# Patient Record
Sex: MALE | Race: WHITE | Employment: OTHER | URBAN - NONMETROPOLITAN AREA
[De-identification: names, ages, dates, MRNs, and addresses within clinical notes are randomized per-mention and may not be internally consistent; named-entity substitution may affect disease eponyms.]

---

## 2020-05-28 ENCOUNTER — APPOINTMENT (OUTPATIENT)
Dept: CT IMAGING | Age: 65
End: 2020-05-28
Payer: MEDICARE

## 2020-05-28 ENCOUNTER — APPOINTMENT (OUTPATIENT)
Dept: GENERAL RADIOLOGY | Age: 65
End: 2020-05-28
Payer: MEDICARE

## 2020-05-28 ENCOUNTER — HOSPITAL ENCOUNTER (EMERGENCY)
Age: 65
Discharge: HOME HEALTH | End: 2020-05-28
Attending: EMERGENCY MEDICINE
Payer: MEDICARE

## 2020-05-28 VITALS
WEIGHT: 190 LBS | SYSTOLIC BLOOD PRESSURE: 124 MMHG | DIASTOLIC BLOOD PRESSURE: 89 MMHG | RESPIRATION RATE: 19 BRPM | BODY MASS INDEX: 27.2 KG/M2 | TEMPERATURE: 97.6 F | HEIGHT: 70 IN | OXYGEN SATURATION: 93 % | HEART RATE: 78 BPM

## 2020-05-28 LAB
ALBUMIN SERPL-MCNC: 3.9 GM/DL (ref 3.4–5)
ALP BLD-CCNC: 74 IU/L (ref 40–129)
ALT SERPL-CCNC: 34 U/L (ref 10–40)
ANION GAP SERPL CALCULATED.3IONS-SCNC: 10 MMOL/L (ref 4–16)
AST SERPL-CCNC: 20 IU/L (ref 15–37)
BASOPHILS ABSOLUTE: 0.1 K/CU MM
BASOPHILS RELATIVE PERCENT: 0.7 % (ref 0–1)
BILIRUB SERPL-MCNC: 0.3 MG/DL (ref 0–1)
BUN BLDV-MCNC: 24 MG/DL (ref 6–23)
CALCIUM SERPL-MCNC: 9.1 MG/DL (ref 8.3–10.6)
CHLORIDE BLD-SCNC: 102 MMOL/L (ref 99–110)
CO2: 28 MMOL/L (ref 21–32)
CREAT SERPL-MCNC: 1 MG/DL (ref 0.9–1.3)
D DIMER: 333 NG/ML(DDU)
DIFFERENTIAL TYPE: ABNORMAL
EOSINOPHILS ABSOLUTE: 0.1 K/CU MM
EOSINOPHILS RELATIVE PERCENT: 1.3 % (ref 0–3)
GFR AFRICAN AMERICAN: >60 ML/MIN/1.73M2
GFR NON-AFRICAN AMERICAN: >60 ML/MIN/1.73M2
GLUCOSE BLD-MCNC: 175 MG/DL (ref 70–99)
HCT VFR BLD CALC: 51.1 % (ref 42–52)
HEMOGLOBIN: 16.2 GM/DL (ref 13.5–18)
IMMATURE NEUTROPHIL %: 0.2 % (ref 0–0.43)
LYMPHOCYTES ABSOLUTE: 2.2 K/CU MM
LYMPHOCYTES RELATIVE PERCENT: 24.5 % (ref 24–44)
MCH RBC QN AUTO: 33 PG (ref 27–31)
MCHC RBC AUTO-ENTMCNC: 31.7 % (ref 32–36)
MCV RBC AUTO: 104.1 FL (ref 78–100)
MONOCYTES ABSOLUTE: 0.6 K/CU MM
MONOCYTES RELATIVE PERCENT: 6.9 % (ref 0–4)
PDW BLD-RTO: 13.2 % (ref 11.7–14.9)
PLATELET # BLD: 173 K/CU MM (ref 140–440)
PMV BLD AUTO: 9.3 FL (ref 7.5–11.1)
POTASSIUM SERPL-SCNC: 4 MMOL/L (ref 3.5–5.1)
PRO-BNP: 2651 PG/ML
RBC # BLD: 4.91 M/CU MM (ref 4.6–6.2)
SEGMENTED NEUTROPHILS ABSOLUTE COUNT: 6 K/CU MM
SEGMENTED NEUTROPHILS RELATIVE PERCENT: 66.4 % (ref 36–66)
SODIUM BLD-SCNC: 140 MMOL/L (ref 135–145)
TOTAL IMMATURE NEUTOROPHIL: 0.02 K/CU MM
TOTAL PROTEIN: 7.2 GM/DL (ref 6.4–8.2)
TROPONIN T: <0.01 NG/ML
WBC # BLD: 9 K/CU MM (ref 4–10.5)

## 2020-05-28 PROCEDURE — 6360000002 HC RX W HCPCS: Performed by: EMERGENCY MEDICINE

## 2020-05-28 PROCEDURE — U0002 COVID-19 LAB TEST NON-CDC: HCPCS

## 2020-05-28 PROCEDURE — 80053 COMPREHEN METABOLIC PANEL: CPT

## 2020-05-28 PROCEDURE — 93005 ELECTROCARDIOGRAM TRACING: CPT | Performed by: EMERGENCY MEDICINE

## 2020-05-28 PROCEDURE — 84484 ASSAY OF TROPONIN QUANT: CPT

## 2020-05-28 PROCEDURE — 93010 ELECTROCARDIOGRAM REPORT: CPT | Performed by: INTERNAL MEDICINE

## 2020-05-28 PROCEDURE — 96374 THER/PROPH/DIAG INJ IV PUSH: CPT

## 2020-05-28 PROCEDURE — 85379 FIBRIN DEGRADATION QUANT: CPT

## 2020-05-28 PROCEDURE — 83880 ASSAY OF NATRIURETIC PEPTIDE: CPT

## 2020-05-28 PROCEDURE — 71045 X-RAY EXAM CHEST 1 VIEW: CPT

## 2020-05-28 PROCEDURE — 85025 COMPLETE CBC W/AUTO DIFF WBC: CPT

## 2020-05-28 PROCEDURE — 6360000004 HC RX CONTRAST MEDICATION: Performed by: EMERGENCY MEDICINE

## 2020-05-28 PROCEDURE — 99285 EMERGENCY DEPT VISIT HI MDM: CPT

## 2020-05-28 PROCEDURE — 71275 CT ANGIOGRAPHY CHEST: CPT

## 2020-05-28 RX ORDER — FUROSEMIDE 10 MG/ML
40 INJECTION INTRAMUSCULAR; INTRAVENOUS ONCE
Status: COMPLETED | OUTPATIENT
Start: 2020-05-28 | End: 2020-05-28

## 2020-05-28 RX ADMIN — IOPAMIDOL 75 ML: 755 INJECTION, SOLUTION INTRAVENOUS at 16:35

## 2020-05-28 RX ADMIN — FUROSEMIDE 40 MG: 10 INJECTION, SOLUTION INTRAVENOUS at 19:11

## 2020-05-28 ASSESSMENT — ENCOUNTER SYMPTOMS
RHINORRHEA: 0
SORE THROAT: 0
SHORTNESS OF BREATH: 1
VOMITING: 0
BACK PAIN: 0
NAUSEA: 0
ABDOMINAL PAIN: 0
EYE REDNESS: 0
WHEEZING: 0
COUGH: 1

## 2020-05-28 NOTE — ED NOTES
[facility] called for consult to [facility] hospitalist      Adele Pace, [address] [facility]  05/28/20 8664

## 2020-05-28 NOTE — ED PROVIDER NOTES
ADDENDUM:    Care of the patient was assumed  from Dr. Ulises Hawkins. I have reviewed the notes, assessments, and/or procedures performed, I concur with her/his documentation on Gagan Sena. I reviewed the medical record and evaluated the patient. ED COURSE/MDM:  Laboratory and imaging data were reviewed and care plan was arranged with the patient(see separate lab/imaging reports). RADIOLOGY:  Already resulted studies have been reviewed. CTA PULMONARY W CONTRAST   Final Result   No evidence of pulmonary embolism. Findings compatible with pulmonary edema with small pleural effusions. XR CHEST PORTABLE   Final Result   Findings are suggestive of congestive heart failure.              Labs Reviewed   CBC WITH AUTO DIFFERENTIAL - Abnormal; Notable for the following components:       Result Value    .1 (*)     MCH 33.0 (*)     MCHC 31.7 (*)     Segs Relative 66.4 (*)     Monocytes % 6.9 (*)     All other components within normal limits   COMPREHENSIVE METABOLIC PANEL W/ REFLEX TO MG FOR LOW K - Abnormal; Notable for the following components:    BUN 24 (*)     Glucose 175 (*)     All other components within normal limits   BRAIN NATRIURETIC PEPTIDE - Abnormal; Notable for the following components:    Pro-BNP 2,651 (*)     All other components within normal limits   D-DIMER, QUANTITATIVE - Abnormal; Notable for the following components:    D-Dimer, Quant 333 (*)     All other components within normal limits   TROPONIN   COVID-19       Medications   iopamidol (ISOVUE-370) 76 % injection 75 mL (75 mLs Intravenous Given 5/28/20 1635)   furosemide (LASIX) injection 40 mg (40 mg Intravenous Given 5/28/20 1911)       Vitals:    05/28/20 1318 05/28/20 1319 05/28/20 1416   BP: (!) 143/92     Pulse: 72  73   Resp: 18  15   Temp: 97.6 °F (36.4 °C)     TempSrc: Oral     SpO2: 98%  93%   Weight:  190 lb (86.2 kg)    Height:  5' 10\" (1.778 m)        Patient refuses to be admitted he changed his mind  I ([facility]    2. Hypoxia    3.  Shortness of breath        New Prescriptions    No medications on file                 Leisa Shi DO  05/28/20 6750

## 2020-05-28 NOTE — ED PROVIDER NOTES
suicidal ideas. Past Medical History:   Diagnosis Date    COPD (chronic obstructive pulmonary disease) ([facility]     Hypertension     Thyroid disease      History reviewed. No pertinent surgical history. History reviewed. No pertinent family history.   Social History     Socioeconomic History    Marital status:      Spouse name: Not on file    Number of children: Not on file    Years of education: Not on file    Highest education level: Not on file   Occupational History    Not on file   Social Needs    Financial resource strain: Not on file    Food insecurity     Worry: Not on file     Inability: Not on file    Transportation needs     Medical: Not on file     Non-medical: Not on file   Tobacco Use    Smoking status: Current Every Day Smoker     Packs/day: 1.00     Types: Cigarettes    Smokeless tobacco: Never Used   Substance and Sexual Activity    Alcohol use: Not on file     Comment: social    Drug use: Never    Sexual activity: Not on file   Lifestyle    Physical activity     Days per week: Not on file     Minutes per session: Not on file    Stress: Not on file   Relationships    Social connections     Talks on phone: Not on file     Gets together: Not on file     Attends Church service: Not on file     Active member of club or organization: Not on file     Attends meetings of clubs or organizations: Not on file     Relationship status: Not on file    Intimate partner violence     Fear of current or ex partner: Not on file     Emotionally abused: Not on file     Physically abused: Not on file     Forced sexual activity: Not on file   Other Topics Concern    Not on file   Social History Narrative    Not on file     Current Facility-Administered Medications   Medication Dose Route Frequency Provider Last Rate Last Dose    furosemide (LASIX) injection 40 mg  40 mg Intravenous Once Elvin Barron MD         Current Outpatient Medications   Medication Sig Dispense Refill   

## 2020-05-28 NOTE — ED NOTES
Spoke with Daria Snow at the access center to inform her that pt is signing out AMA.  She states she will pass it on     Justin Mckeon  05/28/20 1918

## 2020-05-29 ENCOUNTER — CARE COORDINATION (OUTPATIENT)
Dept: CARE COORDINATION | Age: 65
End: 2020-05-29

## 2020-05-29 LAB
EKG ATRIAL RATE: 68 BPM
EKG DIAGNOSIS: NORMAL
EKG P AXIS: -6 DEGREES
EKG P-R INTERVAL: 174 MS
EKG Q-T INTERVAL: 472 MS
EKG QRS DURATION: 110 MS
EKG QTC CALCULATION (BAZETT): 501 MS
EKG R AXIS: 64 DEGREES
EKG T AXIS: 31 DEGREES
EKG VENTRICULAR RATE: 68 BPM

## 2020-05-30 LAB
SARS-COV-2: NOT DETECTED
SOURCE: NORMAL

## 2020-06-01 ENCOUNTER — OFFICE VISIT (OUTPATIENT)
Dept: INTERNAL MEDICINE CLINIC | Age: 65
End: 2020-06-01
Payer: MEDICARE

## 2020-06-01 PROBLEM — M54.50 CHRONIC BILATERAL LOW BACK PAIN WITHOUT SCIATICA: Status: ACTIVE | Noted: 2020-06-01

## 2020-06-01 PROBLEM — G89.29 CHRONIC BILATERAL LOW BACK PAIN WITHOUT SCIATICA: Status: ACTIVE | Noted: 2020-06-01

## 2020-06-01 PROBLEM — J43.1 PANLOBULAR EMPHYSEMA (HCC): Status: ACTIVE | Noted: 2020-06-01

## 2020-06-01 PROBLEM — J81.0 ACUTE PULMONARY EDEMA (HCC): Status: ACTIVE | Noted: 2020-06-01

## 2020-06-01 PROBLEM — K21.9 GASTROESOPHAGEAL REFLUX DISEASE: Status: ACTIVE | Noted: 2020-06-01

## 2020-06-01 PROBLEM — M08.00 JUVENILE RHEUMATOID ARTHRITIS (HCC): Status: ACTIVE | Noted: 2020-06-01

## 2020-06-01 PROBLEM — Z72.0 TOBACCO USE: Status: ACTIVE | Noted: 2020-06-01

## 2020-06-01 PROBLEM — I10 ESSENTIAL HYPERTENSION: Status: ACTIVE | Noted: 2020-06-01

## 2020-06-01 PROBLEM — E03.9 ACQUIRED HYPOTHYROIDISM: Status: ACTIVE | Noted: 2020-06-01

## 2020-06-01 PROBLEM — I73.9 PERIPHERAL ARTERIAL DISEASE (HCC): Status: ACTIVE | Noted: 2020-06-01

## 2020-06-01 PROBLEM — N40.0 BENIGN PROSTATIC HYPERPLASIA: Status: ACTIVE | Noted: 2020-06-01

## 2020-06-01 PROBLEM — J30.89 NON-SEASONAL ALLERGIC RHINITIS: Status: ACTIVE | Noted: 2020-06-01

## 2020-06-01 PROCEDURE — 99204 OFFICE O/P NEW MOD 45 MIN: CPT | Performed by: INTERNAL MEDICINE

## 2020-06-01 RX ORDER — BUDESONIDE AND FORMOTEROL FUMARATE DIHYDRATE 160; 4.5 UG/1; UG/1
2 AEROSOL RESPIRATORY (INHALATION) 2 TIMES DAILY
Qty: 1 INHALER | Refills: 11
Start: 2020-06-01 | End: 2020-07-15 | Stop reason: SDUPTHER

## 2020-06-01 RX ORDER — CETIRIZINE HYDROCHLORIDE 10 MG/1
10 TABLET ORAL DAILY
Qty: 90 TABLET | Refills: 1
Start: 2020-06-01 | End: 2020-07-01

## 2020-06-01 RX ORDER — LEVOTHYROXINE SODIUM 0.03 MG/1
25 TABLET ORAL DAILY
Qty: 90 TABLET | Refills: 1
Start: 2020-06-01 | End: 2020-06-15

## 2020-06-01 RX ORDER — DULOXETIN HYDROCHLORIDE 30 MG/1
30 CAPSULE, DELAYED RELEASE ORAL DAILY
Qty: 90 CAPSULE | Refills: 1
Start: 2020-06-01 | End: 2020-06-15

## 2020-06-01 RX ORDER — FUROSEMIDE 20 MG/1
20 TABLET ORAL DAILY
Qty: 15 TABLET | Refills: 0 | Status: SHIPPED | OUTPATIENT
Start: 2020-06-01 | End: 2020-06-15

## 2020-06-01 RX ORDER — FINASTERIDE 5 MG/1
5 TABLET, FILM COATED ORAL DAILY
Qty: 90 TABLET | Refills: 1
Start: 2020-06-01 | End: 2020-07-15 | Stop reason: SDUPTHER

## 2020-06-01 RX ORDER — TAMSULOSIN HYDROCHLORIDE 0.4 MG/1
0.4 CAPSULE ORAL DAILY
Qty: 90 CAPSULE | Refills: 1
Start: 2020-06-01 | End: 2020-07-15 | Stop reason: SDUPTHER

## 2020-06-01 RX ORDER — METOPROLOL SUCCINATE 25 MG/1
25 TABLET, EXTENDED RELEASE ORAL DAILY
Qty: 90 TABLET | Refills: 1
Start: 2020-06-01 | End: 2020-06-15

## 2020-06-01 RX ORDER — ALBUTEROL SULFATE 90 UG/1
2 AEROSOL, METERED RESPIRATORY (INHALATION) 4 TIMES DAILY PRN
Qty: 1 INHALER | Refills: 11
Start: 2020-06-01 | End: 2020-07-15 | Stop reason: SDUPTHER

## 2020-06-01 RX ORDER — FAMOTIDINE 20 MG/1
20 TABLET, FILM COATED ORAL 2 TIMES DAILY
Qty: 60 TABLET | Refills: 3
Start: 2020-06-01 | End: 2020-07-15 | Stop reason: SDUPTHER

## 2020-06-01 RX ORDER — AMLODIPINE BESYLATE 10 MG/1
10 TABLET ORAL DAILY
Qty: 90 TABLET | Refills: 1
Start: 2020-06-01 | End: 2020-06-15

## 2020-06-01 RX ORDER — CYCLOBENZAPRINE HCL 5 MG
5 TABLET ORAL NIGHTLY PRN
Qty: 90 TABLET | Refills: 1
Start: 2020-06-01 | End: 2020-06-11

## 2020-06-01 ASSESSMENT — PATIENT HEALTH QUESTIONNAIRE - PHQ9
SUM OF ALL RESPONSES TO PHQ QUESTIONS 1-9: 0
2. FEELING DOWN, DEPRESSED OR HOPELESS: 0
SUM OF ALL RESPONSES TO PHQ QUESTIONS 1-9: 0
1. LITTLE INTEREST OR PLEASURE IN DOING THINGS: 0
SUM OF ALL RESPONSES TO PHQ9 QUESTIONS 1 & 2: 0

## 2020-06-01 NOTE — PROGRESS NOTES
Metoprolol and Amlodipine. # Takes Synthroid. Unclear dosing ? 25mcg. # Takes Zyrtec for allergic rhinitis. # Takes Finasteride and Flomax for BPH. # Takes Takes Cymbalta and Flexeril. Unsure of dosing of Cymbalta. # Smokes cigarettes. He is trying to quit smoking. He bought OTC patches. # Uses Albuterol and Symbicort for COPD. # Patient has PAD symptoms. # Reports he has h/o juvenile rheumatoid arthritis. # High MCV on CBC on 5/28/2020. # As per patient he should have had his gall bladder however due to pandemic restrictions he had to hold off. Asking for 1 week off. Drives a semi-truck. Having difficulty breathing. Health maintenance:   Health Maintenance Due   Topic Date Due    AAA screen  1955    Hepatitis C screen  1955    HIV screen  10/26/1969    Lipid screen  10/26/1994    Diabetes screen  10/26/1994    Colon cancer screen colonoscopy  10/26/2004    Shingles Vaccine (2 of 3) 04/02/2018    Pneumococcal 65+ years Vaccine (1 of 1 - PPSV23) 10/26/2019       Past Medical History:  Past Medical History:   Diagnosis Date    [facility]chronic obstructive pulmonary disease) ([facility]     Hypertension     Thyroid disease        Past Surgical History:  No past surgical history on file.     Allergies:  No Known Allergies    Medications:  Current outpatient prescriptions:  Outpatient Medications Marked as Taking for the 6/1/20 encounter (Office Visit) with Kaden Amos MD   Medication Sig Dispense Refill    finasteride (PROSCAR) 5 MG tablet Take 1 tablet by mouth daily 90 tablet 1    tamsulosin (FLOMAX) 0.4 MG capsule Take 1 capsule by mouth daily 90 capsule 1    cetirizine (ZYRTEC) 10 MG tablet Take 1 tablet by mouth daily 90 tablet 1    cyclobenzaprine (FLEXERIL) 5 MG tablet Take 1 tablet by mouth nightly as needed for Muscle spasms 90 tablet 1    levothyroxine (SYNTHROID) 25 MCG tablet Take 1 tablet by mouth daily 90 tablet 1    amLODIPine (NORVASC) 10 MG tablet Take 1 tablet by mouth daily 90 tablet 1    metoprolol succinate (TOPROL XL) 25 MG extended release tablet Take 1 tablet by mouth daily 90 tablet 1    DULoxetine (CYMBALTA) 30 MG extended release capsule Take 1 capsule by mouth daily 90 capsule 1    famotidine (PEPCID) 20 MG tablet Take 1 tablet by mouth 2 times daily 60 tablet 3    budesonide-formoterol (SYMBICORT) 160-4.5 MCG/ACT AERO Inhale 2 puffs into the lungs 2 times daily 1 Inhaler 11    albuterol sulfate HFA (VENTOLIN HFA) 108 (90 Base) MCG/ACT inhaler Inhale 2 puffs into the lungs 4 times daily as needed for Wheezing 1 Inhaler 11    furosemide (LASIX) 20 MG tablet Take 1 tablet by mouth daily 15 tablet 0       Social History:  Social History     Socioeconomic History    Marital status:      Spouse name: Not on file    Number of children: Not on file    Years of education: Not on file    Highest education level: Not on file   Occupational History    Not on file   Social Needs    Financial resource strain: Not on file    Food insecurity     Worry: Not on file     Inability: Not on file    Transportation needs     Medical: Not on file     Non-medical: Not on file   Tobacco Use    Smoking status: Current Every Day Smoker     Packs/day: 1.00     Types: Cigarettes    Smokeless tobacco: Never Used   Substance and Sexual Activity    Alcohol use: Not on file     Comment: social    Drug use: Never    Sexual activity: Not on file   Lifestyle    Physical activity     Days per week: Not on file     Minutes per session: Not on file    Stress: Not on file   Relationships    Social connections     Talks on phone: Not on file     Gets together: Not on file     Attends Anabaptist service: Not on file     Active member of club or organization: Not on file     Attends meetings of clubs or organizations: Not on file     Relationship status: Not on file    Intimate partner violence     Fear of current or ex partner: Not on file     Emotionally social, and family history during this visit. Pursuant to the emergency declaration under the [facility], [facility] waiver authority and the [employer] Act, this Virtual Visit was conducted, with patient's consent, to reduce the patient's risk of exposure to COVID-19 and provide continuity of care for an established patient. Services were provided through a video synchronous discussion virtually to substitute for in-person clinic visit.       Lise Jones MD  Internal Medicine  6/1/2020   2:33 PM

## 2020-06-01 NOTE — LETTER
[id number] [facility] Internal Med  [address] [place] [id number]  Phone: [phone]  Fax: [phone]    Danielle Garcia MD        June 1, 2020     Patient: Kamila Fonseca   YOB: 1955   Date of Visit: 6/1/2020       To Whom It May Concern: It is my medical opinion that Kamila Fonseca should have time off for 1 week starting 6/1/2020 due to acute medical illness. He may return to work with no restrictions on 6/8/2020. If you have any questions or concerns, please don't hesitate to call.     Sincerely,           Danielle Garcia MD

## 2020-06-05 ENCOUNTER — CARE COORDINATION (OUTPATIENT)
Dept: CARE COORDINATION | Age: 65
End: 2020-06-05

## 2020-06-15 ENCOUNTER — OFFICE VISIT (OUTPATIENT)
Dept: INTERNAL MEDICINE CLINIC | Age: 65
End: 2020-06-15
Payer: MEDICARE

## 2020-06-15 VITALS
HEART RATE: 95 BPM | TEMPERATURE: 97.5 F | DIASTOLIC BLOOD PRESSURE: 75 MMHG | BODY MASS INDEX: 29.49 KG/M2 | OXYGEN SATURATION: 98 % | SYSTOLIC BLOOD PRESSURE: 110 MMHG | HEIGHT: 70 IN | WEIGHT: 206 LBS

## 2020-06-15 PROBLEM — K58.2 MIXED IRRITABLE BOWEL SYNDROME: Status: ACTIVE | Noted: 2020-06-15

## 2020-06-15 PROBLEM — J30.1 SEASONAL ALLERGIC RHINITIS DUE TO POLLEN: Status: ACTIVE | Noted: 2020-06-15

## 2020-06-15 PROCEDURE — 99214 OFFICE O/P EST MOD 30 MIN: CPT | Performed by: INTERNAL MEDICINE

## 2020-06-15 RX ORDER — DICYCLOMINE HYDROCHLORIDE 10 MG/1
10 CAPSULE ORAL 4 TIMES DAILY
Qty: 360 CAPSULE | Refills: 1
Start: 2020-06-15 | End: 2020-07-15 | Stop reason: SDUPTHER

## 2020-06-15 RX ORDER — LEVOTHYROXINE SODIUM 0.1 MG/1
100 TABLET ORAL DAILY
Qty: 90 TABLET | Refills: 1
Start: 2020-06-15 | End: 2020-07-15 | Stop reason: SDUPTHER

## 2020-06-15 RX ORDER — AMLODIPINE BESYLATE 5 MG/1
5 TABLET ORAL DAILY
Qty: 90 TABLET | Refills: 1
Start: 2020-06-15 | End: 2020-07-15

## 2020-06-15 RX ORDER — TIOTROPIUM BROMIDE 18 UG/1
18 CAPSULE ORAL; RESPIRATORY (INHALATION) DAILY
Qty: 30 CAPSULE | Refills: 1 | Status: SHIPPED | OUTPATIENT
Start: 2020-06-15 | End: 2020-07-08

## 2020-06-15 RX ORDER — FUROSEMIDE 20 MG/1
20 TABLET ORAL DAILY
Qty: 30 TABLET | Refills: 0 | Status: SHIPPED | OUTPATIENT
Start: 2020-06-15 | End: 2020-07-15 | Stop reason: SDUPTHER

## 2020-06-15 RX ORDER — FLUTICASONE PROPIONATE 50 MCG
1 SPRAY, SUSPENSION (ML) NASAL DAILY
Qty: 1 BOTTLE | Refills: 0
Start: 2020-06-15 | End: 2020-07-15 | Stop reason: SDUPTHER

## 2020-06-15 RX ORDER — DULOXETIN HYDROCHLORIDE 60 MG/1
60 CAPSULE, DELAYED RELEASE ORAL DAILY
Qty: 90 CAPSULE | Refills: 1
Start: 2020-06-15 | End: 2020-07-15 | Stop reason: SDUPTHER

## 2020-06-15 RX ORDER — CYCLOBENZAPRINE HCL 10 MG
10 TABLET ORAL 2 TIMES DAILY PRN
Qty: 180 TABLET | Refills: 1
Start: 2020-06-15 | End: 2020-06-25

## 2020-06-15 SDOH — ECONOMIC STABILITY: FOOD INSECURITY: WITHIN THE PAST 12 MONTHS, THE FOOD YOU BOUGHT JUST DIDN'T LAST AND YOU DIDN'T HAVE MONEY TO GET MORE.: NEVER TRUE

## 2020-06-15 SDOH — ECONOMIC STABILITY: FOOD INSECURITY: WITHIN THE PAST 12 MONTHS, YOU WORRIED THAT YOUR FOOD WOULD RUN OUT BEFORE YOU GOT MONEY TO BUY MORE.: NEVER TRUE

## 2020-06-15 SDOH — ECONOMIC STABILITY: INCOME INSECURITY: HOW HARD IS IT FOR YOU TO PAY FOR THE VERY BASICS LIKE FOOD, HOUSING, MEDICAL CARE, AND HEATING?: NOT HARD AT ALL

## 2020-06-15 SDOH — ECONOMIC STABILITY: TRANSPORTATION INSECURITY
IN THE PAST 12 MONTHS, HAS LACK OF TRANSPORTATION KEPT YOU FROM MEETINGS, WORK, OR FROM GETTING THINGS NEEDED FOR DAILY LIVING?: NO

## 2020-06-15 SDOH — ECONOMIC STABILITY: TRANSPORTATION INSECURITY
IN THE PAST 12 MONTHS, HAS THE LACK OF TRANSPORTATION KEPT YOU FROM MEDICAL APPOINTMENTS OR FROM GETTING MEDICATIONS?: NO

## 2020-06-15 NOTE — PROGRESS NOTES
6/15/20    Leonor Oreilly  1955    Chief Complaint   Patient presents with    Other     2 wk chf       History of Present Illness:  Leonor Oreilly is a 71 y.o. pleasant gentleman presenting today with a chief complaint of SOB. He has a past medical history significant for:  HTN, on Amlodipine 5mg QD, Metoprolol tartrate 25mg BID  Hypothyroidism, on Synthroid 100mcg QD    PAD, on ASA   GERD, on Pepcid 20mg BID   Allergic rhinitis, on Zyrtec, Flonase   BPH, on Finasteride, Flomax  COPD, on Albuterol PRN, Symbicort BID  Chronic back pain, on Cymbalta 60mg QHS, Flexeril 10mg BID PRN   IBS-mixed, on Bentyl QID   Juvenile RA   Rheumatic fever   Current smoker     # Patient was in the ED on 5/28 for acute onset SOB. New diagnosis of CHF. EKG wo acute ischemia. Neg Mainor. BNP elevated. CT showed no PE but did show pulmonary edema (high d-dimer). COVID test negative. He was hypoxic, requiring 4L O2 however patient declined admission. He is not having worsening SOB, but at his baseline. He was given in the ED Lasix IV 40mg once. First time I met patient 2 weeks ago I gave him Lasix 20mg QD. He reports his SOB improved over 50% with the Lasix. TTE not done. # Takes Pepcid for GERD. Helping. He was on Zantac in the past but I discontinued it due to recall. # Takes Amlodipine and Metoprolol for HTN. Unclear of dosing of Metoprolol and Amlodipine. # Takes Synthroid. Unclear dosing ? 25mcg. # Takes Zyrtec for allergic rhinitis. This is helping. Tolerating well. He is also using Flonase PRN. # Takes Finasteride and Flomax for BPH. Symptoms stable on these meds. Tolerating meds. # Takes Takes Cymbalta and Flexeril for chronic back pain. Both helping. No side effects such as drowsiness. # Smokes cigarettes. He is trying to quit smoking. He bought OTC patches. # Uses Albuterol and Symbicort for COPD. # Reports he has h/o juvenile rheumatoid arthritis. # High MCV on CBC on 5/28/2020.    # As per (NORVASC) 5 MG tablet Take 1 tablet by mouth daily 90 tablet 1    tiotropium (SPIRIVA HANDIHALER) 18 MCG inhalation capsule Inhale 1 capsule into the lungs daily 30 capsule 1    finasteride (PROSCAR) 5 MG tablet Take 1 tablet by mouth daily 90 tablet 1    tamsulosin (FLOMAX) 0.4 MG capsule Take 1 capsule by mouth daily 90 capsule 1    cetirizine (ZYRTEC) 10 MG tablet Take 1 tablet by mouth daily 90 tablet 1    famotidine (PEPCID) 20 MG tablet Take 1 tablet by mouth 2 times daily 60 tablet 3    budesonide-formoterol (SYMBICORT) 160-4.5 MCG/ACT AERO Inhale 2 puffs into the lungs 2 times daily 1 Inhaler 11    albuterol sulfate HFA (VENTOLIN HFA) 108 (90 Base) MCG/ACT inhaler Inhale 2 puffs into the lungs 4 times daily as needed for Wheezing 1 Inhaler 11       Social History:  Social History     Socioeconomic History    Marital status:      Spouse name: Not on file    Number of children: Not on file    Years of education: Not on file    Highest education level: Not on file   Occupational History    Not on file   Social Needs    Financial resource strain: Not hard at all   [employer] insecurity     Worry: Never true     Inability: Never true   [employer] needs     Medical: No     Non-medical: No   Tobacco Use    Smoking status: Current Every Day Smoker     Packs/day: 1.00     Types: Cigarettes    Smokeless tobacco: Never Used   Substance and Sexual Activity    Alcohol use: Not on file     Comment: social    Drug use: Never    Sexual activity: Not on file   Lifestyle    Physical activity     Days per week: Not on file     Minutes per session: Not on file    Stress: Not on file   Relationships    Social connections     Talks on phone: Not on file     Gets together: Not on file     Attends Taoist service: Not on file     Active member of club or organization: Not on file     Attends meetings of clubs or organizations: Not on file     Relationship status: Not on file    Intimate partner Ears: external ears normal  Neck: supple, no cervical lymphadenopathy, thyroid symmetric and not enlarged, no bruits   Heart: regular rate and rhythm, regular S1/S2, no S3/S4, no audible murmurs, no audible friction rub  Lungs: clear to auscultation bilaterally, no audible crackles, no audible wheezes, no audible rhonchi    Abdomen: normal bowel sounds, soft abdomen, non-tender, no palpable masses  Extremities: no edema, warm, no cyanosis, no clubbing. Good capillary refill   MSK: no tenderness across spinous processes, full ROM in all 4 extremities. No joint swelling or tenderness   Peripheral vascular: 2+ pulses symmetric (radial)  Neuro: gait normal, CN II-XII intact, motor power 5/5 in all 4 extremities, sensation intact and symmetric    Labs   Reviewed with patient     Imaging   Reviewed with patient      Assessment/Plan:     1. Acute pulmonary edema (HCC)  Improving  However he needs TTE for evaluation as it has never been done and CHF diagnosis is unclear (systolic vs diastolic?)   He has PAD  He has JRA so is at risk for CAD  He is already on ASA  Will continue Lasix 20mg QD   Will check labs to ensure renal and hepatic labs stable   - Echocardiogram complete; Future  - CBC WITH AUTO DIFFERENTIAL; Future  - COMPREHENSIVE METABOLIC PANEL; Future    2. Essential hypertension  Stable  Continue Amlodipine 5mg QD  Continue Metoprolol tartrate 25mg BID     3. Acquired hypothyroidism  Needs updated TFTs  Stable  Continue synthroid 100mcg QD   - TSH without Reflex; Future  - FREE T4; Future    4. Dyslipidemia  Not on statin therapy  Needs ASCVD calculated and will get updated lipid panel  - LIPID PANEL; Future    5. Hyperglycemia  - HEMOGLOBIN A1C; Future    6. Peripheral arterial disease (HCC)  Stable  Continue ASA    7. Gastroesophageal reflux disease, esophagitis presence not specified  Stable  Continue Pepcid 20mg BID    8. Seasonal allergic rhinitis due to pollen  Stable  Continue Zyrtec and Flonase     9.

## 2020-06-22 ENCOUNTER — CARE COORDINATION (OUTPATIENT)
Dept: CARE COORDINATION | Age: 65
End: 2020-06-22

## 2020-06-22 NOTE — CARE COORDINATION
You Patient resolved from the Care Transitions episode on 5/29/20  Discussed COVID-19 related testing which was not done at this time. Test results were not done. Patient informed of results, if available? No    Patient/family has been provided the following resources and education related to COVID-19:                         Signs, symptoms and red flags related to COVID-19            CDC exposure and quarantine guidelines            Conduit exposure contact - [phone]            Contact for their local Department of Health                 Patient currently reports that the following symptoms have improved:  shortness of breath and no new/worsening symptoms     No further outreach scheduled with this CTN/ACM. Episode of Care resolved. Patient has this CTN/ACM contact information if future needs arise. Still having some sob, coughing but not as bad. Has echo and labs tmw. Is using spiriva and symbicort. Reports heat bothered him over the wknd, made breathing worse, reviewed limiting environmental triggers, voices understanding. Advised to call if any needs arise and to call pcp with any worsening symptoms.

## 2020-06-23 ENCOUNTER — HOSPITAL ENCOUNTER (OUTPATIENT)
Dept: CARDIAC REHAB | Age: 65
Discharge: HOME | End: 2020-06-23
Payer: MEDICARE

## 2020-06-23 ENCOUNTER — HOSPITAL ENCOUNTER (OUTPATIENT)
Age: 65
Discharge: HOME | End: 2020-06-23
Payer: MEDICARE

## 2020-06-23 LAB
ALBUMIN SERPL-MCNC: 4.3 GM/DL (ref 3.4–5)
ALP BLD-CCNC: 79 IU/L (ref 40–129)
ALT SERPL-CCNC: 16 U/L (ref 10–40)
ANION GAP SERPL CALCULATED.3IONS-SCNC: 22 MMOL/L (ref 4–16)
AST SERPL-CCNC: 16 IU/L (ref 15–37)
BASOPHILS ABSOLUTE: 0 K/CU MM
BASOPHILS RELATIVE PERCENT: 0.5 % (ref 0–1)
BILIRUB SERPL-MCNC: 0.5 MG/DL (ref 0–1)
BUN BLDV-MCNC: 25 MG/DL (ref 6–23)
CALCIUM SERPL-MCNC: 10 MG/DL (ref 8.3–10.6)
CHLORIDE BLD-SCNC: 97 MMOL/L (ref 99–110)
CHOLESTEROL: 155 MG/DL
CO2: 18 MMOL/L (ref 21–32)
CREAT SERPL-MCNC: 1.4 MG/DL (ref 0.9–1.3)
DIFFERENTIAL TYPE: ABNORMAL
EOSINOPHILS ABSOLUTE: 0.2 K/CU MM
EOSINOPHILS RELATIVE PERCENT: 2.8 % (ref 0–3)
ESTIMATED AVERAGE GLUCOSE: 137 MG/DL
GFR AFRICAN AMERICAN: >60 ML/MIN/1.73M2
GFR NON-AFRICAN AMERICAN: 51 ML/MIN/1.73M2
GLUCOSE BLD-MCNC: 98 MG/DL (ref 70–99)
HBA1C MFR BLD: 6.4 % (ref 4.2–6.3)
HCT VFR BLD CALC: 52.7 % (ref 42–52)
HDLC SERPL-MCNC: 30 MG/DL
HEMOGLOBIN: 17.6 GM/DL (ref 13.5–18)
IMMATURE NEUTROPHIL %: 0.5 % (ref 0–0.43)
LDL CHOLESTEROL DIRECT: 106 MG/DL
LV EF: 43 %
LVEF MODALITY: NORMAL
LYMPHOCYTES ABSOLUTE: 2 K/CU MM
LYMPHOCYTES RELATIVE PERCENT: 24.5 % (ref 24–44)
MCH RBC QN AUTO: 33.3 PG (ref 27–31)
MCHC RBC AUTO-ENTMCNC: 33.4 % (ref 32–36)
MCV RBC AUTO: 99.6 FL (ref 78–100)
MONOCYTES ABSOLUTE: 0.9 K/CU MM
MONOCYTES RELATIVE PERCENT: 10.5 % (ref 0–4)
PDW BLD-RTO: 12.9 % (ref 11.7–14.9)
PLATELET # BLD: 157 K/CU MM (ref 140–440)
PMV BLD AUTO: 9.2 FL (ref 7.5–11.1)
POTASSIUM SERPL-SCNC: 4.1 MMOL/L (ref 3.5–5.1)
RBC # BLD: 5.29 M/CU MM (ref 4.6–6.2)
SEGMENTED NEUTROPHILS ABSOLUTE COUNT: 5.1 K/CU MM
SEGMENTED NEUTROPHILS RELATIVE PERCENT: 61.2 % (ref 36–66)
SODIUM BLD-SCNC: 137 MMOL/L (ref 135–145)
TOTAL IMMATURE NEUTOROPHIL: 0.04 K/CU MM
TOTAL PROTEIN: 7.4 GM/DL (ref 6.4–8.2)
TRIGL SERPL-MCNC: 173 MG/DL
TSH HIGH SENSITIVITY: 1.34 UIU/ML (ref 0.27–4.2)
WBC # BLD: 8.3 K/CU MM (ref 4–10.5)

## 2020-06-23 PROCEDURE — 80053 COMPREHEN METABOLIC PANEL: CPT

## 2020-06-23 PROCEDURE — 93306 TTE W/DOPPLER COMPLETE: CPT

## 2020-06-23 PROCEDURE — 85025 COMPLETE CBC W/AUTO DIFF WBC: CPT

## 2020-06-23 PROCEDURE — 80061 LIPID PANEL: CPT

## 2020-06-23 PROCEDURE — 36415 COLL VENOUS BLD VENIPUNCTURE: CPT

## 2020-06-23 PROCEDURE — 83721 ASSAY OF BLOOD LIPOPROTEIN: CPT

## 2020-06-23 PROCEDURE — 84439 ASSAY OF FREE THYROXINE: CPT

## 2020-06-23 PROCEDURE — 83036 HEMOGLOBIN GLYCOSYLATED A1C: CPT

## 2020-06-23 PROCEDURE — 84443 ASSAY THYROID STIM HORMONE: CPT

## 2020-06-24 ENCOUNTER — TELEPHONE (OUTPATIENT)
Dept: INTERNAL MEDICINE CLINIC | Age: 65
End: 2020-06-24

## 2020-06-24 LAB — T4 FREE: 1.28 NG/DL (ref 0.9–1.8)

## 2020-07-08 RX ORDER — TIOTROPIUM BROMIDE 18 UG/1
18 CAPSULE ORAL; RESPIRATORY (INHALATION) DAILY
Qty: 30 CAPSULE | Refills: 3 | Status: SHIPPED | OUTPATIENT
Start: 2020-07-08 | End: 2020-07-15 | Stop reason: SDUPTHER

## 2020-07-15 ENCOUNTER — OFFICE VISIT (OUTPATIENT)
Dept: INTERNAL MEDICINE CLINIC | Age: 65
End: 2020-07-15
Payer: MEDICARE

## 2020-07-15 VITALS
DIASTOLIC BLOOD PRESSURE: 60 MMHG | BODY MASS INDEX: 30.36 KG/M2 | OXYGEN SATURATION: 92 % | HEART RATE: 80 BPM | HEIGHT: 69 IN | SYSTOLIC BLOOD PRESSURE: 110 MMHG | WEIGHT: 205 LBS

## 2020-07-15 PROBLEM — E78.2 MIXED HYPERLIPIDEMIA: Status: ACTIVE | Noted: 2020-07-15

## 2020-07-15 PROBLEM — I50.42 CHRONIC COMBINED SYSTOLIC AND DIASTOLIC CONGESTIVE HEART FAILURE (HCC): Status: ACTIVE | Noted: 2020-07-15

## 2020-07-15 PROBLEM — N17.9 AKI (ACUTE KIDNEY INJURY) (HCC): Status: ACTIVE | Noted: 2020-07-15

## 2020-07-15 PROBLEM — R73.03 PREDIABETES: Status: ACTIVE | Noted: 2020-07-15

## 2020-07-15 PROBLEM — N40.0 BENIGN PROSTATIC HYPERPLASIA: Status: ACTIVE | Noted: 2020-07-15

## 2020-07-15 PROCEDURE — 99214 OFFICE O/P EST MOD 30 MIN: CPT | Performed by: INTERNAL MEDICINE

## 2020-07-15 PROCEDURE — 36415 COLL VENOUS BLD VENIPUNCTURE: CPT | Performed by: INTERNAL MEDICINE

## 2020-07-15 RX ORDER — FAMOTIDINE 20 MG/1
20 TABLET, FILM COATED ORAL 2 TIMES DAILY
Qty: 180 TABLET | Refills: 1 | Status: ON HOLD | OUTPATIENT
Start: 2020-07-15 | End: 2020-11-09 | Stop reason: HOSPADM

## 2020-07-15 RX ORDER — TAMSULOSIN HYDROCHLORIDE 0.4 MG/1
0.4 CAPSULE ORAL DAILY
Qty: 90 CAPSULE | Refills: 1 | Status: ON HOLD | OUTPATIENT
Start: 2020-07-15 | End: 2020-11-09 | Stop reason: HOSPADM

## 2020-07-15 RX ORDER — LISINOPRIL 5 MG/1
5 TABLET ORAL DAILY
Qty: 30 TABLET | Refills: 0 | Status: SHIPPED | OUTPATIENT
Start: 2020-07-15 | End: 2020-08-10

## 2020-07-15 RX ORDER — DICYCLOMINE HYDROCHLORIDE 10 MG/1
10 CAPSULE ORAL 4 TIMES DAILY
Qty: 360 CAPSULE | Refills: 1 | Status: ON HOLD | OUTPATIENT
Start: 2020-07-15 | End: 2020-11-09 | Stop reason: HOSPADM

## 2020-07-15 RX ORDER — ALBUTEROL SULFATE 90 UG/1
2 AEROSOL, METERED RESPIRATORY (INHALATION) 4 TIMES DAILY PRN
Qty: 1 INHALER | Refills: 11 | Status: ON HOLD | OUTPATIENT
Start: 2020-07-15 | End: 2020-11-09 | Stop reason: HOSPADM

## 2020-07-15 RX ORDER — FLUTICASONE PROPIONATE 50 MCG
1 SPRAY, SUSPENSION (ML) NASAL DAILY
Qty: 1 BOTTLE | Refills: 0 | Status: SHIPPED | OUTPATIENT
Start: 2020-07-15 | End: 2020-08-18

## 2020-07-15 RX ORDER — FUROSEMIDE 20 MG/1
20 TABLET ORAL DAILY
Qty: 30 TABLET | Refills: 0 | Status: SHIPPED | OUTPATIENT
Start: 2020-07-15 | End: 2020-08-10

## 2020-07-15 RX ORDER — DULOXETIN HYDROCHLORIDE 60 MG/1
60 CAPSULE, DELAYED RELEASE ORAL DAILY
Qty: 90 CAPSULE | Refills: 1 | Status: SHIPPED | OUTPATIENT
Start: 2020-07-15 | End: 2020-12-22 | Stop reason: SDUPTHER

## 2020-07-15 RX ORDER — AMLODIPINE BESYLATE 5 MG/1
5 TABLET ORAL DAILY
Qty: 90 TABLET | Refills: 1 | Status: CANCELLED | OUTPATIENT
Start: 2020-07-15

## 2020-07-15 RX ORDER — FINASTERIDE 5 MG/1
5 TABLET, FILM COATED ORAL DAILY
Qty: 90 TABLET | Refills: 1 | Status: ON HOLD | OUTPATIENT
Start: 2020-07-15 | End: 2020-11-09 | Stop reason: HOSPADM

## 2020-07-15 RX ORDER — LEVOTHYROXINE SODIUM 0.1 MG/1
100 TABLET ORAL DAILY
Qty: 90 TABLET | Refills: 1 | Status: SHIPPED | OUTPATIENT
Start: 2020-07-15 | End: 2020-11-30

## 2020-07-15 RX ORDER — BUDESONIDE AND FORMOTEROL FUMARATE DIHYDRATE 160; 4.5 UG/1; UG/1
2 AEROSOL RESPIRATORY (INHALATION) 2 TIMES DAILY
Qty: 1 INHALER | Refills: 11 | Status: SHIPPED | OUTPATIENT
Start: 2020-07-15 | End: 2021-02-24 | Stop reason: SDUPTHER

## 2020-07-15 RX ORDER — TIOTROPIUM BROMIDE 18 UG/1
18 CAPSULE ORAL; RESPIRATORY (INHALATION) DAILY
Qty: 30 CAPSULE | Refills: 3 | Status: SHIPPED | OUTPATIENT
Start: 2020-07-15 | End: 2020-12-16

## 2020-07-15 ASSESSMENT — PATIENT HEALTH QUESTIONNAIRE - PHQ9
2. FEELING DOWN, DEPRESSED OR HOPELESS: 0
SUM OF ALL RESPONSES TO PHQ QUESTIONS 1-9: 0
SUM OF ALL RESPONSES TO PHQ QUESTIONS 1-9: 0
1. LITTLE INTEREST OR PLEASURE IN DOING THINGS: 0
SUM OF ALL RESPONSES TO PHQ9 QUESTIONS 1 & 2: 0

## 2020-07-15 NOTE — PATIENT INSTRUCTIONS
Patient Education        Limiting Sodium With Heart Failure: Care Instructions  Your Care Instructions     Sodium causes your body to hold on to extra water. This may cause your heart failure symptoms to get worse. Limiting sodium may help you feel better. People get most of their sodium from processed foods. Fast food and restaurant meals also tend to be very high in sodium. Your doctor may suggest that you limit sodium. Your doctor can tell you how much sodium is right for you. An example is less than 3,000 mg a day. This includes all the salt you eat in cooked or packaged foods. Follow-up care is a key part of your treatment and safety. Be sure to make and go to all appointments, and call your doctor if you are having problems. It's also a good idea to know your test results and keep a list of the medicines you take. How can you care for yourself at home? Read food labels  · Read food labels on cans and food packages. The labels tell you how much sodium is in each serving. Make sure that you look at the serving size. If you eat more than the serving size, you have eaten more sodium than is listed for one serving. · Food labels also tell you the Percent Daily Value for sodium. Choose products with low Percent Daily Values for sodium. · Be aware that sodium can come in forms other than salt, including monosodium glutamate (MSG), sodium citrate, and sodium bicarbonate (baking soda). MSG is often added to Asian food. You can sometimes ask for food without MSG or salt. Buy low-sodium foods  · Buy foods that are labeled \"unsalted\" (no salt added), \"sodium-free\" (less than 5 mg of sodium per serving), or \"low-sodium\" (less than 140 mg of sodium per serving). A food labeled \"light sodium\" has less than half of the full-sodium version of that food. Foods labeled \"reduced-sodium\" may still have too much sodium. · Buy fresh vegetables or plain, frozen vegetables.  Buy low-sodium versions of canned vegetables, soups, and other canned goods. Prepare low-sodium meals  · Use less salt each day when cooking. Reducing salt in this way will help you adjust to the taste. Do not add salt after cooking. Take the salt shaker off the table. · Flavor your food with garlic, lemon juice, onion, vinegar, herbs, and spices instead of salt. Do not use soy sauce, steak sauce, onion salt, garlic salt, mustard, or ketchup on your food. · Make your own salad dressings, sauces, and ketchup without adding salt. · Use less salt (or none) when recipes call for it. You can often use half the salt a recipe calls for without losing flavor. Other dishes like rice, pasta, and grains do not need added salt. · Rinse canned vegetables. This removes some--but not all--of the salt. · Avoid water that has a naturally high sodium content or that has been treated with water softeners, which add sodium. Call your local water company to find out the sodium content of your water supply. If you buy bottled water, read the label and choose a sodium-free brand. Avoid high-sodium foods, such as:  · Smoked, cured, salted, and canned meat, fish, and poultry. · Ham, roche, hot dogs, and luncheon meats. · Regular, hard, and processed cheese and regular peanut butter. · Crackers with salted tops. · Frozen prepared meals. · Canned and dried soups, broths, and bouillon, unless labeled sodium-free or low-sodium. · Canned vegetables, unless labeled sodium-free or low-sodium. · Salted snack foods such as chips and pretzels. · Western No fries, pizza, tacos, and other fast foods. · Pickles, olives, ketchup, and other condiments, especially soy sauce, unless labeled sodium-free or low-sodium. If you cannot cook for yourself  · Have family members or friends help you, or have someone cook low-sodium meals. · Check with your local senior nutrition program to find out where meals are served and whether they offer a low-sodium option.  You can often find these programs through your local health department or hospital.  · Have meals delivered to your home. Most [facility] have a Meals on [employer]. These programs provide one hot meal a day for older adults, delivered to their homes. Ask whether these meals are low-sodium. Let them know that you are on a low-sodium diet. Where can you learn more? Go to https://BlastRootspepiceweb.[employer]bay. org and sign in to your [employer] account. Enter A166 in the [facility] box to learn more about \"Limiting Sodium With Heart Failure: Care Instructions. \"     If you do not have an account, please click on the \"Sign Up Now\" link. Current as of: December 16, 2019               Content Version: 12.5  © 6231-8513 Healthwise, Incorporated. Care instructions adapted under license by [facility] ([facility]). If you have questions about a medical condition or this instruction, always ask your healthcare professional. [address] any warranty or liability for your use of this information.

## 2020-07-15 NOTE — PROGRESS NOTES
7/15/20    Jillian Rojas  1955    Chief Complaint   Patient presents with    Shortness of Breath     copd       History of Present Illness:  Jillian Rojas is a 71 y.o. pleasant gentleman presenting today with a chief complaint of CHF, HTN. He has a past medical history significant for:  HTN, on Amlodipine 5mg QD, Metoprolol tartrate 25mg BID  HL (,  on 6/23/2020), not on statin   Prediabetes (HbA1C 6.4% on 6/23/2020), not on meds  Hypothyroidism (TSH 1.34 normal on 6/23/2020), on Synthroid 100mcg QD    PAD, on ASA   GERD, on Pepcid 20mg BID   Allergic rhinitis, on Zyrtec, Flonase   BPH, on Finasteride, Flomax  COPD, on Albuterol PRN, Symbicort BID, Spiriva QD   Chronic back pain, on Cymbalta 60mg QHS, Flexeril 10mg BID PRN   IBS-mixed, on Bentyl QID   Juvenile RA   Rheumatic fever   Current smoker     # Patient was in the ED on 5/28 for acute onset SOB. New diagnosis of CHF. EKG wo acute ischemia. Neg Mainor. BNP elevated. CT showed no PE but did show pulmonary edema (high d-dimer). COVID test negative. He was hypoxic, requiring 4L O2 however patient declined admission.   He is not having worsening SOB, but at his baseline. He was given in the ED Lasix IV 40mg once. First time I met patient in June I prescribed Lasix 20mg QD. He reports his SOB improved over 70% with the Lasix. TTE done 6/23/2020 shows e/o mixed systolic and diastolic heart failure. He has never had LHC done. Patient denies alcohol abuse. He developed JOSE so I held Lasix (Cr up to 1.4 in June). He has held Lasix for 1 week. # Takes Pepcid for GERD. Helping. He was on Zantac in the past but I discontinued it due to recall. No heartburn or reflux or nausea. # Takes Amlodipine and Metoprolol for HTN. Unclear of dosing of Metoprolol and Amlodipine. No CP or palpitations. # Takes Synthroid. No symptoms of hyper or hypothyroidism. # Takes Zyrtec for allergic rhinitis. This is helping. Tolerating well.  He is also using Flonase PRN. No coughing or sneezing. # Takes Finasteride and Flomax for BPH. Symptoms stable on these meds. Tolerating meds. No LUTS. # Takes Cymbalta and Flexeril for chronic back pain. Both helping. No side effects such as drowsiness. # Smokes cigarettes. He is trying to quit smoking. He bought OTC patches. # Uses Albuterol and Symbicort for COPD, as well as recently added Spiriva. No wheezing, coughing, hemoptysis  # Reports he has h/o juvenile rheumatoid arthritis? # High MCV on CBC on 5/28/2020. # As per patient he should have had his gall bladder however due to pandemic restrictions he had to hold off. # He takes ASA for PAD. No bleeding or bruising on ASA. No h/o CAD or ischemic stroke. # ? H/o dyslipidemia. Not on statin therapy. He does have RA however. # Patient's mother was diabetic. His BG was high in ER in May. Prediabetic based on A1C drawn in June. # Patient is having ED over the past 2 months. # Has lesion on L side of face. Sun-exposed area. Growing over the past 2 months.      Drives a semi-truck       Health maintenance:   Health Maintenance Due   Topic Date Due    AAA screen  1955    Hepatitis C screen  1955    HIV screen  10/26/1969    Colon cancer screen colonoscopy  10/26/2004    Shingles Vaccine (2 of 3) 04/02/2018    Pneumococcal 65+ years Vaccine (1 of 1 - PPSV23) 10/26/2019    Annual Wellness Visit (AWV)  06/01/2020       Past Medical History:  Past Medical History:   Diagnosis Date    COPD (chronic obstructive pulmonary disease) ([facility] [address]     Hypertension     Thyroid disease        Past Surgical History:  History reviewed. No pertinent surgical history.     Allergies:  No Known Allergies    Medications:  Current outpatient prescriptions:  Outpatient Medications Marked as Taking for the 7/15/20 encounter (Office Visit) with Kaylie Ariza MD   Medication Sig Dispense Refill    furosemide (LASIX) 20 MG tablet Take 1 tablet by mouth daily 30 tablet 0    albuterol sulfate HFA (VENTOLIN HFA) 108 (90 Base) MCG/ACT inhaler Inhale 2 puffs into the lungs 4 times daily as needed for Wheezing 1 Inhaler 11    budesonide-formoterol (SYMBICORT) 160-4.5 MCG/ACT AERO Inhale 2 puffs into the lungs 2 times daily 1 Inhaler 11    dicyclomine (BENTYL) 10 MG capsule Take 1 capsule by mouth 4 times daily 360 capsule 1    famotidine (PEPCID) 20 MG tablet Take 1 tablet by mouth 2 times daily 180 tablet 1    DULoxetine (CYMBALTA) 60 MG extended release capsule Take 1 capsule by mouth daily 90 capsule 1    finasteride (PROSCAR) 5 MG tablet Take 1 tablet by mouth daily 90 tablet 1    fluticasone (FLONASE) 50 MCG/ACT nasal spray 1 spray by Each Nostril route daily 1 Bottle 0    levothyroxine (SYNTHROID) 100 MCG tablet Take 1 tablet by mouth daily 90 tablet 1    metoprolol tartrate (LOPRESSOR) 25 MG tablet Take 1 tablet by mouth 2 times daily 180 tablet 1    tiotropium (SPIRIVA HANDIHALER) 18 MCG inhalation capsule Inhale 1 capsule into the lungs daily 30 capsule 3    tamsulosin (FLOMAX) 0.4 MG capsule Take 1 capsule by mouth daily 90 capsule 1    lisinopril (PRINIVIL;ZESTRIL) 5 MG tablet Take 1 tablet by mouth daily 30 tablet 0       Social History:  Social History     Socioeconomic History    Marital status:      Spouse name: Not on file    Number of children: Not on file    Years of education: Not on file    Highest education level: Not on file   Occupational History    Not on file   Social Needs    Financial resource strain: Not hard at all   [employer] insecurity     Worry: Never true     Inability: Never true   [employer] needs     Medical: No     Non-medical: No   Tobacco Use    Smoking status: Current Every Day Smoker     Packs/day: 1.00     Types: Cigarettes    Smokeless tobacco: Never Used   Substance and Sexual Activity    Alcohol use: Not on file     Comment: social    Drug use: Never    Sexual activity: Not on file   Lifestyle    Physical activity     Days per week: Not on file     Minutes per session: Not on file    Stress: Not on file   Relationships    Social connections     Talks on phone: Not on file     Gets together: Not on file     Attends Confucianism service: Not on file     Active member of club or organization: Not on file     Attends meetings of clubs or organizations: Not on file     Relationship status: Not on file    Intimate partner violence     Fear of current or ex partner: Not on file     Emotionally abused: Not on file     Physically abused: Not on file     Forced sexual activity: Not on file   Other Topics Concern    Not on file   Social History Narrative    Not on file       Family History:  History reviewed. No pertinent family history. Review of Systems:  Constitutional: no fevers, no chills, no night sweats, no weight loss, no weight gain, no fatigue   Pain assessment: no pain  Head: no headaches  Ears: no hearing loss, no tinnitus, no vertigo  Eyes: no blurry vision, no diplopia, no dryness, no itchiness  Mouth: no oral ulcers, no dry mouth, no sore throat  Nose: no nasal congestion, no epistaxis  Cardiac: no chest pain, no palpitations, leg swelling, no orthopnea, no PND, no syncope  Pulmonary: dyspnea, no cough, no wheezing, no hemoptysis  GI: no nausea, no vomiting, no diarrhea, no constipation, no abdominal pain, no hematochezia  : no dysuria, no frequency, no urgency, no hematuria, no frothy urine  MSK: no arthralgias, no myalgias, no early morning stiffness, no Raynaud's   Neuro: no focal neurological deficits, no seizures  Sleep: no snoring, no daytime somnolence   Psych: no depression, no anxiety, no suicidal ideation      Physical Exam:  VITALS:   /60   Pulse 80   Ht 5' 9\" (1.753 m)   Wt 205 lb (93 kg)   SpO2 92%   BMI 30.27 kg/m²     PHYSICAL EXAMINATION:  General: alert, awake, and oriented to time, place, person, and situation.  Not in acute distress   Skin: scaly raised lesion papule on L sideburn  Head: normocephalic/atraumatic  Eyes: anicteric sclera, well-injected conjunctiva. Pupils are equally round and reactive to light. Extraocular movements are intact   Nose/Sinuses: no sinus tenderness, septum midline, mucosa appears normal   Oropharynx: lips, teeth, and tongue normal. Non-erythematous pharynx, no oral ulcers, moist mucous membranes, no tonsillar exudates   Ears: external ears normal  Neck: supple, no cervical lymphadenopathy, thyroid symmetric and not enlarged, no bruits, No JVD   Heart: regular rate and rhythm, regular S1/S2, no S3/S4, no audible murmurs, no audible friction rub  Lungs: clear to auscultation bilaterally, no audible crackles, no audible wheezes, no audible rhonchi    Abdomen: normal bowel sounds, soft abdomen, non-tender, no palpable masses  Extremities: no edema, warm, no cyanosis, no clubbing. Good capillary refill   MSK: no tenderness across spinous processes, full ROM in all 4 extremities. No joint swelling or tenderness   Peripheral vascular: 2+ pulses symmetric (radial)  Neuro: gait normal, CN II-XII intact, motor power 5/5 in all 4 extremities, sensation intact and symmetric    Labs   Reviewed with patient     Imaging   Reviewed with patient      Assessment/Plan:     1. Chronic combined systolic and diastolic congestive heart failure ([address]  Initially was in the ER in May 2020 for acute pulmonary edema. Since then TTE has shown recently combined HFrEF/HFpEF   Ideally needs to be on ACEi/BB/diuretic  He is already on Lasix and Metoprolol. Will continue (but keep Lasix held for a short while until JOSE has resolved)   Will DC Amlodipine and switch to Lisinopril (as BP is well controlled and want to avoid hypotension)   Also, will refer to Cardiology to rule out ischemic cardiomyopathy. May need stress test/LHC   No active ADHF based on my exam  Compensated.  Maintain euvolemia   FU in 1 month or sooner if needed  - Charmayne Hamper, MD, Cardiology, [place]    2. JOSE (acute kidney injury) ([facility] [address]  Check RFP today  Keep Lasix held. Volume status is stable based on my exam today   - RENAL FUNCTION PANEL; Future    3. Essential hypertension  Stable  Continue Metoprolol tartrate 25mg BID  Switch Amlodipine to Lisinopril for cardioprotection (not adding it due to risk for hypotension)    4. Mixed hyperlipidemia  ,  in June 2020  Will no start statin therapy as of yet     5. Prediabetes  HbA1C 6.4% in June 2020  New diagnosis  Not on meds  Monitor     6. Acquired hypothyroidism  Stable  TSH normal in June 2020  Continue Synthroid 100mcg QD     7. Panlobular emphysema (HCC)  Stable  Continue Albuterol PRN  Continue Symbicort BID   Continue Spiriva QD     8. Mixed irritable bowel syndrome  Stable  Continue Bentyl PRN     9. Gastroesophageal reflux disease, esophagitis presence not specified  Stable  Continue Pepcid 20mg BID     10. Chronic bilateral low back pain without sciatica  Stable  Continue Cymbalta 60mg QD     11. Benign prostatic hyperplasia, unspecified whether lower urinary tract symptoms present  Stable  Continue Flomax & Proscar     12. Seasonal allergic rhinitis due to pollen  Stable  Continue Flonase     13. Tobacco use  Smoking cessation strongly encouraged     14. Skin lesion  On face. Sun-exposed area. Rule out [facility]   - External Referral To Dermatology       Care discussed with patient and questions answered. Patient verbalizes understanding and agrees with plan. Discussed with patient the importance of continuity of care. I encouraged patient to schedule next appointment within 4 weeks with me. Patient prefers to be reached by Phone call at [phone] for future medical correspondence. Encouraged to activate [employer]. I reviewed and reconciled the medications this visit. I reviewed and updated the past medical, surgical, social, and family history during this visit. After visit summary provided.        Shahram Morelos MD  Internal Medicine  7/15/2020   2:26 PM

## 2020-07-16 LAB
ALBUMIN SERPL-MCNC: 4.2 G/DL (ref 3.4–5)
ANION GAP SERPL CALCULATED.3IONS-SCNC: 13 MMOL/L (ref 3–16)
BUN BLDV-MCNC: 23 MG/DL (ref 7–20)
CALCIUM SERPL-MCNC: 9.3 MG/DL (ref 8.3–10.6)
CHLORIDE BLD-SCNC: 96 MMOL/L (ref 99–110)
CO2: 22 MMOL/L (ref 21–32)
CREAT SERPL-MCNC: 1 MG/DL (ref 0.8–1.3)
GFR AFRICAN AMERICAN: >60
GFR NON-AFRICAN AMERICAN: >60
GLUCOSE BLD-MCNC: 107 MG/DL (ref 70–99)
PHOSPHORUS: 4.2 MG/DL (ref 2.5–4.9)
POTASSIUM SERPL-SCNC: 4.7 MMOL/L (ref 3.5–5.1)
SODIUM BLD-SCNC: 131 MMOL/L (ref 136–145)

## 2020-07-17 ENCOUNTER — TELEPHONE (OUTPATIENT)
Dept: INTERNAL MEDICINE CLINIC | Age: 65
End: 2020-07-17

## 2020-07-17 NOTE — TELEPHONE ENCOUNTER
Please inform patient that his kidney function has normalized. He is to go back on Lasix 20mg once daily.        Satya Jewell MD  Internal Medicine  7/17/2020  8:49 AM

## 2020-07-17 NOTE — TELEPHONE ENCOUNTER
Pt called back for results, pt informed of results and recommendations,  voiced understanding, pt voiced no questions or concerns.

## 2020-07-21 ENCOUNTER — CARE COORDINATION (OUTPATIENT)
Dept: CARE COORDINATION | Age: 65
End: 2020-07-21

## 2020-07-21 NOTE — CARE COORDINATION
Pt advised of info from pcp and voices understanding. Reports he also plans to only use spiriva every day d/t dry mouth and change in taste of food since he started using. Educated pt on med usage and that it is long acting inhaler and encouraged pt to take as directed. Pt plans to take lasix and spiriva qod and see if dry mouth/taste change improve. Plans to f/u with pcp after cardiology ov. Reviewed with pt to reports any worsening swelling/sob, new cough or other symptoms to pcp immediately. Voices understanding and denies further needs at this time.

## 2020-07-21 NOTE — CARE COORDINATION
Ambulatory Care Coordination Note  7/21/2020  CM Risk Score: 2  Charlson 10 Year Mortality Risk Score: 79%     ACC: Bella Ortega, RN    Summary Note: Rcvd call from pt with concner over timing of cardiology follow up apptmt. Reports PCP wants him to return in a month to see her. Reports pcp Wants him to see cardiology and he would not be able to get in until September . He does not want to go to [place] even if they could get him in sooner. He would like to see pcp after cardiology ov. Reports his \"mouth is so dry, Feels like sandpaper. \" since starting waterpill and new inhaler,  Spiriva. Reports he is Back on lasix now. Report difficulty with heat. Reviewed limiting environmental triggers iin r/t copd. Still with pain that he has had for months in chest and that this was discussed at last pcp ov and she advsied cardiology f/u. Reports has pain in there all the time per pt. Has coughing up green phlegm for a long time and pt reports pcp aware. NO new/worsening symptoms at this time. Not weighing, but denies swelling. Confirmed he Has inhalers and reviewed usage. Advised to go to ER if would have chest pain or sob and advseid to reports any new/worsening symptoms. Reviewed importance of handwashing and red flags r/t covid and to call pcp office if any arise or seek medical attention. Voices understanding. Ambulatory Care Coordination Assessment    Care Coordination Protocol  Program Enrollment:  Rising Risk  Referral from Primary Care Provider:  No  Week 1 - Initial Assessment     Do you have all of your prescriptions and are they filled?:  Yes  Barriers to medication adherence:  None  Are you able to afford your medications?:  Yes  How often do you have trouble taking your medications the way you have been told to take them?:  I always take them as prescribed.      Do you have Home O2 Therapy?:  No      Ability to seek help/take action for Emergent Urgent situations i.e. fire, crime, inclement weather or health crisis. :  Independent  Ability to ambulate to restroom:  Independent  Ability handle personal hygeine needs (bathing/dressing/grooming): Independent  Ability to manage Medications: Independent  Ability to prepare Food Preparation:  Independent  Ability to maintain home (clean home, laundry): Independent  Ability to drive and/or has transportation:  Independent  Ability to do shopping:  Independent  Ability to manage finances: Independent  Is patient able to live independently?:  Yes                    Suggested Interventions and Community Resources   Zone Management Tools: In Process                  Prior to Admission medications    Medication Sig Start Date End Date Taking?  Authorizing Provider   furosemide (LASIX) 20 MG tablet Take 1 tablet by mouth daily 7/15/20   Santana Harada, MD   albuterol sulfate HFA (VENTOLIN HFA) 108 (90 Base) MCG/ACT inhaler Inhale 2 puffs into the lungs 4 times daily as needed for Wheezing 7/15/20   Santana Harada, MD   budesonide-formoterol [facility]) 160-4.5 MCG/ACT AERO Inhale 2 puffs into the lungs 2 times daily 7/15/20   Santana Harada, MD   dicyclomine (BENTYL) 10 MG capsule Take 1 capsule by mouth 4 times daily 7/15/20   Santana Harada, MD   famotidine (PEPCID) 20 MG tablet Take 1 tablet by mouth 2 times daily 7/15/20   Santana Harada, MD   DULoxetine (CYMBALTA) 60 MG extended release capsule Take 1 capsule by mouth daily 7/15/20   Santana Harada, MD   finasteride (PROSCAR) 5 MG tablet Take 1 tablet by mouth daily 7/15/20   Santana Harada, MD   fluticasone [facility]) 50 MCG/ACT nasal spray 1 spray by Each Nostril route daily 7/15/20   Santana Harada, MD   levothyroxine (SYNTHROID) 100 MCG tablet Take 1 tablet by mouth daily 7/15/20   Santana Harada, MD   metoprolol tartrate (LOPRESSOR) 25 MG tablet Take 1 tablet by mouth 2 times daily 7/15/20   Santana Harada, MD   tiotropium (SPIRIVA HANDIHALER) 18 MCG inhalation capsule Inhale 1

## 2020-07-24 ENCOUNTER — CARE COORDINATION (OUTPATIENT)
Dept: CARE COORDINATION | Age: 65
End: 2020-07-24

## 2020-07-24 NOTE — TELEPHONE ENCOUNTER
He should be OK driving as he is no longer having any symptoms. 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

## 2020-07-24 NOTE — LETTER
Alexandria Ndiaye M.D. 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 If you have any questions or concerns, please don't hesitate to call.     Sincerely,           Alexandria Ndiaye M.D.

## 2020-07-24 NOTE — CARE COORDINATION
Received call from pt. Pt works as a . reports with \"my condition\" and being unable to get into the cardiologist soon his work is asking if he is ok to drive. Reports he feels good. 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 Reports he has no trouble driving and not getting tired. Will forward to pcp office and advised pt to follow up with them in regard to questions.

## 2020-07-30 NOTE — CARE COORDINATION
Call to pt to advise of info from pcp and voices understanding. Reports he spoke with office and they had discussed emailing info/letter to him but he has not gotten anything yet. ACM will f/u.

## 2020-08-03 ENCOUNTER — TELEPHONE (OUTPATIENT)
Dept: INTERNAL MEDICINE CLINIC | Age: 65
End: 2020-08-03

## 2020-08-03 NOTE — TELEPHONE ENCOUNTER
Contacted patient to get email to send his letter or phone number to contact someone at office to get email    Contacted [employer] informed to email letter to [email or web address].   Letter emailed

## 2020-08-17 ENCOUNTER — VIRTUAL VISIT (OUTPATIENT)
Dept: INTERNAL MEDICINE CLINIC | Age: 65
End: 2020-08-17
Payer: MEDICARE

## 2020-08-17 PROCEDURE — 99442 PR PHYS/QHP TELEPHONE EVALUATION 11-20 MIN: CPT | Performed by: INTERNAL MEDICINE

## 2020-08-17 RX ORDER — FUROSEMIDE 20 MG/1
TABLET ORAL
Qty: 90 TABLET | Refills: 1 | Status: ON HOLD | OUTPATIENT
Start: 2020-08-17 | End: 2020-11-09 | Stop reason: HOSPADM

## 2020-08-17 RX ORDER — LISINOPRIL 5 MG/1
TABLET ORAL
Qty: 90 TABLET | Refills: 1 | Status: ON HOLD | OUTPATIENT
Start: 2020-08-17 | End: 2020-09-24 | Stop reason: HOSPADM

## 2020-08-17 ASSESSMENT — PATIENT HEALTH QUESTIONNAIRE - PHQ9
1. LITTLE INTEREST OR PLEASURE IN DOING THINGS: 0
2. FEELING DOWN, DEPRESSED OR HOPELESS: 0
SUM OF ALL RESPONSES TO PHQ9 QUESTIONS 1 & 2: 0
SUM OF ALL RESPONSES TO PHQ QUESTIONS 1-9: 0
SUM OF ALL RESPONSES TO PHQ QUESTIONS 1-9: 0

## 2020-08-17 NOTE — PROGRESS NOTES
resolved after Lasix was held for 1 week (Cr up to 1.4 in June then down to 1.0 with GFR > 60 in July). He has not had ischemic work up done in the past.   He is also on BB and ACEi due to systolic dysfunction. # Takes Pepcid for GERD. Helping. He was on Zantac in the past but I discontinued it due to recall. No heartburn or reflux or nausea. # HTN: stable and controlled. No CP, palpitations, dizziness, or light-headedness. # Takes Synthroid. No symptoms of hyper or hypothyroidism. # Takes Zyrtec for allergic rhinitis. This is helping. Tolerating well. He is also using Flonase PRN. No coughing or sneezing. # Takes Finasteride and Flomax for BPH. Symptoms stable on these meds. Tolerating meds. No LUTS. # Takes Cymbalta and Flexeril for chronic back pain. Both helping. No side effects such as drowsiness.   # Smokes cigarettes. He is trying to quit smoking. He bought OTC patches which have not been helping. Nicorette gum has also not been helping. # Uses Albuterol and Symbicort for COPD, as well as recently added Spiriva. 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 # High MCV on CBC on 5/28/2020. # As per patient he should have had his gall bladder however due to pandemic restrictions he had to hold off.   # He takes ASA for PAD. No bleeding or bruising on ASA. No h/o CAD or ischemic stroke. # ? H/o dyslipidemia. Not on statin therapy. He does have RA however. # Patient's mother was diabetic. His BG was high in ER in May. Prediabetic based on A1C drawn in June. # Patient is having ED over the past few months. # Has lesion on L side of face. Sun-exposed area. Growing over the past 2 months.  Sent him to see Derm.      Drives a semi-truck       Health maintenance:   Health Maintenance Due   Topic Date Due    AAA screen  1955    Hepatitis C screen  1955    HIV screen  10/26/1969    Colon cancer screen colonoscopy  10/26/2004    Shingles Vaccine (2 of 3) 04/02/2018    Pneumococcal 65+ years Vaccine (1 of 1 - PPSV23) 10/26/2019    Annual Wellness Visit (AWV)  06/01/2020       Past Medical History:  Past Medical History:   Diagnosis Date    COPD (chronic obstructive pulmonary disease) ([facility] [address]     Hypertension     Thyroid disease        Past Surgical History:  History reviewed. No pertinent surgical history.     Allergies:  No Known Allergies    Medications:  Current outpatient prescriptions:  Outpatient Medications Marked as Taking for the 8/17/20 encounter (Virtual Visit) with Lise Rodríguez MD   Medication Sig Dispense Refill    lisinopril (PRINIVIL;ZESTRIL) 5 MG tablet TAKE 1 TABLET BY MOUTH EVERY DAY 90 tablet 1    furosemide (LASIX) 20 MG tablet TAKE 1 TABLET BY MOUTH EVERY DAY 90 tablet 1    albuterol sulfate HFA (VENTOLIN HFA) 108 (90 Base) MCG/ACT inhaler Inhale 2 puffs into the lungs 4 times daily as needed for Wheezing 1 Inhaler 11    budesonide-formoterol (SYMBICORT) 160-4.5 MCG/ACT AERO Inhale 2 puffs into the lungs 2 times daily 1 Inhaler 11    dicyclomine (BENTYL) 10 MG capsule Take 1 capsule by mouth 4 times daily 360 capsule 1    famotidine (PEPCID) 20 MG tablet Take 1 tablet by mouth 2 times daily 180 tablet 1    DULoxetine (CYMBALTA) 60 MG extended release capsule Take 1 capsule by mouth daily 90 capsule 1    finasteride (PROSCAR) 5 MG tablet Take 1 tablet by mouth daily 90 tablet 1    fluticasone (FLONASE) 50 MCG/ACT nasal spray 1 spray by Each Nostril route daily 1 Bottle 0    levothyroxine (SYNTHROID) 100 MCG tablet Take 1 tablet by mouth daily 90 tablet 1    metoprolol tartrate (LOPRESSOR) 25 MG tablet Take 1 tablet by mouth 2 times daily 180 tablet 1    tiotropium (SPIRIVA HANDIHALER) 18 MCG inhalation capsule Inhale 1 capsule into the lungs daily 30 capsule 3    tamsulosin (FLOMAX) 0.4 MG capsule Take 1 capsule by mouth daily 90 capsule 1       Social History:  Social History     Socioeconomic History    Marital status:      Spouse name: Not on file    Number of children: Not on file    Years of education: Not on file    Highest education level: Not on file   Occupational History    Not on file   Social Needs    Financial resource strain: Not hard at all    Food insecurity     Worry: Never true     Inability: Never true   [employer] needs     Medical: No     Non-medical: No   Tobacco Use    Smoking status: Current Every Day Smoker     Packs/day: 1.00     Types: Cigarettes    Smokeless tobacco: Never Used   Substance and Sexual Activity    Alcohol use: Not on file     Comment: social    Drug use: Never    Sexual activity: Not on file   Lifestyle    Physical activity     Days per week: Not on file     Minutes per session: Not on file    Stress: Not on file   Relationships    Social connections     Talks on phone: Not on file     Gets together: Not on file     Attends Sikh service: Not on file     Active member of club or organization: Not on file     Attends meetings of clubs or organizations: Not on file     Relationship status: Not on file    Intimate partner violence     Fear of current or ex partner: Not on file     Emotionally abused: Not on file     Physically abused: Not on file     Forced sexual activity: Not on file   Other Topics Concern    Not on file   Social History Narrative    Not on file       Family History:  History reviewed. No pertinent family history.       Review of Systems:  Constitutional: no fevers, no chills, no night sweats, no weight loss, no weight gain, no fatigue   Pain assessment: no pain  Head: no headaches  Ears: no hearing loss, no tinnitus, no vertigo  Eyes: no blurry vision, no diplopia, no dryness, no itchiness  Mouth: no oral ulcers, no dry mouth, no sore throat  Nose: no nasal congestion, no epistaxis  Cardiac: no chest pain, no palpitations, leg swelling, no orthopnea, no PND, no syncope  Pulmonary: dyspnea on exertion, no cough, no wheezing, no hemoptysis  GI: no nausea, no vomiting, no diarrhea, no constipation, no abdominal pain, no hematochezia  : no dysuria, no frequency, no urgency, no hematuria, no frothy urine  MSK: no arthralgias, no myalgias, no early morning stiffness, no Raynaud's   Neuro: no focal neurological deficits, no seizures  Sleep: no snoring, no daytime somnolence   Psych: no depression, no anxiety, no suicidal ideation      Physical Exam:  Due to this being a TeleHealth encounter, evaluation of the following organ systems is limited: Vitals/Constitutional/EENT/Resp/CV/GI//MSK/Neuro/Skin/Heme-Lymph-Imm. Labs   Reviewed with patient     Imaging   Reviewed with patient      Assessment/Plan:     1. Chronic combined systolic and diastolic congestive heart failure ([address]  HFrEF/HFpEF on recent TTE  Needs ischemic work up as it has not been done in the past  He will establish care with Cardiology in 3 weeks  Continue ACEi Lisinopril 5mg QD  Continue BB Metoprolol tartrate 25mg BID  Continue diuretic Lasix 20mg QD   Maintain euvolemia  Currently compensated wo ADHF     2. Essential hypertension  Stable  Continue Lisinopril 5mg QD  Continue Metoprolol tartrate 25mg BID     3. Tobacco use  Smoking cessation strongly encouraged  Will await work up from Cardiology prior to initiating Chantix due to risk for CV event (although benefit of smoking outweighs the small risk of CV event from Chantix however he has not had ischemic [place] done for CHF)       Care discussed with patient and questions answered. Patient verbalizes understanding and agrees with plan. Discussed with patient the importance of continuity of care. I encouraged patient to schedule next appointment within 4 weeks with me. I reviewed and reconciled the medications this visit. I reviewed and updated the past medical, surgical, social, and family history during this visit.        I affirm this is a Patient Initiated Episode with an Established Patient who has not had a related appointment within my department in the past 7 days or scheduled within the next 24 hours. Total Time: minutes: 11-20 minutes    Note: not billable if this call serves to triage the patient into an appointment for the relevant concern      Pursuant to the emergency declaration under the [address] [place] [address], [facility] waiver authority and the [employer] Act, this Virtual Visit was conducted, with patient's consent, to reduce the patient's risk of exposure to COVID-19 and provide continuity of care for an established patient. Services were provided through a telephone synchronous discussion virtually to substitute for in-person clinic visit.       Veena Ornelas MD  Internal Medicine  8/17/2020   1:17 PM

## 2020-08-18 RX ORDER — FLUTICASONE PROPIONATE 50 MCG
SPRAY, SUSPENSION (ML) NASAL
Qty: 1 BOTTLE | Refills: 0 | Status: ON HOLD | OUTPATIENT
Start: 2020-08-18 | End: 2020-10-21

## 2020-09-09 ENCOUNTER — INITIAL CONSULT (OUTPATIENT)
Dept: CARDIOLOGY CLINIC | Age: 65
End: 2020-09-09
Payer: MEDICARE

## 2020-09-09 ENCOUNTER — TELEPHONE (OUTPATIENT)
Dept: CARDIOLOGY CLINIC | Age: 65
End: 2020-09-09

## 2020-09-09 VITALS
RESPIRATION RATE: 16 BRPM | DIASTOLIC BLOOD PRESSURE: 70 MMHG | TEMPERATURE: 97.2 F | BODY MASS INDEX: 29.77 KG/M2 | HEART RATE: 88 BPM | WEIGHT: 201 LBS | SYSTOLIC BLOOD PRESSURE: 122 MMHG | HEIGHT: 69 IN

## 2020-09-09 PROCEDURE — 99204 OFFICE O/P NEW MOD 45 MIN: CPT | Performed by: INTERNAL MEDICINE

## 2020-09-09 RX ORDER — ASPIRIN 81 MG/1
81 TABLET ORAL DAILY
Qty: 90 TABLET | Refills: 1 | Status: SHIPPED | OUTPATIENT
Start: 2020-09-09 | End: 2020-12-10 | Stop reason: SDUPTHER

## 2020-09-09 NOTE — PROGRESS NOTES
CARDIOLOGY CONSULT NOTE   Reason for consultation:  SHORTNESS OF BREATH, LV DYSFUNCTION    Referring physician:  Dr. Ryan Hilliard    Primary care physician: Guanako Manning MD      Dear Dr. Ryan Hilliard  Thanks for the consult. History of present illness:Keyur is a 71 y. o.year old who  presents with chest pressure and shortness of breath started in June, he went to hospital and was diagnosed with CHF, his LVEF was 40-45%  Upon extreme heat temperature, he feels shortness of breath which is mild to moderate, for last few months, intermittent, self limiting, not associated with cough or fever, gets worse with activity and better with rest,    Blood pressure, cholesterol, blood glucose and weight are well controlled. Past medical history:    has a past medical history of COPD (chronic obstructive pulmonary disease) ([address] H/O echocardiogram, Hypertension, and Thyroid disease. Past surgical history:  Social History:   reports that he has been smoking cigarettes. He has been smoking about 0.50 packs per day. He has never used smokeless tobacco. He reports that he does not use drugs. Family history:   no family history of CAD, STROKE of DM    No Known Allergies    No current facility-administered medications for this visit.      Current Outpatient Medications   Medication Sig Dispense Refill    fluticasone (FLONASE) 50 MCG/ACT nasal spray SPRAY 1 SPRAY INTO EACH NOSTRIL EVERY DAY 1 Bottle 0    lisinopril (PRINIVIL;ZESTRIL) 5 MG tablet TAKE 1 TABLET BY MOUTH EVERY DAY 90 tablet 1    furosemide (LASIX) 20 MG tablet TAKE 1 TABLET BY MOUTH EVERY DAY 90 tablet 1    albuterol sulfate HFA (VENTOLIN HFA) 108 (90 Base) MCG/ACT inhaler Inhale 2 puffs into the lungs 4 times daily as needed for Wheezing 1 Inhaler 11    budesonide-formoterol (SYMBICORT) 160-4.5 MCG/ACT AERO Inhale 2 puffs into the lungs 2 times daily 1 Inhaler 11    dicyclomine (BENTYL) 10 MG capsule Take 1 capsule by mouth 4 times daily 360 capsule 1    famotidine (PEPCID) 20 MG tablet Take 1 tablet by mouth 2 times daily 180 tablet 1    DULoxetine (CYMBALTA) 60 MG extended release capsule Take 1 capsule by mouth daily 90 capsule 1    finasteride (PROSCAR) 5 MG tablet Take 1 tablet by mouth daily 90 tablet 1    levothyroxine (SYNTHROID) 100 MCG tablet Take 1 tablet by mouth daily 90 tablet 1    metoprolol tartrate (LOPRESSOR) 25 MG tablet Take 1 tablet by mouth 2 times daily 180 tablet 1    tiotropium (SPIRIVA HANDIHALER) 18 MCG inhalation capsule Inhale 1 capsule into the lungs daily 30 capsule 3    tamsulosin (FLOMAX) 0.4 MG capsule Take 1 capsule by mouth daily 90 capsule 1     No current facility-administered medications for this visit. Review of Systems:   · Constitutional: No Fever or Weight Loss   · Eyes: No Decreased Vision  · ENT: No Headaches, Hearing Loss or Vertigo  · Cardiovascular: + chest pain, dyspnea on exertion, palpitations or loss of consciousness  · Respiratory: No cough or wheezing    · Gastrointestinal: No abdominal pain, appetite loss, blood in stools, constipation, diarrhea or heartburn  · Genitourinary: No dysuria, trouble voiding, or hematuria  · Musculoskeletal:  No gait disturbance, weakness or joint complaints  · Integumentary: No rash or pruritis  · Neurological: No TIA or stroke symptoms  · Psychiatric: No anxiety or depression  · Endocrine: No malaise, fatigue or temperature intolerance  · Hematologic/Lymphatic: No bleeding problems, blood clots or swollen lymph nodes  · Allergic/Immunologic: No nasal congestion or hives  All systems negative except as marked. ·   ·      Physical Examination:    Vitals:    09/09/20 1450   BP: 122/70   Pulse: 88   Resp: 16   Temp: 97.2 °F (36.2 °C)      Wt Readings from Last 3 Encounters:   09/09/20 201 lb (91.2 kg)   07/15/20 205 lb (93 kg)   06/15/20 206 lb (93.4 kg)     Body mass index is 29.68 kg/m².     General Appearance:  No distress, conversant    Constitutional:  Well developed, Well nourished, No acute distress, Non-toxic appearance. HENT:  Normocephalic, Atraumatic, Bilateral external ears normal, Oropharynx moist, No oral exudates, Nose normal. Neck- Normal range of motion, No tenderness, Supple, No stridor,no apical-carotid delay, no carotid bruit  Eyes:  PERRL, EOMI, Conjunctiva normal, No discharge. Respiratory:  Normal breath sounds, No respiratory distress, No wheezing, No chest tenderness. ,no use of accessory muscles, diaphragm movement is normal  Cardiovascular: (PMI) apex non displaced,no lifts no thrills, no s3,no s4, Normal heart rate, Normal rhythm, No murmurs, No rubs, No gallops. Carotid arteries pulse and amplitude are normal no bruit, no abdominal bruit noted ( normal abdominal aorta ausculation), femoral arteries pulse and amplitude are normal no bruit, pedal pulses are normal  GI:  Bowel sounds normal, Soft, No tenderness, No masses, No pulsatile masses, no hepatosplenomegally, no bruits  : External genitalia appear normal, No masses or lesions. No discharge. No CVA tenderness. Musculoskeletal:  Intact distal pulses, No edema, No tenderness, No cyanosis, No clubbing. Good range of motion in all major joints. No tenderness to palpation or major deformities noted. Back- No tenderness. Integument:  Warm, Dry, No erythema, No rash. Skin: no rash, no ulcers  Lymphatic:  No lymphadenopathy noted. Neurologic:  Alert & oriented x 3, Normal motor function, Normal sensory function, No focal deficits noted. Psychiatric:  Affect normal, Judgment normal, Mood normal.   Lab Review   No results for input(s): WBC, HGB, HCT, PLT in the last 72 hours. No results for input(s): NA, K, CL, CO2, PHOS, BUN, CREATININE in the last 72 hours. Invalid input(s): CA  No results for input(s): AST, ALT, ALB, BILIDIR, BILITOT, ALKPHOS in the last 72 hours. No results for input(s): TROPONINI in the last 72 hours.   No results found for: BNP  No results found for: INR,

## 2020-09-09 NOTE — PATIENT INSTRUCTIONS
Pt here in office & educated on [facility] for Dx: SOB, scheduled for 9/23/2020 @ 0830, w/arrival @ 0630, @ [facility]; risks explained; & consents signed. Pre-admission orders given to pt for labs & CXR, which are due 9/18/2020 @ [facility]. Instructions given to pt to:  NPO after midnight night before procedure; Call hospital @ [phone] to pre-register; May take rest of morning meds. am of procedure; & pt voiced understanding. Copies of consent, pre-testing orders, & info. sheet given to [place].

## 2020-09-09 NOTE — TELEPHONE ENCOUNTER
Sent information for [facility] with Dr. Hallie Mays on 9/23 @ 830 AM to [facility]. [facility] via email. DX SOB R06.02and Decreased cardiac ejection fraction R93.1  Added to COVID tracker, procedure tracker and OneNote.   Gopal Hall patient

## 2020-09-09 NOTE — LETTER
[address] behind [facility] at [address] to have this lab work done. Phone: [phone] Hours: 7:00 am to 5:00 pm       PHYSICIAN SIGNATURE:      DATE:                                                 Cristela Sharon  Pre Cath Lab Orders   Patient Name: Dick Landry   : 1955   MRN# [id number]  Pre Cath Lab orders   1. IV of 0.9 NS@ 75ml/hr started 2 hours prior to procedure. (#20 Gauge Insyte or larger)  2. Diazepam (Valium) 5 mg po ONCE in Cath Lab. 3. Diphenhydramine (Benadryl) 25 mg ONCE. PHYSICIAN SIGNATURE:      DATE:                                                                                              [facility] ([facility]) Informed Consent for Anesthesia/Sedation, Surgery, Invasive Procedures, and other High-risk Interventions and Medication use      *This consent is applicable for 30 days following patient signature*    Procedure(s)   I Dick Landry authorize, Dr. Matias Gonsalez    and the associate(s) or assistant(s) of his/her choice, to perform the following procedure(s): [id number] [facility] [address]       I know that unexpected conditions may require additional or different procedures than those above. I authorize the above named practitioner(s) perform these as necessary and desirable. This is based on the practitioners professional judgment. The above named practitioner has discussed the above procedure(s) with me, including:  ? Potential benefits, including likelihood of success of the procedure(s) goals  ? Risks  ? Side effects, risk of death, and risk of infection  ? Any potential problems that might occur during recuperation or healing post-procedure  ? Reasonable alternatives  ? Risks of NOT performing the procedure(s)    I acknowledge that no warranty or guarantee has been made to the results the procedure(s). I consent to the above named practitioner(s) providing additional services to me as deemed reasonable and necessary, including but not limited to:    ? Use of medications for anesthesia or sedation. ? All anesthesia and sedation carry risks. My practitioner has discussed my anticipated anesthesia and/or sedation and the risks of using, risk of not using, benefits, side effects, and alternatives. ? Use of pathology  ? I authorize [facility] ([facility]) to dispose of tissues, specimens or organs when pathology is complete. ? Use of radiology  ? A contrast agent may be required for radiology procedures. My practitioner has advised me of the risks of using, risks of not using, benefits, side effects, and alternatives. ? Observers or use of photography, video/audio recording, or televising of the procedure(s). This is for medical, scientific, or educational purposes. This includes appropriate portions of my body. My identity will not be revealed. ? I consent to release of my social security number and other identifying information to [employer] (FDA), and the supplier/, if I receive tissue, a device, or implant. This is to track the tissue, device, or implant for defect, recall, infection, etc.     ? Use of blood and/or blood products, if needed, through my hospital stay. My practitioner has advised me of the risks of using, risks of not using, benefits, side effects, and alternatives. ___ I do NOT want Blood or Blood products given. (Complete separate  refusal form)    Code Status (mitzy one):  ___ I do NOT HAVE a DNR order. I am a Full-code.   I will receive CPR, intubation,  chest compressions, medications, and/or other life saving measures if I have a  cardiac or respiratory arrest.    ___ I have a Do Not Resuscitate (DNR)order.   (mitzy one below)  ___  I rescind my DNR for surgery and immediate post-operative period through Phase 2 recovery. This means, for that time period, I will be a Full-code and receive CPR, intubation, chest compressions, medications, and/or other life saving measures, if I have a cardiac or respiratory arrest.    ___ I WANT to keep my DNR in effect during my procedure(s) and immediate post-operative recovery period through Phase 2 recovery. (Complete separate refusal form)     This form has been fully explained to me. I understand its contents. Patients Signature: ___________________________Date: ________  Time: ________    If patient unable to sign, has engaged the [address] [place] [address], is a minor, or has a court-appointed Guardian:  [address] [facility] Representative Name (Print):  ____________________________________      Relationship ([place] one):    Guardian   Parent    Spouse    HCPOA   Child   Sibling  Next-of-Kin Friend    Patients Representative Signature: _______________________________________              Date: ______________  Time: __________    An  was used.    name/ID: _________________________________      [facility]) Witness________________________  Date: ________   Time: _________    Physician/Practitioner _______________________  Date: ________   Time: _________           Revision 2017                                                                                                        Natalia Clemente    PROCEDURE TO SCHEDULE:    LEFT HEART CATHETERIZATION WITH POSSIBLE PERCUTANEOUS CORONARY INTERVENTION     Patient Name: Corry Thorpe   : 1955   MRN# [id number]    Home Phone Number: [phone]   Weight:    Wt Readings from Last 3 Encounters:   20 201 lb (91.2 kg)   07/15/20 205 lb (93 kg)   06/15/20 206 lb (93.4 kg)        Insurance: Payor: Gabriel Delgado / Plan: Yumiko Rebolledo ESSENTIAL/PLUS / Product Type: *No Product type* /     Date of Ighxnxyhd98/23/2020 Time: 08 Arrival Time:  Diagnosis:  SOB  Allergies: No Known Allergies     1) Call [facility] scheduling ([phone]) or Instant Message  CONFIRMED WITH     PHONE OR   INSTANT MESSAGE  2) PREAUTHORIZATION NUMBER:    Spoke to:      From date:     expiration date:         Joshua Valenzuela

## 2020-09-14 ENCOUNTER — VIRTUAL VISIT (OUTPATIENT)
Dept: INTERNAL MEDICINE CLINIC | Age: 65
End: 2020-09-14
Payer: MEDICARE

## 2020-09-14 PROCEDURE — 99442 PR PHYS/QHP TELEPHONE EVALUATION 11-20 MIN: CPT | Performed by: INTERNAL MEDICINE

## 2020-09-14 ASSESSMENT — PATIENT HEALTH QUESTIONNAIRE - PHQ9
2. FEELING DOWN, DEPRESSED OR HOPELESS: 0
1. LITTLE INTEREST OR PLEASURE IN DOING THINGS: 0
SUM OF ALL RESPONSES TO PHQ QUESTIONS 1-9: 0
SUM OF ALL RESPONSES TO PHQ9 QUESTIONS 1 & 2: 0
SUM OF ALL RESPONSES TO PHQ QUESTIONS 1-9: 0

## 2020-09-14 NOTE — PROGRESS NOTES
TELEHEALTH EVALUATION -- Audio/Visual (During FRUEV-10 public health emergency)      Pawan Long  1955    Patient location: Patient Home     Pawan Long is a 71 y.o. pleasant gentleman evaluated via telephone on 9/14/20       Consent:  He and/or health care decision maker is aware that that he may receive a bill for this telephone service, depending on his insurance coverage, and has provided verbal consent to proceed: Yes      History of Present Illness:  Pawan Long is a 71 y.o. pleasant gentleman who has requested an audio/video evaluation for the following concern(s): HFrEF. He has a past medical history significant for:  HTN, on Lisinopril 5mg QD, Metoprolol tartrate 25mg BID  HL (,  on 6/23/2020), not on statin   Prediabetes (HbA1C 6.4% on 6/23/2020), not on meds  HFrEF/HFpEF (EF 98-68%, diastolic dysfunction, mild MR on 6/23/2020), on Lisinopril, Metoprolol, and Lasix 20mg QD   Hypothyroidism (TSH 1.34 normal on 6/23/2020), on Synthroid 100mcg QD  PAD, on ASA   GERD, on Pepcid 20mg BID   Allergic rhinitis, on Zyrtec, Flonase   BPH, on Flomax, Finasteride   COPD, on Albuterol PRN, Symbicort BID, Spiriva QD   Chronic back pain, on Cymbalta 60mg QHS, Flexeril 10mg BID PRN   IBS-mixed, on Bentyl QID   Juvenile RA   Rheumatic fever   Current smoker     # Patient was in the ED on 5/28 for acute onset SOB. New diagnosis of CHF. EKG wo acute ischemia. Neg Mainor. BNP elevated. CT showed no PE but did show pulmonary edema (high d-dimer). COVID test negative. He was hypoxic, requiring 4L O2 however patient declined admission.   He is not having worsening SOB, but at his baseline. He was given in the ED Lasix IV 40mg once.   First time I met patient in June I prescribed Lasix 20mg QD. He reports his SOB improved over 70% with the Lasix. TTE done 6/23/2020 shows e/o mixed systolic and diastolic heart failure. He has never had LHC done.  Patient denies alcohol abuse. He developed pre-renal JOSE which resolved after Lasix was held for 1 week (Cr up to 1.4 in June then down to 1.0 with GFR > 60 in July). He has not had ischemic work up done in the past.   He is also on BB and ACEi due to systolic dysfunction. He has established with Dr. Alberto Trinidad and will be getting L/RHC on 9/23. He will also be getting COVID-19 testing done PAT. # Takes Pepcid for GERD. Helping. He was on Zantac in the past but I discontinued it due to recall. No heartburn or reflux or nausea.   # HTN: stable and controlled. No CP, palpitations, dizziness, or light-headedness. # Takes Synthroid. No symptoms of hyper or hypothyroidism.   # Takes Zyrtec for allergic rhinitis. This is helping. Tolerating well. He is also using Flonase PRN. No coughing or sneezing.   # Takes Finasteride and Flomax for BPH. Symptoms stable on these meds. Tolerating meds. No LUTS.   # Takes Cymbalta and Flexeril for chronic back pain. Both helping. No side effects such as drowsiness.   # Smokes cigarettes. He is trying to quit smoking. He bought OTC patches which have not been helping. Nicorette gum has also not been helping. # Uses Albuterol and Symbicort for COPD, as well as recently added Spiriva. 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 # High MCV on CBC on 5/28/2020. # As per patient he should have had his gall bladder however due to pandemic restrictions he had to hold off.   # He takes ASA for PAD. No bleeding or bruising on ASA. No h/o CAD or ischemic stroke. # ? H/o dyslipidemia. Not on statin therapy. He does have RA however. # Patient's mother was diabetic. His BG was high in ER in May. Prediabetic based on A1C drawn in June.   # Patient is having ED over the past few months.   # Has lesion on L side of face. Sun-exposed area.  Growing over the past 2 months. Sent him to see Derm.      Drives a semi-truck       Health maintenance:   Health Maintenance Due   Topic Date Due    AAA screen tartrate (LOPRESSOR) 25 MG tablet Take 1 tablet by mouth 2 times daily 180 tablet 1    tiotropium (SPIRIVA HANDIHALER) 18 MCG inhalation capsule Inhale 1 capsule into the lungs daily 30 capsule 3    tamsulosin (FLOMAX) 0.4 MG capsule Take 1 capsule by mouth daily 90 capsule 1       Social History:  Social History     Socioeconomic History    Marital status:      Spouse name: Not on file    Number of children: Not on file    Years of education: Not on file    Highest education level: Not on file   Occupational History    Not on file   Social Needs    Financial resource strain: Not hard at all   [email or web address] insecurity     Worry: Never true     Inability: Never true   [email or web address] needs     Medical: No     Non-medical: No   Tobacco Use    Smoking status: Current Every Day Smoker     Packs/day: 0.50     Types: Cigarettes    Smokeless tobacco: Never Used   Substance and Sexual Activity    Alcohol use: Not on file     Comment: social    Drug use: Never    Sexual activity: Not on file   Lifestyle    Physical activity     Days per week: Not on file     Minutes per session: Not on file    Stress: Not on file   Relationships    Social connections     Talks on phone: Not on file     Gets together: Not on file     Attends Rastafari service: Not on file     Active member of club or organization: Not on file     Attends meetings of clubs or organizations: Not on file     Relationship status: Not on file    Intimate partner violence     Fear of current or ex partner: Not on file     Emotionally abused: Not on file     Physically abused: Not on file     Forced sexual activity: Not on file   Other Topics Concern    Not on file   Social History Narrative    Not on file       Family History:  History reviewed. No pertinent family history.       Review of Systems:  Constitutional: no fevers, no chills, no night sweats, no weight loss, no weight gain, no fatigue   Pain assessment: no pain  Head: no headaches  Ears: no hearing loss, no tinnitus, no vertigo  Eyes: no blurry vision, no diplopia, no dryness, no itchiness  Mouth: no oral ulcers, no dry mouth, no sore throat  Nose: no nasal congestion, no epistaxis  Cardiac: no chest pain, no palpitations, leg swelling, no orthopnea, no PND, no syncope  Pulmonary: dyspnea on exertion, no cough, no wheezing, no hemoptysis  GI: no nausea, no vomiting, no diarrhea, no constipation, no abdominal pain, no hematochezia  : no dysuria, no frequency, no urgency, no hematuria, no frothy urine  MSK: no arthralgias, no myalgias, no early morning stiffness, no Raynaud's   Neuro: no focal neurological deficits, no seizures  Sleep: no snoring, no daytime somnolence   Psych: no depression, no anxiety, no suicidal ideation      Physical Exam:  Due to this being a TeleHealth encounter, evaluation of the following organ systems is limited: Vitals/Constitutional/EENT/Resp/CV/GI//MSK/Neuro/Skin/Heme-Lymph-Imm. Labs   Reviewed with patient     Imaging   Reviewed with patient      Assessment/Plan:     1. Chronic combined systolic and diastolic congestive heart failure ([address]  HFrEF/HFpEF on recent TTE  Needs ischemic work up as it has not been done in the past  He has established with Cardiology Dr. Sami Tabares and will be getting catheterization done next week   Continue ACEi Lisinopril 5mg QD  Continue BB Metoprolol tartrate 25mg BID  Continue diuretic Lasix 20mg QD   Maintain euvolemia  Currently compensated wo ADHF     2. Essential hypertension  Stable  Continue Lisinopril 5mg QD  Continue Metoprolol tartrate 25mg BID      Care discussed with patient and questions answered. Patient verbalizes understanding and agrees with plan. Discussed with patient the importance of continuity of care. I encouraged patient to schedule next appointment within 2 weeks post-procedure with me. I reviewed and reconciled the medications this visit.    I reviewed and updated the past medical, surgical, social, and family history during this visit. I affirm this is a Patient Initiated Episode with an Established Patient who has not had a related appointment within my department in the past 7 days or scheduled within the next 24 hours. Total Time: minutes: 11-20 minutes    Note: not billable if this call serves to triage the patient into an appointment for the relevant concern      Pursuant to the emergency declaration under the [address] [place] [address] waiver authority and the [employer] Act, this Virtual Visit was conducted, with patient's consent, to reduce the patient's risk of exposure to COVID-19 and provide continuity of care for an established patient. Services were provided through a telephone synchronous discussion virtually to substitute for in-person clinic visit.       Santana Harada, MD  Internal Medicine  9/14/2020   2:55 PM

## 2020-09-18 ENCOUNTER — HOSPITAL ENCOUNTER (OUTPATIENT)
Age: 65
Setting detail: SPECIMEN
Discharge: HOME | End: 2020-09-18
Payer: MEDICARE

## 2020-09-18 ENCOUNTER — HOSPITAL ENCOUNTER (OUTPATIENT)
Dept: GENERAL RADIOLOGY | Age: 65
Discharge: HOME | End: 2020-09-18
Payer: MEDICARE

## 2020-09-18 ENCOUNTER — HOSPITAL ENCOUNTER (OUTPATIENT)
Age: 65
Discharge: HOME | End: 2020-09-18
Payer: MEDICARE

## 2020-09-18 LAB
ABO/RH: NORMAL
ANION GAP SERPL CALCULATED.3IONS-SCNC: 4 MMOL/L (ref 4–16)
ANTIBODY SCREEN: NEGATIVE
APTT: 31.8 SECONDS (ref 25.1–37.1)
BASOPHILS ABSOLUTE: 0 K/CU MM
BASOPHILS RELATIVE PERCENT: 0.4 % (ref 0–1)
BUN BLDV-MCNC: 17 MG/DL (ref 6–23)
CALCIUM SERPL-MCNC: 9.4 MG/DL (ref 8.3–10.6)
CHLORIDE BLD-SCNC: 97 MMOL/L (ref 99–110)
CO2: 35 MMOL/L (ref 21–32)
COMMENT: NORMAL
CREAT SERPL-MCNC: 1.3 MG/DL (ref 0.9–1.3)
DIFFERENTIAL TYPE: ABNORMAL
EOSINOPHILS ABSOLUTE: 0.4 K/CU MM
EOSINOPHILS RELATIVE PERCENT: 4.8 % (ref 0–3)
GFR AFRICAN AMERICAN: >60 ML/MIN/1.73M2
GFR NON-AFRICAN AMERICAN: 55 ML/MIN/1.73M2
GLUCOSE FASTING: 113 MG/DL (ref 70–99)
HCT VFR BLD CALC: 52.9 % (ref 42–52)
HEMOGLOBIN: 17.9 GM/DL (ref 13.5–18)
IMMATURE NEUTROPHIL %: 0.2 % (ref 0–0.43)
INR BLD: 1.07 INDEX
LYMPHOCYTES ABSOLUTE: 2.2 K/CU MM
LYMPHOCYTES RELATIVE PERCENT: 27.1 % (ref 24–44)
MCH RBC QN AUTO: 33.4 PG (ref 27–31)
MCHC RBC AUTO-ENTMCNC: 33.8 % (ref 32–36)
MCV RBC AUTO: 98.7 FL (ref 78–100)
MONOCYTES ABSOLUTE: 0.4 K/CU MM
MONOCYTES RELATIVE PERCENT: 5.4 % (ref 0–4)
PDW BLD-RTO: 13.6 % (ref 11.7–14.9)
PLATELET # BLD: 201 K/CU MM (ref 140–440)
PMV BLD AUTO: 9 FL (ref 7.5–11.1)
POTASSIUM SERPL-SCNC: 4.3 MMOL/L (ref 3.5–5.1)
PROTHROMBIN TIME: 12.2 SECONDS (ref 11.7–14.5)
RBC # BLD: 5.36 M/CU MM (ref 4.6–6.2)
SEGMENTED NEUTROPHILS ABSOLUTE COUNT: 5 K/CU MM
SEGMENTED NEUTROPHILS RELATIVE PERCENT: 62.1 % (ref 36–66)
SODIUM BLD-SCNC: 136 MMOL/L (ref 135–145)
TOTAL IMMATURE NEUTOROPHIL: 0.02 K/CU MM
WBC # BLD: 8.1 K/CU MM (ref 4–10.5)

## 2020-09-18 PROCEDURE — 71046 X-RAY EXAM CHEST 2 VIEWS: CPT

## 2020-09-18 PROCEDURE — 86901 BLOOD TYPING SEROLOGIC RH(D): CPT

## 2020-09-18 PROCEDURE — 36415 COLL VENOUS BLD VENIPUNCTURE: CPT

## 2020-09-18 PROCEDURE — 85610 PROTHROMBIN TIME: CPT

## 2020-09-18 PROCEDURE — U0002 COVID-19 LAB TEST NON-CDC: HCPCS

## 2020-09-18 PROCEDURE — 80048 BASIC METABOLIC PNL TOTAL CA: CPT

## 2020-09-18 PROCEDURE — 86850 RBC ANTIBODY SCREEN: CPT

## 2020-09-18 PROCEDURE — C9803 HOPD COVID-19 SPEC COLLECT: HCPCS

## 2020-09-18 PROCEDURE — 85730 THROMBOPLASTIN TIME PARTIAL: CPT

## 2020-09-18 PROCEDURE — 85025 COMPLETE CBC W/AUTO DIFF WBC: CPT

## 2020-09-18 PROCEDURE — 86900 BLOOD TYPING SEROLOGIC ABO: CPT

## 2020-09-21 LAB
SARS-COV-2: NOT DETECTED
SOURCE: NORMAL

## 2020-09-22 ENCOUNTER — TELEPHONE (OUTPATIENT)
Dept: CARDIOLOGY CLINIC | Age: 65
End: 2020-09-22

## 2020-09-23 ENCOUNTER — APPOINTMENT (OUTPATIENT)
Dept: ULTRASOUND IMAGING | Age: 65
DRG: 287 | End: 2020-09-23
Attending: INTERNAL MEDICINE
Payer: MEDICARE

## 2020-09-23 ENCOUNTER — HOSPITAL ENCOUNTER (INPATIENT)
Dept: CARDIAC CATH/INVASIVE PROCEDURES | Age: 65
LOS: 1 days | Discharge: HOME | DRG: 287 | End: 2020-09-24
Attending: INTERNAL MEDICINE | Admitting: INTERNAL MEDICINE
Payer: MEDICARE

## 2020-09-23 LAB
ACTIVATED CLOTTING TIME, LOW RANGE: 174 SEC
ANION GAP SERPL CALCULATED.3IONS-SCNC: 12 MMOL/L (ref 4–16)
BASE EXCESS: 1 (ref 0–3.3)
BASOPHILS ABSOLUTE: 0.1 K/CU MM
BASOPHILS RELATIVE PERCENT: 0.7 % (ref 0–1)
BUN BLDV-MCNC: 27 MG/DL (ref 6–23)
CALCIUM SERPL-MCNC: 9.2 MG/DL (ref 8.3–10.6)
CARBON MONOXIDE, BLOOD: 5.3 % (ref 0–5)
CHLORIDE BLD-SCNC: 103 MMOL/L (ref 99–110)
CO2 CONTENT: 24.9 MMOL/L (ref 19–24)
CO2: 24 MMOL/L (ref 21–32)
COMMENT: ABNORMAL
CREAT SERPL-MCNC: 1.2 MG/DL (ref 0.9–1.3)
DIFFERENTIAL TYPE: ABNORMAL
EOSINOPHILS ABSOLUTE: 0.6 K/CU MM
EOSINOPHILS RELATIVE PERCENT: 5.2 % (ref 0–3)
ESTIMATED AVERAGE GLUCOSE: 134 MG/DL
GFR AFRICAN AMERICAN: >60 ML/MIN/1.73M2
GFR NON-AFRICAN AMERICAN: >60 ML/MIN/1.73M2
GLUCOSE BLD-MCNC: 179 MG/DL (ref 70–99)
HBA1C MFR BLD: 6.3 % (ref 4.2–6.3)
HCO3 ARTERIAL: 23.7 MMOL/L (ref 18–23)
HCT VFR BLD CALC: 52.8 % (ref 42–52)
HEMOGLOBIN: 17.7 GM/DL (ref 13.5–18)
IMMATURE NEUTROPHIL %: 0.2 % (ref 0–0.43)
LYMPHOCYTES ABSOLUTE: 2.7 K/CU MM
LYMPHOCYTES RELATIVE PERCENT: 22.6 % (ref 24–44)
MAGNESIUM: 2.2 MG/DL (ref 1.8–2.4)
MCH RBC QN AUTO: 33.7 PG (ref 27–31)
MCHC RBC AUTO-ENTMCNC: 33.5 % (ref 32–36)
MCV RBC AUTO: 100.4 FL (ref 78–100)
METHEMOGLOBIN ARTERIAL: 1.4 %
MONOCYTES ABSOLUTE: 0.9 K/CU MM
MONOCYTES RELATIVE PERCENT: 7.7 % (ref 0–4)
NUCLEATED RBC %: 0 %
O2 SATURATION: 87 % (ref 96–97)
PCO2 ARTERIAL: 40 MMHG (ref 32–45)
PDW BLD-RTO: 14.1 % (ref 11.7–14.9)
PH BLOOD: 7.38 (ref 7.34–7.45)
PLATELET # BLD: 239 K/CU MM (ref 140–440)
PMV BLD AUTO: 9.1 FL (ref 7.5–11.1)
PO2 ARTERIAL: 62 MMHG (ref 75–100)
POTASSIUM SERPL-SCNC: 4.8 MMOL/L (ref 3.5–5.1)
RBC # BLD: 5.26 M/CU MM (ref 4.6–6.2)
SEGMENTED NEUTROPHILS ABSOLUTE COUNT: 7.6 K/CU MM
SEGMENTED NEUTROPHILS RELATIVE PERCENT: 63.6 % (ref 36–66)
SODIUM BLD-SCNC: 139 MMOL/L (ref 135–145)
TOTAL IMMATURE NEUTOROPHIL: 0.03 K/CU MM
TOTAL NUCLEATED RBC: 0 K/CU MM
WBC # BLD: 12 K/CU MM (ref 4–10.5)

## 2020-09-23 PROCEDURE — 6360000004 HC RX CONTRAST MEDICATION

## 2020-09-23 PROCEDURE — 4A023N8 MEASUREMENT OF CARDIAC SAMPLING AND PRESSURE, BILATERAL, PERCUTANEOUS APPROACH: ICD-10-PCS | Performed by: INTERNAL MEDICINE

## 2020-09-23 PROCEDURE — B2151ZZ FLUOROSCOPY OF LEFT HEART USING LOW OSMOLAR CONTRAST: ICD-10-PCS | Performed by: INTERNAL MEDICINE

## 2020-09-23 PROCEDURE — 93971 EXTREMITY STUDY: CPT

## 2020-09-23 PROCEDURE — 6370000000 HC RX 637 (ALT 250 FOR IP): Performed by: PHYSICIAN ASSISTANT

## 2020-09-23 PROCEDURE — 2580000003 HC RX 258: Performed by: INTERNAL MEDICINE

## 2020-09-23 PROCEDURE — 94761 N-INVAS EAR/PLS OXIMETRY MLT: CPT

## 2020-09-23 PROCEDURE — 6360000002 HC RX W HCPCS: Performed by: SURGERY

## 2020-09-23 PROCEDURE — 6370000000 HC RX 637 (ALT 250 FOR IP): Performed by: INTERNAL MEDICINE

## 2020-09-23 PROCEDURE — 82803 BLOOD GASES ANY COMBINATION: CPT

## 2020-09-23 PROCEDURE — 85347 COAGULATION TIME ACTIVATED: CPT

## 2020-09-23 PROCEDURE — 2500000003 HC RX 250 WO HCPCS

## 2020-09-23 PROCEDURE — 83735 ASSAY OF MAGNESIUM: CPT

## 2020-09-23 PROCEDURE — 6360000002 HC RX W HCPCS

## 2020-09-23 PROCEDURE — 93460 R&L HRT ART/VENTRICLE ANGIO: CPT | Performed by: INTERNAL MEDICINE

## 2020-09-23 PROCEDURE — 93005 ELECTROCARDIOGRAM TRACING: CPT | Performed by: INTERNAL MEDICINE

## 2020-09-23 PROCEDURE — 2000000000 HC ICU R&B

## 2020-09-23 PROCEDURE — 2580000003 HC RX 258: Performed by: SURGERY

## 2020-09-23 PROCEDURE — 83036 HEMOGLOBIN GLYCOSYLATED A1C: CPT

## 2020-09-23 PROCEDURE — 93460 R&L HRT ART/VENTRICLE ANGIO: CPT

## 2020-09-23 PROCEDURE — P9045 ALBUMIN (HUMAN), 5%, 250 ML: HCPCS | Performed by: SURGERY

## 2020-09-23 PROCEDURE — B2111ZZ FLUOROSCOPY OF MULTIPLE CORONARY ARTERIES USING LOW OSMOLAR CONTRAST: ICD-10-PCS | Performed by: INTERNAL MEDICINE

## 2020-09-23 PROCEDURE — 93880 EXTRACRANIAL BILAT STUDY: CPT

## 2020-09-23 PROCEDURE — 80048 BASIC METABOLIC PNL TOTAL CA: CPT

## 2020-09-23 PROCEDURE — 85025 COMPLETE CBC W/AUTO DIFF WBC: CPT

## 2020-09-23 PROCEDURE — B2131ZZ FLUOROSCOPY OF MULTIPLE CORONARY ARTERY BYPASS GRAFTS USING LOW OSMOLAR CONTRAST: ICD-10-PCS | Performed by: INTERNAL MEDICINE

## 2020-09-23 PROCEDURE — 93308 TTE F-UP OR LMTD: CPT

## 2020-09-23 RX ORDER — SODIUM CHLORIDE 0.9 % (FLUSH) 0.9 %
10 SYRINGE (ML) INJECTION PRN
Status: DISCONTINUED | OUTPATIENT
Start: 2020-09-23 | End: 2020-09-24 | Stop reason: HOSPADM

## 2020-09-23 RX ORDER — DOPAMINE HYDROCHLORIDE 160 MG/100ML
INJECTION, SOLUTION INTRAVENOUS
Status: COMPLETED
Start: 2020-09-23 | End: 2020-09-23

## 2020-09-23 RX ORDER — LIDOCAINE HYDROCHLORIDE 10 MG/ML
5 INJECTION, SOLUTION EPIDURAL; INFILTRATION; INTRACAUDAL; PERINEURAL ONCE
Status: DISCONTINUED | OUTPATIENT
Start: 2020-09-23 | End: 2020-09-24 | Stop reason: HOSPADM

## 2020-09-23 RX ORDER — PROCHLORPERAZINE EDISYLATE 5 MG/ML
10 INJECTION INTRAMUSCULAR; INTRAVENOUS ONCE
Status: COMPLETED | OUTPATIENT
Start: 2020-09-23 | End: 2020-09-23

## 2020-09-23 RX ORDER — BUDESONIDE AND FORMOTEROL FUMARATE DIHYDRATE 160; 4.5 UG/1; UG/1
2 AEROSOL RESPIRATORY (INHALATION) 2 TIMES DAILY
Status: DISCONTINUED | OUTPATIENT
Start: 2020-09-23 | End: 2020-09-24 | Stop reason: HOSPADM

## 2020-09-23 RX ORDER — 0.9 % SODIUM CHLORIDE 0.9 %
250 INTRAVENOUS SOLUTION INTRAVENOUS ONCE
Status: COMPLETED | OUTPATIENT
Start: 2020-09-23 | End: 2020-09-23

## 2020-09-23 RX ORDER — DIPHENHYDRAMINE HCL 25 MG
25 TABLET ORAL
Status: COMPLETED | OUTPATIENT
Start: 2020-09-23 | End: 2020-09-23

## 2020-09-23 RX ORDER — ALBUTEROL SULFATE 2.5 MG/3ML
2.5 SOLUTION RESPIRATORY (INHALATION) EVERY 4 HOURS PRN
Status: DISCONTINUED | OUTPATIENT
Start: 2020-09-23 | End: 2020-09-24 | Stop reason: HOSPADM

## 2020-09-23 RX ORDER — DOPAMINE HYDROCHLORIDE 160 MG/100ML
2.5 INJECTION, SOLUTION INTRAVENOUS CONTINUOUS
Status: DISCONTINUED | OUTPATIENT
Start: 2020-09-23 | End: 2020-09-24 | Stop reason: HOSPADM

## 2020-09-23 RX ORDER — DIAZEPAM 5 MG/1
5 TABLET ORAL
Status: COMPLETED | OUTPATIENT
Start: 2020-09-23 | End: 2020-09-23

## 2020-09-23 RX ORDER — FINASTERIDE 5 MG/1
5 TABLET, FILM COATED ORAL DAILY
Status: DISCONTINUED | OUTPATIENT
Start: 2020-09-24 | End: 2020-09-24 | Stop reason: HOSPADM

## 2020-09-23 RX ORDER — SODIUM CHLORIDE 0.9 % (FLUSH) 0.9 %
10 SYRINGE (ML) INJECTION EVERY 12 HOURS SCHEDULED
Status: DISCONTINUED | OUTPATIENT
Start: 2020-09-23 | End: 2020-09-24 | Stop reason: HOSPADM

## 2020-09-23 RX ORDER — ALBUMIN, HUMAN INJ 5% 5 %
25 SOLUTION INTRAVENOUS ONCE
Status: COMPLETED | OUTPATIENT
Start: 2020-09-23 | End: 2020-09-23

## 2020-09-23 RX ORDER — LEVOTHYROXINE SODIUM 0.1 MG/1
100 TABLET ORAL DAILY
Status: DISCONTINUED | OUTPATIENT
Start: 2020-09-24 | End: 2020-09-24 | Stop reason: HOSPADM

## 2020-09-23 RX ORDER — SODIUM CHLORIDE 9 MG/ML
INJECTION, SOLUTION INTRAVENOUS CONTINUOUS
Status: DISCONTINUED | OUTPATIENT
Start: 2020-09-23 | End: 2020-09-23

## 2020-09-23 RX ORDER — ONDANSETRON 2 MG/ML
4 INJECTION INTRAMUSCULAR; INTRAVENOUS EVERY 6 HOURS PRN
Status: DISCONTINUED | OUTPATIENT
Start: 2020-09-23 | End: 2020-09-24 | Stop reason: HOSPADM

## 2020-09-23 RX ORDER — DULOXETIN HYDROCHLORIDE 30 MG/1
60 CAPSULE, DELAYED RELEASE ORAL DAILY
Status: DISCONTINUED | OUTPATIENT
Start: 2020-09-24 | End: 2020-09-24 | Stop reason: HOSPADM

## 2020-09-23 RX ORDER — TAMSULOSIN HYDROCHLORIDE 0.4 MG/1
0.4 CAPSULE ORAL DAILY
Status: DISCONTINUED | OUTPATIENT
Start: 2020-09-24 | End: 2020-09-24 | Stop reason: HOSPADM

## 2020-09-23 RX ORDER — ATROPINE SULFATE 0.1 MG/ML
INJECTION INTRAVENOUS
Status: COMPLETED
Start: 2020-09-23 | End: 2020-09-23

## 2020-09-23 RX ORDER — ASPIRIN 81 MG/1
81 TABLET ORAL DAILY
Status: DISCONTINUED | OUTPATIENT
Start: 2020-09-23 | End: 2020-09-24 | Stop reason: HOSPADM

## 2020-09-23 RX ORDER — MAGNESIUM SULFATE 1 G/100ML
1 INJECTION INTRAVENOUS ONCE
Status: COMPLETED | OUTPATIENT
Start: 2020-09-23 | End: 2020-09-23

## 2020-09-23 RX ADMIN — ALBUMIN (HUMAN) 25 G: 12.5 INJECTION, SOLUTION INTRAVENOUS at 15:14

## 2020-09-23 RX ADMIN — ASPIRIN 81 MG: 81 TABLET, COATED ORAL at 18:19

## 2020-09-23 RX ADMIN — DOPAMINE HYDROCHLORIDE IN DEXTROSE 400000 MCG: 1.6 INJECTION, SOLUTION INTRAVENOUS at 11:52

## 2020-09-23 RX ADMIN — MAGNESIUM SULFATE HEPTAHYDRATE 1 G: 1 INJECTION, SOLUTION INTRAVENOUS at 19:24

## 2020-09-23 RX ADMIN — SODIUM CHLORIDE 250 ML: 9 INJECTION, SOLUTION INTRAVENOUS at 15:15

## 2020-09-23 RX ADMIN — SODIUM CHLORIDE, PRESERVATIVE FREE 10 ML: 5 INJECTION INTRAVENOUS at 20:44

## 2020-09-23 RX ADMIN — ATROPINE SULFATE 1 MG: 0.1 INJECTION PARENTERAL at 11:52

## 2020-09-23 RX ADMIN — DIAZEPAM 5 MG: 5 TABLET ORAL at 08:04

## 2020-09-23 RX ADMIN — PROCHLORPERAZINE EDISYLATE 10 MG: 5 INJECTION INTRAMUSCULAR; INTRAVENOUS at 12:35

## 2020-09-23 RX ADMIN — DIPHENHYDRAMINE HCL 25 MG: 25 TABLET ORAL at 08:04

## 2020-09-23 RX ADMIN — SODIUM CHLORIDE: 9 INJECTION, SOLUTION INTRAVENOUS at 08:04

## 2020-09-23 ASSESSMENT — PAIN DESCRIPTION - LOCATION: LOCATION: ARM

## 2020-09-23 ASSESSMENT — PAIN DESCRIPTION - PAIN TYPE: TYPE: ACUTE PAIN

## 2020-09-23 ASSESSMENT — PAIN DESCRIPTION - DESCRIPTORS: DESCRIPTORS: ACHING;CONSTANT

## 2020-09-23 ASSESSMENT — PAIN SCALES - GENERAL
PAINLEVEL_OUTOF10: 0
PAINLEVEL_OUTOF10: 0
PAINLEVEL_OUTOF10: 8

## 2020-09-23 ASSESSMENT — PAIN DESCRIPTION - ORIENTATION: ORIENTATION: RIGHT

## 2020-09-23 NOTE — PROGRESS NOTES
Dr Ashvin Snider at bedside to assess patient, aware of patients symptoms and VS's, orders received.

## 2020-09-23 NOTE — PROGRESS NOTES
Report called to [facility] in ICU. Patient transported to ICU room 2104 with RN on cardiac monitor.

## 2020-09-23 NOTE — PROGRESS NOTES
Received from cath lab. Monitor and alarms on. Call Light in reach. Procedure site assessment completed per   Allen Maria RN and J Peabody RN      No hematoma or bleeding noted.

## 2020-09-23 NOTE — CONSULTS
PICC line setup started, Dr. Gissell Reyes in room to see patient, states no PICC line at this time, wants to wean off dopamine. PICC line cancelled at this time.

## 2020-09-23 NOTE — CONSULTS
Department of Cardiovascular & Thoracic Surgery   Consult Note        Reason for Consult:  CAD  Requesting Physician:  Dr. Dorene Maldonado    Date of Consult: 09/23/20      History Obtained From:  patient, electronic medical record, MD, friend    HISTORY OF PRESENT ILLNESS:    The patient is a  71 y.o. male who presented with history of shortness of breath. In May, he had an episode where he suddenly woke up with discomfort and difficulty breathing. This episode may have been associated with some chest pain. He has not had any significant CP since then. No palpitations. He was hospitalized in June and found to have CHF. He admits that his dyspnea is worse when it is hotter and more humid. No CP/SOB/edema/F/C/N/Diarrhea, but he had some emesis yesterday but he thinks he may have eaten too much junk yesterday and admits he was a little nervous as well. Workup prompted LHC which was signif for RCA, Cx, and LAD stenoses. RCA occluded but fills from collaterals from L.  Cardiologist requesting consult for evaluation of CAD. Past Medical History:        Diagnosis Date    COPD (chronic obstructive pulmonary disease) [facility]     H/O echocardiogram 06/23/2020     Mild concentric LVH with moderate systolic impairment. EF is 40-45 % .  Hypertension     Thyroid disease        Past Surgical History:    No past surgical history on file.     Current Medications:   Current Facility-Administered Medications: 0.9 % sodium chloride infusion, , Intravenous, Continuous    Allergies:  No Known Allergies    Social History:   Social History     Socioeconomic History    Marital status:      Spouse name: Not on file    Number of children: Not on file    Years of education: Not on file    Highest education level: Not on file   Occupational History    Not on file   Social Needs    Financial resource strain: Not hard at all   [employer] insecurity     Worry: Never true     Inability: Never true   [employer] needs Medical: No     Non-medical: No   Tobacco Use    Smoking status: Current Every Day Smoker     Packs/day: 0.50     Types: Cigarettes    Smokeless tobacco: Never Used   Substance and Sexual Activity    Alcohol use: Not on file     Comment: social    Drug use: Never    Sexual activity: Not on file   Lifestyle    Physical activity     Days per week: Not on file     Minutes per session: Not on file    Stress: Not on file   Relationships    Social connections     Talks on phone: Not on file     Gets together: Not on file     Attends Yazidism service: Not on file     Active member of club or organization: Not on file     Attends meetings of clubs or organizations: Not on file     Relationship status: Not on file    Intimate partner violence     Fear of current or ex partner: Not on file     Emotionally abused: Not on file     Physically abused: Not on file     Forced sexual activity: Not on file   Other Topics Concern    Not on file   Social History Narrative    Not on file       Family History:    No family history on file. REVIEW OF SYSTEMS:   Complete ROS performed and negative except as noted per HPI. PHYSICAL EXAM:  Physical Exam  VITALS:  /68   Pulse 62   Temp 96.2 °F (35.7 °C) (Temporal)   Resp 18   Ht 5' 10\" (1.778 m)   Wt 195 lb (88.5 kg)   SpO2 97%   BMI 27.98 kg/m²   24HR INTAKE/OUTPUT:  No intake or output data in the 24 hours ending 09/23/20 1045    General appearance: No apparent distress, appears stated age and cooperative. Skin: unremarkable, warm, dry  HEENT Normocephalic, atraumatic without obvious deformity, Conjunctiva normal, EOMI, hearing intact  Neck: Supple, Trachea midline   Lungs: Good respiratory effort.  Clear to auscultation, bilaterally  Heart: Regular rate/ rhythm   Abdomen: Soft, non-tender or non-distended   Extremities: min edema warm well perfused  Neurologic: Alert, grossly intact, sensation intact  Mental status: normal affect        DATA:  Images reviewed  Echo--45% EF  Cath--TVD as per HPI    IMPRESSION  Patient Active Problem List   Diagnosis    Juvenile rheumatoid arthritis ([facility]    Chronic bilateral low back pain without sciatica    Peripheral arterial disease (HCC)    Panlobular emphysema ([facility]    Essential hypertension    Acquired hypothyroidism    Gastroesophageal reflux disease    Non-seasonal allergic rhinitis    Benign prostatic hyperplasia without lower urinary tract symptoms    Tobacco use    Acute pulmonary edema (HCC)    Seasonal allergic rhinitis due to pollen    Mixed irritable bowel syndrome    Mixed hyperlipidemia    Prediabetes    Benign prostatic hyperplasia    Chronic combined systolic and diastolic congestive heart failure (HCC)    JOSE (acute kidney injury) ([facility]    LV dysfunction         RECOMMENDATIONS:    Reiterated the importance of lifestyle changes included low sodium diet and tobacco cessation. The risks, benefits and alternatives were discussed in detail with the patient and family. The risks include bleeding, infection, graft failure, cardiac arrhythmias, thromboembolism, stroke, need for reoperation and death. The patient expressed understanding of these issues, confirmed that all questions were answered, and desires to proceed. Reiterated importance of tobacco cessation. > 10 min spent in tobacco cessation counseling. Thank you for the opportunity to assist in the care of this patient.

## 2020-09-23 NOTE — H&P
History of present illness:Keyur is a 71 y. o.year old who  presents with chest pressure and shortness of breath started in June, he went to hospital and was diagnosed with CHF, his LVEF was 40-45%  Upon extreme heat temperature, he feels shortness of breath which is mild to moderate, for last few months, intermittent, self limiting, not associated with cough or fever, gets worse with activity and better with rest,     Blood pressure, cholesterol, blood glucose and weight are well controlled.     Past medical history:    has a past medical history of COPD (chronic obstructive pulmonary disease) ([address] H/O echocardiogram, Hypertension, and Thyroid disease. Past surgical history:  Social History:   reports that he has been smoking cigarettes. He has been smoking about 0.50 packs per day. He has never used smokeless tobacco. He reports that he does not use drugs. Family history:   no family history of CAD, STROKE of DM     No Known Allergies       Current Meds Link used for Sign Out Report   No current facility-administered medications for this visit.       Current Facility-Administered Medications          Current Outpatient Medications   Medication Sig Dispense Refill    fluticasone (FLONASE) 50 MCG/ACT nasal spray SPRAY 1 SPRAY INTO EACH NOSTRIL EVERY DAY 1 Bottle 0    lisinopril (PRINIVIL;ZESTRIL) 5 MG tablet TAKE 1 TABLET BY MOUTH EVERY DAY 90 tablet 1    furosemide (LASIX) 20 MG tablet TAKE 1 TABLET BY MOUTH EVERY DAY 90 tablet 1    albuterol sulfate HFA (VENTOLIN HFA) 108 (90 Base) MCG/ACT inhaler Inhale 2 puffs into the lungs 4 times daily as needed for Wheezing 1 Inhaler 11    budesonide-formoterol (SYMBICORT) 160-4.5 MCG/ACT AERO Inhale 2 puffs into the lungs 2 times daily 1 Inhaler 11    dicyclomine (BENTYL) 10 MG capsule Take 1 capsule by mouth 4 times daily 360 capsule 1    famotidine (PEPCID) 20 MG tablet Take 1 tablet by mouth 2 times daily 180 tablet 1    DULoxetine (CYMBALTA) 60 MG extended release capsule Take 1 capsule by mouth daily 90 capsule 1    finasteride (PROSCAR) 5 MG tablet Take 1 tablet by mouth daily 90 tablet 1    levothyroxine (SYNTHROID) 100 MCG tablet Take 1 tablet by mouth daily 90 tablet 1    metoprolol tartrate (LOPRESSOR) 25 MG tablet Take 1 tablet by mouth 2 times daily 180 tablet 1    tiotropium (SPIRIVA HANDIHALER) 18 MCG inhalation capsule Inhale 1 capsule into the lungs daily 30 capsule 3    tamsulosin (FLOMAX) 0.4 MG capsule Take 1 capsule by mouth daily 90 capsule 1      No current facility-administered medications for this visit. Review of Systems:   · Constitutional: No Fever or Weight Loss   · Eyes: No Decreased Vision  · ENT: No Headaches, Hearing Loss or Vertigo  · Cardiovascular: + chest pain, dyspnea on exertion, palpitations or loss of consciousness  · Respiratory: No cough or wheezing    · Gastrointestinal: No abdominal pain, appetite loss, blood in stools, constipation, diarrhea or heartburn  · Genitourinary: No dysuria, trouble voiding, or hematuria  · Musculoskeletal:  No gait disturbance, weakness or joint complaints  · Integumentary: No rash or pruritis  · Neurological: No TIA or stroke symptoms  · Psychiatric: No anxiety or depression  · Endocrine: No malaise, fatigue or temperature intolerance  · Hematologic/Lymphatic: No bleeding problems, blood clots or swollen lymph nodes  · Allergic/Immunologic: No nasal congestion or hives  All systems negative except as marked.      ·    ·       Physical Examination:        Vitals:     09/09/20 1450   BP: 122/70   Pulse: 88   Resp: 16   Temp: 97.2 °F (36.2 °C)          Wt Readings from Last 3 Encounters:   09/09/20 201 lb (91.2 kg)   07/15/20 205 lb (93 kg)   06/15/20 206 lb (93.4 kg)     Body mass index is 29.68 kg/m².     General Appearance:  No distress, conversant     Constitutional:  Well developed, Well nourished, No acute distress, Non-toxic appearance.    HENT:  Normocephalic, Atraumatic, Bilateral external ears normal, Oropharynx moist, No oral exudates, Nose normal. Neck- Normal range of motion, No tenderness, Supple, No stridor,no apical-carotid delay, no carotid bruit  Eyes:  PERRL, EOMI, Conjunctiva normal, No discharge. Respiratory:  Normal breath sounds, No respiratory distress, No wheezing, No chest tenderness. ,no use of accessory muscles, diaphragm movement is normal  Cardiovascular: (PMI) apex non displaced,no lifts no thrills, no s3,no s4, Normal heart rate, Normal rhythm, No murmurs, No rubs, No gallops. Carotid arteries pulse and amplitude are normal no bruit, no abdominal bruit noted ( normal abdominal aorta ausculation), femoral arteries pulse and amplitude are normal no bruit, pedal pulses are normal  GI:  Bowel sounds normal, Soft, No tenderness, No masses, No pulsatile masses, no hepatosplenomegally, no bruits  : External genitalia appear normal, No masses or lesions. No discharge. No CVA tenderness. Musculoskeletal:  Intact distal pulses, No edema, No tenderness, No cyanosis, No clubbing. Good range of motion in all major joints. No tenderness to palpation or major deformities noted. Back- No tenderness. Integument:  Warm, Dry, No erythema, No rash. Skin: no rash, no ulcers  Lymphatic:  No lymphadenopathy noted. Neurologic:  Alert & oriented x 3, Normal motor function, Normal sensory function, No focal deficits noted. Psychiatric:  Affect normal, Judgment normal, Mood normal.   Lab Review   No results for input(s): WBC, HGB, HCT, PLT in the last 72 hours. No results for input(s): NA, K, CL, CO2, PHOS, BUN, CREATININE in the last 72 hours.     Invalid input(s): CA  No results for input(s): AST, ALT, ALB, BILIDIR, BILITOT, ALKPHOS in the last 72 hours. No results for input(s): TROPONINI in the last 72 hours. No results found for: BNP  No results found for: INR, PROTIME        EKG:nsr     Chest Xray: CHF     ECHO: lvef 40-45%  Labs, echo, meds reviewed  Assessment: 71 y. o.year old with PMH of  has a past medical history of COPD (chronic obstructive pulmonary disease) ([address] H/O echocardiogram, Hypertension, and Thyroid disease.        Recommendations:     1. NEW ONSET CHF LVEF dysfunction:  [facility] scheduled,risk and benefit explained and consent obtained, CONTINUE ASPIRIN AND STATINS, BB  2. COPD\" stable, recommend to weigth: recommend to stop smoking  3. Border line DM: watch for now. Diet control  4. HTN: controlled on lopressor and lisinopril  5. CHF\" stable, continue lasix and lisiniopril and BB  6. Health maintenance: exerise and diet  All labs, medications and tests reviewed, continue all other medications of all above medical condition listed as is.

## 2020-09-24 VITALS
HEIGHT: 70 IN | OXYGEN SATURATION: 96 % | BODY MASS INDEX: 27.92 KG/M2 | TEMPERATURE: 98 F | HEART RATE: 108 BPM | WEIGHT: 195 LBS | DIASTOLIC BLOOD PRESSURE: 98 MMHG | SYSTOLIC BLOOD PRESSURE: 170 MMHG | RESPIRATION RATE: 20 BRPM

## 2020-09-24 PROBLEM — I25.10 CORONARY ARTERY DISEASE INVOLVING NATIVE CORONARY ARTERY: Status: ACTIVE | Noted: 2020-09-24

## 2020-09-24 PROBLEM — R11.10 VOMITING: Status: ACTIVE | Noted: 2020-09-24

## 2020-09-24 PROBLEM — E86.0 DEHYDRATION: Status: ACTIVE | Noted: 2020-09-24

## 2020-09-24 LAB
ANION GAP SERPL CALCULATED.3IONS-SCNC: 11 MMOL/L (ref 4–16)
BUN BLDV-MCNC: 23 MG/DL (ref 6–23)
CALCIUM SERPL-MCNC: 8.4 MG/DL (ref 8.3–10.6)
CHLORIDE BLD-SCNC: 104 MMOL/L (ref 99–110)
CO2: 23 MMOL/L (ref 21–32)
CREAT SERPL-MCNC: 0.9 MG/DL (ref 0.9–1.3)
GFR AFRICAN AMERICAN: >60 ML/MIN/1.73M2
GFR NON-AFRICAN AMERICAN: >60 ML/MIN/1.73M2
GLUCOSE BLD-MCNC: 98 MG/DL (ref 70–99)
HCT VFR BLD CALC: 47.7 % (ref 42–52)
HEMOGLOBIN: 15.5 GM/DL (ref 13.5–18)
MAGNESIUM: 2.2 MG/DL (ref 1.8–2.4)
MCH RBC QN AUTO: 32.9 PG (ref 27–31)
MCHC RBC AUTO-ENTMCNC: 32.5 % (ref 32–36)
MCV RBC AUTO: 101.3 FL (ref 78–100)
PDW BLD-RTO: 13.7 % (ref 11.7–14.9)
PLATELET # BLD: 178 K/CU MM (ref 140–440)
PMV BLD AUTO: 9.1 FL (ref 7.5–11.1)
POTASSIUM SERPL-SCNC: 4.1 MMOL/L (ref 3.5–5.1)
RBC # BLD: 4.71 M/CU MM (ref 4.6–6.2)
SODIUM BLD-SCNC: 138 MMOL/L (ref 135–145)
WBC # BLD: 9.4 K/CU MM (ref 4–10.5)

## 2020-09-24 PROCEDURE — 94640 AIRWAY INHALATION TREATMENT: CPT

## 2020-09-24 PROCEDURE — 83735 ASSAY OF MAGNESIUM: CPT

## 2020-09-24 PROCEDURE — 85027 COMPLETE CBC AUTOMATED: CPT

## 2020-09-24 PROCEDURE — 94761 N-INVAS EAR/PLS OXIMETRY MLT: CPT

## 2020-09-24 PROCEDURE — 99232 SBSQ HOSP IP/OBS MODERATE 35: CPT | Performed by: INTERNAL MEDICINE

## 2020-09-24 PROCEDURE — 6370000000 HC RX 637 (ALT 250 FOR IP): Performed by: PHYSICIAN ASSISTANT

## 2020-09-24 PROCEDURE — 2580000003 HC RX 258: Performed by: INTERNAL MEDICINE

## 2020-09-24 PROCEDURE — 80048 BASIC METABOLIC PNL TOTAL CA: CPT

## 2020-09-24 PROCEDURE — 94664 DEMO&/EVAL PT USE INHALER: CPT

## 2020-09-24 RX ORDER — CHLORHEXIDINE GLUCONATE 0.12 MG/ML
30 RINSE ORAL ONCE
Status: CANCELLED | OUTPATIENT
Start: 2020-10-01

## 2020-09-24 RX ORDER — SIMVASTATIN 40 MG
40 TABLET ORAL ONCE
Status: CANCELLED | OUTPATIENT
Start: 2020-10-01

## 2020-09-24 RX ORDER — CARVEDILOL 3.12 MG/1
3.12 TABLET ORAL ONCE
Status: CANCELLED | OUTPATIENT
Start: 2020-10-01

## 2020-09-24 RX ADMIN — DULOXETINE HYDROCHLORIDE 60 MG: 30 CAPSULE, DELAYED RELEASE ORAL at 08:20

## 2020-09-24 RX ADMIN — BUDESONIDE AND FORMOTEROL FUMARATE DIHYDRATE 2 PUFF: 160; 4.5 AEROSOL RESPIRATORY (INHALATION) at 07:38

## 2020-09-24 RX ADMIN — ASPIRIN 81 MG: 81 TABLET, COATED ORAL at 08:20

## 2020-09-24 RX ADMIN — TIOTROPIUM BROMIDE INHALATION SPRAY 2 PUFF: 3.12 SPRAY, METERED RESPIRATORY (INHALATION) at 07:37

## 2020-09-24 RX ADMIN — TAMSULOSIN HYDROCHLORIDE 0.4 MG: 0.4 CAPSULE ORAL at 08:20

## 2020-09-24 RX ADMIN — LEVOTHYROXINE SODIUM 100 MCG: 100 TABLET ORAL at 08:20

## 2020-09-24 RX ADMIN — FINASTERIDE 5 MG: 5 TABLET, FILM COATED ORAL at 08:20

## 2020-09-24 RX ADMIN — SODIUM CHLORIDE, PRESERVATIVE FREE 10 ML: 5 INJECTION INTRAVENOUS at 08:21

## 2020-09-24 ASSESSMENT — PAIN SCALES - GENERAL
PAINLEVEL_OUTOF10: 0

## 2020-09-24 NOTE — CARE COORDINATION
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 Cm met with pt. 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 Pt had been living in [place] until approx 1 year ago when he  from his wife of 11 years. Pt states that he has no children and he himself is an only child. Pt states that he has cousins but does not associate with them. Pt states that he is living check to check and has no money put back. CM asked how this could be the case given that pt is living in his truck. Pt states that he does have a car and making payment on that but also has been giving his soon to be ex wife money every month toward her rent and utilties. Cm asked if he planned to do that after the divorce and he advised no. Cm explained that even if he did not have open heart he could have any number of things happen that could prevent him from being able to stay in the cab of his truck and he doesn't have the money for a deposit on an ap. Cm encouraged pt to consider whether he should continue sending money to his soon to be ex wife or make sure that he is putting money back to take care of himself. Cm encouraged pt to call his wife and request that since he has been paying part of her bills that she allow him to stay with her for a couple of weeks post op or quit sending her money so he can possibly use it for a motel. Cm also encouraged him to consider asking someone he works with if he could stay with them and/or if he gave them some money weekly could he stay with them. Pt is aware that if pt does poorly after surgery that SNF could be an option but there is no guarantee insurance would approve SNF and it is based on how he is progressing/doing post op. Cm checked the price of 4 local motels and placed the rates/ph#s and locations on pt's discharge instructions.

## 2020-09-24 NOTE — PLAN OF CARE
Problem:  Activity:  Goal: Risk for activity intolerance will decrease  Description: Risk for activity intolerance will decrease  Outcome: Ongoing  Goal: Will verbalize the importance of balancing activity with adequate rest periods  Description: Will verbalize the importance of balancing activity with adequate rest periods  Outcome: Ongoing     Problem: Cardiac:  Goal: Ability to maintain an adequate cardiac output will improve  Description: Ability to maintain an adequate cardiac output will improve  Outcome: Ongoing  Goal: Hemodynamic stability will improve  Description: Hemodynamic stability will improve  Outcome: Ongoing  Goal: Diagnostic test results will improve  Description: Diagnostic test results will improve  Outcome: Ongoing     Problem: Coping:  Goal: Ability to verbalize feelings about condition will improve  Description: Ability to verbalize feelings about condition will improve  Outcome: Ongoing  Goal: Ability to identify strategies to decrease anxiety will improve  Description: Ability to identify strategies to decrease anxiety will improve  Outcome: Ongoing  Goal: Ability to identify and alter barriers to satisfying sexual function will improve  Description: Ability to identify and alter barriers to satisfying sexual function will improve  Outcome: Ongoing     Problem: Health Behavior:  Goal: Ability to identify changes in lifestyle to reduce recurrence of condition will improve  Description: Ability to identify changes in lifestyle to reduce recurrence of condition will improve  Outcome: Ongoing  Goal: Ability to manage health-related needs will improve  Description: Ability to manage health-related needs will improve  Outcome: Ongoing     Problem: Nutritional:  Goal: Ability to identify appropriate dietary choices will improve  Description: Ability to identify appropriate dietary choices will improve  Outcome: Ongoing     Problem: Respiratory:  Goal: Ability to maintain adequate ventilation will improve  Description: Ability to maintain adequate ventilation will improve  Outcome: Ongoing  Goal: Levels of oxygenation will improve  Description: Levels of oxygenation will improve  Outcome: Ongoing     Problem: Sensory:  Goal: Pain level will decrease  Description: Pain level will decrease  Outcome: Ongoing

## 2020-09-24 NOTE — PROGRESS NOTES
Today's plan: bp is better, ok for discharge for out patient CABG      Admit Date:  9/23/2020    Subjective:ok      Chief complaints on admission: abnormal ECHO and shortness of breath        History of present illness:Keyur is a 71 y. o.year old who  presents with shortness of breath and abnomal ECHO    Past medical history:    has a past medical history of COPD (chronic obstructive pulmonary disease) ([address] H/O cardiac catheterization, H/O echocardiogram, Hypertension, and Thyroid disease. Past surgical history:   has a past surgical history that includes Cardiac catheterization. Social History:   reports that he has been smoking cigarettes. He has been smoking about 0.50 packs per day. He has never used smokeless tobacco. He reports that he does not use drugs. Family history:  family history is not on file. No Known Allergies      Objective:   BP (!) 156/98   Pulse 89   Temp 98 °F (36.7 °C) (Oral)   Resp 12   Ht 5' 10\" (1.778 m)   Wt 195 lb (88.5 kg)   SpO2 96%   BMI 27.98 kg/m²       Intake/Output Summary (Last 24 hours) at 9/24/2020 1713  Last data filed at 9/24/2020 1301  Gross per 24 hour   Intake 1904.95 ml   Output 1000 ml   Net 904.95 ml       TELEMETRY: Sinus     Physical Exam:  Constitutional:  Well developed, Well nourished, No acute distress, Non-toxic appearance. HENT:  Normocephalic, Atraumatic, Bilateral external ears normal, Oropharynx moist, No oral exudates, Nose normal. Neck- Normal range of motion, No tenderness, Supple, No stridor. Eyes:  PERRL, EOMI, Conjunctiva normal, No discharge. Respiratory:  Normal breath sounds, No respiratory distress, No wheezing, No chest tenderness. ,no use of accessory muscles, diaphragm movement is normal  Cardiovascular: (PMI) apex non displaced,no lifts no thrills, no s3,no s4, Normal heart rate, Normal rhythm, No murmurs, No rubs, No gallops.  Carotid arteries pulse and amplitude are normal no bruit, no abdominal bruit noted ( normal abdominal aorta ausculation), femoral arteries pulse and amplitude are normal no bruit, pedal pulses are normal  GI:  Bowel sounds normal, Soft, No tenderness, No masses, No pulsatile masses. : External genitalia appear normal, No masses or lesions. No discharge. No CVA tenderness. Musculoskeletal:  Intact distal pulses, No edema, No tenderness, No cyanosis, No clubbing. Good range of motion in all major joints. No tenderness to palpation or major deformities noted. Back- No tenderness. Integument:  Warm, Dry, No erythema, No rash. Lymphatic:  No lymphadenopathy noted. Neurologic:  Alert & oriented x 3, Normal motor function, Normal sensory function, No focal deficits noted. Psychiatric:  Affect normal, Judgment normal, Mood normal.     Medications:    lidocaine PF  5 mL Intradermal Once    sodium chloride flush  10 mL Intravenous 2 times per day    aspirin EC  81 mg Oral Daily    budesonide-formoterol  2 puff Inhalation BID    DULoxetine  60 mg Oral Daily    finasteride  5 mg Oral Daily    levothyroxine  100 mcg Oral Daily    tamsulosin  0.4 mg Oral Daily    tiotropium  2 puff Inhalation Daily      DOPamine Stopped (09/23/20 1509)     ondansetron, sodium chloride flush, albuterol    Lab Data:  CBC:   Recent Labs     09/23/20  1235 09/24/20  0337   WBC 12.0* 9.4   HGB 17.7 15.5   HCT 52.8* 47.7   .4* 101.3*    178     BMP:   Recent Labs     09/23/20  1235 09/24/20  0337    138   K 4.8 4.1    104   CO2 24 23   BUN 27* 23   CREATININE 1.2 0.9     LIVER PROFILE: No results for input(s): AST, ALT, LIPASE, BILIDIR, BILITOT, ALKPHOS in the last 72 hours. Invalid input(s): AMYLASE,  ALB  PT/INR: No results for input(s): PROTIME, INR in the last 72 hours. APTT: No results for input(s): APTT in the last 72 hours. BNP:  No results for input(s): BNP in the last 72 hours. TROPONIN: @TROPONINI:3@      Assessment:  71 y. o.year old who is admitted for          Plan:  CAD: MULTIVESSEL DISEASE, WILL NEED CABG Continue aspirincontinue statins,OFF  ACEinhibitors, OFF betablockers DUE TO SHOCK YESTERDAY  Dyslipidemia: continue statins  HYPOTHYROIDISM: STABLE  Health maintenance: exerise and diet  All labs, medications and tests reviewed, continue all other medications of all above medical condition listed as is.       Chelly Mendosa MD 9/24/2020 5:13 PM

## 2020-09-24 NOTE — PROGRESS NOTES
Discussed with pt and Gil Fuller. Pt is currently living in his semi truck and has no family or friends he can stay with post op. He would like to work for a while to save up money for a motel stay post op. Will hold off on surgery for now. Plan for outpt follow up next week to discuss futher. Advised pt to try to think of anyone who might be able to help him in the meantime. He agrees to the plan.

## 2020-09-24 NOTE — PROGRESS NOTES
Pt discharged via wheelchair. Discharge paperwork reviewed w/ patient. Dr Anibal Mcclain in to see patient before discharge, plan of care discussed.

## 2020-09-24 NOTE — DISCHARGE SUMMARY
Discharge Summary     Patient ID  Dilip Brooks   1955  [id number]      Primary Care Doctor:  Garcia Regan MD    Admit date: 9/23/2020   Discharge date: 9/24/2020      Admitting Physician: Hillary Goetz MD   Discharge Physician: Geovanni Pacheco PA-C    Discharge Diagnoses: Active Hospital Problems    Diagnosis Date Noted    Dehydration [E86.0] 09/24/2020    Vomiting [R11.10] 09/24/2020    Coronary artery disease involving native coronary artery [I25.10] 09/24/2020    LV dysfunction [I51.9]      Discharged Condition: stable. Hospital Course:  Patient was admitted for hypotension and bradycardia after LHC. He had nausea and vomiting and was hypovolemic. Received dopamine gtt and fluid resuscitation. monitored rhythm, O2 sat, I/O, Labs, CXRs serially  Neuro status , BP and vital signs monitored  Started on diet and activity. At discharge, pt ambulating, on RA.      Special concerns: none  Further plans after discharge: will return for CABG next week    VS at discharge   Vitals:    09/24/20 0800   BP: (!) 140/89   Pulse: 81   Resp: 15   Temp: 98 °F (36.7 °C)   SpO2: 92%       Consults: none    Disposition: home    Patient Instructions:      Medication List      CHANGE how you take these medications    metoprolol tartrate 25 MG tablet  Commonly known as:  LOPRESSOR  Take 0.5 tablets by mouth 2 times daily  What changed:  how much to take        CONTINUE taking these medications    albuterol sulfate  (90 Base) MCG/ACT inhaler  Commonly known as:  Ventolin HFA  Inhale 2 puffs into the lungs 4 times daily as needed for Wheezing     aspirin EC 81 MG EC tablet  Take 1 tablet by mouth daily     budesonide-formoterol 160-4.5 MCG/ACT Aero  Commonly known as:  Symbicort  Inhale 2 puffs into the lungs 2 times daily     dicyclomine 10 MG capsule  Commonly known as:  BENTYL  Take 1 capsule by mouth 4 times daily     DULoxetine 60 MG extended release capsule  Commonly known as:  CYMBALTA  Take 1

## 2020-09-24 NOTE — PROGRESS NOTES
Pt is currently living out of his semi-truck. , has no family or friends to stay with at this time. Pt has no place to quarantine, or recover post procedure scheduled for October 1st. Had CM speak w/ patient. Patient would like to delay surgery until he can save money for a hotel. Álvaro MARCIAL notified and up to bedside to see patient. Per her request, wait until Dr Amanda Goode sees patient to discharge or proceed further.

## 2020-09-25 ENCOUNTER — TELEPHONE (OUTPATIENT)
Dept: INTERNAL MEDICINE CLINIC | Age: 65
End: 2020-09-25

## 2020-09-25 ENCOUNTER — HOSPITAL ENCOUNTER (OUTPATIENT)
Dept: PREADMISSION TESTING | Age: 65
Discharge: HOME | End: 2020-09-25

## 2020-09-25 ENCOUNTER — CARE COORDINATION (OUTPATIENT)
Dept: CARE COORDINATION | Age: 65
End: 2020-09-25

## 2020-09-25 RX ORDER — VARENICLINE TARTRATE 0.5 MG/1
.5-1 TABLET, FILM COATED ORAL SEE ADMIN INSTRUCTIONS
Qty: 57 TABLET | Refills: 0 | Status: SHIPPED | OUTPATIENT
Start: 2020-09-25 | End: 2020-12-10 | Stop reason: ALTCHOICE

## 2020-09-25 NOTE — TELEPHONE ENCOUNTER
Patient would like to know if we can send in Chantix for him? He is trying to do this prior to his upcoming surgery.

## 2020-09-25 NOTE — CARE COORDINATION
Call to pt for acm/hosp follow-up. Reviewed avs and med changes with pt. Denies any new/worsening symptoms at this time. Advised to contact Dr. Colton Brown and Dr. Wilman Pineda office for f/u as directed and reports understanding and that he has their phone number. Pt reports Going thru bad divorce, has to have place to stay post cabg surgery for 2 weeks. Reports he is working on securing a place to stay at this time. Has numbers to [employer] local, and reports that maggie alfaro work as does not want to stay alone. Offered referral to social work for possible assistance and reports he was provied with local hotel info from  at [address] and he is working on it on his own. Reports he is talking to ex wife about staying with her and supposed to find out in a few days and wants to wait on referral until he hears back from her.  Will continue to follow ot educate and support, denies needs at thsi time

## 2020-09-26 LAB
EKG ATRIAL RATE: 49 BPM
EKG DIAGNOSIS: NORMAL
EKG P AXIS: 30 DEGREES
EKG P-R INTERVAL: 164 MS
EKG Q-T INTERVAL: 472 MS
EKG QRS DURATION: 104 MS
EKG QTC CALCULATION (BAZETT): 426 MS
EKG R AXIS: 60 DEGREES
EKG T AXIS: 116 DEGREES
EKG VENTRICULAR RATE: 49 BPM

## 2020-09-28 NOTE — TELEPHONE ENCOUNTER
Please inform patient with his active coronary issues I would recommend nicotine patches over Chantix.  If he is agreeable, I can send prescription for patches

## 2020-10-01 ENCOUNTER — CARE COORDINATION (OUTPATIENT)
Dept: CARE COORDINATION | Age: 65
End: 2020-10-01

## 2020-10-01 ENCOUNTER — TELEPHONE (OUTPATIENT)
Dept: CARDIOLOGY CLINIC | Age: 65
End: 2020-10-01

## 2020-10-01 NOTE — CARE COORDINATION
Ambulatory Care Coordination Note  CM Risk Score: 2  Charlson 10 Year Mortality Risk Score: 79%     ACC: Charity Elaine RN    Summary Note: Call to pt for acm f/u. Reports he has talked with 3 places yesterday to stay post surgery, they are all with waiting list, reports he informed them of situation and they moved him up on list. Saw cv surgeon yesterday, and aware of situation, ex wife has to get back with pt about situation As soon as pt hears something he will inform surgeon To get scheduled for surgery. Has ov with pcp next week. Will f/u with pt in 2 weeks. Declines  referral at this time. Advised to call if any needs arise. Ambulatory Care Coordination Assessment    Care Coordination Protocol  Program Enrollment:  Rising Risk  Referral from Primary Care Provider:  No  Week 1 - Initial Assessment     Do you have all of your prescriptions and are they filled?:  Yes  Barriers to medication adherence:  None  Are you able to afford your medications?:  Yes  How often do you have trouble taking your medications the way you have been told to take them?:  I always take them as prescribed. Do you have Home O2 Therapy?:  No      Ability to seek help/take action for Emergent Urgent situations i.e. fire, crime, inclement weather or health crisis. :  Independent  Ability to ambulate to restroom:  Independent  Ability handle personal hygeine needs (bathing/dressing/grooming): Independent  Ability to manage Medications: Independent  Ability to prepare Food Preparation:  Independent  Ability to maintain home (clean home, laundry): Independent  Ability to drive and/or has transportation:  Independent  Ability to do shopping:  Independent  Ability to manage finances:   Independent  Is patient able to live independently?:  Yes     Current Housing:  Transition, Homeless        Per the Fall Risk Screening, did the patient have 2 or more falls or 1 fall with injury in the past year?:  No     Frequent urination at night?:  No  Do you use rails/bars?:  No  Do you have a non-slip tub mat?:  No        Thinking about your patient's physical health needs, are there any symptoms or problems (risk indicators) you are unsure about that require further investigation?:  No identified areas of uncertainly or problems already being investigated   Are the patients physical health problems impacting on their mental well-being?:  Moderate to severe impact upon mental well-being and preventing enjoyment of usual activities   Are there any problems with your patients lifestyle behaviors (alcohol, drugs, diet, exercise) that are impacting on physical or mental well-being?:  No identified areas of concern   Do you have any other concerns about your patients mental well-being? How would you rate their severity and impact on the patient?:  Mild problems - don't interfere with function   How would you rate their home environment in terms of safety and stability (including domestic violence, insecure housing, neighbor harassment)?:  Safety/stability questionable   How do daily activities impact on the patient's well-being? (include current or anticipated unemployment, work, caregiving, access to transportation or other): Contributes to low mood or stress at times   How would you rate their social network (family, work, friends)?:  Adequate participation with social networks   How would you rate their financial resources (including ability to afford all required medical care)?:  Financially insecure, some resource challenges   How wells does the patient now understand their health and well-being (symptoms, signs or risk factors) and what they need to do to manage their health?:  Reasonable to good understanding and already engages in managing health or is willing to undertake better management   How well do you think your patient can engage in healthcare discussions?  (Barriers include language, deafness, aphasia, alcohol or drug problems, learning difficulties, concentration):  Clear and open communication, no identified barriers   Do other services need to be involved to help this patient?:  Other care/services in place and adequate   Are current services involved with this patient well-coordinated? (Include coordination with other services you are now recommendation): All required care/services in place and well-coordinated   Suggested Interventions and Community Resources   Physical Therapy:  Completed   Social Work:  Declined   Other Therapy Services:  Completed   Zone Management Tools:  Completed                  Prior to Admission medications    Medication Sig Start Date End Date Taking? Authorizing Provider   traMADol (ULTRAM) 50 MG tablet Take 1 tablet by mouth every 8 hours as needed for Pain for up to 4 days. Intended supply: 3 days. Take lowest dose possible to manage pain 12/2/20 12/6/20  Álvaro Fleming PA-C   levothyroxine (SYNTHROID) 100 MCG tablet TAKE 1 TABLET BY MOUTH EVERY DAY 11/30/20   Melanie White MD   amiodarone (CORDARONE) 200 MG tablet Take 1 tablet by mouth daily 11/10/20   CHERRY Mayorga MD   albuterol sulfate  (90 Base) MCG/ACT inhaler Inhale 2 puffs into the lungs every 4 hours (while awake) 11/9/20   C Johnathon Mayorga MD   atorvastatin (LIPITOR) 20 MG tablet Take 1 tablet by mouth nightly 11/9/20   C Johnathon Mayorga MD   carvedilol (COREG) 3.125 MG tablet Take 1 tablet by mouth 2 times daily 11/9/20   C Johnathon Mayorga MD   predniSONE (DELTASONE) 10 MG tablet 1 tab daily for 5 days, then 1/2 tab daily for 6 days, then stop.  11/9/20   Miles Billings MD   guaiFENesin [facility]) 600 MG extended release tablet Take 1 tablet by mouth 2 times daily 11/9/20   C Johnathon Mayorga MD   furosemide (LASIX) 40 MG tablet Take 1 tablet by mouth 2 times daily 11/9/20   C Johnathon Mayorga MD   finasteride (PROSCAR) 5 MG tablet Take 1 tablet by mouth daily 11/10/20   C Johnathon Mayorga MD   tamsulosin [facility]) 0.4

## 2020-10-01 NOTE — TELEPHONE ENCOUNTER
Patient picked up completed [facility] physician statement. He will take care of sending it to [facility]. See copy of form attached.

## 2020-10-05 ENCOUNTER — OFFICE VISIT (OUTPATIENT)
Dept: INTERNAL MEDICINE CLINIC | Age: 65
End: 2020-10-05
Payer: MEDICARE

## 2020-10-05 VITALS
SYSTOLIC BLOOD PRESSURE: 120 MMHG | HEART RATE: 64 BPM | HEIGHT: 69 IN | WEIGHT: 207 LBS | TEMPERATURE: 98.7 F | DIASTOLIC BLOOD PRESSURE: 60 MMHG | BODY MASS INDEX: 30.66 KG/M2 | OXYGEN SATURATION: 96 %

## 2020-10-05 PROCEDURE — 99495 TRANSJ CARE MGMT MOD F2F 14D: CPT | Performed by: INTERNAL MEDICINE

## 2020-10-05 PROCEDURE — 1111F DSCHRG MED/CURRENT MED MERGE: CPT | Performed by: INTERNAL MEDICINE

## 2020-10-05 RX ORDER — CETIRIZINE HYDROCHLORIDE 10 MG/1
TABLET ORAL
COMMUNITY
Start: 2020-09-06 | End: 2020-12-22 | Stop reason: SDUPTHER

## 2020-10-05 RX ORDER — ATORVASTATIN CALCIUM 40 MG/1
40 TABLET, FILM COATED ORAL EVERY EVENING
Qty: 30 TABLET | Refills: 3 | Status: ON HOLD | OUTPATIENT
Start: 2020-10-05 | End: 2020-11-09 | Stop reason: HOSPADM

## 2020-10-05 RX ORDER — VARENICLINE TARTRATE
KIT
Status: ON HOLD | COMMUNITY
Start: 2020-09-29 | End: 2020-11-09 | Stop reason: HOSPADM

## 2020-10-05 RX ORDER — AMLODIPINE BESYLATE 5 MG/1
TABLET ORAL
Status: ON HOLD | COMMUNITY
Start: 2020-09-06 | End: 2020-11-09 | Stop reason: HOSPADM

## 2020-10-05 ASSESSMENT — PATIENT HEALTH QUESTIONNAIRE - PHQ9
SUM OF ALL RESPONSES TO PHQ QUESTIONS 1-9: 0
SUM OF ALL RESPONSES TO PHQ9 QUESTIONS 1 & 2: 0
1. LITTLE INTEREST OR PLEASURE IN DOING THINGS: 0
2. FEELING DOWN, DEPRESSED OR HOPELESS: 0
SUM OF ALL RESPONSES TO PHQ QUESTIONS 1-9: 0

## 2020-10-05 NOTE — PROGRESS NOTES
Post-Discharge Transitional Care Management Services or Hospital Follow Up      Wilfredo Hillman   YOB: 1955    Date of Office Visit:  10/5/2020  Date of Hospital Admission: 9/23/20  Date of Hospital Discharge: 9/24/20  Readmission Risk Score(high >=14%.  Medium >=10%):Readmission Risk Score: 10      Care management risk score Rising risk (score 2-5) and Complex Care (Scores >=6): 2     Non face to face  following discharge, date last encounter closed (first attempt may have been earlier): 9/25/2020  4:22 PM 9/25/2020  4:22 PM    Call initiated 2 business days of discharge: Yes     Patient Active Problem List   Diagnosis    Juvenile rheumatoid arthritis ([address]    Chronic bilateral low back pain without sciatica    Peripheral arterial disease ([address]    Panlobular emphysema ([address]    Essential hypertension    Acquired hypothyroidism    Gastroesophageal reflux disease    Non-seasonal allergic rhinitis    Benign prostatic hyperplasia without lower urinary tract symptoms    Tobacco use    Acute pulmonary edema (HCC)    Seasonal allergic rhinitis due to pollen    Mixed irritable bowel syndrome    Mixed hyperlipidemia    Prediabetes    Benign prostatic hyperplasia    Chronic combined systolic and diastolic congestive heart failure (HCC)    JOSE (acute kidney injury) ([address]    LV dysfunction    Dehydration    Vomiting    Coronary artery disease involving native coronary artery       No Known Allergies    Medications listed as ordered at the time of discharge from hospital   [place], [address] [place] [address] Medication Instructions AYAH:    Printed on:10/05/20 2638   Medication Information                      albuterol sulfate HFA (VENTOLIN HFA) 108 (90 Base) MCG/ACT inhaler  Inhale 2 puffs into the lungs 4 times daily as needed for Wheezing             amLODIPine (NORVASC) 5 MG tablet  TAKE 1 TABLET BY MOUTH EVERY DAY             aspirin EC 81 MG EC tablet  Take 1 tablet by mouth daily atorvastatin (LIPITOR) 40 MG tablet  Take 1 tablet by mouth every evening             budesonide-formoterol (SYMBICORT) 160-4.5 MCG/ACT AERO  Inhale 2 puffs into the lungs 2 times daily             cetirizine (ZYRTEC) 10 MG tablet  TAKE 1 TABLET BY MOUTH EVERY DAY             CHANTIX STARTING MONTH REJI 0.5 MG X 11 & 1 MG X 42 tablet  TAKE 1 TAB BY MOUTH X3DAYS, THEN TAKE 1 TAB TWICE A DAY X4DAYS, TAKE 2TABS TWICE A DAY             dicyclomine (BENTYL) 10 MG capsule  Take 1 capsule by mouth 4 times daily             DULoxetine (CYMBALTA) 60 MG extended release capsule  Take 1 capsule by mouth daily             famotidine (PEPCID) 20 MG tablet  Take 1 tablet by mouth 2 times daily             finasteride (PROSCAR) 5 MG tablet  Take 1 tablet by mouth daily             fluticasone (FLONASE) 50 MCG/ACT nasal spray  SPRAY 1 SPRAY INTO EACH NOSTRIL EVERY DAY             furosemide (LASIX) 20 MG tablet  TAKE 1 TABLET BY MOUTH EVERY DAY             levothyroxine (SYNTHROID) 100 MCG tablet  Take 1 tablet by mouth daily             metoprolol tartrate (LOPRESSOR) 25 MG tablet  Take 0.5 tablets by mouth 2 times daily             tamsulosin (FLOMAX) 0.4 MG capsule  Take 1 capsule by mouth daily             tiotropium (SPIRIVA HANDIHALER) 18 MCG inhalation capsule  Inhale 1 capsule into the lungs daily             varenicline (CHANTIX) 0.5 MG tablet  Take 1-2 tablets by mouth See Admin Instructions 0.5mg DAILY for 3 days followed by 0.5mg TWICE DAILY for 4 days followed by 1mg TWICE DAILY                   Medications marked \"taking\" at this time  Outpatient Medications Marked as Taking for the 10/5/20 encounter (Office Visit) with Elvin Riley MD   Medication Sig Dispense Refill    amLODIPine (NORVASC) 5 MG tablet TAKE 1 TABLET BY MOUTH EVERY DAY      CHANTIX STARTING MONTH REJI 0.5 MG X 11 & 1 MG X 42 tablet TAKE 1 TAB BY MOUTH X3DAYS, THEN TAKE 1 TAB TWICE A DAY X4DAYS, TAKE 2TABS TWICE A DAY      atorvastatin (LIPITOR) 40 MG tablet Take 1 tablet by mouth every evening 30 tablet 3    varenicline (CHANTIX) 0.5 MG tablet Take 1-2 tablets by mouth See Admin Instructions 0.5mg DAILY for 3 days followed by 0.5mg TWICE DAILY for 4 days followed by 1mg TWICE DAILY 57 tablet 0    metoprolol tartrate (LOPRESSOR) 25 MG tablet Take 0.5 tablets by mouth 2 times daily 180 tablet 1    aspirin EC 81 MG EC tablet Take 1 tablet by mouth daily 90 tablet 1    furosemide (LASIX) 20 MG tablet TAKE 1 TABLET BY MOUTH EVERY DAY 90 tablet 1    albuterol sulfate HFA (VENTOLIN HFA) 108 (90 Base) MCG/ACT inhaler Inhale 2 puffs into the lungs 4 times daily as needed for Wheezing 1 Inhaler 11    budesonide-formoterol (SYMBICORT) 160-4.5 MCG/ACT AERO Inhale 2 puffs into the lungs 2 times daily 1 Inhaler 11    famotidine (PEPCID) 20 MG tablet Take 1 tablet by mouth 2 times daily 180 tablet 1    DULoxetine (CYMBALTA) 60 MG extended release capsule Take 1 capsule by mouth daily 90 capsule 1    finasteride (PROSCAR) 5 MG tablet Take 1 tablet by mouth daily 90 tablet 1    levothyroxine (SYNTHROID) 100 MCG tablet Take 1 tablet by mouth daily 90 tablet 1    tamsulosin (FLOMAX) 0.4 MG capsule Take 1 capsule by mouth daily 90 capsule 1        Medications patient taking as of now reconciled against medications ordered at time of hospital discharge: Yes    Chief Complaint   Patient presents with    Follow-Up from Hospital       HPI    Inpatient course: Discharge summary reviewed- see chart.     Interval history/Current status:   Daisy Olivares is a 71 y.o. pleasant gentleman who has requested an audio/video evaluation for the following concern(s): HFrEF, multivessel CAD  He has a past medical history significant for:  HTN, on Amlodipine 5mg QD, Metoprolol tartrate 25mg BID  HL (,  on 6/23/2020), not on statin   Prediabetes (HbA1C 6.3% on 9/23/2020), not on meds  HFrEF/HFpEF (EF 60-28%, diastolic dysfunction, mild MR on 6/23/2020), off Lisinopril, on Metoprolol tartrate 25mg BID and Lasix 20mg QD   Hypothyroidism (TSH 1.34 normal on 6/23/2020), on Synthroid 100mcg QD  PAD, on ASA   GERD, on Pepcid 20mg BID   Allergic rhinitis, on Zyrtec, Flonase   BPH, on Flomax, Finasteride   COPD, on Albuterol PRN, Symbicort BID, Spiriva QD   Chronic back pain, on Cymbalta 60mg QHS, Flexeril 10mg BID PRN   IBS-mixed, on Bentyl QID   Juvenile RA   Rheumatic fever   Current smoker     # Patient was admitted 9/23-9/24/2020 for hypotension and bradycardia following LHC. He had N/V and was hypovolemic. He was thought to be in cardiogenic shock s/p dopamine gtt. He also received IVF resuscitation. He was discharged home ambulating, in a stable condition. LHC showed multi-vessel CAD and he is planned for CABG. Following with Dr. Kaylan Flores outpatient. He was having issues with staying in a home post-op. Last night his ex-wife allowed him to stay at home for 2 weeks post-op. His Lisinopril was discontinued in the hospital. He is on Amlodipine. # Patient started Chantix a few days ago. Has started to cut back. # Patient was in the ED on 5/28 for acute onset SOB. New diagnosis of CHF. EKG wo acute ischemia. Neg Mainor. BNP elevated. CT showed no PE but did show pulmonary edema (high d-dimer). COVID test negative. He was hypoxic, requiring 4L O2 however patient declined admission.   He is not having worsening SOB, but at his baseline. He was given in the ED Lasix IV 40mg once.   First time I met patient in June I prescribed Lasix 20mg QD. He reports his SOB improved over 70% with the Lasix. TTE done 6/23/2020 shows e/o mixed systolic and diastolic heart failure. He has never had LHC done. Patient denies alcohol abuse. He developed pre-renal JOSE which resolved after Lasix was held for 1 week (Cr up to 1.4 in June then down to 1.0 with GFR > 60 in July). He has not had ischemic work up done in the past.   # Takes Pepcid for GERD. Helping.  He was on Zantac in the past but I discontinued it due to recall. No heartburn or reflux or nausea.   # HTN: stable and controlled. No CP, palpitations, dizziness, or light-headedness.   # Takes Synthroid. No symptoms of hyper or hypothyroidism.   # Takes Zyrtec for allergic rhinitis. This is helping. Tolerating well. He is also using Flonase PRN. No coughing or sneezing.   # Takes Finasteride and Flomax for BPH. Symptoms stable on these meds. Tolerating meds. No LUTS.   # Takes Cymbalta and Flexeril for chronic back pain. Both helping. No side effects such as drowsiness.   # Smokes cigarettes. He is trying to quit smoking. He bought OTC patches which have not been helping. Nicorette gum has also not been helping.   # Uses Albuterol and Symbicort for COPD, as well as recently added Spiriva. 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 # High MCV on CBC on 5/28/2020. # As per patient he should have had his gall bladder however due to pandemic restrictions he had to hold off.   # He takes ASA for PAD. No bleeding or bruising on ASA. No h/o CAD or ischemic stroke. # ? H/o dyslipidemia. Not on statin therapy. He does have RA however. # Patient's mother was diabetic. His BG was high in ER in May. Prediabetic based on A1C drawn in June.   # Patient is having ED over the past few months.   # Has lesion on L side of face. Sun-exposed area.  Growing over the past 2 months. Sent him to see Derm.      Drives a semi-truck     Review of Systems:  Constitutional: no fevers, no chills, no night sweats, no weight loss, no weight gain, no fatigue   Pain assessment: no pain  Head: no headaches  Ears: no hearing loss, no tinnitus, no vertigo  Eyes: no blurry vision, no diplopia, no dryness, no itchiness  Mouth: no oral ulcers, no dry mouth, no sore throat  Nose: no nasal congestion, no epistaxis  Cardiac: no chest pain, no palpitations, leg swelling, no orthopnea, no PND, no syncope  Pulmonary: exertional °C)   SpO2: 96%   Weight: 207 lb (93.9 kg)   Height: 5' 9\" (1.753 m)     Body mass index is 30.57 kg/m². Wt Readings from Last 3 Encounters:   10/05/20 207 lb (93.9 kg)   09/30/20 202 lb (91.6 kg)   09/23/20 195 lb (88.5 kg)     BP Readings from Last 3 Encounters:   10/05/20 120/60   09/30/20 132/84   09/24/20 (!) 170/98         Assessment/Plan:    1. Coronary artery disease involving native coronary artery of native heart with other form of angina pectoris ([facility]  Multivessel CAD  Planned for CABG upcoming with Dr. Keren Connell  Start Atorvastatin 40mg QD   Continue ASA  - WI DISCHARGE MEDS RECONCILED W/ CURRENT OUTPATIENT MED LIST    2. Mixed hyperlipidemia  ,  in June 2020  Start Atorvastatin 40mg QD today  Check CMP in 3 months ~ Jan 2020    3. Essential hypertension  Stable  Continue Amlodipine 5mg QD  Continue Metoprolol 25mg BID  Patient recently in hospital switched from Lisinopril to Amlodipine due to ?cardiogenic shock. However likely hypovolemic from n/v due to anesthetic. ACEi is protective but per patient he was told to DC     4. Prediabetes  Stable  A1C 6.3% in Sept 2020  Not on meds  Diet controlled    5. Acquired hypothyroidism  TSH normal in June 2020  Continue Synthroid 100mcg QD    6. Tobacco use  Smoking cessation strongly encouraged  Ideally should not start Chantix due to active CAD, however he recently started it. Discussed at length with patient risk for CAD event, and benefit of quitting smoking outweighs risk of Chantix at the moment. He wants to continue Chantix.  He has seen Dr. Kodi Michael who has been OK with him being on Chantix          Medical Decision Making: high complexity      Nate Tay MD  Internal Medicine  10/5/2020  2:18 PM

## 2020-10-06 NOTE — PROGRESS NOTES
Physician Progress Note      Ilda Moy  GEOVANNA #:                  [id number]  :                       1955  ADMIT DATE:       2020 6:46 AM  DISCH DATE:        2020 5:28 PM  RESPONDING  PROVIDER #:        Annalisa Forrest PA-C          QUERY TEXT:    [facility],    Pt admitted with CAD and has CHF documented. If possible, please document in   progress notes and discharge summary further specificity regarding the type   and acuity of CHF:    The medical record reflects the following:  Risk Factors: CAD  Clinical Indicators: documentation of new onset stable CHF, BNP-2651,   persistent SOB  Treatment: Lasix, lisinopril, BB  Options provided:  -- Acute on Chronic Systolic CHF/HFrEF  -- Acute on Chronic Diastolic CHF/HFpEF  -- Acute on Chronic Systolic and Diastolic CHF  -- Acute Systolic CHF/HFrEF  -- Acute Diastolic CHF/HFpEF  -- Acute Systolic and Diastolic CHF  -- Chronic Systolic CHF/HFrEF  -- Chronic Diastolic CHF/HFpEF  -- Chronic Systolic and Diastolic CHF  -- Other - I will add my own diagnosis  -- Disagree - Not applicable / Not valid  -- Disagree - Clinically unable to determine / Unknown  -- Refer to Clinical Documentation Reviewer    PROVIDER RESPONSE TEXT:    This patient has chronic systolic and diastolic CHF.     Query created by: Audi Yao on 2020 3:05 PM      Electronically signed by:  Annalisa Forrest PA-C 10/6/2020 11:57 AM

## 2020-10-08 ENCOUNTER — ANESTHESIA EVENT (OUTPATIENT)
Dept: OPERATING ROOM | Age: 65
DRG: 235 | End: 2020-10-08
Payer: MEDICARE

## 2020-10-08 NOTE — ANESTHESIA PRE PROCEDURE
7/15/20   Kingston Valle MD   famotidine (PEPCID) 20 MG tablet Take 1 tablet by mouth 2 times daily 7/15/20   Kingston Valle MD   DULoxetine (CYMBALTA) 60 MG extended release capsule Take 1 capsule by mouth daily 7/15/20   Kingston Valle MD   finasteride (PROSCAR) 5 MG tablet Take 1 tablet by mouth daily 7/15/20   Kingston Valle MD   levothyroxine (SYNTHROID) 100 MCG tablet Take 1 tablet by mouth daily 7/15/20   Kingston Valle MD   tiotropium (Kaylah Katos) 18 MCG inhalation capsule Inhale 1 capsule into the lungs daily 7/15/20   Kingston Valle MD   tamsulosin [facility]) 0.4 MG capsule Take 1 capsule by mouth daily 7/15/20   Kingston Valle MD       Current medications:    Current Outpatient Medications   Medication Sig Dispense Refill    amLODIPine (NORVASC) 5 MG tablet TAKE 1 TABLET BY MOUTH EVERY DAY      CHANTIX STARTING MONTH REJI 0.5 MG X 11 & 1 MG X 42 tablet TAKE 1 TAB BY MOUTH X3DAYS, THEN TAKE 1 TAB TWICE A DAY X4DAYS, TAKE 2TABS TWICE A DAY      cetirizine (ZYRTEC) 10 MG tablet TAKE 1 TABLET BY MOUTH EVERY DAY      atorvastatin (LIPITOR) 40 MG tablet Take 1 tablet by mouth every evening 30 tablet 3    varenicline (CHANTIX) 0.5 MG tablet Take 1-2 tablets by mouth See Admin Instructions 0.5mg DAILY for 3 days followed by 0.5mg TWICE DAILY for 4 days followed by 1mg TWICE DAILY 57 tablet 0    metoprolol tartrate (LOPRESSOR) 25 MG tablet Take 0.5 tablets by mouth 2 times daily 180 tablet 1    aspirin EC 81 MG EC tablet Take 1 tablet by mouth daily 90 tablet 1    fluticasone (FLONASE) 50 MCG/ACT nasal spray SPRAY 1 SPRAY INTO EACH NOSTRIL EVERY DAY 1 Bottle 0    furosemide (LASIX) 20 MG tablet TAKE 1 TABLET BY MOUTH EVERY DAY 90 tablet 1    albuterol sulfate HFA (VENTOLIN HFA) 108 (90 Base) MCG/ACT inhaler Inhale 2 puffs into the lungs 4 times daily as needed for Wheezing 1 Inhaler 11    budesonide-formoterol (SYMBICORT) 160-4.5 MCG/ACT AERO Inhale 2 puffs into the lungs 2 times daily 1 Inhaler 11    dicyclomine (BENTYL) 10 MG capsule Take 1 capsule by mouth 4 times daily 360 capsule 1    famotidine (PEPCID) 20 MG tablet Take 1 tablet by mouth 2 times daily 180 tablet 1    DULoxetine (CYMBALTA) 60 MG extended release capsule Take 1 capsule by mouth daily 90 capsule 1    finasteride (PROSCAR) 5 MG tablet Take 1 tablet by mouth daily 90 tablet 1    levothyroxine (SYNTHROID) 100 MCG tablet Take 1 tablet by mouth daily 90 tablet 1    tiotropium (SPIRIVA HANDIHALER) 18 MCG inhalation capsule Inhale 1 capsule into the lungs daily 30 capsule 3    tamsulosin (FLOMAX) 0.4 MG capsule Take 1 capsule by mouth daily 90 capsule 1     No current facility-administered medications for this encounter.         Allergies:  No Known Allergies    Problem List:    Patient Active Problem List   Diagnosis Code    Juvenile rheumatoid arthritis ([facility] M08.00    Chronic bilateral low back pain without sciatica M54.5, G89.29    Peripheral arterial disease (HCC) I73.9    Panlobular emphysema (HCC) J43.1    Essential hypertension I10    Acquired hypothyroidism E03.9    Gastroesophageal reflux disease K21.9    Non-seasonal allergic rhinitis J30.89    Benign prostatic hyperplasia without lower urinary tract symptoms N40.0    Tobacco use Z72.0    Acute pulmonary edema (HCC) J81.0    Seasonal allergic rhinitis due to pollen J30.1    Mixed irritable bowel syndrome K58.2    Mixed hyperlipidemia E78.2    Prediabetes R73.03    Benign prostatic hyperplasia N40.0    Chronic combined systolic and diastolic congestive heart failure (HCC) I50.42    JOSE (acute kidney injury) (HCC) N17.9    LV dysfunction I51.9    Dehydration E86.0    Vomiting R11.10    Coronary artery disease involving native coronary artery I25.10       Past Medical History:        Diagnosis Date    COPD (chronic obstructive pulmonary disease) ([facility]     H/O cardiac catheterization 09/24/2020     Severe calcified multivessel CAD with LV dysfuntion, PCWP is 12, CABG consult.  H/O echocardiogram 06/23/2020     Mild concentric LVH with moderate systolic impairment. EF is 40-45 % .  Hypertension     Thyroid disease        Past Surgical History:        Procedure Laterality Date    CARDIAC CATHETERIZATION      per old chart pt had cath done 9/24/2020       Social History:    Social History     Tobacco Use    Smoking status: Current Every Day Smoker     Packs/day: 0.50     Types: Cigarettes    Smokeless tobacco: Never Used   Substance Use Topics    Alcohol use: Not on file     Comment: social                                Ready to quit: Not Answered  Counseling given: Not Answered      Vital Signs (Current): There were no vitals filed for this visit. BP Readings from Last 3 Encounters:   10/05/20 120/60   09/30/20 132/84   09/24/20 (!) 170/98       NPO Status:                                                                                 BMI:   Wt Readings from Last 3 Encounters:   10/05/20 207 lb (93.9 kg)   09/30/20 202 lb (91.6 kg)   09/23/20 195 lb (88.5 kg)     There is no height or weight on file to calculate BMI.       CBC  Lab Results   Component Value Date/Time    WBC 7.3 10/12/2020 08:23 AM    HGB 15.1 10/12/2020 08:23 AM    HCT 46.6 10/12/2020 08:23 AM     10/12/2020 08:23 AM     RENAL  Lab Results   Component Value Date/Time     10/12/2020 08:23 AM    K 4.2 10/12/2020 08:23 AM     10/12/2020 08:23 AM    CO2 23 10/12/2020 08:23 AM    BUN 23 10/12/2020 08:23 AM    CREATININE 0.9 10/12/2020 08:23 AM    GLUCOSE 130 (H) 10/12/2020 08:23 AM     COAGS  Lab Results   Component Value Date/Time    PROTIME 12.1 10/12/2020 08:23 AM    INR 1.00 10/12/2020 08:23 AM    APTT 27.4 10/12/2020 08:23 AM       ABGs:   Lab Results   Component Value Date    PO2ART 85 10/12/2020    NSL9ATJ 33.0 10/12/2020    BTQ1OUM 20.4 10/12/2020          COVID-19 Screening (If Applicable):   Lab Results Component Value Date    COVID19 NOT DETECTED 09/18/2020         Anesthesia Evaluation  Patient summary reviewed  Airway: Mallampati: III  TM distance: >3 FB   Neck ROM: full  Mouth opening: > = 3 FB Dental:    (+) edentulous      Pulmonary: breath sounds clear to auscultation  (+) COPD (Panlobular emphysema , inhaler x 3. last exacerbation, 3/2020):  current smoker (on chantix 4-5 cigs per day)                          ROS comment: Hx seasonal allergies with sinuitis       Smoker  2ppd x   Counseled on smoking cessation. PAT Airway. Limited exam. COVID 19 precautions. Patient wearing mask  Head/Neck movement: full   History of difficult intubation:  None  Teeth: U/L dentures   Able to lie flat         COVID 19 screening  Denies recent fever or known COVID 19 exposure. Instructed to quarantine until surgery and report any new respiratory or fever symptoms to surgeon. Aware COVID testing must be completed before DOS. COVID swab:  10/12/2020    CXR: 9/23/2020  1. Central pulmonary vascular congestion without overt pulmonary edema. 2. Stable enlargement of the cardiac silhouette. Cardiovascular:    (+) hypertension (on beta blocker ):, valvular problems/murmurs (mild MR): MR, CAD:, CHF: systolic and diastolic, hyperlipidemia      ECG reviewed  Rhythm: regular    Echocardiogram reviewed  Stress test reviewed             ROS comment: Cardiac cath 9/23/2020  LMCA: Normal.  LAD: Abnormal.calcified 80% ostial, proximal stenosis  LCx: Abnormal.abnormal, 0m1 90% stenosis, om2 occluded  RCA: Abnormal.heavily calcified RCA, 100% occluded,gets collaterals from left circulation      Limited echo 9/2020   This is a limited echocardiogram.   Left ventricular systolic function is abnormal.   Ejection fraction is visually estimated at 40%. No evidence of any pericardial effusion. Echo 6/2020  EF 40-45%   Mild concentric LVH with moderate systolic impairment.   Impaired relaxation compatible with diastolic surgery.   10/8/2020

## 2020-10-12 ENCOUNTER — HOSPITAL ENCOUNTER (OUTPATIENT)
Dept: PREADMISSION TESTING | Age: 65
Discharge: HOME | End: 2020-10-16
Payer: MEDICARE

## 2020-10-12 ENCOUNTER — CARE COORDINATION (OUTPATIENT)
Dept: CARE COORDINATION | Age: 65
End: 2020-10-12

## 2020-10-12 ENCOUNTER — HOSPITAL ENCOUNTER (OUTPATIENT)
Dept: PULMONOLOGY | Age: 65
Discharge: HOME | End: 2020-10-12
Payer: MEDICARE

## 2020-10-12 ENCOUNTER — HOSPITAL ENCOUNTER (OUTPATIENT)
Dept: ULTRASOUND IMAGING | Age: 65
Discharge: HOME | End: 2020-10-12
Payer: MEDICARE

## 2020-10-12 VITALS
OXYGEN SATURATION: 97 % | BODY MASS INDEX: 29.2 KG/M2 | DIASTOLIC BLOOD PRESSURE: 66 MMHG | HEIGHT: 70 IN | TEMPERATURE: 97 F | SYSTOLIC BLOOD PRESSURE: 118 MMHG | WEIGHT: 204 LBS | RESPIRATION RATE: 18 BRPM | HEART RATE: 105 BPM

## 2020-10-12 LAB
ANION GAP SERPL CALCULATED.3IONS-SCNC: 12 MMOL/L (ref 4–16)
APTT: 27.4 SECONDS (ref 25.1–37.1)
BASE EXCESS: 4 (ref 0–3.3)
BASOPHILS ABSOLUTE: 0 K/CU MM
BASOPHILS RELATIVE PERCENT: 0.5 % (ref 0–1)
BUN BLDV-MCNC: 23 MG/DL (ref 6–23)
CALCIUM SERPL-MCNC: 9.1 MG/DL (ref 8.3–10.6)
CARBON MONOXIDE, BLOOD: 4.5 % (ref 0–5)
CHLORIDE BLD-SCNC: 105 MMOL/L (ref 99–110)
CO2 CONTENT: 21.4 MMOL/L (ref 19–24)
CO2: 23 MMOL/L (ref 21–32)
COMMENT: ABNORMAL
CREAT SERPL-MCNC: 0.9 MG/DL (ref 0.9–1.3)
DIFFERENTIAL TYPE: ABNORMAL
EKG ATRIAL RATE: 57 BPM
EKG DIAGNOSIS: NORMAL
EKG P AXIS: 34 DEGREES
EKG P-R INTERVAL: 140 MS
EKG Q-T INTERVAL: 464 MS
EKG QRS DURATION: 104 MS
EKG QTC CALCULATION (BAZETT): 451 MS
EKG R AXIS: 34 DEGREES
EKG T AXIS: 70 DEGREES
EKG VENTRICULAR RATE: 57 BPM
EOSINOPHILS ABSOLUTE: 0.5 K/CU MM
EOSINOPHILS RELATIVE PERCENT: 6.4 % (ref 0–3)
ESTIMATED AVERAGE GLUCOSE: 134 MG/DL
GFR AFRICAN AMERICAN: >60 ML/MIN/1.73M2
GFR NON-AFRICAN AMERICAN: >60 ML/MIN/1.73M2
GLUCOSE BLD-MCNC: 130 MG/DL (ref 70–99)
HBA1C MFR BLD: 6.3 % (ref 4.2–6.3)
HCO3 ARTERIAL: 20.4 MMOL/L (ref 18–23)
HCT VFR BLD CALC: 46.6 % (ref 42–52)
HEMOGLOBIN: 15.1 GM/DL (ref 13.5–18)
IMMATURE NEUTROPHIL %: 0.5 % (ref 0–0.43)
INR BLD: 1 INDEX
LYMPHOCYTES ABSOLUTE: 1.8 K/CU MM
LYMPHOCYTES RELATIVE PERCENT: 24.7 % (ref 24–44)
MAGNESIUM: 2.1 MG/DL (ref 1.8–2.4)
MCH RBC QN AUTO: 32.5 PG (ref 27–31)
MCHC RBC AUTO-ENTMCNC: 32.4 % (ref 32–36)
MCV RBC AUTO: 100.4 FL (ref 78–100)
METHEMOGLOBIN ARTERIAL: 1.2 %
MONOCYTES ABSOLUTE: 0.7 K/CU MM
MONOCYTES RELATIVE PERCENT: 9.3 % (ref 0–4)
NUCLEATED RBC %: 0 %
O2 SATURATION: 92.4 % (ref 96–97)
PCO2 ARTERIAL: 33 MMHG (ref 32–45)
PDW BLD-RTO: 13.4 % (ref 11.7–14.9)
PH BLOOD: 7.4 (ref 7.34–7.45)
PLATELET # BLD: 219 K/CU MM (ref 140–440)
PMV BLD AUTO: 9.2 FL (ref 7.5–11.1)
PO2 ARTERIAL: 85 MMHG (ref 75–100)
POTASSIUM SERPL-SCNC: 4.2 MMOL/L (ref 3.5–5.1)
PROTHROMBIN TIME: 12.1 SECONDS (ref 11.7–14.5)
RBC # BLD: 4.64 M/CU MM (ref 4.6–6.2)
SEGMENTED NEUTROPHILS ABSOLUTE COUNT: 4.3 K/CU MM
SEGMENTED NEUTROPHILS RELATIVE PERCENT: 58.6 % (ref 36–66)
SODIUM BLD-SCNC: 140 MMOL/L (ref 135–145)
TOTAL IMMATURE NEUTOROPHIL: 0.04 K/CU MM
TOTAL NUCLEATED RBC: 0 K/CU MM
WBC # BLD: 7.3 K/CU MM (ref 4–10.5)

## 2020-10-12 PROCEDURE — 80048 BASIC METABOLIC PNL TOTAL CA: CPT

## 2020-10-12 PROCEDURE — 83036 HEMOGLOBIN GLYCOSYLATED A1C: CPT

## 2020-10-12 PROCEDURE — 85730 THROMBOPLASTIN TIME PARTIAL: CPT

## 2020-10-12 PROCEDURE — 86900 BLOOD TYPING SEROLOGIC ABO: CPT

## 2020-10-12 PROCEDURE — 86901 BLOOD TYPING SEROLOGIC RH(D): CPT

## 2020-10-12 PROCEDURE — P9016 RBC LEUKOCYTES REDUCED: HCPCS

## 2020-10-12 PROCEDURE — 86850 RBC ANTIBODY SCREEN: CPT

## 2020-10-12 PROCEDURE — 36415 COLL VENOUS BLD VENIPUNCTURE: CPT

## 2020-10-12 PROCEDURE — 85025 COMPLETE CBC W/AUTO DIFF WBC: CPT

## 2020-10-12 PROCEDURE — 36600 WITHDRAWAL OF ARTERIAL BLOOD: CPT

## 2020-10-12 PROCEDURE — 93971 EXTREMITY STUDY: CPT

## 2020-10-12 PROCEDURE — 93005 ELECTROCARDIOGRAM TRACING: CPT | Performed by: SURGERY

## 2020-10-12 PROCEDURE — 86922 COMPATIBILITY TEST ANTIGLOB: CPT

## 2020-10-12 PROCEDURE — 83735 ASSAY OF MAGNESIUM: CPT

## 2020-10-12 PROCEDURE — 82803 BLOOD GASES ANY COMBINATION: CPT

## 2020-10-12 PROCEDURE — 85610 PROTHROMBIN TIME: CPT

## 2020-10-12 PROCEDURE — 93010 ELECTROCARDIOGRAM REPORT: CPT | Performed by: INTERNAL MEDICINE

## 2020-10-12 PROCEDURE — 81001 URINALYSIS AUTO W/SCOPE: CPT

## 2020-10-12 PROCEDURE — U0002 COVID-19 LAB TEST NON-CDC: HCPCS

## 2020-10-12 PROCEDURE — 87081 CULTURE SCREEN ONLY: CPT

## 2020-10-12 PROCEDURE — 94010 BREATHING CAPACITY TEST: CPT

## 2020-10-12 RX ORDER — CHLORHEXIDINE GLUCONATE 0.12 MG/ML
30 RINSE ORAL ONCE
Status: CANCELLED | OUTPATIENT
Start: 2020-10-19

## 2020-10-12 RX ORDER — CYCLOBENZAPRINE HCL 10 MG
10 TABLET ORAL 2 TIMES DAILY PRN
Status: ON HOLD | COMMUNITY
End: 2020-11-09 | Stop reason: HOSPADM

## 2020-10-12 RX ORDER — CARVEDILOL 3.12 MG/1
3.12 TABLET ORAL ONCE
Status: CANCELLED | OUTPATIENT
Start: 2020-10-19

## 2020-10-12 RX ORDER — SIMVASTATIN 40 MG
40 TABLET ORAL ONCE
Status: CANCELLED | OUTPATIENT
Start: 2020-10-19

## 2020-10-12 ASSESSMENT — PAIN SCALES - GENERAL
PAINLEVEL_OUTOF10: 0
PAINLEVEL_OUTOF10: 0

## 2020-10-12 ASSESSMENT — LIFESTYLE VARIABLES: SMOKING_STATUS: 1

## 2020-10-12 NOTE — PROGRESS NOTES
Cardiac Surgery Risk Factor Worksheet      STS Criteria  Possible Score Yes/No Score ICD 10 Notes   Emergency Case 6 No      Serum Creatinine         >1.2 0 No 0 CR 0.9    >1.6 to <1.8mg/dl 1 No 0     >.1.9 4 No 0     Acute/Chronic Renal Failure/Renal Insufficiency 0   No 0     Left Ventricular Dysfunction(EF<40%) 3   No 0     Operative Mitral Valve Insufficiency 3   No 0     Reoperation 3 No 0     Age >71 and <74 years 1  Yes   1 Age 71    Age >75 years 2 No 0     Prior Vascular Surgery 2   No 0     COPD +History of Patient Use of Bronchodilators 2   No 0     COPD 0 Yes 0     Current Smoker of History of Smoking  0   Yes 0 Current 2pk/day smoker    Anemia (Hemotocrit<0.34) 2   No 0 Hct 46.6    ASA / Anticoagulants/ Bleeding Tendiencies / Antiplatelets 0   Yes 0 ASA daily    Operative Aortic Valve Stenosis 1 No 0       Weight<143 lbs (  BMI<18.5) 1   No 0     Weight >200 lbs (BMI >30    0   Yes 0 Wt: 204lbs  BMI 29.69    Diabetes Oral or Insulin Therapy 1   No 0 HbgA1C 6.3    Cerebrovascular Disease 1   No 0     Impaired Mobility 0 No 0     Walker/Cane/Wheelchair 0 No 0     Recent MI 0 No 0     Hypertension 0 Yes 0     Peripheral Vascular Disease 0 No 0     Lives Alone 0 Yes 0 Has plan for friend to stay with him for the first two weeks after discharged home    Other (GI Auto Immune Hepatic etc) 0 No 0       TOTAL   1     Note The surgeon must be notified for all risk scores >7    Notified by CHITO Hadley, RN   10/12/2020

## 2020-10-12 NOTE — PROGRESS NOTES
Educated patient on CABG, post operative phase and care after being discharged home. Questions answered and patient verbalized understanding. Patient states he has decent oral health with no broken or loose teeth.

## 2020-10-12 NOTE — PROGRESS NOTES
PFT: Bedside spirometry complete. Pt cooperative, good effort. A copy was left with patients PAT packet as well as a copy left in the PFT lab. Abg was drawn on room air as well with no complications. Patient was also instructed and educated on the importance of the IS.   Patient achieved

## 2020-10-13 LAB
SARS-COV-2: NOT DETECTED
SOURCE: NORMAL

## 2020-10-14 LAB
CULTURE: NORMAL
Lab: NORMAL
SPECIMEN: NORMAL

## 2020-10-17 ENCOUNTER — CARE COORDINATION (OUTPATIENT)
Dept: CARE COORDINATION | Age: 65
End: 2020-10-17

## 2020-10-19 ENCOUNTER — APPOINTMENT (OUTPATIENT)
Dept: GENERAL RADIOLOGY | Age: 65
DRG: 235 | End: 2020-10-19
Attending: SURGERY
Payer: MEDICARE

## 2020-10-19 ENCOUNTER — ANESTHESIA (OUTPATIENT)
Dept: OPERATING ROOM | Age: 65
DRG: 235 | End: 2020-10-19
Payer: MEDICARE

## 2020-10-19 ENCOUNTER — HOSPITAL ENCOUNTER (INPATIENT)
Age: 65
LOS: 8 days | Discharge: NURSING, REHAB OR LONG-TERM CARE | DRG: 235 | End: 2020-10-27
Attending: SURGERY | Admitting: SURGERY
Payer: MEDICARE

## 2020-10-19 VITALS
RESPIRATION RATE: 1 BRPM | OXYGEN SATURATION: 100 % | SYSTOLIC BLOOD PRESSURE: 104 MMHG | DIASTOLIC BLOOD PRESSURE: 57 MMHG | TEMPERATURE: 95.4 F

## 2020-10-19 PROBLEM — I25.10 CAD IN NATIVE ARTERY: Status: ACTIVE | Noted: 2020-10-19

## 2020-10-19 LAB
ANION GAP SERPL CALCULATED.3IONS-SCNC: 14 MMOL/L (ref 4–16)
APTT: 32.5 SECONDS (ref 25.1–37.1)
BASE EXCESS MIXED: 0.1 (ref 0–1.2)
BASE EXCESS MIXED: 0.8 (ref 0–1.2)
BASE EXCESS MIXED: 1.2 (ref 0–1.2)
BASE EXCESS MIXED: 1.9 (ref 0–1.2)
BASE EXCESS MIXED: 1.9 (ref 0–1.2)
BASE EXCESS MIXED: 2 (ref 0–1.2)
BASE EXCESS MIXED: 2.3 (ref 0–1.2)
BASE EXCESS MIXED: 2.3 (ref 0–1.2)
BASE EXCESS MIXED: 2.6 (ref 0–1.2)
BASE EXCESS MIXED: 3 (ref 0–1.2)
BASE EXCESS MIXED: 3.7 (ref 0–1.2)
BASE EXCESS MIXED: 4.8 (ref 0–1.2)
BASE EXCESS: ABNORMAL (ref 0–3.3)
BUN BLDV-MCNC: 25 MG/DL (ref 6–23)
CALCIUM SERPL-MCNC: 9.8 MG/DL (ref 8.3–10.6)
CHLORIDE BLD-SCNC: 105 MMOL/L (ref 99–110)
CO2: 22 MMOL/L (ref 21–32)
CO2: 24 MMOL/L (ref 21–32)
CO2: 24 MMOL/L (ref 21–32)
CO2: 25 MMOL/L (ref 21–32)
CO2: 26 MMOL/L (ref 21–32)
CO2: 27 MMOL/L (ref 21–32)
CREAT SERPL-MCNC: 1.1 MG/DL (ref 0.9–1.3)
GFR AFRICAN AMERICAN: >60 ML/MIN/1.73M2
GFR NON-AFRICAN AMERICAN: >60 ML/MIN/1.73M2
GLUCOSE BLD-MCNC: 114 MG/DL (ref 70–99)
GLUCOSE BLD-MCNC: 119 MG/DL (ref 70–99)
GLUCOSE BLD-MCNC: 120 MG/DL (ref 70–99)
GLUCOSE BLD-MCNC: 126 MG/DL (ref 70–99)
GLUCOSE BLD-MCNC: 134 MG/DL (ref 70–99)
GLUCOSE BLD-MCNC: 137 MG/DL (ref 70–99)
GLUCOSE BLD-MCNC: 145 MG/DL (ref 70–99)
GLUCOSE BLD-MCNC: 146 MG/DL (ref 70–99)
GLUCOSE BLD-MCNC: 148 MG/DL (ref 70–99)
GLUCOSE BLD-MCNC: 149 MG/DL (ref 70–99)
GLUCOSE BLD-MCNC: 150 MG/DL (ref 70–99)
GLUCOSE BLD-MCNC: 155 MG/DL (ref 70–99)
GLUCOSE BLD-MCNC: 162 MG/DL (ref 70–99)
GLUCOSE BLD-MCNC: 162 MG/DL (ref 70–99)
GLUCOSE BLD-MCNC: 182 MG/DL (ref 70–99)
GLUCOSE BLD-MCNC: 62 MG/DL (ref 70–99)
GLUCOSE BLD-MCNC: 63 MG/DL (ref 70–99)
GLUCOSE BLD-MCNC: 95 MG/DL (ref 70–99)
HCO3 ARTERIAL: 22.5 MMOL/L (ref 18–23)
HCO3 ARTERIAL: 22.5 MMOL/L (ref 18–23)
HCO3 ARTERIAL: 23.3 MMOL/L (ref 18–23)
HCO3 ARTERIAL: 23.6 MMOL/L (ref 18–23)
HCO3 ARTERIAL: 23.7 MMOL/L (ref 18–23)
HCO3 ARTERIAL: 23.9 MMOL/L (ref 18–23)
HCO3 ARTERIAL: 24 MMOL/L (ref 18–23)
HCO3 ARTERIAL: 24.3 MMOL/L (ref 18–23)
HCO3 ARTERIAL: 24.3 MMOL/L (ref 18–23)
HCO3 ARTERIAL: 24.4 MMOL/L (ref 18–23)
HCO3 ARTERIAL: 25.1 MMOL/L (ref 18–23)
HCO3 ARTERIAL: 25.6 MMOL/L (ref 18–23)
HCT VFR BLD CALC: 34 % (ref 42–52)
HCT VFR BLD CALC: 35 % (ref 42–52)
HCT VFR BLD CALC: 35 % (ref 42–52)
HCT VFR BLD CALC: 36 % (ref 42–52)
HCT VFR BLD CALC: 38 % (ref 42–52)
HCT VFR BLD CALC: 39.4 % (ref 42–52)
HCT VFR BLD CALC: 44 % (ref 42–52)
HEMOGLOBIN: 11.5 GM/DL (ref 13.5–18)
HEMOGLOBIN: 11.6 GM/DL (ref 13.5–18)
HEMOGLOBIN: 11.6 GM/DL (ref 13.5–18)
HEMOGLOBIN: 11.7 GM/DL (ref 13.5–18)
HEMOGLOBIN: 11.7 GM/DL (ref 13.5–18)
HEMOGLOBIN: 11.9 GM/DL (ref 13.5–18)
HEMOGLOBIN: 11.9 GM/DL (ref 13.5–18)
HEMOGLOBIN: 12.2 GM/DL (ref 13.5–18)
HEMOGLOBIN: 12.3 GM/DL (ref 13.5–18)
HEMOGLOBIN: 12.4 GM/DL (ref 13.5–18)
HEMOGLOBIN: 12.7 GM/DL (ref 13.5–18)
HEMOGLOBIN: 12.9 GM/DL (ref 13.5–18)
HEMOGLOBIN: 14.9 GM/DL (ref 13.5–18)
INR BLD: 1.27 INDEX
MAGNESIUM: 3.3 MG/DL (ref 1.8–2.4)
MCH RBC QN AUTO: 33.1 PG (ref 27–31)
MCHC RBC AUTO-ENTMCNC: 32.2 % (ref 32–36)
MCV RBC AUTO: 102.6 FL (ref 78–100)
O2 SATURATION: 100 % (ref 96–97)
O2 SATURATION: 88.3 % (ref 96–97)
O2 SATURATION: 88.4 % (ref 96–97)
O2 SATURATION: 89.4 % (ref 96–97)
O2 SATURATION: 90.8 % (ref 96–97)
O2 SATURATION: 93.1 % (ref 96–97)
O2 SATURATION: 95.3 % (ref 96–97)
O2 SATURATION: 97.3 % (ref 96–97)
O2 SATURATION: 99.5 % (ref 96–97)
O2 SATURATION: 99.6 % (ref 96–97)
PCO2 ARTERIAL: 33.7 MMHG (ref 32–45)
PCO2 ARTERIAL: 40.9 MMHG (ref 32–45)
PCO2 ARTERIAL: 41.8 MMHG (ref 32–45)
PCO2 ARTERIAL: 41.9 MMHG (ref 32–45)
PCO2 ARTERIAL: 42.3 MMHG (ref 32–45)
PCO2 ARTERIAL: 46.1 MMHG (ref 32–45)
PCO2 ARTERIAL: 46.1 MMHG (ref 32–45)
PCO2 ARTERIAL: 46.7 MMHG (ref 32–45)
PCO2 ARTERIAL: 47.9 MMHG (ref 32–45)
PCO2 ARTERIAL: 48.4 MMHG (ref 32–45)
PCO2 ARTERIAL: 50.4 MMHG (ref 32–45)
PCO2 ARTERIAL: 56.5 MMHG (ref 32–45)
PDW BLD-RTO: 13.2 % (ref 11.7–14.9)
PH BLOOD: 7.24 (ref 7.34–7.45)
PH BLOOD: 7.26 (ref 7.34–7.45)
PH BLOOD: 7.3 (ref 7.34–7.45)
PH BLOOD: 7.31 (ref 7.34–7.45)
PH BLOOD: 7.32 (ref 7.34–7.45)
PH BLOOD: 7.32 (ref 7.34–7.45)
PH BLOOD: 7.33 (ref 7.34–7.45)
PH BLOOD: 7.35 (ref 7.34–7.45)
PH BLOOD: 7.36 (ref 7.34–7.45)
PH BLOOD: 7.39 (ref 7.34–7.45)
PH BLOOD: 7.41 (ref 7.34–7.45)
PH BLOOD: 7.43 (ref 7.34–7.45)
PLATELET # BLD: 184 K/CU MM (ref 140–440)
PMV BLD AUTO: 8.8 FL (ref 7.5–11.1)
PO2 ARTERIAL: 111.5 MMHG (ref 75–100)
PO2 ARTERIAL: 181.1 MMHG (ref 75–100)
PO2 ARTERIAL: 186.8 MMHG (ref 75–100)
PO2 ARTERIAL: 58.3 MMHG (ref 75–100)
PO2 ARTERIAL: 59.9 MMHG (ref 75–100)
PO2 ARTERIAL: 595.7 MMHG (ref 75–100)
PO2 ARTERIAL: 61 MMHG (ref 75–100)
PO2 ARTERIAL: 630 MMHG (ref 75–100)
PO2 ARTERIAL: 65.2 MMHG (ref 75–100)
PO2 ARTERIAL: 664.6 MMHG (ref 75–100)
PO2 ARTERIAL: 73 MMHG (ref 75–100)
PO2 ARTERIAL: 89.5 MMHG (ref 75–100)
POC ACT PLUS: 112 SEC
POC ACT PLUS: 126 SEC
POC ACT PLUS: 456 SEC
POC ACT PLUS: 462 SEC
POC ACT PLUS: 495 SEC
POC ACT PLUS: 642 SEC
POC CALCIUM: 1.09 MMOL/L (ref 1.12–1.32)
POC CALCIUM: 1.1 MMOL/L (ref 1.12–1.32)
POC CALCIUM: 1.14 MMOL/L (ref 1.12–1.32)
POC CALCIUM: 1.25 MMOL/L (ref 1.12–1.32)
POC CALCIUM: 1.28 MMOL/L (ref 1.12–1.32)
POC CALCIUM: 1.29 MMOL/L (ref 1.12–1.32)
POC CALCIUM: 1.3 MMOL/L (ref 1.12–1.32)
POC CALCIUM: 1.35 MMOL/L (ref 1.12–1.32)
POC CALCIUM: 1.49 MMOL/L (ref 1.12–1.32)
POC CALCIUM: 1.52 MMOL/L (ref 1.12–1.32)
POC CHLORIDE: 103 MMOL/L (ref 98–109)
POC CHLORIDE: 105 MMOL/L (ref 98–109)
POC CHLORIDE: 105 MMOL/L (ref 98–109)
POC CHLORIDE: 106 MMOL/L (ref 98–109)
POC CHLORIDE: 106 MMOL/L (ref 98–109)
POC CHLORIDE: 107 MMOL/L (ref 98–109)
POC CHLORIDE: 108 MMOL/L (ref 98–109)
POC CHLORIDE: 108 MMOL/L (ref 98–109)
POC CREATININE: 1 MG/DL (ref 0.9–1.3)
POC CREATININE: 1.1 MG/DL (ref 0.9–1.3)
POC CREATININE: 1.2 MG/DL (ref 0.9–1.3)
POC CREATININE: 1.3 MG/DL (ref 0.9–1.3)
POTASSIUM SERPL-SCNC: 3.3 MMOL/L (ref 3.5–4.5)
POTASSIUM SERPL-SCNC: 3.5 MMOL/L (ref 3.5–4.5)
POTASSIUM SERPL-SCNC: 3.5 MMOL/L (ref 3.5–4.5)
POTASSIUM SERPL-SCNC: 3.5 MMOL/L (ref 3.5–5.1)
POTASSIUM SERPL-SCNC: 3.8 MMOL/L (ref 3.5–4.5)
POTASSIUM SERPL-SCNC: 4.2 MMOL/L (ref 3.5–4.5)
POTASSIUM SERPL-SCNC: 4.3 MMOL/L (ref 3.5–4.5)
POTASSIUM SERPL-SCNC: 4.5 MMOL/L (ref 3.5–4.5)
POTASSIUM SERPL-SCNC: 4.6 MMOL/L (ref 3.5–4.5)
POTASSIUM SERPL-SCNC: 5 MMOL/L (ref 3.5–4.5)
POTASSIUM SERPL-SCNC: 5 MMOL/L (ref 3.5–5.1)
POTASSIUM SERPL-SCNC: 5.1 MMOL/L (ref 3.5–5.1)
POTASSIUM SERPL-SCNC: 5.2 MMOL/L (ref 3.5–4.5)
PROTHROMBIN TIME: 15.4 SECONDS (ref 11.7–14.5)
RBC # BLD: 3.84 M/CU MM (ref 4.6–6.2)
SODIUM BLD-SCNC: 138 MMOL/L (ref 138–146)
SODIUM BLD-SCNC: 139 MMOL/L (ref 138–146)
SODIUM BLD-SCNC: 140 MMOL/L (ref 138–146)
SODIUM BLD-SCNC: 141 MMOL/L (ref 135–145)
SODIUM BLD-SCNC: 141 MMOL/L (ref 138–146)
SODIUM BLD-SCNC: 142 MMOL/L (ref 138–146)
SODIUM BLD-SCNC: 143 MMOL/L (ref 138–146)
SODIUM BLD-SCNC: 143 MMOL/L (ref 138–146)
SOURCE, BLOOD GAS: ABNORMAL
WBC # BLD: 24.2 K/CU MM (ref 4–10.5)

## 2020-10-19 PROCEDURE — 85730 THROMBOPLASTIN TIME PARTIAL: CPT

## 2020-10-19 PROCEDURE — 80048 BASIC METABOLIC PNL TOTAL CA: CPT

## 2020-10-19 PROCEDURE — 6360000002 HC RX W HCPCS: Performed by: SURGERY

## 2020-10-19 PROCEDURE — 2500000003 HC RX 250 WO HCPCS

## 2020-10-19 PROCEDURE — 2580000003 HC RX 258: Performed by: ANESTHESIOLOGY

## 2020-10-19 PROCEDURE — 021109W BYPASS CORONARY ARTERY, TWO ARTERIES FROM AORTA WITH AUTOLOGOUS VENOUS TISSUE, OPEN APPROACH: ICD-10-PCS | Performed by: SURGERY

## 2020-10-19 PROCEDURE — 2580000003 HC RX 258: Performed by: SURGERY

## 2020-10-19 PROCEDURE — 2500000003 HC RX 250 WO HCPCS: Performed by: NURSE ANESTHETIST, CERTIFIED REGISTERED

## 2020-10-19 PROCEDURE — 2709999900 HC NON-CHARGEABLE SUPPLY: Performed by: SURGERY

## 2020-10-19 PROCEDURE — 5A1221Z PERFORMANCE OF CARDIAC OUTPUT, CONTINUOUS: ICD-10-PCS | Performed by: SURGERY

## 2020-10-19 PROCEDURE — 83735 ASSAY OF MAGNESIUM: CPT

## 2020-10-19 PROCEDURE — 6370000000 HC RX 637 (ALT 250 FOR IP): Performed by: SURGERY

## 2020-10-19 PROCEDURE — 85027 COMPLETE CBC AUTOMATED: CPT

## 2020-10-19 PROCEDURE — 3700000001 HC ADD 15 MINUTES (ANESTHESIA): Performed by: SURGERY

## 2020-10-19 PROCEDURE — 6370000000 HC RX 637 (ALT 250 FOR IP)

## 2020-10-19 PROCEDURE — 2580000003 HC RX 258: Performed by: PHYSICIAN ASSISTANT

## 2020-10-19 PROCEDURE — P9045 ALBUMIN (HUMAN), 5%, 250 ML: HCPCS | Performed by: PHYSICIAN ASSISTANT

## 2020-10-19 PROCEDURE — 2000000000 HC ICU R&B

## 2020-10-19 PROCEDURE — 71045 X-RAY EXAM CHEST 1 VIEW: CPT

## 2020-10-19 PROCEDURE — 06BQ4ZZ EXCISION OF LEFT SAPHENOUS VEIN, PERCUTANEOUS ENDOSCOPIC APPROACH: ICD-10-PCS | Performed by: SURGERY

## 2020-10-19 PROCEDURE — 37799 UNLISTED PX VASCULAR SURGERY: CPT

## 2020-10-19 PROCEDURE — 3600000008 HC SURGERY OHS BASE: Performed by: SURGERY

## 2020-10-19 PROCEDURE — 3700000000 HC ANESTHESIA ATTENDED CARE: Performed by: SURGERY

## 2020-10-19 PROCEDURE — C1751 CATH, INF, PER/CENT/MIDLINE: HCPCS | Performed by: SURGERY

## 2020-10-19 PROCEDURE — 2720000010 HC SURG SUPPLY STERILE: Performed by: SURGERY

## 2020-10-19 PROCEDURE — 85610 PROTHROMBIN TIME: CPT

## 2020-10-19 PROCEDURE — 93503 INSERT/PLACE HEART CATHETER: CPT

## 2020-10-19 PROCEDURE — 3600000018 HC SURGERY OHS ADDTL 15MIN: Performed by: SURGERY

## 2020-10-19 PROCEDURE — P9045 ALBUMIN (HUMAN), 5%, 250 ML: HCPCS | Performed by: NURSE ANESTHETIST, CERTIFIED REGISTERED

## 2020-10-19 PROCEDURE — 6370000000 HC RX 637 (ALT 250 FOR IP): Performed by: PHYSICIAN ASSISTANT

## 2020-10-19 PROCEDURE — 94640 AIRWAY INHALATION TREATMENT: CPT

## 2020-10-19 PROCEDURE — 82962 GLUCOSE BLOOD TEST: CPT

## 2020-10-19 PROCEDURE — 2500000003 HC RX 250 WO HCPCS: Performed by: SURGERY

## 2020-10-19 PROCEDURE — C1729 CATH, DRAINAGE: HCPCS | Performed by: SURGERY

## 2020-10-19 PROCEDURE — 6360000002 HC RX W HCPCS: Performed by: PHYSICIAN ASSISTANT

## 2020-10-19 PROCEDURE — 6370000000 HC RX 637 (ALT 250 FOR IP): Performed by: NURSE ANESTHETIST, CERTIFIED REGISTERED

## 2020-10-19 PROCEDURE — 94002 VENT MGMT INPAT INIT DAY: CPT

## 2020-10-19 PROCEDURE — 2580000003 HC RX 258

## 2020-10-19 PROCEDURE — B24BZZ4 ULTRASONOGRAPHY OF HEART WITH AORTA, TRANSESOPHAGEAL: ICD-10-PCS | Performed by: SURGERY

## 2020-10-19 PROCEDURE — 7100000000 HC PACU RECOVERY - FIRST 15 MIN

## 2020-10-19 PROCEDURE — C1894 INTRO/SHEATH, NON-LASER: HCPCS | Performed by: SURGERY

## 2020-10-19 PROCEDURE — 85347 COAGULATION TIME ACTIVATED: CPT

## 2020-10-19 PROCEDURE — 6360000002 HC RX W HCPCS: Performed by: NURSE ANESTHETIST, CERTIFIED REGISTERED

## 2020-10-19 PROCEDURE — 2580000003 HC RX 258: Performed by: NURSE ANESTHETIST, CERTIFIED REGISTERED

## 2020-10-19 PROCEDURE — 02100Z9 BYPASS CORONARY ARTERY, ONE ARTERY FROM LEFT INTERNAL MAMMARY, OPEN APPROACH: ICD-10-PCS | Performed by: SURGERY

## 2020-10-19 PROCEDURE — P9047 ALBUMIN (HUMAN), 25%, 50ML: HCPCS

## 2020-10-19 PROCEDURE — 84132 ASSAY OF SERUM POTASSIUM: CPT

## 2020-10-19 PROCEDURE — 6360000002 HC RX W HCPCS

## 2020-10-19 RX ORDER — MAGNESIUM SULFATE IN WATER 40 MG/ML
2 INJECTION, SOLUTION INTRAVENOUS PRN
Status: DISCONTINUED | OUTPATIENT
Start: 2020-10-19 | End: 2020-10-27 | Stop reason: HOSPADM

## 2020-10-19 RX ORDER — DULOXETIN HYDROCHLORIDE 30 MG/1
60 CAPSULE, DELAYED RELEASE ORAL DAILY
Status: DISCONTINUED | OUTPATIENT
Start: 2020-10-19 | End: 2020-10-27 | Stop reason: HOSPADM

## 2020-10-19 RX ORDER — NICOTINE POLACRILEX 4 MG
15 LOZENGE BUCCAL PRN
Status: DISCONTINUED | OUTPATIENT
Start: 2020-10-19 | End: 2020-10-27 | Stop reason: HOSPADM

## 2020-10-19 RX ORDER — PROTAMINE SULFATE 10 MG/ML
INJECTION, SOLUTION INTRAVENOUS PRN
Status: DISCONTINUED | OUTPATIENT
Start: 2020-10-19 | End: 2020-10-19 | Stop reason: SDUPTHER

## 2020-10-19 RX ORDER — KETOROLAC TROMETHAMINE 30 MG/ML
15 INJECTION, SOLUTION INTRAMUSCULAR; INTRAVENOUS EVERY 6 HOURS PRN
Status: DISPENSED | OUTPATIENT
Start: 2020-10-19 | End: 2020-10-24

## 2020-10-19 RX ORDER — SODIUM PHOSPHATE, DIBASIC AND SODIUM PHOSPHATE, MONOBASIC 7; 19 G/133ML; G/133ML
1 ENEMA RECTAL DAILY PRN
Status: DISCONTINUED | OUTPATIENT
Start: 2020-10-19 | End: 2020-10-27 | Stop reason: HOSPADM

## 2020-10-19 RX ORDER — ASPIRIN 81 MG/1
81 TABLET ORAL DAILY
Status: DISCONTINUED | OUTPATIENT
Start: 2020-10-19 | End: 2020-10-27 | Stop reason: HOSPADM

## 2020-10-19 RX ORDER — SODIUM CHLORIDE, SODIUM LACTATE, POTASSIUM CHLORIDE, CALCIUM CHLORIDE 600; 310; 30; 20 MG/100ML; MG/100ML; MG/100ML; MG/100ML
INJECTION, SOLUTION INTRAVENOUS CONTINUOUS
Status: DISCONTINUED | OUTPATIENT
Start: 2020-10-19 | End: 2020-10-19

## 2020-10-19 RX ORDER — CHLORHEXIDINE GLUCONATE 0.12 MG/ML
30 RINSE ORAL ONCE
Status: COMPLETED | OUTPATIENT
Start: 2020-10-19 | End: 2020-10-19

## 2020-10-19 RX ORDER — HYDRALAZINE HYDROCHLORIDE 20 MG/ML
5 INJECTION INTRAMUSCULAR; INTRAVENOUS EVERY 5 MIN PRN
Status: DISCONTINUED | OUTPATIENT
Start: 2020-10-19 | End: 2020-10-27 | Stop reason: HOSPADM

## 2020-10-19 RX ORDER — AMIODARONE HYDROCHLORIDE 200 MG/1
200 TABLET ORAL 3 TIMES DAILY
Status: DISPENSED | OUTPATIENT
Start: 2020-10-19 | End: 2020-10-26

## 2020-10-19 RX ORDER — SIMVASTATIN 40 MG
40 TABLET ORAL ONCE
Status: COMPLETED | OUTPATIENT
Start: 2020-10-19 | End: 2020-10-19

## 2020-10-19 RX ORDER — ALBUMIN (HUMAN) 12.5 G/50ML
25 SOLUTION INTRAVENOUS
Status: ACTIVE | OUTPATIENT
Start: 2020-10-19 | End: 2020-10-19

## 2020-10-19 RX ORDER — ONDANSETRON 2 MG/ML
4 INJECTION INTRAMUSCULAR; INTRAVENOUS EVERY 8 HOURS PRN
Status: DISCONTINUED | OUTPATIENT
Start: 2020-10-19 | End: 2020-10-27 | Stop reason: HOSPADM

## 2020-10-19 RX ORDER — PANTOPRAZOLE SODIUM 40 MG/1
40 TABLET, DELAYED RELEASE ORAL DAILY
Status: DISCONTINUED | OUTPATIENT
Start: 2020-10-19 | End: 2020-10-27 | Stop reason: HOSPADM

## 2020-10-19 RX ORDER — FENTANYL CITRATE 50 UG/ML
25 INJECTION, SOLUTION INTRAMUSCULAR; INTRAVENOUS
Status: DISPENSED | OUTPATIENT
Start: 2020-10-19 | End: 2020-10-20

## 2020-10-19 RX ORDER — ALBUMIN, HUMAN INJ 5% 5 %
25 SOLUTION INTRAVENOUS PRN
Status: DISCONTINUED | OUTPATIENT
Start: 2020-10-19 | End: 2020-10-27 | Stop reason: HOSPADM

## 2020-10-19 RX ORDER — LIDOCAINE HYDROCHLORIDE 20 MG/ML
INJECTION, SOLUTION INTRAVENOUS PRN
Status: DISCONTINUED | OUTPATIENT
Start: 2020-10-19 | End: 2020-10-19 | Stop reason: SDUPTHER

## 2020-10-19 RX ORDER — CALCIUM CHLORIDE 100 MG/ML
INJECTION INTRAVENOUS; INTRAVENTRICULAR PRN
Status: DISCONTINUED | OUTPATIENT
Start: 2020-10-19 | End: 2020-10-19 | Stop reason: SDUPTHER

## 2020-10-19 RX ORDER — DEXTROSE MONOHYDRATE 25 G/50ML
12.5 INJECTION, SOLUTION INTRAVENOUS PRN
Status: DISCONTINUED | OUTPATIENT
Start: 2020-10-19 | End: 2020-10-27 | Stop reason: HOSPADM

## 2020-10-19 RX ORDER — BISACODYL 10 MG
10 SUPPOSITORY, RECTAL RECTAL DAILY PRN
Status: DISCONTINUED | OUTPATIENT
Start: 2020-10-19 | End: 2020-10-27 | Stop reason: HOSPADM

## 2020-10-19 RX ORDER — PHENYLEPHRINE HYDROCHLORIDE 10 MG/ML
INJECTION INTRAVENOUS PRN
Status: DISCONTINUED | OUTPATIENT
Start: 2020-10-19 | End: 2020-10-19 | Stop reason: SDUPTHER

## 2020-10-19 RX ORDER — IPRATROPIUM BROMIDE AND ALBUTEROL SULFATE 2.5; .5 MG/3ML; MG/3ML
1 SOLUTION RESPIRATORY (INHALATION)
Status: DISCONTINUED | OUTPATIENT
Start: 2020-10-19 | End: 2020-10-23

## 2020-10-19 RX ORDER — METOPROLOL TARTRATE 5 MG/5ML
2.5 INJECTION INTRAVENOUS EVERY 10 MIN PRN
Status: DISCONTINUED | OUTPATIENT
Start: 2020-10-19 | End: 2020-10-27 | Stop reason: HOSPADM

## 2020-10-19 RX ORDER — ROCURONIUM BROMIDE 10 MG/ML
INJECTION, SOLUTION INTRAVENOUS PRN
Status: DISCONTINUED | OUTPATIENT
Start: 2020-10-19 | End: 2020-10-19 | Stop reason: SDUPTHER

## 2020-10-19 RX ORDER — SODIUM CHLORIDE 9 MG/ML
INJECTION, SOLUTION INTRAVENOUS CONTINUOUS PRN
Status: DISCONTINUED | OUTPATIENT
Start: 2020-10-19 | End: 2020-10-19 | Stop reason: SDUPTHER

## 2020-10-19 RX ORDER — SODIUM CHLORIDE 0.9 % (FLUSH) 0.9 %
10 SYRINGE (ML) INJECTION EVERY 12 HOURS SCHEDULED
Status: DISCONTINUED | OUTPATIENT
Start: 2020-10-19 | End: 2020-10-27 | Stop reason: HOSPADM

## 2020-10-19 RX ORDER — HEPARIN SODIUM 1000 [USP'U]/ML
INJECTION, SOLUTION INTRAVENOUS; SUBCUTANEOUS PRN
Status: DISCONTINUED | OUTPATIENT
Start: 2020-10-19 | End: 2020-10-19 | Stop reason: SDUPTHER

## 2020-10-19 RX ORDER — DEXAMETHASONE SODIUM PHOSPHATE 4 MG/ML
INJECTION, SOLUTION INTRA-ARTICULAR; INTRALESIONAL; INTRAMUSCULAR; INTRAVENOUS; SOFT TISSUE PRN
Status: DISCONTINUED | OUTPATIENT
Start: 2020-10-19 | End: 2020-10-19 | Stop reason: SDUPTHER

## 2020-10-19 RX ORDER — SODIUM CHLORIDE 450 MG/100ML
INJECTION, SOLUTION INTRAVENOUS CONTINUOUS
Status: DISCONTINUED | OUTPATIENT
Start: 2020-10-19 | End: 2020-10-27 | Stop reason: HOSPADM

## 2020-10-19 RX ORDER — EPINEPHRINE 1 MG/ML
INJECTION, SOLUTION, CONCENTRATE INTRAVENOUS PRN
Status: DISCONTINUED | OUTPATIENT
Start: 2020-10-19 | End: 2020-10-19 | Stop reason: SDUPTHER

## 2020-10-19 RX ORDER — CARVEDILOL 3.12 MG/1
3.12 TABLET ORAL ONCE
Status: DISCONTINUED | OUTPATIENT
Start: 2020-10-19 | End: 2020-10-19 | Stop reason: HOSPADM

## 2020-10-19 RX ORDER — MIDAZOLAM HYDROCHLORIDE 1 MG/ML
INJECTION INTRAMUSCULAR; INTRAVENOUS PRN
Status: DISCONTINUED | OUTPATIENT
Start: 2020-10-19 | End: 2020-10-19 | Stop reason: SDUPTHER

## 2020-10-19 RX ORDER — ALBUMIN, HUMAN INJ 5% 5 %
SOLUTION INTRAVENOUS PRN
Status: DISCONTINUED | OUTPATIENT
Start: 2020-10-19 | End: 2020-10-19 | Stop reason: SDUPTHER

## 2020-10-19 RX ORDER — NITROGLYCERIN 20 MG/100ML
10 INJECTION INTRAVENOUS CONTINUOUS PRN
Status: DISCONTINUED | OUTPATIENT
Start: 2020-10-19 | End: 2020-10-27 | Stop reason: HOSPADM

## 2020-10-19 RX ORDER — POLYETHYLENE GLYCOL 3350 17 G/17G
17 POWDER, FOR SOLUTION ORAL DAILY
Status: DISCONTINUED | OUTPATIENT
Start: 2020-10-19 | End: 2020-10-27 | Stop reason: HOSPADM

## 2020-10-19 RX ORDER — M-VIT,TX,IRON,MINS/CALC/FOLIC 27MG-0.4MG
1 TABLET ORAL
Status: DISCONTINUED | OUTPATIENT
Start: 2020-10-20 | End: 2020-10-27 | Stop reason: HOSPADM

## 2020-10-19 RX ORDER — FINASTERIDE 5 MG/1
5 TABLET, FILM COATED ORAL DAILY
Status: DISCONTINUED | OUTPATIENT
Start: 2020-10-19 | End: 2020-10-27 | Stop reason: HOSPADM

## 2020-10-19 RX ORDER — DEXTROSE MONOHYDRATE 50 MG/ML
100 INJECTION, SOLUTION INTRAVENOUS PRN
Status: DISCONTINUED | OUTPATIENT
Start: 2020-10-19 | End: 2020-10-27 | Stop reason: HOSPADM

## 2020-10-19 RX ORDER — PROPOFOL 10 MG/ML
INJECTION, EMULSION INTRAVENOUS PRN
Status: DISCONTINUED | OUTPATIENT
Start: 2020-10-19 | End: 2020-10-19 | Stop reason: SDUPTHER

## 2020-10-19 RX ORDER — ACETAMINOPHEN 325 MG/1
650 TABLET ORAL EVERY 4 HOURS PRN
Status: DISCONTINUED | OUTPATIENT
Start: 2020-10-19 | End: 2020-10-27 | Stop reason: HOSPADM

## 2020-10-19 RX ORDER — HYDROCODONE BITARTRATE AND ACETAMINOPHEN 5; 325 MG/1; MG/1
2 TABLET ORAL EVERY 4 HOURS PRN
Status: DISCONTINUED | OUTPATIENT
Start: 2020-10-19 | End: 2020-10-27 | Stop reason: HOSPADM

## 2020-10-19 RX ORDER — TAMSULOSIN HYDROCHLORIDE 0.4 MG/1
0.4 CAPSULE ORAL DAILY
Status: DISCONTINUED | OUTPATIENT
Start: 2020-10-19 | End: 2020-10-27 | Stop reason: HOSPADM

## 2020-10-19 RX ORDER — FUROSEMIDE 10 MG/ML
20 INJECTION INTRAMUSCULAR; INTRAVENOUS
Status: ACTIVE | OUTPATIENT
Start: 2020-10-19 | End: 2020-10-19

## 2020-10-19 RX ORDER — LEVOTHYROXINE SODIUM 0.1 MG/1
100 TABLET ORAL DAILY
Status: DISCONTINUED | OUTPATIENT
Start: 2020-10-19 | End: 2020-10-27 | Stop reason: HOSPADM

## 2020-10-19 RX ORDER — CARVEDILOL 3.12 MG/1
3.12 TABLET ORAL 2 TIMES DAILY
Status: DISCONTINUED | OUTPATIENT
Start: 2020-10-19 | End: 2020-10-20

## 2020-10-19 RX ORDER — HYDROCODONE BITARTRATE AND ACETAMINOPHEN 5; 325 MG/1; MG/1
1 TABLET ORAL EVERY 4 HOURS PRN
Status: DISCONTINUED | OUTPATIENT
Start: 2020-10-19 | End: 2020-10-27 | Stop reason: HOSPADM

## 2020-10-19 RX ORDER — SODIUM CHLORIDE 0.9 % (FLUSH) 0.9 %
10 SYRINGE (ML) INJECTION PRN
Status: DISCONTINUED | OUTPATIENT
Start: 2020-10-19 | End: 2020-10-27 | Stop reason: HOSPADM

## 2020-10-19 RX ORDER — AMIODARONE HYDROCHLORIDE 200 MG/1
200 TABLET ORAL DAILY
Status: DISCONTINUED | OUTPATIENT
Start: 2020-10-27 | End: 2020-10-27 | Stop reason: HOSPADM

## 2020-10-19 RX ORDER — ATORVASTATIN CALCIUM 20 MG/1
20 TABLET, FILM COATED ORAL NIGHTLY
Status: DISCONTINUED | OUTPATIENT
Start: 2020-10-20 | End: 2020-10-27 | Stop reason: HOSPADM

## 2020-10-19 RX ORDER — FENTANYL CITRATE 0.05 MG/ML
INJECTION, SOLUTION INTRAMUSCULAR; INTRAVENOUS PRN
Status: DISCONTINUED | OUTPATIENT
Start: 2020-10-19 | End: 2020-10-19 | Stop reason: SDUPTHER

## 2020-10-19 RX ORDER — BUDESONIDE AND FORMOTEROL FUMARATE DIHYDRATE 160; 4.5 UG/1; UG/1
2 AEROSOL RESPIRATORY (INHALATION) 2 TIMES DAILY
Status: DISCONTINUED | OUTPATIENT
Start: 2020-10-19 | End: 2020-10-27 | Stop reason: HOSPADM

## 2020-10-19 RX ORDER — CHLORHEXIDINE GLUCONATE 0.12 MG/ML
15 RINSE ORAL ONCE
Status: DISCONTINUED | OUTPATIENT
Start: 2020-10-19 | End: 2020-10-19 | Stop reason: HOSPADM

## 2020-10-19 RX ORDER — POTASSIUM CHLORIDE 29.8 MG/ML
20 INJECTION INTRAVENOUS PRN
Status: DISCONTINUED | OUTPATIENT
Start: 2020-10-19 | End: 2020-10-27 | Stop reason: HOSPADM

## 2020-10-19 RX ADMIN — SODIUM CHLORIDE: 4.5 INJECTION, SOLUTION INTRAVENOUS at 23:58

## 2020-10-19 RX ADMIN — Medication: at 20:05

## 2020-10-19 RX ADMIN — FENTANYL CITRATE 25 MCG: 50 INJECTION INTRAMUSCULAR; INTRAVENOUS at 20:07

## 2020-10-19 RX ADMIN — CEFAZOLIN SODIUM 2 G: 10 INJECTION, POWDER, FOR SOLUTION INTRAVENOUS at 07:15

## 2020-10-19 RX ADMIN — SODIUM CHLORIDE 2 UNITS/HR: 9 INJECTION, SOLUTION INTRAVENOUS at 13:11

## 2020-10-19 RX ADMIN — MIDAZOLAM 2 MG: 1 INJECTION INTRAMUSCULAR; INTRAVENOUS at 06:57

## 2020-10-19 RX ADMIN — MIDAZOLAM 2 MG: 1 INJECTION INTRAMUSCULAR; INTRAVENOUS at 11:08

## 2020-10-19 RX ADMIN — CALCIUM CHLORIDE 1 G: 100 INJECTION, SOLUTION INTRAVENOUS; INTRAVENTRICULAR at 10:48

## 2020-10-19 RX ADMIN — ROCURONIUM BROMIDE 20 MG: 10 INJECTION INTRAVENOUS at 08:47

## 2020-10-19 RX ADMIN — ROCURONIUM BROMIDE 100 MG: 10 INJECTION INTRAVENOUS at 07:15

## 2020-10-19 RX ADMIN — ALBUMIN (HUMAN) 250 ML: 12.5 INJECTION, SOLUTION INTRAVENOUS at 10:47

## 2020-10-19 RX ADMIN — EPINEPHRINE 2 MCG/MIN: 1 INJECTION, SOLUTION, CONCENTRATE INTRAVENOUS at 08:16

## 2020-10-19 RX ADMIN — INSULIN HUMAN 2 UNITS: 100 INJECTION, SOLUTION PARENTERAL at 11:40

## 2020-10-19 RX ADMIN — PROPOFOL 20 MG: 10 INJECTION, EMULSION INTRAVENOUS at 07:14

## 2020-10-19 RX ADMIN — ALBUMIN (HUMAN) 25 G: 12.5 INJECTION, SOLUTION INTRAVENOUS at 13:08

## 2020-10-19 RX ADMIN — PROTAMINE SULFATE 350 MG: 10 INJECTION, SOLUTION INTRAVENOUS at 10:55

## 2020-10-19 RX ADMIN — SODIUM CHLORIDE, PRESERVATIVE FREE 10 ML: 5 INJECTION INTRAVENOUS at 20:06

## 2020-10-19 RX ADMIN — HEPARIN SODIUM 30000 UNITS: 1000 INJECTION INTRAVENOUS; SUBCUTANEOUS at 08:18

## 2020-10-19 RX ADMIN — BUDESONIDE AND FORMOTEROL FUMARATE DIHYDRATE 2 PUFF: 160; 4.5 AEROSOL RESPIRATORY (INHALATION) at 21:32

## 2020-10-19 RX ADMIN — LIDOCAINE HYDROCHLORIDE 100 MG: 20 INJECTION, SOLUTION INTRAVENOUS at 07:14

## 2020-10-19 RX ADMIN — CHLORHEXIDINE GLUCONATE 0.12% ORAL RINSE 30 ML: 1.2 LIQUID ORAL at 06:27

## 2020-10-19 RX ADMIN — HYDROCODONE BITARTRATE AND ACETAMINOPHEN 2 TABLET: 5; 325 TABLET ORAL at 17:00

## 2020-10-19 RX ADMIN — SODIUM CHLORIDE: 4.5 INJECTION, SOLUTION INTRAVENOUS at 12:20

## 2020-10-19 RX ADMIN — EPINEPHRINE 5 MCG: 1 INJECTION, SOLUTION, CONCENTRATE INTRAVENOUS at 07:21

## 2020-10-19 RX ADMIN — ALBUMIN (HUMAN) 25 G: 12.5 INJECTION, SOLUTION INTRAVENOUS at 12:40

## 2020-10-19 RX ADMIN — Medication: at 12:55

## 2020-10-19 RX ADMIN — NOREPINEPHRINE BITARTRATE 2 MCG/MIN: 1 INJECTION INTRAVENOUS at 10:57

## 2020-10-19 RX ADMIN — Medication: at 06:27

## 2020-10-19 RX ADMIN — CEFAZOLIN SODIUM 2 G: 10 INJECTION, POWDER, FOR SOLUTION INTRAVENOUS at 18:49

## 2020-10-19 RX ADMIN — FENTANYL CITRATE 25 MCG: 50 INJECTION INTRAMUSCULAR; INTRAVENOUS at 12:54

## 2020-10-19 RX ADMIN — DEXAMETHASONE SODIUM PHOSPHATE 8 MG: 4 INJECTION, SOLUTION INTRAMUSCULAR; INTRAVENOUS at 07:52

## 2020-10-19 RX ADMIN — AMINOCAPROIC ACID 5 G/HR: 250 INJECTION, SOLUTION INTRAVENOUS at 07:43

## 2020-10-19 RX ADMIN — ONDANSETRON 4 MG: 2 INJECTION INTRAMUSCULAR; INTRAVENOUS at 13:32

## 2020-10-19 RX ADMIN — FENTANYL CITRATE 250 MCG: 50 INJECTION, SOLUTION INTRAMUSCULAR; INTRAVENOUS at 11:45

## 2020-10-19 RX ADMIN — EPINEPHRINE 10 MCG: 1 INJECTION, SOLUTION, CONCENTRATE INTRAVENOUS at 08:14

## 2020-10-19 RX ADMIN — CHLORHEXIDINE GLUCONATE: 4 LIQUID TOPICAL at 06:30

## 2020-10-19 RX ADMIN — AMIODARONE HYDROCHLORIDE 200 MG: 200 TABLET ORAL at 20:05

## 2020-10-19 RX ADMIN — FENTANYL CITRATE 300 MCG: 50 INJECTION, SOLUTION INTRAMUSCULAR; INTRAVENOUS at 07:14

## 2020-10-19 RX ADMIN — IPRATROPIUM BROMIDE AND ALBUTEROL SULFATE 1 AMPULE: .5; 3 SOLUTION RESPIRATORY (INHALATION) at 21:28

## 2020-10-19 RX ADMIN — FENTANYL CITRATE 200 MCG: 50 INJECTION, SOLUTION INTRAMUSCULAR; INTRAVENOUS at 07:51

## 2020-10-19 RX ADMIN — CARVEDILOL 3.12 MG: 3.12 TABLET, FILM COATED ORAL at 20:05

## 2020-10-19 RX ADMIN — SODIUM CHLORIDE, POTASSIUM CHLORIDE, SODIUM LACTATE AND CALCIUM CHLORIDE: 600; 310; 30; 20 INJECTION, SOLUTION INTRAVENOUS at 06:52

## 2020-10-19 RX ADMIN — CEFAZOLIN SODIUM 2 G: 10 INJECTION, POWDER, FOR SOLUTION INTRAVENOUS at 11:48

## 2020-10-19 RX ADMIN — PHENYLEPHRINE HYDROCHLORIDE 50 MCG: 10 INJECTION INTRAVENOUS at 07:21

## 2020-10-19 RX ADMIN — HYDROCODONE BITARTRATE AND ACETAMINOPHEN 2 TABLET: 5; 325 TABLET ORAL at 21:33

## 2020-10-19 RX ADMIN — SIMVASTATIN 40 MG: 40 TABLET, FILM COATED ORAL at 06:27

## 2020-10-19 RX ADMIN — SODIUM CHLORIDE: 900 INJECTION INTRAVENOUS at 07:00

## 2020-10-19 ASSESSMENT — PAIN DESCRIPTION - ORIENTATION
ORIENTATION: MID
ORIENTATION: UPPER;MID
ORIENTATION: MID;UPPER
ORIENTATION: MID
ORIENTATION: UPPER;MID

## 2020-10-19 ASSESSMENT — PULMONARY FUNCTION TESTS
PIF_VALUE: 17
PIF_VALUE: 4
PIF_VALUE: 18
PIF_VALUE: 14
PIF_VALUE: 0
PIF_VALUE: 0
PIF_VALUE: 17
PIF_VALUE: 19
PIF_VALUE: 0
PIF_VALUE: 20
PIF_VALUE: 0
PIF_VALUE: 16
PIF_VALUE: 14
PIF_VALUE: 17
PIF_VALUE: 16
PIF_VALUE: 0
PIF_VALUE: 21
PIF_VALUE: 16
PIF_VALUE: 16
PIF_VALUE: 0
PIF_VALUE: 14
PIF_VALUE: 19
PIF_VALUE: 0
PIF_VALUE: 15
PIF_VALUE: 18
PIF_VALUE: 5
PIF_VALUE: 17
PIF_VALUE: 18
PIF_VALUE: 15
PIF_VALUE: 17
PIF_VALUE: 0
PIF_VALUE: 21
PIF_VALUE: 0
PIF_VALUE: 24
PIF_VALUE: 0
PIF_VALUE: 0
PIF_VALUE: 16
PIF_VALUE: 16
PIF_VALUE: 0
PIF_VALUE: 17
PIF_VALUE: 20
PIF_VALUE: 0
PIF_VALUE: 16
PIF_VALUE: 0
PIF_VALUE: 1
PIF_VALUE: 0
PIF_VALUE: 1
PIF_VALUE: 17
PIF_VALUE: 16
PIF_VALUE: 15
PIF_VALUE: 16
PIF_VALUE: 0
PIF_VALUE: 17
PIF_VALUE: 0
PIF_VALUE: 13
PIF_VALUE: 0
PIF_VALUE: 1
PIF_VALUE: 0
PIF_VALUE: 19
PIF_VALUE: 18
PIF_VALUE: 0
PIF_VALUE: 0
PIF_VALUE: 16
PIF_VALUE: 0
PIF_VALUE: 16
PIF_VALUE: 0
PIF_VALUE: 0
PIF_VALUE: 15
PIF_VALUE: 17
PIF_VALUE: 21
PIF_VALUE: 1
PIF_VALUE: 17
PIF_VALUE: 18
PIF_VALUE: 13
PIF_VALUE: 22
PIF_VALUE: 0
PIF_VALUE: 16
PIF_VALUE: 0
PIF_VALUE: 0
PIF_VALUE: 18
PIF_VALUE: 0
PIF_VALUE: 15
PIF_VALUE: 14
PIF_VALUE: 18
PIF_VALUE: 0
PIF_VALUE: 19
PIF_VALUE: 1
PIF_VALUE: 0
PIF_VALUE: 0
PIF_VALUE: 16
PIF_VALUE: 16
PIF_VALUE: 1
PIF_VALUE: 17
PIF_VALUE: 16
PIF_VALUE: 0
PIF_VALUE: 0
PIF_VALUE: 17
PIF_VALUE: 18
PIF_VALUE: 16
PIF_VALUE: 15
PIF_VALUE: 0
PIF_VALUE: 16
PIF_VALUE: 18
PIF_VALUE: 0
PIF_VALUE: 2
PIF_VALUE: 0
PIF_VALUE: 0
PIF_VALUE: 5
PIF_VALUE: 0
PIF_VALUE: 16
PIF_VALUE: 1
PIF_VALUE: 16
PIF_VALUE: 0
PIF_VALUE: 0
PIF_VALUE: 17
PIF_VALUE: 0
PIF_VALUE: 16
PIF_VALUE: 0
PIF_VALUE: 17
PIF_VALUE: 13
PIF_VALUE: 0
PIF_VALUE: 8
PIF_VALUE: 15
PIF_VALUE: 17
PIF_VALUE: 17
PIF_VALUE: 16
PIF_VALUE: 1
PIF_VALUE: 14
PIF_VALUE: 17
PIF_VALUE: 17
PIF_VALUE: 1
PIF_VALUE: 17
PIF_VALUE: 14
PIF_VALUE: 0
PIF_VALUE: 18
PIF_VALUE: 0
PIF_VALUE: 16
PIF_VALUE: 20
PIF_VALUE: 14
PIF_VALUE: 0
PIF_VALUE: 16
PIF_VALUE: 6
PIF_VALUE: 16
PIF_VALUE: 20
PIF_VALUE: 18
PIF_VALUE: 0
PIF_VALUE: 18
PIF_VALUE: 17
PIF_VALUE: 0
PIF_VALUE: 19
PIF_VALUE: 16
PIF_VALUE: 17
PIF_VALUE: 16
PIF_VALUE: 17
PIF_VALUE: 16
PIF_VALUE: 14
PIF_VALUE: 0
PIF_VALUE: 17
PIF_VALUE: 0
PIF_VALUE: 0
PIF_VALUE: 17
PIF_VALUE: 0
PIF_VALUE: 17
PIF_VALUE: 17
PIF_VALUE: 19
PIF_VALUE: 16
PIF_VALUE: 0
PIF_VALUE: 17
PIF_VALUE: 16
PIF_VALUE: 17
PIF_VALUE: 0
PIF_VALUE: 16
PIF_VALUE: 20
PIF_VALUE: 0
PIF_VALUE: 15
PIF_VALUE: 0
PIF_VALUE: 21
PIF_VALUE: 13
PIF_VALUE: 17
PIF_VALUE: 0
PIF_VALUE: 16
PIF_VALUE: 0
PIF_VALUE: 0
PIF_VALUE: 1
PIF_VALUE: 0
PIF_VALUE: 17
PIF_VALUE: 20
PIF_VALUE: 1
PIF_VALUE: 0
PIF_VALUE: 21
PIF_VALUE: 0
PIF_VALUE: 16
PIF_VALUE: 16
PIF_VALUE: 1
PIF_VALUE: 0
PIF_VALUE: 22
PIF_VALUE: 0
PIF_VALUE: 16
PIF_VALUE: 0
PIF_VALUE: 0
PIF_VALUE: 16
PIF_VALUE: 15
PIF_VALUE: 0
PIF_VALUE: 15
PIF_VALUE: 0
PIF_VALUE: 11
PIF_VALUE: 16
PIF_VALUE: 0
PIF_VALUE: 0
PIF_VALUE: 17
PIF_VALUE: 1
PIF_VALUE: 0
PIF_VALUE: 17
PIF_VALUE: 17
PIF_VALUE: 0
PIF_VALUE: 18
PIF_VALUE: 16
PIF_VALUE: 0
PIF_VALUE: 1
PIF_VALUE: 16
PIF_VALUE: 0
PIF_VALUE: 16
PIF_VALUE: 0
PIF_VALUE: 0
PIF_VALUE: 17
PIF_VALUE: 16
PIF_VALUE: 17
PIF_VALUE: 0
PIF_VALUE: 1
PIF_VALUE: 20
PIF_VALUE: 17
PIF_VALUE: 17
PIF_VALUE: 0
PIF_VALUE: 16
PIF_VALUE: 0
PIF_VALUE: 0
PIF_VALUE: 20
PIF_VALUE: 0
PIF_VALUE: 16
PIF_VALUE: 17
PIF_VALUE: 17
PIF_VALUE: 0
PIF_VALUE: 6
PIF_VALUE: 16
PIF_VALUE: 16
PIF_VALUE: 0
PIF_VALUE: 16
PIF_VALUE: 15
PIF_VALUE: 0
PIF_VALUE: 0
PIF_VALUE: 16
PIF_VALUE: 16
PIF_VALUE: 17
PIF_VALUE: 0
PIF_VALUE: 17
PIF_VALUE: 5
PIF_VALUE: 17
PIF_VALUE: 17
PIF_VALUE: 16
PIF_VALUE: 0
PIF_VALUE: 17
PIF_VALUE: 0
PIF_VALUE: 20
PIF_VALUE: 0
PIF_VALUE: 16
PIF_VALUE: 16
PIF_VALUE: 19
PIF_VALUE: 16
PIF_VALUE: 16
PIF_VALUE: 18
PIF_VALUE: 7
PIF_VALUE: 17
PIF_VALUE: 1
PIF_VALUE: 0
PIF_VALUE: 17
PIF_VALUE: 1
PIF_VALUE: 17

## 2020-10-19 ASSESSMENT — PAIN - FUNCTIONAL ASSESSMENT
PAIN_FUNCTIONAL_ASSESSMENT: PREVENTS OR INTERFERES SOME ACTIVE ACTIVITIES AND ADLS
PAIN_FUNCTIONAL_ASSESSMENT: ACTIVITIES ARE NOT PREVENTED

## 2020-10-19 ASSESSMENT — PAIN DESCRIPTION - DESCRIPTORS
DESCRIPTORS: ACHING
DESCRIPTORS: CONSTANT
DESCRIPTORS: CONSTANT
DESCRIPTORS: ACHING
DESCRIPTORS: ACHING;DISCOMFORT

## 2020-10-19 ASSESSMENT — PAIN SCALES - GENERAL
PAINLEVEL_OUTOF10: 7
PAINLEVEL_OUTOF10: 10
PAINLEVEL_OUTOF10: 5
PAINLEVEL_OUTOF10: 5
PAINLEVEL_OUTOF10: 9
PAINLEVEL_OUTOF10: 0
PAINLEVEL_OUTOF10: 8
PAINLEVEL_OUTOF10: 10
PAINLEVEL_OUTOF10: 8
PAINLEVEL_OUTOF10: 10

## 2020-10-19 ASSESSMENT — PAIN DESCRIPTION - FREQUENCY
FREQUENCY: CONTINUOUS

## 2020-10-19 ASSESSMENT — PAIN DESCRIPTION - PAIN TYPE
TYPE: SURGICAL PAIN
TYPE: ACUTE PAIN;SURGICAL PAIN

## 2020-10-19 ASSESSMENT — PAIN DESCRIPTION - LOCATION
LOCATION: CHEST
LOCATION: CHEST
LOCATION: BACK;CHEST;SHOULDER
LOCATION: CHEST
LOCATION: CHEST

## 2020-10-19 ASSESSMENT — PAIN DESCRIPTION - PROGRESSION
CLINICAL_PROGRESSION: NOT CHANGED

## 2020-10-19 ASSESSMENT — PAIN DESCRIPTION - ONSET
ONSET: ON-GOING

## 2020-10-19 NOTE — ANESTHESIA PROCEDURE NOTES
Arterial Line:    An arterial line was placed using surface landmarks, in the OR for the following indication(s): continuous blood pressure monitoring and blood sampling needed. A 22 gauge (size), 1 and 3/4 inch (length), Arrow (type) catheter was placed, Seldinger technique used, into the left radial artery, secured by Tegaderm.   Anesthesia type: Local  Local infiltration: Injection  10/19/2020 7:09 AM10/19/2020 7:11 AM  Anesthesiologist: Óscar Valdovinos MD  Resident/CRNA: GETACHEW Baxter CRNA  Performed: Resident/CRNA and Anesthesiologist   Preanesthetic Checklist  Completed: patient identified, site marked, surgical consent, pre-op evaluation, timeout performed, IV checked, risks and benefits discussed, monitors and equipment checked, anesthesia consent given, oxygen available and patient being monitored

## 2020-10-19 NOTE — PROGRESS NOTES
Rodrigue King, at bedside. Money counted and wallet secured. Security receipt placed in pt's personal belongings. Bailee Robles, took picture of receipt.

## 2020-10-19 NOTE — FLOWSHEET NOTE
RT at bedside. Vent settings changed to spontaneous and pt doing well. Will continue to monitor closely.

## 2020-10-19 NOTE — PROGRESS NOTES
Pt belongings received:  Shoes, undergarments, shirt, jeans, belt, keys, cell phone, cell phone , watch, dentures, glasses, wallet with $ 1839.00    Security called to lock money in safe.

## 2020-10-19 NOTE — PROGRESS NOTES
Laury Bills, emergency contact, at bedside. Updated on Hospital visiting policy change tomorrow- No visitors. Security code given: [id number]. Questions answered. Encourage to call with questions or concerns at any time.

## 2020-10-19 NOTE — PROGRESS NOTES
Patient meets parameters for extubation. Patient able to follow commands, lifts head off of pillow, and Nif -22 and RSBI 22 at this time. Patient tolerating CPAP. Patient educated on extubation process. Sahil RT at bedside. Patient extubated, 4 liters of oxygen applied per NC. Breathing treatment administered per MAR. Vital signs per protocol. Will continue to monitor patient.      Electronically signed by Roman Urrutia RN on 10/19/2020 at 3:49 PM

## 2020-10-19 NOTE — ANESTHESIA POSTPROCEDURE EVALUATION
Department of Anesthesiology  Postprocedure Note    Patient: Kendall Rodriges  MRN: [id number]  YOB: 1955  Date of evaluation: 10/19/2020  Time:  12:16 PM     Procedure Summary     Date:  10/19/20 Room / Location:  [address] [place] [address]    Anesthesia Start:  0700 Anesthesia Stop:  5995    Procedure:  CABG CORONARY ARTERY BYPASS x3 WITH LIMA, INTRAOP BETH, ENDOHARVEST OF THE LEFT SAPHENOUS VEIN (N/A Chest) Diagnosis:  (CAD)    Surgeon:  Christian Raphael MD Responsible Provider:  GETACHEW Nava CRNA    Anesthesia Type:  general ASA Status:  4          Anesthesia Type: general    Archana Phase I:      Archana Phase II:      Last vitals: Reviewed and per EMR flowsheets.        Anesthesia Post Evaluation    Patient location during evaluation: ICU  Patient participation: complete - patient cannot participate (sedated)  Level of consciousness: sedated and ventilated  Pain score: 0  Airway patency: patent  Nausea & Vomiting: no nausea and no vomiting  Complications: no  Cardiovascular status: blood pressure returned to baseline  Respiratory status: acceptable and ventilator  Hydration status: stable

## 2020-10-19 NOTE — OP NOTE
Operative Note      Patient: Laith Talamantes  YOB: 1955  MRN: [id number]    Date of Procedure: 10/19/2020    Pre-Op Diagnosis: CAD, CHF    Post-Op Diagnosis: Same       Procedure(s):  CABG CORONARY ARTERY BYPASS x3 WITH LIMA, INTRAOP BETH, ENDOHARVEST OF THE LEFT SAPHENOUS VEIN    Surgeon(s):  MD Jan Akbar MD Misty Dolphin, MD    Assistant:   Physician Assistant: Delano Mazariegos PA-C    Anesthesia: General    Estimated Blood Loss (mL): 585     Complications: None    Specimens:   * No specimens in log *    Implants:  * No implants in log *      Drains:   Urethral Catheter Non-latex 16 fr (Active)   Blakes x 2      Pre-op Diagnosis:    Active Hospital Problems    Diagnosis Date Noted    Coronary artery disease involving native coronary artery [I25.10] 09/24/2020    Chronic combined systolic and diastolic congestive heart failure ([facility] [address] [I50.42] 07/15/2020       Past Medical History:   Diagnosis Date    CAD (coronary artery disease)     Dr. Efren Dai COPD (chronic obstructive pulmonary disease) ([facility] [address]     No pulmonologist - follows with PCP    H/O cardiac catheterization 09/24/2020     Severe calcified multivessel CAD with LV dysfuntion, PCWP is 12, CABG consult.  H/O echocardiogram 06/23/2020     Mild concentric LVH with moderate systolic impairment. EF is 40-45 % .     Hyperlipidemia     Hypertension     Follows with PCP    IBS (irritable bowel syndrome)     Pancreatitis 2013    Rheumatoid arthritis ([facility] [address]     Thyroid disease             Post-op Diagnosis and Findings: As above     Active Hospital Problems    Diagnosis Date Noted    Coronary artery disease involving native coronary artery [I25.10] 09/24/2020    Chronic combined systolic and diastolic congestive heart failure ([facility] [address] [I50.42] 07/15/2020     Severe LVH, Pericarditis and old infarcts noted  preop EF ~30%, postop function improved, dior lateral wall  Quality of the coronaries extensive diffuse disease, Cx occluded  Quality of BROWN excellent  Quality of saphanous vein good  PLV not targetable      Procedure:  CABG X 3  LIMA to LAD  SVG to PDA, Ramus    Additional procedure:  Triple lumen CVP  Akira Nailer catheter  Vein Mapping    Surgeon: Pancho Manning MD/Jan Mast MD    Assistant(s):  Marko Olsen PA-C    Anesthesia:  General      Drains:  Two chest tubes and Pacer wires      Complications:  None    Details of the Procedure:     Patient brought to the operating room after discussion with patient, family and consultation with cardiology. Patient was prepared for surgery and risk stratification was performed. Ultrasound was used to identify and map the course of GSV. Time out per check list confirmed    Placement of CVP and Solomon Olga:  Patient was prepared and draped exposing the left subclavian area with complete barrier precautions. A needle was placed in the subclavian vein and a guidewire was placed through the needle. The needle was placed a second time in the subclavian vein and a subsequent guidewire was placed. The first guidewire was dilated and a triple lumen catheter was placed over the guidewire. The catheter was flushed and secured with suture. An introductory sheath was placed over the second guidewire and then a Akira Nailer catheter was floated with pressure monitoring guidance until wedged. Hemodynamic obtained and catheter was anchored to the skin with suture. CABG:  Patient was prepared exposing chest and both lower extremities. Midline chest incision was made and sternum was opened at midline. Pericardium was opened and coronary arteries were evaluated. The left mammary artery was harvested. Simultaneously the left great saphenous vein was harvested by endoscopic technique. Patient was placed on cardio pulmonary bypass with the arterial cannula in the ascending aorta and venous cannula in the right atrium. Cardioplegic arrest was obtained.      The Ramus target was

## 2020-10-19 NOTE — PROGRESS NOTES
Dr. Delores Levy at bedside to eval patient. Hemodynamics,  I/O and chest tube output reviewed. Reviewed.  Order received:     mls  Albumin 500 ml prn x1

## 2020-10-19 NOTE — PROGRESS NOTES
Patient arrived from OR to room 2128. Pt traveled on bed with cardiac monitor. Escorted per Herminio Perales CRNA and Jose M Radha. Pt being bagged per Jose M Radha. Pt connected to vent per   [facility]. Bedside report and complete assessment done. EPOC drawn. Post-op labs drawn and sent to lab.      EPOC results:  Temp 34.8  Potassium 3.3  Creatinine 1.3     Infusions Handoff:  Epi 7  Norepip 2  Insulin N/A   OR Intake:  Crystalloids 700  Cell saver 700  Albumin 250   OR Output:     Fowler 150

## 2020-10-20 ENCOUNTER — APPOINTMENT (OUTPATIENT)
Dept: GENERAL RADIOLOGY | Age: 65
DRG: 235 | End: 2020-10-20
Attending: SURGERY
Payer: MEDICARE

## 2020-10-20 LAB
ANION GAP SERPL CALCULATED.3IONS-SCNC: 13 MMOL/L (ref 4–16)
BASE EXCESS MIXED: 0.8 (ref 0–1.2)
BASE EXCESS MIXED: 1 (ref 0–1.2)
BASE EXCESS MIXED: 1.7 (ref 0–1.2)
BASE EXCESS MIXED: 1.8 (ref 0–1.2)
BASE EXCESS: ABNORMAL (ref 0–3.3)
BUN BLDV-MCNC: 29 MG/DL (ref 6–23)
CALCIUM IONIZED: 4.8 MG/DL (ref 4.48–5.28)
CALCIUM SERPL-MCNC: 9.2 MG/DL (ref 8.3–10.6)
CARBON MONOXIDE, BLOOD: 2.1 % (ref 0–5)
CHLORIDE BLD-SCNC: 104 MMOL/L (ref 99–110)
CO2 CONTENT: 24.6 MMOL/L (ref 19–24)
CO2 CONTENT: 24.6 MMOL/L (ref 19–24)
CO2 CONTENT: 26.4 MMOL/L (ref 19–24)
CO2: 21 MMOL/L (ref 21–32)
CO2: 26 MMOL/L (ref 21–32)
COMMENT: ABNORMAL
CREAT SERPL-MCNC: 1.1 MG/DL (ref 0.9–1.3)
GFR AFRICAN AMERICAN: >60 ML/MIN/1.73M2
GFR NON-AFRICAN AMERICAN: >60 ML/MIN/1.73M2
GLUCOSE BLD-MCNC: 105 MG/DL (ref 70–99)
GLUCOSE BLD-MCNC: 115 MG/DL (ref 70–99)
GLUCOSE BLD-MCNC: 120 MG/DL (ref 70–99)
GLUCOSE BLD-MCNC: 121 MG/DL (ref 70–99)
GLUCOSE BLD-MCNC: 124 MG/DL (ref 70–99)
GLUCOSE BLD-MCNC: 125 MG/DL (ref 70–99)
GLUCOSE BLD-MCNC: 126 MG/DL (ref 70–99)
GLUCOSE BLD-MCNC: 129 MG/DL (ref 70–99)
GLUCOSE BLD-MCNC: 130 MG/DL (ref 70–99)
GLUCOSE BLD-MCNC: 136 MG/DL (ref 70–99)
GLUCOSE BLD-MCNC: 137 MG/DL (ref 70–99)
GLUCOSE BLD-MCNC: 148 MG/DL (ref 70–99)
GLUCOSE BLD-MCNC: 148 MG/DL (ref 70–99)
GLUCOSE BLD-MCNC: 154 MG/DL (ref 70–99)
GLUCOSE BLD-MCNC: 158 MG/DL (ref 70–99)
GLUCOSE BLD-MCNC: 167 MG/DL (ref 70–99)
GLUCOSE BLD-MCNC: 178 MG/DL (ref 70–99)
GLUCOSE BLD-MCNC: 182 MG/DL (ref 70–99)
GLUCOSE BLD-MCNC: 97 MG/DL (ref 70–99)
HCO3 ARTERIAL: 23.3 MMOL/L (ref 18–23)
HCO3 ARTERIAL: 23.4 MMOL/L (ref 18–23)
HCO3 ARTERIAL: 24.8 MMOL/L (ref 18–23)
HCO3 ARTERIAL: 25.2 MMOL/L (ref 18–23)
HCT VFR BLD CALC: 35 % (ref 42–52)
HCT VFR BLD CALC: 36 % (ref 42–52)
HCT VFR BLD CALC: 36.4 % (ref 42–52)
HCT VFR BLD CALC: 37 % (ref 42–52)
HEMOGLOBIN: 11.7 GM/DL (ref 13.5–18)
HEMOGLOBIN: 11.8 GM/DL (ref 13.5–18)
HEMOGLOBIN: 12.1 GM/DL (ref 13.5–18)
HEMOGLOBIN: 12.5 GM/DL (ref 13.5–18)
IONIZED CA: 1.2 MMOL/L (ref 1.12–1.32)
MAGNESIUM: 2.9 MG/DL (ref 1.8–2.4)
MCH RBC QN AUTO: 32.9 PG (ref 27–31)
MCHC RBC AUTO-ENTMCNC: 32.1 % (ref 32–36)
MCV RBC AUTO: 102.2 FL (ref 78–100)
METHEMOGLOBIN ARTERIAL: 1.2 %
O2 SATURATION: 83.3 % (ref 96–97)
O2 SATURATION: 88.9 % (ref 96–97)
O2 SATURATION: 90.8 % (ref 96–97)
O2 SATURATION: 93.7 % (ref 96–97)
PCO2 ARTERIAL: 38 MMHG (ref 32–45)
PCO2 ARTERIAL: 39.9 MMHG (ref 32–45)
PCO2 ARTERIAL: 40.3 MMHG (ref 32–45)
PCO2 ARTERIAL: 43.6 MMHG (ref 32–45)
PDW BLD-RTO: 13.6 % (ref 11.7–14.9)
PH BLOOD: 7.36 (ref 7.34–7.45)
PH BLOOD: 7.37 (ref 7.34–7.45)
PH BLOOD: 7.38 (ref 7.34–7.45)
PH BLOOD: 7.43 (ref 7.34–7.45)
PLATELET # BLD: 146 K/CU MM (ref 140–440)
PMV BLD AUTO: 9.4 FL (ref 7.5–11.1)
PO2 ARTERIAL: 48.9 MMHG (ref 75–100)
PO2 ARTERIAL: 58.6 MMHG (ref 75–100)
PO2 ARTERIAL: 62.3 MMHG (ref 75–100)
PO2 ARTERIAL: 69 MMHG (ref 75–100)
POC CALCIUM: 1.16 MMOL/L (ref 1.12–1.32)
POC CALCIUM: 1.23 MMOL/L (ref 1.12–1.32)
POC CALCIUM: 1.26 MMOL/L (ref 1.12–1.32)
POC CHLORIDE: 106 MMOL/L (ref 98–109)
POC CREATININE: 1.2 MG/DL (ref 0.9–1.3)
POTASSIUM SERPL-SCNC: 4.7 MMOL/L (ref 3.5–4.5)
POTASSIUM SERPL-SCNC: 4.7 MMOL/L (ref 3.5–4.5)
POTASSIUM SERPL-SCNC: 4.7 MMOL/L (ref 3.5–5.1)
POTASSIUM SERPL-SCNC: 4.9 MMOL/L (ref 3.5–4.5)
RBC # BLD: 3.56 M/CU MM (ref 4.6–6.2)
SODIUM BLD-SCNC: 136 MMOL/L (ref 138–146)
SODIUM BLD-SCNC: 137 MMOL/L (ref 138–146)
SODIUM BLD-SCNC: 138 MMOL/L (ref 135–145)
SODIUM BLD-SCNC: 138 MMOL/L (ref 138–146)
SOURCE, BLOOD GAS: ABNORMAL
WBC # BLD: 19.2 K/CU MM (ref 4–10.5)

## 2020-10-20 PROCEDURE — 2000000000 HC ICU R&B

## 2020-10-20 PROCEDURE — 97162 PT EVAL MOD COMPLEX 30 MIN: CPT

## 2020-10-20 PROCEDURE — 82962 GLUCOSE BLOOD TEST: CPT

## 2020-10-20 PROCEDURE — 97166 OT EVAL MOD COMPLEX 45 MIN: CPT

## 2020-10-20 PROCEDURE — 85014 HEMATOCRIT: CPT

## 2020-10-20 PROCEDURE — 94761 N-INVAS EAR/PLS OXIMETRY MLT: CPT

## 2020-10-20 PROCEDURE — 83735 ASSAY OF MAGNESIUM: CPT

## 2020-10-20 PROCEDURE — 97530 THERAPEUTIC ACTIVITIES: CPT

## 2020-10-20 PROCEDURE — 6370000000 HC RX 637 (ALT 250 FOR IP): Performed by: PHYSICIAN ASSISTANT

## 2020-10-20 PROCEDURE — 94667 MNPJ CHEST WALL 1ST: CPT

## 2020-10-20 PROCEDURE — 82330 ASSAY OF CALCIUM: CPT

## 2020-10-20 PROCEDURE — 2580000003 HC RX 258: Performed by: PHYSICIAN ASSISTANT

## 2020-10-20 PROCEDURE — 71045 X-RAY EXAM CHEST 1 VIEW: CPT

## 2020-10-20 PROCEDURE — 2500000003 HC RX 250 WO HCPCS: Performed by: SURGERY

## 2020-10-20 PROCEDURE — 94640 AIRWAY INHALATION TREATMENT: CPT

## 2020-10-20 PROCEDURE — 6360000002 HC RX W HCPCS: Performed by: SURGERY

## 2020-10-20 PROCEDURE — 94660 CPAP INITIATION&MGMT: CPT

## 2020-10-20 PROCEDURE — 80048 BASIC METABOLIC PNL TOTAL CA: CPT

## 2020-10-20 PROCEDURE — 6360000002 HC RX W HCPCS: Performed by: PHYSICIAN ASSISTANT

## 2020-10-20 PROCEDURE — 84295 ASSAY OF SERUM SODIUM: CPT

## 2020-10-20 PROCEDURE — 6360000002 HC RX W HCPCS: Performed by: INTERNAL MEDICINE

## 2020-10-20 PROCEDURE — 82803 BLOOD GASES ANY COMBINATION: CPT

## 2020-10-20 PROCEDURE — 93005 ELECTROCARDIOGRAM TRACING: CPT | Performed by: SURGERY

## 2020-10-20 PROCEDURE — 85027 COMPLETE CBC AUTOMATED: CPT

## 2020-10-20 PROCEDURE — 84132 ASSAY OF SERUM POTASSIUM: CPT

## 2020-10-20 PROCEDURE — 6360000002 HC RX W HCPCS

## 2020-10-20 PROCEDURE — 85018 HEMOGLOBIN: CPT

## 2020-10-20 PROCEDURE — 2500000003 HC RX 250 WO HCPCS

## 2020-10-20 PROCEDURE — 6370000000 HC RX 637 (ALT 250 FOR IP): Performed by: SURGERY

## 2020-10-20 RX ORDER — FUROSEMIDE 10 MG/ML
20 INJECTION INTRAMUSCULAR; INTRAVENOUS ONCE
Status: COMPLETED | OUTPATIENT
Start: 2020-10-20 | End: 2020-10-20

## 2020-10-20 RX ORDER — MORPHINE SULFATE 2 MG/ML
2 INJECTION, SOLUTION INTRAMUSCULAR; INTRAVENOUS ONCE
Status: COMPLETED | OUTPATIENT
Start: 2020-10-20 | End: 2020-10-20

## 2020-10-20 RX ORDER — CARVEDILOL 3.12 MG/1
3.12 TABLET ORAL ONCE
Status: COMPLETED | OUTPATIENT
Start: 2020-10-20 | End: 2020-10-20

## 2020-10-20 RX ORDER — CARVEDILOL 6.25 MG/1
6.25 TABLET ORAL 2 TIMES DAILY
Status: DISCONTINUED | OUTPATIENT
Start: 2020-10-20 | End: 2020-10-21

## 2020-10-20 RX ORDER — MORPHINE SULFATE 2 MG/ML
INJECTION, SOLUTION INTRAMUSCULAR; INTRAVENOUS
Status: COMPLETED
Start: 2020-10-20 | End: 2020-10-20

## 2020-10-20 RX ORDER — FUROSEMIDE 10 MG/ML
20 INJECTION INTRAMUSCULAR; INTRAVENOUS 2 TIMES DAILY
Status: DISCONTINUED | OUTPATIENT
Start: 2020-10-20 | End: 2020-10-20

## 2020-10-20 RX ORDER — MAGNESIUM SULFATE IN WATER 40 MG/ML
2 INJECTION, SOLUTION INTRAVENOUS ONCE
Status: COMPLETED | OUTPATIENT
Start: 2020-10-20 | End: 2020-10-20

## 2020-10-20 RX ORDER — METHYLPREDNISOLONE SODIUM SUCCINATE 125 MG/2ML
125 INJECTION, POWDER, LYOPHILIZED, FOR SOLUTION INTRAMUSCULAR; INTRAVENOUS ONCE
Status: COMPLETED | OUTPATIENT
Start: 2020-10-20 | End: 2020-10-20

## 2020-10-20 RX ORDER — LABETALOL HYDROCHLORIDE 5 MG/ML
10 INJECTION, SOLUTION INTRAVENOUS ONCE
Status: COMPLETED | OUTPATIENT
Start: 2020-10-20 | End: 2020-10-20

## 2020-10-20 RX ORDER — FUROSEMIDE 10 MG/ML
40 INJECTION INTRAMUSCULAR; INTRAVENOUS 2 TIMES DAILY
Status: DISCONTINUED | OUTPATIENT
Start: 2020-10-20 | End: 2020-10-27 | Stop reason: HOSPADM

## 2020-10-20 RX ADMIN — IPRATROPIUM BROMIDE AND ALBUTEROL SULFATE 1 AMPULE: .5; 3 SOLUTION RESPIRATORY (INHALATION) at 08:11

## 2020-10-20 RX ADMIN — CARVEDILOL 3.12 MG: 3.12 TABLET, FILM COATED ORAL at 12:00

## 2020-10-20 RX ADMIN — CALCIUM GLUCONATE 2 G: 98 INJECTION, SOLUTION INTRAVENOUS at 16:24

## 2020-10-20 RX ADMIN — HYDROCODONE BITARTRATE AND ACETAMINOPHEN 2 TABLET: 5; 325 TABLET ORAL at 23:48

## 2020-10-20 RX ADMIN — MAGNESIUM SULFATE 2 G: 2 INJECTION INTRAVENOUS at 01:35

## 2020-10-20 RX ADMIN — AMIODARONE HYDROCHLORIDE 200 MG: 200 TABLET ORAL at 13:51

## 2020-10-20 RX ADMIN — BUDESONIDE AND FORMOTEROL FUMARATE DIHYDRATE 2 PUFF: 160; 4.5 AEROSOL RESPIRATORY (INHALATION) at 21:19

## 2020-10-20 RX ADMIN — LEVOTHYROXINE SODIUM 100 MCG: 100 TABLET ORAL at 08:42

## 2020-10-20 RX ADMIN — MORPHINE SULFATE 2 MG: 2 INJECTION, SOLUTION INTRAMUSCULAR; INTRAVENOUS at 01:50

## 2020-10-20 RX ADMIN — SODIUM CHLORIDE, PRESERVATIVE FREE 10 ML: 5 INJECTION INTRAVENOUS at 20:56

## 2020-10-20 RX ADMIN — FUROSEMIDE 40 MG: 10 INJECTION, SOLUTION INTRAMUSCULAR; INTRAVENOUS at 18:47

## 2020-10-20 RX ADMIN — SODIUM CHLORIDE, PRESERVATIVE FREE 10 ML: 5 INJECTION INTRAVENOUS at 08:41

## 2020-10-20 RX ADMIN — FUROSEMIDE 20 MG: 10 INJECTION, SOLUTION INTRAVENOUS at 08:40

## 2020-10-20 RX ADMIN — CARVEDILOL 3.12 MG: 3.12 TABLET, FILM COATED ORAL at 22:51

## 2020-10-20 RX ADMIN — POLYETHYLENE GLYCOL (3350) 17 G: 17 POWDER, FOR SOLUTION ORAL at 08:47

## 2020-10-20 RX ADMIN — TAMSULOSIN HYDROCHLORIDE 0.4 MG: 0.4 CAPSULE ORAL at 08:43

## 2020-10-20 RX ADMIN — ASPIRIN 81 MG: 81 TABLET, COATED ORAL at 08:43

## 2020-10-20 RX ADMIN — FUROSEMIDE 20 MG: 10 INJECTION, SOLUTION INTRAVENOUS at 12:00

## 2020-10-20 RX ADMIN — BUDESONIDE AND FORMOTEROL FUMARATE DIHYDRATE 2 PUFF: 160; 4.5 AEROSOL RESPIRATORY (INHALATION) at 08:10

## 2020-10-20 RX ADMIN — AMIODARONE HYDROCHLORIDE 200 MG: 200 TABLET ORAL at 08:43

## 2020-10-20 RX ADMIN — CARVEDILOL 6.25 MG: 6.25 TABLET, FILM COATED ORAL at 20:54

## 2020-10-20 RX ADMIN — CEFAZOLIN SODIUM 2 G: 10 INJECTION, POWDER, FOR SOLUTION INTRAVENOUS at 03:43

## 2020-10-20 RX ADMIN — AMIODARONE HYDROCHLORIDE 200 MG: 200 TABLET ORAL at 20:55

## 2020-10-20 RX ADMIN — IPRATROPIUM BROMIDE AND ALBUTEROL SULFATE 1 AMPULE: .5; 3 SOLUTION RESPIRATORY (INHALATION) at 15:35

## 2020-10-20 RX ADMIN — METHYLPREDNISOLONE SODIUM SUCCINATE 125 MG: 125 INJECTION, POWDER, LYOPHILIZED, FOR SOLUTION INTRAMUSCULAR; INTRAVENOUS at 16:32

## 2020-10-20 RX ADMIN — DULOXETINE HYDROCHLORIDE 60 MG: 30 CAPSULE, DELAYED RELEASE ORAL at 08:43

## 2020-10-20 RX ADMIN — PANTOPRAZOLE SODIUM 40 MG: 40 TABLET, DELAYED RELEASE ORAL at 08:42

## 2020-10-20 RX ADMIN — CARVEDILOL 3.12 MG: 3.12 TABLET, FILM COATED ORAL at 08:42

## 2020-10-20 RX ADMIN — LABETALOL HYDROCHLORIDE 10 MG: 5 INJECTION, SOLUTION INTRAVENOUS at 01:53

## 2020-10-20 RX ADMIN — FINASTERIDE 5 MG: 5 TABLET, FILM COATED ORAL at 08:42

## 2020-10-20 RX ADMIN — HYDROCODONE BITARTRATE AND ACETAMINOPHEN 2 TABLET: 5; 325 TABLET ORAL at 13:50

## 2020-10-20 RX ADMIN — IPRATROPIUM BROMIDE AND ALBUTEROL SULFATE 1 AMPULE: .5; 3 SOLUTION RESPIRATORY (INHALATION) at 11:41

## 2020-10-20 RX ADMIN — HYDROCODONE BITARTRATE AND ACETAMINOPHEN 2 TABLET: 5; 325 TABLET ORAL at 04:35

## 2020-10-20 RX ADMIN — Medication: at 08:42

## 2020-10-20 RX ADMIN — MORPHINE SULFATE 2 MG: 2 INJECTION, SOLUTION INTRAMUSCULAR; INTRAVENOUS at 02:15

## 2020-10-20 RX ADMIN — ATORVASTATIN CALCIUM 20 MG: 20 TABLET, FILM COATED ORAL at 20:54

## 2020-10-20 RX ADMIN — IPRATROPIUM BROMIDE AND ALBUTEROL SULFATE 1 AMPULE: .5; 3 SOLUTION RESPIRATORY (INHALATION) at 21:18

## 2020-10-20 RX ADMIN — HYDROCODONE BITARTRATE AND ACETAMINOPHEN 2 TABLET: 5; 325 TABLET ORAL at 18:47

## 2020-10-20 RX ADMIN — FUROSEMIDE 20 MG: 10 INJECTION, SOLUTION INTRAVENOUS at 01:43

## 2020-10-20 RX ADMIN — CALCIUM GLUCONATE 2 G: 98 INJECTION, SOLUTION INTRAVENOUS at 01:53

## 2020-10-20 RX ADMIN — Medication: at 20:54

## 2020-10-20 RX ADMIN — HYDROCODONE BITARTRATE AND ACETAMINOPHEN 2 TABLET: 5; 325 TABLET ORAL at 08:43

## 2020-10-20 RX ADMIN — MULTIPLE VITAMINS W/ MINERALS TAB 1 TABLET: TAB at 08:42

## 2020-10-20 RX ADMIN — FENTANYL CITRATE 25 MCG: 50 INJECTION INTRAMUSCULAR; INTRAVENOUS at 00:28

## 2020-10-20 ASSESSMENT — PAIN - FUNCTIONAL ASSESSMENT
PAIN_FUNCTIONAL_ASSESSMENT: PREVENTS OR INTERFERES SOME ACTIVE ACTIVITIES AND ADLS
PAIN_FUNCTIONAL_ASSESSMENT: ACTIVITIES ARE NOT PREVENTED
PAIN_FUNCTIONAL_ASSESSMENT: PREVENTS OR INTERFERES SOME ACTIVE ACTIVITIES AND ADLS

## 2020-10-20 ASSESSMENT — PAIN DESCRIPTION - ONSET
ONSET: ON-GOING
ONSET: GRADUAL
ONSET: ON-GOING

## 2020-10-20 ASSESSMENT — PAIN SCALES - GENERAL
PAINLEVEL_OUTOF10: 9
PAINLEVEL_OUTOF10: 9
PAINLEVEL_OUTOF10: 5
PAINLEVEL_OUTOF10: 0
PAINLEVEL_OUTOF10: 9
PAINLEVEL_OUTOF10: 10
PAINLEVEL_OUTOF10: 8
PAINLEVEL_OUTOF10: 0
PAINLEVEL_OUTOF10: 8
PAINLEVEL_OUTOF10: 10
PAINLEVEL_OUTOF10: 9
PAINLEVEL_OUTOF10: 8
PAINLEVEL_OUTOF10: 9
PAINLEVEL_OUTOF10: 6
PAINLEVEL_OUTOF10: 7
PAINLEVEL_OUTOF10: 6
PAINLEVEL_OUTOF10: 8
PAINLEVEL_OUTOF10: 7
PAINLEVEL_OUTOF10: 9

## 2020-10-20 ASSESSMENT — PAIN DESCRIPTION - LOCATION
LOCATION: CHEST
LOCATION: CHEST;STERNUM
LOCATION: STERNUM;BACK;SHOULDER
LOCATION: CHEST
LOCATION: CHEST;STERNUM

## 2020-10-20 ASSESSMENT — PAIN DESCRIPTION - PROGRESSION
CLINICAL_PROGRESSION: GRADUALLY WORSENING
CLINICAL_PROGRESSION: NOT CHANGED
CLINICAL_PROGRESSION: NOT CHANGED
CLINICAL_PROGRESSION: GRADUALLY WORSENING

## 2020-10-20 ASSESSMENT — PAIN DESCRIPTION - ORIENTATION
ORIENTATION: MID

## 2020-10-20 ASSESSMENT — PAIN DESCRIPTION - DESCRIPTORS
DESCRIPTORS: DISCOMFORT
DESCRIPTORS: ACHING
DESCRIPTORS: ACHING;SORE
DESCRIPTORS: ACHING
DESCRIPTORS: ACHING
DESCRIPTORS: CONSTANT
DESCRIPTORS: ACHING
DESCRIPTORS: CONSTANT

## 2020-10-20 ASSESSMENT — PAIN DESCRIPTION - PAIN TYPE
TYPE: SURGICAL PAIN
TYPE: SURGICAL PAIN
TYPE: SURGICAL PAIN;CHRONIC PAIN
TYPE: SURGICAL PAIN
TYPE: ACUTE PAIN;SURGICAL PAIN
TYPE: SURGICAL PAIN
TYPE: ACUTE PAIN;SURGICAL PAIN

## 2020-10-20 ASSESSMENT — PAIN DESCRIPTION - FREQUENCY
FREQUENCY: CONTINUOUS
FREQUENCY: CONTINUOUS
FREQUENCY: INTERMITTENT
FREQUENCY: CONTINUOUS

## 2020-10-20 NOTE — PROGRESS NOTES
Dr. Teresa Barajas ok to wait to pull Art line and [place] cath until he rounds on patient this morning.

## 2020-10-20 NOTE — PROGRESS NOTES
Physical Therapy  [facility] ACUTE CARE PHYSICAL THERAPY EVALUATION  Melvin Slade, 1955, 2128/2128-A, 10/20/2020    History  [place]:  There were no encounter diagnoses. Patient  has a past medical history of CAD (coronary artery disease), COPD (chronic obstructive pulmonary disease) ([facility] [address] H/O cardiac catheterization, H/O echocardiogram, Hyperlipidemia, Hypertension, IBS (irritable bowel syndrome), Pancreatitis, Rheumatoid arthritis ([facility] [address] and Thyroid disease. Patient  has a past surgical history that includes Endoscopy, colon, diagnostic (2017); Colonoscopy (2017); and Cardiac catheterization (09/24/2020). Subjective:  Patient states:  \"I just feel weak\"   Pain:  Sternum 8/10 (reports also having chronic back pain)   Communication with other providers:  Handoff to RN, co-eval with Ry BENITEZ   Restrictions: sternal precautions, falls, portable tele, pulse ox, BP cuff, dsouza, chest tube, A-line, central line     Home Setup/Prior level of function  Social/Functional History  Lives With: Significant other(Plans on staying with ex-wife at discharge)  Type of Home: House  Home Layout: One level  Home Access: Stairs to enter with rails  Entrance Stairs - Number of Steps: 1  Entrance Stairs - Rails: Right  Bathroom Shower/Tub: Tub/Shower unit  Bathroom Toilet: Standard  Bathroom Accessibility: Accessible  ADL Assistance: Independent  Homemaking Assistance: Independent  Homemaking Responsibilities: Yes  Ambulation Assistance: Independent(uses no AD)  Transfer Assistance: Independent  Active : Yes  Occupation: Full time employment  Type of occupation:   Additional Comments: Per RN, 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 Pt reports he will be staying with ex-wife at discharge and will have initial 24/7 supervision. Above information is for ex-wife's home setup.     Examination of body systems (includes body structures/functions, activity/participation limitations):  · Observation: Supine in bed upon arrival. Agreeable to work with therapy   · Vision:  [facility]   · Hearing:  [facility]  · Cardiopulmonary:  Vitals stable throughout session on 11L at rest, 15L with activity   · Orientation: [facility]     Musculoskeletal  · ROM R/L:  WFL BLEs  · Strength R/L:  BLEs grossly 4+/5. Minimal strength deficits observed in function and endurance. Mobility/treatment:   · Rolling L/R:  Partial roll modA  · Supine to sit:  maxA for hip guidance and uprighting trunk. Slow to advance BLEs to EOB needing small assist at end of distance to get his heels fully off   · Transfers:   · Sit to stand: CGA from raised bed surface with cues for fwd momentum and UE placement   · Stand to sit: Shanel for eccentric control to recliner. Cues for UE placement and sequencing trunk/hips   · Sitting balance:  SBA at EOB static without UE support   · Standing balance:  CGA at CW   · Gait: ~25ft with CW CGA for safety. Slow pace with very short bilat step length and foot clearance. Limited distance with report of feeling weak and SOB. · Educated pt on POC, role of PT, DME use, sternal precautions. Cues for sequencing,  weight shift, UE placement to inc safety and indep with mobility     [facility] 6 Clicks Inpatient Mobility:  AM-PAC Inpatient Mobility Raw Score : 16    Safety: patient left in chair, call light within reach, RN notified, gait belt used. Assessment:  Pt is a 71year old male admitted with CAD and is s/p CABG x 3 as of 10/19. Recommend ARU once medically stable. At baseline he is indep with gross mobility, ADLs, and working full time. He is currently functioning below his typical baseline needing up to maxA and with limited standing/ambulation tolerance. He would benefit from continued therapy to address his current deficits, dec potential fall risk, and restore PLOF. Complexity: Moderate  Prognosis: Good, no significant barriers to participation at this time.    Plan Times per week: 6+/week, 1 week  Discharge Recommendations: IP Rehab  Equipment: continue to assess     Goals:  Short term goals  Time Frame for Short term goals: 1 week  Short term goal 1: Pt will perform sit><supine Shanel  Short term goal 2: Pt will transfer SBA  Short term goal 3: Pt will ambulate 200ft with LRAD SBA  Short term goal 4: Pt will ascend/descend 1 step, single rail SBA  Short term goal 5: Pt will complete cardiac HEP x 10 reps indep       Treatment plan:  Bed mobility, transfers, balance, gait, TA, TX, stairs     Recommendations for NURSING mobility: ambulate with CW     Time:   Time in: 1248  Time out: 1316  Timed treatment minutes: 13  Total time: 28    Electronically signed by:    Agatha Rascon [id number]  10/20/2020, 2:01 PM

## 2020-10-20 NOTE — FLOWSHEET NOTE
Pete MARCIAL aware of improving ABGs. Verbal orders received patient to wear Bipap during naps and while sleeping. RN may remove for short breaks and meals.      Jerod Rutherford RN 11:21 AM

## 2020-10-20 NOTE — FLOWSHEET NOTE
A wire removed from V port at this time due to ABP increasing to 880V systolic after returning from walk. Dr. Elizabeth Menezes notified. Patient HR now 80 with some multifocal PVCs noted.  45 (67). Pacer remains in reach if needed.      Valencai Zheng RN 4:36 PM

## 2020-10-20 NOTE — PLAN OF CARE
Problem: Falls - Risk of:  Goal: Will remain free from falls  Description: Will remain free from falls  10/20/2020 0228 by Aron Curry  Outcome: Ongoing  10/19/2020 1817 by Coty Esparza RN  Outcome: Ongoing  10/19/2020 1628 by Angelica Mclaughlin RN  Outcome: Ongoing  Goal: Absence of physical injury  Description: Absence of physical injury  10/20/2020 0228 by Aron Curry  Outcome: Ongoing  10/19/2020 1817 by Coty Esparza RN  Outcome: Ongoing  10/19/2020 1628 by Angelica Mclaughlin RN  Outcome: Ongoing     Problem: Skin Integrity:  Goal: Will show no infection signs and symptoms  Description: Will show no infection signs and symptoms  10/20/2020 0228 by Aron Curry  Outcome: Ongoing  10/19/2020 1817 by Coty Esparza RN  Outcome: Ongoing  10/19/2020 1628 by Angelica Mclaughlin RN  Outcome: Ongoing  Goal: Absence of new skin breakdown  Description: Absence of new skin breakdown  10/20/2020 0228 by Aron Curry  Outcome: Ongoing  10/19/2020 1817 by Coty Esparza RN  Outcome: Ongoing  10/19/2020 1628 by Angelica Mclaughlin RN  Outcome: Ongoing     Problem: Discharge Planning:  Goal: Discharged to appropriate level of care  Description: Discharged to appropriate level of care  10/20/2020 0228 by Aron Curry  Outcome: Ongoing  10/19/2020 1817 by Coty Esparza RN  Outcome: Ongoing  10/19/2020 1628 by Angelica Mclaughlin RN  Outcome: Ongoing     Problem:  Activity Intolerance:  Goal: Able to perform prescribed physical activity  Description: Able to perform prescribed physical activity  10/20/2020 0228 by Aron Curry  Outcome: Ongoing  10/19/2020 1817 by Coty Esparza RN  Outcome: Ongoing  10/19/2020 1628 by Angelica Mclaughlin RN  Outcome: Ongoing  Goal: Ability to tolerate increased activity will improve  Description: Ability to tolerate increased activity will improve  10/20/2020 0228 by Aron Curry  Outcome: Ongoing  10/19/2020 1817 by Kamila Gonzalez adequate ventilation will improve  Description: Ability to maintain adequate ventilation will improve  10/20/2020 0228 by Heidy Richboroter  Outcome: Ongoing  10/19/2020 1817 by Ainsley Cross RN  Outcome: Ongoing  10/19/2020 1628 by Gia Killian RN  Outcome: Ongoing     Problem: Pain:  Goal: Pain level will decrease  Description: Pain level will decrease  10/20/2020 0228 by [facility]  Outcome: Ongoing  10/19/2020 1817 by Ainsley Cross RN  Outcome: Ongoing  10/19/2020 1628 by Gia Killian RN  Outcome: Ongoing  Goal: Control of acute pain  Description: Control of acute pain  10/20/2020 0228 by Heidy Richbororegla  Outcome: Ongoing  10/19/2020 1817 by Ainsley Cross RN  Outcome: Ongoing  10/19/2020 1628 by Gia Killian RN  Outcome: Ongoing  Goal: Control of chronic pain  Description: Control of chronic pain  10/20/2020 0228 by [facility]  Outcome: Ongoing  10/19/2020 1817 by Ainsley Cross RN  Outcome: Ongoing  10/19/2020 1628 by Gia Killian RN  Outcome: Ongoing     Problem: Tissue Perfusion - Cardiopulmonary, Altered:  Goal: Absence of angina  Description: Absence of angina  10/20/2020 0228 by Heidy [facility]  Outcome: Ongoing  10/19/2020 1817 by Ainsley Cross RN  Outcome: Ongoing  10/19/2020 1628 by Gia Killian RN  Outcome: Ongoing  Goal: Hemodynamic stability will improve  Description: Hemodynamic stability will improve  10/20/2020 0228 by [facility]  Outcome: Ongoing  10/19/2020 1817 by Ainsley Cross RN  Outcome: Ongoing  10/19/2020 1628 by Gia Killian RN  Outcome: Ongoing  Goal: Will show no evidence of cardiac arrhythmias  Description: Will show no evidence of cardiac arrhythmias  10/20/2020 0228 by Heidy [facility]  Outcome: Ongoing  10/19/2020 1817 by Ainsley Cross RN  Outcome: Ongoing  10/19/2020 1628 by Gia Killian RN  Outcome: Ongoing     Problem: Tobacco Use:  Goal: Will participate in inpatient tobacco-use cessation counseling  Description: Will participate in inpatient tobacco-use cessation counseling  10/20/2020 0228 by Betzy Nguyen  Outcome: Ongoing  10/19/2020 1817 by Dalila Montana RN  Outcome: Ongoing  10/19/2020 1628 by Juanita Mobley RN  Outcome: Ongoing     Problem: Pain:  Goal: Pain level will decrease  Description: Pain level will decrease  10/20/2020 0228 by Betzy Nguyen  Outcome: Ongoing  10/19/2020 1817 by Dalila Montana RN  Outcome: Ongoing  10/19/2020 1628 by Juanita Mobley RN  Outcome: Ongoing  Goal: Control of acute pain  Description: Control of acute pain  10/20/2020 0228 by Betzy Nguyen  Outcome: Ongoing  10/19/2020 1817 by Dalila Montana RN  Outcome: Ongoing  Goal: Control of chronic pain  Description: Control of chronic pain  10/20/2020 0228 by Betzy Nguyen  Outcome: Ongoing  10/19/2020 1817 by Dalila Montana RN  Outcome: Ongoing

## 2020-10-20 NOTE — PROGRESS NOTES
Pts. Rhythm irregular, 02 saturation dropping, increased pain. Labs checked, EKG completed at 0130. Notified Dr. Vinh Dawson. 2 grams calcium, 2 mg Morphine, 10mg labetalol, and 2 grams Magnesium given. Patient placed on atrial pacing per Dr. Vinh Dawson. Will continue to monitor.

## 2020-10-20 NOTE — PLAN OF CARE
Problem: Falls - Risk of:  Goal: Will remain free from falls  Description: Will remain free from falls  10/20/2020 0909 by Yelena Echevarria RN  Outcome: Ongoing  10/20/2020 0228 by Della Rivera  Outcome: Ongoing  Goal: Absence of physical injury  Description: Absence of physical injury  10/20/2020 0909 by Yelena Echevarria RN  Outcome: Ongoing  10/20/2020 0228 by Della Rivera  Outcome: Ongoing     Problem: Skin Integrity:  Goal: Will show no infection signs and symptoms  Description: Will show no infection signs and symptoms  10/20/2020 0909 by Yelena Echevarria RN  Outcome: Ongoing  10/20/2020 0228 by Della Rivera  Outcome: Ongoing  Goal: Absence of new skin breakdown  Description: Absence of new skin breakdown  10/20/2020 0909 by Yelena Echevarria RN  Outcome: Ongoing  10/20/2020 0228 by Della Rivera  Outcome: Ongoing     Problem: Discharge Planning:  Goal: Discharged to appropriate level of care  Description: Discharged to appropriate level of care  10/20/2020 0909 by Yelena Echevarria RN  Outcome: Ongoing  10/20/2020 0228 by Della Rivera  Outcome: Ongoing     Problem:  Activity Intolerance:  Goal: Able to perform prescribed physical activity  Description: Able to perform prescribed physical activity  10/20/2020 0909 by Yelena Echevarria RN  Outcome: Ongoing  10/20/2020 0228 by Della Rivera  Outcome: Ongoing  Goal: Ability to tolerate increased activity will improve  Description: Ability to tolerate increased activity will improve  10/20/2020 0909 by Yelena Echevarria RN  Outcome: Ongoing  10/20/2020 0228 by Della Rivera  Outcome: Ongoing     Problem: Anxiety:  Goal: Level of anxiety will decrease  Description: Level of anxiety will decrease  10/20/2020 0909 by Yelena Echevarria RN  Outcome: Ongoing  10/20/2020 0228 by Della Rivera  Outcome: Ongoing     Problem: Cardiac Output - Decreased:  Goal: Cardiac output within specified parameters  Description: Cardiac output within specified parameters  10/20/2020 0909 by Yelena Echevarria Cardiopulmonary, Altered:  Goal: Absence of angina  Description: Absence of angina  10/20/2020 0909 by Barbara Rdz RN  Outcome: Ongoing  10/20/2020 0228 by Yaw Osorio  Outcome: Ongoing  Goal: Hemodynamic stability will improve  Description: Hemodynamic stability will improve  10/20/2020 0909 by Barbara Rdz RN  Outcome: Ongoing  10/20/2020 0228 by Yaw Osorio  Outcome: Ongoing  Goal: Will show no evidence of cardiac arrhythmias  Description: Will show no evidence of cardiac arrhythmias  10/20/2020 0909 by Barbara Rdz RN  Outcome: Ongoing  10/20/2020 0228 by Yaw Osorio  Outcome: Ongoing     Problem: Tobacco Use:  Goal: Will participate in inpatient tobacco-use cessation counseling  Description: Will participate in inpatient tobacco-use cessation counseling  10/20/2020 0909 by Barbara Rdz RN  Outcome: Ongoing  10/20/2020 0228 by Yaw Osorio  Outcome: Ongoing     Problem: Pain:  Description: Pain management should include both nonpharmacologic and pharmacologic interventions.   Goal: Pain level will decrease  Description: Pain level will decrease  10/20/2020 0909 by Barbara Rdz RN  Outcome: Ongoing  10/20/2020 0228 by Yaw Osorio  Outcome: Ongoing  Goal: Control of acute pain  Description: Control of acute pain  10/20/2020 0909 by Barbara Rdz RN  Outcome: Ongoing  10/20/2020 0228 by Yaw Osorio  Outcome: Ongoing  Goal: Control of chronic pain  Description: Control of chronic pain  10/20/2020 0909 by Barbara Rdz RN  Outcome: Ongoing  10/20/2020 0228 by Yaw Osorio  Outcome: Ongoing

## 2020-10-20 NOTE — CARE COORDINATION
Attempted to meet with patient. He is unable to participate; on BiPap. Awaiting PT/OT recommendations. Dusty Wong RN    1200 Contacted emergency contact Constantine Hayden at [phone]. She is his employer. Patient was attempting to secure permanent housing PTA but did not want to stay alone w/[facility] following so he let apartment go and will remain on list for another apartment. Patient has made arrangements for his soon-to-be ex wife Abilio Nation and step daughter Waverly Canavan ([phone]) for 2 weeks stay in [facility]. They will provide transportation. Per Lavinia Cates- patient's car is in the lot (he drove himself against recommendations) His keys are with the patient; he has miranda in the safe. Lavinia Cates also let CM know that patient continued to smoke up until Robe 10/18 night. CM reinforced that patient may be discharged as soon as day 4; awaiting PT/OT recommendations. Lavinia Cates will reach out to Step daughter Waverly Canavan with update.  Dusty Wong RN

## 2020-10-20 NOTE — CONSULTS
Subjective:   CHIEF COMPLAINT / HPI:  71year old male who has smoked for over 50 years and has underlying copd underwent cabg yesterday. Postop he was able to be extubated but today requiring more and more o2 and is currently on 11 litres o2      Past Medical History:  Past Medical History:   Diagnosis Date    CAD (coronary artery disease)     Dr. Caro Yang COPD (chronic obstructive pulmonary disease) ([facility] [address]     No pulmonologist - follows with PCP    H/O cardiac catheterization 09/24/2020     Severe calcified multivessel CAD with LV dysfuntion, PCWP is 12, CABG consult.  H/O echocardiogram 06/23/2020     Mild concentric LVH with moderate systolic impairment. EF is 40-45 % .  Hyperlipidemia     Hypertension     Follows with PCP    IBS (irritable bowel syndrome)     Pancreatitis 2013    Rheumatoid arthritis ([facility] [address]     Thyroid disease        Past Surgical History:        Procedure Laterality Date    CARDIAC CATHETERIZATION  09/24/2020     Severe calcified multivessel CAD with LV dysfuntion, PCWP is 12, CABG consult.     COLONOSCOPY  2017    Polyps - [place], [place])    ENDOSCOPY, COLON, DIAGNOSTIC  2017    Normal exam per pt (done in [place], [place])       Current Medications:    Current Facility-Administered Medications: carvedilol (COREG) tablet 6.25 mg, 6.25 mg, Oral, BID  furosemide (LASIX) injection 40 mg, 40 mg, Intravenous, BID  methylPREDNISolone sodium (SOLU-MEDROL) injection 125 mg, 125 mg, Intravenous, Once  budesonide-formoterol (SYMBICORT) 160-4.5 MCG/ACT inhaler 2 puff, 2 puff, Inhalation, BID  DULoxetine (CYMBALTA) extended release capsule 60 mg, 60 mg, Oral, Daily  finasteride (PROSCAR) tablet 5 mg, 5 mg, Oral, Daily  levothyroxine (SYNTHROID) tablet 100 mcg, 100 mcg, Oral, Daily  tiotropium (SPIRIVA RESPIMAT) 2.5 MCG/ACT inhaler 2 puff, 2 puff, Inhalation, Daily  tamsulosin (FLOMAX) capsule 0.4 mg, 0.4 mg, Oral, Daily  0.45 % sodium chloride infusion, , Intravenous, Continuous  sodium chloride flush 0.9 % injection 10 mL, 10 mL, Intravenous, 2 times per day  sodium chloride flush 0.9 % injection 10 mL, 10 mL, Intravenous, PRN  potassium chloride 20 mEq/50 mL IVPB (Central Line), 20 mEq, Intravenous, PRN  magnesium sulfate 2 g in 50 mL IVPB premix, 2 g, Intravenous, PRN  calcium gluconate 2 g in dextrose 5 % 100 mL IVPB, 2 g, Intravenous, PRN  acetaminophen (TYLENOL) tablet 650 mg, 650 mg, Oral, Q4H PRN  HYDROcodone-acetaminophen (NORCO) 5-325 MG per tablet 1 tablet, 1 tablet, Oral, Q4H PRN **OR** HYDROcodone-acetaminophen (NORCO) 5-325 MG per tablet 2 tablet, 2 tablet, Oral, Q4H PRN  ketorolac (TORADOL) injection 15 mg, 15 mg, Intravenous, Q6H PRN  polyethylene glycol (GLYCOLAX) packet 17 g, 17 g, Oral, Daily  bisacodyl (DULCOLAX) suppository 10 mg, 10 mg, Rectal, Daily PRN  fleet rectal enema 1 enema, 1 enema, Rectal, Daily PRN  ondansetron (ZOFRAN) injection 4 mg, 4 mg, Intravenous, Q8H PRN  pantoprazole (PROTONIX) tablet 40 mg, 40 mg, Oral, Daily  amiodarone (CORDARONE) tablet 200 mg, 200 mg, Oral, TID  hydrALAZINE (APRESOLINE) injection 5 mg, 5 mg, Intravenous, Q5 Min PRN  metoprolol (LOPRESSOR) injection 2.5 mg, 2.5 mg, Intravenous, Q10 Min PRN  mupirocin (BACTROBAN) 2 % ointment, , Nasal, BID  therapeutic multivitamin-minerals 1 tablet, 1 tablet, Oral, Daily with breakfast  atorvastatin (LIPITOR) tablet 20 mg, 20 mg, Oral, Nightly  aspirin EC tablet 81 mg, 81 mg, Oral, Daily  ipratropium-albuterol (DUONEB) nebulizer solution 1 ampule, 1 ampule, Inhalation, Q4H WA  albumin human 5 % IV solution 25 g, 25 g, Intravenous, PRN  nitroGLYCERIN 50 mg in dextrose 5% 250 mL infusion, 10 mcg/min, Intravenous, Continuous PRN  insulin regular (HUMULIN R;NOVOLIN R) 100 Units in sodium chloride 0.9 % 100 mL infusion, 1 Units/hr, Intravenous, Continuous  glucose (GLUTOSE) 40 % oral gel 15 g, 15 g, Oral, PRN  dextrose 50 % IV solution, 12.5 g, Intravenous, PRN  glucagon (rDNA) injection 1 mg, 1 mg, Intramuscular, PRN  dextrose 5 % solution, 100 mL/hr, Intravenous, PRN  [START ON 10/27/2020] amiodarone (CORDARONE) tablet 200 mg, 200 mg, Oral, Daily  EPINEPHrine (EPINEPHrine HCL) 5 mg in dextrose 5 % 250 mL infusion, 1 mcg/min, Intravenous, Continuous PRN  norepinephrine (LEVOPHED) 16 mg in dextrose 5% 250 mL infusion, 2 mcg/min, Intravenous, Continuous PRN    No Known Allergies    Social History:    Social History     Socioeconomic History    Marital status:      Spouse name: None    Number of children: None    Years of education: None    Highest education level: None   Occupational History    None   Social Needs    Financial resource strain: Not hard at all   [employer] insecurity     Worry: Never true     Inability: Never true    Transportation needs     Medical: No     Non-medical: No   Tobacco Use    Smoking status: Current Every Day Smoker     Packs/day: 2.00     Years: 52.00     Pack years: 104.00     Types: Cigarettes     Start date: 1968    Smokeless tobacco: Never Used    Tobacco comment: Currently smokes 4-5 cigs a day (10/12/20)   Substance and Sexual Activity    Alcohol use: Yes     Comment: social    Drug use: Never    Sexual activity: None   Lifestyle    Physical activity     Days per week: None     Minutes per session: None    Stress: None   Relationships    Social connections     Talks on phone: None     Gets together: None     Attends L[employer] day service: None     Active member of club or organization: None     Attends meetings of clubs or organizations: None     Relationship status: None    Intimate partner violence     Fear of current or ex partner: None     Emotionally abused: None     Physically abused: None     Forced sexual activity: None   Other Topics Concern    None   Social History Narrative    None       Family History:   Family History   Problem Relation Age of Onset    Alzheimer's Disease Mother     Heart Disease Father     Heart Attack Father     High Blood Pressure Father     High Cholesterol Father        Immunization:    There is no immunization history on file for this patient. REVIEW OF SYSTEMS:    CONSTITUTIONAL:  negative for fevers, chills, diaphoresis, activity change, appetite change, fatigue, night sweats and unexpected weight change. EYES:  negative for blurred vision, eye discharge, visual disturbance and icterus  HEENT:  negative for hearing loss, tinnitus, ear drainage, sinus pressure, nasal congestion, epistaxis and snoring  RESPIRATORY:  See HPI  CARDIOVASCULAR:  negative for chest pain, palpitations, exertional chest pressure/discomfort, edema, syncope  GASTROINTESTINAL:  negative for nausea, vomiting, diarrhea, constipation, blood in stool and abdominal pain  GENITOURINARY:  negative for frequency, dysuria and hematuria  HEMATOLOGIC/LYMPHATIC:  negative for easy bruising, bleeding and lymphadenopathy  ALLERGIC/IMMUNOLOGIC:  negative for recurrent infections, angioedema, anaphylaxis and drug reactions  ENDOCRINE:  negative for weight changes and diabetic symptoms including polyuria, polydipsia and polyphagia    MUSCULOSKELETAL:  negative for  pain, joint swelling, decreased range of motion and muscle weakness  NEUROLOGICAL:  negative for headaches, slurred speech, unilateral weakness  PSYCHIATRIC/BEHAVIORAL: negative for hallucinations, behavioral problems, confusion and agitation.      Objective:   PHYSICAL EXAM:      VITALS:    Vitals:    10/20/20 1200 10/20/20 1203 10/20/20 1303 10/20/20 1403   BP: (!) 118/50 (!) 124/58 129/77 (!) 88/53   Pulse: 91 91 93 88   Resp:  (!) 33 24 24   Temp:  98.1 °F (36.7 °C)     TempSrc:  Core     SpO2:  93% 95% 92%   Weight:       Height:             CONSTITUTIONAL:  awake, alert, cooperative, no apparent distress, and appears stated age  NECK:  Supple, symmetrical, trachea midline, no adenopathy, thyroid symmetric, not enlarged and no tenderness  CHEST: Chest expansion equal and symmetrical, no intercostal

## 2020-10-20 NOTE — FLOWSHEET NOTE
Reyna Curling emergency contact updated via phone, security code verified     Valencia Zheng RN 8:52 AM

## 2020-10-20 NOTE — PROGRESS NOTES
PATIENT NAME: Daisy Olivares    TODAY'S DATE: 10/20/20    SUBJECTIVE:    Pt is POD # 1 s/p CABG x 3. Pt c/o incisional pain. Has not been out of bed yet. Mild SOB, on BiPAP. OBJECTIVE:   VITALS:    Vitals:    10/20/20 0903   BP: (!) 151/77   Pulse: 91   Resp: 26   Temp:    SpO2: (!) 84%     INTAKE/OUTPUT:    Date 10/20/20 0000 - 10/20/20 2359   Shift 6067-1080 0529-5540 9400-9211 24 Hour Total   INTAKE   I.V.(mL/kg/hr) 1121(1.5) 10  1131   IV Piggyback 249   249   Shift Total(mL/kg) 1370(14.9) 10(0.1)  1380(15)   OUTPUT   Urine(mL/kg/hr) 4070(5.5) 335  4405   Chest Tube 90 30  120   Shift Total(mL/kg) 4160(45.2) 365(4)  4525(49.2)   Weight (kg) 92 92 92 92      Patient Vitals for the past 96 hrs (Last 3 readings):   Weight   10/20/20 0503 202 lb 13.2 oz (92 kg)   10/19/20 0624 204 lb (92.5 kg)       EXAM:  Blood pressure (!) 151/77, pulse 91, temperature 98.2 °F (36.8 °C), temperature source Core, resp. rate 26, height 5' 9.5\" (1.765 m), weight 202 lb 13.2 oz (92 kg), SpO2 (!) 84 %. General appearance: No apparent distress, appears stated age and cooperative. Skin: unremarkable  HEENT Normocephalic, atraumatic without obvious deformity. Neck: Supple, Trachea midline   Lungs: Good respiratory effort. Diminished, bilaterally  Heart: Regular rate/ rhythm inc c/d/i  Abdomen: Soft, non-tender distended   Extremities: 1+ edema warm well perfused  Neurologic: Alert, grossly intact  Mental status: normal affect      Data:  CBC:   Recent Labs     10/19/20  1215  10/19/20  1657 10/19/20  1752 10/20/20  0428   WBC 24.2*  --   --   --  19.2*   HGB 12.7*   < > 11.6* 12.3* 11.7*   HCT 39.4*   < > 34.0* 36.0* 36.4*     --   --   --  146    < > = values in this interval not displayed.      BMP:    Recent Labs     10/19/20  1215  10/19/20  1657  10/19/20  1752 10/19/20  2307 10/20/20  0428      < > 140  --  140  --  138   K 3.5   < > 4.6*   < > 5.0* 5.0 4.7     --   --   --   --   --  104   CO2 22   < > 25 --  25  --  21   BUN 25*  --   --   --   --   --  29*   CREATININE 1.1   < > 1.1  --  1.1  --  1.1   GLUCOSE 150*  --   --   --   --   --  115*    < > = values in this interval not displayed. Hepatic: No results for input(s): AST, ALT, ALB, BILITOT, ALKPHOS in the last 72 hours. Mag:      Recent Labs     10/19/20  1215 10/20/20  0428   MG 3.3* 2.9*      Phos:   No results for input(s): PHOS in the last 72 hours. INR:   Recent Labs     10/19/20  1215   INR 1.27       Radiology Review:  CXR  Impression:      Bilateral pleural effusions with bibasilar opacities without significant   change. ASSESSMENT AND PLAN:    Patient Active Problem List   Diagnosis    Juvenile rheumatoid arthritis ([facility] [address]    Chronic bilateral low back pain without sciatica    Peripheral arterial disease (HCC)    Panlobular emphysema (HCC)    Essential hypertension    Acquired hypothyroidism    Gastroesophageal reflux disease    Non-seasonal allergic rhinitis    Benign prostatic hyperplasia without lower urinary tract symptoms    Tobacco use    Acute pulmonary edema (HCC)    Seasonal allergic rhinitis due to pollen    Mixed irritable bowel syndrome    Mixed hyperlipidemia    Prediabetes    Benign prostatic hyperplasia    Chronic combined systolic and diastolic congestive heart failure (HCC)    JOSE (acute kidney injury) ([address]    LV dysfunction    Dehydration    Vomiting    Coronary artery disease involving native coronary artery    CAD in native artery       S/P CABG x 3    Cardio: atrial paced 90, increase coreg, on PO amio. Pulm: on BiPAP FiO2 80%, wean O2 as tolerated. Check ABG later this am. Needs diuresed. Moderate bilateral pleural effusions. GI: abd distended, likely from BiPAP, will monitor closely. May need NGT if it worsens. Renal: creatinine stable 1.1, excellent UOP. Start lasix 40 BID. Tubes/Lines: DC swan. Keep art line until more stable from respiratory standpoint.  Pt to walk with CT, will hopefully have a dump.    Wound: stable    Violeta Mayte CEHEK

## 2020-10-20 NOTE — FLOWSHEET NOTE
Dr. Anaya Monteiro contacted personally for consult per Dr. Romelia Mccall, also notified via perfect serve per unit clerk Sue Schroeder RN 12:31 PM

## 2020-10-20 NOTE — PROGRESS NOTES
Patient up in chair eating lunch. POD 1 states he is having a moderate amount of pain and his nurse was bringing him a pain pill. I spoke to patient regarding his housing situation and he stated that he would be staying with his spouse that he is  from at this time for the first two weeks after discharge in [facility]. He is currently looking for housing in the [facility] but is not sure what he will find at this time. I explained to patient that he will not be able to drive for six weeks or until he has been released by his surgeon. I spoke with patient regarding Cardiac Rehab Phase 2. Patient reluctant at this time because he does not know where he will be residing. I explained to him the benefits of rehab and what it involves. Patient states he will keep this in mind. I informed him that he will receive a phone call from rehab in a few weeks. Patient verbalized understanding and has no further questions at this time. I will follow patient until discharge.

## 2020-10-21 ENCOUNTER — APPOINTMENT (OUTPATIENT)
Dept: GENERAL RADIOLOGY | Age: 65
DRG: 235 | End: 2020-10-21
Attending: SURGERY
Payer: MEDICARE

## 2020-10-21 LAB
ANION GAP SERPL CALCULATED.3IONS-SCNC: 10 MMOL/L (ref 4–16)
BASE EXCESS MIXED: 2.4 (ref 0–1.2)
BUN BLDV-MCNC: 32 MG/DL (ref 6–23)
CALCIUM IONIZED: 4.64 MG/DL (ref 4.48–5.28)
CALCIUM SERPL-MCNC: 9.2 MG/DL (ref 8.3–10.6)
CARBON MONOXIDE, BLOOD: 2.1 % (ref 0–5)
CHLORIDE BLD-SCNC: 100 MMOL/L (ref 99–110)
CO2 CONTENT: 27.6 MMOL/L (ref 19–24)
CO2: 27 MMOL/L (ref 21–32)
COMMENT: ABNORMAL
CREAT SERPL-MCNC: 0.9 MG/DL (ref 0.9–1.3)
GFR AFRICAN AMERICAN: >60 ML/MIN/1.73M2
GFR NON-AFRICAN AMERICAN: >60 ML/MIN/1.73M2
GLUCOSE BLD-MCNC: 100 MG/DL (ref 70–99)
GLUCOSE BLD-MCNC: 103 MG/DL (ref 70–99)
GLUCOSE BLD-MCNC: 105 MG/DL (ref 70–99)
GLUCOSE BLD-MCNC: 106 MG/DL (ref 70–99)
GLUCOSE BLD-MCNC: 109 MG/DL (ref 70–99)
GLUCOSE BLD-MCNC: 112 MG/DL (ref 70–99)
GLUCOSE BLD-MCNC: 124 MG/DL (ref 70–99)
GLUCOSE BLD-MCNC: 125 MG/DL (ref 70–99)
GLUCOSE BLD-MCNC: 126 MG/DL (ref 70–99)
GLUCOSE BLD-MCNC: 129 MG/DL (ref 70–99)
GLUCOSE BLD-MCNC: 129 MG/DL (ref 70–99)
GLUCOSE BLD-MCNC: 130 MG/DL (ref 70–99)
GLUCOSE BLD-MCNC: 147 MG/DL (ref 70–99)
GLUCOSE BLD-MCNC: 150 MG/DL (ref 70–99)
GLUCOSE BLD-MCNC: 159 MG/DL (ref 70–99)
GLUCOSE BLD-MCNC: 82 MG/DL (ref 70–99)
GLUCOSE BLD-MCNC: 90 MG/DL (ref 70–99)
GLUCOSE BLD-MCNC: 93 MG/DL (ref 70–99)
GLUCOSE BLD-MCNC: 93 MG/DL (ref 70–99)
GLUCOSE BLD-MCNC: 94 MG/DL (ref 70–99)
GLUCOSE BLD-MCNC: 99 MG/DL (ref 70–99)
HCO3 ARTERIAL: 26.4 MMOL/L (ref 18–23)
HCT VFR BLD CALC: 34.7 % (ref 42–52)
HEMOGLOBIN: 11.6 GM/DL (ref 13.5–18)
IONIZED CA: 1.16 MMOL/L (ref 1.12–1.32)
MAGNESIUM: 2.3 MG/DL (ref 1.8–2.4)
MCH RBC QN AUTO: 33.7 PG (ref 27–31)
MCHC RBC AUTO-ENTMCNC: 33.4 % (ref 32–36)
MCV RBC AUTO: 100.9 FL (ref 78–100)
METHEMOGLOBIN ARTERIAL: 1.2 %
O2 SATURATION: 90.3 % (ref 96–97)
PCO2 ARTERIAL: 38 MMHG (ref 32–45)
PDW BLD-RTO: 13.5 % (ref 11.7–14.9)
PH BLOOD: 7.45 (ref 7.34–7.45)
PLATELET # BLD: 138 K/CU MM (ref 140–440)
PMV BLD AUTO: 9.4 FL (ref 7.5–11.1)
PO2 ARTERIAL: 56 MMHG (ref 75–100)
POTASSIUM SERPL-SCNC: 4.3 MMOL/L (ref 3.5–5.1)
RBC # BLD: 3.44 M/CU MM (ref 4.6–6.2)
SODIUM BLD-SCNC: 137 MMOL/L (ref 135–145)
WBC # BLD: 22 K/CU MM (ref 4–10.5)

## 2020-10-21 PROCEDURE — 82803 BLOOD GASES ANY COMBINATION: CPT

## 2020-10-21 PROCEDURE — 2700000000 HC OXYGEN THERAPY PER DAY

## 2020-10-21 PROCEDURE — 94640 AIRWAY INHALATION TREATMENT: CPT

## 2020-10-21 PROCEDURE — 97110 THERAPEUTIC EXERCISES: CPT

## 2020-10-21 PROCEDURE — 97116 GAIT TRAINING THERAPY: CPT

## 2020-10-21 PROCEDURE — 94664 DEMO&/EVAL PT USE INHALER: CPT

## 2020-10-21 PROCEDURE — 2580000003 HC RX 258: Performed by: PHYSICIAN ASSISTANT

## 2020-10-21 PROCEDURE — 2000000000 HC ICU R&B

## 2020-10-21 PROCEDURE — 94150 VITAL CAPACITY TEST: CPT

## 2020-10-21 PROCEDURE — 82330 ASSAY OF CALCIUM: CPT

## 2020-10-21 PROCEDURE — 94668 MNPJ CHEST WALL SBSQ: CPT

## 2020-10-21 PROCEDURE — 94660 CPAP INITIATION&MGMT: CPT

## 2020-10-21 PROCEDURE — 6370000000 HC RX 637 (ALT 250 FOR IP): Performed by: PHYSICIAN ASSISTANT

## 2020-10-21 PROCEDURE — 93010 ELECTROCARDIOGRAM REPORT: CPT | Performed by: INTERNAL MEDICINE

## 2020-10-21 PROCEDURE — 82962 GLUCOSE BLOOD TEST: CPT

## 2020-10-21 PROCEDURE — 2580000003 HC RX 258: Performed by: SURGERY

## 2020-10-21 PROCEDURE — 80048 BASIC METABOLIC PNL TOTAL CA: CPT

## 2020-10-21 PROCEDURE — 97530 THERAPEUTIC ACTIVITIES: CPT

## 2020-10-21 PROCEDURE — 6370000000 HC RX 637 (ALT 250 FOR IP): Performed by: SURGERY

## 2020-10-21 PROCEDURE — 94761 N-INVAS EAR/PLS OXIMETRY MLT: CPT

## 2020-10-21 PROCEDURE — 85027 COMPLETE CBC AUTOMATED: CPT

## 2020-10-21 PROCEDURE — 71045 X-RAY EXAM CHEST 1 VIEW: CPT

## 2020-10-21 PROCEDURE — 6360000002 HC RX W HCPCS: Performed by: PHYSICIAN ASSISTANT

## 2020-10-21 PROCEDURE — 83735 ASSAY OF MAGNESIUM: CPT

## 2020-10-21 RX ORDER — GUAIFENESIN 600 MG/1
600 TABLET, EXTENDED RELEASE ORAL 2 TIMES DAILY
Status: DISCONTINUED | OUTPATIENT
Start: 2020-10-21 | End: 2020-10-27 | Stop reason: HOSPADM

## 2020-10-21 RX ORDER — CARVEDILOL 12.5 MG/1
12.5 TABLET ORAL 2 TIMES DAILY
Status: DISCONTINUED | OUTPATIENT
Start: 2020-10-21 | End: 2020-10-21

## 2020-10-21 RX ORDER — FUROSEMIDE 10 MG/ML
40 INJECTION INTRAMUSCULAR; INTRAVENOUS ONCE
Status: COMPLETED | OUTPATIENT
Start: 2020-10-21 | End: 2020-10-21

## 2020-10-21 RX ORDER — LISINOPRIL 5 MG/1
5 TABLET ORAL DAILY
Status: DISCONTINUED | OUTPATIENT
Start: 2020-10-21 | End: 2020-10-22

## 2020-10-21 RX ORDER — CARVEDILOL 6.25 MG/1
6.25 TABLET ORAL 2 TIMES DAILY
Status: DISCONTINUED | OUTPATIENT
Start: 2020-10-21 | End: 2020-10-22

## 2020-10-21 RX ORDER — FLUTICASONE PROPIONATE 50 MCG
SPRAY, SUSPENSION (ML) NASAL
Qty: 1 BOTTLE | Refills: 0 | Status: SHIPPED | OUTPATIENT
Start: 2020-10-21 | End: 2020-12-22

## 2020-10-21 RX ORDER — CARVEDILOL 3.12 MG/1
3.12 TABLET ORAL 2 TIMES DAILY
Status: DISCONTINUED | OUTPATIENT
Start: 2020-10-21 | End: 2020-10-27 | Stop reason: HOSPADM

## 2020-10-21 RX ADMIN — GUAIFENESIN 600 MG: 600 TABLET, EXTENDED RELEASE ORAL at 21:17

## 2020-10-21 RX ADMIN — Medication: at 21:17

## 2020-10-21 RX ADMIN — ASPIRIN 81 MG: 81 TABLET, COATED ORAL at 08:23

## 2020-10-21 RX ADMIN — CALCIUM GLUCONATE 2 G: 98 INJECTION, SOLUTION INTRAVENOUS at 08:31

## 2020-10-21 RX ADMIN — HYDROCODONE BITARTRATE AND ACETAMINOPHEN 2 TABLET: 5; 325 TABLET ORAL at 21:16

## 2020-10-21 RX ADMIN — PANTOPRAZOLE SODIUM 40 MG: 40 TABLET, DELAYED RELEASE ORAL at 08:23

## 2020-10-21 RX ADMIN — TAMSULOSIN HYDROCHLORIDE 0.4 MG: 0.4 CAPSULE ORAL at 09:52

## 2020-10-21 RX ADMIN — SODIUM CHLORIDE, PRESERVATIVE FREE 10 ML: 5 INJECTION INTRAVENOUS at 21:18

## 2020-10-21 RX ADMIN — HYDROCODONE BITARTRATE AND ACETAMINOPHEN 2 TABLET: 5; 325 TABLET ORAL at 04:13

## 2020-10-21 RX ADMIN — FUROSEMIDE 40 MG: 10 INJECTION, SOLUTION INTRAMUSCULAR; INTRAVENOUS at 17:43

## 2020-10-21 RX ADMIN — AMIODARONE HYDROCHLORIDE 200 MG: 200 TABLET ORAL at 21:17

## 2020-10-21 RX ADMIN — SODIUM CHLORIDE: 4.5 INJECTION, SOLUTION INTRAVENOUS at 14:01

## 2020-10-21 RX ADMIN — AMIODARONE HYDROCHLORIDE 200 MG: 200 TABLET ORAL at 13:56

## 2020-10-21 RX ADMIN — AMIODARONE HYDROCHLORIDE 200 MG: 200 TABLET ORAL at 08:23

## 2020-10-21 RX ADMIN — POLYETHYLENE GLYCOL (3350) 17 G: 17 POWDER, FOR SOLUTION ORAL at 09:53

## 2020-10-21 RX ADMIN — SODIUM CHLORIDE 2 UNITS/HR: 9 INJECTION, SOLUTION INTRAVENOUS at 14:01

## 2020-10-21 RX ADMIN — SODIUM CHLORIDE, PRESERVATIVE FREE 10 ML: 5 INJECTION INTRAVENOUS at 08:32

## 2020-10-21 RX ADMIN — FUROSEMIDE 40 MG: 10 INJECTION, SOLUTION INTRAMUSCULAR; INTRAVENOUS at 08:31

## 2020-10-21 RX ADMIN — BUDESONIDE AND FORMOTEROL FUMARATE DIHYDRATE 2 PUFF: 160; 4.5 AEROSOL RESPIRATORY (INHALATION) at 08:23

## 2020-10-21 RX ADMIN — LISINOPRIL 5 MG: 5 TABLET ORAL at 09:52

## 2020-10-21 RX ADMIN — FINASTERIDE 5 MG: 5 TABLET, FILM COATED ORAL at 08:23

## 2020-10-21 RX ADMIN — HYDROCODONE BITARTRATE AND ACETAMINOPHEN 2 TABLET: 5; 325 TABLET ORAL at 13:56

## 2020-10-21 RX ADMIN — GUAIFENESIN 600 MG: 600 TABLET, EXTENDED RELEASE ORAL at 09:52

## 2020-10-21 RX ADMIN — TIOTROPIUM BROMIDE INHALATION SPRAY 2 PUFF: 3.12 SPRAY, METERED RESPIRATORY (INHALATION) at 08:22

## 2020-10-21 RX ADMIN — IPRATROPIUM BROMIDE AND ALBUTEROL SULFATE 1 AMPULE: .5; 3 SOLUTION RESPIRATORY (INHALATION) at 16:15

## 2020-10-21 RX ADMIN — CARVEDILOL 9.38 MG: 6.25 TABLET, FILM COATED ORAL at 07:13

## 2020-10-21 RX ADMIN — DULOXETINE HYDROCHLORIDE 60 MG: 30 CAPSULE, DELAYED RELEASE ORAL at 08:23

## 2020-10-21 RX ADMIN — HYDROCODONE BITARTRATE AND ACETAMINOPHEN 2 TABLET: 5; 325 TABLET ORAL at 08:23

## 2020-10-21 RX ADMIN — FUROSEMIDE 40 MG: 10 INJECTION, SOLUTION INTRAMUSCULAR; INTRAVENOUS at 13:56

## 2020-10-21 RX ADMIN — BISACODYL 10 MG: 10 SUPPOSITORY RECTAL at 09:32

## 2020-10-21 RX ADMIN — LEVOTHYROXINE SODIUM 100 MCG: 100 TABLET ORAL at 08:23

## 2020-10-21 RX ADMIN — ATORVASTATIN CALCIUM 20 MG: 20 TABLET, FILM COATED ORAL at 21:29

## 2020-10-21 RX ADMIN — Medication: at 08:23

## 2020-10-21 RX ADMIN — MULTIPLE VITAMINS W/ MINERALS TAB 1 TABLET: TAB at 09:52

## 2020-10-21 ASSESSMENT — PAIN SCALES - GENERAL
PAINLEVEL_OUTOF10: 9
PAINLEVEL_OUTOF10: 9
PAINLEVEL_OUTOF10: 7
PAINLEVEL_OUTOF10: 9
PAINLEVEL_OUTOF10: 0
PAINLEVEL_OUTOF10: 7
PAINLEVEL_OUTOF10: 0
PAINLEVEL_OUTOF10: 7
PAINLEVEL_OUTOF10: 0
PAINLEVEL_OUTOF10: 0
PAINLEVEL_OUTOF10: 9
PAINLEVEL_OUTOF10: 9

## 2020-10-21 ASSESSMENT — PAIN DESCRIPTION - LOCATION
LOCATION: BACK;CHEST;STERNUM
LOCATION: CHEST;STERNUM
LOCATION: STERNUM
LOCATION: CHEST
LOCATION: CHEST

## 2020-10-21 ASSESSMENT — PAIN DESCRIPTION - PAIN TYPE
TYPE: ACUTE PAIN;SURGICAL PAIN
TYPE: ACUTE PAIN;SURGICAL PAIN
TYPE: SURGICAL PAIN
TYPE: SURGICAL PAIN
TYPE: SURGICAL PAIN;CHRONIC PAIN

## 2020-10-21 ASSESSMENT — PAIN DESCRIPTION - ONSET
ONSET: GRADUAL

## 2020-10-21 ASSESSMENT — PAIN DESCRIPTION - ORIENTATION
ORIENTATION: MID
ORIENTATION: MID
ORIENTATION: UPPER;MID;LEFT;RIGHT
ORIENTATION: MID
ORIENTATION: MID

## 2020-10-21 ASSESSMENT — PAIN DESCRIPTION - PROGRESSION
CLINICAL_PROGRESSION: GRADUALLY WORSENING
CLINICAL_PROGRESSION: GRADUALLY IMPROVING
CLINICAL_PROGRESSION: GRADUALLY WORSENING
CLINICAL_PROGRESSION: GRADUALLY WORSENING

## 2020-10-21 ASSESSMENT — PAIN DESCRIPTION - FREQUENCY
FREQUENCY: INTERMITTENT
FREQUENCY: CONTINUOUS
FREQUENCY: INTERMITTENT
FREQUENCY: CONTINUOUS
FREQUENCY: CONTINUOUS

## 2020-10-21 ASSESSMENT — PAIN - FUNCTIONAL ASSESSMENT
PAIN_FUNCTIONAL_ASSESSMENT: ACTIVITIES ARE NOT PREVENTED
PAIN_FUNCTIONAL_ASSESSMENT: PREVENTS OR INTERFERES SOME ACTIVE ACTIVITIES AND ADLS
PAIN_FUNCTIONAL_ASSESSMENT: ACTIVITIES ARE NOT PREVENTED
PAIN_FUNCTIONAL_ASSESSMENT: ACTIVITIES ARE NOT PREVENTED

## 2020-10-21 ASSESSMENT — PAIN DESCRIPTION - DESCRIPTORS
DESCRIPTORS: ACHING;CONSTANT;SORE
DESCRIPTORS: ACHING;SORE
DESCRIPTORS: CONSTANT
DESCRIPTORS: ACHING;SORE
DESCRIPTORS: ACHING;CONSTANT

## 2020-10-21 NOTE — PROGRESS NOTES
pulmonary      SUBJECTIVE: better today but still on high fio2     OBJECTIVE    VITALS:  /72   Pulse 74   Temp 98.3 °F (36.8 °C) (Oral)   Resp 21   Ht 5' 9.5\" (1.765 m)   Wt 200 lb 13.4 oz (91.1 kg)   SpO2 99%   BMI 29.23 kg/m²   HEAD AND FACE EXAM:  No throat injection, no active exudate,no thrush  NECK EXAM;No JVD, no masses, symmetrical  CHEST EXAM; Expansion equal and symmetrical, no masses  LUNG EXAM; Good breath sounds bilaterally. There are expiratory wheezes both lungs, there are crackles at both lung bases  CARDIOVASCULAR EXAM: Positive S1 and S2, no S3 or S4, no clicks ,no murmurs  RIGHT AND LEFT LOWER EXTRIMITY EXAM: No edema, no swelling, no inflamation  CNS EXAM: Alert and oriented X3          LABS   Lab Results   Component Value Date    WBC 22.0 (H) 10/21/2020    HGB 11.6 (L) 10/21/2020    HCT 34.7 (L) 10/21/2020    .9 (H) 10/21/2020     (L) 10/21/2020     Lab Results   Component Value Date    CREATININE 1.1 10/20/2020    BUN 29 (H) 10/20/2020     (L) 10/20/2020    K 4.9 (H) 10/20/2020     10/20/2020    CO2 21 10/20/2020     Lab Results   Component Value Date    INR 1.27 10/19/2020    PROTIME 15.4 (H) 10/19/2020          Lab Results   Component Value Date    PHOS 4.2 07/15/2020        Recent Labs     10/19/20  1752 10/20/20  0512 10/20/20  1000   PH 7.36 7.38 7.43   PO2ART 59.9* 48.9* 69*   KEQ4VET 41.9 39.9 38.0   O2SAT 89.4* 83.3* 93.7*         Wt Readings from Last 3 Encounters:   10/21/20 200 lb 13.4 oz (91.1 kg)   10/12/20 204 lb (92.5 kg)   10/05/20 207 lb (93.9 kg)               ASSESMENT  Ac resp failure  Ac copd  Bib atx  vonda pl effusion        PLAN  1. Cont bipap. vapotherm if available  2. Cont o2  3. metaneb rx  4.  Inc yossi    10/21/2020  Katherine Norman M.D.

## 2020-10-21 NOTE — PROGRESS NOTES
Physician Progress Note      Yimi Gould  [facility] #:                  [id number]  :                       1955  ADMIT DATE:       10/19/2020 5:45 AM  DISCH DATE:  RESPONDING  PROVIDER #:        Tracy Carson PA-C          QUERY TEXT:    Dear Attending:    Patient admitted after CABGx3, noted to have chronic combined heart failure   and noted on 10/21 to have O2 9L HF NC with worsening pulmonary edema on CXR. If possible, please document in progress notes and discharge summary if you   are evaluating and/or treating any of the following: The medical record reflects the following:  Risk Factors: CABGx3, chronic combined HF, COPD  Clinical Indicators: ABG: pCO2 38, pO2 56, HCO3 26.4, TCO2 27.6, BE 2.4, sat   90.3; SpO2 91-94% on 9L HF O2 put on Cpap 60% with SpO2 ; CXR: Mildly   increased interstitial changes which could be related to increased  pulmonary edema or pneumonitis. ?Cardiomegaly with central vascular   congestion; 10/21 Cardiothoracic PA PN: \"Pulm: on 9L HF NC, worsening pulm   edema on CXR. Needs aggressive diuresis. \"  Treatment: Lasix 40mg three times daily, CXR, ABG, I&O, daily weights, labs   and monitoring  If you have any questions, please call [phone]. Thank you. Nathan Nesbitt,   RN, BSN, CDS  Options provided:  -- Acute on Chronic Systolic and Diastolic CHF  -- Chronic Systolic and Diastolic CHF  -- Other - I will add my own diagnosis  -- Disagree - Not applicable / Not valid  -- Disagree - Clinically unable to determine / Unknown  -- Refer to Clinical Documentation Reviewer    PROVIDER RESPONSE TEXT:    This patient has chronic systolic and diastolic CHF.     Query created by: Tanner Ruelas on 10/21/2020 1:59 PM      Electronically signed by:  Tracy Carson PA-C 10/21/2020 2:24 PM

## 2020-10-21 NOTE — PROGRESS NOTES
Spoke to Gus from PFT. If patient d/c to ARU or nursing home, O2 eval will be done at the facility. If pt discharges from hospital, order can be placed 48 hours prior to discharge.      Electronically signed by Alison Stanton RN on 10/21/2020 at 2:10 PM

## 2020-10-21 NOTE — PROGRESS NOTES
Kenisha Nguyen called (emergency contact) and gave the security code. Updated on patients condition and answered questions.      Electronically signed by Juan Campbell RN on 10/21/2020 at 8:54 AM

## 2020-10-21 NOTE — PROGRESS NOTES
Jared MARCIAL at bedside assessing patient. Verbal orders received to draw ABGs, will assess chest tube output after patient ambulates and to remove dsouza.     Electronically signed by Juarez Martinez RN on 10/21/2020 at 8:56 AM

## 2020-10-21 NOTE — FLOWSHEET NOTE
Message sent to Dr. Eugenia Calderon. Regarding pt. nbp 140/80, abp 176/76, hr 96. Coreg 6.25 mg po given at 2054. Possible need for antihypertensive. Orders given for add'l 3.125 mg coreg po to be given.

## 2020-10-21 NOTE — PROGRESS NOTES
talked to on the phone. Notified of patients hypotension and HR in the 60s since about 1500. Dr. Salas Francis gave order parameters to give 3.125mg of Coreg if the SBP is under 100 and to give 6.25mg if SBP over 100. Discontinue Coreg 12.5mg.      Electronically signed by Mila Velasco RN on 10/21/2020 at 6:33 PM

## 2020-10-21 NOTE — PROGRESS NOTES
ART line removed. Patient tolerated. Pressure held for 5 minutes. Transparent dressing applied.      Electronically signed by Brigida Langford RN on 10/21/2020 at 10:30 AM

## 2020-10-21 NOTE — PROGRESS NOTES
Chest tubes removed per Dr Yesy Whiteside order. Vital signs monitored per CVICU policy. Dry sterile dressing applied with petroleum gauze to incision site. Patient tolerated well. CXR ordered. Patient repositioned for comfort and call light in reach.     Electronically signed by Elmer Vasquez RN on 10/21/2020 at 1:04 PM

## 2020-10-21 NOTE — PROGRESS NOTES
Dr. Waqar Carmichael rounding on patient. Updated that we will remove dsouza and chest tube once the patient ambulates and returns to bed. Also discussed patient's current oxygen demands. No new orders at this time.     Electronically signed by Ximena Moody RN on 10/21/2020 at 11:28 AM

## 2020-10-21 NOTE — PROGRESS NOTES
Sixtove sent to Dr. Daxa Alexander for consult.     Electronically signed by Karlee Caballero RN on 10/21/2020 at 1:07 PM

## 2020-10-21 NOTE — PROGRESS NOTES
PATIENT NAME: Milton Raymond    TODAY'S DATE: 10/21/20    SUBJECTIVE:    Pt is POD # 2 s/p CABG x 3. Pt feeling better today. SOB slowly improving but still high O2 req. He is working with therapy. He states his abdomen feels bloated but he has gallbladder issues. OBJECTIVE:   VITALS:    Vitals:    10/21/20 1002   BP: 103/62   Pulse: 75   Resp: 18   Temp:    SpO2: 91%     INTAKE/OUTPUT:    Date 10/21/20 0000 - 10/21/20 2359   Shift 3815-2174 3659-7452 6369-8307 24 Hour Total   INTAKE   P.O. 320 300  620   I. V.(mL/kg/hr) 123(0.2)   123   IV Piggyback  100  100   Shift Total(mL/kg) 443(4.9) 400(4.4)  843(9.3)   OUTPUT   Urine(mL/kg/hr) 860(1.2) 365  1225   Chest Tube 60 20  80   Shift Total(mL/kg) 920(10.1) 385(4.2)  1305(14.3)   Weight (kg) 91.1 91.1 91.1 91.1      Patient Vitals for the past 96 hrs (Last 3 readings):   Weight   10/21/20 0500 200 lb 13.4 oz (91.1 kg)   10/20/20 0503 202 lb 13.2 oz (92 kg)   10/19/20 0624 204 lb (92.5 kg)       EXAM:  Blood pressure 103/62, pulse 75, temperature 97.9 °F (36.6 °C), temperature source Oral, resp. rate 18, height 5' 9.5\" (1.765 m), weight 200 lb 13.4 oz (91.1 kg), SpO2 91 %. General appearance: No apparent distress, appears stated age and cooperative. Skin: unremarkable  HEENT Normocephalic, atraumatic without obvious deformity. Neck: Supple, Trachea midline   Lungs: Good respiratory effort. Diminished, bilaterally  Heart: Regular rate/ rhythm inc c/d/i  Abdomen: Soft, non-tender distended   Extremities: 1+ edema warm well perfused  Neurologic: Alert, grossly intact  Mental status: normal affect      Data:  CBC:   Recent Labs     10/19/20  1215  10/20/20  0428 10/20/20  0512 10/21/20  0510   WBC 24.2*  --  19.2*  --  22.0*   HGB 12.7*   < > 11.7* 12.5* 11.6*   HCT 39.4*   < > 36.4* 37.0* 34.7*     --  146  --  138*    < > = values in this interval not displayed.      BMP:    Recent Labs     10/19/20  1215  10/19/20  1752  10/20/20  0428 10/20/20  4249 10/21/20  0510      < > 140  --  138 136* 137   K 3.5   < > 5.0*   < > 4.7 4.9* 4.3     --   --   --  104  --  100   CO2 22   < > 25  --  21  --  27   BUN 25*  --   --   --  29*  --  32*   CREATININE 1.1   < > 1.1  --  1.1  --  0.9   GLUCOSE 150*  --   --   --  115*  --  94    < > = values in this interval not displayed. Hepatic: No results for input(s): AST, ALT, ALB, BILITOT, ALKPHOS in the last 72 hours. Mag:      Recent Labs     10/19/20  1215 10/20/20  0428 10/21/20  0510   MG 3.3* 2.9* 2.3      Phos:   No results for input(s): PHOS in the last 72 hours. INR:   Recent Labs     10/19/20  1215   INR 1.27       Radiology Review:  CXR  Impression:      Mildly increased interstitial changes which could be related to increased   pulmonary edema or pneumonitis.  Cardiomegaly with central vascular   congestion.  Otherwise similar appearing chest.          ASSESSMENT AND PLAN:    Patient Active Problem List   Diagnosis    Juvenile rheumatoid arthritis ([address]    Chronic bilateral low back pain without sciatica    Peripheral arterial disease ([address]    Panlobular emphysema ([address]    Essential hypertension    Acquired hypothyroidism    Gastroesophageal reflux disease    Non-seasonal allergic rhinitis    Benign prostatic hyperplasia without lower urinary tract symptoms    Tobacco use    Acute pulmonary edema (HCC)    Seasonal allergic rhinitis due to pollen    Mixed irritable bowel syndrome    Mixed hyperlipidemia    Prediabetes    Benign prostatic hyperplasia    Chronic combined systolic and diastolic congestive heart failure (HCC)    JOSE (acute kidney injury) ([address]    LV dysfunction    Dehydration    Vomiting    Coronary artery disease involving native coronary artery    CAD in native artery       S/P CABG x 3    Cardio: stable, HTN, increase coreg and add low dose lisinopril. Continue PO amio. Pulm: on 9L HF NC, worsening pulm edema on CXR. Needs aggressive diuresis.  Encourage IS, wean O2 as tolerated. ABG with hypoxemia, no hypercapnia. GI: abd distended, suppository today. Renal: creatinine stable 0.9, good UOP. Lasix 40 TID today. Tubes/Lines: DC art line, CT, dsouza.    Wound: stable    ARU precert pending    Lydia Guardado PA-C

## 2020-10-21 NOTE — CARE COORDINATION
Chart reviewed. PT/OT recommend ARUU. Called referral to [id number] RN. Ravi Santos RN    1400 Met with patient; he is awake and eating lunch. Discussed ARU referral and need for insurance approval. Discussed back up plan- possibly SNF in [facility]. He would like [facility] or [facility] if ARU precert fails. Encouraged patient to have car removed from the lot to avoid possible tow or vandalism.  Ravi Santos RN

## 2020-10-22 ENCOUNTER — APPOINTMENT (OUTPATIENT)
Dept: GENERAL RADIOLOGY | Age: 65
DRG: 235 | End: 2020-10-22
Attending: SURGERY
Payer: MEDICARE

## 2020-10-22 LAB
ANION GAP SERPL CALCULATED.3IONS-SCNC: 13 MMOL/L (ref 4–16)
BUN BLDV-MCNC: 45 MG/DL (ref 6–23)
CALCIUM IONIZED: 4.76 MG/DL (ref 4.48–5.28)
CALCIUM SERPL-MCNC: 8.8 MG/DL (ref 8.3–10.6)
CHLORIDE BLD-SCNC: 96 MMOL/L (ref 99–110)
CO2: 24 MMOL/L (ref 21–32)
CREAT SERPL-MCNC: 1.2 MG/DL (ref 0.9–1.3)
GFR AFRICAN AMERICAN: >60 ML/MIN/1.73M2
GFR NON-AFRICAN AMERICAN: >60 ML/MIN/1.73M2
GLUCOSE BLD-MCNC: 100 MG/DL (ref 70–99)
GLUCOSE BLD-MCNC: 108 MG/DL (ref 70–99)
GLUCOSE BLD-MCNC: 110 MG/DL (ref 70–99)
GLUCOSE BLD-MCNC: 143 MG/DL (ref 70–99)
GLUCOSE BLD-MCNC: 149 MG/DL (ref 70–99)
GLUCOSE BLD-MCNC: 193 MG/DL (ref 70–99)
GLUCOSE BLD-MCNC: 80 MG/DL (ref 70–99)
GLUCOSE BLD-MCNC: 93 MG/DL (ref 70–99)
HCT VFR BLD CALC: 32.5 % (ref 42–52)
HEMOGLOBIN: 10.4 GM/DL (ref 13.5–18)
IONIZED CA: 1.19 MMOL/L (ref 1.12–1.32)
MAGNESIUM: 2.2 MG/DL (ref 1.8–2.4)
MCH RBC QN AUTO: 33 PG (ref 27–31)
MCHC RBC AUTO-ENTMCNC: 32 % (ref 32–36)
MCV RBC AUTO: 103.2 FL (ref 78–100)
PDW BLD-RTO: 13.3 % (ref 11.7–14.9)
PLATELET # BLD: 120 K/CU MM (ref 140–440)
PMV BLD AUTO: 10.4 FL (ref 7.5–11.1)
POTASSIUM SERPL-SCNC: 4.1 MMOL/L (ref 3.5–5.1)
RBC # BLD: 3.15 M/CU MM (ref 4.6–6.2)
SODIUM BLD-SCNC: 133 MMOL/L (ref 135–145)
WBC # BLD: 15 K/CU MM (ref 4–10.5)

## 2020-10-22 PROCEDURE — 6370000000 HC RX 637 (ALT 250 FOR IP): Performed by: PHYSICIAN ASSISTANT

## 2020-10-22 PROCEDURE — 6360000002 HC RX W HCPCS: Performed by: SURGERY

## 2020-10-22 PROCEDURE — 71045 X-RAY EXAM CHEST 1 VIEW: CPT

## 2020-10-22 PROCEDURE — 51798 US URINE CAPACITY MEASURE: CPT

## 2020-10-22 PROCEDURE — 85027 COMPLETE CBC AUTOMATED: CPT

## 2020-10-22 PROCEDURE — 80048 BASIC METABOLIC PNL TOTAL CA: CPT

## 2020-10-22 PROCEDURE — 6360000002 HC RX W HCPCS: Performed by: PHYSICIAN ASSISTANT

## 2020-10-22 PROCEDURE — 6360000002 HC RX W HCPCS: Performed by: INTERNAL MEDICINE

## 2020-10-22 PROCEDURE — 6370000000 HC RX 637 (ALT 250 FOR IP): Performed by: INTERNAL MEDICINE

## 2020-10-22 PROCEDURE — 82330 ASSAY OF CALCIUM: CPT

## 2020-10-22 PROCEDURE — 82962 GLUCOSE BLOOD TEST: CPT

## 2020-10-22 PROCEDURE — 94640 AIRWAY INHALATION TREATMENT: CPT

## 2020-10-22 PROCEDURE — 94761 N-INVAS EAR/PLS OXIMETRY MLT: CPT

## 2020-10-22 PROCEDURE — 97110 THERAPEUTIC EXERCISES: CPT

## 2020-10-22 PROCEDURE — 83735 ASSAY OF MAGNESIUM: CPT

## 2020-10-22 PROCEDURE — 94660 CPAP INITIATION&MGMT: CPT

## 2020-10-22 PROCEDURE — 2580000003 HC RX 258: Performed by: SURGERY

## 2020-10-22 PROCEDURE — 6370000000 HC RX 637 (ALT 250 FOR IP): Performed by: SURGERY

## 2020-10-22 PROCEDURE — 97530 THERAPEUTIC ACTIVITIES: CPT

## 2020-10-22 PROCEDURE — 2000000000 HC ICU R&B

## 2020-10-22 PROCEDURE — 2580000003 HC RX 258: Performed by: PHYSICIAN ASSISTANT

## 2020-10-22 PROCEDURE — 2700000000 HC OXYGEN THERAPY PER DAY

## 2020-10-22 PROCEDURE — 51701 INSERT BLADDER CATHETER: CPT

## 2020-10-22 RX ORDER — MAGNESIUM SULFATE IN WATER 40 MG/ML
2 INJECTION, SOLUTION INTRAVENOUS ONCE
Status: COMPLETED | OUTPATIENT
Start: 2020-10-22 | End: 2020-10-22

## 2020-10-22 RX ORDER — ACETYLCYSTEINE 200 MG/ML
600 SOLUTION ORAL; RESPIRATORY (INHALATION) 2 TIMES DAILY
Status: COMPLETED | OUTPATIENT
Start: 2020-10-22 | End: 2020-10-23

## 2020-10-22 RX ORDER — ACETYLCYSTEINE 200 MG/ML
600 SOLUTION ORAL; RESPIRATORY (INHALATION) 2 TIMES DAILY
Status: DISCONTINUED | OUTPATIENT
Start: 2020-10-22 | End: 2020-10-22

## 2020-10-22 RX ORDER — BISACODYL 10 MG
10 SUPPOSITORY, RECTAL RECTAL ONCE
Status: COMPLETED | OUTPATIENT
Start: 2020-10-22 | End: 2020-10-22

## 2020-10-22 RX ORDER — METHYLPREDNISOLONE SODIUM SUCCINATE 40 MG/ML
40 INJECTION, POWDER, LYOPHILIZED, FOR SOLUTION INTRAMUSCULAR; INTRAVENOUS EVERY 12 HOURS
Status: DISCONTINUED | OUTPATIENT
Start: 2020-10-22 | End: 2020-10-25

## 2020-10-22 RX ADMIN — CALCIUM GLUCONATE 2 G: 98 INJECTION, SOLUTION INTRAVENOUS at 12:05

## 2020-10-22 RX ADMIN — Medication: at 20:52

## 2020-10-22 RX ADMIN — AMIODARONE HYDROCHLORIDE 200 MG: 200 TABLET ORAL at 20:52

## 2020-10-22 RX ADMIN — IPRATROPIUM BROMIDE AND ALBUTEROL SULFATE 1 AMPULE: .5; 3 SOLUTION RESPIRATORY (INHALATION) at 19:36

## 2020-10-22 RX ADMIN — FUROSEMIDE 40 MG: 10 INJECTION, SOLUTION INTRAMUSCULAR; INTRAVENOUS at 13:28

## 2020-10-22 RX ADMIN — FUROSEMIDE 40 MG: 10 INJECTION, SOLUTION INTRAMUSCULAR; INTRAVENOUS at 20:51

## 2020-10-22 RX ADMIN — AMIODARONE HYDROCHLORIDE 200 MG: 200 TABLET ORAL at 13:22

## 2020-10-22 RX ADMIN — IPRATROPIUM BROMIDE AND ALBUTEROL SULFATE 1 AMPULE: .5; 3 SOLUTION RESPIRATORY (INHALATION) at 15:51

## 2020-10-22 RX ADMIN — AMIODARONE HYDROCHLORIDE 200 MG: 200 TABLET ORAL at 09:55

## 2020-10-22 RX ADMIN — AMIODARONE HYDROCHLORIDE 150 MG: 50 INJECTION, SOLUTION INTRAVENOUS at 14:13

## 2020-10-22 RX ADMIN — FINASTERIDE 5 MG: 5 TABLET, FILM COATED ORAL at 09:54

## 2020-10-22 RX ADMIN — LEVOTHYROXINE SODIUM 100 MCG: 100 TABLET ORAL at 09:57

## 2020-10-22 RX ADMIN — TAMSULOSIN HYDROCHLORIDE 0.4 MG: 0.4 CAPSULE ORAL at 09:53

## 2020-10-22 RX ADMIN — GUAIFENESIN 600 MG: 600 TABLET, EXTENDED RELEASE ORAL at 20:53

## 2020-10-22 RX ADMIN — HYDROCODONE BITARTRATE AND ACETAMINOPHEN 2 TABLET: 5; 325 TABLET ORAL at 22:53

## 2020-10-22 RX ADMIN — ACETAMINOPHEN 650 MG: 325 TABLET ORAL at 20:51

## 2020-10-22 RX ADMIN — MAGNESIUM SULFATE IN WATER 2 G: 40 INJECTION, SOLUTION INTRAVENOUS at 10:53

## 2020-10-22 RX ADMIN — DULOXETINE HYDROCHLORIDE 60 MG: 30 CAPSULE, DELAYED RELEASE ORAL at 09:54

## 2020-10-22 RX ADMIN — METHYLPREDNISOLONE SODIUM SUCCINATE 40 MG: 40 INJECTION, POWDER, LYOPHILIZED, FOR SOLUTION INTRAMUSCULAR; INTRAVENOUS at 13:22

## 2020-10-22 RX ADMIN — HYDROCODONE BITARTRATE AND ACETAMINOPHEN 2 TABLET: 5; 325 TABLET ORAL at 10:01

## 2020-10-22 RX ADMIN — SODIUM CHLORIDE, PRESERVATIVE FREE 10 ML: 5 INJECTION INTRAVENOUS at 10:17

## 2020-10-22 RX ADMIN — CARVEDILOL 3.12 MG: 3.12 TABLET, FILM COATED ORAL at 13:20

## 2020-10-22 RX ADMIN — Medication: at 09:53

## 2020-10-22 RX ADMIN — GUAIFENESIN 600 MG: 600 TABLET, EXTENDED RELEASE ORAL at 09:53

## 2020-10-22 RX ADMIN — ACETYLCYSTEINE 600 MG: 200 SOLUTION ORAL; RESPIRATORY (INHALATION) at 16:00

## 2020-10-22 RX ADMIN — CARVEDILOL 3.12 MG: 3.12 TABLET, FILM COATED ORAL at 20:52

## 2020-10-22 RX ADMIN — ASPIRIN 81 MG: 81 TABLET, COATED ORAL at 09:55

## 2020-10-22 RX ADMIN — KETOROLAC TROMETHAMINE 15 MG: 30 INJECTION, SOLUTION INTRAMUSCULAR; INTRAVENOUS at 20:51

## 2020-10-22 RX ADMIN — SODIUM CHLORIDE, PRESERVATIVE FREE 10 ML: 5 INJECTION INTRAVENOUS at 20:53

## 2020-10-22 RX ADMIN — POLYETHYLENE GLYCOL (3350) 17 G: 17 POWDER, FOR SOLUTION ORAL at 10:02

## 2020-10-22 RX ADMIN — ATORVASTATIN CALCIUM 20 MG: 20 TABLET, FILM COATED ORAL at 20:52

## 2020-10-22 RX ADMIN — IPRATROPIUM BROMIDE AND ALBUTEROL SULFATE 1 AMPULE: .5; 3 SOLUTION RESPIRATORY (INHALATION) at 11:38

## 2020-10-22 RX ADMIN — METHYLPREDNISOLONE SODIUM SUCCINATE 40 MG: 40 INJECTION, POWDER, LYOPHILIZED, FOR SOLUTION INTRAMUSCULAR; INTRAVENOUS at 23:50

## 2020-10-22 RX ADMIN — PANTOPRAZOLE SODIUM 40 MG: 40 TABLET, DELAYED RELEASE ORAL at 09:55

## 2020-10-22 RX ADMIN — BISACODYL 10 MG: 10 SUPPOSITORY RECTAL at 18:15

## 2020-10-22 RX ADMIN — INSULIN LISPRO 1 UNITS: 100 INJECTION, SOLUTION INTRAVENOUS; SUBCUTANEOUS at 19:31

## 2020-10-22 RX ADMIN — HYDROCODONE BITARTRATE AND ACETAMINOPHEN 2 TABLET: 5; 325 TABLET ORAL at 18:15

## 2020-10-22 RX ADMIN — IPRATROPIUM BROMIDE AND ALBUTEROL SULFATE 1 AMPULE: .5; 3 SOLUTION RESPIRATORY (INHALATION) at 07:46

## 2020-10-22 RX ADMIN — AMIODARONE HYDROCHLORIDE 0.5 MG/MIN: 50 INJECTION, SOLUTION INTRAVENOUS at 22:31

## 2020-10-22 RX ADMIN — MULTIPLE VITAMINS W/ MINERALS TAB 1 TABLET: TAB at 09:55

## 2020-10-22 RX ADMIN — AMIODARONE HYDROCHLORIDE 1 MG/MIN: 50 INJECTION, SOLUTION INTRAVENOUS at 15:17

## 2020-10-22 RX ADMIN — BUDESONIDE AND FORMOTEROL FUMARATE DIHYDRATE 2 PUFF: 160; 4.5 AEROSOL RESPIRATORY (INHALATION) at 07:46

## 2020-10-22 RX ADMIN — HYDROCODONE BITARTRATE AND ACETAMINOPHEN 1 TABLET: 5; 325 TABLET ORAL at 03:37

## 2020-10-22 ASSESSMENT — PAIN DESCRIPTION - LOCATION
LOCATION: CHEST;STERNUM
LOCATION: CHEST;STERNUM
LOCATION: CHEST
LOCATION: CHEST;STERNUM
LOCATION: CHEST;STERNUM

## 2020-10-22 ASSESSMENT — PAIN - FUNCTIONAL ASSESSMENT
PAIN_FUNCTIONAL_ASSESSMENT: ACTIVITIES ARE NOT PREVENTED

## 2020-10-22 ASSESSMENT — PAIN DESCRIPTION - ONSET
ONSET: ON-GOING
ONSET: GRADUAL

## 2020-10-22 ASSESSMENT — PAIN DESCRIPTION - PROGRESSION
CLINICAL_PROGRESSION: GRADUALLY WORSENING

## 2020-10-22 ASSESSMENT — PAIN DESCRIPTION - ORIENTATION
ORIENTATION: MID

## 2020-10-22 ASSESSMENT — PAIN DESCRIPTION - PAIN TYPE
TYPE: SURGICAL PAIN
TYPE: ACUTE PAIN;SURGICAL PAIN
TYPE: SURGICAL PAIN
TYPE: ACUTE PAIN;SURGICAL PAIN
TYPE: SURGICAL PAIN

## 2020-10-22 ASSESSMENT — PAIN SCALES - GENERAL
PAINLEVEL_OUTOF10: 9
PAINLEVEL_OUTOF10: 7
PAINLEVEL_OUTOF10: 9
PAINLEVEL_OUTOF10: 6
PAINLEVEL_OUTOF10: 0
PAINLEVEL_OUTOF10: 5
PAINLEVEL_OUTOF10: 5

## 2020-10-22 ASSESSMENT — PAIN DESCRIPTION - FREQUENCY
FREQUENCY: CONTINUOUS
FREQUENCY: CONTINUOUS
FREQUENCY: INTERMITTENT
FREQUENCY: CONTINUOUS

## 2020-10-22 ASSESSMENT — PAIN DESCRIPTION - DESCRIPTORS
DESCRIPTORS: ACHING
DESCRIPTORS: ACHING;SORE
DESCRIPTORS: ACHING

## 2020-10-22 NOTE — FLOWSHEET NOTE
Dr. Louann osei. Reviewed BMP results for today, Verbal orders received to discontinue Lisinopril at this time. Coreg dose modified to 3.125 mg PO BID. Patient's underlying rhythm reviewed without pacer, verbal orders received to maintain A pacing at rate of 80. Aware of continued high O2 requirements, on 9L high flow cannula at this time, with loose congestion noted with persistent cough. RN to leave in pacing wires today.      Valencia Zheng RN 9:34 AM

## 2020-10-22 NOTE — PLAN OF CARE
Problem: Falls - Risk of:  Goal: Will remain free from falls  Description: Will remain free from falls  Outcome: Ongoing  Goal: Absence of physical injury  Description: Absence of physical injury  Outcome: Ongoing     Problem: Skin Integrity:  Goal: Will show no infection signs and symptoms  Description: Will show no infection signs and symptoms  Outcome: Ongoing  Goal: Absence of new skin breakdown  Description: Absence of new skin breakdown  Outcome: Ongoing     Problem: Discharge Planning:  Goal: Discharged to appropriate level of care  Description: Discharged to appropriate level of care  Outcome: Ongoing     Problem:  Activity Intolerance:  Goal: Able to perform prescribed physical activity  Description: Able to perform prescribed physical activity  Outcome: Ongoing  Goal: Ability to tolerate increased activity will improve  Description: Ability to tolerate increased activity will improve  Outcome: Ongoing     Problem: Anxiety:  Goal: Level of anxiety will decrease  Description: Level of anxiety will decrease  Outcome: Ongoing     Problem: Cardiac Output - Decreased:  Goal: Cardiac output within specified parameters  Description: Cardiac output within specified parameters  Outcome: Ongoing  Goal: Hemodynamic stability will improve  Description: Hemodynamic stability will improve  Outcome: Ongoing     Problem: Fluid Volume - Imbalance:  Goal: Ability to achieve a balanced intake and output will improve  Description: Ability to achieve a balanced intake and output will improve  Outcome: Ongoing  Goal: Chest tube drainage is within specified parameters  Description: Chest tube drainage is within specified parameters  Outcome: Ongoing     Problem: Gas Exchange - Impaired:  Goal: Levels of oxygenation will improve  Description: Levels of oxygenation will improve  Outcome: Ongoing  Goal: Ability to maintain adequate ventilation will improve  Description: Ability to maintain adequate ventilation will improve  Outcome: Ongoing     Problem: Pain:  Description: Pain management should include both nonpharmacologic and pharmacologic interventions. Goal: Pain level will decrease  Description: Pain level will decrease  Outcome: Ongoing  Goal: Control of acute pain  Description: Control of acute pain  Outcome: Ongoing  Goal: Control of chronic pain  Description: Control of chronic pain  Outcome: Ongoing     Problem: Tissue Perfusion - Cardiopulmonary, Altered:  Goal: Absence of angina  Description: Absence of angina  Outcome: Ongoing  Goal: Hemodynamic stability will improve  Description: Hemodynamic stability will improve  Outcome: Ongoing  Goal: Will show no evidence of cardiac arrhythmias  Description: Will show no evidence of cardiac arrhythmias  Outcome: Ongoing     Problem: Tobacco Use:  Goal: Will participate in inpatient tobacco-use cessation counseling  Description: Will participate in inpatient tobacco-use cessation counseling  Outcome: Ongoing     Problem: Pain:  Description: Pain management should include both nonpharmacologic and pharmacologic interventions.   Goal: Pain level will decrease  Description: Pain level will decrease  Outcome: Ongoing  Goal: Control of acute pain  Description: Control of acute pain  Outcome: Ongoing  Goal: Control of chronic pain  Description: Control of chronic pain  Outcome: Ongoing  Goal: Patient's pain/discomfort is manageable  Description: Patient's pain/discomfort is manageable  Outcome: Ongoing     Problem: Infection:  Goal: Will remain free from infection  Description: Will remain free from infection  Outcome: Ongoing     Problem: Safety:  Goal: Free from accidental physical injury  Description: Free from accidental physical injury  Outcome: Ongoing  Goal: Free from intentional harm  Description: Free from intentional harm  Outcome: Ongoing     Problem: Daily Care:  Goal: Daily care needs are met  Description: Daily care needs are met  Outcome: Ongoing     Problem: Skin Integrity:  Goal: Skin integrity will stabilize  Description: Skin integrity will stabilize  Outcome: Ongoing     Problem: Discharge Planning:  Goal: Patients continuum of care needs are met  Description: Patients continuum of care needs are met  Outcome: Ongoing

## 2020-10-22 NOTE — FLOWSHEET NOTE
Dr. Char Yates called, patient has converted into A fib rate 110's to 120's, BP borderline low, SBP running 03-76 systolic with A fib. Telephone orders received to hold Coreg at this time. Telephone orders received to give 2 GM Mag Sulfate IV x1 now. RN will also replace IV Calcium gluconate per protocol.      Sebastien Cisneros RN 10:05 AM

## 2020-10-22 NOTE — FLOWSHEET NOTE
Pt. placed on bipap. HOB lowered. Procedure explained. [place] area cleaned. Pt. Straight cathed. Sterile technique used. 300 ml in rolanda colored urine returned. Cath d/c'd. [place] area cleaned. Pt. Tolerated well.

## 2020-10-22 NOTE — PROGRESS NOTES
Physical Therapy    Physical Therapy Treatment Note  Name: Kristy Camargo MRN: [id number] :   1955   Date:  10/22/2020   Admission Date: 10/19/2020 Room:  [address] [place], [place] [address]   Restrictions/Precautions:        fall risk, sternal precautions  Communication with other providers:  Patricia CHIRINOS states pt is ok to see for therapy, pt has been on Bi-pap last night and on external pacer. Still 9L of O2  Subjective:  Patient states:  He  Feels awful  Pain:   Location, Type, Intensity (0/10 to 10/10):  8/10 chest with coughs  Objective:    Observation:  Pt was semi fowlers in bed, having coughs every 3-4 min,HR 89 at rest  Treatment, including education/measures:  Vital Signs  HR: 103, B/P 90/60, O2 91% on 9L of O2  Education:  Pt was instructed in Sternal Precautions, Purpose of Exercise Program, Joselin Scale and Signs and Symptoms of Exercise Intolerance per Cardiac Protocol  Pt was given Initial Cardiac Exercises in Supine: pt had continual coughs and rattles with movement  Ankle Pumps x 10  Hip Abduction x 10  Heel Slides x 10  Shoulder Rolls x 10  Head Turns x 10  Front Arm Raises x 10   Side Arm Raises x 10  Arm Crosses x 10  Pt's HR increased to 140 BPM and pt given rest breaks  Transfers  Supine to sit :min A of 1 with HOB elevated to 65 degrees  Scooting :mod a of 1  Sit to stand :min A of 1 with line management, pt's HR elevated to 140's  Stand to sit :min A and VC's for precations  Did not amb pt at this time, pt was set up for breakfast and discussed with nursing  Safety  Patient left safely in the chair, with call light/phone in reach.   Assessment / Impression:     Patient's tolerance of treatment:  Fair, continually non productive coughing, increased HR with activity   Adverse Reaction: none  Significant change in status and impact:  none  Barriers to improvement:  Pain, coughing,9L of O2, increased HR with activity  Plan for Next Session:    Will cont to progress ex's and gt per cardiac protocol and educate pt on sternal precautions, S & S of ex intolerance, purpose of ex's, progression of ex's and gt at home. Time in:  0845  Time out:  0938  Timed treatment minutes:  53  Total treatment time:  48  Previously filed items:  Social/Functional History  Lives With: Significant other(Plans on staying with ex-wife at discharge)  Type of Home: House  Home Layout: One level  Home Access: Stairs to enter with rails  Entrance Stairs - Number of Steps: 1  Entrance Stairs - Rails: Right  Bathroom Shower/Tub: Tub/Shower unit  Bathroom Toilet: Standard  Bathroom Accessibility: Accessible  ADL Assistance: Independent  Homemaking Assistance: Independent  Homemaking Responsibilities: Yes  Ambulation Assistance: Independent(uses no AD)  Transfer Assistance: Independent  Active : Yes  Occupation: Full time employment  Type of occupation:   Additional Comments: Per RN, 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 Pt reports he will be staying with ex-wife at discharge and will have initial 24/7 supervision. Above information is for ex-wife's home setup. Short term goals  Time Frame for Short term goals: 1 week  Short term goal 1: Pt will perform sit><supine Shanel  Short term goal 2: Pt will transfer SBA  Short term goal 3: Pt will ambulate 200ft with LRAD SBA  Short term goal 4: Pt will ascend/descend 1 step, single rail SBA  Short term goal 5: Pt will complete cardiac HEP x 10 reps indep   Electronically signed by:     Dannielle Rowe PTA  10/22/2020, 9:25 AM

## 2020-10-22 NOTE — PROGRESS NOTES
PATIENT NAME: Layla Contreras    TODAY'S DATE: 10/22/20    SUBJECTIVE:    Pt is POD # 3 s/p CABG x 3. Pt c/o SOB and \"not feeling good\". He has had increasing O2 req and has been in and out of atrial fibrillation all morning. He had to be straight cathed this am and has not voided since. C/o abdominal bloating. Had a very small BM yesterday with suppository. OBJECTIVE:   VITALS:    Vitals:    10/22/20 1320   BP: 114/63   Pulse: 76   Resp:    Temp:    SpO2:      INTAKE/OUTPUT:    Date 10/22/20 0000 - 10/22/20 2359   Shift 6890-3473 3730-2168 6631-4264 24 Hour Total   INTAKE   I.V.(mL/kg/hr) 123.2(0.2) 10  133.2   Shift Total(mL/kg) 123.2(1.3) 10(0.1)  133.2(1.4)   OUTPUT   Urine(mL/kg/hr) 300(0.4)   300   Shift Total(mL/kg) 300(3.2)   300(3.2)   Weight (kg) 93.1 93.1 93.1 93.1      Patient Vitals for the past 96 hrs (Last 3 readings):   Weight   10/22/20 0525 205 lb 4 oz (93.1 kg)   10/21/20 0500 200 lb 13.4 oz (91.1 kg)   10/20/20 0503 202 lb 13.2 oz (92 kg)       EXAM:  Blood pressure 114/63, pulse 76, temperature 98 °F (36.7 °C), temperature source Oral, resp. rate 11, height 5' 9.5\" (1.765 m), weight 205 lb 4 oz (93.1 kg), SpO2 (!) 89 %. General appearance: No apparent distress, appears stated age and cooperative. Skin: unremarkable  HEENT Normocephalic, atraumatic without obvious deformity. Neck: Supple, Trachea midline   Lungs: poor respiratory effort.  Diminished, bilaterally  Heart: Regular rate/ rhythm inc c/d/i  Abdomen: Soft, non-tender distended   Extremities: 1+ edema warm well perfused  Neurologic: Alert, grossly intact  Mental status: normal affect      Data:  CBC:   Recent Labs     10/20/20  0428 10/20/20  0512 10/21/20  0510 10/22/20  0530   WBC 19.2*  --  22.0* 15.0*   HGB 11.7* 12.5* 11.6* 10.4*   HCT 36.4* 37.0* 34.7* 32.5*     --  138* 120*     BMP:    Recent Labs     10/20/20  0428 10/20/20  0512 10/21/20  0510 10/22/20  0530    136* 137 133*   K 4.7 4.9* 4.3 4.1     -- 100 96*   CO2 21  --  27 24   BUN 29*  --  32* 45*   CREATININE 1.1  --  0.9 1.2   GLUCOSE 115*  --  94 93     Hepatic: No results for input(s): AST, ALT, ALB, BILITOT, ALKPHOS in the last 72 hours. Mag:      Recent Labs     10/20/20  0428 10/21/20  0510 10/22/20  0530   MG 2.9* 2.3 2.2      Phos:   No results for input(s): PHOS in the last 72 hours. INR:   No results for input(s): INR in the last 72 hours. Radiology Review:  CXR  Impression:      1. Unchanged mild pulmonary edema and small bilateral pleural effusions. 2. Stable right lower lobe atelectasis. ASSESSMENT AND PLAN:    Patient Active Problem List   Diagnosis    [facility] rheumatoid arthritis [facility] [address]    Chronic bilateral low back pain without sciatica    Peripheral arterial disease (HCC)    Panlobular emphysema (HCC)    Essential hypertension    Acquired hypothyroidism    Gastroesophageal reflux disease    Non-seasonal allergic rhinitis    Benign prostatic hyperplasia without lower urinary tract symptoms    Tobacco use    Acute pulmonary edema (HCC)    Seasonal allergic rhinitis due to pollen    Mixed irritable bowel syndrome    Mixed hyperlipidemia    Prediabetes    Benign prostatic hyperplasia    Chronic combined systolic and diastolic congestive heart failure (HCC)    JOSE (acute kidney injury) ([facility] [address]    LV dysfunction    Dehydration    Vomiting    Coronary artery disease involving native coronary artery    CAD in native artery       S/P CABG x 3    Cardio: atrial fibrillation, give 2g mag, bolus amio. DC lisinopril due to HOTN. Decrease BB. Pulm: on 10L HF NC, appreciate pulm input. Starting on BID solumedrol and mucomyst. Pulmonary toilet. GI: repeat suppository today  Renal: creatinine up to 1.2. suspect urinary retention. Check bladder scan. Reinsert dsouza. Continue proscar and flomax. Continue BID lasix.    Wound: stable    ARU precert pending    Sin Bocanegra PA-C

## 2020-10-22 NOTE — FLOWSHEET NOTE
Telephone update given to emergency contact Anisa Wyatt, security code verified. Notified patient is in pre-cert process for ARU and this may take a day or more. She is concerned patient's car is in parking lot and she is concerned it may be towed. RN will call security to inquire. Aware pt still requiring increase O2 and conversion into A fib this morning.      Denisse Albrecht RN 10:34 AM

## 2020-10-22 NOTE — CONSULTS
Endocrinology   Consult Note      Dear Doctor Ara/Elliott    Thank You for the Consult     Pt. Was Admitted for : Chest pain CAD underwent CABG 2 days ago    Reason for Consult: Control of blood glucose/postop day 2    History Obtained From:  Patient/ EMR       HISTORY OF PRESENT ILLNESS:                The patient is a 71 y.o. male with significant past medical history of CAD, lipidemia, hypertension, otitis, hypothyroidism was admitted to hospital with chest discomfort and found to be having CAD and underwent cardiac bypass surgery 2 days ago. Patient was mildly diabetic but not taking any medicines for control blood glucose. I was  consulted for better control of blood glucose. ROS:   Pt's ROS done in detail. Abnormal ROS are noted in Medical and Surgical History Section below: Other Medical History:        Diagnosis Date    CAD (coronary artery disease)     Dr. Yuridia Archibald COPD (chronic obstructive pulmonary disease) ([facility] [address]     No pulmonologist - follows with PCP    H/O cardiac catheterization 09/24/2020     Severe calcified multivessel CAD with LV dysfuntion, PCWP is 12, CABG consult.  H/O echocardiogram 06/23/2020     Mild concentric LVH with moderate systolic impairment. EF is 40-45 % .  Hyperlipidemia     Hypertension     Follows with PCP    IBS (irritable bowel syndrome)     Pancreatitis 2013    Rheumatoid arthritis ([facility] [address]     Thyroid disease      Surgical History:        Procedure Laterality Date    CARDIAC CATHETERIZATION  09/24/2020     Severe calcified multivessel CAD with LV dysfuntion, PCWP is 12, CABG consult.  COLONOSCOPY  2017    Polyps - [facility], [place])    ENDOSCOPY, COLON, DIAGNOSTIC  2017    Normal exam per pt (done in [place], [place])       Allergies:  Patient has no known allergies.     Family History:       Problem Relation Age of Onset    Alzheimer's Disease Mother     Heart Disease Father     Heart Attack Father     High Blood Pressure Father     High Cholesterol Father      REVIEW OF SYSTEMS:  Review of System Done as noted above     PHYSICAL EXAM:      Vitals:    BP 97/62   Pulse 78   Temp 97.8 °F (36.6 °C) (Oral)   Resp 12   Ht 5' 9.5\" (1.765 m)   Wt 200 lb 13.4 oz (91.1 kg)   SpO2 91%   BMI 29.23 kg/m²     CONSTITUTIONAL:  awake, alert, cooperative, appears stated age   EYES:  vision intact Fundoscopic Exam not performed   ENT:Normal  NECK:  Supple, No JVD. Thyroid Exam:Normal   LUNGS:  Has Vesicular Breath Sounds,   CARDIOVASCULAR:  Normal apical impulse, regular rate and rhythm, normal S1 and S2, no S3 or S4, and has no  murmur   ABDOMEN:  No scars, normal bowel sounds, soft, non-distended, non-tender, no masses palpated, no hepatolienomegaly  Musculoskeletal: Normal  Extremities: Normal, peripheral pulses normal, , has no edema   NEUROLOGIC:  Awake, alert, oriented to name, place and time. Cranial nerves II-XII are grossly intact. Motor is  intact. Sensory is intact. ,  and gait is normal.    DATA:    CBC:   Recent Labs     10/19/20  1215  10/20/20  0428 10/20/20  0512 10/21/20  0510   WBC 24.2*  --  19.2*  --  22.0*   HGB 12.7*   < > 11.7* 12.5* 11.6*     --  146  --  138*    < > = values in this interval not displayed. CMP:  Recent Labs     10/19/20  1215  10/19/20  1752  10/20/20  0428 10/20/20  0512 10/21/20  0510      < > 140  --  138 136* 137   K 3.5   < > 5.0*   < > 4.7 4.9* 4.3     --   --   --  104  --  100   CO2 22   < > 25  --  21  --  27   BUN 25*  --   --   --  29*  --  32*   CREATININE 1.1   < > 1.1  --  1.1  --  0.9   CALCIUM 9.8  --   --   --  9.2  --  9.2    < > = values in this interval not displayed.      Lipids:   Lab Results   Component Value Date    CHOL 155 06/23/2020    HDL 30 06/23/2020    TRIG 173 06/23/2020     Glucose:   Recent Labs     10/21/20  1851 10/21/20  2037 10/21/20  2123   POCGLU 129* 126* 109*     Hemoglobin A1C:   Lab Results   Component Value Date    LABA1C 6.3 10/12/2020     Free T4:   Lab Results   Component Value Date    T4FREE 1.28 06/23/2020     Free T3: No results found for: FT3  TSH High Sensitivity:   Lab Results   Component Value Date    TSHHS 1.340 06/23/2020       Xr Chest Portable    Result Date: 10/21/2020  EXAMINATION: ONE XRAY VIEW OF THE CHEST 10/21/2020 1:16 pm COMPARISON: 10/21/2020 HISTORY: ORDERING SYSTEM PROVIDED HISTORY: chest tube removal TECHNOLOGIST PROVIDED HISTORY: Reason for exam:->chest tube removal Reason for   . Interval removal of thoracostomy tubes. No discrete pneumothorax.             Scheduled Medicines   Medications:    guaiFENesin  600 mg Oral BID    lisinopril  5 mg Oral Daily    carvedilol  3.125 mg Oral BID    Or    carvedilol  6.25 mg Oral BID    furosemide  40 mg Intravenous BID    budesonide-formoterol  2 puff Inhalation BID    DULoxetine  60 mg Oral Daily    finasteride  5 mg Oral Daily    levothyroxine  100 mcg Oral Daily    tiotropium  2 puff Inhalation Daily    tamsulosin  0.4 mg Oral Daily    sodium chloride flush  10 mL Intravenous 2 times per day    polyethylene glycol  17 g Oral Daily    pantoprazole  40 mg Oral Daily    amiodarone  200 mg Oral TID    mupirocin   Nasal BID    therapeutic multivitamin-minerals  1 tablet Oral Daily with breakfast    atorvastatin  20 mg Oral Nightly    aspirin  81 mg Oral Daily    ipratropium-albuterol  1 ampule Inhalation Q4H WA    [START ON 10/27/2020] amiodarone  200 mg Oral Daily      Infusions:    sodium chloride 10 mL/hr at 10/21/20 1401    nitroGLYCERIN      insulin 1 Units/hr (10/21/20 2124)    dextrose      EPINEPHrine infusion      norepinephrine           IMPRESSION    Patient Active Problem List   Diagnosis    Juvenile rheumatoid arthritis (HCC)    Chronic bilateral low back pain without sciatica    Peripheral arterial disease (HCC)    Panlobular emphysema (HCC)    Essential hypertension    Acquired hypothyroidism    Gastroesophageal reflux disease    Non-seasonal allergic rhinitis    Benign prostatic hyperplasia without lower urinary tract symptoms    Tobacco use    Acute pulmonary edema (HCC)    Seasonal allergic rhinitis due to pollen    Mixed irritable bowel syndrome    Mixed hyperlipidemia    Prediabetes    Benign prostatic hyperplasia    Chronic combined systolic and diastolic congestive heart failure (HCC)    JOSE (acute kidney injury) (HCC)    LV dysfunction    Dehydration    Vomiting    Coronary artery disease involving native coronary artery    CAD in native artery         RECOMMENDATIONS:      1. Reviewed POC blood glucose . Labs and X ray results   2. Reviewed Home and Current Medicines   3. Started on post CABG surgery protocol including IV insulin infusion drip  4. Monitor Blood glucose frequently   5. Modify  the dose of Insulin  as needed        Will follow with you  Again thank you for sharing pt's care with me.      Truly yours,       Jorje Teague MD

## 2020-10-22 NOTE — FLOWSHEET NOTE
called and notified of make and color, description of patient's car and where it is located. They state his car will not be towed.      Brda Pittman RN 10:42 AM

## 2020-10-22 NOTE — FLOWSHEET NOTE
Called and spoke to Dr. Viviana Reynolds. Updated on issues with high bp last night, early coreg dose of 9.375 mg po this am followed by 120 mg lasix total for the day d/t evidence of pulmonary edema on pcxr this am. Sbp at 1915 running 60's - 70's. Hr 63. Pt. C/o some dizziness. Assisted x 2 back to bed and temporary pacer connected and pacing at 80 with complete capture. Bp retake 94/59(79). Hr 80 Orders given to obtain cvp reading. cvp 11. Orders given to hold beta blockers. Continue to keep a paced and continue to monitor patient.

## 2020-10-22 NOTE — FLOWSHEET NOTE
Hamzah MARCIAL notified of bladder scan volume 495 cc, verbal orders received to insert indwelling dsouza catheter now. Aware HR still converting in and out of Afib, rate 130-140. BP 96 82. Verbal orders received to start Amiodarone infusion now over 24 hours and do not discontinue oral Amiodarone. Aware Amiodarone bolus given without improvement. Nightshift RN to give oral Coreg 3.125 mg tonight as ordered.      Rickey Hall RN 2:54 PM

## 2020-10-23 ENCOUNTER — APPOINTMENT (OUTPATIENT)
Dept: ULTRASOUND IMAGING | Age: 65
DRG: 235 | End: 2020-10-23
Attending: SURGERY
Payer: MEDICARE

## 2020-10-23 ENCOUNTER — APPOINTMENT (OUTPATIENT)
Dept: GENERAL RADIOLOGY | Age: 65
DRG: 235 | End: 2020-10-23
Attending: SURGERY
Payer: MEDICARE

## 2020-10-23 LAB
ANION GAP SERPL CALCULATED.3IONS-SCNC: 11 MMOL/L (ref 4–16)
BUN BLDV-MCNC: 50 MG/DL (ref 6–23)
CALCIUM IONIZED: 4.36 MG/DL (ref 4.48–5.28)
CALCIUM SERPL-MCNC: 9 MG/DL (ref 8.3–10.6)
CHLORIDE BLD-SCNC: 95 MMOL/L (ref 99–110)
CO2: 25 MMOL/L (ref 21–32)
CREAT SERPL-MCNC: 1.1 MG/DL (ref 0.9–1.3)
GFR AFRICAN AMERICAN: >60 ML/MIN/1.73M2
GFR NON-AFRICAN AMERICAN: >60 ML/MIN/1.73M2
GLUCOSE BLD-MCNC: 143 MG/DL (ref 70–99)
GLUCOSE BLD-MCNC: 152 MG/DL (ref 70–99)
GLUCOSE BLD-MCNC: 170 MG/DL (ref 70–99)
GLUCOSE BLD-MCNC: 181 MG/DL (ref 70–99)
GLUCOSE BLD-MCNC: 184 MG/DL (ref 70–99)
IONIZED CA: 1.09 MMOL/L (ref 1.12–1.32)
MAGNESIUM: 2.4 MG/DL (ref 1.8–2.4)
POTASSIUM SERPL-SCNC: 4.7 MMOL/L (ref 3.5–5.1)
PROSTATE SPECIFIC ANTIGEN: 1.1 NG/ML (ref 0–4)
SODIUM BLD-SCNC: 131 MMOL/L (ref 135–145)

## 2020-10-23 PROCEDURE — 6370000000 HC RX 637 (ALT 250 FOR IP): Performed by: PHYSICIAN ASSISTANT

## 2020-10-23 PROCEDURE — 2580000003 HC RX 258: Performed by: PHYSICIAN ASSISTANT

## 2020-10-23 PROCEDURE — 71045 X-RAY EXAM CHEST 1 VIEW: CPT

## 2020-10-23 PROCEDURE — 94761 N-INVAS EAR/PLS OXIMETRY MLT: CPT

## 2020-10-23 PROCEDURE — 82962 GLUCOSE BLOOD TEST: CPT

## 2020-10-23 PROCEDURE — 6360000002 HC RX W HCPCS: Performed by: INTERNAL MEDICINE

## 2020-10-23 PROCEDURE — 97116 GAIT TRAINING THERAPY: CPT

## 2020-10-23 PROCEDURE — 94640 AIRWAY INHALATION TREATMENT: CPT

## 2020-10-23 PROCEDURE — 76604 US EXAM CHEST: CPT

## 2020-10-23 PROCEDURE — 6370000000 HC RX 637 (ALT 250 FOR IP): Performed by: SURGERY

## 2020-10-23 PROCEDURE — 97110 THERAPEUTIC EXERCISES: CPT

## 2020-10-23 PROCEDURE — 97530 THERAPEUTIC ACTIVITIES: CPT

## 2020-10-23 PROCEDURE — 6370000000 HC RX 637 (ALT 250 FOR IP): Performed by: INTERNAL MEDICINE

## 2020-10-23 PROCEDURE — 82330 ASSAY OF CALCIUM: CPT

## 2020-10-23 PROCEDURE — G0103 PSA SCREENING: HCPCS

## 2020-10-23 PROCEDURE — 83735 ASSAY OF MAGNESIUM: CPT

## 2020-10-23 PROCEDURE — 2700000000 HC OXYGEN THERAPY PER DAY

## 2020-10-23 PROCEDURE — 2000000000 HC ICU R&B

## 2020-10-23 PROCEDURE — 6360000002 HC RX W HCPCS: Performed by: PHYSICIAN ASSISTANT

## 2020-10-23 PROCEDURE — 80048 BASIC METABOLIC PNL TOTAL CA: CPT

## 2020-10-23 PROCEDURE — 94150 VITAL CAPACITY TEST: CPT

## 2020-10-23 RX ORDER — ALBUTEROL SULFATE 90 UG/1
2 AEROSOL, METERED RESPIRATORY (INHALATION)
Status: DISCONTINUED | OUTPATIENT
Start: 2020-10-23 | End: 2020-10-27 | Stop reason: HOSPADM

## 2020-10-23 RX ADMIN — HYDROCODONE BITARTRATE AND ACETAMINOPHEN 2 TABLET: 5; 325 TABLET ORAL at 03:03

## 2020-10-23 RX ADMIN — ACETYLCYSTEINE 600 MG: 200 SOLUTION ORAL; RESPIRATORY (INHALATION) at 07:30

## 2020-10-23 RX ADMIN — HYDROCODONE BITARTRATE AND ACETAMINOPHEN 2 TABLET: 5; 325 TABLET ORAL at 06:25

## 2020-10-23 RX ADMIN — IPRATROPIUM BROMIDE AND ALBUTEROL SULFATE 1 AMPULE: .5; 3 SOLUTION RESPIRATORY (INHALATION) at 11:18

## 2020-10-23 RX ADMIN — CARVEDILOL 3.12 MG: 3.12 TABLET, FILM COATED ORAL at 20:25

## 2020-10-23 RX ADMIN — Medication: at 08:42

## 2020-10-23 RX ADMIN — FUROSEMIDE 40 MG: 10 INJECTION, SOLUTION INTRAMUSCULAR; INTRAVENOUS at 08:41

## 2020-10-23 RX ADMIN — TAMSULOSIN HYDROCHLORIDE 0.4 MG: 0.4 CAPSULE ORAL at 08:42

## 2020-10-23 RX ADMIN — INSULIN LISPRO 1 UNITS: 100 INJECTION, SOLUTION INTRAVENOUS; SUBCUTANEOUS at 17:18

## 2020-10-23 RX ADMIN — AMIODARONE HYDROCHLORIDE 200 MG: 200 TABLET ORAL at 20:25

## 2020-10-23 RX ADMIN — DULOXETINE HYDROCHLORIDE 60 MG: 30 CAPSULE, DELAYED RELEASE ORAL at 08:42

## 2020-10-23 RX ADMIN — CARVEDILOL 3.12 MG: 3.12 TABLET, FILM COATED ORAL at 08:44

## 2020-10-23 RX ADMIN — MULTIPLE VITAMINS W/ MINERALS TAB 1 TABLET: TAB at 08:42

## 2020-10-23 RX ADMIN — INSULIN LISPRO 1 UNITS: 100 INJECTION, SOLUTION INTRAVENOUS; SUBCUTANEOUS at 12:05

## 2020-10-23 RX ADMIN — BUDESONIDE AND FORMOTEROL FUMARATE DIHYDRATE 2 PUFF: 160; 4.5 AEROSOL RESPIRATORY (INHALATION) at 07:34

## 2020-10-23 RX ADMIN — INSULIN LISPRO 1 UNITS: 100 INJECTION, SOLUTION INTRAVENOUS; SUBCUTANEOUS at 08:43

## 2020-10-23 RX ADMIN — TIOTROPIUM BROMIDE INHALATION SPRAY 2 PUFF: 3.12 SPRAY, METERED RESPIRATORY (INHALATION) at 07:34

## 2020-10-23 RX ADMIN — HYDROCODONE BITARTRATE AND ACETAMINOPHEN 2 TABLET: 5; 325 TABLET ORAL at 20:24

## 2020-10-23 RX ADMIN — CALCIUM GLUCONATE 2 G: 98 INJECTION, SOLUTION INTRAVENOUS at 08:41

## 2020-10-23 RX ADMIN — AMIODARONE HYDROCHLORIDE 200 MG: 200 TABLET ORAL at 15:10

## 2020-10-23 RX ADMIN — AMIODARONE HYDROCHLORIDE 200 MG: 200 TABLET ORAL at 08:42

## 2020-10-23 RX ADMIN — ASPIRIN 81 MG: 81 TABLET, COATED ORAL at 08:41

## 2020-10-23 RX ADMIN — BUDESONIDE AND FORMOTEROL FUMARATE DIHYDRATE 2 PUFF: 160; 4.5 AEROSOL RESPIRATORY (INHALATION) at 21:39

## 2020-10-23 RX ADMIN — ATORVASTATIN CALCIUM 20 MG: 20 TABLET, FILM COATED ORAL at 20:25

## 2020-10-23 RX ADMIN — HYDROCODONE BITARTRATE AND ACETAMINOPHEN 2 TABLET: 5; 325 TABLET ORAL at 10:33

## 2020-10-23 RX ADMIN — METHYLPREDNISOLONE SODIUM SUCCINATE 40 MG: 40 INJECTION, POWDER, LYOPHILIZED, FOR SOLUTION INTRAMUSCULAR; INTRAVENOUS at 12:05

## 2020-10-23 RX ADMIN — ALBUTEROL SULFATE 2 PUFF: 90 AEROSOL, METERED RESPIRATORY (INHALATION) at 21:38

## 2020-10-23 RX ADMIN — GUAIFENESIN 600 MG: 600 TABLET, EXTENDED RELEASE ORAL at 08:42

## 2020-10-23 RX ADMIN — LEVOTHYROXINE SODIUM 100 MCG: 100 TABLET ORAL at 08:42

## 2020-10-23 RX ADMIN — SODIUM CHLORIDE, PRESERVATIVE FREE 10 ML: 5 INJECTION INTRAVENOUS at 20:26

## 2020-10-23 RX ADMIN — Medication: at 20:25

## 2020-10-23 RX ADMIN — PANTOPRAZOLE SODIUM 40 MG: 40 TABLET, DELAYED RELEASE ORAL at 08:41

## 2020-10-23 RX ADMIN — GUAIFENESIN 600 MG: 600 TABLET, EXTENDED RELEASE ORAL at 20:25

## 2020-10-23 RX ADMIN — FUROSEMIDE 40 MG: 10 INJECTION, SOLUTION INTRAMUSCULAR; INTRAVENOUS at 17:18

## 2020-10-23 RX ADMIN — FINASTERIDE 5 MG: 5 TABLET, FILM COATED ORAL at 08:42

## 2020-10-23 RX ADMIN — POLYETHYLENE GLYCOL (3350) 17 G: 17 POWDER, FOR SOLUTION ORAL at 08:41

## 2020-10-23 RX ADMIN — IPRATROPIUM BROMIDE AND ALBUTEROL SULFATE 1 AMPULE: .5; 3 SOLUTION RESPIRATORY (INHALATION) at 07:29

## 2020-10-23 ASSESSMENT — PAIN SCALES - GENERAL
PAINLEVEL_OUTOF10: 5
PAINLEVEL_OUTOF10: 7
PAINLEVEL_OUTOF10: 8
PAINLEVEL_OUTOF10: 7
PAINLEVEL_OUTOF10: 4
PAINLEVEL_OUTOF10: 9
PAINLEVEL_OUTOF10: 8
PAINLEVEL_OUTOF10: 9

## 2020-10-23 ASSESSMENT — PAIN DESCRIPTION - PAIN TYPE
TYPE: SURGICAL PAIN
TYPE: ACUTE PAIN;SURGICAL PAIN
TYPE: CHRONIC PAIN;SURGICAL PAIN
TYPE: ACUTE PAIN;SURGICAL PAIN

## 2020-10-23 ASSESSMENT — PAIN DESCRIPTION - FREQUENCY
FREQUENCY: INTERMITTENT
FREQUENCY: CONTINUOUS

## 2020-10-23 ASSESSMENT — PAIN DESCRIPTION - DESCRIPTORS
DESCRIPTORS: ACHING;CONSTANT
DESCRIPTORS: ACHING;CONSTANT
DESCRIPTORS: ACHING;DISCOMFORT
DESCRIPTORS: ACHING

## 2020-10-23 ASSESSMENT — PAIN DESCRIPTION - LOCATION
LOCATION: BACK;CHEST
LOCATION: CHEST;BACK
LOCATION: INCISION;STERNUM
LOCATION: CHEST

## 2020-10-23 ASSESSMENT — PAIN - FUNCTIONAL ASSESSMENT
PAIN_FUNCTIONAL_ASSESSMENT: PREVENTS OR INTERFERES WITH MANY ACTIVE NOT PASSIVE ACTIVITIES
PAIN_FUNCTIONAL_ASSESSMENT: ACTIVITIES ARE NOT PREVENTED

## 2020-10-23 ASSESSMENT — PAIN DESCRIPTION - ORIENTATION
ORIENTATION: MID
ORIENTATION: MID
ORIENTATION: MID;UPPER;LOWER

## 2020-10-23 ASSESSMENT — PAIN DESCRIPTION - ONSET
ONSET: ON-GOING

## 2020-10-23 ASSESSMENT — PAIN DESCRIPTION - PROGRESSION
CLINICAL_PROGRESSION: NOT CHANGED
CLINICAL_PROGRESSION: NOT CHANGED
CLINICAL_PROGRESSION: GRADUALLY WORSENING
CLINICAL_PROGRESSION: GRADUALLY WORSENING
CLINICAL_PROGRESSION: NOT CHANGED

## 2020-10-23 NOTE — PROGRESS NOTES
Dr. Renny Dumont at bedside rounding on patient. Continue current treatment plan. No new orders at this time.      Electronically signed by Russ Traore RN on 10/23/2020 at 7:37 AM

## 2020-10-23 NOTE — PROGRESS NOTES
Verbal orders received from Sophia MARCIAL to consult urology. Asked patient if he currently sees anyone for his BPH and he stated his primary care provider takes care of the condition. Pt also talked to this nurse about how he was taking Chantix for smoking cessation. Voiced this concern to Sophia and she stated this should be restarted at discharge.      Electronically signed by Katih Fredercik RN on 10/23/2020 at 10:13 AM

## 2020-10-23 NOTE — Clinical Note
Order received to DC pacer wire. Pt informed and removal process explained. Atrial and ventricular wires removed without difficulty. Pt tolerated well. Dry ABD dressing applied. Tele strip recorded and placed in chart. Will continue to monitor.

## 2020-10-23 NOTE — PROGRESS NOTES
Physical Therapy    Physical Therapy Treatment Note  Name: Dilip Brooks MRN: [id number] :   1955   Date:  10/23/2020   Admission Date: 10/19/2020 Room:  [address] [place], [place] [address]   Restrictions/Precautions:        fall risk, sternal precautions  Communication with other providers:  Per nurse ok to tx  Subjective:  Patient states:  Pt motivated and agreeable to tx  Pain:   Location, Type, Intensity (0/10 to 10/10):  Rates pain 9 but not due for pain meds at this time. Pt did not appear to be in distress at end of tx and was able to start eating breakfast.  Objective:    Observation:  Alert and oriented on 10 liters of wall O2 but nurse present and assisted with gt and placed pt on 8 liters for gt. Treatment, including education/measures:  Sit<=>stand from chair cga of 2  amb with cardiac walker 120' cga   Education:  Pt was instructed in Sternal Precautions, Purpose of Exercise Program, Joselin Scale and Signs and Symptoms of Exercise Intolerance per Cardiac Protocol  Pt was instructed in Intermediate Cardiac ex program:sitting pt needs rest breaks between ex and cues for purse lip breathing. Cervical side bending and rotation 10 reps and pt reports this felt good. Overhead Side Stretch x 10  Ankle Pumps/ Heel Raises x 10  Marching Seated x 10  Forward Arm Raises x 10  Side Arm Raises x 10  Arm Crosses x 10  Arm Circles Forwards and Backwards x 10  SittingTrunkTwist x 10  Safety  Patient left safely in the chair, with call light/phone in reach with alarm applied. Gait belt was used for transfers and gait. Assessment / Impression:       Patient's tolerance of treatment:  good   Adverse Reaction: na  Significant change in status and impact:  na  Barriers to improvement:  Strength and safety   Plan for Next Session:    Will cont to progress ex's and gt per cardiac protocol and educate pt on sternal precautions, S & S of ex intolerance, purpose of ex's, progression of ex's and gt at home.    Time in:  0840  Time out: 0935  Timed treatment minutes:  55  Total treatment time:  54    Previously filed items:  Social/Functional History  Lives With: Significant other(Plans on staying with ex-wife at discharge)  Type of Home: House  Home Layout: One level  Home Access: Stairs to enter with rails  Entrance Stairs - Number of Steps: 1  Entrance Stairs - Rails: Right  Bathroom Shower/Tub: Tub/Shower unit  Bathroom Toilet: Standard  Bathroom Accessibility: Accessible  ADL Assistance: Independent  Homemaking Assistance: Independent  Homemaking Responsibilities: Yes  Ambulation Assistance: Independent(uses no AD)  Transfer Assistance: Independent  Active : Yes  Occupation: Full time employment  Type of occupation:   Additional Comments: Per RN, 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 Pt reports he will be staying with ex-wife at discharge and will have initial 24/7 supervision. Above information is for ex-wife's home setup.   Short term goals  Time Frame for Short term goals: 1 week  Short term goal 1: Pt will perform sit><supine Shanel  Short term goal 2: Pt will transfer SBA  Short term goal 3: Pt will ambulate 200ft with LRAD SBA  Short term goal 4: Pt will ascend/descend 1 step, single rail SBA  Short term goal 5: Pt will complete cardiac HEP x 10 reps indep       Electronically signed by:    Hansa Pradhan PTA  10/23/2020, 8:35 AM

## 2020-10-23 NOTE — PROGRESS NOTES
Progress Note( Dr. Pramod Cesar)  10/22/2020  Subjective:   Admit Date: 10/19/2020  PCP: Giacomo Clay MD    Admitted For : Chest pain CAD underwent CABG    Consulted For: Better control of blood glucose filed diabetes mellitus    Interval History:Postop day 3  Better  Denies any chest pains,   Denies SOB . Denies nausea or vomiting. Doing well //more  ambulatory  No new bowel or bladder symptoms. Intake/Output Summary (Last 24 hours) at 10/22/2020 2345  Last data filed at 10/22/2020 2016  Gross per 24 hour   Intake 1184.18 ml   Output 1275 ml   Net -90.82 ml       DATA    CBC:   Recent Labs     10/20/20  0428 10/20/20  0512 10/21/20  0510 10/22/20  0530   WBC 19.2*  --  22.0* 15.0*   HGB 11.7* 12.5* 11.6* 10.4*     --  138* 120*    CMP:  Recent Labs     10/20/20  0428 10/20/20  0512 10/21/20  0510 10/22/20  0530    136* 137 133*   K 4.7 4.9* 4.3 4.1     --  100 96*   CO2 21  --  27 24   BUN 29*  --  32* 45*   CREATININE 1.1  --  0.9 1.2   CALCIUM 9.2  --  9.2 8.8     Lipids:   Lab Results   Component Value Date    CHOL 155 06/23/2020    HDL 30 06/23/2020    TRIG 173 06/23/2020     Glucose:  Recent Labs     10/22/20  1312 10/22/20  1738 10/22/20  2043   POCGLU 149* 143* 193*     [id number]:  Lab Results   Component Value Date    LABA1C 6.3 10/12/2020     High Sensitivity TSH:   Lab Results   Component Value Date    TSHHS 1.340 06/23/2020     Free T3: No results found for: FT3  Free T4:  Lab Results   Component Value Date    T4FREE 1.28 06/23/2020       Xr Chest Portable    Result Date: 10/22/2020  EXAMINATION: ONE XRAY VIEW OF THE CHEST 10/22/2020 5:10 am COMPARISON: 10/21/2020 HISTORY: ORDERING SYSTEM PROVIDED HISTORY: Post op open heart surgery TECHNOLOGIST PROVIDED HISTORY: Reason for Exam:->Post op open heart surgery Reason for Exam: post op open heart surgery Acuity: Unknown Type of Exam: Ongoing FINDINGS: Central venous catheter tip overlies the SVC. Sternotomy wires.   Small bilateral pleural effusion. Right lower lobe opacity. Stable cardiomegaly. Mild interstitial edema. No pneumothorax. 1. Unchanged mild pulmonary edema and small bilateral pleural effusions. 2. Stable right lower lobe atelectasis. Xr Chest Portable    Result Date: 10/21/2020  EXAMINATION: ONE XRAY VIEW OF THE CHEST 10/21/2020 1:16 pm COMPARISON: 10/21/2020 HISTORY: ORDERING SYSTEM PROVIDED HISTORY: chest tube removal   ersistent small right pleural effusion with bibasilar atelectasis. No discrete pneumothorax. The osseous structures otherwise stable. Interval removal of thoracostomy tubes. No discrete pneumothorax. Xr Chest Portable    Result Date: 10/21/2020  EXAMINATION: ONE XRAY VIEW OF THE CHEST 10/21/2020 6:06 am COMPARISON: 10/20/2020 HISTORY: ORDERING SYSTEM PROVIDED HISTORY: Post op open heart surgery  . Mildly increased interstitial changes which could be related to increased pulmonary edema or pneumonitis. Cardiomegaly with central vascular congestion. Otherwise similar appearing chest.     Xr Chest Portable    Result Date: 10/21/2020  EXAMINATION: ONE XRAY VIEW OF THE CHEST 10/20/2020 5:18 am COMPARISON: Chest radiograph dated October 19, 2020. HISTORY: ORDERING SYSTEM PROVIDED HISTORY: Post op open heart surgery TECHNOLOGIST PROVIDED HISTORY: Reason for Exam:->Post op open heart surgery Reason for Exam: Post op open heart surgery Acuity: Unknown Type of Exam: Unknown FINDINGS: Median sternotomy wires are noted. A left-sided central venous catheter is in place. A Adah-Olga catheter is in place. Bilateral chest tubes are present. Low lung volumes with bilateral pleural effusions and bibasilar opacities are seen. Bilateral pleural effusions with bibasilar opacities without significant change.            Scheduled Medicines   Medications:    insulin lispro  0-6 Units Subcutaneous TID WC    insulin lispro  0-3 Units Subcutaneous 2 times per day    methylPREDNISolone  40 mg Intravenous Q12H    acetylcysteine  600 mg Inhalation BID    guaiFENesin  600 mg Oral BID    carvedilol  3.125 mg Oral BID    furosemide  40 mg Intravenous BID    budesonide-formoterol  2 puff Inhalation BID    DULoxetine  60 mg Oral Daily    finasteride  5 mg Oral Daily    levothyroxine  100 mcg Oral Daily    tiotropium  2 puff Inhalation Daily    tamsulosin  0.4 mg Oral Daily    sodium chloride flush  10 mL Intravenous 2 times per day    polyethylene glycol  17 g Oral Daily    pantoprazole  40 mg Oral Daily    amiodarone  200 mg Oral TID    mupirocin   Nasal BID    therapeutic multivitamin-minerals  1 tablet Oral Daily with breakfast    atorvastatin  20 mg Oral Nightly    aspirin  81 mg Oral Daily    ipratropium-albuterol  1 ampule Inhalation Q4H WA    [START ON 10/27/2020] amiodarone  200 mg Oral Daily      Infusions:    amiodarone 0.5 mg/min (10/22/20 9641)    sodium chloride 10 mL/hr at 10/21/20 1401    nitroGLYCERIN      insulin 1 Units/hr (10/22/20 5934)    dextrose      EPINEPHrine infusion      norepinephrine           Objective:   Vitals: BP (!) 108/58   Pulse 66   Temp 98.4 °F (36.9 °C) (Oral)   Resp 20   Ht 5' 9.5\" (1.765 m)   Wt 205 lb 4 oz (93.1 kg)   SpO2 97%   BMI 29.88 kg/m²   General appearance: alert and cooperative with exam  Neck: no JVD or bruit  Thyroid : Normal lobes   Lungs: Has Vesicular Breath sounds remove the chest tubes  Heart:  regular rate and rhythm  Abdomen: soft, non-tender; bowel sounds normal; no masses,  no organomegaly  Musculoskeletal: Normal  Extremities: extremities normal, , no edema  Neurologic:  Awake, alert, oriented to name, place and time. Cranial nerves II-XII are grossly intact. Motor is  intact. Sensory is intact. ,  and gait is normal.    Assessment:     Patient Active Problem List:     Juvenile rheumatoid arthritis ([address]     Chronic bilateral low back pain without sciatica     Peripheral arterial disease ([address]     Panlobular emphysema ([facility] [address]     Essential hypertension     Acquired hypothyroidism     Gastroesophageal reflux disease     Non-seasonal allergic rhinitis     Benign prostatic hyperplasia without lower urinary tract symptoms     Tobacco use     Acute pulmonary edema (HCC)     Seasonal allergic rhinitis due to pollen     Mixed irritable bowel syndrome     Mixed hyperlipidemia     Prediabetes     Benign prostatic hyperplasia     Chronic combined systolic and diastolic congestive heart failure (HCC)     JOSE (acute kidney injury) (HCC)     LV dysfunction     Dehydration     Vomiting     Coronary artery disease involving native coronary artery     CAD in native artery. //CABG      Plan:     1. Reviewed POC blood glucose . Labs and X ray results   2. Reviewed Current Medicines   3. Will d/c  IV insulin infusion  4. Will start  on  Correction bolus Humalog Insulin regime  5. Monitor Blood glucose frequently   6. Modified  the dose of Insulin/ other medicines as needed   7. Will follow     .      Cesar Rubio MD

## 2020-10-23 NOTE — PROGRESS NOTES
Sara MARCIAL agreed that it is OK to dc pacing wires.     Electronically signed by Kathi Frederick RN on 10/23/2020 at 3:56 PM

## 2020-10-23 NOTE — PROGRESS NOTES
Lexy from Ultrasound called and discussed when they would be to room. She stated she was on her way.     Electronically signed by Elmer Vasquez RN on 10/23/2020 at 10:38 AM

## 2020-10-23 NOTE — PLAN OF CARE
Problem: Falls - Risk of:  Goal: Will remain free from falls  Description: Will remain free from falls  Outcome: Ongoing  Goal: Absence of physical injury  Description: Absence of physical injury  Outcome: Ongoing     Problem: Skin Integrity:  Goal: Will show no infection signs and symptoms  Description: Will show no infection signs and symptoms  Outcome: Ongoing  Goal: Absence of new skin breakdown  Description: Absence of new skin breakdown  Outcome: Ongoing     Problem: Discharge Planning:  Goal: Discharged to appropriate level of care  Description: Discharged to appropriate level of care  Outcome: Ongoing     Problem:  Activity Intolerance:  Goal: Able to perform prescribed physical activity  Description: Able to perform prescribed physical activity  Outcome: Ongoing  Goal: Ability to tolerate increased activity will improve  Description: Ability to tolerate increased activity will improve  Outcome: Ongoing     Problem: Anxiety:  Goal: Level of anxiety will decrease  Description: Level of anxiety will decrease  Outcome: Ongoing     Problem: Cardiac Output - Decreased:  Goal: Cardiac output within specified parameters  Description: Cardiac output within specified parameters  Outcome: Ongoing  Goal: Hemodynamic stability will improve  Description: Hemodynamic stability will improve  Outcome: Ongoing     Problem: Fluid Volume - Imbalance:  Goal: Ability to achieve a balanced intake and output will improve  Description: Ability to achieve a balanced intake and output will improve  Outcome: Ongoing  Goal: Chest tube drainage is within specified parameters  Description: Chest tube drainage is within specified parameters  Outcome: Ongoing     Problem: Gas Exchange - Impaired:  Goal: Levels of oxygenation will improve  Description: Levels of oxygenation will improve  Outcome: Ongoing  Goal: Ability to maintain adequate ventilation will improve  Description: Ability to maintain adequate ventilation will improve  Outcome: Ongoing     Problem: Pain:  Goal: Pain level will decrease  Description: Pain level will decrease  Outcome: Ongoing  Goal: Control of acute pain  Description: Control of acute pain  Outcome: Ongoing  Goal: Control of chronic pain  Description: Control of chronic pain  Outcome: Ongoing     Problem: Tissue Perfusion - Cardiopulmonary, Altered:  Goal: Absence of angina  Description: Absence of angina  Outcome: Ongoing  Goal: Hemodynamic stability will improve  Description: Hemodynamic stability will improve  Outcome: Ongoing  Goal: Will show no evidence of cardiac arrhythmias  Description: Will show no evidence of cardiac arrhythmias  Outcome: Ongoing     Problem: Tobacco Use:  Goal: Will participate in inpatient tobacco-use cessation counseling  Description: Will participate in inpatient tobacco-use cessation counseling  Outcome: Ongoing     Problem: Pain:  Goal: Pain level will decrease  Description: Pain level will decrease  Outcome: Ongoing  Goal: Control of acute pain  Description: Control of acute pain  Outcome: Ongoing  Goal: Control of chronic pain  Description: Control of chronic pain  Outcome: Ongoing  Goal: Patient's pain/discomfort is manageable  Description: Patient's pain/discomfort is manageable  Outcome: Ongoing     Problem: Infection:  Goal: Will remain free from infection  Description: Will remain free from infection  Outcome: Ongoing     Problem: Safety:  Goal: Free from accidental physical injury  Description: Free from accidental physical injury  Outcome: Ongoing  Goal: Free from intentional harm  Description: Free from intentional harm  Outcome: Ongoing     Problem: Daily Care:  Goal: Daily care needs are met  Description: Daily care needs are met  Outcome: Ongoing     Problem: Skin Integrity:  Goal: Skin integrity will stabilize  Description: Skin integrity will stabilize  Outcome: Ongoing     Problem: Discharge Planning:  Goal: Patients continuum of care needs are met  Description: Patients continuum of care needs are met  Outcome: Ongoing

## 2020-10-23 NOTE — CONSULTS
[facility], [address]   Consult Note  [facility] 1 2 3 4 5    Date: 10/23/2020   Patient: Ute Fuentes   : 1955   DOA: 10/19/2020   MRN: [id number]   ROOM#: [id number]     Reason for Consult:  Urinary Retention   Requesting Physician:  Jessica Huggins PA-C  Collaborating Urologist on Call at time of admission: Dr. Rc English:  Chest pain, SOB     History Obtained From:  patient, electronic medical record    HISTORY OF PRESENT ILLNESS:                The patient is a 71 y.o. male with significant past medical history of CAD, CHF, COPD, HTN and HLD who was admitted on 10/19/20 for CABG x 3. Patient developed a-fib and started to have difficulty voiding after the procedure. Dsouza catheter was removed 10/21/20 and was unable to void. He was reportedly straight cathted the morning of 10/22/20 with 300cc urine output and was unable to void all day, so an indwelling dsouza catheter was placed that evening after bedside bladder scan showed 495cc He is reportedly going for a thoracentesis today. He notes a h/o BPH, managed by his PCP with Flomax 0.4mg and Proscar 5mg. He notes a baseline strong urinary stream with urinary urgency and  nocturia x1-3. Past Medical History:        Diagnosis Date    CAD (coronary artery disease)     Dr. Ravinder Forte COPD (chronic obstructive pulmonary disease) ([facility] [address]     No pulmonologist - follows with PCP    H/O cardiac catheterization 2020     Severe calcified multivessel CAD with LV dysfuntion, PCWP is 12, CABG consult.  H/O echocardiogram 2020     Mild concentric LVH with moderate systolic impairment. EF is 40-45 % .     Hyperlipidemia     Hypertension     Follows with PCP    IBS (irritable bowel syndrome)     Pancreatitis     Rheumatoid arthritis ([facility] [address]     Thyroid disease      Past Surgical History:        Procedure Laterality Date    CARDIAC CATHETERIZATION  2020     Severe calcified multivessel CAD with LV 4.2 07/15/2020    CREATININE 1.1 10/23/2020    CALCIUM 9.0 10/23/2020    GFRAA >60 10/23/2020    LABGLOM >60 10/23/2020     Imaging:  Xr Chest Portable    Result Date: 10/23/2020  EXAMINATION: ONE XRAY VIEW OF THE CHEST 10/23/2020 8:42 am COMPARISON: 10/22/2020 HISTORY: ORDERING SYSTEM PROVIDED HISTORY: cabg TECHNOLOGIST PROVIDED HISTORY: Reason for exam:->cabg Reason for Exam: cabg Acuity: Acute Type of Exam: Initial FINDINGS: Left subclavian catheter with tip at the inferior SVC. Status post median sternotomy. Stable mild cardiomegaly. No pneumothorax. Moderate right pleural effusion and small left pleural effusion are stable. Moderate bibasilar atelectasis is stable. 1.  No significant change from prior exam. Moderate right pleural effusion and small left pleural effusion with bibasilar atelectasis. Xr Chest Portable    Result Date: 10/22/2020  EXAMINATION: ONE XRAY VIEW OF THE CHEST 10/22/2020 5:10 am COMPARISON: 10/21/2020 HISTORY: ORDERING SYSTEM PROVIDED HISTORY: Post op open heart surgery TECHNOLOGIST PROVIDED HISTORY: Reason for Exam:->Post op open heart surgery Reason for Exam: post op open heart surgery Acuity: Unknown Type of Exam: Ongoing FINDINGS: Central venous catheter tip overlies the SVC. Sternotomy wires. Small bilateral pleural effusion. Right lower lobe opacity. Stable cardiomegaly. Mild interstitial edema. No pneumothorax. 1. Unchanged mild pulmonary edema and small bilateral pleural effusions. 2. Stable right lower lobe atelectasis.      Xr Chest Portable    Result Date: 10/21/2020  EXAMINATION: ONE XRAY VIEW OF THE CHEST 10/21/2020 1:16 pm COMPARISON: 10/21/2020 HISTORY: ORDERING SYSTEM PROVIDED HISTORY: chest tube removal TECHNOLOGIST PROVIDED HISTORY: Reason for exam:->chest tube removal Reason for Exam: chest tube removal Acuity: Acute Type of Exam: Initial Additional signs and symptoms: none Relevant Medical/Surgical History: post cabg 2 days ago FINDINGS: Interval removal of thoracostomy tubes. Left subclavian central venous catheter stable in positioning. Status post median sternotomy. Stable cardiomegaly with pulmonary vascular congestion. Persistent small right pleural effusion with bibasilar atelectasis. No discrete pneumothorax. The osseous structures otherwise stable. Interval removal of thoracostomy tubes. No discrete pneumothorax. Xr Chest Portable    Result Date: 10/21/2020  EXAMINATION: ONE XRAY VIEW OF THE CHEST 10/21/2020 6:06 am COMPARISON: 10/20/2020 HISTORY: ORDERING SYSTEM PROVIDED HISTORY: Post op open heart surgery TECHNOLOGIST PROVIDED HISTORY: Reason for Exam:->Post op open heart surgery Reason for Exam: Post op open heart surgery Acuity: Unknown Type of Exam: Ongoing FINDINGS: Median sternotomy wires are identified. Left-sided subclavian central venous catheter with the tip overlying the distal SVC. Cardiomegaly. Vascular congestion. Low lung volumes. Prominent interstitial changes are seen bilaterally, mildly increased. Osseous structures appear stable. No free air beneath the diaphragms. Large bore tube is noted overlying the left chest and mediastinum versus medial aspect of the right hemithorax inferiorly. Gas-filled loops of bowel seen in the upper abdomen. Mildly increased interstitial changes which could be related to increased pulmonary edema or pneumonitis. Cardiomegaly with central vascular congestion. Otherwise similar appearing chest.     Xr Chest Portable    Result Date: 10/21/2020  EXAMINATION: ONE XRAY VIEW OF THE CHEST 10/20/2020 5:18 am COMPARISON: Chest radiograph dated October 19, 2020. HISTORY: ORDERING SYSTEM PROVIDED HISTORY: Post op open heart surgery TECHNOLOGIST PROVIDED HISTORY: Reason for Exam:->Post op open heart surgery Reason for Exam: Post op open heart surgery Acuity: Unknown Type of Exam: Unknown FINDINGS: Median sternotomy wires are noted. A left-sided central venous catheter is in place.   A Dundee-Olga catheter is in place. Bilateral chest tubes are present. Low lung volumes with bilateral pleural effusions and bibasilar opacities are seen. Bilateral pleural effusions with bibasilar opacities without significant change. Xr Chest Portable    Result Date: 10/20/2020  EXAMINATION: ONE XRAY VIEW OF THE CHEST 10/19/2020 12:42 pm COMPARISON: 09/18/2020 HISTORY: ORDERING SYSTEM PROVIDED HISTORY: Post op open heart surgery TECHNOLOGIST PROVIDED HISTORY: Reason for exam:->Post op open heart surgery Reason for Exam: Post op open heart surgery FINDINGS: Status post cardiac surgery. Endotracheal tube terminates 5.5 cm above the royal. Dundee-Olga catheter terminates in the region of the right main pulmonary artery. Left subclavian catheter terminates at the inferior SVC. Chest tubes are present. Moderate left basilar atelectasis. Mild right basilar atelectasis. Pulmonary vascular redistribution. No significant pneumothorax. Stable mild cardiomegaly. 1. Interval cardiac surgery. Satisfactory position of support devices. 2. Moderate left basilar atelectasis. Mild right basilar atelectasis. 3. Pulmonary vascular redistribution. Vl Dup Carotid Bilateral    Result Date: 9/23/2020  EXAMINATION: ULTRASOUND EVALUATION OF THE CAROTID ARTERIES 9/23/2020 COMPARISON: None. HISTORY: ORDERING SYSTEM PROVIDED HISTORY: pre op CABG TECHNOLOGIST PROVIDED HISTORY: Reason for exam:->pre op CABG Reason for Exam: pre op cabg FINDINGS: RIGHT: The right common carotid artery demonstrates peak systolic velocities of 33 cm/sec in the proximal and distal segments respectively. The right internal carotid artery demonstrates the systolic velocities of 53, 60, 77 cm/sec in the proximal, mid and distal segments respectively. The external carotid artery is patent. The vertebral artery demonstrates normal antegrade flow. No evidence of focal atherosclerotic plaque. ICA/CCA ratio of 0.3.  LEFT: The left common carotid artery demonstrates peak systolic velocities of 48 cm/sec in the proximal and distal segments respectively. The left internal carotid artery demonstrates the systolic velocities of 45, 50, 57 cm/sec in the proximal, mid and distal segments respectively. The external carotid artery is patent. The vertebral artery demonstrates normal antegrade flow. No evidence of focal atherosclerotic plaque. ICA/CCA ratio of 0.9. The right internal carotid artery demonstrates 0-50% stenosis . The left internal carotid artery demonstrates 0-50% stenosis . Bilateral vertebral arteries are patent with flow in the normal direction. Us Lower Extremity Unilateral Vein Mapping    Result Date: 10/12/2020  EXAMINATION: VENOUS ULTRASOUND OF THE LEFT LOWER EXTREMITY FOR VEIN PSJYLND97/12/2020 10:39 am TECHNIQUE: Duplex ultrasound and Doppler images were obtained of the left lower extremity. COMPARISON: 09/23/2020 HISTORY: ORDERING SYSTEM PROVIDED HISTORY: Pre-op exam TECHNOLOGIST PROVIDED HISTORY: Reason for Exam: pre op Acuity: Acute Type of Exam: Initial FINDINGS: Greater saphenous vein measurements below: Saphenofemoral junction: 4 mm Proximal thigh: 4 mm Mid thigh: 4 mm Distal thigh: 3.5 mm Knee: 3.4 mm Proximal calf: 2.4 mm Mid calf: 3 mm Distal calf: 2.7 mm     Left greater saphenous vein measurements above. Us Lower Extremity Unilateral Vein Mapping    Result Date: 9/23/2020  EXAMINATION: ULTRASOUND OF THE left LOWER EXTREMITY FOR VEIN MAPPING 9/23/2020 4:54 pm TECHNIQUE: Sonographic evaluation of the greater saphenous vein was performed. Color flow Doppler imaging was also acquired. COMPARISON: None. HISTORY: ORDERING SYSTEM PROVIDED HISTORY: left leg please. Pre-op CABG. Alert nursing staff if left leg cannot be mapped. TECHNOLOGIST PROVIDED HISTORY: Reason for exam:->left leg please. Pre-op CABG. Alert nursing staff if left leg cannot be mapped. Reason for Exam: pre op cabg FINDINGS: Sapheno-femoral junction: 5.1 mm.  Proximal thigh:  4.1 mm. Mid thigh:  4.0mm. Distal thigh:  4.0mm. Knee:  3.5mm. Proximal calf: 2.6mm. Mid calf: 1.8mm. Ankle:  2.3mm. Greater saphenous vein is patent with measurements ranging from 1.8 mm to 5.1mm as discussed above. Assessment & Plan:      Brandon Robertson is a 71y.o. year old male admitted 10/19/2020 for CAD, s/p CABG x 3 on 10/19/20 with Dr. Joette Blizzard. 1) Urinary Retention: Fowler removed post-op 10/21/20   PVR 317cc on 10/22/20 AM, he was straight cathted with ~300cc urine output    Indwelling Fowler catheter placed evening of 10/22/20 with 475cc urine output    Serum creatinine stable at 1.1 (baseline 0.9-1.1)   Acute urinary retention likely related to anesthesia and immobility    Recommend voiding trial in 48 hours and close Urology follow-up in 1-2 weeks   2) BPH: Chronically on Flomax 0.4mg and Proscar 5mg managed by his PCP, does not follow with Urology   Due to chronic LUTS, patient may benefit from UroLift vs TURP   Recommend outpatient cystoscopy/uroflow/PVR/TRUS in the near future for further evaluation of bladder emptying    Check PSA     Will continue to follow         Patient seen and examined, chart reviewed.      Electronically signed by Johnathan Gonzalez PA-C on 10/23/2020 at 11:43 AM

## 2020-10-23 NOTE — PROGRESS NOTES
pulmonary      SUBJECTIVE: rmains on 10 litres o2     OBJECTIVE    VITALS:  BP (!) 149/74   Pulse 65   Temp 98 °F (36.7 °C) (Oral)   Resp 14   Ht 5' 9.5\" (1.765 m)   Wt 203 lb 4.2 oz (92.2 kg)   SpO2 93%   BMI 29.59 kg/m²   HEAD AND FACE EXAM:  No throat injection, no active exudate,no thrush  NECK EXAM;No JVD, no masses, symmetrical  CHEST EXAM; Expansion equal and symmetrical, no masses  LUNG EXAM; Good breath sounds bilaterally. There are expiratory wheezes both lungs, there are crackles at both lung bases  CARDIOVASCULAR EXAM: Positive S1 and S2, no S3 or S4, no clicks ,no murmurs  RIGHT AND LEFT LOWER EXTRIMITY EXAM: No edema, no swelling, no inflamation  CNS EXAM: Alert and oriented X3          LABS   Lab Results   Component Value Date    WBC 15.0 (H) 10/22/2020    HGB 10.4 (L) 10/22/2020    HCT 32.5 (L) 10/22/2020    .2 (H) 10/22/2020     (L) 10/22/2020     Lab Results   Component Value Date    CREATININE 1.1 10/23/2020    BUN 50 (H) 10/23/2020     (L) 10/23/2020    K 4.7 10/23/2020    CL 95 (L) 10/23/2020    CO2 25 10/23/2020     Lab Results   Component Value Date    INR 1.27 10/19/2020    PROTIME 15.4 (H) 10/19/2020          Lab Results   Component Value Date    PHOS 4.2 07/15/2020        Recent Labs     10/20/20  1000 10/21/20  0900   PH 7.43 7.45   PO2ART 69* 56*   PBG3IIK 38.0 38.0   O2SAT 93.7* 90.3*         Wt Readings from Last 3 Encounters:   10/23/20 203 lb 4.2 oz (92.2 kg)   10/12/20 204 lb (92.5 kg)   10/05/20 207 lb (93.9 kg)               ASSESMENT  Ac hypoxic rewsp failure  Ac copd  vonda pl effusion  Bib atx        PLAN  1. Bd rx  2. Steroids added to pt rx  3. Check cxr and act accordingly  4.  Cont o2    10/23/2020  Nicole Segundo M.D.

## 2020-10-23 NOTE — CARE COORDINATION
Received  for [address] [place] [address] liaison. Precert granted for ARU stay. Spoke to [employer]- patient may have thoracentesis today. Contacted ARU liaison Jos Islas with update. They can take the patient late today or tomorrow. Will update ARU when able.  Jania Berry RN

## 2020-10-23 NOTE — PROGRESS NOTES
PATIENT NAME: Ute Fuentes    TODAY'S DATE: 10/23/20    SUBJECTIVE:    Pt is POD # 4 s/p CABG x 3. Pt feels slightly better today. C/o orthopnea. OBJECTIVE:   VITALS:    Vitals:    10/23/20 1003   BP: 138/70   Pulse: 64   Resp: 19   Temp:    SpO2: 93%     INTAKE/OUTPUT:    Date 10/23/20 0000 - 10/23/20 2359   Shift 3487-5290 6209-5388 2979-7720 24 Hour Total   INTAKE   P.O. 150   150   I. V.(mL/kg/hr) 257(0.3)   257   Shift Total(mL/kg) 407(4.4)   407(4.4)   OUTPUT   Urine(mL/kg/hr) 750(1) 380  1130   Shift Total(mL/kg) 750(8.1) 380(4.1)  1130(12.3)   Weight (kg) 92.2 92.2 92.2 92.2      Patient Vitals for the past 96 hrs (Last 3 readings):   Weight   10/23/20 0500 203 lb 4.2 oz (92.2 kg)   10/22/20 0525 205 lb 4 oz (93.1 kg)   10/21/20 0500 200 lb 13.4 oz (91.1 kg)       EXAM:  Blood pressure 138/70, pulse 64, temperature 97.7 °F (36.5 °C), temperature source Oral, resp. rate 19, height 5' 9.5\" (1.765 m), weight 203 lb 4.2 oz (92.2 kg), SpO2 93 %. General appearance: No apparent distress, appears stated age and cooperative. Skin: unremarkable  HEENT Normocephalic, atraumatic without obvious deformity. Neck: Supple, Trachea midline   Lungs: poor respiratory effort. Diminished, bilaterally  Heart: Regular rate/ rhythm inc c/d/i  Abdomen: Soft, non-tender distended   Extremities: min  edema warm well perfused  Neurologic: Alert, grossly intact  Mental status: normal affect      Data:  CBC:   Recent Labs     10/21/20  0510 10/22/20  0530   WBC 22.0* 15.0*   HGB 11.6* 10.4*   HCT 34.7* 32.5*   * 120*     BMP:    Recent Labs     10/21/20  0510 10/22/20  0530 10/23/20  0600    133* 131*   K 4.3 4.1 4.7    96* 95*   CO2 27 24 25   BUN 32* 45* 50*   CREATININE 0.9 1.2 1.1   GLUCOSE 94 93 143*     Hepatic: No results for input(s): AST, ALT, ALB, BILITOT, ALKPHOS in the last 72 hours.   Mag:      Recent Labs     10/21/20  0510 10/22/20  0530 10/23/20  0600   MG 2.3 2.2 2.4      Phos:   No results for

## 2020-10-23 NOTE — PROGRESS NOTES
Simon MARCIAL at bedside assessing patient. Updated patient on plan for ultrasound and thoracentesis if needed. Will round on patient later. No new orders at this time.      Electronically signed by Jennie Sheets RN on 10/23/2020 at 10:40 AM

## 2020-10-23 NOTE — PROGRESS NOTES
Dr. Darcy Huff sent update regarding Amiodarone gtt. Amio gtt infused at 1 mg/min x6 hrs and 0.5 mg/min x 18hrs.  Order received:    HENRRY amiodarone gtt

## 2020-10-23 NOTE — PROGRESS NOTES
Mychal Mera with urology at bedside assessing patient. Wants to leave dsouza in for another 48 hours to give bladder time to rest then will reassess with voiding trial. Recapped timeline with patient on dsouza after surgery and it being removed on Wednesday. Also confirmed that patient currently takes Proscar and Flomax. Patient confirmed that he does not currently see a urologist. Patient states that at home he has urgency and frequency. No new orders at this time. Will continue to monitor.      Electronically signed by Karlee Caballero RN on 10/23/2020 at 12:05 PM

## 2020-10-24 ENCOUNTER — APPOINTMENT (OUTPATIENT)
Dept: GENERAL RADIOLOGY | Age: 65
DRG: 235 | End: 2020-10-24
Attending: SURGERY
Payer: MEDICARE

## 2020-10-24 PROBLEM — E86.0 DEHYDRATION: Status: RESOLVED | Noted: 2020-09-24 | Resolved: 2020-10-24

## 2020-10-24 LAB
ANION GAP SERPL CALCULATED.3IONS-SCNC: 9 MMOL/L (ref 4–16)
BUN BLDV-MCNC: 47 MG/DL (ref 6–23)
CALCIUM IONIZED: 4.8 MG/DL (ref 4.48–5.28)
CALCIUM SERPL-MCNC: 9 MG/DL (ref 8.3–10.6)
CHLORIDE BLD-SCNC: 95 MMOL/L (ref 99–110)
CO2: 28 MMOL/L (ref 21–32)
CREAT SERPL-MCNC: 1.1 MG/DL (ref 0.9–1.3)
GFR AFRICAN AMERICAN: >60 ML/MIN/1.73M2
GFR NON-AFRICAN AMERICAN: >60 ML/MIN/1.73M2
GLUCOSE BLD-MCNC: 126 MG/DL (ref 70–99)
GLUCOSE BLD-MCNC: 128 MG/DL (ref 70–99)
GLUCOSE BLD-MCNC: 143 MG/DL (ref 70–99)
GLUCOSE BLD-MCNC: 150 MG/DL (ref 70–99)
GLUCOSE BLD-MCNC: 163 MG/DL (ref 70–99)
GLUCOSE BLD-MCNC: 194 MG/DL (ref 70–99)
IONIZED CA: 1.2 MMOL/L (ref 1.12–1.32)
POTASSIUM SERPL-SCNC: 4.7 MMOL/L (ref 3.5–5.1)
SODIUM BLD-SCNC: 132 MMOL/L (ref 135–145)

## 2020-10-24 PROCEDURE — 97116 GAIT TRAINING THERAPY: CPT

## 2020-10-24 PROCEDURE — 6370000000 HC RX 637 (ALT 250 FOR IP): Performed by: PHYSICIAN ASSISTANT

## 2020-10-24 PROCEDURE — 94150 VITAL CAPACITY TEST: CPT

## 2020-10-24 PROCEDURE — 82962 GLUCOSE BLOOD TEST: CPT

## 2020-10-24 PROCEDURE — 2000000000 HC ICU R&B

## 2020-10-24 PROCEDURE — 94640 AIRWAY INHALATION TREATMENT: CPT

## 2020-10-24 PROCEDURE — 94761 N-INVAS EAR/PLS OXIMETRY MLT: CPT

## 2020-10-24 PROCEDURE — 6360000002 HC RX W HCPCS: Performed by: SURGERY

## 2020-10-24 PROCEDURE — 36415 COLL VENOUS BLD VENIPUNCTURE: CPT

## 2020-10-24 PROCEDURE — 82330 ASSAY OF CALCIUM: CPT

## 2020-10-24 PROCEDURE — 71046 X-RAY EXAM CHEST 2 VIEWS: CPT

## 2020-10-24 PROCEDURE — 6370000000 HC RX 637 (ALT 250 FOR IP): Performed by: SURGERY

## 2020-10-24 PROCEDURE — 80048 BASIC METABOLIC PNL TOTAL CA: CPT

## 2020-10-24 PROCEDURE — 6360000002 HC RX W HCPCS: Performed by: INTERNAL MEDICINE

## 2020-10-24 PROCEDURE — 2700000000 HC OXYGEN THERAPY PER DAY

## 2020-10-24 PROCEDURE — 6360000002 HC RX W HCPCS: Performed by: PHYSICIAN ASSISTANT

## 2020-10-24 PROCEDURE — 2580000003 HC RX 258: Performed by: PHYSICIAN ASSISTANT

## 2020-10-24 PROCEDURE — 97110 THERAPEUTIC EXERCISES: CPT

## 2020-10-24 PROCEDURE — 94660 CPAP INITIATION&MGMT: CPT

## 2020-10-24 RX ORDER — MORPHINE SULFATE 2 MG/ML
1 INJECTION, SOLUTION INTRAMUSCULAR; INTRAVENOUS EVERY 4 HOURS PRN
Status: DISCONTINUED | OUTPATIENT
Start: 2020-10-24 | End: 2020-10-27 | Stop reason: HOSPADM

## 2020-10-24 RX ADMIN — HYDROCODONE BITARTRATE AND ACETAMINOPHEN 2 TABLET: 5; 325 TABLET ORAL at 23:12

## 2020-10-24 RX ADMIN — CARVEDILOL 3.12 MG: 3.12 TABLET, FILM COATED ORAL at 21:08

## 2020-10-24 RX ADMIN — DULOXETINE HYDROCHLORIDE 60 MG: 30 CAPSULE, DELAYED RELEASE ORAL at 09:39

## 2020-10-24 RX ADMIN — SODIUM CHLORIDE, PRESERVATIVE FREE 10 ML: 5 INJECTION INTRAVENOUS at 21:09

## 2020-10-24 RX ADMIN — ALBUTEROL SULFATE 2 PUFF: 90 AEROSOL, METERED RESPIRATORY (INHALATION) at 15:14

## 2020-10-24 RX ADMIN — ALBUTEROL SULFATE 2 PUFF: 90 AEROSOL, METERED RESPIRATORY (INHALATION) at 21:24

## 2020-10-24 RX ADMIN — ATORVASTATIN CALCIUM 20 MG: 20 TABLET, FILM COATED ORAL at 21:08

## 2020-10-24 RX ADMIN — SODIUM CHLORIDE, PRESERVATIVE FREE 10 ML: 5 INJECTION INTRAVENOUS at 09:37

## 2020-10-24 RX ADMIN — HYDROCODONE BITARTRATE AND ACETAMINOPHEN 2 TABLET: 5; 325 TABLET ORAL at 09:55

## 2020-10-24 RX ADMIN — ALBUTEROL SULFATE 2 PUFF: 90 AEROSOL, METERED RESPIRATORY (INHALATION) at 07:43

## 2020-10-24 RX ADMIN — ASPIRIN 81 MG: 81 TABLET, COATED ORAL at 09:37

## 2020-10-24 RX ADMIN — AMIODARONE HYDROCHLORIDE 200 MG: 200 TABLET ORAL at 21:08

## 2020-10-24 RX ADMIN — INSULIN LISPRO 1 UNITS: 100 INJECTION, SOLUTION INTRAVENOUS; SUBCUTANEOUS at 18:35

## 2020-10-24 RX ADMIN — SODIUM CHLORIDE, PRESERVATIVE FREE 10 ML: 5 INJECTION INTRAVENOUS at 00:09

## 2020-10-24 RX ADMIN — TAMSULOSIN HYDROCHLORIDE 0.4 MG: 0.4 CAPSULE ORAL at 09:39

## 2020-10-24 RX ADMIN — FUROSEMIDE 40 MG: 10 INJECTION, SOLUTION INTRAMUSCULAR; INTRAVENOUS at 09:38

## 2020-10-24 RX ADMIN — AMIODARONE HYDROCHLORIDE 200 MG: 200 TABLET ORAL at 14:11

## 2020-10-24 RX ADMIN — FINASTERIDE 5 MG: 5 TABLET, FILM COATED ORAL at 09:39

## 2020-10-24 RX ADMIN — GUAIFENESIN 600 MG: 600 TABLET, EXTENDED RELEASE ORAL at 21:08

## 2020-10-24 RX ADMIN — METHYLPREDNISOLONE SODIUM SUCCINATE 40 MG: 40 INJECTION, POWDER, LYOPHILIZED, FOR SOLUTION INTRAMUSCULAR; INTRAVENOUS at 00:09

## 2020-10-24 RX ADMIN — LEVOTHYROXINE SODIUM 100 MCG: 100 TABLET ORAL at 09:39

## 2020-10-24 RX ADMIN — PANTOPRAZOLE SODIUM 40 MG: 40 TABLET, DELAYED RELEASE ORAL at 09:38

## 2020-10-24 RX ADMIN — FUROSEMIDE 40 MG: 10 INJECTION, SOLUTION INTRAMUSCULAR; INTRAVENOUS at 18:29

## 2020-10-24 RX ADMIN — GUAIFENESIN 600 MG: 600 TABLET, EXTENDED RELEASE ORAL at 09:38

## 2020-10-24 RX ADMIN — AMIODARONE HYDROCHLORIDE 200 MG: 200 TABLET ORAL at 09:39

## 2020-10-24 RX ADMIN — BUDESONIDE AND FORMOTEROL FUMARATE DIHYDRATE 2 PUFF: 160; 4.5 AEROSOL RESPIRATORY (INHALATION) at 07:43

## 2020-10-24 RX ADMIN — HYDROCODONE BITARTRATE AND ACETAMINOPHEN 2 TABLET: 5; 325 TABLET ORAL at 18:29

## 2020-10-24 RX ADMIN — BUDESONIDE AND FORMOTEROL FUMARATE DIHYDRATE 2 PUFF: 160; 4.5 AEROSOL RESPIRATORY (INHALATION) at 21:24

## 2020-10-24 RX ADMIN — INSULIN LISPRO 1 UNITS: 100 INJECTION, SOLUTION INTRAVENOUS; SUBCUTANEOUS at 12:52

## 2020-10-24 RX ADMIN — TIOTROPIUM BROMIDE INHALATION SPRAY 2 PUFF: 3.12 SPRAY, METERED RESPIRATORY (INHALATION) at 07:43

## 2020-10-24 RX ADMIN — ALBUTEROL SULFATE 2 PUFF: 90 AEROSOL, METERED RESPIRATORY (INHALATION) at 11:14

## 2020-10-24 RX ADMIN — Medication 1 MG: at 12:48

## 2020-10-24 RX ADMIN — METHYLPREDNISOLONE SODIUM SUCCINATE 40 MG: 40 INJECTION, POWDER, LYOPHILIZED, FOR SOLUTION INTRAMUSCULAR; INTRAVENOUS at 12:30

## 2020-10-24 RX ADMIN — CARVEDILOL 3.12 MG: 3.12 TABLET, FILM COATED ORAL at 09:39

## 2020-10-24 RX ADMIN — MULTIPLE VITAMINS W/ MINERALS TAB 1 TABLET: TAB at 09:42

## 2020-10-24 RX ADMIN — POLYETHYLENE GLYCOL (3350) 17 G: 17 POWDER, FOR SOLUTION ORAL at 09:37

## 2020-10-24 ASSESSMENT — PAIN DESCRIPTION - PAIN TYPE
TYPE: SURGICAL PAIN
TYPE: ACUTE PAIN;SURGICAL PAIN
TYPE: SURGICAL PAIN
TYPE: CHRONIC PAIN;SURGICAL PAIN
TYPE: SURGICAL PAIN
TYPE: SURGICAL PAIN

## 2020-10-24 ASSESSMENT — PAIN DESCRIPTION - PROGRESSION
CLINICAL_PROGRESSION: NOT CHANGED
CLINICAL_PROGRESSION: NOT CHANGED
CLINICAL_PROGRESSION: GRADUALLY WORSENING
CLINICAL_PROGRESSION: NOT CHANGED
CLINICAL_PROGRESSION: GRADUALLY IMPROVING
CLINICAL_PROGRESSION: NOT CHANGED
CLINICAL_PROGRESSION: GRADUALLY WORSENING
CLINICAL_PROGRESSION: NOT CHANGED
CLINICAL_PROGRESSION: GRADUALLY WORSENING

## 2020-10-24 ASSESSMENT — PAIN DESCRIPTION - ONSET
ONSET: ON-GOING
ONSET: GRADUAL

## 2020-10-24 ASSESSMENT — PAIN DESCRIPTION - DESCRIPTORS
DESCRIPTORS: DISCOMFORT
DESCRIPTORS: ACHING
DESCRIPTORS: ACHING;DISCOMFORT
DESCRIPTORS: ACHING
DESCRIPTORS: ACHING

## 2020-10-24 ASSESSMENT — PAIN DESCRIPTION - FREQUENCY
FREQUENCY: CONTINUOUS
FREQUENCY: INTERMITTENT
FREQUENCY: CONTINUOUS

## 2020-10-24 ASSESSMENT — PAIN SCALES - GENERAL
PAINLEVEL_OUTOF10: 9
PAINLEVEL_OUTOF10: 0
PAINLEVEL_OUTOF10: 9
PAINLEVEL_OUTOF10: 3
PAINLEVEL_OUTOF10: 9
PAINLEVEL_OUTOF10: 0
PAINLEVEL_OUTOF10: 7
PAINLEVEL_OUTOF10: 9
PAINLEVEL_OUTOF10: 0
PAINLEVEL_OUTOF10: 0
PAINLEVEL_OUTOF10: 9

## 2020-10-24 ASSESSMENT — PAIN DESCRIPTION - LOCATION
LOCATION: CHEST;SHOULDER
LOCATION: CHEST
LOCATION: BACK;CHEST;SHOULDER
LOCATION: CHEST
LOCATION: STERNUM;INCISION
LOCATION: CHEST;SHOULDER
LOCATION: CHEST

## 2020-10-24 ASSESSMENT — PAIN - FUNCTIONAL ASSESSMENT
PAIN_FUNCTIONAL_ASSESSMENT: PREVENTS OR INTERFERES SOME ACTIVE ACTIVITIES AND ADLS
PAIN_FUNCTIONAL_ASSESSMENT: ACTIVITIES ARE NOT PREVENTED
PAIN_FUNCTIONAL_ASSESSMENT: PREVENTS OR INTERFERES SOME ACTIVE ACTIVITIES AND ADLS
PAIN_FUNCTIONAL_ASSESSMENT: PREVENTS OR INTERFERES SOME ACTIVE ACTIVITIES AND ADLS
PAIN_FUNCTIONAL_ASSESSMENT: ACTIVITIES ARE NOT PREVENTED
PAIN_FUNCTIONAL_ASSESSMENT: PREVENTS OR INTERFERES SOME ACTIVE ACTIVITIES AND ADLS

## 2020-10-24 ASSESSMENT — PAIN DESCRIPTION - ORIENTATION
ORIENTATION: MID;RIGHT
ORIENTATION: MID
ORIENTATION: MID;RIGHT
ORIENTATION: RIGHT;MID
ORIENTATION: MID
ORIENTATION: MID;RIGHT
ORIENTATION: MID
ORIENTATION: RIGHT;MID
ORIENTATION: MID

## 2020-10-24 NOTE — PLAN OF CARE
Problem: Falls - Risk of:  Goal: Will remain free from falls  Description: Will remain free from falls  10/24/2020 0021 by Damien Mendoza RN  Outcome: Ongoing  10/23/2020 1731 by Katerine Flores RN  Outcome: Ongoing  Goal: Absence of physical injury  Description: Absence of physical injury  10/24/2020 0021 by Damien Mendoza RN  Outcome: Ongoing  10/23/2020 1731 by Katerine Flores RN  Outcome: Ongoing     Problem: Skin Integrity:  Goal: Will show no infection signs and symptoms  Description: Will show no infection signs and symptoms  10/24/2020 0021 by Damien Mendoza RN  Outcome: Ongoing  10/23/2020 1731 by Katerine Flores RN  Outcome: Ongoing  Goal: Absence of new skin breakdown  Description: Absence of new skin breakdown  10/24/2020 0021 by Damien Mendoza RN  Outcome: Ongoing  10/23/2020 1731 by Katerine Flores RN  Outcome: Ongoing     Problem: Discharge Planning:  Goal: Discharged to appropriate level of care  Description: Discharged to appropriate level of care  10/24/2020 0021 by Damien Mendoza RN  Outcome: Ongoing  10/23/2020 1731 by Katerine Flroes RN  Outcome: Ongoing     Problem:  Activity Intolerance:  Goal: Able to perform prescribed physical activity  Description: Able to perform prescribed physical activity  10/24/2020 0021 by Damien Mendoza RN  Outcome: Ongoing  10/23/2020 1731 by Katerine Flores RN  Outcome: Ongoing  Goal: Ability to tolerate increased activity will improve  Description: Ability to tolerate increased activity will improve  10/24/2020 0021 by Damien Mendoza RN  Outcome: Ongoing  10/23/2020 1731 by Katerine Flores RN  Outcome: Ongoing     Problem: Anxiety:  Goal: Level of anxiety will decrease  Description: Level of anxiety will decrease  10/24/2020 0021 by Damien Mendoza RN  Outcome: Ongoing  10/23/2020 1731 by Katerine Flores RN  Outcome: Ongoing     Problem: Cardiac Output - Decreased:  Goal: Cardiac output within specified parameters  Description: Cardiac output within specified parameters  10/24/2020 0021 by Manny Beasley RN  Outcome: Ongoing  10/23/2020 1731 by Reji Clayton RN  Outcome: Ongoing  Goal: Hemodynamic stability will improve  Description: Hemodynamic stability will improve  10/24/2020 0021 by Manny Beasley RN  Outcome: Ongoing  10/23/2020 1731 by Reji Clayton RN  Outcome: Ongoing     Problem: Fluid Volume - Imbalance:  Goal: Ability to achieve a balanced intake and output will improve  Description: Ability to achieve a balanced intake and output will improve  10/24/2020 0021 by Manny Beasley RN  Outcome: Ongoing  10/23/2020 1731 by Reji Clayton RN  Outcome: Ongoing  Goal: Chest tube drainage is within specified parameters  Description: Chest tube drainage is within specified parameters  10/24/2020 0021 by Manny Beasley RN  Outcome: Ongoing  10/23/2020 1731 by Reji Clayton RN  Outcome: Ongoing     Problem: Gas Exchange - Impaired:  Goal: Levels of oxygenation will improve  Description: Levels of oxygenation will improve  10/24/2020 0021 by Manny Beasley RN  Outcome: Ongoing  10/23/2020 1731 by Reji Clayton RN  Outcome: Ongoing  Goal: Ability to maintain adequate ventilation will improve  Description: Ability to maintain adequate ventilation will improve  10/24/2020 0021 by Manny Beasley RN  Outcome: Ongoing  10/23/2020 1731 by Reji Clayton RN  Outcome: Ongoing     Problem: Pain:  Description: Pain management should include both nonpharmacologic and pharmacologic interventions.   Goal: Pain level will decrease  Description: Pain level will decrease  10/24/2020 0021 by Manny Beasley RN  Outcome: Ongoing  10/23/2020 1731 by Reji Clayton RN  Outcome: Ongoing  Goal: Control of acute pain  Description: Control of acute pain  10/24/2020 0021 Ongoing  10/23/2020 1731 by Arnie Clay RN  Outcome: Ongoing  Goal: Patient's pain/discomfort is manageable  Description: Patient's pain/discomfort is manageable  10/24/2020 0021 by Geovanni Felipe RN  Outcome: Ongoing  10/23/2020 1731 by Arnie Clay RN  Outcome: Ongoing     Problem: Infection:  Goal: Will remain free from infection  Description: Will remain free from infection  10/24/2020 0021 by Geovanni Felipe RN  Outcome: Ongoing  10/23/2020 1731 by Arnie Clay RN  Outcome: Ongoing     Problem: Safety:  Goal: Free from accidental physical injury  Description: Free from accidental physical injury  10/24/2020 0021 by Geovanni Felipe RN  Outcome: Ongoing  10/23/2020 1731 by Arnie Clay RN  Outcome: Ongoing  Goal: Free from intentional harm  Description: Free from intentional harm  10/24/2020 0021 by Geovanni Felipe RN  Outcome: Ongoing  10/23/2020 1731 by Arnie Clay RN  Outcome: Ongoing     Problem: Daily Care:  Goal: Daily care needs are met  Description: Daily care needs are met  10/24/2020 0021 by Geovanni Felipe RN  Outcome: Ongoing  10/23/2020 1731 by Arnie Clay RN  Outcome: Ongoing     Problem: Skin Integrity:  Goal: Skin integrity will stabilize  Description: Skin integrity will stabilize  10/24/2020 0021 by Geovanni Felipe RN  Outcome: Ongoing  10/23/2020 1731 by Arnie Clay RN  Outcome: Ongoing     Problem: Discharge Planning:  Goal: Patients continuum of care needs are met  Description: Patients continuum of care needs are met  10/24/2020 0021 by Geovanni Felipe RN  Outcome: Ongoing  10/23/2020 1731 by Arnie Clay RN  Outcome: Ongoing

## 2020-10-24 NOTE — PROGRESS NOTES
pulmonary      SUBJECTIVE: still on 9 litres o2     OBJECTIVE    VITALS:  /60   Pulse 64   Temp 98.4 °F (36.9 °C) (Oral)   Resp 9   Ht 5' 9.5\" (1.765 m)   Wt 202 lb 2.6 oz (91.7 kg)   SpO2 97%   BMI 29.43 kg/m²   HEAD AND FACE EXAM:  No throat injection, no active exudate,no thrush  NECK EXAM;No JVD, no masses, symmetrical  CHEST EXAM; Expansion equal and symmetrical, no masses  LUNG EXAM; Good breath sounds bilaterally. There are expiratory wheezes both lungs, there are crackles at both lung bases  CARDIOVASCULAR EXAM: Positive S1 and S2, no S3 or S4, no clicks ,no murmurs  RIGHT AND LEFT LOWER EXTRIMITY EXAM: No edema, no swelling, no inflamation  CNS EXAM: Alert and oriented X3          LABS   Lab Results   Component Value Date    WBC 15.0 (H) 10/22/2020    HGB 10.4 (L) 10/22/2020    HCT 32.5 (L) 10/22/2020    .2 (H) 10/22/2020     (L) 10/22/2020     Lab Results   Component Value Date    CREATININE 1.1 10/24/2020    BUN 47 (H) 10/24/2020     (L) 10/24/2020    K 4.7 10/24/2020    CL 95 (L) 10/24/2020    CO2 28 10/24/2020     Lab Results   Component Value Date    INR 1.27 10/19/2020    PROTIME 15.4 (H) 10/19/2020          Lab Results   Component Value Date    PHOS 4.2 07/15/2020      No results for input(s): PH, PO2ART, YNX1FHL, HCO3, BEART, O2SAT in the last 72 hours. Wt Readings from Last 3 Encounters:   10/24/20 202 lb 2.6 oz (91.7 kg)   10/12/20 204 lb (92.5 kg)   10/05/20 207 lb (93.9 kg)               ASSESMENT  Ac resp failure  Ac copd  vonda pl effusion  Bib atx        PLAN  1. Bd rx  2. Taper steroids  3.  Inc yossi    10/24/2020  Elicia Beavers M.D.

## 2020-10-24 NOTE — PROGRESS NOTES
Physical Therapy    Physical Therapy Treatment Note  Name: Domi Power MRN: [id number] :   1955   Date:  10/24/2020   Admission Date: 10/19/2020 Room:  [address] [place], [place] [address]A   Restrictions/Precautions:        fall risk, sternal precautions  Communication with other providers:  Per nurse ok to tx  Subjective:  Patient states:  Agreeable to tx  Pain:   Location, Type, Intensity (0/10 to 10/10):  No c/o during tx  Objective:    Observation:  Alert and oriented on 7 liters O2  Treatment, including education/measures:  Sup to sit with min assist and cues  Sit<=>stand from bed to chair cga of 1  amb with cardiac walker 240' cga     Education:  Pt was instructed in Sternal Precautions, Purpose of Exercise Program, Joselin Scale and Signs and Symptoms of Exercise Intolerance per Cardiac Protocol  Pt was instructed in Intermediate Cardiac ex program:sitting pt needs rest breaks between ex and cues for purse lip breathing. Cervical side bending and rotation 10 reps. Overhead Side Stretch x 10  Ankle Pumps/ Heel Raises x 10  Marching Seated x 10  Forward Arm Raises x 10  Side Arm Raises x 10  Arm Crosses x 10  Arm Circles Forwards and Backwards x 10  SittingTrunkTwist x 10  Safety  Patient left safely in the chair, with call light/phone in reach with alarm applied. Gait belt was used for transfers and gait. Assessment / Impression:       Patient's tolerance of treatment:  good  Adverse Reaction: na  Significant change in status and impact:  na  Barriers to improvement:  Strength and safety  Plan for Next Session:    Will cont to progress ex's and gt per cardiac protocol and educate pt on sternal precautions, S & S of ex intolerance, purpose of ex's, progression of ex's and gt at home.    Time in: 830  Time out:  915  Timed treatment minutes:  45  Total treatment time:  39    Previously filed items:  Social/Functional History  Lives With: Significant other(Plans on staying with ex-wife at discharge)  Type of Home: House  Home Layout: One level  Home Access: Stairs to enter with rails  Entrance Stairs - Number of Steps: 1  Entrance Stairs - Rails: Right  Bathroom Shower/Tub: Tub/Shower unit  Bathroom Toilet: Standard  Bathroom Accessibility: Accessible  ADL Assistance: Independent  Homemaking Assistance: Independent  Homemaking Responsibilities: Yes  Ambulation Assistance: Independent(uses no AD)  Transfer Assistance: Independent  Active : Yes  Occupation: Full time employment  Type of occupation:   Additional Comments: Per RN, 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 Pt reports he will be staying with ex-wife at discharge and will have initial 24/7 supervision. Above information is for ex-wife's home setup.   Short term goals  Time Frame for Short term goals: 1 week  Short term goal 1: Pt will perform sit><supine Shanel  Short term goal 2: Pt will transfer SBA  Short term goal 3: Pt will ambulate 200ft with LRAD SBA  Short term goal 4: Pt will ascend/descend 1 step, single rail SBA  Short term goal 5: Pt will complete cardiac HEP x 10 reps indep       Electronically signed by:    Guanako Guerra PTA  10/24/2020, 8:17 AM

## 2020-10-24 NOTE — CARE COORDINATION
Received a call from [address] [facility] admissions inquiring if patient will admit today. Spoke with Tonie Torrez RN who states patient is not medically stable today though likely will be tomorrow. Updated [facility] admissions who states patient authorization is valid through tomorrow hence he is able to admit tomorrow if ready. If not, new pre-cert will likely be needed. CM remains available if needs arise.

## 2020-10-24 NOTE — PROGRESS NOTES
PATIENT NAME: Arlet Mccall    TODAY'S DATE: 10/24/20    SUBJECTIVE:    Pt postop day #5 status post CABG  Is making slow but steady progress still on oxygen however pulse oximetry reveals increasing oxygen levels  He is more comfortable and tolerating more activity. OBJECTIVE:   VITALS:    Vitals:    10/24/20 1000   BP: (!) 140/70   Pulse: 65   Resp:    Temp:    SpO2: 98%     INTAKE/OUTPUT:    Date 10/24/20 0000 - 10/24/20 2359   Shift 6927-2803 7338-8052 2819-5047 24 Hour Total   INTAKE   P.O.  200  200   Shift Total(mL/kg)  704(6.7)  200(2.2)   OUTPUT   Urine(mL/kg/hr) 450(0.6)   450   Shift Total(mL/kg) 450(4.9)   450(4.9)   Weight (kg) 91.7 91.7 91.7 91.7      Patient Vitals for the past 96 hrs (Last 3 readings):   Weight   10/24/20 0534 202 lb 2.6 oz (91.7 kg)   10/23/20 0500 203 lb 4.2 oz (92.2 kg)   10/22/20 0525 205 lb 4 oz (93.1 kg)       EXAM:  Blood pressure (!) 140/70, pulse 65, temperature 98.2 °F (36.8 °C), temperature source Oral, resp. rate 21, height 5' 9.5\" (1.765 m), weight 202 lb 2.6 oz (91.7 kg), SpO2 98 %. General appearance: No apparent distress, appears stated age and cooperative. Skin: unremarkable  HEENT Normocephalic, atraumatic without obvious deformity. Neck: Supple, Trachea midline   Lungs: Good respiratory effort. Clear to auscultation, bilaterally  Heart: Regular rate/ rhythm inc c/d/i  Abdomen: Soft, non-tender or non-distended   Extremities: edema warm well perfused  Neurologic: Alert, grossly intact  Mental status: normal affect      Data:  CBC:   Recent Labs     10/22/20  0530   WBC 15.0*   HGB 10.4*   HCT 32.5*   *     BMP:    Recent Labs     10/22/20  0530 10/23/20  0600 10/24/20  0500   * 131* 132*   K 4.1 4.7 4.7   CL 96* 95* 95*   CO2 24 25 28   BUN 45* 50* 47*   CREATININE 1.2 1.1 1.1   GLUCOSE 93 143* 128*     Hepatic: No results for input(s): AST, ALT, ALB, BILITOT, ALKPHOS in the last 72 hours.   Mag:      Recent Labs     10/22/20  0501 10/23/20  0600   MG 2.2 2.4      Phos:   No results for input(s): PHOS in the last 72 hours. INR: No results for input(s): INR in the last 72 hours. Radiology Review:  CXR      ASSESSMENT AND PLAN:  S/P   Patient Active Problem List   Diagnosis    Juvenile rheumatoid arthritis ([facility] [address]    Chronic bilateral low back pain without sciatica    Peripheral arterial disease (HCC)    Panlobular emphysema (HCC)    Essential hypertension    Acquired hypothyroidism    Gastroesophageal reflux disease    Non-seasonal allergic rhinitis    Benign prostatic hyperplasia without lower urinary tract symptoms    Tobacco use    Acute pulmonary edema (HCC)    Seasonal allergic rhinitis due to pollen    Mixed irritable bowel syndrome    Mixed hyperlipidemia    Prediabetes    Benign prostatic hyperplasia    Chronic combined systolic and diastolic congestive heart failure (HCC)    JOSE (acute kidney injury) (HCC)    LV dysfunction    Dehydration    Vomiting    Coronary artery disease involving native coronary artery    CAD in native artery       Mobilize. Pulmonary toilet.    Diuresis  Continue postoperative monitoring and care  Continue weaning the oxygen  Patient may be ready to go to ARU next 24 to 48 hours

## 2020-10-24 NOTE — PROGRESS NOTES
Progress Note( Dr. David Goodwin)  10/24/2020  Subjective:   Admit Date: 10/19/2020  PCP: Melanie White MD    Admitted For : Chest pain CAD underwent CABG    Consulted For: Better control of blood glucose filed diabetes mellitus    Interval History:Postop day 5  Better  C/O  chest pains, also with deep breathing  Has some  SOB . Denies nausea or vomiting. Doing well //more  ambulatory  No new bowel or bladder symptoms. Intake/Output Summary (Last 24 hours) at 10/24/2020 1638  Last data filed at 10/24/2020 1330  Gross per 24 hour   Intake 450 ml   Output 1400 ml   Net -950 ml       DATA    CBC:   Recent Labs     10/22/20  0530   WBC 15.0*   HGB 10.4*   *    CMP:  Recent Labs     10/22/20  0530 10/23/20  0600 10/24/20  0500   * 131* 132*   K 4.1 4.7 4.7   CL 96* 95* 95*   CO2 24 25 28   BUN 45* 50* 47*   CREATININE 1.2 1.1 1.1   CALCIUM 8.8 9.0 9.0     Lipids:   Lab Results   Component Value Date    CHOL 155 06/23/2020    HDL 30 06/23/2020    TRIG 173 06/23/2020     Glucose:  Recent Labs     10/24/20  0201 10/24/20  0944 10/24/20  1241   POCGLU 163* 126* 143*     [id number]:  Lab Results   Component Value Date    LABA1C 6.3 10/12/2020     High Sensitivity TSH:   Lab Results   Component Value Date    TSHHS 1.340 06/23/2020     Free T3: No results found for: FT3  Free T4:  Lab Results   Component Value Date    T4FREE 1.28 06/23/2020       Xr Chest Portable    Result Date: 10/22/2020  EXAMINATION: ONE XRAY VIEW OF THE CHEST 10/22/2020 5:10 am COMPARISON: 10/21/2020 HISTORY: ORDERING SYSTEM PROVIDED HISTORY: Post op open heart surgery TECHNOLOGIST PROVIDED HISTORY: Reason for Exam:->Post op open heart surgery Reason for Exam: post op open heart surgery Acuity: Unknown Type of Exam: Ongoing FINDINGS: Central venous catheter tip overlies the SVC. Sternotomy wires. Small bilateral pleural effusion. Right lower lobe opacity. Stable cardiomegaly. Mild interstitial edema. No pneumothorax.      1. Unchanged mild pulmonary edema and small bilateral pleural effusions. 2. Stable right lower lobe atelectasis. Xr Chest Portable    Result Date: 10/21/2020  EXAMINATION: ONE XRAY VIEW OF THE CHEST 10/21/2020 1:16 pm COMPARISON: 10/21/2020 HISTORY: ORDERING SYSTEM PROVIDED HISTORY: chest tube removal   ersistent small right pleural effusion with bibasilar atelectasis. No discrete pneumothorax. The osseous structures otherwise stable. Interval removal of thoracostomy tubes. No discrete pneumothorax. Xr Chest Portable    Result Date: 10/21/2020  EXAMINATION: ONE XRAY VIEW OF THE CHEST 10/21/2020 6:06 am COMPARISON: 10/20/2020 HISTORY: ORDERING SYSTEM PROVIDED HISTORY: Post op open heart surgery  . Mildly increased interstitial changes which could be related to increased pulmonary edema or pneumonitis. Cardiomegaly with central vascular congestion. Otherwise similar appearing chest.     Xr Chest Portable    Result Date: 10/21/2020  EXAMINATION: ONE XRAY VIEW OF THE CHEST 10/20/2020 5:18 am COMPARISON: Chest radiograph dated October 19, 2020. HISTORY: ORDERING SYSTEM PROVIDED HISTORY: Post op open heart surgery TECHNOLOGIST PROVIDED HISTORY: Reason for Exam:->Post op open heart surgery Reason for Exam: Post op open heart surgery Acuity: Unknown Type of Exam: Unknown FINDINGS: Median sternotomy wires are noted. A left-sided central venous catheter is in place. A [place]-Olga catheter is in place. Bilateral chest tubes are present. Low lung volumes with bilateral pleural effusions and bibasilar opacities are seen. Bilateral pleural effusions with bibasilar opacities without significant change.            Scheduled Medicines   Medications:    albuterol sulfate HFA  2 puff Inhalation Q4H WA    insulin lispro  0-6 Units Subcutaneous TID WC    insulin lispro  0-3 Units Subcutaneous 2 times per day    methylPREDNISolone  40 mg Intravenous Q12H    guaiFENesin  600 mg Oral BID    carvedilol  3.125 mg Oral BID    furosemide  40 mg Intravenous BID    budesonide-formoterol  2 puff Inhalation BID    DULoxetine  60 mg Oral Daily    finasteride  5 mg Oral Daily    levothyroxine  100 mcg Oral Daily    tiotropium  2 puff Inhalation Daily    tamsulosin  0.4 mg Oral Daily    sodium chloride flush  10 mL Intravenous 2 times per day    polyethylene glycol  17 g Oral Daily    pantoprazole  40 mg Oral Daily    amiodarone  200 mg Oral TID    therapeutic multivitamin-minerals  1 tablet Oral Daily with breakfast    atorvastatin  20 mg Oral Nightly    aspirin  81 mg Oral Daily    [START ON 10/27/2020] amiodarone  200 mg Oral Daily      Infusions:    sodium chloride 10 mL/hr at 10/21/20 1401    nitroGLYCERIN      insulin 1 Units/hr (10/22/20 0334)    dextrose      EPINEPHrine infusion      norepinephrine           Objective:   Vitals: /60   Pulse 64   Temp 98.4 °F (36.9 °C) (Oral)   Resp 12   Ht 5' 9.5\" (1.765 m)   Wt 202 lb 2.6 oz (91.7 kg)   SpO2 95%   BMI 29.43 kg/m²   General appearance: alert and cooperative with exam  Neck: no JVD or bruit  Thyroid : Normal lobes   Lungs: Has Vesicular Breath sounds somewhat diminished breath sounds right side   heart:  regular rate and rhythm  Abdomen: soft, non-tender; bowel sounds normal; no masses,  no organomegaly  Musculoskeletal: Normal  Extremities: extremities normal, , no edema  Neurologic:  Awake, alert, oriented to name, place and time. Cranial nerves II-XII are grossly intact. Motor is  intact. Sensory is intact. ,  and gait is normal.    Assessment:     Patient Active Problem List:     Juvenile rheumatoid arthritis (HCC)     Chronic bilateral low back pain without sciatica     Peripheral arterial disease (HCC)     Panlobular emphysema (HCC)     Essential hypertension     Acquired hypothyroidism     Gastroesophageal reflux disease     Non-seasonal allergic rhinitis     Benign prostatic hyperplasia without lower urinary tract symptoms

## 2020-10-24 NOTE — PROGRESS NOTES
Progress Note( Dr. Concepcion Garcia)  10/23/2020  Subjective:   Admit Date: 10/19/2020  PCP: Elvin Riley MD    Admitted For : Chest pain CAD underwent CABG    Consulted For: Better control of blood glucose filed diabetes mellitus    Interval History:Postop day 4  Better  Denies any chest pains,   Denies SOB . Denies nausea or vomiting. Doing well //more  ambulatory  No new bowel or bladder symptoms. Intake/Output Summary (Last 24 hours) at 10/23/2020 2224  Last data filed at 10/23/2020 2200  Gross per 24 hour   Intake 880.52 ml   Output 2505 ml   Net -1624.48 ml       DATA    CBC:   Recent Labs     10/21/20  0510 10/22/20  0530   WBC 22.0* 15.0*   HGB 11.6* 10.4*   * 120*    CMP:  Recent Labs     10/21/20  0510 10/22/20  0530 10/23/20  0600    133* 131*   K 4.3 4.1 4.7    96* 95*   CO2 27 24 25   BUN 32* 45* 50*   CREATININE 0.9 1.2 1.1   CALCIUM 9.2 8.8 9.0     Lipids:   Lab Results   Component Value Date    CHOL 155 06/23/2020    HDL 30 06/23/2020    TRIG 173 06/23/2020     Glucose:  Recent Labs     10/23/20  1201 10/23/20  1709 10/23/20  2021   POCGLU 181* 170* 184*     [id number]:  Lab Results   Component Value Date    LABA1C 6.3 10/12/2020     High Sensitivity TSH:   Lab Results   Component Value Date    TSHHS 1.340 06/23/2020     Free T3: No results found for: FT3  Free T4:  Lab Results   Component Value Date    T4FREE 1.28 06/23/2020       Xr Chest Portable    Result Date: 10/22/2020  EXAMINATION: ONE XRAY VIEW OF THE CHEST 10/22/2020 5:10 am COMPARISON: 10/21/2020 HISTORY: ORDERING SYSTEM PROVIDED HISTORY: Post op open heart surgery TECHNOLOGIST PROVIDED HISTORY: Reason for Exam:->Post op open heart surgery Reason for Exam: post op open heart surgery Acuity: Unknown Type of Exam: Ongoing FINDINGS: Central venous catheter tip overlies the SVC. Sternotomy wires. Small bilateral pleural effusion. Right lower lobe opacity. Stable cardiomegaly. Mild interstitial edema.   No pneumothorax. 1. Unchanged mild pulmonary edema and small bilateral pleural effusions. 2. Stable right lower lobe atelectasis. Xr Chest Portable    Result Date: 10/21/2020  EXAMINATION: ONE XRAY VIEW OF THE CHEST 10/21/2020 1:16 pm COMPARISON: 10/21/2020 HISTORY: ORDERING SYSTEM PROVIDED HISTORY: chest tube removal   ersistent small right pleural effusion with bibasilar atelectasis. No discrete pneumothorax. The osseous structures otherwise stable. Interval removal of thoracostomy tubes. No discrete pneumothorax. Xr Chest Portable    Result Date: 10/21/2020  EXAMINATION: ONE XRAY VIEW OF THE CHEST 10/21/2020 6:06 am COMPARISON: 10/20/2020 HISTORY: ORDERING SYSTEM PROVIDED HISTORY: Post op open heart surgery  . Mildly increased interstitial changes which could be related to increased pulmonary edema or pneumonitis. Cardiomegaly with central vascular congestion. Otherwise similar appearing chest.     Xr Chest Portable    Result Date: 10/21/2020  EXAMINATION: ONE XRAY VIEW OF THE CHEST 10/20/2020 5:18 am COMPARISON: Chest radiograph dated October 19, 2020. HISTORY: ORDERING SYSTEM PROVIDED HISTORY: Post op open heart surgery TECHNOLOGIST PROVIDED HISTORY: Reason for Exam:->Post op open heart surgery Reason for Exam: Post op open heart surgery Acuity: Unknown Type of Exam: Unknown FINDINGS: Median sternotomy wires are noted. A left-sided central venous catheter is in place. A [place]-Olga catheter is in place. Bilateral chest tubes are present. Low lung volumes with bilateral pleural effusions and bibasilar opacities are seen. Bilateral pleural effusions with bibasilar opacities without significant change.            Scheduled Medicines   Medications:    albuterol sulfate HFA  2 puff Inhalation Q4H WA    insulin lispro  0-6 Units Subcutaneous TID WC    insulin lispro  0-3 Units Subcutaneous 2 times per day    methylPREDNISolone  40 mg Intravenous Q12H    guaiFENesin  600 mg Oral BID    carvedilol  3.125 mg Oral BID    furosemide  40 mg Intravenous BID    budesonide-formoterol  2 puff Inhalation BID    DULoxetine  60 mg Oral Daily    finasteride  5 mg Oral Daily    levothyroxine  100 mcg Oral Daily    tiotropium  2 puff Inhalation Daily    tamsulosin  0.4 mg Oral Daily    sodium chloride flush  10 mL Intravenous 2 times per day    polyethylene glycol  17 g Oral Daily    pantoprazole  40 mg Oral Daily    amiodarone  200 mg Oral TID    therapeutic multivitamin-minerals  1 tablet Oral Daily with breakfast    atorvastatin  20 mg Oral Nightly    aspirin  81 mg Oral Daily    [START ON 10/27/2020] amiodarone  200 mg Oral Daily      Infusions:    sodium chloride 10 mL/hr at 10/21/20 1401    nitroGLYCERIN      insulin 1 Units/hr (10/22/20 0334)    dextrose      EPINEPHrine infusion      norepinephrine           Objective:   Vitals: BP (!) 116/52   Pulse 69   Temp 98 °F (36.7 °C) (Oral)   Resp 17   Ht 5' 9.5\" (1.765 m)   Wt 203 lb 4.2 oz (92.2 kg)   SpO2 (!) 87%   BMI 29.59 kg/m²   General appearance: alert and cooperative with exam  Neck: no JVD or bruit  Thyroid : Normal lobes   Lungs: Has Vesicular Breath sounds remove the chest tubes  Heart:  regular rate and rhythm  Abdomen: soft, non-tender; bowel sounds normal; no masses,  no organomegaly  Musculoskeletal: Normal  Extremities: extremities normal, , no edema  Neurologic:  Awake, alert, oriented to name, place and time. Cranial nerves II-XII are grossly intact. Motor is  intact. Sensory is intact. ,  and gait is normal.    Assessment:     Patient Active Problem List:     Juvenile rheumatoid arthritis (HCC)     Chronic bilateral low back pain without sciatica     Peripheral arterial disease (HCC)     Panlobular emphysema (HCC)     Essential hypertension     Acquired hypothyroidism     Gastroesophageal reflux disease     Non-seasonal allergic rhinitis     Benign prostatic hyperplasia without lower urinary tract symptoms Tobacco use     Acute pulmonary edema (HCC)     Seasonal allergic rhinitis due to pollen     Mixed irritable bowel syndrome     Mixed hyperlipidemia     Prediabetes     Benign prostatic hyperplasia     Chronic combined systolic and diastolic congestive heart failure (HCC)     JOSE (acute kidney injury) (HCC)     LV dysfunction     Dehydration     Vomiting     Coronary artery disease involving native coronary artery     CAD in native artery. //CABG      Plan:     1. Reviewed POC blood glucose . Labs and X ray results   2. Reviewed Current Medicines   3. on  Correction bolus Humalog Insulin regime  4. Monitor Blood glucose frequently   5. Modified  the dose of Insulin/ other medicines as needed   6. Will follow     .      Margaret Sanchez MD

## 2020-10-25 LAB
ANION GAP SERPL CALCULATED.3IONS-SCNC: 6 MMOL/L (ref 4–16)
BUN BLDV-MCNC: 47 MG/DL (ref 6–23)
CALCIUM SERPL-MCNC: 9 MG/DL (ref 8.3–10.6)
CHLORIDE BLD-SCNC: 98 MMOL/L (ref 99–110)
CO2: 32 MMOL/L (ref 21–32)
CREAT SERPL-MCNC: 1 MG/DL (ref 0.9–1.3)
GFR AFRICAN AMERICAN: >60 ML/MIN/1.73M2
GFR NON-AFRICAN AMERICAN: >60 ML/MIN/1.73M2
GLUCOSE BLD-MCNC: 128 MG/DL (ref 70–99)
GLUCOSE BLD-MCNC: 141 MG/DL (ref 70–99)
GLUCOSE BLD-MCNC: 152 MG/DL (ref 70–99)
GLUCOSE BLD-MCNC: 161 MG/DL (ref 70–99)
GLUCOSE BLD-MCNC: 167 MG/DL (ref 70–99)
GLUCOSE BLD-MCNC: 167 MG/DL (ref 70–99)
POTASSIUM SERPL-SCNC: 4.8 MMOL/L (ref 3.5–5.1)
SODIUM BLD-SCNC: 136 MMOL/L (ref 135–145)

## 2020-10-25 PROCEDURE — 80048 BASIC METABOLIC PNL TOTAL CA: CPT

## 2020-10-25 PROCEDURE — 6370000000 HC RX 637 (ALT 250 FOR IP): Performed by: SURGERY

## 2020-10-25 PROCEDURE — 6360000002 HC RX W HCPCS: Performed by: INTERNAL MEDICINE

## 2020-10-25 PROCEDURE — 82962 GLUCOSE BLOOD TEST: CPT

## 2020-10-25 PROCEDURE — 6370000000 HC RX 637 (ALT 250 FOR IP): Performed by: PHYSICIAN ASSISTANT

## 2020-10-25 PROCEDURE — 94640 AIRWAY INHALATION TREATMENT: CPT

## 2020-10-25 PROCEDURE — 6360000002 HC RX W HCPCS: Performed by: PHYSICIAN ASSISTANT

## 2020-10-25 PROCEDURE — 6370000000 HC RX 637 (ALT 250 FOR IP): Performed by: INTERNAL MEDICINE

## 2020-10-25 PROCEDURE — 94150 VITAL CAPACITY TEST: CPT

## 2020-10-25 PROCEDURE — 2000000000 HC ICU R&B

## 2020-10-25 PROCEDURE — 2580000003 HC RX 258: Performed by: PHYSICIAN ASSISTANT

## 2020-10-25 PROCEDURE — 94761 N-INVAS EAR/PLS OXIMETRY MLT: CPT

## 2020-10-25 PROCEDURE — 2700000000 HC OXYGEN THERAPY PER DAY

## 2020-10-25 RX ORDER — PREDNISONE 20 MG/1
20 TABLET ORAL DAILY
Status: DISCONTINUED | OUTPATIENT
Start: 2020-10-25 | End: 2020-10-27 | Stop reason: HOSPADM

## 2020-10-25 RX ADMIN — ALBUTEROL SULFATE 2 PUFF: 90 AEROSOL, METERED RESPIRATORY (INHALATION) at 11:17

## 2020-10-25 RX ADMIN — ALBUTEROL SULFATE 2 PUFF: 90 AEROSOL, METERED RESPIRATORY (INHALATION) at 15:51

## 2020-10-25 RX ADMIN — SODIUM CHLORIDE, PRESERVATIVE FREE 10 ML: 5 INJECTION INTRAVENOUS at 09:24

## 2020-10-25 RX ADMIN — METHYLPREDNISOLONE SODIUM SUCCINATE 40 MG: 40 INJECTION, POWDER, LYOPHILIZED, FOR SOLUTION INTRAMUSCULAR; INTRAVENOUS at 12:23

## 2020-10-25 RX ADMIN — SODIUM CHLORIDE, PRESERVATIVE FREE 10 ML: 5 INJECTION INTRAVENOUS at 21:09

## 2020-10-25 RX ADMIN — FINASTERIDE 5 MG: 5 TABLET, FILM COATED ORAL at 09:26

## 2020-10-25 RX ADMIN — BUDESONIDE AND FORMOTEROL FUMARATE DIHYDRATE 2 PUFF: 160; 4.5 AEROSOL RESPIRATORY (INHALATION) at 08:01

## 2020-10-25 RX ADMIN — FUROSEMIDE 40 MG: 10 INJECTION, SOLUTION INTRAMUSCULAR; INTRAVENOUS at 18:13

## 2020-10-25 RX ADMIN — HYDROCODONE BITARTRATE AND ACETAMINOPHEN 2 TABLET: 5; 325 TABLET ORAL at 19:49

## 2020-10-25 RX ADMIN — GUAIFENESIN 600 MG: 600 TABLET, EXTENDED RELEASE ORAL at 09:26

## 2020-10-25 RX ADMIN — HYDROCODONE BITARTRATE AND ACETAMINOPHEN 2 TABLET: 5; 325 TABLET ORAL at 08:44

## 2020-10-25 RX ADMIN — MULTIPLE VITAMINS W/ MINERALS TAB 1 TABLET: TAB at 09:28

## 2020-10-25 RX ADMIN — TIOTROPIUM BROMIDE INHALATION SPRAY 2 PUFF: 3.12 SPRAY, METERED RESPIRATORY (INHALATION) at 08:02

## 2020-10-25 RX ADMIN — POLYETHYLENE GLYCOL (3350) 17 G: 17 POWDER, FOR SOLUTION ORAL at 09:23

## 2020-10-25 RX ADMIN — PREDNISONE 20 MG: 20 TABLET ORAL at 15:40

## 2020-10-25 RX ADMIN — INSULIN LISPRO 1 UNITS: 100 INJECTION, SOLUTION INTRAVENOUS; SUBCUTANEOUS at 18:15

## 2020-10-25 RX ADMIN — FUROSEMIDE 40 MG: 10 INJECTION, SOLUTION INTRAMUSCULAR; INTRAVENOUS at 09:26

## 2020-10-25 RX ADMIN — HYDROCODONE BITARTRATE AND ACETAMINOPHEN 2 TABLET: 5; 325 TABLET ORAL at 15:41

## 2020-10-25 RX ADMIN — METHYLPREDNISOLONE SODIUM SUCCINATE 40 MG: 40 INJECTION, POWDER, LYOPHILIZED, FOR SOLUTION INTRAMUSCULAR; INTRAVENOUS at 01:00

## 2020-10-25 RX ADMIN — BUDESONIDE AND FORMOTEROL FUMARATE DIHYDRATE 2 PUFF: 160; 4.5 AEROSOL RESPIRATORY (INHALATION) at 21:54

## 2020-10-25 RX ADMIN — INSULIN LISPRO 1 UNITS: 100 INJECTION, SOLUTION INTRAVENOUS; SUBCUTANEOUS at 08:38

## 2020-10-25 RX ADMIN — TAMSULOSIN HYDROCHLORIDE 0.4 MG: 0.4 CAPSULE ORAL at 09:25

## 2020-10-25 RX ADMIN — PANTOPRAZOLE SODIUM 40 MG: 40 TABLET, DELAYED RELEASE ORAL at 09:25

## 2020-10-25 RX ADMIN — ALBUTEROL SULFATE 2 PUFF: 90 AEROSOL, METERED RESPIRATORY (INHALATION) at 21:54

## 2020-10-25 RX ADMIN — AMIODARONE HYDROCHLORIDE 200 MG: 200 TABLET ORAL at 15:41

## 2020-10-25 RX ADMIN — DULOXETINE HYDROCHLORIDE 60 MG: 30 CAPSULE, DELAYED RELEASE ORAL at 09:24

## 2020-10-25 RX ADMIN — CARVEDILOL 3.12 MG: 3.12 TABLET, FILM COATED ORAL at 09:26

## 2020-10-25 RX ADMIN — SODIUM CHLORIDE, PRESERVATIVE FREE 10 ML: 5 INJECTION INTRAVENOUS at 01:00

## 2020-10-25 RX ADMIN — INSULIN LISPRO 1 UNITS: 100 INJECTION, SOLUTION INTRAVENOUS; SUBCUTANEOUS at 12:21

## 2020-10-25 RX ADMIN — AMIODARONE HYDROCHLORIDE 200 MG: 200 TABLET ORAL at 21:09

## 2020-10-25 RX ADMIN — AMIODARONE HYDROCHLORIDE 200 MG: 200 TABLET ORAL at 09:25

## 2020-10-25 RX ADMIN — ALBUTEROL SULFATE 2 PUFF: 90 AEROSOL, METERED RESPIRATORY (INHALATION) at 08:00

## 2020-10-25 RX ADMIN — CARVEDILOL 3.12 MG: 3.12 TABLET, FILM COATED ORAL at 21:09

## 2020-10-25 RX ADMIN — ATORVASTATIN CALCIUM 20 MG: 20 TABLET, FILM COATED ORAL at 21:09

## 2020-10-25 RX ADMIN — GUAIFENESIN 600 MG: 600 TABLET, EXTENDED RELEASE ORAL at 21:09

## 2020-10-25 RX ADMIN — ASPIRIN 81 MG: 81 TABLET, COATED ORAL at 09:25

## 2020-10-25 RX ADMIN — LEVOTHYROXINE SODIUM 100 MCG: 100 TABLET ORAL at 09:25

## 2020-10-25 ASSESSMENT — PAIN DESCRIPTION - DESCRIPTORS
DESCRIPTORS: ACHING
DESCRIPTORS: ACHING;DISCOMFORT
DESCRIPTORS: ACHING

## 2020-10-25 ASSESSMENT — PAIN SCALES - GENERAL
PAINLEVEL_OUTOF10: 9
PAINLEVEL_OUTOF10: 7
PAINLEVEL_OUTOF10: 7
PAINLEVEL_OUTOF10: 0
PAINLEVEL_OUTOF10: 9
PAINLEVEL_OUTOF10: 9

## 2020-10-25 ASSESSMENT — PAIN DESCRIPTION - ORIENTATION
ORIENTATION: MID;RIGHT
ORIENTATION: MID;RIGHT
ORIENTATION: MID

## 2020-10-25 ASSESSMENT — PAIN DESCRIPTION - PROGRESSION
CLINICAL_PROGRESSION: GRADUALLY IMPROVING
CLINICAL_PROGRESSION: GRADUALLY WORSENING
CLINICAL_PROGRESSION: GRADUALLY WORSENING
CLINICAL_PROGRESSION: GRADUALLY IMPROVING
CLINICAL_PROGRESSION: GRADUALLY WORSENING

## 2020-10-25 ASSESSMENT — PAIN - FUNCTIONAL ASSESSMENT
PAIN_FUNCTIONAL_ASSESSMENT: PREVENTS OR INTERFERES SOME ACTIVE ACTIVITIES AND ADLS
PAIN_FUNCTIONAL_ASSESSMENT: PREVENTS OR INTERFERES SOME ACTIVE ACTIVITIES AND ADLS
PAIN_FUNCTIONAL_ASSESSMENT: ACTIVITIES ARE NOT PREVENTED
PAIN_FUNCTIONAL_ASSESSMENT: PREVENTS OR INTERFERES SOME ACTIVE ACTIVITIES AND ADLS
PAIN_FUNCTIONAL_ASSESSMENT: PREVENTS OR INTERFERES SOME ACTIVE ACTIVITIES AND ADLS

## 2020-10-25 ASSESSMENT — PAIN DESCRIPTION - PAIN TYPE
TYPE: SURGICAL PAIN;CHRONIC PAIN
TYPE: SURGICAL PAIN

## 2020-10-25 ASSESSMENT — PAIN SCALES - WONG BAKER: WONGBAKER_NUMERICALRESPONSE: 0

## 2020-10-25 ASSESSMENT — PAIN DESCRIPTION - LOCATION
LOCATION: CHEST
LOCATION: CHEST;SHOULDER
LOCATION: CHEST
LOCATION: CHEST;SHOULDER
LOCATION: INCISION;STERNUM

## 2020-10-25 ASSESSMENT — PAIN DESCRIPTION - FREQUENCY
FREQUENCY: INTERMITTENT
FREQUENCY: CONTINUOUS
FREQUENCY: CONTINUOUS
FREQUENCY: INTERMITTENT
FREQUENCY: CONTINUOUS

## 2020-10-25 ASSESSMENT — PAIN DESCRIPTION - ONSET
ONSET: GRADUAL
ONSET: GRADUAL
ONSET: ON-GOING

## 2020-10-25 NOTE — PROGRESS NOTES
Progress Note( Dr. Concepcion Garcia)  10/25/2020  Subjective:   Admit Date: 10/19/2020  PCP: Elvin Riley MD    Admitted For : Chest pain CAD underwent CABG    Consulted For: Better control of blood glucose filed diabetes mellitus    Interval History:Postop day 5  Better  C/O  chest pains, also with deep breathing mostly from chest wall  Has some  SOB . Denies nausea or vomiting. Doing well //more  ambulatory  No new bowel or bladder symptoms. Intake/Output Summary (Last 24 hours) at 10/25/2020 1750  Last data filed at 10/25/2020 0602  Gross per 24 hour   Intake 200 ml   Output 1640 ml   Net -1440 ml       DATA    CBC:   No results for input(s): WBC, HGB, PLT in the last 72 hours. CMP:  Recent Labs     10/23/20  0600 10/24/20  0500 10/25/20  0603   * 132* 136   K 4.7 4.7 4.8   CL 95* 95* 98*   CO2 25 28 32   BUN 50* 47* 47*   CREATININE 1.1 1.1 1.0   CALCIUM 9.0 9.0 9.0     Lipids:   Lab Results   Component Value Date    CHOL 155 06/23/2020    HDL 30 06/23/2020    TRIG 173 06/23/2020     Glucose:  Recent Labs     10/25/20  0214 10/25/20  0835 10/25/20  1219   POCGLU 128* 152* 167*     WwsepzzuyhO1G:  Lab Results   Component Value Date    LABA1C 6.3 10/12/2020     High Sensitivity TSH:   Lab Results   Component Value Date    TSHHS 1.340 06/23/2020     Free T3: No results found for: FT3  Free T4:  Lab Results   Component Value Date    T4FREE 1.28 06/23/2020       Xr Chest Portable    Result Date: 10/22/2020  EXAMINATION: ONE XRAY VIEW OF THE CHEST 10/22/2020 5:10 am COMPARISON: 10/21/2020 HISTORY: ORDERING SYSTEM PROVIDED HISTORY: Post op open heart surgery TECHNOLOGIST PROVIDED HISTORY: Reason for Exam:->Post op open heart surgery Reason for Exam: post op open heart surgery Acuity: Unknown Type of Exam: Ongoing FINDINGS: Central venous catheter tip overlies the SVC. Sternotomy wires. Small bilateral pleural effusion. Right lower lobe opacity. Stable cardiomegaly. Mild interstitial edema.   No pneumothorax. 1. Unchanged mild pulmonary edema and small bilateral pleural effusions. 2. Stable right lower lobe atelectasis. Xr Chest Portable    Result Date: 10/21/2020  EXAMINATION: ONE XRAY VIEW OF THE CHEST 10/21/2020 1:16 pm COMPARISON: 10/21/2020 HISTORY: ORDERING SYSTEM PROVIDED HISTORY: chest tube removal   ersistent small right pleural effusion with bibasilar atelectasis. No discrete pneumothorax. The osseous structures otherwise stable. Interval removal of thoracostomy tubes. No discrete pneumothorax. Xr Chest Portable    Result Date: 10/21/2020  EXAMINATION: ONE XRAY VIEW OF THE CHEST 10/21/2020 6:06 am COMPARISON: 10/20/2020 HISTORY: ORDERING SYSTEM PROVIDED HISTORY: Post op open heart surgery  . Mildly increased interstitial changes which could be related to increased pulmonary edema or pneumonitis. Cardiomegaly with central vascular congestion. Otherwise similar appearing chest.     Xr Chest Portable    Result Date: 10/21/2020  EXAMINATION: ONE XRAY VIEW OF THE CHEST 10/20/2020 5:18 am COMPARISON: Chest radiograph dated October 19, 2020. HISTORY: ORDERING SYSTEM PROVIDED HISTORY: Post op open heart surgery TECHNOLOGIST PROVIDED HISTORY: Reason for Exam:->Post op open heart surgery Reason for Exam: Post op open heart surgery Acuity: Unknown Type of Exam: Unknown FINDINGS: Median sternotomy wires are noted. A left-sided central venous catheter is in place. A [place]and[place]-Olga catheter is in place. Bilateral chest tubes are present. Low lung volumes with bilateral pleural effusions and bibasilar opacities are seen. Bilateral pleural effusions with bibasilar opacities without significant change.            Scheduled Medicines   Medications:    predniSONE  20 mg Oral Daily    albuterol sulfate HFA  2 puff Inhalation Q4H WA    insulin lispro  0-6 Units Subcutaneous TID WC    insulin lispro  0-3 Units Subcutaneous 2 times per day    guaiFENesin  600 mg Oral BID    carvedilol 3.125 mg Oral BID    furosemide  40 mg Intravenous BID    budesonide-formoterol  2 puff Inhalation BID    DULoxetine  60 mg Oral Daily    finasteride  5 mg Oral Daily    levothyroxine  100 mcg Oral Daily    tiotropium  2 puff Inhalation Daily    tamsulosin  0.4 mg Oral Daily    sodium chloride flush  10 mL Intravenous 2 times per day    polyethylene glycol  17 g Oral Daily    pantoprazole  40 mg Oral Daily    amiodarone  200 mg Oral TID    therapeutic multivitamin-minerals  1 tablet Oral Daily with breakfast    atorvastatin  20 mg Oral Nightly    aspirin  81 mg Oral Daily    [START ON 10/27/2020] amiodarone  200 mg Oral Daily      Infusions:    sodium chloride 10 mL/hr at 10/21/20 1401    nitroGLYCERIN      insulin 1 Units/hr (10/22/20 0334)    dextrose      EPINEPHrine infusion      norepinephrine           Objective:   Vitals: /67   Pulse 64   Temp 97.9 °F (36.6 °C) (Oral)   Resp 12   Ht 5' 9.5\" (1.765 m)   Wt 200 lb 9.9 oz (91 kg)   SpO2 100%   BMI 29.20 kg/m²   General appearance: alert and cooperative with exam  Neck: no JVD or bruit  Thyroid : Normal lobes   Lungs: Has Vesicular Breath sounds somewhat diminished breath sounds right side   heart:  regular rate and rhythm  Abdomen: soft, non-tender; bowel sounds normal; no masses,  no organomegaly  Musculoskeletal: Normal  Extremities: extremities normal, , no edema  Neurologic:  Awake, alert, oriented to name, place and time. Cranial nerves II-XII are grossly intact. Motor is  intact. Sensory is intact. ,  and gait is normal.    Assessment:     Patient Active Problem List:     Juvenile rheumatoid arthritis (HCC)     Chronic bilateral low back pain without sciatica     Peripheral arterial disease (HCC)     Panlobular emphysema (HCC)     Essential hypertension     Acquired hypothyroidism     Gastroesophageal reflux disease     Non-seasonal allergic rhinitis     Benign prostatic hyperplasia without lower urinary tract symptoms Tobacco use     Acute pulmonary edema (HCC)     Seasonal allergic rhinitis due to pollen     Mixed irritable bowel syndrome     Mixed hyperlipidemia     Prediabetes     Benign prostatic hyperplasia     Chronic combined systolic and diastolic congestive heart failure (HCC)     JOSE (acute kidney injury) (HCC)     LV dysfunction     Dehydration     Vomiting     Coronary artery disease involving native coronary artery     CAD in native artery. //CABG      Plan:     1. Reviewed POC blood glucose . Labs and X ray results   2. Reviewed Current Medicines   3. on  Correction bolus Humalog Insulin regime  4. Monitor Blood glucose frequently   5. Modified  the dose of Insulin/ other medicines as needed   6. Looking into possibility of being discharged to acute rehab unit sometime soon on insurance approval  7. Will follow     .      Rinku Santos MD

## 2020-10-25 NOTE — PROGRESS NOTES
[facility] [address]   Progress Note  [address] 0 1 2      Date: 10/25/2020   Patient: Nano Carcamo   : 1955   DOA: 10/19/2020   MRN: [id number]   ROOM#: [id number]/2128-A     Admit Date: 10/19/2020     Collaborating Urologist on Call at time of admission: Dr. Stefanie Le     CC: Chest pain, SOB   Reason for Consult:  Urinary Retention   s/p CABG x 3 on 10/19/20 with Dr. Colletta Landsberg    Subjective:     Pain: mild, no nausea and no vomiting,   Bowel Movement/Flatus:   Yes  Voiding: indwelling dsouza catheter, urine clear    \"I don't feel too good\"   Patient weaning off of oxygen. Plans to be discharged to ARU, possible tomorrow. He has been ambulating well on his own. Patient does not feel comfortable proceeding with voiding trial today, he is in agreement to try tomorrow AM which is reasonable.      Objective:    Vitals:    BP (!) 146/72   Pulse 59   Temp 97.7 °F (36.5 °C) (Oral)   Resp 12   Ht 5' 9.5\" (1.765 m)   Wt 200 lb 9.9 oz (91 kg)   SpO2 99%   BMI 29.20 kg/m²    Temp  Av °F (36.7 °C)  Min: 97.7 °F (36.5 °C)  Max: 98.4 °F (36.9 °C)       Intake/Output Summary (Last 24 hours) at 10/25/2020 0810  Last data filed at 10/25/2020 0602  Gross per 24 hour   Intake 900 ml   Output 2690 ml   Net -1790 ml       Physical Exam:   General appearance: alert, appears stated age, cooperative and no distress  Head: Normocephalic, without obvious abnormality, atraumatic  Male genitalia: dsouza catheter in place, urine clear     Labs:   WBC:    Lab Results   Component Value Date    WBC 15.0 10/22/2020      Hemoglobin/Hematocrit:    Lab Results   Component Value Date    HGB 10.4 10/22/2020    HCT 32.5 10/22/2020      BMP:   Lab Results   Component Value Date     10/25/2020    K 4.8 10/25/2020    CL 98 10/25/2020    CO2 32 10/25/2020    BUN 47 10/25/2020    LABALBU 4.2 07/15/2020    CREATININE 1.0 10/25/2020    CALCIUM 9.0 10/25/2020    GFRAA >60 10/25/2020    LABGLOM >60 10/25/2020 Imaging:  Xr Chest (2 Vw)    Result Date: 10/24/2020  EXAMINATION: TWO XRAY VIEWS OF THE CHEST 10/24/2020 11:28 am COMPARISON: 10/23/2020 HISTORY: ORDERING SYSTEM PROVIDED HISTORY: pacer wire removal TECHNOLOGIST PROVIDED HISTORY: Reason for exam:->pacer wire removal Follow-up exam FINDINGS: Left subclavian central venous catheter stable in positioning. Status post median sternotomy. Stable cardiomegaly. Pulmonary vascular congestion has improved. Moderate right and small left pleural effusions with bibasilar atelectasis. No pneumothorax. Osseous structures otherwise stable. Osteopenia. There few mildly dilated loops of small bowel in the upper abdomen measuring up to 3.4 cm.     1. Interval improvement in pulmonary vascular congestion. 2. Moderate right and small left pleural effusions with adjacent atelectasis. 3. Few mildly dilated loops of small bowel in the upper abdomen, nonspecific. Recommend dedicated KUB for further evaluation. Xr Chest Portable    Result Date: 10/23/2020  EXAMINATION: ONE XRAY VIEW OF THE CHEST 10/23/2020 8:42 am COMPARISON: 10/22/2020 HISTORY: ORDERING SYSTEM PROVIDED HISTORY: cabg TECHNOLOGIST PROVIDED HISTORY: Reason for exam:->cabg Reason for Exam: cabg Acuity: Acute Type of Exam: Initial FINDINGS: Left subclavian catheter with tip at the inferior SVC. Status post median sternotomy. Stable mild cardiomegaly. No pneumothorax. Moderate right pleural effusion and small left pleural effusion are stable. Moderate bibasilar atelectasis is stable. 1.  No significant change from prior exam. Moderate right pleural effusion and small left pleural effusion with bibasilar atelectasis.      Xr Chest Portable    Result Date: 10/22/2020  EXAMINATION: ONE XRAY VIEW OF THE CHEST 10/22/2020 5:10 am COMPARISON: 10/21/2020 HISTORY: ORDERING SYSTEM PROVIDED HISTORY: Post op open heart surgery TECHNOLOGIST PROVIDED HISTORY: Reason for Exam:->Post op open heart surgery Reason for Exam: post op open heart surgery Acuity: Unknown Type of Exam: Ongoing FINDINGS: Central venous catheter tip overlies the SVC. Sternotomy wires. Small bilateral pleural effusion. Right lower lobe opacity. Stable cardiomegaly. Mild interstitial edema. No pneumothorax. 1. Unchanged mild pulmonary edema and small bilateral pleural effusions. 2. Stable right lower lobe atelectasis. Xr Chest Portable    Result Date: 10/21/2020  EXAMINATION: ONE XRAY VIEW OF THE CHEST 10/21/2020 1:16 pm COMPARISON: 10/21/2020 HISTORY: ORDERING SYSTEM PROVIDED HISTORY: chest tube removal TECHNOLOGIST PROVIDED HISTORY: Reason for exam:->chest tube removal Reason for Exam: chest tube removal Acuity: Acute Type of Exam: Initial Additional signs and symptoms: none Relevant Medical/Surgical History: post cabg 2 days ago FINDINGS: Interval removal of thoracostomy tubes. Left subclavian central venous catheter stable in positioning. Status post median sternotomy. Stable cardiomegaly with pulmonary vascular congestion. Persistent small right pleural effusion with bibasilar atelectasis. No discrete pneumothorax. The osseous structures otherwise stable. Interval removal of thoracostomy tubes. No discrete pneumothorax. Xr Chest Portable    Result Date: 10/21/2020  EXAMINATION: ONE XRAY VIEW OF THE CHEST 10/21/2020 6:06 am COMPARISON: 10/20/2020 HISTORY: ORDERING SYSTEM PROVIDED HISTORY: Post op open heart surgery TECHNOLOGIST PROVIDED HISTORY: Reason for Exam:->Post op open heart surgery Reason for Exam: Post op open heart surgery Acuity: Unknown Type of Exam: Ongoing FINDINGS: Median sternotomy wires are identified. Left-sided subclavian central venous catheter with the tip overlying the distal SVC. Cardiomegaly. Vascular congestion. Low lung volumes. Prominent interstitial changes are seen bilaterally, mildly increased. Osseous structures appear stable. No free air beneath the diaphragms.   Large bore tube is noted overlying the left chest and mediastinum versus medial aspect of the right hemithorax inferiorly. Gas-filled loops of bowel seen in the upper abdomen. Mildly increased interstitial changes which could be related to increased pulmonary edema or pneumonitis. Cardiomegaly with central vascular congestion. Otherwise similar appearing chest.     Xr Chest Portable    Result Date: 10/21/2020  EXAMINATION: ONE XRAY VIEW OF THE CHEST 10/20/2020 5:18 am COMPARISON: Chest radiograph dated October 19, 2020. HISTORY: ORDERING SYSTEM PROVIDED HISTORY: Post op open heart surgery TECHNOLOGIST PROVIDED HISTORY: Reason for Exam:->Post op open heart surgery Reason for Exam: Post op open heart surgery Acuity: Unknown Type of Exam: Unknown FINDINGS: Median sternotomy wires are noted. A left-sided central venous catheter is in place. A [place]-Olga catheter is in place. Bilateral chest tubes are present. Low lung volumes with bilateral pleural effusions and bibasilar opacities are seen. Bilateral pleural effusions with bibasilar opacities without significant change. Xr Chest Portable    Result Date: 10/20/2020  EXAMINATION: ONE XRAY VIEW OF THE CHEST 10/19/2020 12:42 pm COMPARISON: 09/18/2020 HISTORY: ORDERING SYSTEM PROVIDED HISTORY: Post op open heart surgery TECHNOLOGIST PROVIDED HISTORY: Reason for exam:->Post op open heart surgery Reason for Exam: Post op open heart surgery FINDINGS: Status post cardiac surgery. Endotracheal tube terminates 5.5 cm above the royal. Broad[place]-Olga catheter terminates in the region of the right main pulmonary artery. Left subclavian catheter terminates at the inferior SVC. Chest tubes are present. Moderate left basilar atelectasis. Mild right basilar atelectasis. Pulmonary vascular redistribution. No significant pneumothorax. Stable mild cardiomegaly. 1. Interval cardiac surgery. Satisfactory position of support devices. 2. Moderate left basilar atelectasis.   Mild right basilar atelectasis. 3. Pulmonary vascular redistribution. Us Lower Extremity Unilateral Vein Mapping    Result Date: 10/12/2020  EXAMINATION: VENOUS ULTRASOUND OF THE LEFT LOWER EXTREMITY FOR VEIN [id number]/12/2020 10:39 am TECHNIQUE: Duplex ultrasound and Doppler images were obtained of the left lower extremity. COMPARISON: 09/23/2020 HISTORY: ORDERING SYSTEM PROVIDED HISTORY: Pre-op exam TECHNOLOGIST PROVIDED HISTORY: Reason for Exam: pre op Acuity: Acute Type of Exam: Initial FINDINGS: Greater saphenous vein measurements below: Saphenofemoral junction: 4 mm Proximal thigh: 4 mm Mid thigh: 4 mm Distal thigh: 3.5 mm Knee: 3.4 mm Proximal calf: 2.4 mm Mid calf: 3 mm Distal calf: 2.7 mm     Left greater saphenous vein measurements above. Us Chest Including Mediastinum    Result Date: 10/23/2020  EXAMINATION: ULTRASOUND OF THE CHEST 10/23/2020 10:41 am COMPARISON: 10/23/2020 HISTORY: ORDERING SYSTEM PROVIDED HISTORY: evaluate size of pleural effusions for possible thoracentesis TECHNOLOGIST PROVIDED HISTORY: Reason for exam:->evaluate size of pleural effusions for possible thoracentesis Reason for Exam: evaluate for possible thora Acuity: Unknown Type of Exam: Subsequent/Follow-up FINDINGS: Small bilateral pleural effusions. No other focal abnormality. Small bilateral pleural effusions, which do not appear large enough for thoracentesis. Assessment & Plan:      Cynthia Pastrana is a 71y.o. year old male admitted 10/19/2020 for  CAD, s/p CABG x 3 on 10/19/20 with Dr. Rush Rod.      1) Urinary Retention: Fowler removed post-op 10/21/20              PVR 317cc on 10/22/20 AM, he was straight cathted with ~300cc urine output               Indwelling Fowler catheter placed evening of 10/22/20 with 475cc urine output               Serum creatinine stable at 1.0 (baseline 0.9-1.1)              Acute urinary retention likely related to anesthesia and immobility               Voiding trial tomorrow AM    Measure PVR after first void, replace indwelling dsouza catheter if >250cc   2) BPH: Chronically on Flomax 0.4mg and Proscar 5mg managed by his PCP, does not follow with Urology              Due to chronic LUTS, patient may benefit from UroLift vs TURP              Recommend outpatient cystoscopy/uroflow/PVR/TRUS in the near future for further evaluation of bladder emptying               PSA  1.10 on 10/23/20, repeat annually     Voiding trial tomorrow AM; if successful, will plan to see him for outpatient assessment in 2-3 weeks. If fails voiding trial, replace 16fr indwelling dsouza catheter and continue for an additional 5 days and repeat voiding trial at that time. Patient seen and examined, chart reviewed.      Electronically signed by Shankar Malik PA-C on 10/25/2020 at 8:10 AM

## 2020-10-25 NOTE — PROGRESS NOTES
pulmonary      SUBJECTIVE:  He still feels sob though oxygenating much better     OBJECTIVE    VITALS:  BP (!) 146/72   Pulse 59   Temp 97.7 °F (36.5 °C) (Oral)   Resp 12   Ht 5' 9.5\" (1.765 m)   Wt 200 lb 9.9 oz (91 kg)   SpO2 99%   BMI 29.20 kg/m²   HEAD AND FACE EXAM:  No throat injection, no active exudate,no thrush  NECK EXAM;No JVD, no masses, symmetrical  CHEST EXAM; Expansion equal and symmetrical, no masses  LUNG EXAM; Good breath sounds bilaterally. There are expiratory wheezes both lungs, there are crackles at both lung bases  CARDIOVASCULAR EXAM: Positive S1 and S2, no S3 or S4, no clicks ,no murmurs  RIGHT AND LEFT LOWER EXTRIMITY EXAM: No edema, no swelling, no inflamation  CNS EXAM: Alert and oriented X3          LABS   Lab Results   Component Value Date    WBC 15.0 (H) 10/22/2020    HGB 10.4 (L) 10/22/2020    HCT 32.5 (L) 10/22/2020    .2 (H) 10/22/2020     (L) 10/22/2020     Lab Results   Component Value Date    CREATININE 1.0 10/25/2020    BUN 47 (H) 10/25/2020     10/25/2020    K 4.8 10/25/2020    CL 98 (L) 10/25/2020    CO2 32 10/25/2020     Lab Results   Component Value Date    INR 1.27 10/19/2020    PROTIME 15.4 (H) 10/19/2020          Lab Results   Component Value Date    PHOS 4.2 07/15/2020      No results for input(s): PH, PO2ART, EDT9QPW, HCO3, BEART, O2SAT in the last 72 hours. Wt Readings from Last 3 Encounters:   10/25/20 200 lb 9.9 oz (91 kg)   10/12/20 204 lb (92.5 kg)   10/05/20 207 lb (93.9 kg)               ASSESMENT  Ac resp failure  Ac copd  vonda pl effusion  Bib atx        PLAN  1. cpm  2.  Cont o2 change to po steroids    10/25/2020  Ana M Juárez M.D.

## 2020-10-25 NOTE — PROGRESS NOTES
PATIENT NAME: Domi Power    TODAY'S DATE: 10/25/20    SUBJECTIVE:    Pt status post CABG. Day #5  Significant underlying pulmonary problems being addressed  Presently on 6 L oxygen  Still has significant incisional pain     OBJECTIVE:   VITALS:    Vitals:    10/25/20 0702   BP: (!) 146/72   Pulse: 59   Resp: 12   Temp:    SpO2: 99%     INTAKE/OUTPUT:    Date 10/25/20 0000 - 10/25/20 2359   Shift 4824-5863 4998-5094 0330-3143 24 Hour Total   INTAKE   Shift Total(mL/kg)       OUTPUT   Urine(mL/kg/hr) 390(0.5)   390   Shift Total(mL/kg) 390(4.3)   390(4.3)   Weight (kg) 91 91 91 91      Patient Vitals for the past 96 hrs (Last 3 readings):   Weight   10/25/20 0602 200 lb 9.9 oz (91 kg)   10/24/20 0534 202 lb 2.6 oz (91.7 kg)   10/23/20 0500 203 lb 4.2 oz (92.2 kg)       EXAM:  Blood pressure (!) 146/72, pulse 59, temperature 97.7 °F (36.5 °C), temperature source Oral, resp. rate 12, height 5' 9.5\" (1.765 m), weight 200 lb 9.9 oz (91 kg), SpO2 99 %. General appearance: No apparent distress, appears stated age and cooperative. Skin: unremarkable  HEENT Normocephalic, atraumatic without obvious deformity. Neck: Supple, Trachea midline   Lungs: Good respiratory effort. Clear to auscultation, bilaterally  Heart: Regular rate/ rhythm inc c/d/i  Abdomen: Soft, non-tender or non-distended   Extremities: edema warm well perfused  Neurologic: Alert, grossly intact  Mental status: normal affect      Data:  CBC: No results for input(s): WBC, HGB, HCT, PLT in the last 72 hours. BMP:    Recent Labs     10/23/20  0600 10/24/20  0500 10/25/20  0603   * 132* 136   K 4.7 4.7 4.8   CL 95* 95* 98*   CO2 25 28 32   BUN 50* 47* 47*   CREATININE 1.1 1.1 1.0   GLUCOSE 143* 128* 141*     Hepatic: No results for input(s): AST, ALT, ALB, BILITOT, ALKPHOS in the last 72 hours. Mag:      Recent Labs     10/23/20  0600   MG 2.4      Phos:   No results for input(s): PHOS in the last 72 hours.    INR: No results for input(s): INR in the last 72 hours. Radiology Review:  CXR      ASSESSMENT AND PLAN:  S/P CABG day #5  Patient Active Problem List   Diagnosis    Juvenile rheumatoid arthritis ([facility]    Chronic bilateral low back pain without sciatica    Peripheral arterial disease (HCC)    Panlobular emphysema ([facility]    Essential hypertension    Acquired hypothyroidism    Gastroesophageal reflux disease    Non-seasonal allergic rhinitis    Benign prostatic hyperplasia without lower urinary tract symptoms    Tobacco use    Acute pulmonary edema (HCC)    Seasonal allergic rhinitis due to pollen    Mixed irritable bowel syndrome    Mixed hyperlipidemia    Prediabetes    Benign prostatic hyperplasia    Chronic combined systolic and diastolic congestive heart failure (HCC)    JOSE (acute kidney injury) (HCC)    LV dysfunction    Vomiting    Coronary artery disease involving native coronary artery    CAD in native artery       Mobilize. Pulmonary toilet. Diuresis  Monitor intake output  Continue to wean oxygen  Probable transfer to ARU in a.m.

## 2020-10-26 LAB
ANION GAP SERPL CALCULATED.3IONS-SCNC: 10 MMOL/L (ref 4–16)
BUN BLDV-MCNC: 44 MG/DL (ref 6–23)
CALCIUM SERPL-MCNC: 9.2 MG/DL (ref 8.3–10.6)
CHLORIDE BLD-SCNC: 97 MMOL/L (ref 99–110)
CO2: 31 MMOL/L (ref 21–32)
CREAT SERPL-MCNC: 1 MG/DL (ref 0.9–1.3)
GFR AFRICAN AMERICAN: >60 ML/MIN/1.73M2
GFR NON-AFRICAN AMERICAN: >60 ML/MIN/1.73M2
GLUCOSE BLD-MCNC: 106 MG/DL (ref 70–99)
GLUCOSE BLD-MCNC: 109 MG/DL (ref 70–99)
GLUCOSE BLD-MCNC: 113 MG/DL (ref 70–99)
GLUCOSE BLD-MCNC: 144 MG/DL (ref 70–99)
GLUCOSE BLD-MCNC: 145 MG/DL (ref 70–99)
GLUCOSE BLD-MCNC: 210 MG/DL (ref 70–99)
POTASSIUM SERPL-SCNC: 4.6 MMOL/L (ref 3.5–5.1)
SODIUM BLD-SCNC: 138 MMOL/L (ref 135–145)

## 2020-10-26 PROCEDURE — 97116 GAIT TRAINING THERAPY: CPT

## 2020-10-26 PROCEDURE — 6360000002 HC RX W HCPCS: Performed by: SURGERY

## 2020-10-26 PROCEDURE — 94640 AIRWAY INHALATION TREATMENT: CPT

## 2020-10-26 PROCEDURE — 6370000000 HC RX 637 (ALT 250 FOR IP): Performed by: SURGERY

## 2020-10-26 PROCEDURE — 97110 THERAPEUTIC EXERCISES: CPT

## 2020-10-26 PROCEDURE — 2580000003 HC RX 258: Performed by: PHYSICIAN ASSISTANT

## 2020-10-26 PROCEDURE — 6360000002 HC RX W HCPCS: Performed by: PHYSICIAN ASSISTANT

## 2020-10-26 PROCEDURE — 80048 BASIC METABOLIC PNL TOTAL CA: CPT

## 2020-10-26 PROCEDURE — 2700000000 HC OXYGEN THERAPY PER DAY

## 2020-10-26 PROCEDURE — 6370000000 HC RX 637 (ALT 250 FOR IP): Performed by: INTERNAL MEDICINE

## 2020-10-26 PROCEDURE — 94761 N-INVAS EAR/PLS OXIMETRY MLT: CPT

## 2020-10-26 PROCEDURE — 97530 THERAPEUTIC ACTIVITIES: CPT

## 2020-10-26 PROCEDURE — 2000000000 HC ICU R&B

## 2020-10-26 PROCEDURE — 6370000000 HC RX 637 (ALT 250 FOR IP): Performed by: PHYSICIAN ASSISTANT

## 2020-10-26 PROCEDURE — 82962 GLUCOSE BLOOD TEST: CPT

## 2020-10-26 PROCEDURE — 94150 VITAL CAPACITY TEST: CPT

## 2020-10-26 PROCEDURE — 94660 CPAP INITIATION&MGMT: CPT

## 2020-10-26 RX ADMIN — PREDNISONE 20 MG: 20 TABLET ORAL at 10:24

## 2020-10-26 RX ADMIN — ALBUTEROL SULFATE 2 PUFF: 90 AEROSOL, METERED RESPIRATORY (INHALATION) at 12:34

## 2020-10-26 RX ADMIN — AMIODARONE HYDROCHLORIDE 200 MG: 200 TABLET ORAL at 10:18

## 2020-10-26 RX ADMIN — DULOXETINE HYDROCHLORIDE 60 MG: 30 CAPSULE, DELAYED RELEASE ORAL at 10:10

## 2020-10-26 RX ADMIN — LEVOTHYROXINE SODIUM 100 MCG: 100 TABLET ORAL at 10:18

## 2020-10-26 RX ADMIN — GUAIFENESIN 600 MG: 600 TABLET, EXTENDED RELEASE ORAL at 10:10

## 2020-10-26 RX ADMIN — PANTOPRAZOLE SODIUM 40 MG: 40 TABLET, DELAYED RELEASE ORAL at 10:18

## 2020-10-26 RX ADMIN — TAMSULOSIN HYDROCHLORIDE 0.4 MG: 0.4 CAPSULE ORAL at 10:17

## 2020-10-26 RX ADMIN — CARVEDILOL 3.12 MG: 3.12 TABLET, FILM COATED ORAL at 20:55

## 2020-10-26 RX ADMIN — SODIUM CHLORIDE, PRESERVATIVE FREE 10 ML: 5 INJECTION INTRAVENOUS at 20:56

## 2020-10-26 RX ADMIN — GUAIFENESIN 600 MG: 600 TABLET, EXTENDED RELEASE ORAL at 20:55

## 2020-10-26 RX ADMIN — TIOTROPIUM BROMIDE INHALATION SPRAY 2 PUFF: 3.12 SPRAY, METERED RESPIRATORY (INHALATION) at 08:14

## 2020-10-26 RX ADMIN — FUROSEMIDE 40 MG: 10 INJECTION, SOLUTION INTRAMUSCULAR; INTRAVENOUS at 18:03

## 2020-10-26 RX ADMIN — BUDESONIDE AND FORMOTEROL FUMARATE DIHYDRATE 2 PUFF: 160; 4.5 AEROSOL RESPIRATORY (INHALATION) at 08:14

## 2020-10-26 RX ADMIN — POLYETHYLENE GLYCOL (3350) 17 G: 17 POWDER, FOR SOLUTION ORAL at 10:10

## 2020-10-26 RX ADMIN — ATORVASTATIN CALCIUM 20 MG: 20 TABLET, FILM COATED ORAL at 20:55

## 2020-10-26 RX ADMIN — SODIUM CHLORIDE, PRESERVATIVE FREE 10 ML: 5 INJECTION INTRAVENOUS at 10:19

## 2020-10-26 RX ADMIN — FUROSEMIDE 40 MG: 10 INJECTION, SOLUTION INTRAMUSCULAR; INTRAVENOUS at 10:18

## 2020-10-26 RX ADMIN — HYDROCODONE BITARTRATE AND ACETAMINOPHEN 2 TABLET: 5; 325 TABLET ORAL at 14:55

## 2020-10-26 RX ADMIN — MULTIPLE VITAMINS W/ MINERALS TAB 1 TABLET: TAB at 10:17

## 2020-10-26 RX ADMIN — INSULIN LISPRO 1 UNITS: 100 INJECTION, SOLUTION INTRAVENOUS; SUBCUTANEOUS at 17:58

## 2020-10-26 RX ADMIN — Medication 1 MG: at 21:46

## 2020-10-26 RX ADMIN — ALBUTEROL SULFATE 2 PUFF: 90 AEROSOL, METERED RESPIRATORY (INHALATION) at 20:11

## 2020-10-26 RX ADMIN — FINASTERIDE 5 MG: 5 TABLET, FILM COATED ORAL at 10:17

## 2020-10-26 RX ADMIN — ALBUTEROL SULFATE 2 PUFF: 90 AEROSOL, METERED RESPIRATORY (INHALATION) at 08:14

## 2020-10-26 RX ADMIN — CARVEDILOL 3.12 MG: 3.12 TABLET, FILM COATED ORAL at 10:17

## 2020-10-26 RX ADMIN — HYDROCODONE BITARTRATE AND ACETAMINOPHEN 2 TABLET: 5; 325 TABLET ORAL at 20:54

## 2020-10-26 RX ADMIN — BUDESONIDE AND FORMOTEROL FUMARATE DIHYDRATE 2 PUFF: 160; 4.5 AEROSOL RESPIRATORY (INHALATION) at 20:11

## 2020-10-26 RX ADMIN — ALBUTEROL SULFATE 2 PUFF: 90 AEROSOL, METERED RESPIRATORY (INHALATION) at 16:09

## 2020-10-26 RX ADMIN — HYDROCODONE BITARTRATE AND ACETAMINOPHEN 2 TABLET: 5; 325 TABLET ORAL at 10:17

## 2020-10-26 RX ADMIN — ASPIRIN 81 MG: 81 TABLET, COATED ORAL at 10:16

## 2020-10-26 ASSESSMENT — PAIN DESCRIPTION - FREQUENCY
FREQUENCY: CONTINUOUS
FREQUENCY: INTERMITTENT
FREQUENCY: CONTINUOUS
FREQUENCY: INTERMITTENT
FREQUENCY: CONTINUOUS
FREQUENCY: INTERMITTENT

## 2020-10-26 ASSESSMENT — PAIN DESCRIPTION - LOCATION
LOCATION: CHEST;SHOULDER
LOCATION: CHEST
LOCATION: CHEST
LOCATION: BACK;CHEST
LOCATION: CHEST;SHOULDER
LOCATION: CHEST;SHOULDER

## 2020-10-26 ASSESSMENT — PAIN DESCRIPTION - ONSET
ONSET: ON-GOING

## 2020-10-26 ASSESSMENT — PAIN DESCRIPTION - PAIN TYPE
TYPE: SURGICAL PAIN;CHRONIC PAIN
TYPE: CHRONIC PAIN;SURGICAL PAIN
TYPE: SURGICAL PAIN
TYPE: SURGICAL PAIN
TYPE: CHRONIC PAIN;SURGICAL PAIN
TYPE: CHRONIC PAIN;SURGICAL PAIN

## 2020-10-26 ASSESSMENT — PAIN SCALES - GENERAL
PAINLEVEL_OUTOF10: 8
PAINLEVEL_OUTOF10: 0
PAINLEVEL_OUTOF10: 0
PAINLEVEL_OUTOF10: 6
PAINLEVEL_OUTOF10: 9
PAINLEVEL_OUTOF10: 8
PAINLEVEL_OUTOF10: 9
PAINLEVEL_OUTOF10: 9
PAINLEVEL_OUTOF10: 7
PAINLEVEL_OUTOF10: 0

## 2020-10-26 ASSESSMENT — PAIN DESCRIPTION - PROGRESSION
CLINICAL_PROGRESSION: GRADUALLY WORSENING
CLINICAL_PROGRESSION: GRADUALLY IMPROVING
CLINICAL_PROGRESSION: NOT CHANGED
CLINICAL_PROGRESSION: GRADUALLY WORSENING
CLINICAL_PROGRESSION: GRADUALLY WORSENING
CLINICAL_PROGRESSION: GRADUALLY IMPROVING

## 2020-10-26 ASSESSMENT — PAIN DESCRIPTION - ORIENTATION
ORIENTATION: RIGHT
ORIENTATION: RIGHT
ORIENTATION: MID
ORIENTATION: MID
ORIENTATION: RIGHT;MID

## 2020-10-26 ASSESSMENT — PAIN DESCRIPTION - DESCRIPTORS
DESCRIPTORS: ACHING

## 2020-10-26 ASSESSMENT — PAIN - FUNCTIONAL ASSESSMENT
PAIN_FUNCTIONAL_ASSESSMENT: ACTIVITIES ARE NOT PREVENTED
PAIN_FUNCTIONAL_ASSESSMENT: PREVENTS OR INTERFERES SOME ACTIVE ACTIVITIES AND ADLS
PAIN_FUNCTIONAL_ASSESSMENT: PREVENTS OR INTERFERES WITH MANY ACTIVE NOT PASSIVE ACTIVITIES
PAIN_FUNCTIONAL_ASSESSMENT: PREVENTS OR INTERFERES SOME ACTIVE ACTIVITIES AND ADLS
PAIN_FUNCTIONAL_ASSESSMENT: PREVENTS OR INTERFERES SOME ACTIVE ACTIVITIES AND ADLS

## 2020-10-26 ASSESSMENT — PAIN SCALES - WONG BAKER
WONGBAKER_NUMERICALRESPONSE: 0

## 2020-10-26 NOTE — FLOWSHEET NOTE
Fowler catheter removed as ordered. Tolerated well. Assessment per flowsheet. O2 remains at 3 L NC. Tolerating well. Will continue to monitor closely.     Warner Roberts RN

## 2020-10-26 NOTE — PROGRESS NOTES
Occupational Therapy      Occupational Therapy Treatment Note    Name: Daija Caraballo MRN: [id number] :   1955   Date:  10/26/2020   Admission Date: 10/19/2020 Room:  [address] [place] [address]-A     Primary Problem: CAD s/p CABG x 3    Restrictions/Precautions: General Precautions, Fall Risk, Sternal Precautions, Telemetry, Pulse Ox, BP cuff, SCDs, Bed/chair alarm    Communication with other providers: RN [facility]    Subjective:  Patient states: \"Thank you for stopping by!\"  Pain: Pt reported 9/10 surgical pain in sternum    Objective:    Observation: Pt received ambulating to bathroom with RN. Pt needing to have bowel movement and agreeable to treatment. Objective Measures: Stable vitals throughout session, Orientation WFL    Treatment, including education:  Therapeutic Activity Training:   Therapeutic activity training was instructed today. Cues were given for safety, sequence, UE/LE placement, awareness, and balance. Pt received ambulating to bathroom with RN using no AD. Pt needing to have bowel movement and agreeable to treatment. Pt transferred onto toilet min A with min cues for sternal precautions. Pt had bowel movement and completed task max A with assist for clothing mgmt both directions. Pt able to complete seated cassandra care without assist. Pt stood from toilet min A with min cues for sternal precautions. Pt completed hand hygiene in standing CGA with setup. Pt ambulated an additional 125 ft CGA to min A with no AD. Pt with intermittent unsteadiness and decreased pain tolerance, as pt continually ambulated with arms crossing chest for constant pressure against sternum. Pt required one standing break with coaching for pursed lip breathing. Pt returned to room, and transferred stand to sit to bed CGA with min cues for sternal precautions. Pt transferred sitting EOB to supine mod A with assist for lifting BL LEs into bed and min cues for sternal precautions/technique.  Pt left positioned for comfort in bed with all lines intact, all needs within reach, and bed alarm on. Assessment / Impression:    Patient's tolerance of treatment: Fair+  Adverse Reaction: None  Significant change in status and impact: Improved overall pain/activity tolerance from initial evaluation, but not progressing as quickly as expected  Barriers to improvement: Decreased pain tolerance      Plan for Next Session:    Continue per OT POC. Continue to recommend inpatient rehab at discharge.       Time in: 1355  Time out: 1413  Timed treatment minutes: 18  Total treatment time: 18      Electronically signed by:    FAMILIA Dean/L, [place], [id number]

## 2020-10-26 NOTE — PROGRESS NOTES
[facility] called regarding placement today.   States had to re-certify with insurance, so it will not be today

## 2020-10-26 NOTE — DISCHARGE INSTR - COC
Continuity of Care Form    Patient Name: Ute Fuentes   :  1955  MRN:  [id number]    Admit date:  10/19/2020  Discharge date:  ***    Code Status Order: Full Code   Advance Directives:   Advance Care Flowsheet Documentation     Date/Time Healthcare Directive Type of Healthcare Directive Copy in [address] Agent's Name Healthcare Agent's Phone Number    10/19/20 0701  No, patient does not have an advance directive for healthcare treatment -- -- -- -- --    10/19/20 0614  No, patient does not have an advance directive for healthcare treatment -- -- -- -- --          Admitting Physician:  Dennie Purl, MD  PCP: Gaudencio Hansen MD    Discharging Nurse: [facility] Unit/Room#: 2128/2128-A  Discharging Unit Phone Number: [phone]    Emergency Contact:   Extended Emergency Contact Information  Primary Emergency Contact: Paulette Lee  Home Phone: [phone]  Mobile Phone: [phone]  Relation: Other  Secondary Emergency Contact: Endy Felix  Home Phone: [phone]  Relation: Other    Past Surgical History:  Past Surgical History:   Procedure Laterality Date    CARDIAC CATHETERIZATION  2020     Severe calcified multivessel CAD with LV dysfuntion, PCWP is 12, CABG consult.  COLONOSCOPY  2017    [place], [place])    CORONARY ARTERY BYPASS GRAFT N/A 10/19/2020    CABG CORONARY ARTERY BYPASS x3 WITH LIMA, INTRAOP BETH, ENDOHARVEST OF THE LEFT SAPHENOUS VEIN performed by Dennie Purl, MD at [facility], DIAGNOSTIC  2017    Normal exam per pt (done in [place], [place])       Immunization History: There is no immunization history on file for this patient.     Active Problems:  Patient Active Problem List   Diagnosis Code    Juvenile rheumatoid arthritis ([facility] [address] M08.00    Chronic bilateral low back pain without sciatica M54.5, G89.29    Peripheral arterial disease (HCC) I73.9    Panlobular emphysema (HCC) J43.1    Essential hypertension I10    Acquired hypothyroidism E03.9    Gastroesophageal reflux disease K21.9    Non-seasonal allergic rhinitis J30.89    Benign prostatic hyperplasia without lower urinary tract symptoms N40.0    Tobacco use Z72.0    Acute pulmonary edema (HCC) J81.0    Seasonal allergic rhinitis due to pollen J30.1    Mixed irritable bowel syndrome K58.2    Mixed hyperlipidemia E78.2    Prediabetes R73.03    Benign prostatic hyperplasia N40.0    Chronic combined systolic and diastolic congestive heart failure (HCC) I50.42    JOSE (acute kidney injury) (HCC) N17.9    LV dysfunction I51.9    Vomiting R11.10    Coronary artery disease involving native coronary artery I25.10    CAD in native artery I25.10       Isolation/Infection:   Isolation          No Isolation        Patient Infection Status     Infection Onset Added Last Indicated Last Indicated By Review Planned Expiration Resolved Resolved By    None active    Resolved    COVID-19 Rule Out 10/12/20 10/12/20 10/12/20 COVID-19 Ambulatory (Ordered)   10/13/20 Rule-Out Test Resulted    COVID-19 Rule Out 09/18/20 09/18/20 09/18/20 Covid-19 Ambulatory (Ordered)   09/21/20 Rule-Out Test Resulted    COVID-19 Rule Out 05/28/20 05/28/20 05/28/20 Covid-19 Ambulatory (Ordered)   05/30/20 Rule-Out Test Resulted          Nurse Assessment:  Last Vital Signs: /72   Pulse 74   Temp 97.7 °F (36.5 °C) (Oral)   Resp 20   Ht 5' 9.49\" (1.765 m)   Wt 198 lb 10.2 oz (90.1 kg)   SpO2 92%   BMI 28.92 kg/m²     Last documented pain score (0-10 scale): Pain Level: 9  Last Weight:   Wt Readings from Last 1 Encounters:   10/26/20 198 lb 10.2 oz (90.1 kg)     Mental Status:  oriented and alert    IV Access:  {[facility] IV ACCESS:082453184}    Nursing Mobility/ADLs:  Walking   Assisted  Transfer  Assisted  Bathing  Assisted  Dressing  Assisted  Toileting  Independent  Feeding  Independent  Med Admin  Assisted  Med Delivery   whole    Wound Care Documentation and Therapy:        Elimination:  Continence:   · Bowel: Yes  · Bladder: Yes  Urinary Catheter: No.  Fowler removed 10/26/27822 @ 0600  Colostomy/Ileostomy/Ileal Conduit: No       Date of Last BM: 10/26/20    Intake/Output Summary (Last 24 hours) at 10/26/2020 1541  Last data filed at 10/26/2020 1300  Gross per 24 hour   Intake 850 ml   Output 3990 ml   Net -3140 ml     I/O last 3 completed shifts: In: 850 [P.O.:850]  Out: Wilfred Clay [XRC:0543]    Safety Concerns: At Risk for Falls    Impairments/Disabilities:      None    Nutrition Therapy:  Current Nutrition Therapy:   - Oral Diet:  Carb Control 4 carbs/meal (1800kcals/day) and Cardiac    Routes of Feeding: Oral  Liquids: Thin Liquids  Daily Fluid Restriction: no  Last Modified Barium Swallow with Video (Video Swallowing Test): not done    Treatments at the Time of Hospital Discharge:   Respiratory Treatments: albuterol and Symbicort  Oxygen Therapy:  is on oxygen at 2-4 L L/min per nasal cannula.   Ventilator:    - BiPAP   IPAP: 12 cmH20, CPAP/EPAP: 6 cmH2O only when sleeping    Rehab Therapies: Physical Therapy and Occupational Therapy  Weight Bearing Status/Restrictions: No weight bearing restirctions  Other Medical Equipment (for information only, NOT a DME order):  Gait belt, walker at times  Other Treatments: Post op CaBG    Patient's personal belongings (please select all that are sent with patient):  Glasses, cell phone, cell phone , upper and lower dentures, shoes, shirt, pants, underwear    RN SIGNATURE:  {Esignature:[id number]}    CASE MANAGEMENT/SOCIAL WORK SECTION    Inpatient Status Date: ***    Readmission Risk Assessment Score:  Readmission Risk              Risk of Unplanned Readmission:        19           Discharging to Facility/ Agency   · Name:   · Address:  · Phone:  · Fax:    Dialysis Facility (if applicable)   · Name:  · Address:  · Dialysis Schedule:  · Phone:  · Fax:    / signature: {Esignature:[id number]}    PHYSICIAN SECTION    Prognosis: {Prognosis:7694617055}    Condition at Discharge: [address] Patient Condition:811462131}    Rehab Potential (if transferring to Rehab): {Prognosis:2236039027}    Recommended Labs or Other Treatments After Discharge: ***    Physician Certification: I certify the above information and transfer of Evon Lama  is necessary for the continuing treatment of the diagnosis listed and that he requires {Admit to Appropriate Level of Care:80263} for {GREATER/LESS:718097641} 30 days.      Update Admission H&P: {CHP DME Changes in CDWBJ:696566568}    PHYSICIAN SIGNATURE:  {Esignature:[id number]}

## 2020-10-26 NOTE — PROGRESS NOTES
Physical Therapy    Physical Therapy Treatment Note  Name: Socorro Guzman MRN: [id number] :   1955   Date:  10/26/2020   Admission Date: 10/19/2020 Room:  [address] [place], [place] [address]A   Restrictions/Precautions:        sternal  Communication with other providers: Toya Licea RN states pt is ok to see for therapy  Subjective:  Patient states:  He has some pain in his chest  Pain:   Location, Type, Intensity (0/10 to 10/10):  5/10 chest  Objective:    Observation:  Pt was resting in the bed  Treatment, including education/measures:  Transfers  Supine to sit :mon to mod A of 1  Scooting :VC's and SBA  Rolling :min a of 1 to the L  Sit to stand :from bed, SBA to CGA and VC's for precautions  Stand to sit :to chair, VC's and SBA  Gait:  Pt amb with no AD for 50 ft x 2 with min a of 1, O2 was 6L NC and fluctuated between 87% and 93%. Nursing did a chair follow d/t pt feeling light headed in the first 20 feet   Pt needed VC's for pt needs encouragement to increase his distance and PLB  Vital Signs  HR: 61, B/P 147/74, O2 95%  Education:  Pt was instructed in Sternal Precautions, Purpose of Exercise Program, Joselin Scale and Signs and Symptoms of Exercise Intolerance per Cardiac Protocol  Pt was instructed in Intermediate Cardiac ex program:sitting  Overhead Side Stretch x 10  Ankle Pumps/ Heel Raises x 10  Marching Seated x 10  Forward Arm Raises x 10  Side Arm Raises x 10  Arm Crosses x 10  Arm Circles Forwards and Backwards x 10  SittingTrunkTwist x 10  Safety  Patient left safely in the chair, with call light/phone in reach. Gait belt was used for transfers and gait.   Assessment / Impression:     Patient's tolerance of treatment:  Good, pt feeling worn out from the above d/t sob with activity   Adverse Reaction: none  Significant change in status and impact:  none  Barriers to improvement: endurance, sob  Plan for Next Session:    Will cont to work towards pt's goals per his tolerance  Time in:  45  Time out:  45  Timed treatment minutes:  60  Total treatment time:  61  Previously filed items:  Social/Functional History  Lives With: Significant other(Plans on staying with ex-wife at discharge)  Type of Home: House  Home Layout: One level  Home Access: Stairs to enter with rails  Entrance Stairs - Number of Steps: 1  Entrance Stairs - Rails: Right  Bathroom Shower/Tub: Tub/Shower unit  Bathroom Toilet: Standard  Bathroom Accessibility: Accessible  ADL Assistance: Independent  Homemaking Assistance: Independent  Homemaking Responsibilities: Yes  Ambulation Assistance: Independent(uses no AD)  Transfer Assistance: Independent  Active : Yes  Occupation: Full time employment  Type of occupation:   Additional Comments: Per RN, 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 Pt reports he will be staying with ex-wife at discharge and will have initial 24/7 supervision. Above information is for ex-wife's home setup. Short term goals  Time Frame for Short term goals: 1 week  Short term goal 1: Pt will perform sit><supine Shanel  Short term goal 2: Pt will transfer SBA  Short term goal 3: Pt will ambulate 200ft with LRAD SBA  Short term goal 4: Pt will ascend/descend 1 step, single rail SBA  Short term goal 5: Pt will complete cardiac HEP x 10 reps indep   Electronically signed by:     Issac Warren PTA  10/26/2020, 9:32 AM

## 2020-10-26 NOTE — PROGRESS NOTES
pulmonary      SUBJECTIVE: sleeping with mild sob     OBJECTIVE    VITALS:  BP (!) 147/74   Pulse 61   Temp 98.3 °F (36.8 °C) (Oral)   Resp 11   Ht 5' 9.5\" (1.765 m)   Wt 198 lb 10.2 oz (90.1 kg)   SpO2 95%   BMI 28.91 kg/m²   HEAD AND FACE EXAM:  No throat injection, no active exudate,no thrush  NECK EXAM;No JVD, no masses, symmetrical  CHEST EXAM; Expansion equal and symmetrical, no masses  LUNG EXAM; Good breath sounds bilaterally. There are expiratory wheezes both lungs, there are crackles at both lung bases  CARDIOVASCULAR EXAM: Positive S1 and S2, no S3 or S4, no clicks ,no murmurs  RIGHT AND LEFT LOWER EXTRIMITY EXAM: No edema, no swelling, no inflamation            LABS   Lab Results   Component Value Date    WBC 15.0 (H) 10/22/2020    HGB 10.4 (L) 10/22/2020    HCT 32.5 (L) 10/22/2020    .2 (H) 10/22/2020     (L) 10/22/2020     Lab Results   Component Value Date    CREATININE 1.0 10/25/2020    BUN 47 (H) 10/25/2020     10/25/2020    K 4.8 10/25/2020    CL 98 (L) 10/25/2020    CO2 32 10/25/2020     Lab Results   Component Value Date    INR 1.27 10/19/2020    PROTIME 15.4 (H) 10/19/2020          Lab Results   Component Value Date    PHOS 4.2 07/15/2020      No results for input(s): PH, PO2ART, HBI7ISH, HCO3, BEART, O2SAT in the last 72 hours. Wt Readings from Last 3 Encounters:   10/26/20 198 lb 10.2 oz (90.1 kg)   10/12/20 204 lb (92.5 kg)   10/05/20 207 lb (93.9 kg)               ASSESMENT  Ac resp failure  Ac copd  vonda pl effusion  Bib atx        PLAN  1. Bd rx  2. On po steroids now  3. Inc yossi  4.  Increase activity    10/26/2020  Dale Michel M.D.

## 2020-10-26 NOTE — PROGRESS NOTES
Dr Dennie Purl here to visit. Checking on status of ARU, patient nor medically stable and can go to ARU today.

## 2020-10-26 NOTE — PROGRESS NOTES
Comprehensive Nutrition Assessment    Type and Reason for Visit:  Initial(los)    Nutrition Recommendations/Plan:   Continue current diet as ordered  Encourage po intake as able  Will continue to monitor weight trends, po intake, nutrition status, poc    Nutrition Assessment:  Pt admitted for CAD, PMH IBS, HTN, HLD, COPD, thyroid disease, pt on high flow O2, no significant wt loss noted per chart review, pt currently on carb control 2 GM sodium diet, Pt consuming ~% of meals consistently per documentation, pt is at low nutrition risk at this time    Malnutrition Assessment:  Malnutrition Status:  No malnutrition    Context:  Acute Illness       Estimated Daily Nutrient Needs:  Energy (kcal):  7043-0485([place] Font w/ stress factor 1.0-1.2); Weight Used for Energy Requirements:  Current     Protein (g):  74-89(1.0-1.2 g/kg); Weight Used for Protein Requirements:  Ideal        Fluid (ml/day):  3576-9645(1 ml/kcal); Weight Used for Fluid Requirements:  Current      Wounds:  Surgical Wound       Current Nutrition Therapies:    DIET CARB CONTROL; Carb Control: 4 carb choices (60 gms)/meal; Low Sodium (2 GM)    Anthropometric Measures:  · Height: 5' 9.49\" (176.5 cm)  · Current Body Weight: 198 lb 10.2 oz (90.1 kg)   · Admission Body Weight: 202 lb 13.2 oz (92 kg)    · Usual Body Weight:       · Ideal Body Weight: 163 lbs; % Ideal Body Weight 121.9 %   · BMI: 28.9   · BMI Categories: Overweight (BMI 25.0-29. 9)       Nutrition Diagnosis:   · No nutrition diagnosis at this time related to   as evidenced by      Nutrition Interventions:   Food and/or Nutrient Delivery:  Continue Current Diet  Nutrition Education/Counseling:  No recommendation at this time   Coordination of Nutrition Care:  Continued Inpatient Monitoring    Goals:  pt will continue to consistently consume greater than 75% of meals       Nutrition Monitoring and Evaluation:   Food/Nutrient Intake Outcomes:  Food and Nutrient Intake  Physical Signs/Symptoms Outcomes:  Biochemical Data, GI Status, Hemodynamic Status, Weight, Skin     Discharge Planning:     Too soon to determine     Electronically signed by Dominique Fowler MS, RD, LD on 10/26/20 at 2:55 PM EDT    Contact: [phone]

## 2020-10-26 NOTE — PROGRESS NOTES
[facility] [address]   Progress Note  [facility] 0 1 2      Date: 10/26/2020   Patient: Arlet Mccall   : 1955   DOA: 10/19/2020   MRN: [id number]   ROOM#: [id number]/[id number]     Admit Date: 10/19/2020     Collaborating Urologist on Call at time of admission: Dr. Jennifer Banks     CC: Chest pain, SOB   Reason for Consult:  Urinary Retention   s/p CABG x 3 on 10/19/20 with Dr. Sin Antonio    Subjective:     Pain: mild, no nausea and no vomiting,   Bowel Movement/Flatus:   Yes  Voiding: dsouza catheter removed at 0600, has not yet voided. \"I feel rough\"   Patient was finishing up with PT when I arrived, PT notes he has been doing very well, hopes to get him to ARU today.      Objective:    Vitals:    BP (!) 147/74   Pulse 61   Temp 98.3 °F (36.8 °C) (Oral)   Resp 11   Ht 5' 9.5\" (1.765 m)   Wt 198 lb 10.2 oz (90.1 kg)   SpO2 95%   BMI 28.91 kg/m²    Temp  Av °F (36.7 °C)  Min: 97.8 °F (36.6 °C)  Max: 98.3 °F (36.8 °C)       Intake/Output Summary (Last 24 hours) at 10/26/2020 0747  Last data filed at 10/26/2020 0600  Gross per 24 hour   Intake 900 ml   Output 3990 ml   Net -3090 ml       Physical Exam:   General appearance: alert, appears stated age, cooperative and no distress  Head: Normocephalic, without obvious abnormality, atraumatic  Male genitalia: dsouza catheter in place, urine clear     Labs:   WBC:    Lab Results   Component Value Date    WBC 15.0 10/22/2020      Hemoglobin/Hematocrit:    Lab Results   Component Value Date    HGB 10.4 10/22/2020    HCT 32.5 10/22/2020      BMP:   Lab Results   Component Value Date     10/25/2020    K 4.8 10/25/2020    CL 98 10/25/2020    CO2 32 10/25/2020    BUN 47 10/25/2020    LABALBU 4.2 07/15/2020    CREATININE 1.0 10/25/2020    CALCIUM 9.0 10/25/2020    GFRAA >60 10/25/2020    LABGLOM >60 10/25/2020     Imaging:  Xr Chest (2 Vw)    Result Date: 10/24/2020  EXAMINATION: TWO XRAY VIEWS OF THE CHEST 10/24/2020 11:28 am COMPARISON: 10/23/2020 HISTORY: ORDERING SYSTEM PROVIDED HISTORY: pacer wire removal TECHNOLOGIST PROVIDED HISTORY: Reason for exam:->pacer wire removal Follow-up exam FINDINGS: Left subclavian central venous catheter stable in positioning. Status post median sternotomy. Stable cardiomegaly. Pulmonary vascular congestion has improved. Moderate right and small left pleural effusions with bibasilar atelectasis. No pneumothorax. Osseous structures otherwise stable. Osteopenia. There few mildly dilated loops of small bowel in the upper abdomen measuring up to 3.4 cm.     1. Interval improvement in pulmonary vascular congestion. 2. Moderate right and small left pleural effusions with adjacent atelectasis. 3. Few mildly dilated loops of small bowel in the upper abdomen, nonspecific. Recommend dedicated KUB for further evaluation. Xr Chest Portable    Result Date: 10/23/2020  EXAMINATION: ONE XRAY VIEW OF THE CHEST 10/23/2020 8:42 am COMPARISON: 10/22/2020 HISTORY: ORDERING SYSTEM PROVIDED HISTORY: cabg TECHNOLOGIST PROVIDED HISTORY: Reason for exam:->cabg Reason for Exam: cabg Acuity: Acute Type of Exam: Initial FINDINGS: Left subclavian catheter with tip at the inferior SVC. Status post median sternotomy. Stable mild cardiomegaly. No pneumothorax. Moderate right pleural effusion and small left pleural effusion are stable. Moderate bibasilar atelectasis is stable. 1.  No significant change from prior exam. Moderate right pleural effusion and small left pleural effusion with bibasilar atelectasis. Xr Chest Portable    Result Date: 10/22/2020  EXAMINATION: ONE XRAY VIEW OF THE CHEST 10/22/2020 5:10 am COMPARISON: 10/21/2020 HISTORY: ORDERING SYSTEM PROVIDED HISTORY: Post op open heart surgery TECHNOLOGIST PROVIDED HISTORY: Reason for Exam:->Post op open heart surgery Reason for Exam: post op open heart surgery Acuity: Unknown Type of Exam: Ongoing FINDINGS: Central venous catheter tip overlies the SVC.   Sternotomy wires.  Small bilateral pleural effusion. Right lower lobe opacity. Stable cardiomegaly. Mild interstitial edema. No pneumothorax. 1. Unchanged mild pulmonary edema and small bilateral pleural effusions. 2. Stable right lower lobe atelectasis. Xr Chest Portable    Result Date: 10/21/2020  EXAMINATION: ONE XRAY VIEW OF THE CHEST 10/21/2020 1:16 pm COMPARISON: 10/21/2020 HISTORY: ORDERING SYSTEM PROVIDED HISTORY: chest tube removal TECHNOLOGIST PROVIDED HISTORY: Reason for exam:->chest tube removal Reason for Exam: chest tube removal Acuity: Acute Type of Exam: Initial Additional signs and symptoms: none Relevant Medical/Surgical History: post cabg 2 days ago FINDINGS: Interval removal of thoracostomy tubes. Left subclavian central venous catheter stable in positioning. Status post median sternotomy. Stable cardiomegaly with pulmonary vascular congestion. Persistent small right pleural effusion with bibasilar atelectasis. No discrete pneumothorax. The osseous structures otherwise stable. Interval removal of thoracostomy tubes. No discrete pneumothorax. Xr Chest Portable    Result Date: 10/21/2020  EXAMINATION: ONE XRAY VIEW OF THE CHEST 10/21/2020 6:06 am COMPARISON: 10/20/2020 HISTORY: ORDERING SYSTEM PROVIDED HISTORY: Post op open heart surgery TECHNOLOGIST PROVIDED HISTORY: Reason for Exam:->Post op open heart surgery Reason for Exam: Post op open heart surgery Acuity: Unknown Type of Exam: Ongoing FINDINGS: Median sternotomy wires are identified. Left-sided subclavian central venous catheter with the tip overlying the distal SVC. Cardiomegaly. Vascular congestion. Low lung volumes. Prominent interstitial changes are seen bilaterally, mildly increased. Osseous structures appear stable. No free air beneath the diaphragms. Large bore tube is noted overlying the left chest and mediastinum versus medial aspect of the right hemithorax inferiorly.   Gas-filled loops of bowel seen in the upper abdomen. Mildly increased interstitial changes which could be related to increased pulmonary edema or pneumonitis. Cardiomegaly with central vascular congestion. Otherwise similar appearing chest.     Xr Chest Portable    Result Date: 10/21/2020  EXAMINATION: ONE XRAY VIEW OF THE CHEST 10/20/2020 5:18 am COMPARISON: Chest radiograph dated October 19, 2020. HISTORY: ORDERING SYSTEM PROVIDED HISTORY: Post op open heart surgery TECHNOLOGIST PROVIDED HISTORY: Reason for Exam:->Post op open heart surgery Reason for Exam: Post op open heart surgery Acuity: Unknown Type of Exam: Unknown FINDINGS: Median sternotomy wires are noted. A left-sided central venous catheter is in place. A [place]-Olga catheter is in place. Bilateral chest tubes are present. Low lung volumes with bilateral pleural effusions and bibasilar opacities are seen. Bilateral pleural effusions with bibasilar opacities without significant change. Xr Chest Portable    Result Date: 10/20/2020  EXAMINATION: ONE XRAY VIEW OF THE CHEST 10/19/2020 12:42 pm COMPARISON: 09/18/2020 HISTORY: ORDERING SYSTEM PROVIDED HISTORY: Post op open heart surgery TECHNOLOGIST PROVIDED HISTORY: Reason for exam:->Post op open heart surgery Reason for Exam: Post op open heart surgery FINDINGS: Status post cardiac surgery. Endotracheal tube terminates 5.5 cm above the royal. [place]-Olga catheter terminates in the region of the right main pulmonary artery. Left subclavian catheter terminates at the inferior SVC. Chest tubes are present. Moderate left basilar atelectasis. Mild right basilar atelectasis. Pulmonary vascular redistribution. No significant pneumothorax. Stable mild cardiomegaly. 1. Interval cardiac surgery. Satisfactory position of support devices. 2. Moderate left basilar atelectasis. Mild right basilar atelectasis. 3. Pulmonary vascular redistribution.      Us Lower Extremity Unilateral Vein Mapping    Result Date: 10/12/2020  EXAMINATION: follow with Urology              Due to chronic LUTS, patient may benefit from UroLift vs TURP              Recommend outpatient cystoscopy/uroflow/PVR/TRUS in the near future for further evaluation of bladder emptying               PSA  1.10 on 10/23/20, repeat annually     Voiding trial today; if successful, will plan to see him for outpatient assessment in 2-3 weeks. If fails voiding trial, replace 16fr indwelling dsouza catheter and continue for an additional 5 days and repeat voiding trial at that time. Patient seen and examined, chart reviewed.      Electronically signed by Smith Grimaldo PA-C on 10/26/2020 at 7:47 AM

## 2020-10-26 NOTE — CARE COORDINATION
ARU auth . Will need updated therapy notes to initiate new ARU pre-cert. Left [facility] a VM regarding  ARU auth. PTA signed up to work with patient today. Will need updated OT notes as well prior to initiating pre-cert with [facility]. Whiteboard placed requesting updated OT notes. Spoke to Lianet and notified her above information. 1640:  Updated OT/PT notes reviewed and sent to [facility]. New ARU pre-cert is pending at this time. Will continue to follow for determination.

## 2020-10-27 ENCOUNTER — HOSPITAL ENCOUNTER (INPATIENT)
Age: 65
LOS: 14 days | Discharge: HOME HEALTH | DRG: 949 | End: 2020-11-10
Attending: PHYSICAL MEDICINE & REHABILITATION | Admitting: PHYSICAL MEDICINE & REHABILITATION
Payer: MEDICARE

## 2020-10-27 VITALS
OXYGEN SATURATION: 94 % | HEART RATE: 76 BPM | WEIGHT: 198.63 LBS | HEIGHT: 69 IN | BODY MASS INDEX: 29.42 KG/M2 | TEMPERATURE: 98.2 F | RESPIRATION RATE: 17 BRPM | DIASTOLIC BLOOD PRESSURE: 74 MMHG | SYSTOLIC BLOOD PRESSURE: 110 MMHG

## 2020-10-27 PROBLEM — I97.130 CHF FOLLOWING CARDIAC SURGERY, POSTOP: Status: ACTIVE | Noted: 2020-10-27

## 2020-10-27 LAB
ABO/RH: NORMAL
ANION GAP SERPL CALCULATED.3IONS-SCNC: 10 MMOL/L (ref 4–16)
ANTIBODY SCREEN: NEGATIVE
BUN BLDV-MCNC: 47 MG/DL (ref 6–23)
CALCIUM SERPL-MCNC: 9 MG/DL (ref 8.3–10.6)
CHLORIDE BLD-SCNC: 96 MMOL/L (ref 99–110)
CO2: 31 MMOL/L (ref 21–32)
COMMENT: NORMAL
COMMENT: NORMAL
COMPONENT: NORMAL
COMPONENT: NORMAL
CREAT SERPL-MCNC: 1.1 MG/DL (ref 0.9–1.3)
CROSSMATCH RESULT: NORMAL
CROSSMATCH RESULT: NORMAL
GFR AFRICAN AMERICAN: >60 ML/MIN/1.73M2
GFR NON-AFRICAN AMERICAN: >60 ML/MIN/1.73M2
GLUCOSE BLD-MCNC: 101 MG/DL (ref 70–99)
GLUCOSE BLD-MCNC: 116 MG/DL (ref 70–99)
GLUCOSE BLD-MCNC: 117 MG/DL (ref 70–99)
GLUCOSE BLD-MCNC: 147 MG/DL (ref 70–99)
GLUCOSE BLD-MCNC: 180 MG/DL (ref 70–99)
GLUCOSE BLD-MCNC: 92 MG/DL (ref 70–99)
POTASSIUM SERPL-SCNC: 4.1 MMOL/L (ref 3.5–5.1)
SARS-COV-2, NAAT: NOT DETECTED
SODIUM BLD-SCNC: 137 MMOL/L (ref 135–145)
STATUS: NORMAL
STATUS: NORMAL
THERMAL AMPLITUDE STUDY: NORMAL
TRANSFUSION STATUS: NORMAL
TRANSFUSION STATUS: NORMAL
UNIT DIVISION: 0
UNIT DIVISION: 0
UNIT NUMBER: NORMAL
UNIT NUMBER: NORMAL

## 2020-10-27 PROCEDURE — 6370000000 HC RX 637 (ALT 250 FOR IP): Performed by: PHYSICAL MEDICINE & REHABILITATION

## 2020-10-27 PROCEDURE — 6370000000 HC RX 637 (ALT 250 FOR IP): Performed by: PHYSICIAN ASSISTANT

## 2020-10-27 PROCEDURE — 80048 BASIC METABOLIC PNL TOTAL CA: CPT

## 2020-10-27 PROCEDURE — 6370000000 HC RX 637 (ALT 250 FOR IP): Performed by: SURGERY

## 2020-10-27 PROCEDURE — 2580000003 HC RX 258: Performed by: PHYSICIAN ASSISTANT

## 2020-10-27 PROCEDURE — 82962 GLUCOSE BLOOD TEST: CPT

## 2020-10-27 PROCEDURE — 6360000002 HC RX W HCPCS: Performed by: PHYSICIAN ASSISTANT

## 2020-10-27 PROCEDURE — U0002 COVID-19 LAB TEST NON-CDC: HCPCS

## 2020-10-27 PROCEDURE — 94761 N-INVAS EAR/PLS OXIMETRY MLT: CPT

## 2020-10-27 PROCEDURE — 99223 1ST HOSP IP/OBS HIGH 75: CPT | Performed by: PHYSICAL MEDICINE & REHABILITATION

## 2020-10-27 PROCEDURE — 97116 GAIT TRAINING THERAPY: CPT

## 2020-10-27 PROCEDURE — 94150 VITAL CAPACITY TEST: CPT

## 2020-10-27 PROCEDURE — 6370000000 HC RX 637 (ALT 250 FOR IP): Performed by: INTERNAL MEDICINE

## 2020-10-27 PROCEDURE — 1280000000 HC REHAB R&B

## 2020-10-27 PROCEDURE — 94640 AIRWAY INHALATION TREATMENT: CPT

## 2020-10-27 PROCEDURE — 2700000000 HC OXYGEN THERAPY PER DAY

## 2020-10-27 PROCEDURE — 97110 THERAPEUTIC EXERCISES: CPT

## 2020-10-27 PROCEDURE — 94664 DEMO&/EVAL PT USE INHALER: CPT

## 2020-10-27 RX ORDER — ASPIRIN 81 MG/1
81 TABLET ORAL DAILY
Status: DISCONTINUED | OUTPATIENT
Start: 2020-10-28 | End: 2020-11-10 | Stop reason: HOSPADM

## 2020-10-27 RX ORDER — LEVOTHYROXINE SODIUM 0.1 MG/1
100 TABLET ORAL DAILY
Status: DISCONTINUED | OUTPATIENT
Start: 2020-10-27 | End: 2020-10-27 | Stop reason: SDUPTHER

## 2020-10-27 RX ORDER — CARVEDILOL 6.25 MG/1
3.12 TABLET ORAL 2 TIMES DAILY
Status: DISCONTINUED | OUTPATIENT
Start: 2020-10-27 | End: 2020-10-27

## 2020-10-27 RX ORDER — ASPIRIN 81 MG/1
81 TABLET ORAL DAILY
Status: CANCELLED | OUTPATIENT
Start: 2020-10-28

## 2020-10-27 RX ORDER — ATORVASTATIN CALCIUM 20 MG/1
20 TABLET, FILM COATED ORAL NIGHTLY
Status: DISCONTINUED | OUTPATIENT
Start: 2020-10-27 | End: 2020-11-10 | Stop reason: HOSPADM

## 2020-10-27 RX ORDER — DULOXETIN HYDROCHLORIDE 30 MG/1
60 CAPSULE, DELAYED RELEASE ORAL DAILY
Status: DISCONTINUED | OUTPATIENT
Start: 2020-10-28 | End: 2020-11-10 | Stop reason: HOSPADM

## 2020-10-27 RX ORDER — HYDROCODONE BITARTRATE AND ACETAMINOPHEN 5; 325 MG/1; MG/1
1 TABLET ORAL EVERY 4 HOURS PRN
Status: CANCELLED | OUTPATIENT
Start: 2020-10-27

## 2020-10-27 RX ORDER — ACETAMINOPHEN 325 MG/1
650 TABLET ORAL EVERY 4 HOURS PRN
Status: CANCELLED | OUTPATIENT
Start: 2020-10-27

## 2020-10-27 RX ORDER — FUROSEMIDE 40 MG/1
40 TABLET ORAL 2 TIMES DAILY
Status: CANCELLED | OUTPATIENT
Start: 2020-10-27

## 2020-10-27 RX ORDER — LEVOTHYROXINE SODIUM 0.1 MG/1
100 TABLET ORAL DAILY
Status: DISCONTINUED | OUTPATIENT
Start: 2020-10-28 | End: 2020-11-10 | Stop reason: HOSPADM

## 2020-10-27 RX ORDER — DULOXETIN HYDROCHLORIDE 30 MG/1
60 CAPSULE, DELAYED RELEASE ORAL DAILY
Status: CANCELLED | OUTPATIENT
Start: 2020-10-28

## 2020-10-27 RX ORDER — ALBUTEROL SULFATE 90 UG/1
2 AEROSOL, METERED RESPIRATORY (INHALATION)
Status: DISCONTINUED | OUTPATIENT
Start: 2020-10-27 | End: 2020-11-10 | Stop reason: HOSPADM

## 2020-10-27 RX ORDER — LEVOTHYROXINE SODIUM 0.1 MG/1
100 TABLET ORAL DAILY
Status: CANCELLED | OUTPATIENT
Start: 2020-10-27

## 2020-10-27 RX ORDER — BUDESONIDE AND FORMOTEROL FUMARATE DIHYDRATE 160; 4.5 UG/1; UG/1
2 AEROSOL RESPIRATORY (INHALATION) 2 TIMES DAILY
Status: DISCONTINUED | OUTPATIENT
Start: 2020-10-27 | End: 2020-11-10 | Stop reason: HOSPADM

## 2020-10-27 RX ORDER — M-VIT,TX,IRON,MINS/CALC/FOLIC 27MG-0.4MG
1 TABLET ORAL
Status: DISCONTINUED | OUTPATIENT
Start: 2020-10-28 | End: 2020-11-10 | Stop reason: HOSPADM

## 2020-10-27 RX ORDER — GUAIFENESIN 600 MG/1
600 TABLET, EXTENDED RELEASE ORAL 2 TIMES DAILY
Status: CANCELLED | OUTPATIENT
Start: 2020-10-27

## 2020-10-27 RX ORDER — LEVOTHYROXINE SODIUM 0.1 MG/1
100 TABLET ORAL DAILY
Status: CANCELLED | OUTPATIENT
Start: 2020-10-28

## 2020-10-27 RX ORDER — CARVEDILOL 6.25 MG/1
3.12 TABLET ORAL 2 TIMES DAILY
Status: DISCONTINUED | OUTPATIENT
Start: 2020-10-27 | End: 2020-11-10 | Stop reason: HOSPADM

## 2020-10-27 RX ORDER — FUROSEMIDE 40 MG/1
40 TABLET ORAL 2 TIMES DAILY
Qty: 60 TABLET | Refills: 3 | Status: ON HOLD | OUTPATIENT
Start: 2020-10-27 | End: 2020-11-09 | Stop reason: SDUPTHER

## 2020-10-27 RX ORDER — AMIODARONE HYDROCHLORIDE 200 MG/1
200 TABLET ORAL DAILY
Status: DISCONTINUED | OUTPATIENT
Start: 2020-10-28 | End: 2020-11-10 | Stop reason: HOSPADM

## 2020-10-27 RX ORDER — FINASTERIDE 5 MG/1
5 TABLET, FILM COATED ORAL DAILY
Status: CANCELLED | OUTPATIENT
Start: 2020-10-28

## 2020-10-27 RX ORDER — TAMSULOSIN HYDROCHLORIDE 0.4 MG/1
0.4 CAPSULE ORAL DAILY
Status: CANCELLED | OUTPATIENT
Start: 2020-10-28

## 2020-10-27 RX ORDER — M-VIT,TX,IRON,MINS/CALC/FOLIC 27MG-0.4MG
1 TABLET ORAL
Status: CANCELLED | OUTPATIENT
Start: 2020-10-28

## 2020-10-27 RX ORDER — AMIODARONE HYDROCHLORIDE 200 MG/1
200 TABLET ORAL DAILY
Status: CANCELLED | OUTPATIENT
Start: 2020-10-28

## 2020-10-27 RX ORDER — FINASTERIDE 5 MG/1
5 TABLET, FILM COATED ORAL DAILY
Status: DISCONTINUED | OUTPATIENT
Start: 2020-10-28 | End: 2020-11-10 | Stop reason: HOSPADM

## 2020-10-27 RX ORDER — FINASTERIDE 5 MG/1
5 TABLET, FILM COATED ORAL DAILY
Status: CANCELLED | OUTPATIENT
Start: 2020-10-27

## 2020-10-27 RX ORDER — GUAIFENESIN 600 MG/1
600 TABLET, EXTENDED RELEASE ORAL 2 TIMES DAILY
Status: DISCONTINUED | OUTPATIENT
Start: 2020-10-27 | End: 2020-11-10 | Stop reason: HOSPADM

## 2020-10-27 RX ORDER — PREDNISONE 20 MG/1
20 TABLET ORAL DAILY
Status: DISCONTINUED | OUTPATIENT
Start: 2020-10-28 | End: 2020-11-09

## 2020-10-27 RX ORDER — FINASTERIDE 5 MG/1
5 TABLET, FILM COATED ORAL DAILY
Status: DISCONTINUED | OUTPATIENT
Start: 2020-10-27 | End: 2020-10-27 | Stop reason: SDUPTHER

## 2020-10-27 RX ORDER — HYDROCODONE BITARTRATE AND ACETAMINOPHEN 5; 325 MG/1; MG/1
2 TABLET ORAL EVERY 4 HOURS PRN
Status: DISCONTINUED | OUTPATIENT
Start: 2020-10-27 | End: 2020-11-09

## 2020-10-27 RX ORDER — ATORVASTATIN CALCIUM 20 MG/1
20 TABLET, FILM COATED ORAL NIGHTLY
Status: CANCELLED | OUTPATIENT
Start: 2020-10-27

## 2020-10-27 RX ORDER — CARVEDILOL 3.12 MG/1
3.12 TABLET ORAL 2 TIMES DAILY
Status: CANCELLED | OUTPATIENT
Start: 2020-10-27

## 2020-10-27 RX ORDER — ALBUTEROL SULFATE 90 UG/1
2 AEROSOL, METERED RESPIRATORY (INHALATION)
Status: CANCELLED | OUTPATIENT
Start: 2020-10-27

## 2020-10-27 RX ORDER — HYDROCODONE BITARTRATE AND ACETAMINOPHEN 5; 325 MG/1; MG/1
1 TABLET ORAL EVERY 4 HOURS PRN
Status: DISCONTINUED | OUTPATIENT
Start: 2020-10-27 | End: 2020-11-09

## 2020-10-27 RX ORDER — ACETAMINOPHEN 325 MG/1
650 TABLET ORAL EVERY 4 HOURS PRN
Status: DISCONTINUED | OUTPATIENT
Start: 2020-10-27 | End: 2020-11-10 | Stop reason: HOSPADM

## 2020-10-27 RX ORDER — ACETAMINOPHEN 325 MG/1
650 TABLET ORAL EVERY 4 HOURS PRN
Status: DISCONTINUED | OUTPATIENT
Start: 2020-10-27 | End: 2020-10-29

## 2020-10-27 RX ORDER — TAMSULOSIN HYDROCHLORIDE 0.4 MG/1
0.4 CAPSULE ORAL DAILY
Status: DISCONTINUED | OUTPATIENT
Start: 2020-10-28 | End: 2020-11-10 | Stop reason: HOSPADM

## 2020-10-27 RX ORDER — FUROSEMIDE 40 MG/1
40 TABLET ORAL 2 TIMES DAILY
Status: DISCONTINUED | OUTPATIENT
Start: 2020-10-27 | End: 2020-11-10 | Stop reason: HOSPADM

## 2020-10-27 RX ORDER — BUDESONIDE AND FORMOTEROL FUMARATE DIHYDRATE 160; 4.5 UG/1; UG/1
2 AEROSOL RESPIRATORY (INHALATION) 2 TIMES DAILY
Status: CANCELLED | OUTPATIENT
Start: 2020-10-27

## 2020-10-27 RX ORDER — HYDROCODONE BITARTRATE AND ACETAMINOPHEN 5; 325 MG/1; MG/1
2 TABLET ORAL EVERY 4 HOURS PRN
Status: CANCELLED | OUTPATIENT
Start: 2020-10-27

## 2020-10-27 RX ORDER — PREDNISONE 20 MG/1
20 TABLET ORAL DAILY
Status: CANCELLED | OUTPATIENT
Start: 2020-10-28

## 2020-10-27 RX ADMIN — FUROSEMIDE 40 MG: 40 TABLET ORAL at 21:20

## 2020-10-27 RX ADMIN — INSULIN LISPRO 1 UNITS: 100 INJECTION, SOLUTION INTRAVENOUS; SUBCUTANEOUS at 21:07

## 2020-10-27 RX ADMIN — PANTOPRAZOLE SODIUM 40 MG: 40 TABLET, DELAYED RELEASE ORAL at 08:57

## 2020-10-27 RX ADMIN — CARVEDILOL 3.12 MG: 3.12 TABLET, FILM COATED ORAL at 08:57

## 2020-10-27 RX ADMIN — HYDROCODONE BITARTRATE AND ACETAMINOPHEN 2 TABLET: 5; 325 TABLET ORAL at 22:54

## 2020-10-27 RX ADMIN — ALBUTEROL SULFATE 2 PUFF: 90 AEROSOL, METERED RESPIRATORY (INHALATION) at 11:37

## 2020-10-27 RX ADMIN — HYDROCODONE BITARTRATE AND ACETAMINOPHEN 2 TABLET: 5; 325 TABLET ORAL at 18:39

## 2020-10-27 RX ADMIN — GUAIFENESIN 600 MG: 600 TABLET, EXTENDED RELEASE ORAL at 21:03

## 2020-10-27 RX ADMIN — FUROSEMIDE 40 MG: 10 INJECTION, SOLUTION INTRAMUSCULAR; INTRAVENOUS at 18:38

## 2020-10-27 RX ADMIN — TAMSULOSIN HYDROCHLORIDE 0.4 MG: 0.4 CAPSULE ORAL at 08:57

## 2020-10-27 RX ADMIN — ATORVASTATIN CALCIUM 20 MG: 20 TABLET, FILM COATED ORAL at 21:06

## 2020-10-27 RX ADMIN — CARVEDILOL 3.12 MG: 6.25 TABLET, FILM COATED ORAL at 21:03

## 2020-10-27 RX ADMIN — FINASTERIDE 5 MG: 5 TABLET, FILM COATED ORAL at 08:57

## 2020-10-27 RX ADMIN — HYDROCODONE BITARTRATE AND ACETAMINOPHEN 2 TABLET: 5; 325 TABLET ORAL at 02:34

## 2020-10-27 RX ADMIN — LEVOTHYROXINE SODIUM 100 MCG: 100 TABLET ORAL at 08:57

## 2020-10-27 RX ADMIN — SODIUM CHLORIDE, PRESERVATIVE FREE 10 ML: 5 INJECTION INTRAVENOUS at 09:02

## 2020-10-27 RX ADMIN — TIOTROPIUM BROMIDE INHALATION SPRAY 2 PUFF: 3.12 SPRAY, METERED RESPIRATORY (INHALATION) at 08:05

## 2020-10-27 RX ADMIN — PREDNISONE 20 MG: 20 TABLET ORAL at 09:03

## 2020-10-27 RX ADMIN — INSULIN LISPRO 1 UNITS: 100 INJECTION, SOLUTION INTRAVENOUS; SUBCUTANEOUS at 16:47

## 2020-10-27 RX ADMIN — DULOXETINE HYDROCHLORIDE 60 MG: 30 CAPSULE, DELAYED RELEASE ORAL at 08:57

## 2020-10-27 RX ADMIN — AMIODARONE HYDROCHLORIDE 200 MG: 200 TABLET ORAL at 08:57

## 2020-10-27 RX ADMIN — ASPIRIN 81 MG: 81 TABLET, COATED ORAL at 08:57

## 2020-10-27 RX ADMIN — BUDESONIDE AND FORMOTEROL FUMARATE DIHYDRATE 2 PUFF: 160; 4.5 AEROSOL RESPIRATORY (INHALATION) at 08:07

## 2020-10-27 RX ADMIN — ALBUTEROL SULFATE 2 PUFF: 90 AEROSOL, METERED RESPIRATORY (INHALATION) at 15:40

## 2020-10-27 RX ADMIN — GUAIFENESIN 600 MG: 600 TABLET, EXTENDED RELEASE ORAL at 08:57

## 2020-10-27 RX ADMIN — ALBUTEROL SULFATE 2 PUFF: 90 AEROSOL, METERED RESPIRATORY (INHALATION) at 08:03

## 2020-10-27 RX ADMIN — FUROSEMIDE 40 MG: 10 INJECTION, SOLUTION INTRAMUSCULAR; INTRAVENOUS at 08:57

## 2020-10-27 RX ADMIN — MULTIPLE VITAMINS W/ MINERALS TAB 1 TABLET: TAB at 08:57

## 2020-10-27 RX ADMIN — HYDROCODONE BITARTRATE AND ACETAMINOPHEN 2 TABLET: 5; 325 TABLET ORAL at 06:55

## 2020-10-27 ASSESSMENT — PAIN DESCRIPTION - LOCATION
LOCATION: CHEST
LOCATION: BACK;CHEST
LOCATION: CHEST
LOCATION: CHEST

## 2020-10-27 ASSESSMENT — PAIN - FUNCTIONAL ASSESSMENT
PAIN_FUNCTIONAL_ASSESSMENT: PREVENTS OR INTERFERES SOME ACTIVE ACTIVITIES AND ADLS
PAIN_FUNCTIONAL_ASSESSMENT: ACTIVITIES ARE NOT PREVENTED
PAIN_FUNCTIONAL_ASSESSMENT: PREVENTS OR INTERFERES SOME ACTIVE ACTIVITIES AND ADLS
PAIN_FUNCTIONAL_ASSESSMENT: PREVENTS OR INTERFERES SOME ACTIVE ACTIVITIES AND ADLS
PAIN_FUNCTIONAL_ASSESSMENT: ACTIVITIES ARE NOT PREVENTED

## 2020-10-27 ASSESSMENT — PAIN DESCRIPTION - PAIN TYPE
TYPE: SURGICAL PAIN;CHRONIC PAIN
TYPE: SURGICAL PAIN
TYPE: SURGICAL PAIN
TYPE: CHRONIC PAIN;SURGICAL PAIN
TYPE: SURGICAL PAIN
TYPE: CHRONIC PAIN;SURGICAL PAIN

## 2020-10-27 ASSESSMENT — PAIN SCALES - GENERAL
PAINLEVEL_OUTOF10: 8
PAINLEVEL_OUTOF10: 9
PAINLEVEL_OUTOF10: 9
PAINLEVEL_OUTOF10: 8
PAINLEVEL_OUTOF10: 9
PAINLEVEL_OUTOF10: 6
PAINLEVEL_OUTOF10: 8

## 2020-10-27 ASSESSMENT — PAIN DESCRIPTION - DESCRIPTORS
DESCRIPTORS: ACHING;CONSTANT
DESCRIPTORS: ACHING
DESCRIPTORS: ACHING;CONSTANT
DESCRIPTORS: ACHING

## 2020-10-27 ASSESSMENT — PAIN DESCRIPTION - ONSET
ONSET: ON-GOING

## 2020-10-27 ASSESSMENT — PAIN DESCRIPTION - ORIENTATION
ORIENTATION: MID

## 2020-10-27 ASSESSMENT — PAIN DESCRIPTION - PROGRESSION
CLINICAL_PROGRESSION: NOT CHANGED
CLINICAL_PROGRESSION: GRADUALLY WORSENING
CLINICAL_PROGRESSION: NOT CHANGED
CLINICAL_PROGRESSION: NOT CHANGED
CLINICAL_PROGRESSION: GRADUALLY WORSENING
CLINICAL_PROGRESSION: NOT CHANGED
CLINICAL_PROGRESSION: GRADUALLY WORSENING

## 2020-10-27 ASSESSMENT — PAIN DESCRIPTION - DIRECTION: RADIATING_TOWARDS: NON

## 2020-10-27 ASSESSMENT — PAIN DESCRIPTION - FREQUENCY
FREQUENCY: CONTINUOUS

## 2020-10-27 NOTE — PROGRESS NOTES
none  Barriers to improvement:  Shallow breathing, loss of balance with gt, endurance and progression  Plan for Next Session:    Will cont to progress ex's and gt per cardiac protocol and educate pt on sternal precautions, S & S of ex intolerance, purpose of ex's, progression of ex's and gt at home. Time in:  0930  Time out:  1025  Timed treatment minutes:  55  Total treatment time:  54  Previously filed items:  Social/Functional History  Lives With: Significant other(Plans on staying with ex-wife at discharge)  Type of Home: House  Home Layout: One level  Home Access: Stairs to enter with rails  Entrance Stairs - Number of Steps: 1  Entrance Stairs - Rails: Right  Bathroom Shower/Tub: Tub/Shower unit  Bathroom Toilet: Standard  Bathroom Accessibility: Accessible  ADL Assistance: Independent  Homemaking Assistance: Independent  Homemaking Responsibilities: Yes  Ambulation Assistance: Independent(uses no AD)  Transfer Assistance: Independent  Active : Yes  Occupation: Full time employment  Type of occupation:   Additional Comments: Per RN, 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 Pt reports he will be staying with ex-wife at discharge and will have initial 24/7 supervision. Above information is for ex-wife's home setup. Short term goals  Time Frame for Short term goals: 1 week  Short term goal 1: Pt will perform sit><supine Shanel  Short term goal 2: Pt will transfer SBA  Short term goal 3: Pt will ambulate 200ft with LRAD SBA  Short term goal 4: Pt will ascend/descend 1 step, single rail SBA  Short term goal 5: Pt will complete cardiac HEP x 10 reps indep   Electronically signed by:     Issac Warren PTA  10/27/2020, 9:21 AM

## 2020-10-27 NOTE — PROGRESS NOTES
Progress Note( Dr. Deloris Tillman)  10/26/2020  Subjective:   Admit Date: 10/19/2020  PCP: Reyna Downs MD    Admitted For : Chest pain CAD underwent CABG    Consulted For: Better control of blood glucose filed diabetes mellitus    Interval History:Postop day 5  Better  C/O  chest pains, also with deep breathing mostly from chest wall  Has some  SOB . Denies nausea or vomiting. Doing well //more  ambulatory  No new bowel or bladder symptoms. Intake/Output Summary (Last 24 hours) at 10/26/2020 2228  Last data filed at 10/26/2020 2000  Gross per 24 hour   Intake 650 ml   Output 3640 ml   Net -2990 ml       DATA    CBC:   No results for input(s): WBC, HGB, PLT in the last 72 hours. CMP:  Recent Labs     10/24/20  0500 10/25/20  0603 10/26/20  0600   * 136 138   K 4.7 4.8 4.6   CL 95* 98* 97*   CO2 28 32 31   BUN 47* 47* 44*   CREATININE 1.1 1.0 1.0   CALCIUM 9.0 9.0 9.2     Lipids:   Lab Results   Component Value Date    CHOL 155 06/23/2020    HDL 30 06/23/2020    TRIG 173 06/23/2020     Glucose:  Recent Labs     10/26/20  1240 10/26/20  1756 10/26/20  2101   POCGLU 109* 144* 210*     [id number]:  Lab Results   Component Value Date    LABA1C 6.3 10/12/2020     High Sensitivity TSH:   Lab Results   Component Value Date    TSHHS 1.340 06/23/2020     Free T3: No results found for: FT3  Free T4:  Lab Results   Component Value Date    T4FREE 1.28 06/23/2020       Xr Chest Portable    Result Date: 10/22/2020  EXAMINATION: ONE XRAY VIEW OF THE CHEST 10/22/2020 5:10 am COMPARISON: 10/21/2020 HISTORY: ORDERING SYSTEM PROVIDED HISTORY: Post op open heart surgery TECHNOLOGIST PROVIDED HISTORY: Reason for Exam:->Post op open heart surgery Reason for Exam: post op open heart surgery Acuity: Unknown Type of Exam: Ongoing FINDINGS: Central venous catheter tip overlies the SVC. Sternotomy wires. Small bilateral pleural effusion. Right lower lobe opacity. Stable cardiomegaly. Mild interstitial edema.   No pneumothorax. 1. Unchanged mild pulmonary edema and small bilateral pleural effusions. 2. Stable right lower lobe atelectasis. Xr Chest Portable    Result Date: 10/21/2020  EXAMINATION: ONE XRAY VIEW OF THE CHEST 10/21/2020 1:16 pm COMPARISON: 10/21/2020 HISTORY: ORDERING SYSTEM PROVIDED HISTORY: chest tube removal   ersistent small right pleural effusion with bibasilar atelectasis. No discrete pneumothorax. The osseous structures otherwise stable. Interval removal of thoracostomy tubes. No discrete pneumothorax. Xr Chest Portable    Result Date: 10/21/2020  EXAMINATION: ONE XRAY VIEW OF THE CHEST 10/21/2020 6:06 am COMPARISON: 10/20/2020 HISTORY: ORDERING SYSTEM PROVIDED HISTORY: Post op open heart surgery  . Mildly increased interstitial changes which could be related to increased pulmonary edema or pneumonitis. Cardiomegaly with central vascular congestion. Otherwise similar appearing chest.     Xr Chest Portable    Result Date: 10/21/2020  EXAMINATION: ONE XRAY VIEW OF THE CHEST 10/20/2020 5:18 am COMPARISON: Chest radiograph dated October 19, 2020. HISTORY: ORDERING SYSTEM PROVIDED HISTORY: Post op open heart surgery TECHNOLOGIST PROVIDED HISTORY: Reason for Exam:->Post op open heart surgery Reason for Exam: Post op open heart surgery Acuity: Unknown Type of Exam: Unknown FINDINGS: Median sternotomy wires are noted. A left-sided central venous catheter is in place. A Colon-Olga catheter is in place. Bilateral chest tubes are present. Low lung volumes with bilateral pleural effusions and bibasilar opacities are seen. Bilateral pleural effusions with bibasilar opacities without significant change.            Scheduled Medicines   Medications:    predniSONE  20 mg Oral Daily    albuterol sulfate HFA  2 puff Inhalation Q4H WA    insulin lispro  0-6 Units Subcutaneous TID WC    insulin lispro  0-3 Units Subcutaneous 2 times per day    guaiFENesin  600 mg Oral BID    carvedilol 3.125 mg Oral BID    furosemide  40 mg Intravenous BID    budesonide-formoterol  2 puff Inhalation BID    DULoxetine  60 mg Oral Daily    finasteride  5 mg Oral Daily    levothyroxine  100 mcg Oral Daily    tiotropium  2 puff Inhalation Daily    tamsulosin  0.4 mg Oral Daily    sodium chloride flush  10 mL Intravenous 2 times per day    polyethylene glycol  17 g Oral Daily    pantoprazole  40 mg Oral Daily    therapeutic multivitamin-minerals  1 tablet Oral Daily with breakfast    atorvastatin  20 mg Oral Nightly    aspirin  81 mg Oral Daily    [START ON 10/27/2020] amiodarone  200 mg Oral Daily      Infusions:    sodium chloride 10 mL/hr at 10/21/20 1401    nitroGLYCERIN      insulin 1 Units/hr (10/22/20 0334)    dextrose      EPINEPHrine infusion      norepinephrine           Objective:   Vitals: /64   Pulse 70   Temp 98.2 °F (36.8 °C) (Oral)   Resp 21   Ht 5' 9.49\" (1.765 m)   Wt 198 lb 10.2 oz (90.1 kg)   SpO2 99%   BMI 28.92 kg/m²   General appearance: alert and cooperative with exam  Neck: no JVD or bruit  Thyroid : Normal lobes   Lungs: Has Vesicular Breath sounds somewhat diminished breath sounds right side   heart:  regular rate and rhythm  Abdomen: soft, non-tender; bowel sounds normal; no masses,  no organomegaly  Musculoskeletal: Normal  Extremities: extremities normal, , no edema  Neurologic:  Awake, alert, oriented to name, place and time. Cranial nerves II-XII are grossly intact. Motor is  intact. Sensory is intact. ,  and gait is normal.    Assessment:     Patient Active Problem List:     Juvenile rheumatoid arthritis (HCC)     Chronic bilateral low back pain without sciatica     Peripheral arterial disease (HCC)     Panlobular emphysema (HCC)     Essential hypertension     Acquired hypothyroidism     Gastroesophageal reflux disease     Non-seasonal allergic rhinitis     Benign prostatic hyperplasia without lower urinary tract symptoms     Tobacco use     Acute pulmonary edema (HCC)     Seasonal allergic rhinitis due to pollen     Mixed irritable bowel syndrome     Mixed hyperlipidemia     Prediabetes     Benign prostatic hyperplasia     Chronic combined systolic and diastolic congestive heart failure (HCC)     JOSE (acute kidney injury) (HCC)     LV dysfunction     Dehydration     Vomiting     Coronary artery disease involving native coronary artery     CAD in native artery. //CABG      Plan:     1. Reviewed POC blood glucose . Labs and X ray results   2. Reviewed Current Medicines   3. on  Correction bolus Humalog Insulin regime  4. Monitor Blood glucose frequently   5. Modified  the dose of Insulin/ other medicines as needed   6. Patient supposed to go to acute rehab but due to some insurance issues. Possibly by tomorrow  7. Will follow     .      Margaret Sanchez MD

## 2020-10-27 NOTE — PROGRESS NOTES
input(s): PHOS in the last 72 hours. INR: No results for input(s): INR in the last 72 hours. Radiology Review:  CXR      ASSESSMENT AND PLAN:  S/P CABG day #5  Patient Active Problem List   Diagnosis    Juvenile rheumatoid arthritis ([facility] [address]    Chronic bilateral low back pain without sciatica    Peripheral arterial disease (HCC)    Panlobular emphysema ([facility] [address]    Essential hypertension    Acquired hypothyroidism    Gastroesophageal reflux disease    Non-seasonal allergic rhinitis    Benign prostatic hyperplasia without lower urinary tract symptoms    Tobacco use    Acute pulmonary edema (HCC)    Seasonal allergic rhinitis due to pollen    Mixed irritable bowel syndrome    Mixed hyperlipidemia    Prediabetes    Benign prostatic hyperplasia    Chronic combined systolic and diastolic congestive heart failure (HCC)    JOSE (acute kidney injury) (HCC)    LV dysfunction    Vomiting    Coronary artery disease involving native coronary artery    CAD in native artery       Mobilize. Pulmonary toilet. Diuresis  Continue to wean oxygen. Making excellent progress from pulmonary standpoint.   Awaiting insurance approval for transfer to ARU  Continue PT/OT

## 2020-10-27 NOTE — PROGRESS NOTES
pulmonary      SUBJECTIVE: weak     OBJECTIVE    VITALS:  BP (!) 144/65   Pulse 68   Temp 98.1 °F (36.7 °C) (Oral)   Resp 11   Ht 5' 9.49\" (1.765 m)   Wt 198 lb 10.2 oz (90.1 kg)   SpO2 94%   BMI 28.92 kg/m²   HEAD AND FACE EXAM:  No throat injection, no active exudate,no thrush  NECK EXAM;No JVD, no masses, symmetrical  CHEST EXAM; Expansion equal and symmetrical, no masses  LUNG EXAM; Good breath sounds bilaterally. There are expiratory wheezes both lungs, there are crackles at both lung bases  CARDIOVASCULAR EXAM: Positive S1 and S2, no S3 or S4, no clicks ,no murmurs  RIGHT AND LEFT LOWER EXTRIMITY EXAM: No edema, no swelling, no inflamation  CNS EXAM: Alert and oriented X3          LABS   Lab Results   Component Value Date    WBC 15.0 (H) 10/22/2020    HGB 10.4 (L) 10/22/2020    HCT 32.5 (L) 10/22/2020    .2 (H) 10/22/2020     (L) 10/22/2020     Lab Results   Component Value Date    CREATININE 1.0 10/26/2020    BUN 44 (H) 10/26/2020     10/26/2020    K 4.6 10/26/2020    CL 97 (L) 10/26/2020    CO2 31 10/26/2020     Lab Results   Component Value Date    INR 1.27 10/19/2020    PROTIME 15.4 (H) 10/19/2020          Lab Results   Component Value Date    PHOS 4.2 07/15/2020      No results for input(s): PH, PO2ART, YIL2PVZ, HCO3, BEART, O2SAT in the last 72 hours. Wt Readings from Last 3 Encounters:   10/26/20 198 lb 10.2 oz (90.1 kg)   10/12/20 204 lb (92.5 kg)   10/05/20 207 lb (93.9 kg)               ASSESMENT  Ac resp failure  Ac copd  vonda pl effusion  Bib atx        PLAN  1. Bd rx  2. Inc yossi  3. Po prednisone  4.  ARU    10/27/2020  Cristian Zhao M.D.

## 2020-10-27 NOTE — CARE COORDINATION
New ARU pre-cert is pending at this time. Will continue to follow for determination. 1400:  Received approval for admission to ARU. Auth #[id number]. Updated clinicals needed on 11/6 by noon. Notified Yariel Still CM and Lexie VIDES of approval.  Will need COVID negative test PTA. Spoke with patients emergency contact Army Hughes)  who states she's still trying to arrange an apartment where the patient can live, should be ready soon. Per patient and Anuj Gannon patient plans on staying with soon to be ex-wife at discharge from ARU while awaiting on apartment availability. Patient meets criteria and is approved to come to ARU. Patient able to admit once COVID negative test noted and after ARU Medical Director and  sign the pre-admission screen (PAS). Notified Yariel Still CM that admit will have to be once a d/c leaves from ARU per ARU nurse manager. Per Yariel Still she'll notify patients nurse.

## 2020-10-27 NOTE — PROGRESS NOTES
Patient accepted for ARU per Ant Lisa, pt needs rapd covid done before he can be transferred. Updated patient on approbal for ARU and need for COVID test, patient voiced understanding.

## 2020-10-27 NOTE — PROGRESS NOTES
Per Joselin Gilliland RN in ARU still waiting on approval from their doctor before patient can be transferred.

## 2020-10-27 NOTE — CARE COORDINATION
Received call that precert was granted; patient can be transferred today when bed is available. Owen Johnson RN    1400 Patient will need rapid COVID test prior to transfer to ARU per Tayler CHIRINOS ARU liaison.  Owen Johnson RN

## 2020-10-27 NOTE — DISCHARGE SUMMARY
Discharge Summary     Patient ID  Nano Carcamo   1955  [id number]      Primary Care Doctor:  Meliton Cantu MD    Admit date: 10/19/2020   Discharge date: 10/27/2020      Admitting Physician: Anderson Lewis MD   Discharge Physician: Anderson Lewis MD    Discharge Diagnoses: Active Hospital Problems    Diagnosis Date Noted    CAD in native artery [I25.10] 10/19/2020    Coronary artery disease involving native coronary artery [I25.10] 09/24/2020    Chronic combined systolic and diastolic congestive heart failure ([address] [I50.42] 07/15/2020    Tobacco use [Z72.0] 06/01/2020     Discharged Condition: stable. Hospital Course:  Upon admission underwent surgery of CABG after discussion and preparation. Tolerated surgery well. Post op patient had expected pulmonary insufficiency, but improved. Post op ; monitored rhythm, O2 sat, I/O, Labs, CXRs serially  Neuro status , BP and vital signs monitored  Started on diet and activity.   Uneventful recovery  At discharge, pt ambulating  Tolerating diet  Wounds clean  Had Bm  Oxygen weaned to 1-2L   NSR  VS at discharge   Vitals:    10/27/20 1700   BP: 118/61   Pulse: 70   Resp: 19   Temp:    SpO2:        Consults: Pulm, Endocrine    Significant Diagnostic Studies:     Disposition:     Patient Instructions:      Medication List      ASK your doctor about these medications    albuterol sulfate  (90 Base) MCG/ACT inhaler  Commonly known as:  Ventolin HFA  Inhale 2 puffs into the lungs 4 times daily as needed for Wheezing     amLODIPine 5 MG tablet  Commonly known as:  NORVASC     aspirin EC 81 MG EC tablet  Take 1 tablet by mouth daily     atorvastatin 40 MG tablet  Commonly known as:  LIPITOR  Take 1 tablet by mouth every evening     budesonide-formoterol 160-4.5 MCG/ACT Aero  Commonly known as:  Symbicort  Inhale 2 puffs into the lungs 2 times daily     cetirizine 10 MG tablet  Commonly known as:  ZYRTEC     cyclobenzaprine 10 MG tablet  Commonly [phone]   · fluticasone 50 MCG/ACT nasal spray  · furosemide 40 MG tablet       Activity: activity as tolerated and no lifting, Driving, or Strenuous exercise until seen by physician in office  Diet: cardiac diet  Wound Care: as directed    Follow-up as directed at discharge    Signed: Merline Smith    Time spent on discharge 35 minutes

## 2020-10-28 ENCOUNTER — CARE COORDINATION (OUTPATIENT)
Dept: CARE COORDINATION | Age: 65
End: 2020-10-28

## 2020-10-28 ENCOUNTER — APPOINTMENT (OUTPATIENT)
Dept: GENERAL RADIOLOGY | Age: 65
DRG: 949 | End: 2020-10-28
Attending: PHYSICAL MEDICINE & REHABILITATION
Payer: MEDICARE

## 2020-10-28 LAB
ANION GAP SERPL CALCULATED.3IONS-SCNC: 11 MMOL/L (ref 4–16)
BUN BLDV-MCNC: 47 MG/DL (ref 6–23)
CALCIUM SERPL-MCNC: 9.1 MG/DL (ref 8.3–10.6)
CHLORIDE BLD-SCNC: 95 MMOL/L (ref 99–110)
CO2: 33 MMOL/L (ref 21–32)
CREAT SERPL-MCNC: 1.1 MG/DL (ref 0.9–1.3)
EKG ATRIAL RATE: 80 BPM
EKG DIAGNOSIS: NORMAL
EKG P AXIS: 1 DEGREES
EKG P-R INTERVAL: 146 MS
EKG Q-T INTERVAL: 406 MS
EKG QRS DURATION: 108 MS
EKG QTC CALCULATION (BAZETT): 468 MS
EKG R AXIS: 13 DEGREES
EKG T AXIS: 56 DEGREES
EKG VENTRICULAR RATE: 80 BPM
GFR AFRICAN AMERICAN: >60 ML/MIN/1.73M2
GFR NON-AFRICAN AMERICAN: >60 ML/MIN/1.73M2
GLUCOSE BLD-MCNC: 102 MG/DL (ref 70–99)
GLUCOSE BLD-MCNC: 104 MG/DL (ref 70–99)
GLUCOSE BLD-MCNC: 122 MG/DL (ref 70–99)
GLUCOSE BLD-MCNC: 132 MG/DL (ref 70–99)
GLUCOSE BLD-MCNC: 207 MG/DL (ref 70–99)
HCT VFR BLD CALC: 42.6 % (ref 42–52)
HCT VFR BLD CALC: 50.3 % (ref 42–52)
HEMOGLOBIN: 13.8 GM/DL (ref 13.5–18)
HEMOGLOBIN: 15 GM/DL (ref 13.5–18)
INR BLD: 1.02 INDEX
MCH RBC QN AUTO: 32.6 PG (ref 27–31)
MCHC RBC AUTO-ENTMCNC: 32.4 % (ref 32–36)
MCV RBC AUTO: 100.7 FL (ref 78–100)
PDW BLD-RTO: 13 % (ref 11.7–14.9)
PLATELET # BLD: 303 K/CU MM (ref 140–440)
PMV BLD AUTO: 8.7 FL (ref 7.5–11.1)
POTASSIUM SERPL-SCNC: 4 MMOL/L (ref 3.5–5.1)
PROTHROMBIN TIME: 12.4 SECONDS (ref 11.7–14.5)
RBC # BLD: 4.23 M/CU MM (ref 4.6–6.2)
SODIUM BLD-SCNC: 139 MMOL/L (ref 135–145)
WBC # BLD: 24.8 K/CU MM (ref 4–10.5)

## 2020-10-28 PROCEDURE — 6370000000 HC RX 637 (ALT 250 FOR IP): Performed by: PHYSICAL MEDICINE & REHABILITATION

## 2020-10-28 PROCEDURE — 94640 AIRWAY INHALATION TREATMENT: CPT

## 2020-10-28 PROCEDURE — 99232 SBSQ HOSP IP/OBS MODERATE 35: CPT | Performed by: PHYSICAL MEDICINE & REHABILITATION

## 2020-10-28 PROCEDURE — 97535 SELF CARE MNGMENT TRAINING: CPT

## 2020-10-28 PROCEDURE — 85014 HEMATOCRIT: CPT

## 2020-10-28 PROCEDURE — 97167 OT EVAL HIGH COMPLEX 60 MIN: CPT

## 2020-10-28 PROCEDURE — 85610 PROTHROMBIN TIME: CPT

## 2020-10-28 PROCEDURE — 1280000000 HC REHAB R&B

## 2020-10-28 PROCEDURE — 85027 COMPLETE CBC AUTOMATED: CPT

## 2020-10-28 PROCEDURE — 36415 COLL VENOUS BLD VENIPUNCTURE: CPT

## 2020-10-28 PROCEDURE — 85018 HEMOGLOBIN: CPT

## 2020-10-28 PROCEDURE — 80048 BASIC METABOLIC PNL TOTAL CA: CPT

## 2020-10-28 PROCEDURE — 94761 N-INVAS EAR/PLS OXIMETRY MLT: CPT

## 2020-10-28 PROCEDURE — 71045 X-RAY EXAM CHEST 1 VIEW: CPT

## 2020-10-28 PROCEDURE — 82962 GLUCOSE BLOOD TEST: CPT

## 2020-10-28 PROCEDURE — 6370000000 HC RX 637 (ALT 250 FOR IP): Performed by: SURGERY

## 2020-10-28 PROCEDURE — 94150 VITAL CAPACITY TEST: CPT

## 2020-10-28 PROCEDURE — 2700000000 HC OXYGEN THERAPY PER DAY

## 2020-10-28 RX ORDER — LOPERAMIDE HYDROCHLORIDE 2 MG/1
2 CAPSULE ORAL 4 TIMES DAILY PRN
Status: DISCONTINUED | OUTPATIENT
Start: 2020-10-28 | End: 2020-10-29

## 2020-10-28 RX ADMIN — CARVEDILOL 3.12 MG: 6.25 TABLET, FILM COATED ORAL at 20:50

## 2020-10-28 RX ADMIN — ALBUTEROL SULFATE 2 PUFF: 90 AEROSOL, METERED RESPIRATORY (INHALATION) at 08:12

## 2020-10-28 RX ADMIN — LOPERAMIDE HYDROCHLORIDE 2 MG: 2 CAPSULE ORAL at 20:50

## 2020-10-28 RX ADMIN — HYDROCODONE BITARTRATE AND ACETAMINOPHEN 2 TABLET: 5; 325 TABLET ORAL at 13:20

## 2020-10-28 RX ADMIN — HYDROCODONE BITARTRATE AND ACETAMINOPHEN 2 TABLET: 5; 325 TABLET ORAL at 18:17

## 2020-10-28 RX ADMIN — GUAIFENESIN 600 MG: 600 TABLET, EXTENDED RELEASE ORAL at 20:51

## 2020-10-28 RX ADMIN — ALBUTEROL SULFATE 2 PUFF: 90 AEROSOL, METERED RESPIRATORY (INHALATION) at 16:37

## 2020-10-28 RX ADMIN — TIOTROPIUM BROMIDE INHALATION SPRAY 2 PUFF: 3.12 SPRAY, METERED RESPIRATORY (INHALATION) at 08:12

## 2020-10-28 RX ADMIN — PREDNISONE 20 MG: 20 TABLET ORAL at 08:56

## 2020-10-28 RX ADMIN — BUDESONIDE AND FORMOTEROL FUMARATE DIHYDRATE 2 PUFF: 160; 4.5 AEROSOL RESPIRATORY (INHALATION) at 20:01

## 2020-10-28 RX ADMIN — TAMSULOSIN HYDROCHLORIDE 0.4 MG: 0.4 CAPSULE ORAL at 08:55

## 2020-10-28 RX ADMIN — AMIODARONE HYDROCHLORIDE 200 MG: 200 TABLET ORAL at 08:55

## 2020-10-28 RX ADMIN — ALBUTEROL SULFATE 2 PUFF: 90 AEROSOL, METERED RESPIRATORY (INHALATION) at 12:33

## 2020-10-28 RX ADMIN — FINASTERIDE 5 MG: 5 TABLET, FILM COATED ORAL at 08:55

## 2020-10-28 RX ADMIN — LEVOTHYROXINE SODIUM 100 MCG: 100 TABLET ORAL at 06:19

## 2020-10-28 RX ADMIN — GUAIFENESIN 600 MG: 600 TABLET, EXTENDED RELEASE ORAL at 08:56

## 2020-10-28 RX ADMIN — CARVEDILOL 3.12 MG: 6.25 TABLET, FILM COATED ORAL at 08:56

## 2020-10-28 RX ADMIN — MULTIPLE VITAMINS W/ MINERALS TAB 1 TABLET: TAB at 08:56

## 2020-10-28 RX ADMIN — ALBUTEROL SULFATE 2 PUFF: 90 AEROSOL, METERED RESPIRATORY (INHALATION) at 20:01

## 2020-10-28 RX ADMIN — ASPIRIN 81 MG: 81 TABLET, FILM COATED ORAL at 08:56

## 2020-10-28 RX ADMIN — HYDROCODONE BITARTRATE AND ACETAMINOPHEN 2 TABLET: 5; 325 TABLET ORAL at 08:55

## 2020-10-28 RX ADMIN — DULOXETINE HYDROCHLORIDE 60 MG: 30 CAPSULE, DELAYED RELEASE ORAL at 08:55

## 2020-10-28 RX ADMIN — ATORVASTATIN CALCIUM 20 MG: 20 TABLET, FILM COATED ORAL at 20:50

## 2020-10-28 RX ADMIN — FUROSEMIDE 40 MG: 40 TABLET ORAL at 18:17

## 2020-10-28 RX ADMIN — HYDROCODONE BITARTRATE AND ACETAMINOPHEN 2 TABLET: 5; 325 TABLET ORAL at 04:05

## 2020-10-28 RX ADMIN — BUDESONIDE AND FORMOTEROL FUMARATE DIHYDRATE 2 PUFF: 160; 4.5 AEROSOL RESPIRATORY (INHALATION) at 08:12

## 2020-10-28 RX ADMIN — FUROSEMIDE 40 MG: 40 TABLET ORAL at 08:56

## 2020-10-28 RX ADMIN — LOPERAMIDE HYDROCHLORIDE 2 MG: 2 CAPSULE ORAL at 22:01

## 2020-10-28 ASSESSMENT — PAIN SCALES - GENERAL
PAINLEVEL_OUTOF10: 9
PAINLEVEL_OUTOF10: 9
PAINLEVEL_OUTOF10: 8
PAINLEVEL_OUTOF10: 9

## 2020-10-28 NOTE — PLAN OF CARE
ARU Interdisciplinary Plan of Care (IPOC)  [facility]  [address] [facility], [address]  [phone]  Fax: [phone]        Fidelia Cote    : 1955  Acct #: [de-identified]  MRN: [id number]   PHYSICIAN:  Eugenia Quiles MD  Primary Active Problems:   Active Hospital Problems    Diagnosis Date Noted    Uncontrolled pain [R52]     Generalized weakness [R53.1]     Gait disturbance [R26.9]     Acute blood loss anemia [D62]     COPD exacerbation (HCC) [J44.1]     Cigarette nicotine dependence without complication [Y04.778]     CHF following cardiac surgery, postop [I97.130] 10/27/2020       Rehabilitation Diagnosis:     Atherosclerotic heart disease of native coronary artery without angina pectoris [I25.10]  CHF following cardiac surgery, postop [I97.130]       ADMIT DATE:10/27/2020   CARE PLAN     NURSING:  Fidelia Cote while on this unit will:      Bowel and Bladder   [x] Be continent of bowel and bladder      [x] Have an adequate number of bowel movements   [x] Urinate with no urinary retention >300ml in bladder   [] Bladder Scan: (details)   [] Complete bladder protocol with dsouza removal   [] Initiate Bladder Program to toilet every ___ hours   [] Initiate Bowel Program to toilet every ___hours   [] Bladder training    [] Bowel training  Pulmonary   [x] Maintain O2 SATs at 92% or greater  Pain Management   [x] Have pain managed while on ARU        [x] Be pain free by discharge    [x] Medication Management and Education  Maintenance of Skin Integrity/Wound Management   [x] Have no skin breakdown while on ARU   [] Have improved skin integrity via wound measurements   [x] Have no signs/symptoms of infection via infection protection and monitoring at the          wound site  Fall Prevention   [x] Be free from injury during hospitalization via fall prevention measures     [x] Disease management and Education  Precautions   [] Weight Bearing Precautions   [] Swallowing Precautions   [x] Monitoring of Risks of Complications   [x] DVT Prophylaxis    [] Fluid/electrolyte/Nutrition Management    [] Complete education with patient/family with understanding demonstrated for          in-room safety with transfers to bed, toilet, wheelchair, shower as well as                bathroom activities and hygiene. [] Adjustment   [] Other:   Nursing interventions may include bowel/bladder training, education for medical assistive devices, medication education, O2 saturation management, energy conservation, stress management techniques, fall prevention, alarms protocol, seating and positioning, skin/wound care, pressure relief instruction,dressing changes,  infection protection, DVT prophylaxis, and/or assistance with in room safety with transfers to bed, toilet, wheelchair, shower as well as bathroom activities and hygiene. Patient/caregiver education for:   [x] Disease/sustained injury/management      [x] Medication Use   [] Surgical intervention   [x] Safety/Precautions   [] Body mechanics and or joint protection   [] Health maintenance         PHYSICAL THERAPY:  Goals:                  Short term goals  Time Frame for Short term goals: 10 tx days:  Short term goal 1: Pt will complete rolling L/R and sup<->sit Ind following sternal precautions. Short term goal 2: Pt will complete OOB transfers following sternal precautions Mod Ind. Short term goal 3: Pt will ambulate 150 ft using RW Mod Ind. Short term goal 4: Pt will ascend/descend 4-5 steps using bilat rails Mod Ind. Short term goal 5: Pt will ascend/descend curb step and ambulate over uneven surfaces using RW Mod Ind. Short term goal 6: Pt will complete object retrieval from the floor in standing Mod Ind-Ind. These goals were reviewed with this patient at the time of assessment and Daija Caraballo is in agreement.      Plan of Care: Pt to be seen 5 days per week for a minimum of 60 minutes for cognitive training, home management, energy conservation training, community reintegration, splint fabrication, patient/caregiver education and training, animal assisted therapy, and concurrent and/or group therapy. SPEECH THERAPY: (If ordered)  Plan of Care and Goals:   LTG                                                      LTG:                           Treatments may include speech/language/communication therapy, cognitive training, animal assisted therapy, group therapy, education, and/or dysphagia therapy based on the above goals. Co-treats where appropriate with PT or OT to facilitate patient goals in functional tasks. These goals were reviewed with this patient at the time of assessment and Nusrat Fountain is in agreement. CASE MANAGEMENT:  Goals:   Assist patient/family with discharge planning, patient/family counseling, assistance in obtaining recommended equipment and other services, and coordination with insurance during ARU stay. Patient Goals:   Return to maximum level of independent function. Nutrition goal:Patient will tolerate diet to consume at least 50-75% at meals        PT IRF-ESTEBAN scores and goals for initial assessment:   Roll Left and Right  Assistance Needed: Supervision or touching assistance  Comment: required Sup.  (sequential cues following sternal precautions); tasks completed without use of bed features  CARE Score: 4  Discharge Goal: Independent  Sit to Lying  Assistance Needed: Partial/moderate assistance  Comment: required Mod A (assist on BLE), transfer completed without use of bed features  CARE Score: 3  Discharge Goal: Independent  Sit to Stand  Assistance Needed: Partial/moderate assistance  Comment: required Mod A with RW  CARE Score: 3  Discharge Goal: Independent  Chair/Bed-to-Chair Transfer  Comment: pt limited by lightheadedness  Reason if not Attempted: Not attempted due to medical condition or safety concerns  CARE Score: 88  Discharge Goal: Independent  Toilet Transfer  Assistance Needed: Partial/moderate assistance  Comment: Min A-Mod A on/off toilet and body placement. CARE Score: 3  Discharge Goal: Independent  Car Transfer  Reason if not Attempted: Not attempted due to medical condition or safety concerns  CARE Score: 88  Discharge Goal: Independent  Walk 10 Feet? Walk 10 Feet?: No  1 Step  1 Step?: Yes  Picking Up Object  Reason if not Attempted: Not attempted due to medical condition or safety concerns  CARE Score: 88  Discharge Goal: Independent  Wheelchair Ability  Uses a Wheelchair and/or Scooter?: No               1 Step (Curb)  Reason if not Attempted: Not attempted due to medical condition or safety concerns  CARE Score: 88  Discharge Goal: Independent   4 Steps  Reason if not Attempted: Not attempted due to medical condition or safety concerns  CARE Score: 88  Discharge Goal: Independent   12 Steps  Reason if not Attempted: Not applicable  CARE Score: 9  Discharge Goal: Not Applicable    OT IRF-ESTEBAN scores and goals for initial assessment:    Eating  Assistance Needed: Setup or clean-up assistance  CARE Score: 5  Discharge Goal: Independent  Oral Hygiene  Assistance Needed: Setup or clean-up assistance  Comment: Completed seated sink-side  CARE Score: 5  Discharge Goal: Independent  Toileting Hygiene  Assistance Needed: Dependent  Comment: nurse did hygiene   CARE Score: 1  Discharge Goal: Independent  Shower/Bathe Self  Assistance Needed: Partial/moderate assistance  Comment: Mod A for LB bathing, v/c for sternal precautions/compensatory strategies. CARE Score: 3  Discharge Goal: Independent  Upper Body Dressing  Assistance Needed: Partial/moderate assistance  Comment: Min A for threading BUE while maintaining cardiac precautions. CARE Score: 3  Discharge Goal: Independent  Lower Body Dressing  Assistance Needed: Substantial/maximal assistance  Comment: Max A to thread BLE and don over hips, pt demo buttoning pants.   CARE Score: 2  Discharge Goal: Independent  Putting On/Taking Off Footwear  Assistance Needed: Dependent  CARE Score: 1  Discharge Goal: Partial/moderate assistance    Activities Prior to Admit:   Homemaking Responsibilities: Yes  Active : Yes  Mode of Transportation: Truck, Car  Occupation: Full time employment  Leisure & Hobbies: none         Intensity of Therapy  Nusrat Fountain will be seen a minimum of 3 hours of therapy per day/a minimum of 5 out of 7 days per week. [] In this rare instance due to the nature of this patient's medical involvement, this patient will be seen 15 hours per week (900 minutes within a 7 day period). Treatments may include therapeutic exercises, gait training, neuromuscular re-ed, transfer training, community reintegration, bed mobility, w/c mobility and training, self care, home mgmt, cognitive training, energy conservation,dysphagia tx, speech/language/communication therapy, group therapy, and patient/family education. In addition, dietician/nutritionist may monitor calorie count as well as intake and collaboratively work with SLP on dietary upgrades. Neuropsychology/Psychology may evaluate and provide necessary support. Group therapy as appropriate to facilitate improved endurance, STR, COORD, function, safety, transfers, awareness and insight into deficits, problem solving, memory, and social interaction and engagement.     Medical issues being managed closely and that require 24 hour availability of a physician:   [x] Swallowing Precautions                                     [x] Weight bearing precautions   [x] Wound Care                             [x] Infection Prevention   [x] DVT Prophylaxis/assessment              [x] Monitoring for complications    [x] Fall Precautions/Prevention                         [x] Fluid/Electrolyte/Nutrition Balance   [x] Voice Protection                           [x] Medication Management   [x] Respiratory                   [x] Pain Mgmt   [x] Bowel/Bladder Fx    Medical Prognosis: [] Good  [x] Fair    [] Guarded   Total expected IRF days 18                                            Physician anticipated functional outcomes:  FWW and C RN/OT/pt and supervision. Rehab Goals:   [] Return to premorbid function of_______________________________.    [] Independent   [] Mod I  [x] Supervision  [] CGA   [] Min A   [] Mod A  Level for ambulation []without assistive device  [x] with assistive device        [] Independent   [] Mod I  [x] Supervision [] CGA   [] Min A   [] Mod A  Level for transfers []without assistive device  [x] with assistive device         [] Independent   [] Mod I  [x] Supervision [] CGA   [] Min A   [] Mod A Level with ADL's []without assistive device   [x] with assistive device     ___________________     Level with cognitive skills requiring [] No assist [x] Supervision  [] Active Assist/Cues     [] Maximize level of mobility and ADL's to decrease burden on caregiver    IPOC brief synthesis of Preadmission Screen, Post-Admission Evaluation, and Therapy Evaluations: Acute inpatient rehabilitation with occupational and physical therapy 180 minutes 5 out of every 7 days. Will address basic and  advancing mobility with self-care instruction and adaptive equipment training. Caregiver education will be offered. Expected length of stay  prior to a supervised level of function for discharge home with a walker and [facility] OT/PT is 2 weeks.     Additional recommendation:     1. Systolic congestive heart failure after coronary bypass graft surgery: Patient requires daily occupational and physical therapy. We must help him develop techniques for self-care and mobility while following sternal precautions. Must emphasize pulmonary hygiene, nutritional support and cautious pain management. Monitoring his oxygen saturations through activity and daily weights. Diuretic therapy as his Lasix has been converted from IV to p.o. form. Beta-blocker.   Adaptive equipment training. Perhaps caregiver training with his ex-wife if that is who is going to live with postoperatively. 2. DVT prophylaxis: Due to his anemia and bruising, chemoprophylaxis is contraindicated. SCDs when in bed. Weightbearing activities are pursued daily. Monitoring his hemoglobin closely. 3. COPD exacerbation: Albuterol and Symbicort inhalers. Mucinex and Spiriva. Monitoring O2 saturations at rest and with activity. Nebulizers as needed. Supplemental oxygen to keep saturations greater than 90%. 4. Hypertension: Coreg and Lasix are used to manage his hypertension. Target systolic blood pressure is 120-140. Vital signs are checked at rest and with activity. 5. BPH: Flomax and Proscar used to manage this issue. Bladder protocol should he develop symptoms of retention. 6. Nicotine addiction: Topical nicotine replacement patch.     Anticipated discharge destination:    [] Home Independently   [x] Home with supervision    []SNF     [] Other       This plan has been reviewed with me in a language I understand.  I have had the opportunity to include my input with my therapy team.    ________________________________________________   ______________________  Patient/Significant Other      Date    I have reviewed this initial plan of care and agree with its contents:    Title   Name    Date    Time    Physician: Everardo Au 10/30/2020 7:06 PM    Case Mgmt:  Ehsan Kj 10/28/2020 1125    OT: Anai Tesfaye OTR/L 10/28/2020 1700    PT: Tiffany Peters, PT     10/29/2020    16:08    RN: Julio Cesar Yang RN 10/27/20 2200    ST:    Dietician: KIARA Mcmillan  10/28/2020   13:03

## 2020-10-28 NOTE — H&P
Lucian Bustos    : 1955  [facility]  MRN: [id number]              History and physical      Admitting diagnosis: Congestive heart failure after coronary bypass graft surgery ( [address])    Comorbid diagnoses impacting rehabilitation: Uncontrolled pain, generalized weakness, gait disturbance, acute blood loss anemia, essential hypertension, BPH, COPD exacerbation, nicotine addiction    Chief complaint: Chest wall pain as expected dyspnea. History of present illness: The patient is a 59-year-old right-hand-dominant male whose progressive shortness of breath and intermittent chest pain led to a cardiac evaluation. Eventually he qualified for coronary bypass graft surgery. On 10/19/2020 he underwent coronary artery bypass graft surgery. Postoperatively he had significant hypoxia requiring aggressive pulmonary hygiene, oxygen support and steroid therapy. A Fowler catheter was used for urinary retention. He had significant fatigue and positional dizziness with fluctuating blood pressure. Eventually now he has become more consistent with getting out of bed participating in therapies but he has been unable to do his own toileting, transfers and self-care following sternal precautions. He requires inpatient rehabilitation. Review of systems: Chest wall pain. Generalized fatigue. And frequent bowel movements. Shortness of breath with exertion. Fair appetite. The remainder of their review of systems was negative except as mentioned in the history of present illness. Social History: Patient is unmarried and he has been living alone in an accessible 1 floor home with a tub shower combination for bathing and standard height commode. He is fully independent with his own medication administration, home upkeep, personal hygiene and driving. He reports that he has been smoking cigarettes. He started smoking about 52 years ago. He has a 104.00 pack-year smoking history.  He has never used smokeless tobacco. He reports current alcohol use. He reports that he does not use drugs. Prior (baseline) level of function: Independent with mobility and self-care. Current level of function: Moderate physical assistance needed for mobility and self-care. Allergies:  Patient has no known allergies. Past Medical History:   Past Medical History:   Diagnosis Date    CAD (coronary artery disease)     Dr. Magda Lopez COPD (chronic obstructive pulmonary disease) ([facility] [address]     No pulmonologist - follows with PCP    H/O cardiac catheterization 09/24/2020     Severe calcified multivessel CAD with LV dysfuntion, PCWP is 12, CABG consult.  H/O echocardiogram 06/23/2020     Mild concentric LVH with moderate systolic impairment. EF is 40-45 % .  Hyperlipidemia     Hypertension     Follows with PCP    IBS (irritable bowel syndrome)     Pancreatitis 2013    [facility]     Thyroid disease         Past Surgical History:     Past Surgical History:   Procedure Laterality Date    CARDIAC CATHETERIZATION  09/24/2020     Severe calcified multivessel CAD with LV dysfuntion, PCWP is 12, CABG consult.     COLONOSCOPY  2017    [facility] [place], [place])    CORONARY ARTERY BYPASS GRAFT N/A 10/19/2020    CABG CORONARY ARTERY BYPASS x3 WITH LIMA, INTRAOP BETH, ENDOHARVEST OF THE LEFT SAPHENOUS VEIN performed by Griselda Distad, MD at [address] [place] [address], DIAGNOSTIC  2017    Normal exam per pt (done in [place], [place])       Current Medications:     Current Facility-Administered Medications:     furosemide (LASIX) tablet 40 mg, 40 mg, Oral, BID, Griselda Distad, MD    acetaminophen (TYLENOL) tablet 650 mg, 650 mg, Oral, Q4H PRN, Griselda Distad, MD    [START ON 10/28/2020] aspirin EC tablet 81 mg, 81 mg, Oral, Daily, Griselda Distad, MD    atorvastatin (LIPITOR) tablet 20 mg, 20 mg, Oral, Nightly, Griselda Distad, MD    HYDROcodone-acetaminophen (NORCO) 5-325 MG per tablet 1 tablet, 1 tablet, Oral, Q4H PRN **OR** HYDROcodone-acetaminophen (NORCO) 5-325 MG per tablet 2 tablet, 2 tablet, Oral, Q4H PRN, Christian Raphael MD    [START ON 10/28/2020] therapeutic multivitamin-minerals 1 tablet, 1 tablet, Oral, Daily with breakfast, Christian Raphael MD    albuterol sulfate  (90 Base) MCG/ACT inhaler 2 puff, 2 puff, Inhalation, Q4H WA, Christian Raphael MD    [START ON 10/28/2020] amiodarone (CORDARONE) tablet 200 mg, 200 mg, Oral, Daily, Christian Raphael MD    budesonide-formoterol (SYMBICORT) 160-4.5 MCG/ACT inhaler 2 puff, 2 puff, Inhalation, BID, Christian Raphael MD    carvedilol (COREG) tablet 3.125 mg, 3.125 mg, Oral, BID, Christian Raphael MD    [START ON 10/28/2020] DULoxetine (CYMBALTA) extended release capsule 60 mg, 60 mg, Oral, Daily, Christian Raphael MD    [START ON 10/28/2020] finasteride (PROSCAR) tablet 5 mg, 5 mg, Oral, Daily, Christian Raphael MD    guaiFENesin (MUCINEX) extended release tablet 600 mg, 600 mg, Oral, BID, Christian Raphael MD    [START ON 10/28/2020] levothyroxine (SYNTHROID) tablet 100 mcg, 100 mcg, Oral, Daily, Christian Raphael MD    [START ON 10/28/2020] predniSONE (DELTASONE) tablet 20 mg, 20 mg, Oral, Daily, Christian Raphael MD    [START ON 10/28/2020] tamsulosin (FLOMAX) capsule 0.4 mg, 0.4 mg, Oral, Daily, Christian Raphael MD    [START ON 10/28/2020] tiotropium (SPIRIVA RESPIMAT) 2.5 MCG/ACT inhaler 2 puff, 2 puff, Inhalation, Daily, Christian Raphael MD    acetaminophen (TYLENOL) tablet 650 mg, 650 mg, Oral, Q4H PRN, CHERRY Woodard MD    magnesium hydroxide (MILK OF MAGNESIA) 400 MG/5ML suspension 30 mL, 30 mL, Oral, Daily PRN, CHERRY Woodard MD  Brittani Blake Spar ON 10/28/2020] insulin lispro (HUMALOG) injection vial 0-6 Units, 0-6 Units, Subcutaneous, TID WC, CHERRY Woodard MD    insulin lispro (HUMALOG) injection vial 0-3 Units, 0-3 Units, Subcutaneous, Nightly, CHERRY Woodard MD    Family History:   Family History   Problem Relation Age of Onset    Alzheimer's Disease Mother     Heart Disease Father     Heart Attack Father     High Blood Pressure Father     High Cholesterol Father        Exam:    Height 5' 9.49\" (1.765 m), weight 198 lb 10.2 oz (90.1 kg). General: Patient was observed reclined in bed. He is alert but soft-spoken. Oriented x3. Follows one-step commands. HEENT: No signs of trauma to his head or neck. MMM. Neck supple without JVD or adenopathy. Pulmonary: Blunted breath sounds in the bases. No expiratory wheezes. No coughing. Cardiac: Occasional premature beat with a controlled rate. Sternal incision is well approximated but bruised. No drainage. Abdomen: Patient's abdomen was soft and nondistended. Bowel sounds were present throughout. There was no rebound, guarding or masses noted. Spinal exam: Skin overlying his spines intact. Guarded trunk rotation due to sternal pain. Upper extremities: Cautiously brings both hands up to meet mine. 4+/5  strength but only fair dexterity. Lower extremities: Trace edema about the lower legs and ankles. Normal sensation. Give way with MMT. Sitting balance was fair. Standing balance was poor.     Lab Results   Component Value Date    WBC 15.0 (H) 10/22/2020    HGB 10.4 (L) 10/22/2020    HCT 32.5 (L) 10/22/2020    .2 (H) 10/22/2020     (L) 10/22/2020     Lab Results   Component Value Date    INR 1.27 10/19/2020    INR 1.00 10/12/2020    INR 1.07 09/18/2020    PROTIME 15.4 (H) 10/19/2020    PROTIME 12.1 10/12/2020    PROTIME 12.2 09/18/2020     Lab Results   Component Value Date    CREATININE 1.1 10/27/2020    BUN 47 (H) 10/27/2020     10/27/2020    K 4.1 10/27/2020    CL 96 (L) 10/27/2020    CO2 31 10/27/2020     Lab Results   Component Value Date    ALT 16 06/23/2020    AST 16 06/23/2020    ALKPHOS 79 06/23/2020    BILITOT 0.5 06/23/2020         Impression: 14-year-old male with COPD, hypertension and nicotine addiction who is undergone coronary bypass graft surgery. He has poorly controlled pain, persistent hypoxia and significant anemia. Strengths for the patient: 7 [facility] age, alertness and accessible home. Limitations/barriers for the patient: Sternal precautions, he lives alone and his anemia. Recommendation: Acute inpatient rehabilitation with occupational and physical therapy 180 minutes 5 out of every 7 days. Will address basic and  advancing mobility with self-care instruction and adaptive equipment training. Caregiver education will be offered. Expected length of stay  prior to a supervised level of function for discharge home with a walker and C OT/PT is 2 weeks. Additional recommendation:    1. Systolic congestive heart failure after coronary bypass graft surgery: Patient requires daily occupational and physical therapy. We must help him develop techniques for self-care and mobility while following sternal precautions. Must emphasize pulmonary hygiene, nutritional support and cautious pain management. Monitoring his oxygen saturations through activity and daily weights. Diuretic therapy as his Lasix has been converted from IV to p.o. form. Beta-blocker. Adaptive equipment training. Perhaps caregiver training with his ex-wife if that is who is going to live with postoperatively. 2. DVT prophylaxis: Due to his anemia and bruising, chemoprophylaxis is contraindicated. SCDs when in bed. Weightbearing activities are pursued daily. Monitoring his hemoglobin closely. 3. COPD exacerbation: Albuterol and Symbicort inhalers. Mucinex and Spiriva. Monitoring O2 saturations at rest and with activity. Nebulizers as needed. Supplemental oxygen to keep saturations greater than 90%. 4. Hypertension: Coreg and Lasix are used to manage his hypertension. Target systolic blood pressure is 120-140. Vital signs are checked at rest and with activity. 5. BPH: Flomax and Proscar used to manage this issue.   Bladder protocol should he develop symptoms of retention. 6. Nicotine addiction: Topical nicotine replacement patch. I personally performed a history and physical on this patient within 24 hours of admission to the rehab unit. I have reviewed the preadmission screening and concur with its findings without change. A detailed plan of care will be established by hospital day 4 and I attest the patient is appropriate for inpatient rehabilitation at this time. I have compared the patient's current functional status noted during my history and physical with that of the preadmission screen and I have found no significant differences.

## 2020-10-28 NOTE — PROGRESS NOTES
This nurse was called into room by RT, stating that bruising to chest was not there this am when she did her treatment. There is significant bruising to chest and upper abdomen, mottling to L flank area. Pt stated he has a lot of pain in L side chest area, denies chest pain. CVICU notified, Dr. Leyva Never also notified. CVICU will come to see pt as soon as she can.

## 2020-10-28 NOTE — PROGRESS NOTES
This nurse completed skin assessment with Constantin Solomon upon admission. Midline chest  Incision open to air noted, incision on L proximal incision noted open to air, scatted bruising through out, abrasions scattered throughout, and mottling in nose and feet.

## 2020-10-28 NOTE — CARE COORDINATION
Covering for Kristen Celestin. Received notification of recent discharge from IP status. Upon review of chart, pt is noted to be in ARU at this time. Will continue to monitor. GISSELLE Luna RN  Ambulatory Care Manager  [phone] office/cell  [phone] fax  [email or web address]

## 2020-10-28 NOTE — PROGRESS NOTES
Physical Therapy    PT vel attempted at 1105 AM, however pt declined to participate due to diarrhea and increased weakness. RN Elaine Chávez) was informed of pt's request for pain meds and refusal with PT.  also informed of pt's refusal today.     Dominic Durant, PT

## 2020-10-28 NOTE — PATIENT CARE CONFERENCE
ACUTE REHAB TEAM CONFERENCE SUMMARY   [facility]    NAME: Wilfredo Hillman  : 1955 ADMIT DATE: 10/27/2020    Rehab Admitting Dx: Atherosclerotic heart disease of native coronary artery without angina pectoris [I25.10]  CHF following cardiac surgery, postop [I97.130]  Patient Comorbid Conditions: Active Hospital Problems    Diagnosis Date Noted    Uncontrolled pain [R52]     Generalized weakness [R53.1]     Gait disturbance [R26.9]     Acute blood loss anemia [D62]     COPD exacerbation (HCC) [J44.1]     Cigarette nicotine dependence without complication [Y02.608]     CHF following cardiac surgery, postop [I97.130] 10/27/2020     Date: 10/29/2020    CASE MANAGEMENT  Current issues/needs regarding patient and family discharge status:   Patient plans d/c to spouse's home. No DME or o2 PTA. PHYSICAL THERAPY              Impairments/deficits, barriers:  Continued assessment being completed                 Equipment needed at discharge:RW      PT IRF-ESTEBAN scores and goals since initial assessment:   80                 Toilet Transfer  Assistance Needed: Partial/moderate assistance  Comment: Min A-Mod A on/off toilet and body placement.   CARE Score: 3  Discharge Goal: Independent                                          Fall Risk: [x]  Yes  []  No    OCCUPATIONAL THERAPY   Short term goals  Time Frame for Short term goals: STG=LTG :   Long term goals  Time Frame for Long term goals : 10-12 days or until d/c  Long term goal 1: Pt will complete feeding/grooming/oral care task c MOD I by d/c SETUP  Long term goal 2: Pt will complete total body bathing c Mod I by d/c MOD A  Long term goal 3: Pt will complete UB dressing c MOD I and retrieve items from closet by d/c MIN A  Long term goal 4: Pt will complete LB dressing c MOD I and retrieve items from closet by d/c MAX A  Long term goal 5: Pt will don/doff footwear c Min A by d/c TOTAL  Long term goals 6: Pt will complete toileting c MOD I by d/c MOD A  Long term goal 7: Pt will complete functional transfer (toilet, tub, shower) c MOD I by d/c. MIN-MOD A  Long term goal 8: Pt will perform therex/therax to facilitate increased strength/endurance/ax tolerance (c emphasis on maintaining sternal precautions and dynamic standing balance/tolerance >8 mins) c MOD I in order to complete ADLs. : ONGOING                                      OT IRF-ESTEBAN scores and goals since initial assessment:    Eating  Assistance Needed: Setup or clean-up assistance  CARE Score: 5  Discharge Goal: Independent  Oral Hygiene  Assistance Needed: Setup or clean-up assistance  Comment: Completed seated sink-side  CARE Score: 5  Discharge Goal: Independent  Toileting Hygiene  Assistance Needed: Dependent  Comment: nurse did hygiene   CARE Score: 1  Discharge Goal: Independent  Shower/Bathe Self  Assistance Needed: Partial/moderate assistance  Comment: Mod A for LB bathing, v/c for sternal precautions/compensatory strategies. CARE Score: 3  Discharge Goal: Independent  Upper Body Dressing  Assistance Needed: Partial/moderate assistance  Comment: Min A for threading BUE while maintaining cardiac precautions. CARE Score: 3  Discharge Goal: Independent  Lower Body Dressing  Assistance Needed: Substantial/maximal assistance  Comment: Max A to thread BLE and don over hips, pt demo buttoning pants.   CARE Score: 2  Discharge Goal: Independent  Putting On/Taking Off Footwear  Assistance Needed: Dependent  CARE Score: 1  Discharge Goal: Partial/moderate assistance      Impairments/deficits, barriers:  Sternal precautions  Assessment  Performance deficits / Impairments: Decreased functional mobility , Decreased strength, Decreased endurance, Decreased ADL status, Decreased high-level IADLs, Decreased ROM, Decreased balance  Decision Making: High Complexity  REQUIRES OT FOLLOW UP: Yes  Equipment needed at discharge:BSC+TTB      COGNITIVE FUNCTION/SPEECH THERAPY (AS INDICATED)      Nursing Current Medical Status:   [x] Is continent of bowel and bladder     [] Is incontinent of bowel and bladder    [x] Has had an adequate number of bowel movements   [] Urinates with no urinary retention >300ml in bladder   [] Targeting bladder protocol with dsouza removal   [x] Maintaining O2 SATs at 92% or greater   [x] Has pain managed while on ARU         [x] Has had no skin breakdown while on ARU   [] Has improved skin integrity via wound measurements   [x] Has no signs/symptoms of infection at the wound site   [] Pressure wounds Stage/Location:    [] Arrived on unit with pressure wound  [x] Has been free from injury during hospitalization   [] Has experienced a fall during hospitalization  [] Ongoing education with patient/family with understanding demonstrated for:  [] Receives IV Fluids  [] Other:        NUTRITION  Weight: 185 lb 9.6 oz (84.2 kg) / Body mass index is 27.41 kg/m². Current diet: DIET CARB CONTROL; Carb Control: 4 carb choices (60 gms)/meal; Low Sodium (2 GM)  Dietary Nutrition Supplements: Low Calorie High Protein Supplement  Intake: Varying intake, recent meals %, agreed to try oral nutrition supplement      Medical improvements/barriers: fatigue, dizziness, chronic diarrhea, GI to follow, check bowel    Team goals for next treatment period/Intervention for current barriers:   [x] Pt will increase activity tolerance for daily tasks. [] Pt will improve bed mobility with reduced assist.  [x] Pt will improve safety in fx tasks with reduced cues/assist  [x] Pt will improve transfers with reduced assist  [x] Pt will improve toileting with reduced assist  [x] Pt will improve ADL's with use of adaptive equipment with reduced assist  [] Pt will improve pain mgmt for maximum participation in tx program  [] Pt will improve communication to get basic needs met on unit  [] Pt will improve swallowing for safe diet advancement with use of strategies  []  Plan for discharge to home.      Patient Strengths: Cooperative and Pleasant    Justification for Continued Stay  Based on my medical assessment of the patient and review of information from the interdisciplinary team as part of this weekly team conference, the patient continues to meet the following criteria for IRF level of care:   The patient requires active and ongoing intervention of multiple therapy disciplines   The patient requires and intensive rehabilitation therapy program   The patient requires continued physician supervision by a rehabilitation physician   The patient requires 24 hours rehab nursing care   The patient requires an intensive and coordinated interdisciplinary team approach to the delivery of rehabilitative care. Assessment/Plan   [x]  The patient is making good progression towards their long term goals and is actively participating in and has a reasonable expectation to continue to benefit from the intensive rehabilitation therapy program   []  The estimated discharge date has been changed from initial team conference due to:   []  The estimated discharge destination has been changed from initial team conference due to:         Ongoing tx following discharge: [x]HHC OT  PT  NSG   []OUTPATIENT     [] No Further Treatment     [] Family/Caregiver Training  []  Transitional Living Arrangement   [] Home Assessment (date  )     [] Family Conference   []  Therapeutic Pass       []  Other: (specify)    Estimated Discharge Date: 11/10/2020    Estimated Discharge Destination: []home alone   []home alone with assist prn  []Continuous supervision [x]Return home with spouse/family   [] Assisted living  []SNF     Team members participating in today's conference.     [x] Susannah Michael, Medical Director  [x] Hallie Perez,   [x] Natalie Torres, Nurse Mgr     [x]  Kia Kingston, PT  []  Bill Robin, PT   [x] Justina Renee, OT  [] Luis Oliver, OT   [x] Justin Kovacs, SLP     [x]  Levar Arthur, GURMEET   [x] Fernando Shi,     [x]Vivien Thomson,    []  Annalisa Hightower RN    [] Fariba Patel RN  [] Karly Willingham RN    [x] Jordyn Grace RN        I have led this Team Conference and agree with the plan, Benji Bazzi MD, 10/29/2020, 12:50 PM  Goals have been updated to reflect recent status.     Team conference note transcribed this date by: Ky Carrillo MA, [id number] [facility], Therapy Coordinator

## 2020-10-28 NOTE — PROGRESS NOTES
Comprehensive Nutrition Assessment    Type and Reason for Visit:  Initial, Consult, Wound    Nutrition Recommendations/Plan:   Continue current diet, carb controlled with low sodium   Will trial high protein oral nutrition supplement  Encourage consistent intake at meals     Nutrition Assessment:  Rehab admit with recent hx CABG with CHF. Currently on carb controlled, low sodium diet with recent intake % but not wanting to eat as much today due to c/o diarrhea. Discussed need for increased intake for healing, protein intake. Tolerating milk and willing to trial nutrition supplement. Moderate nutrition risk at this time. Malnutrition Assessment:  Malnutrition Status: At risk for malnutrition (Comment)    Context:  Acute Illness       Estimated Daily Nutrient Needs:  Energy (kcal):  0204-1051 (20-25 constantino/kg); Weight Used for Energy Requirements:  Current     Protein (g):  74-89 (1-1.2 g/kg); Weight Used for Protein Requirements:  Ideal        Fluid (ml/day):  1834-2477; Method Used for Fluid Requirements:  1 ml/kcal      Nutrition Related Findings:  sitting up in bed eating lunch, weight in abdomen vs. distention, HbA1c 6.3%      Wounds:  Surgical Wound       Current Nutrition Therapies:    DIET CARB CONTROL; Carb Control: 4 carb choices (60 gms)/meal; Low Sodium (2 GM)    Anthropometric Measures:  · Height: 5' 9\" (175.3 cm)  · Current Body Weight: 188 lb 7.9 oz (85.5 kg)   · Admission Body Weight: 202 lb 13.2 oz (92 kg)    · Usual Body Weight: (n/a)     · Ideal Body Weight: 160 lbs; % Ideal Body Weight 117.8 %   · BMI: 27.8  · Adjusted Body Weight:  ; No Adjustment   · BMI Categories: Overweight (BMI 25.0-29. 9)       Nutrition Diagnosis:   · Predicted inadequate energy intake related to increase demand for energy/nutrients, altered GI function as evidenced by diarrhea      Nutrition Interventions:   Food and/or Nutrient Delivery:  Continue Current Diet, Start Oral Nutrition Supplement  Nutrition Education/Counseling:  Education initiated   Coordination of Nutrition Care:  Continue to monitor while inpatient    Goals:  Patient will tolerate diet to consume at least 50-75% at meals       Nutrition Monitoring and Evaluation:   Food/Nutrient Intake Outcomes:  Diet Advancement/Tolerance, Food and Nutrient Intake  Physical Signs/Symptoms Outcomes:  Biochemical Data, Diarrhea, GI Status, Skin, Weight     Discharge Planning:    Continue current diet     Electronically signed by Kami Gibbons RD, LD on 10/28/20 at 12:57 PM EDT    Contact: [phone]

## 2020-10-28 NOTE — PROGRESS NOTES
Occupational Therapy                              [facility] ARU OCCUPATIONAL THERAPY EVALUATION    Chart Review:  Past Medical History:   Diagnosis Date    CAD (coronary artery disease)     Dr. Caro Yang COPD (chronic obstructive pulmonary disease) ([facility] [address]     No pulmonologist - follows with PCP    H/O cardiac catheterization 09/24/2020     Severe calcified multivessel CAD with LV dysfuntion, PCWP is 12, CABG consult.  H/O echocardiogram 06/23/2020     Mild concentric LVH with moderate systolic impairment. EF is 40-45 % .  Hyperlipidemia     Hypertension     Follows with PCP    IBS (irritable bowel syndrome)     Pancreatitis 2013    Rheumatoid arthritis ([facility] [address]     Thyroid disease      Past Surgical History:   Procedure Laterality Date    CARDIAC CATHETERIZATION  09/24/2020     Severe calcified multivessel CAD with LV dysfuntion, PCWP is 12, CABG consult.  COLONOSCOPY  2017    [place], [place])    CORONARY ARTERY BYPASS GRAFT N/A 10/19/2020    CABG CORONARY ARTERY BYPASS x3 WITH LIMA, INTRAOP BETH, ENDOHARVEST OF THE LEFT SAPHENOUS VEIN performed by Elizabeth Menezes MD at [facility]  2017    Normal exam per pt (done in [place], [place])     Social History:  Social/Functional History  ADL Assistance: Independent  Homemaking Assistance: Independent  Homemaking Responsibilities: Yes  Meal Prep Responsibility: Primary(Pt reports eating out most of the time.)  Laundry Responsibility: Primary  Cleaning Responsibility: Primary  Bill Paying/Finance Responsibility: Primary  Shopping Responsibility: Primary  Dependent Care Responsibility: No  Health Care Management: Primary  Ambulation Assistance: Independent  Transfer Assistance: Independent  Active : Yes  Mode of Transportation: Truck, Car  Occupation: Full time employment  Type of occupation: Truck drive  Leisure & Hobbies: none  Additional Comments: 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  Pt reports he will be staying with ex-wife at discharge and will have initial 24/7 supervision. Above information is for ex-wife's home setup. Restrictions:  Restrictions/Precautions  Restrictions/Precautions: (3 L O2.)        Position Activity Restriction  Sternal Precautions: No Pushing, No Pulling, 5# Lifting Restrictions         Pain Level: 9       Objective:       Orientation  Overall Orientation Status: Within Functional Limits        Vision  Vision: Impaired  Vision Exceptions: Wears glasses at all times  Vision - Basic Assessment  Prior Vision: No visual deficits  Visual History: No significant visual history  Patient Visual Report: No visual complaint reported. Visual Field Cut: No  Oculo Motor Control: WNL  Hearing  Hearing: Within functional limits    ROM:      LUE AROM (degrees)  LUE AROM : Exceptions  LUE General AROM: cardiac precautions limiting BUE AROM     Left Hand AROM (degrees)  Left Hand AROM: WFL     RUE AROM (degrees)  RUE AROM : Exceptions  RUE General AROM: cardiac precautions limiting BUE AROM     Right Hand AROM (degrees)  Right Hand AROM: WFL    Strength:    LUE Strength  Gross LUE Strength: [facility]  L Hand General: 4+/5  LUE Strength Comment: cardiac precautions limiting BUE AROM/MMT  RUE Strength  Gross RUE Strength: Exceptions to [facility]  RUE Strength Comment: cardiac precautions limiting BUE AROM    Quality of Movement:   Tone RUE  RUE Tone: Normotonic  Tone LUE  LUE Tone: Normotonic  Coordination  Movements Are Fluid And Coordinated: Yes       Sensation:    Sensation  Overall Sensation Status: Impaired(Pt reports poor circulation in both hands and feet)     ADLs:  Eating: Eating  Assistance Needed: Setup or clean-up assistance  CARE Score: 5  Discharge Goal: Independent       Oral Hygiene: Oral Hygiene  Assistance Needed: Setup or clean-up assistance  Comment: Completed seated sink-side  CARE Score: 5  Discharge Goal: Independent    UB/LB Bathing: Shower/Bathe Self  Assistance Needed: Partial/moderate assistance  Comment: Mod A for LB bathing, v/c for sternal precautions/compensatory strategies. CARE Score: 3  Discharge Goal: Independent    UB Dressing: Upper Body Dressing  Assistance Needed: Partial/moderate assistance  Comment: Min A for threading BUE while maintaining cardiac precautions. CARE Score: 3  Discharge Goal: Independent         LB Dressing:   Lower Body Dressing  Assistance Needed: Substantial/maximal assistance  Comment: Max A to thread BLE and don over hips, pt demo buttoning pants. CARE Score: 2  Discharge Goal: Independent    Donning and Thayne Footwear: Putting On/Taking Off Footwear  Assistance Needed: Dependent  CARE Score: 1  Discharge Goal: Partial/moderate assistance      Toileting: Toileting Hygiene  Assistance Needed: Partial/moderate assistance  Comment: Mod A for thoroughness of cleaning. Pt able to complete seated on standard toilet, c v/c for sternal precautions. CARE Score: 3  Discharge Goal: Independent      Bed Mobility:    Bed mobility  Supine to Sit: Minimal assistance    Transfers:    Transfers  Sit to stand: Minimal assistance  Stand to sit: Minimal assistance           Toilet Transfer  Assistance Needed: Partial/moderate assistance  Comment: Min A-Mod A on/off toilet and body placement. CARE Score: 3  Discharge Goal: Independent    Functional Mobility:    Balance  Sitting Balance: Stand by assistance  Standing Balance: Contact guard assistance     Functional Mobility  Functional - Mobility Device: (Pt required w/c to bathroom d/t urgency of BM.)  Functional Mobility Comments: Pt required w/c to bathroom d/t urgency of BM. Post ADL session pt ambulated from bathroom to bed c CGA and no device. Cognition:  Cognition  Overall Cognitive Status: Exceptions  Arousal/Alertness: Appropriate responses to stimuli  Following Commands:  Follows multistep commands with repitition, Follows one step commands consistently  Attention Span: Appears intact  Memory: Appears intact  Safety Judgement: (Poor awareness of safety precautions/implementation during fucntional tasks.)  Problem Solving: Assistance required to identify errors made, Decreased awareness of errors  Insights: Decreased awareness of deficits  Initiation: Does not require cues  Sequencing: Does not require cues    Perception:  Perception  Overall Perceptual Status: WFL      Assessment:     Performance deficits / Impairments: Decreased functional mobility , Decreased strength, Decreased endurance, Decreased ADL status, Decreased high-level IADLs, Decreased ROM, Decreased balance    The patient is a 76year old male admitted onto ARU after hospitalization for CHF following cardiac surgery, postop CABG X3. Pt was independent with all ADLs/IADLs at PLOF. Pt today required Mod-Total A for completion of adls as a result of above deficits as well as cardiac sternal precautions. Pt on 3L O2 during session and demo increased fatigue as well as 2 episodes of BM. Pt is expected to make progress towards established OT goals based on current physical deficits, Ind PLOF, young age, and motivation to regain independence. The QI assessment was used this date to determine the above performance deficits, which compromise pt's ability to safely complete ADLs/IADLs/mobility. Pt will benefit from ARU OT services to increase functional performance, safety, and achieve the highest level of independence with ADLs. Decision Making: High Complexity  Clinical Presentation:  Unpredictable characteristics  History:  See \"Social/Functional\" sections for complete Occupational Profile. Pt's co-morbidities include COPD, CAD, RA which all impact function and ability to progress through plan of care.     Assistance/Modification:  Use of DME, providing verbal cues, providing physical assistance for mobility and ADL's, providing set-up of supplies during ADL's  Patient education:   ARU procotol, Role of O.T., O.T. plan of care,   [] Patient goal was established and reviewed in Rehabtracker with patient and/or family this date. Treatment Initiated:  Patient education, ADL re-training,   Barriers to Improvement:  Decreased endurance, fatigue  REQUIRES OT FOLLOW UP: Yes  Discharge Recommendations:  Home c significant other and C OT  Equipment Recommendations:  TBD    Goals:  Patient Goals   Patient goals : Return to PLOF (independence)  Short term goals  Time Frame for Short term goals: STG=LTG  Long term goals  Time Frame for Long term goals : 10-12 days or until d/c  Long term goal 1: Pt will complete feeding/grooming/oral care task c MOD I by d/c  Long term goal 2: Pt will complete total body bathing c Mod I by d/c  Long term goal 3: Pt will complete UB dressing c MOD I and retrieve items from closet by d/c  Long term goal 4: Pt will complete LB dressing c MOD I and retrieve items from closet by d/c  Long term goal 5: Pt will don/doff footwear c Min A by d/c  Long term goals 6: Pt will complete toileting c MOD I by d/c  Long term goal 7: Pt will complete functional transfer (toilet, tub, shower) c MOD I by d/c. Long term goal 8: Pt will perform therex/therax to facilitate increased strength/endurance/ax tolerance (c emphasis on maintaining sternal precautions and dynamic standing balance/tolerance >8 mins) c MOD I in order to complete ADLs.     Plan:    Pt will be seen at least 60 minutes per day for a minimum of 5 days per week, plus group therapy as appropriate  Current Treatment Recommendations: Strengthening, Patient/Caregiver Education & Training, Home Management Training, Functional Mobility Training, Endurance Training, Equipment Evaluation, Education, & procurement, Pain Management, Safety Education & Training, Self-Care / ADL, Balance Training    OT Individual Minutes  Time In: 0830  Time Out: 1010  Minutes: 100                Number of Minutes/Billable Intervention      OT Evaluation 30   Therapeutic Exercise    ADL Self-care 70

## 2020-10-28 NOTE — CARE COORDINATION
Case Management Admission Note      Patient:Keyur Prieto      :1955  ZTO:[id number]  Rehab Dx/Hx: Atherosclerotic heart disease of native coronary artery without angina pectoris [I25.10]  CHF following cardiac surgery, postop [I97.130]    Chief Complaint:   Past Medical History:   Diagnosis Date    CAD (coronary artery disease)     Dr. Laban Blizzard COPD (chronic obstructive pulmonary disease) ([facility] [address]     No pulmonologist - follows with PCP    H/O cardiac catheterization 2020     Severe calcified multivessel CAD with LV dysfuntion, PCWP is 12, CABG consult.  H/O echocardiogram 2020     Mild concentric LVH with moderate systolic impairment. EF is 40-45 % .  Hyperlipidemia     Hypertension     Follows with PCP    IBS (irritable bowel syndrome)     Pancreatitis     Rheumatoid arthritis ([facility] [address]     Thyroid disease      Past Surgical History:   Procedure Laterality Date    CARDIAC CATHETERIZATION  2020     Severe calcified multivessel CAD with LV dysfuntion, PCWP is 12, CABG consult.  COLONOSCOPY  2017    [facility] [place], [place])    CORONARY ARTERY BYPASS GRAFT N/A 10/19/2020    CABG CORONARY ARTERY BYPASS x3 WITH LIMA, INTRAOP BETH, ENDOHARVEST OF THE LEFT SAPHENOUS VEIN performed by Eladia Urbina MD at [facility]  2017    Normal exam per pt (done in [place], [place])     No Known Allergies  Precautions: falls and cardiac    Date of Admit: 10/27/2020  Room #: 1015/1015-A      Current functional status at time of admit:        Home Living/DME Available:                             IADL Hx:   Homemaking Responsibilities: Yes  Active : Yes  Mode of Transportation: Truck, Car  Occupation: Full time employment  Leisure & Hobbies: none       Spouse: Morgan Kaur  Family:    Comments:  Patient plans d/c to his spouse's home. They are currently , but he'll be there for a few week. 1-level home with 1 SEB. No o2 PTA.   Rehabtracker was presented to patient/family and a brochure, including contact information, was provided. Whiteboard updated. BIMS completed. Patient complaining of loose stools.     Monika Greer, 10/28/2020, 11:26 AM

## 2020-10-29 LAB
GLUCOSE BLD-MCNC: 115 MG/DL (ref 70–99)
GLUCOSE BLD-MCNC: 124 MG/DL (ref 70–99)
GLUCOSE BLD-MCNC: 135 MG/DL (ref 70–99)
GLUCOSE BLD-MCNC: 164 MG/DL (ref 70–99)

## 2020-10-29 PROCEDURE — 94640 AIRWAY INHALATION TREATMENT: CPT

## 2020-10-29 PROCEDURE — 97535 SELF CARE MNGMENT TRAINING: CPT

## 2020-10-29 PROCEDURE — 82962 GLUCOSE BLOOD TEST: CPT

## 2020-10-29 PROCEDURE — 97162 PT EVAL MOD COMPLEX 30 MIN: CPT

## 2020-10-29 PROCEDURE — 99233 SBSQ HOSP IP/OBS HIGH 50: CPT | Performed by: PHYSICAL MEDICINE & REHABILITATION

## 2020-10-29 PROCEDURE — 1280000000 HC REHAB R&B

## 2020-10-29 PROCEDURE — 6370000000 HC RX 637 (ALT 250 FOR IP): Performed by: SURGERY

## 2020-10-29 PROCEDURE — 94761 N-INVAS EAR/PLS OXIMETRY MLT: CPT

## 2020-10-29 PROCEDURE — 94150 VITAL CAPACITY TEST: CPT

## 2020-10-29 PROCEDURE — 2700000000 HC OXYGEN THERAPY PER DAY

## 2020-10-29 PROCEDURE — 6370000000 HC RX 637 (ALT 250 FOR IP): Performed by: PHYSICAL MEDICINE & REHABILITATION

## 2020-10-29 PROCEDURE — 97530 THERAPEUTIC ACTIVITIES: CPT

## 2020-10-29 RX ORDER — DIPHENOXYLATE HYDROCHLORIDE AND ATROPINE SULFATE 2.5; .025 MG/1; MG/1
1 TABLET ORAL 4 TIMES DAILY PRN
Status: DISCONTINUED | OUTPATIENT
Start: 2020-10-29 | End: 2020-11-10 | Stop reason: HOSPADM

## 2020-10-29 RX ADMIN — ALBUTEROL SULFATE 2 PUFF: 90 AEROSOL, METERED RESPIRATORY (INHALATION) at 19:46

## 2020-10-29 RX ADMIN — PREDNISONE 20 MG: 20 TABLET ORAL at 08:58

## 2020-10-29 RX ADMIN — LOPERAMIDE HYDROCHLORIDE 2 MG: 2 CAPSULE ORAL at 04:44

## 2020-10-29 RX ADMIN — HYDROCODONE BITARTRATE AND ACETAMINOPHEN 2 TABLET: 5; 325 TABLET ORAL at 00:43

## 2020-10-29 RX ADMIN — DULOXETINE HYDROCHLORIDE 60 MG: 30 CAPSULE, DELAYED RELEASE ORAL at 09:00

## 2020-10-29 RX ADMIN — ALBUTEROL SULFATE 2 PUFF: 90 AEROSOL, METERED RESPIRATORY (INHALATION) at 11:31

## 2020-10-29 RX ADMIN — BUDESONIDE AND FORMOTEROL FUMARATE DIHYDRATE 2 PUFF: 160; 4.5 AEROSOL RESPIRATORY (INHALATION) at 19:46

## 2020-10-29 RX ADMIN — TIOTROPIUM BROMIDE INHALATION SPRAY 2 PUFF: 3.12 SPRAY, METERED RESPIRATORY (INHALATION) at 08:20

## 2020-10-29 RX ADMIN — HYDROCODONE BITARTRATE AND ACETAMINOPHEN 2 TABLET: 5; 325 TABLET ORAL at 12:24

## 2020-10-29 RX ADMIN — ATORVASTATIN CALCIUM 20 MG: 20 TABLET, FILM COATED ORAL at 20:12

## 2020-10-29 RX ADMIN — LEVOTHYROXINE SODIUM 100 MCG: 100 TABLET ORAL at 06:24

## 2020-10-29 RX ADMIN — FUROSEMIDE 40 MG: 40 TABLET ORAL at 17:27

## 2020-10-29 RX ADMIN — HYDROCODONE BITARTRATE AND ACETAMINOPHEN 2 TABLET: 5; 325 TABLET ORAL at 04:43

## 2020-10-29 RX ADMIN — FUROSEMIDE 40 MG: 40 TABLET ORAL at 08:57

## 2020-10-29 RX ADMIN — CARVEDILOL 3.12 MG: 6.25 TABLET, FILM COATED ORAL at 08:58

## 2020-10-29 RX ADMIN — AMIODARONE HYDROCHLORIDE 200 MG: 200 TABLET ORAL at 09:01

## 2020-10-29 RX ADMIN — HYDROCODONE BITARTRATE AND ACETAMINOPHEN 2 TABLET: 5; 325 TABLET ORAL at 18:11

## 2020-10-29 RX ADMIN — DIPHENOXYLATE HYDROCHLORIDE AND ATROPINE SULFATE 1 TABLET: 2.5; .025 TABLET ORAL at 20:12

## 2020-10-29 RX ADMIN — ALBUTEROL SULFATE 2 PUFF: 90 AEROSOL, METERED RESPIRATORY (INHALATION) at 08:20

## 2020-10-29 RX ADMIN — TAMSULOSIN HYDROCHLORIDE 0.4 MG: 0.4 CAPSULE ORAL at 09:01

## 2020-10-29 RX ADMIN — GUAIFENESIN 600 MG: 600 TABLET, EXTENDED RELEASE ORAL at 20:13

## 2020-10-29 RX ADMIN — FINASTERIDE 5 MG: 5 TABLET, FILM COATED ORAL at 09:01

## 2020-10-29 RX ADMIN — ALBUTEROL SULFATE 2 PUFF: 90 AEROSOL, METERED RESPIRATORY (INHALATION) at 15:48

## 2020-10-29 RX ADMIN — ASPIRIN 81 MG: 81 TABLET, FILM COATED ORAL at 09:01

## 2020-10-29 RX ADMIN — CARVEDILOL 3.12 MG: 6.25 TABLET, FILM COATED ORAL at 20:13

## 2020-10-29 RX ADMIN — MULTIPLE VITAMINS W/ MINERALS TAB 1 TABLET: TAB at 08:58

## 2020-10-29 RX ADMIN — GUAIFENESIN 600 MG: 600 TABLET, EXTENDED RELEASE ORAL at 08:58

## 2020-10-29 RX ADMIN — HYDROCODONE BITARTRATE AND ACETAMINOPHEN 2 TABLET: 5; 325 TABLET ORAL at 22:34

## 2020-10-29 RX ADMIN — BUDESONIDE AND FORMOTEROL FUMARATE DIHYDRATE 2 PUFF: 160; 4.5 AEROSOL RESPIRATORY (INHALATION) at 08:20

## 2020-10-29 ASSESSMENT — PAIN DESCRIPTION - PROGRESSION: CLINICAL_PROGRESSION: GRADUALLY WORSENING

## 2020-10-29 ASSESSMENT — PAIN DESCRIPTION - PAIN TYPE: TYPE: ACUTE PAIN

## 2020-10-29 ASSESSMENT — PAIN SCALES - GENERAL
PAINLEVEL_OUTOF10: 7
PAINLEVEL_OUTOF10: 9
PAINLEVEL_OUTOF10: 8

## 2020-10-29 ASSESSMENT — PAIN DESCRIPTION - ORIENTATION: ORIENTATION: MID;UPPER

## 2020-10-29 ASSESSMENT — PAIN DESCRIPTION - LOCATION: LOCATION: CHEST

## 2020-10-29 ASSESSMENT — PAIN DESCRIPTION - ONSET: ONSET: ON-GOING

## 2020-10-29 ASSESSMENT — PAIN DESCRIPTION - DESCRIPTORS: DESCRIPTORS: CONSTANT

## 2020-10-29 ASSESSMENT — PAIN DESCRIPTION - FREQUENCY: FREQUENCY: CONTINUOUS

## 2020-10-29 NOTE — PROGRESS NOTES
with breakfast    albuterol sulfate HFA  2 puff Inhalation Q4H WA    amiodarone  200 mg Oral Daily    budesonide-formoterol  2 puff Inhalation BID    DULoxetine  60 mg Oral Daily    finasteride  5 mg Oral Daily    guaiFENesin  600 mg Oral BID    levothyroxine  100 mcg Oral Daily    predniSONE  20 mg Oral Daily    tamsulosin  0.4 mg Oral Daily    tiotropium  2 puff Inhalation Daily    insulin lispro  0-6 Units Subcutaneous TID WC    insulin lispro  0-3 Units Subcutaneous Nightly    carvedilol  3.125 mg Oral BID      Infusions:         Objective:   Vitals: /60   Pulse 70   Temp 97.8 °F (36.6 °C) (Oral)   Resp 16   Ht 5' 9\" (1.753 m)   Wt 188 lb 9.6 oz (85.5 kg)   SpO2 90%   BMI 27.85 kg/m²   General appearance: alert and cooperative with exam  Neck: no JVD or bruit  Thyroid : Normal lobes   Lungs: Has Vesicular Breath sounds somewhat diminished breath sounds right side   heart:  regular rate and rhythm  Abdomen: soft, non-tender; bowel sounds normal; no masses,  no organomegaly  Musculoskeletal: Normal  Extremities: extremities normal, , no edema  Neurologic:  Awake, alert, oriented to name, place and time. Cranial nerves II-XII are grossly intact. Motor is  intact. Sensory is intact. ,  and gait is normal.    Assessment:     Patient Active Problem List:     Juvenile rheumatoid arthritis (HCC)     Chronic bilateral low back pain without sciatica     Peripheral arterial disease (HCC)     Panlobular emphysema (HCC)     Essential hypertension     Acquired hypothyroidism     Gastroesophageal reflux disease     Non-seasonal allergic rhinitis     Benign prostatic hyperplasia without lower urinary tract symptoms     Tobacco use     Acute pulmonary edema (HCC)     Seasonal allergic rhinitis due to pollen     Mixed irritable bowel syndrome     Mixed hyperlipidemia     Prediabetes     Benign prostatic hyperplasia     Chronic combined systolic and diastolic congestive heart failure (HCC)     JOSE (acute kidney injury) ([facility] [address]     LV dysfunction     Dehydration     Vomiting     Coronary artery disease involving native coronary artery     CAD in native artery. //CABG      Plan:     1. Reviewed POC blood glucose . Labs and X ray results   2. Reviewed Current Medicines   3. on  Correction bolus Humalog Insulin regime  4. Monitor Blood glucose frequently   5. Modified  the dose of Insulin/ other medicines as needed   6. Transferred to acute rehab unit on 10/27/2020 Will follow  7. Will follow  8.      Claudette Georges MD

## 2020-10-29 NOTE — PROGRESS NOTES
Domi Power    : 1955  Acct #: [de-identified]  MRN: [id number]              PM&R Progress Note      Admitting diagnosis: Congestive heart failure after coronary bypass graft surgery     Comorbid diagnoses impacting rehabilitation: Uncontrolled pain, generalized weakness, gait disturbance, acute blood loss anemia, essential hypertension, BPH, COPD exacerbation, nicotine addiction     Chief complaint: He is concerned about some thoracic and flank bruising. No new chills or difficulty breathing. Prior (baseline) level of function: Independent. Current level of function:         Current  IRF-ESTEBAN and Goals:   Hearing, Speech, and Vision  Expression of Ideas and Wants: Without difficulty  Understanding Verbal and Non-Verbal Content: Understands  Prior Functioning: Everyday Activities  Self Care: Independent  Indoor Mobility (Ambulation): Independent  Stairs: Independent  Functional Cognition: Independent    Eating  Assistance Needed: Setup or clean-up assistance  CARE Score: 5  Discharge Goal: Independent  Oral Hygiene  Assistance Needed: Setup or clean-up assistance  Comment: Completed seated sink-side  CARE Score: 5  Discharge Goal: Independent  Toileting Hygiene  Assistance Needed: Dependent  Comment: nurse did hygiene   CARE Score: 1  Discharge Goal: Independent  Shower/Bathe Self  Assistance Needed: Partial/moderate assistance  Comment: Mod A for LB bathing, v/c for sternal precautions/compensatory strategies. CARE Score: 3  Discharge Goal: Independent  Upper Body Dressing  Assistance Needed: Partial/moderate assistance  Comment: Min A for threading BUE while maintaining cardiac precautions. CARE Score: 3  Discharge Goal: Independent  Lower Body Dressing  Assistance Needed: Substantial/maximal assistance  Comment: Max A to thread BLE and don over hips, pt demo buttoning pants.   CARE Score: 2  Discharge Goal: Independent  Putting On/Taking Off Footwear  Assistance Needed: Dependent  CARE Score: 1  Discharge Goal: Partial/moderate assistance                Toilet Transfer  Assistance Needed: Partial/moderate assistance  Comment: Min A-Mod A on/off toilet and body placement. CARE Score: 3  Discharge Goal: Independent                                  I      Exam:    Blood pressure 129/64, pulse 70, temperature 98.1 °F (36.7 °C), temperature source Oral, resp. rate 14, height 5' 9\" (1.753 m), weight 188 lb 9.6 oz (85.5 kg), SpO2 96 %. General: Supine in bed. Alert and interactive. In no distress. HEENT: MMM. Speech clear. Pulmonary: Blunted breath sounds in the bases. No wheezes or rales. Cardiac: Regular rate and rhythm. Anterior thoracic bruising about his sternal incision with some tracking into the flank area. Abdomen: Patient's abdomen is soft and nondistended. Bowel sounds were present throughout. There was no rebound, guarding or masses noted. Upper extremities: Slow and deliberate with arm movements with functional  noted. Lower extremities: Trace edema. No signs of DVT. Sitting balance was fair. Standing balance was poor. Lab Results   Component Value Date    WBC 24.8 (H) 10/28/2020    HGB 15.0 10/28/2020    HCT 50.3 10/28/2020    .7 (H) 10/28/2020     10/28/2020     Lab Results   Component Value Date    INR 1.02 10/28/2020    INR 1.27 10/19/2020    INR 1.00 10/12/2020    PROTIME 12.4 10/28/2020    PROTIME 15.4 (H) 10/19/2020    PROTIME 12.1 10/12/2020     Lab Results   Component Value Date    CREATININE 1.1 10/28/2020    BUN 47 (H) 10/28/2020     10/28/2020    K 4.0 10/28/2020    CL 95 (L) 10/28/2020    CO2 33 (H) 10/28/2020     Lab Results   Component Value Date    ALT 16 06/23/2020    AST 16 06/23/2020    ALKPHOS 79 06/23/2020    BILITOT 0.5 06/23/2020       Expected length of stay  prior to a supervised level of function for discharge home with a walker and HHC OT/PT is 2 weeks. Recommendations:    1.  Systolic congestive heart failure after coronary bypass graft surgery: Some nausea and diarrhea interrupted his participation in the daily occupational and physical therapy. Working to develop techniques for self-care and mobility while following sternal precautions. Is bruising may be a little more prominent today. I will check a chest x-ray coagulation markers and hemoglobin again. His morning hemoglobin was stable. Providing pulmonary hygiene, nutritional support and cautious pain management. Monitoring his oxygen saturations through activity and daily weights. P.o. Lasix now. Beta-blocker. Adaptive equipment training. Perhaps caregiver training with his ex-wife if that is who is going to live with postoperatively. 2. DVT prophylaxis: Due to his anemia and bruising, chemoprophylaxis is contraindicated. SCDs when in bed. Weightbearing activities are pursued daily. A.m. hemoglobin was stable, but I will check it again. 3. COPD exacerbation: Albuterol and Symbicort inhalers. Mucinex and Spiriva. Monitoring O2 saturations at rest and with activity. Nebulizers as needed. Supplemental oxygen to keep saturations greater than 90%. 4. Hypertension: Coreg and Lasix are used to manage his hypertension. Target systolic blood pressure is 120-140. Vital signs are checked at rest and with activity. 5. BPH: Flomax and Proscar used to manage this issue. Bladder protocol should he develop symptoms of retention.   6. Nicotine addiction: Topical nicotine replacement patch.

## 2020-10-29 NOTE — PLAN OF CARE
Problem: SAFETY  Goal: Free from accidental physical injury  10/28/2020 2238 by Hermes Collier RN  Outcome: Ongoing  10/28/2020 1540 by Aguila Padilla. Jesus Manuel Marsh RN  Outcome: Ongoing  Goal: Free from intentional harm  10/28/2020 2238 by Hermes Collier RN  Outcome: Ongoing  10/28/2020 1540 by Aguila Padilla. Jesus Manuel Marsh RN  Outcome: Ongoing     Problem: DAILY CARE  Goal: Daily care needs are met  10/28/2020 2238 by Hermes Collier RN  Outcome: Ongoing  10/28/2020 1540 by Aguila Padilla. Jesus Manuel Marsh RN  Outcome: Ongoing     Problem: SKIN INTEGRITY  Goal: Skin integrity is maintained or improved  10/28/2020 2238 by Hermes Collier RN  Outcome: Ongoing  10/28/2020 1540 by Aguila Padilla. Jesus Manuel Marsh RN  Outcome: Ongoing     Problem: PAIN  Goal: Patient's pain/discomfort is manageable  10/28/2020 2238 by Hermes Collier RN  Outcome: Ongoing  10/28/2020 1540 by Aguila Padilla. Jesus Manuel Marsh RN  Outcome: Ongoing     Problem: KNOWLEDGE DEFICIT  Goal: Patient/S.O. demonstrates understanding of disease process, treatment plan, medications, and discharge instructions. 10/28/2020 2238 by Hermes Collier RN  Outcome: Ongoing  10/28/2020 1540 by Aguila Padilla. Jesus Manuel Marsh RN  Outcome: Ongoing     Problem: DISCHARGE BARRIERS  Goal: Patient's continuum of care needs are met  10/28/2020 2238 by Hermes Collier RN  Outcome: Ongoing  10/28/2020 1540 by Aguila Padilla. Jesus Manuel Marsh RN  Outcome: Ongoing     Problem: Infection - Surgical Site:  Goal: Will show no infection signs and symptoms  Description: Will show no infection signs and symptoms  10/28/2020 2238 by Hermes Collier RN  Outcome: Ongoing  10/28/2020 1540 by Aguila Padilla. Jesus Manuel Marsh RN  Outcome: Ongoing     Problem: Pain:  Goal: Pain level will decrease  Description: Pain level will decrease  10/28/2020 2238 by Hermes Collier RN  Outcome: Ongoing  10/28/2020 1540 by Aguila Padilla. Jesus Manuel Marsh RN  Outcome: Ongoing  Goal: Control of acute pain  Description: Control of acute pain  10/28/2020 2238 by Hermes Collier RN  Outcome: Ongoing  10/28/2020 1540 by Aguila Padilla. Stacy Rhoades RN  Outcome: Ongoing  Goal: Control of chronic pain  Description: Control of chronic pain  10/28/2020 2238 by Julio Cesar Yang RN  Outcome: Ongoing  10/28/2020 1540 by Deep Vidales.  Stacy Rhoades RN  Outcome: Ongoing

## 2020-10-29 NOTE — PROGRESS NOTES
Progress Note( Dr. Severo Massy)  10/28/2020  Subjective:   Admit Date: 10/27/2020  PCP: Quinten Munoz MD    Admitted For : Chest pain CAD underwent CABG    Consulted For: Better control of blood glucose filed diabetes mellitus    Interval History: Post CABG patient was transferred to acute rehab unit on evening of 10/27/2020      C/O  chest pains, also with deep breathing mostly from chest wall  Has some  SOB . Denies nausea or vomiting. No new bowel or bladder symptoms. Complains of frequent diarrhea for last 2 to 3 days X watery and solid      Intake/Output Summary (Last 24 hours) at 10/28/2020 2041  Last data filed at 10/28/2020 1830  Gross per 24 hour   Intake 480 ml   Output 1225 ml   Net -745 ml       DATA    CBC:   Recent Labs     10/28/20  0624 10/28/20  1613   WBC 24.8*  --    HGB 13.8 15.0     --     CMP:  Recent Labs     10/26/20  0600 10/27/20  0611 10/28/20  0624    137 139   K 4.6 4.1 4.0   CL 97* 96* 95*   CO2 31 31 33*   BUN 44* 47* 47*   CREATININE 1.0 1.1 1.1   CALCIUM 9.2 9.0 9.1     Lipids:   Lab Results   Component Value Date    CHOL 155 06/23/2020    HDL 30 06/23/2020    TRIG 173 06/23/2020     Glucose:  Recent Labs     10/28/20  0747 10/28/20  1158 10/28/20  1652   POCGLU 102* 207* 122*     [id number]:  Lab Results   Component Value Date    LABA1C 6.3 10/12/2020     High Sensitivity TSH:   Lab Results   Component Value Date    TSHHS 1.340 06/23/2020     Free T3: No results found for: FT3  Free T4:  Lab Results   Component Value Date    T4FREE 1.28 06/23/2020       Xr Chest Portable    Result Date: 10/22/2020  EXAMINATION: ONE XRAY VIEW OF THE CHEST 10/22/2020 5:10 am COMPARISON: 10/21/2020 HISTORY: ORDERING SYSTEM PROVIDED HISTORY: Post op open heart surgery TECHNOLOGIST PROVIDED HISTORY: Reason for Exam:->Post op open heart surgery Reason for Exam: post op open heart surgery Acuity: Unknown Type of Exam: Ongoing FINDINGS: Central venous catheter tip overlies the SVC. Sternotomy wires. Small bilateral pleural effusion. Right lower lobe opacity. Stable cardiomegaly. Mild interstitial edema. No pneumothorax. 1. Unchanged mild pulmonary edema and small bilateral pleural effusions. 2. Stable right lower lobe atelectasis. Xr Chest Portable    Result Date: 10/21/2020  EXAMINATION: ONE XRAY VIEW OF THE CHEST 10/21/2020 1:16 pm COMPARISON: 10/21/2020 HISTORY: ORDERING SYSTEM PROVIDED HISTORY: chest tube removal   ersistent small right pleural effusion with bibasilar atelectasis. No discrete pneumothorax. The osseous structures otherwise stable. Interval removal of thoracostomy tubes. No discrete pneumothorax. Xr Chest Portable    Result Date: 10/21/2020  EXAMINATION: ONE XRAY VIEW OF THE CHEST 10/21/2020 6:06 am COMPARISON: 10/20/2020 HISTORY: ORDERING SYSTEM PROVIDED HISTORY: Post op open heart surgery  . Mildly increased interstitial changes which could be related to increased pulmonary edema or pneumonitis. Cardiomegaly with central vascular congestion. Otherwise similar appearing chest.     Xr Chest Portable    Result Date: 10/21/2020  EXAMINATION: ONE XRAY VIEW OF THE CHEST 10/20/2020 5:18 am COMPARISON: Chest radiograph dated October 19, 2020. HISTORY: ORDERING SYSTEM PROVIDED HISTORY: Post op open heart surgery TECHNOLOGIST PROVIDED HISTORY: Reason for Exam:->Post op open heart surgery Reason for Exam: Post op open heart surgery Acuity: Unknown Type of Exam: Unknown FINDINGS: Median sternotomy wires are noted. A left-sided central venous catheter is in place. A [place]-Olga catheter is in place. Bilateral chest tubes are present. Low lung volumes with bilateral pleural effusions and bibasilar opacities are seen. Bilateral pleural effusions with bibasilar opacities without significant change.            Scheduled Medicines   Medications:    furosemide  40 mg Oral BID    aspirin  81 mg Oral Daily    atorvastatin  20 mg Oral Nightly    therapeutic

## 2020-10-29 NOTE — PROGRESS NOTES
Physical Therapy    [facility] ARU PHYSICAL THERAPY EVALUATION    Chart Review:  Past Medical History:   Diagnosis Date    CAD (coronary artery disease)     Dr. June Rosales COPD (chronic obstructive pulmonary disease) ([facility] [address]     No pulmonologist - follows with PCP    H/O cardiac catheterization 09/24/2020     Severe calcified multivessel CAD with LV dysfuntion, PCWP is 12, CABG consult.  H/O echocardiogram 06/23/2020     Mild concentric LVH with moderate systolic impairment. EF is 40-45 % .  Hyperlipidemia     Hypertension     Follows with PCP    IBS (irritable bowel syndrome)     Pancreatitis 2013    Rheumatoid arthritis ([facility] [address]     Thyroid disease      Past Surgical History:   Procedure Laterality Date    CARDIAC CATHETERIZATION  09/24/2020     Severe calcified multivessel CAD with LV dysfuntion, PCWP is 12, CABG consult.  COLONOSCOPY  2017    [facility] [place], [place])    CORONARY ARTERY BYPASS GRAFT N/A 10/19/2020    CABG CORONARY ARTERY BYPASS x3 WITH LIMA, INTRAOP BETH, ENDOHARVEST OF THE LEFT SAPHENOUS VEIN performed by Vick Leslie MD at [facility], DIAGNOSTIC  2017    Normal exam per pt (done in [place], [place])     Fall History: None   Social History:  Social/Functional History  ADL Assistance: Independent  Homemaking Assistance: Independent  Homemaking Responsibilities: Yes  Meal Prep Responsibility: Primary(Pt reports eating out most of the time.)  Laundry Responsibility: Primary  Cleaning Responsibility: Primary  Bill Paying/Finance Responsibility: Primary  Shopping Responsibility: Primary  Dependent Care Responsibility: No  Health Care Management: Primary  Ambulation Assistance: Independent  Transfer Assistance: Independent  Active : Yes  Mode of Transportation: Truck, Car  Occupation: Full time employment  Type of occupation: Truck drive  Leisure & Hobbies: none  Additional Comments: 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  Pt reports he will be staying with ex-wife at discharge and will have initial 24/7 supervision. Above information is for ex-wife's home setup.; no falls in the past year    Restrictions:  Restrictions/Precautions  Restrictions/Precautions: Fall Risk, General Precautions  Position Activity Restriction  Sternal Precautions: No Pushing, No Pulling, 5# Lifting Restrictions  Other position/activity restrictions: 3L O2    Subjective: Pt in bed, reports persistent diarrhea for 4 days now and feels weak. Pain Level: 9/10  Pain Location: Chest incision site when coughing and lungs with deep breathing     Objective:  Orientation  Orientation Level: Oriented X4        Vision  Vision: Impaired  Vision Exceptions: Wears glasses at all times  Hearing  Hearing: Within functional limits    ROM:      AROM RLE (degrees)  RLE AROM: WFL     AROM LLE (degrees)  LLE AROM : WFL                 Strength:    Strength RLE  Comment: grossly 3+/5  Strength LLE  Comment: grossly 3+/5              Bed Mobility:   Lying to Sitting on Side of Bed  Assistance Needed: Partial/moderate assistance  Comment: required Mod A (assist on upper body), transfer completed without use of bed features  CARE Score: 3  Discharge Goal: Independent  Roll Left and Right  Assistance Needed: Supervision or touching assistance  Comment: required Sup.  (sequential cues following sternal precautions); tasks completed without use of bed features  CARE Score: 4  Discharge Goal: Independent  Sit to Lying  Assistance Needed: Partial/moderate assistance  Comment: required Mod A (assist on BLE), transfer completed without use of bed features  CARE Score: 3  Discharge Goal: Independent    Transfers:    Sit to Stand  Assistance Needed: Partial/moderate assistance  Comment: required Mod A with RW  CARE Score: 3  Discharge Goal: Independent  Chair/Bed-to-Chair Transfer  Comment: pt limited by lightheadedness  Reason if not Attempted: Not attempted due to medical condition or safety concerns  CARE Score: 88  Discharge Goal: Independent     Car Transfer  Reason if not Attempted: Not attempted due to medical condition or safety concerns  CARE Score: 88  Discharge Goal: Independent    Ambulation:   Device used PTA: none    Walking Ability  Does the Patient Walk?: Yes(per OT report, pt was able to ambulate from bathroom to bed, no AD with CGA on 10/28/2020)     Walk 10 Feet  Comment: pt was limited by lightheadedness today  Reason if not Attempted: Not attempted due to medical condition or safety concerns  CARE Score: 88  Discharge Goal: Independent     Walk 50 Feet with Two Turns  Reason if not Attempted: Not attempted due to medical condition or safety concerns  CARE Score: 88  Discharge Goal: Independent     Walk 150 Feet  Reason if not Attempted: Not attempted due to medical condition or safety concerns  CARE Score: 88  Discharge Goal: Independent     Walking 10 Feet on Uneven Surfaces  Reason if not Attempted: Not attempted due to medical condition or safety concerns  CARE Score: 88  Discharge Goal: Independent     1 Step (Curb)  Reason if not Attempted: Not attempted due to medical condition or safety concerns  CARE Score: 88  Discharge Goal: Independent     4 Steps  Reason if not Attempted: Not attempted due to medical condition or safety concerns  CARE Score: 88  Discharge Goal: Independent     12 Steps  Reason if not Attempted: Not applicable  CARE Score: 9  Discharge Goal: Not Applicable       Wheelchair:  w/c Ability: Wheelchair Ability  Uses a Wheelchair and/or Scooter?: No                Balance:        Object: Picking Up Object  Reason if not Attempted: Not attempted due to medical condition or safety concerns  CARE Score: 88  Discharge Goal: Independent    Assessment:   The patient is a 76year old male admitted onto ARU after hospitalization for CAD and CHF S/P CABG x 3. Pt presented with post-op and lung pain today and was hesitant to perform any OOB activity due to anticipated diarrhea and increased weakness. No episode of diarrhea occurred during this PT visit, however he presented with lightheadedness with positional changes. Vital signs in supine prior to activities were: MT=289/64 and HR=55, seated at EOB: WY=092/58 and HR=75. He then had limited participation in mobility assessment due to his medical status, however potential to progress is based on his BLE strength, PLOF, cognitive function to understand sternal precautions, participation in therapy while in acute care, and OOB activities performed with OT at ARU. Body structures, Functions, Activity limitations: Decreased functional mobility , Decreased high-level IADLs, Decreased ADL status, Decreased endurance, Decreased sensation, Decreased strength, Decreased balance, Increased pain     Prognosis: Good  Decision Making: Medium Complexity  Clinical Presentation: evolving with changing characteristics      Patient education:   ARU schedule, ARU expectations for participation, plan of care, sternal precautions   Treatment Initiated:  Functional mobility training, patient education  Barriers to Improvement:  Diarrhea, pain, lightheadedness with positional changes  Discharge Recommendations:  [facility] PT   Equipment Recommendations:  Likely RW     Goals:  Patient Goals   Patient goals : for diarrhea to resolve and to go home  Short term goals  Time Frame for Short term goals: 10 tx days:  Short term goal 1: Pt will complete rolling L/R and sup<->sit Ind following sternal precautions. Short term goal 2: Pt will complete OOB transfers following sternal precautions Mod Ind. Short term goal 3: Pt will ambulate 150 ft using RW Mod Ind. Short term goal 4: Pt will ascend/descend 4-5 steps using bilat rails Mod Ind. Short term goal 5: Pt will ascend/descend curb step and ambulate over uneven surfaces using RW Mod Ind. Short term goal 6: Pt will complete object retrieval from the floor in standing Mod Ind-Ind.      Plan:    REQUIRES PT FOLLOW UP: Yes  Pt will be seen at least 60 minutes per day for a minimum of 5 days per week, plus group therapy as appropriate  Plan  Times per day: Daily  Current Treatment Recommendations: Transfer Training, Endurance Training, Strengthening, Patient/Caregiver Education & Training, Pain Management, Equipment Evaluation, Education, & procurement, Balance Training, Gait Training, Home Exercise Program, Functional Mobility Training, Stair training, Safety Education & Training    PT Individual Minutes  Time In: 5787  Time Out: 1035  Minutes: 60        Timed Code Treatment Minutes: 45 Minutes    Number of Minutes/Billable Intervention    Time in: 866  Time out: 4764     PT Evaluation 15   Gait Training    Therapeutic Exercise    Neuro Re-Ed    Therapeutic Activity 45   Wheelchair Propulsion    Group    Other:    TOTAL 60       Electronically signed by:    Raquel Guadalupe, PT   10/29/2020, 15:55

## 2020-10-29 NOTE — CARE COORDINATION
Patient reviewed at today's discharge. Patient will d/c to spouse's [address] home. Dr Amie Eid plans GI consult. RW/BSC/TTB recommended. Octavia Diaz pt/ot/RN. Discharge 11/10    Case mgt met with patient in room. Patient agreeable to d/c plan but is requesting a 11/9 discharge as two of his children are off work that day and transport to d/c location in [facility] [address] would be easier to setup. Case mgt will discuss with Dr Amie Eid. Whiteboard updated with 11/10 discharge date.

## 2020-10-29 NOTE — PROGRESS NOTES
Progress Note( Dr. Diogo Silva)    Subjective:   Admit Date: 10/19/2020  PCP: Tasneem Newman MD    Admitted For : Chest pain CAD underwent CABG    Consulted For: Better control of blood glucose filed diabetes mellitus    Interval History:Postop day 6  Better  C/O  chest pains, also with deep breathing mostly from chest wall  Has some  SOB . Denies nausea or vomiting. Doing well //more  ambulatory  No new bowel or bladder symptoms. No intake or output data in the 24 hours ending 10/28/20 2307    DATA    CBC:   Recent Labs     10/28/20  0624 10/28/20  1613   WBC 24.8*  --    HGB 13.8 15.0     --     CMP:  Recent Labs     10/26/20  0600 10/27/20  0611 10/28/20  0624    137 139   K 4.6 4.1 4.0   CL 97* 96* 95*   CO2 31 31 33*   BUN 44* 47* 47*   CREATININE 1.0 1.1 1.1   CALCIUM 9.2 9.0 9.1     Lipids:   Lab Results   Component Value Date    CHOL 155 06/23/2020    HDL 30 06/23/2020    TRIG 173 06/23/2020     Glucose:  Recent Labs     10/28/20  1158 10/28/20  1652 10/28/20  2055   POCGLU 207* 122* 132*     [id number]:  Lab Results   Component Value Date    LABA1C 6.3 10/12/2020     High Sensitivity TSH:   Lab Results   Component Value Date    TSHHS 1.340 06/23/2020     Free T3: No results found for: FT3  Free T4:  Lab Results   Component Value Date    T4FREE 1.28 06/23/2020       Xr Chest Portable    Result Date: 10/22/2020  EXAMINATION: ONE XRAY VIEW OF THE CHEST 10/22/2020 5:10 am COMPARISON: 10/21/2020 HISTORY: ORDERING SYSTEM PROVIDED HISTORY: Post op open heart surgery TECHNOLOGIST PROVIDED HISTORY: Reason for Exam:->Post op open heart surgery Reason for Exam: post op open heart surgery Acuity: Unknown Type of Exam: Ongoing FINDINGS: Central venous catheter tip overlies the SVC. Sternotomy wires. Small bilateral pleural effusion. Right lower lobe opacity. Stable cardiomegaly. Mild interstitial edema. No pneumothorax.      1. Unchanged mild pulmonary edema and small bilateral pleural effusions. 2. Stable right lower lobe atelectasis. Xr Chest Portable    Result Date: 10/21/2020  EXAMINATION: ONE XRAY VIEW OF THE CHEST 10/21/2020 1:16 pm COMPARISON: 10/21/2020 HISTORY: ORDERING SYSTEM PROVIDED HISTORY: chest tube removal   ersistent small right pleural effusion with bibasilar atelectasis. No discrete pneumothorax. The osseous structures otherwise stable. Interval removal of thoracostomy tubes. No discrete pneumothorax. Xr Chest Portable    Result Date: 10/21/2020  EXAMINATION: ONE XRAY VIEW OF THE CHEST 10/21/2020 6:06 am COMPARISON: 10/20/2020 HISTORY: ORDERING SYSTEM PROVIDED HISTORY: Post op open heart surgery  . Mildly increased interstitial changes which could be related to increased pulmonary edema or pneumonitis. Cardiomegaly with central vascular congestion. Otherwise similar appearing chest.     Xr Chest Portable    Result Date: 10/21/2020  EXAMINATION: ONE XRAY VIEW OF THE CHEST 10/20/2020 5:18 am COMPARISON: Chest radiograph dated October 19, 2020. HISTORY: ORDERING SYSTEM PROVIDED HISTORY: Post op open heart surgery TECHNOLOGIST PROVIDED HISTORY: Reason for Exam:->Post op open heart surgery Reason for Exam: Post op open heart surgery Acuity: Unknown Type of Exam: Unknown FINDINGS: Median sternotomy wires are noted. A left-sided central venous catheter is in place. A [place]-Olga catheter is in place. Bilateral chest tubes are present. Low lung volumes with bilateral pleural effusions and bibasilar opacities are seen. Bilateral pleural effusions with bibasilar opacities without significant change.            Scheduled Medicines   Medications:      Infusions:         Objective:   Vitals: /74   Pulse 76   Temp 98.2 °F (36.8 °C) (Oral)   Resp 17   Ht 5' 9.49\" (1.765 m)   Wt 198 lb 10.2 oz (90.1 kg)   SpO2 94%   BMI 28.92 kg/m²   General appearance: alert and cooperative with exam  Neck: no JVD or bruit  Thyroid : Normal lobes   Lungs: Has Vesicular

## 2020-10-29 NOTE — PLAN OF CARE
Problem: SAFETY  Goal: Free from accidental physical injury  Outcome: Ongoing  Goal: Free from intentional harm  Outcome: Ongoing     Problem: DAILY CARE  Goal: Daily care needs are met  Outcome: Ongoing     Problem: PAIN  Goal: Patient's pain/discomfort is manageable  Outcome: Ongoing     Problem: SKIN INTEGRITY  Goal: Skin integrity is maintained or improved  Outcome: Ongoing     Problem: KNOWLEDGE DEFICIT  Goal: Patient/S.O. demonstrates understanding of disease process, treatment plan, medications, and discharge instructions.   Outcome: Ongoing     Problem: DISCHARGE BARRIERS  Goal: Patient's continuum of care needs are met  Outcome: Ongoing     Problem: Infection - Surgical Site:  Goal: Will show no infection signs and symptoms  Description: Will show no infection signs and symptoms  Outcome: Ongoing     Problem: Pain:  Goal: Pain level will decrease  Description: Pain level will decrease  Outcome: Ongoing  Goal: Control of acute pain  Description: Control of acute pain  Outcome: Ongoing  Goal: Control of chronic pain  Description: Control of chronic pain  Outcome: Ongoing     Problem: Skin Integrity:  Goal: Will show no infection signs and symptoms  Description: Will show no infection signs and symptoms  Outcome: Ongoing  Goal: Absence of new skin breakdown  Description: Absence of new skin breakdown  Outcome: Ongoing

## 2020-10-29 NOTE — PROGRESS NOTES
Occupational Therapy   Physical Rehabilitation: OCCUPATIONAL THERAPY     [x] daily progress note       [] discharge       Patient Name:  Milton Raymond   :  1955 MRN: [id number]  Room:  [address] [place] [address] Date of Admission: 10/27/2020  Rehabilitation Diagnosis:   Atherosclerotic heart disease of native coronary artery without angina pectoris [I25.10]  CHF following cardiac surgery, postop [I97.130]       Date       THURS 10/29/2020       Day of ARU Week:  3   Time IN/OUT 9552-9064  1500-  Pt declined tx d/t fatigue, pain, diarrhea    Individual Tx Minutes 60   Group Tx Minutes    Co-Treat Minutes    Concurrent Tx Minutes    TOTAL Tx Time Mins 60   Variance Time -60   Variance Time []   Refusal due to:     []   Medical hold/reason:    []   Illness   []   Off Unit for test/procedure  []   Extra time needed to complete task  []   Therapeutic need  []   Other (specify):   Restrictions Restrictions/Precautions: (3 L O2.)         Communication with other providers: [x]   OK to see per nursing:     []   Spoke with team member regarding:      Subjective observations and cognitive status:   Pt appeared fatigued,painful,anxiousn re diarrhea    Pain level/location:    /10       Location: surg site   Discharge recommendations  Anticipated discharge date:  TBD  Destination: []home alone   []home alone w assist prn   [] home w/ family    [] Continuous supervision       []SNF    [] Assisted living     [] Other:   Continued therapy: []HHC OT  []OUTPATIENT  OT   [] No Further OT  Equipment needs: TBD     Toileting:   SUP      Toilet Transfers:   WC <>toilet c SUP  Device Used:    []   Standard Toilet         [x]   Grab Bars           [x]  Bedside Commode       []   Elevated Toilet          []   Other:        Bed Mobility:           []   Pt received out of bed   Rolling R/L:   Scooting:    Supine --> Sit:  Min A  Sit --> Supine:  SUP    Transfers:    Some c/o dizziness  Sit--> Stand:  CGA  Stand --> Sit:   CGA  Stand-Pivot: Other:    Assistive device required for transfer:         Functional Mobility:  Na  D/t fear of diarrhea   Assistance:    Device:   []   Meli Patiño     []   Standard Walker []   Wheelchair        []   [employer]       []   Betty Miller         []   Cardiac Regulo Lenorlando       []   Other:        Homemaking Tasks: Additional Therapeutic activities/exercises completed this date:     [x]   ADL Training   toileting c SUP,  Shaved,washed face c SUP sinkside   Extra time, slow pace   [x]   Balance/Postural training   No LOB seated, some unsteadiness    [x]   Bed/Transfer Training   [x]   Endurance Training   []   Neuromuscular Re-ed   []   Nu-step:  Time:        Level:         #Steps:       []   Rebounder:    []  Seated     []  Standing        []   Supine Ther Ex (reps/sets):     []   Seated Ther Ex (reps/sets):     []   Standing Ther Ex (reps/sets):     [x]   Other:  Supine positioning    Reviewed SP    Comments:      Patient/Caregiver Education and Training:   []   Adaptive Equipment Use  []   Bed Mobility/Transfer Technique/Safety  [x]   Energy Conservation Tips  []   Family training  [x]   Postural Awareness  [x]   Safety During Functional Activities  [x]   Reinforced Patient's Precautions   []   Progress was updated and reviewed in Rehabtracker with patient and/or family this         date.     Treatment Plan for Next Session  See poc    Assessment:        Treatment/Activity Tolerance:   [] Tolerated treatment with no adverse effects    [x] Patient limited by fatigue  [x] Patient limited by pain   [x] Patient limited by medical complications: diarrhea    [] Adverse reaction to Tx:   [] Significant change in status    Safety:       []  bed alarm set    []  chair alarm set    []  Pt refused alarms                []  Telesitter activated      [x]  Gait belt used during tx session      []other:       Number of Minutes/Billable Intervention  Therapeutic Exercise    ADL Self-care 60   Neuro Re-Ed    Therapeutic Activity Group    Other:    TOTAL 60       Social History  Social/Functional History  ADL Assistance: Independent  Homemaking Assistance: Independent  Homemaking Responsibilities: Yes  Meal Prep Responsibility: Primary(Pt reports eating out most of the time.)  Laundry Responsibility: Primary  Cleaning Responsibility: Primary  Bill Paying/Finance Responsibility: Primary  Shopping Responsibility: Primary  Dependent Care Responsibility: No  Health Care Management: Primary  Ambulation Assistance: Independent  Transfer Assistance: Independent  Active : Yes  Mode of Transportation: Truck, Car  Occupation: Full time employment  Type of occupation: Truck drive  Leisure & Hobbies: none  Additional Comments: 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 Pt reports he will be staying with ex-wife at discharge and will have initial 24/7 supervision. Above information is for ex-wife's home setup. Objective                                                                                    Goals:  (Update in navigator)  Short term goals  Time Frame for Short term goals: STG=LTG:  Long term goals  Time Frame for Long term goals : 10-12 days or until d/c  Long term goal 1: Pt will complete feeding/grooming/oral care task c MOD I by d/c  Long term goal 2: Pt will complete total body bathing c Mod I by d/c  Long term goal 3: Pt will complete UB dressing c MOD I and retrieve items from closet by d/c  Long term goal 4: Pt will complete LB dressing c MOD I and retrieve items from closet by d/c  Long term goal 5: Pt will don/doff footwear c Min A by d/c  Long term goals 6: Pt will complete toileting c MOD I by d/c  Long term goal 7: Pt will complete functional transfer (toilet, tub, shower) c MOD I by d/c. Long term goal 8: Pt will perform therex/therax to facilitate increased strength/endurance/ax tolerance (c emphasis on maintaining sternal precautions and dynamic standing balance/tolerance >8 mins) c MOD I in order to complete ADLs. Magi Phillips Plan of Care                                                                              Times per week: 5 days per week for a minimum of 60 minutes/day plus group as appropriate for 60 minutes.   Treatment to include Plan  Times per day: Daily  Current Treatment Recommendations: Strengthening, Patient/Caregiver Education & Training, Home Management Training, Functional Mobility Training, Endurance Training, Equipment Evaluation, Education, & procurement, Pain Management, Safety Education & Training, Self-Care / ADL, Balance Training    Electronically signed by   Orene Clubs. ravinder Wood  10/29/2020, 8:15 AM

## 2020-10-30 LAB
GLUCOSE BLD-MCNC: 104 MG/DL (ref 70–99)
GLUCOSE BLD-MCNC: 118 MG/DL (ref 70–99)
GLUCOSE BLD-MCNC: 131 MG/DL (ref 70–99)
GLUCOSE BLD-MCNC: 133 MG/DL (ref 70–99)

## 2020-10-30 PROCEDURE — 82962 GLUCOSE BLOOD TEST: CPT

## 2020-10-30 PROCEDURE — 97110 THERAPEUTIC EXERCISES: CPT

## 2020-10-30 PROCEDURE — 94761 N-INVAS EAR/PLS OXIMETRY MLT: CPT

## 2020-10-30 PROCEDURE — 97530 THERAPEUTIC ACTIVITIES: CPT

## 2020-10-30 PROCEDURE — 99232 SBSQ HOSP IP/OBS MODERATE 35: CPT | Performed by: PHYSICAL MEDICINE & REHABILITATION

## 2020-10-30 PROCEDURE — 6370000000 HC RX 637 (ALT 250 FOR IP): Performed by: PHYSICAL MEDICINE & REHABILITATION

## 2020-10-30 PROCEDURE — 94150 VITAL CAPACITY TEST: CPT

## 2020-10-30 PROCEDURE — 6370000000 HC RX 637 (ALT 250 FOR IP): Performed by: SURGERY

## 2020-10-30 PROCEDURE — 94640 AIRWAY INHALATION TREATMENT: CPT

## 2020-10-30 PROCEDURE — 2700000000 HC OXYGEN THERAPY PER DAY

## 2020-10-30 PROCEDURE — 1280000000 HC REHAB R&B

## 2020-10-30 RX ADMIN — TIOTROPIUM BROMIDE INHALATION SPRAY 2 PUFF: 3.12 SPRAY, METERED RESPIRATORY (INHALATION) at 07:38

## 2020-10-30 RX ADMIN — DIPHENOXYLATE HYDROCHLORIDE AND ATROPINE SULFATE 1 TABLET: 2.5; .025 TABLET ORAL at 06:16

## 2020-10-30 RX ADMIN — BUDESONIDE AND FORMOTEROL FUMARATE DIHYDRATE 2 PUFF: 160; 4.5 AEROSOL RESPIRATORY (INHALATION) at 20:12

## 2020-10-30 RX ADMIN — ALBUTEROL SULFATE 2 PUFF: 90 AEROSOL, METERED RESPIRATORY (INHALATION) at 10:58

## 2020-10-30 RX ADMIN — HYDROCODONE BITARTRATE AND ACETAMINOPHEN 2 TABLET: 5; 325 TABLET ORAL at 12:31

## 2020-10-30 RX ADMIN — TAMSULOSIN HYDROCHLORIDE 0.4 MG: 0.4 CAPSULE ORAL at 09:35

## 2020-10-30 RX ADMIN — DIPHENOXYLATE HYDROCHLORIDE AND ATROPINE SULFATE 1 TABLET: 2.5; .025 TABLET ORAL at 18:43

## 2020-10-30 RX ADMIN — ASPIRIN 81 MG: 81 TABLET, FILM COATED ORAL at 09:35

## 2020-10-30 RX ADMIN — GUAIFENESIN 600 MG: 600 TABLET, EXTENDED RELEASE ORAL at 09:36

## 2020-10-30 RX ADMIN — HYDROCODONE BITARTRATE AND ACETAMINOPHEN 2 TABLET: 5; 325 TABLET ORAL at 06:13

## 2020-10-30 RX ADMIN — DULOXETINE HYDROCHLORIDE 60 MG: 30 CAPSULE, DELAYED RELEASE ORAL at 09:36

## 2020-10-30 RX ADMIN — FUROSEMIDE 40 MG: 40 TABLET ORAL at 17:36

## 2020-10-30 RX ADMIN — LEVOTHYROXINE SODIUM 100 MCG: 100 TABLET ORAL at 06:14

## 2020-10-30 RX ADMIN — DIPHENOXYLATE HYDROCHLORIDE AND ATROPINE SULFATE 1 TABLET: 2.5; .025 TABLET ORAL at 12:31

## 2020-10-30 RX ADMIN — CARVEDILOL 3.12 MG: 6.25 TABLET, FILM COATED ORAL at 20:56

## 2020-10-30 RX ADMIN — AMIODARONE HYDROCHLORIDE 200 MG: 200 TABLET ORAL at 09:36

## 2020-10-30 RX ADMIN — ATORVASTATIN CALCIUM 20 MG: 20 TABLET, FILM COATED ORAL at 20:56

## 2020-10-30 RX ADMIN — PREDNISONE 20 MG: 20 TABLET ORAL at 09:36

## 2020-10-30 RX ADMIN — GUAIFENESIN 600 MG: 600 TABLET, EXTENDED RELEASE ORAL at 20:56

## 2020-10-30 RX ADMIN — HYDROCODONE BITARTRATE AND ACETAMINOPHEN 2 TABLET: 5; 325 TABLET ORAL at 22:31

## 2020-10-30 RX ADMIN — ALBUTEROL SULFATE 2 PUFF: 90 AEROSOL, METERED RESPIRATORY (INHALATION) at 20:12

## 2020-10-30 RX ADMIN — BUDESONIDE AND FORMOTEROL FUMARATE DIHYDRATE 2 PUFF: 160; 4.5 AEROSOL RESPIRATORY (INHALATION) at 07:38

## 2020-10-30 RX ADMIN — MULTIPLE VITAMINS W/ MINERALS TAB 1 TABLET: TAB at 09:36

## 2020-10-30 RX ADMIN — ALBUTEROL SULFATE 2 PUFF: 90 AEROSOL, METERED RESPIRATORY (INHALATION) at 15:42

## 2020-10-30 RX ADMIN — HYDROCODONE BITARTRATE AND ACETAMINOPHEN 2 TABLET: 5; 325 TABLET ORAL at 17:40

## 2020-10-30 RX ADMIN — CARVEDILOL 3.12 MG: 6.25 TABLET, FILM COATED ORAL at 09:36

## 2020-10-30 RX ADMIN — FUROSEMIDE 40 MG: 40 TABLET ORAL at 09:36

## 2020-10-30 RX ADMIN — FINASTERIDE 5 MG: 5 TABLET, FILM COATED ORAL at 09:35

## 2020-10-30 RX ADMIN — ALBUTEROL SULFATE 2 PUFF: 90 AEROSOL, METERED RESPIRATORY (INHALATION) at 07:37

## 2020-10-30 ASSESSMENT — PAIN SCALES - GENERAL
PAINLEVEL_OUTOF10: 9
PAINLEVEL_OUTOF10: 8
PAINLEVEL_OUTOF10: 0
PAINLEVEL_OUTOF10: 9

## 2020-10-30 ASSESSMENT — PAIN DESCRIPTION - DESCRIPTORS: DESCRIPTORS: CONSTANT

## 2020-10-30 ASSESSMENT — PAIN DESCRIPTION - FREQUENCY: FREQUENCY: CONTINUOUS

## 2020-10-30 ASSESSMENT — PAIN DESCRIPTION - ORIENTATION: ORIENTATION: MID;UPPER

## 2020-10-30 ASSESSMENT — PAIN DESCRIPTION - LOCATION: LOCATION: CHEST

## 2020-10-30 ASSESSMENT — PAIN DESCRIPTION - ONSET: ONSET: ON-GOING

## 2020-10-30 ASSESSMENT — PAIN DESCRIPTION - PAIN TYPE: TYPE: ACUTE PAIN

## 2020-10-30 NOTE — PROGRESS NOTES
Socorro Guzman    : 1955  Acct #: [de-identified]  MRN: [id number]              PM&R Progress Note      Admitting diagnosis: Congestive heart failure after coronary bypass graft surgery     Comorbid diagnoses impacting rehabilitation: Uncontrolled pain, generalized weakness, gait disturbance, acute blood loss anemia, essential hypertension, BPH, COPD exacerbation, nicotine addiction     Chief complaint: Perhaps slight improvements with the change in his antidiarrheal medication. Watery stools are still present, however. Prior (baseline) level of function: Independent. Current level of function:         Current  IRF-ESTEBAN and Goals:   Hearing, Speech, and Vision  Expression of Ideas and Wants: Without difficulty  Understanding Verbal and Non-Verbal Content: Understands  Prior Functioning: Everyday Activities  Self Care: Independent  Indoor Mobility (Ambulation): Independent  Stairs: Independent  Functional Cognition: Independent  Prior Device Use: (Ind)    Eating  Assistance Needed: Setup or clean-up assistance  CARE Score: 5  Discharge Goal: Independent  Oral Hygiene  Assistance Needed: Setup or clean-up assistance  Comment: Completed seated sink-side  CARE Score: 5  Discharge Goal: Independent  Toileting Hygiene  Assistance Needed: Dependent  Comment: nurse did hygiene   CARE Score: 1  Discharge Goal: Independent  Shower/Bathe Self  Assistance Needed: Partial/moderate assistance  Comment: Mod A for LB bathing, v/c for sternal precautions/compensatory strategies. CARE Score: 3  Discharge Goal: Independent  Upper Body Dressing  Assistance Needed: Partial/moderate assistance  Comment: Min A for threading BUE while maintaining cardiac precautions. CARE Score: 3  Discharge Goal: Independent  Lower Body Dressing  Assistance Needed: Substantial/maximal assistance  Comment: Max A to thread BLE and don over hips, pt demo buttoning pants.   CARE Score: 2  Discharge Goal: Independent  Putting On/Taking Off Footwear  Assistance Needed: Dependent  CARE Score: 1  Discharge Goal: Partial/moderate assistance    Roll Left and Right  Assistance Needed: Supervision or touching assistance  Comment: required Sup. (sequential cues following sternal precautions); tasks completed without use of bed features  CARE Score: 4  Discharge Goal: Independent  Sit to Lying  Assistance Needed: Partial/moderate assistance  Comment: required Mod A (assist on BLE), transfer completed without use of bed features  CARE Score: 3  Discharge Goal: Independent  Sit to Stand  Assistance Needed: Partial/moderate assistance  Comment: required Mod A with RW  CARE Score: 3  Discharge Goal: Independent  Chair/Bed-to-Chair Transfer  Comment: pt limited by lightheadedness  Reason if not Attempted: Not attempted due to medical condition or safety concerns  CARE Score: 88  Discharge Goal: Independent  Toilet Transfer  Assistance Needed: Partial/moderate assistance  Comment: Min A-Mod A on/off toilet and body placement. CARE Score: 3  Discharge Goal: Independent  Car Transfer  Reason if not Attempted: Not attempted due to medical condition or safety concerns  CARE Score: 88  Discharge Goal: Independent   Walk 10 Feet? Walk 10 Feet?: No  1 Step  1 Step?: Yes  Picking Up Object  Reason if not Attempted: Not attempted due to medical condition or safety concerns  CARE Score: 88  Discharge Goal: Independent  Wheelchair Ability  Uses a Wheelchair and/or Scooter?: No                  I      Exam:    Blood pressure 129/68, pulse 69, temperature 97.5 °F (36.4 °C), temperature source Oral, resp. rate 18, height 5' 9\" (1.753 m), weight 183 lb 6.4 oz (83.2 kg), SpO2 97 %. General: Up in a wheelchair. Talkative. HEENT: MMM. Neck supple. No JVD. Pulmonary: No coughing today. Blunted breath sounds in the base. Cardiac: Regular rate and rhythm. Incision clean and dry. Abdomen: Patient's abdomen is soft and nondistended.   Bowel sounds were present throughout. There was no rebound, guarding or masses noted. Upper extremities: Slow and deliberate with bringing his hands up to meet mine. Functional  strength. No new bruising. Lower extremities: Trace edema. No signs of DVT. 3+/5 strength across the knees and ankles. Sitting balance was good. Standing balance was poor. Lab Results   Component Value Date    WBC 24.8 (H) 10/28/2020    HGB 15.0 10/28/2020    HCT 50.3 10/28/2020    .7 (H) 10/28/2020     10/28/2020     Lab Results   Component Value Date    INR 1.02 10/28/2020    INR 1.27 10/19/2020    INR 1.00 10/12/2020    PROTIME 12.4 10/28/2020    PROTIME 15.4 (H) 10/19/2020    PROTIME 12.1 10/12/2020     Lab Results   Component Value Date    CREATININE 1.1 10/28/2020    BUN 47 (H) 10/28/2020     10/28/2020    K 4.0 10/28/2020    CL 95 (L) 10/28/2020    CO2 33 (H) 10/28/2020     Lab Results   Component Value Date    ALT 16 06/23/2020    AST 16 06/23/2020    ALKPHOS 79 06/23/2020    BILITOT 0.5 06/23/2020       Expected length of stay  prior to a supervised level of function for discharge home with a walker and [facility] OT/PT is 11/10/2020. Recommendations:    1. Systolic congestive heart failure after coronary bypass graft surgery:   Little better participation in the daily occupational and physical therapy.  No fever, cramping or significant leukocytosis.  Requires significant verbal cues to use techniques for self-care and mobility while following sternal precautions.  Stable thoracic bruising. Hemoglobin stable. INR normal.  Chest x-ray with mild congestion only. Ongoing pulmonary hygiene, nutritional support and cautious pain management. Daily weights do not indicate any decompensation of CHF.  P.o. Lasix now. Beta-blocker.  Adaptive equipment training. Caregiver training closer to discharge.   2. DVT prophylaxis: Due to his anemia and bruising, chemoprophylaxis is contraindicated.  SCDs when in bed.  Weightbearing activities are pursued daily.      Stable hemoglobin.    3. COPD exacerbation: Albuterol and Symbicort inhalers.  Mucinex and Spiriva.  Monitoring O2 saturations at rest and with activity.  Nebulizers as needed.  Supplemental oxygen to keep saturations greater than 90%. 4. Hypertension: Coreg and Lasix are used to manage his hypertension.  Target systolic blood pressure is 120-140.  Vital signs are checked at rest and with activity. 5. BPH: Flomax and Proscar used to manage this issue.  Bladder protocol should he develop symptoms of retention.   6. Nicotine addiction: Topical nicotine replacement patch.

## 2020-10-30 NOTE — PROGRESS NOTES
Physical Therapy      [x] daily progress note       [] discharge       Patient Name:  Daija Caraballo   :  1955 MRN: [id number]  Room:  [address] [place] [address] Date of Admission: 10/27/2020  Rehabilitation Diagnosis:   Atherosclerotic heart disease of native coronary artery without angina pectoris [I25.10]  CHF following cardiac surgery, postop [I97.130]       Date 10/30/2020       Day of ARU Week:  4   Time IN/OUT 0950-x  9703-4908   Individual Tx Minutes 60   TOTAL Tx Time Mins 60   Variance Time -60   Variance Time [x]   Refusal due to: diarrhea, weakness    []   Medical hold/reason:    []   Illness   []   Off Unit for test/procedure  []   Extra time needed to complete task  []   Therapeutic need  []   Other (specify):   Restrictions Restrictions/Precautions  Restrictions/Precautions: Fall Risk, General Precautions  Position Activity Restriction  Sternal Precautions: No Pushing, No Pulling, 5# Lifting Restrictions  Other position/activity restrictions: 3L O2   Communication with other providers: [x]   OK to see per nursing:     []   Spoke with team member regarding:      Subjective observations and cognitive status: AM: Pt refusing therapy this AM due to c/o diarrhea and weakness. Encouraged pt to attempt ax's or exercise sitting EOB but pt still refusing. PM: Pt resting in bed, willing to participate but states \"Can we just do tx in the room? \" Pt reports worried about possible bowel incontinence  VS at rest: semifowlers: /64, HR 68, O2- unable to attain despite several attempts. Pt asymptomatic  With ax: in sitting: /60, HR 70, O2 sats 95% on 3L.      Pain level/location: 9/10       Location: chest incision   Discharge recommendations  Anticipated discharge date: TBD  Destination: Home with prn A of ex-apouse   Continued therapy: [address] PT  Equipment needs: FLORI Caraballo requires the assistance of a wheeled walker to successfully ambulate from room to room at home to allow completion of daily living tasks such as: bathing, toileting, dressing and grooming. A wheeled walker is necessary due to the patient's unsteady gait, upper body weakness, inability to  a standard walker. This patient can ambulate only by pushing a walker instead of using a lesser assistive device such as a cane or crutch. Bed Mobility:           []   Pt received out of bed   Rolling R/L:  SBA to L, follows sternal precautions  Scooting:  SBA to EOB  Supine --> Sit:  SBA with HOB elevated, follows sternal precautions  Sit --> Supine:  Min A LE's    Transfers:    Sit--> Stand:  CGA  Stand --> Sit:   CGA  Follows sternal precautions for transfers, min cues for B foot positioning and COG fwd  Assistive device required for transfer:   RW      Additional Therapeutic activities/exercises completed this date:     []   Nu-step:  Time:        Level:         #Steps:       []   Rebounder:    []  Seated     []  Standing        [x]   Balance training: static standing EOB at RW x 2 1/2 ' with good tolerance, no c/o lightheadedness. Pt able to perform standing WS to L/R with CGA. Pt then suddenly becoming incontinent with loose stool into brief, unable to transfer into recliner or perform other standing ax's. Pt reports feels too weak to attempt amb to toilet with WC follow. No BSC available so A pt into supine for hygiene. Pt max A for hygiene and total for changing of brief in SL/Supine. []   Postural training    []   Supine ther ex (reps/sets):     [x]   Seated ther ex (reps/sets): S/P CABG intermediate ex's per handout x 10 reps with SBA sitting unsupported EOB, min cues for PLB, ex technique with handout. Pt with good tolerance.      []   Standing ther ex (reps/sets):     []   Picked up object from floor                       []   Reacher used   [x]   Other:   []   Other:   []   Other:       Patient/Caregiver Education and Training:   [x]   Bed Mobility/Transfer technique/safety  []   Gait technique/sequencing  [x]   Proper use of assistive device  []   Advanced mobility safety and technique  [x]   Reinforced patient's precautions with mobility/functional tasks: Pt able to verbalize and demonstrate sternal precautions with bed mobility and transfer.  []   Energy conservation, PLB  []   Family training  [x]   Progress was updated and reviewed in Rehabtracker with patient and/or family this date. Treatment Plan for Next Session: CABG ex's, standing tolerance/balance, SPT with RW, amb with RW with close WC follow if able    Treatment/Activity Tolerance:   [] Tolerated treatment with no adverse effects    [x] Patient limited by weakness, diarrhea. [] Patient limited by pain   [] Patient limited by medical complications:    [] Adverse reaction to Tx:   [] Significant change in status    Safety:       [x]  bed alarm set    []  chair alarm  set    []  Pt refused alarms                 []  Telesitter activated      [x]  Gait belt used during tx session      []other:         Number of Minutes/Billable Intervention  Gait Training    Therapeutic Exercise 30   Neuro Re-Ed    Therapeutic Activity 30   Wheelchair Propulsion    Group    Other:    TOTAL 60         Social History  Social/Functional History  ADL Assistance: Independent  Homemaking Assistance: Independent  Homemaking Responsibilities: Yes  Meal Prep Responsibility: Primary(Pt reports eating out most of the time.)  Laundry Responsibility: Primary  Cleaning Responsibility: Primary  Bill Paying/Finance Responsibility: Primary  Shopping Responsibility: Primary  Dependent Care Responsibility: No  Health Care Management: Primary  Ambulation Assistance: Independent  Transfer Assistance: Independent  Active : Yes  Mode of Transportation: Truck, Car  Occupation: Full time employment  Type of occupation: Truck drive  Leisure & Hobbies: none  Additional Comments: 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  Pt reports he will be staying with ex-wife at discharge and will have initial 24/7 supervision. Above information is for ex-wife's home setup.; no falls in the past year    Objective                                                                                    Goals:  (Update in navigator)  Short term goals  Time Frame for Short term goals: 10 tx days:  Short term goal 1: Pt will complete rolling L/R and sup<->sit Ind following sternal precautions. Short term goal 2: Pt will complete OOB transfers following sternal precautions Mod Ind. Short term goal 3: Pt will ambulate 150 ft using RW Mod Ind. Short term goal 4: Pt will ascend/descend 4-5 steps using bilat rails Mod Ind. Short term goal 5: Pt will ascend/descend curb step and ambulate over uneven surfaces using RW Mod Ind. Short term goal 6: Pt will complete object retrieval from the floor in standing Mod Ind-Ind.:   :        Plan of Care                                                                              Times per week: 5 days per week for a minimum of 60 minutes/day plus group as appropriate for 60 minutes.   Treatment to include Current Treatment Recommendations: Transfer Training, Endurance Training, Strengthening, Patient/Caregiver Education & Training, Pain Management, Equipment Evaluation, Education, & procurement, Balance Training, Gait Training, Home Exercise Program, Functional Mobility Training, Stair training, Safety Education & Training    Electronically signed by   Noemi Edward, PTA #[id number]  10/30/2020, 8:29 AM

## 2020-10-30 NOTE — PLAN OF CARE
Problem: SAFETY  Goal: Free from accidental physical injury  10/30/2020 1214 by Jenna Messina RN  Outcome: Ongoing  10/30/2020 0026 by Tay Santos RN  Outcome: Ongoing  Goal: Free from intentional harm  10/30/2020 1214 by Jenna Messina RN  Outcome: Ongoing  10/30/2020 0026 by Tay Santos RN  Outcome: Ongoing     Problem: DAILY CARE  Goal: Daily care needs are met  10/30/2020 1214 by Jenna Messina RN  Outcome: Ongoing  10/30/2020 0026 by Tay Santos RN  Outcome: Ongoing     Problem: PAIN  Goal: Patient's pain/discomfort is manageable  10/30/2020 1214 by Jenna Messina RN  Outcome: Ongoing  10/30/2020 0026 by Tay Santos RN  Outcome: Ongoing     Problem: SKIN INTEGRITY  Goal: Skin integrity is maintained or improved  10/30/2020 1214 by Jenna Messina RN  Outcome: Ongoing  10/30/2020 0026 by Tay Santos RN  Outcome: Ongoing     Problem: KNOWLEDGE DEFICIT  Goal: Patient/S.O. demonstrates understanding of disease process, treatment plan, medications, and discharge instructions.   10/30/2020 1214 by Jenna Messina RN  Outcome: Ongoing  10/30/2020 0026 by Tay Santos RN  Outcome: Ongoing     Problem: DISCHARGE BARRIERS  Goal: Patient's continuum of care needs are met  10/30/2020 1214 by Jenna Messina RN  Outcome: Ongoing  10/30/2020 0026 by Tay Santos RN  Outcome: Ongoing     Problem: Infection - Surgical Site:  Goal: Will show no infection signs and symptoms  Description: Will show no infection signs and symptoms  10/30/2020 1214 by Jenna Messina RN  Outcome: Ongoing  10/30/2020 0026 by Tay Santos RN  Outcome: Ongoing     Problem: Pain:  Goal: Pain level will decrease  Description: Pain level will decrease  10/30/2020 1214 by Jenna Messina RN  Outcome: Ongoing  10/30/2020 0026 by Tay Santos RN  Outcome: Ongoing  Goal: Control of acute pain  Description: Control of acute pain  10/30/2020 1214 by Jenna Messina RN  Outcome: Ongoing  10/30/2020 0026 by Tay Santos RN  Outcome: Ongoing  Goal: Control of chronic pain  Description: Control of chronic pain  10/30/2020 1214 by Nash Johns RN  Outcome: Ongoing  10/30/2020 0026 by Loletta Ormond, RN  Outcome: Ongoing     Problem: Skin Integrity:  Goal: Will show no infection signs and symptoms  Description: Will show no infection signs and symptoms  10/30/2020 1214 by Nash Johns RN  Outcome: Ongoing  10/30/2020 0026 by Loletta Ormond, RN  Outcome: Ongoing  Goal: Absence of new skin breakdown  Description: Absence of new skin breakdown  10/30/2020 1214 by Nash Johns RN  Outcome: Ongoing  10/30/2020 0026 by Loletta Ormond, RN  Outcome: Ongoing

## 2020-10-30 NOTE — PROGRESS NOTES
Re-Ed    Therapeutic Activity 15   Group    Other:    TOTAL 60       Social History  Social/Functional History  ADL Assistance: Independent  Homemaking Assistance: Independent  Homemaking Responsibilities: Yes  Meal Prep Responsibility: Primary(Pt reports eating out most of the time.)  Laundry Responsibility: Primary  Cleaning Responsibility: Primary  Bill Paying/Finance Responsibility: Primary  Shopping Responsibility: Primary  Dependent Care Responsibility: No  Health Care Management: Primary  Ambulation Assistance: Independent  Transfer Assistance: Independent  Active : Yes  Mode of Transportation: Truck, Car  Occupation: Full time employment  Type of occupation: Truck drive  Leisure & Hobbies: none  Additional Comments: 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 Pt reports he will be staying with ex-wife at discharge and will have initial 24/7 supervision. Above information is for ex-wife's home setup.; no falls in the past year    Objective                                                                                    Goals:  (Update in navigator)  Short term goals  Time Frame for Short term goals: STG=LTG:  Long term goals  Time Frame for Long term goals : 10-12 days or until d/c  Long term goal 1: Pt will complete feeding/grooming/oral care task c MOD I by d/c  Long term goal 2: Pt will complete total body bathing c Mod I by d/c  Long term goal 3: Pt will complete UB dressing c MOD I and retrieve items from closet by d/c  Long term goal 4: Pt will complete LB dressing c MOD I and retrieve items from closet by d/c  Long term goal 5: Pt will don/doff footwear c Min A by d/c  Long term goals 6: Pt will complete toileting c MOD I by d/c  Long term goal 7: Pt will complete functional transfer (toilet, tub, shower) c MOD I by d/c.   Long term goal 8: Pt will perform therex/therax to facilitate increased strength/endurance/ax tolerance (c emphasis on maintaining sternal precautions and dynamic standing balance/tolerance >8 mins) c MOD I in order to complete ADLs. :        Plan of Care                                                                              Times per week: 5 days per week for a minimum of 60 minutes/day plus group as appropriate for 60 minutes.   Treatment to include Plan  Times per day: Daily  Current Treatment Recommendations: Strengthening, Patient/Caregiver Education & Training, Home Management Training, Functional Mobility Training, Endurance Training, Equipment Evaluation, Education, & procurement, Pain Management, Safety Education & Training, Self-Care / ADL, Balance Training    Electronically signed by   ravinder Hoyos  10/30/2020, 8:12 AM

## 2020-10-30 NOTE — PLAN OF CARE
Problem: SAFETY  Goal: Free from accidental physical injury  10/30/2020 0026 by Sarai Carney RN  Outcome: Ongoing  10/29/2020 1859 by Marko Zavala RN  Outcome: Ongoing  Goal: Free from intentional harm  10/30/2020 0026 by Sarai Carney RN  Outcome: Ongoing  10/29/2020 1859 by Marko Zavala RN  Outcome: Ongoing     Problem: DAILY CARE  Goal: Daily care needs are met  10/30/2020 0026 by Sarai Carney RN  Outcome: Ongoing  10/29/2020 1859 by Marko Zavala RN  Outcome: Ongoing     Problem: PAIN  Goal: Patient's pain/discomfort is manageable  10/30/2020 0026 by Sarai Carney RN  Outcome: Ongoing  10/29/2020 1859 by Marko Zavaal RN  Outcome: Ongoing     Problem: SKIN INTEGRITY  Goal: Skin integrity is maintained or improved  10/30/2020 0026 by Sarai Carney RN  Outcome: Ongoing  10/29/2020 1859 by Marko Zavala RN  Outcome: Ongoing     Problem: KNOWLEDGE DEFICIT  Goal: Patient/S.O. demonstrates understanding of disease process, treatment plan, medications, and discharge instructions.   10/30/2020 0026 by Sarai Carney RN  Outcome: Ongoing  10/29/2020 1859 by Marko Zavala RN  Outcome: Ongoing     Problem: DISCHARGE BARRIERS  Goal: Patient's continuum of care needs are met  10/30/2020 0026 by Sarai Carney RN  Outcome: Ongoing  10/29/2020 1859 by Marko Zavala RN  Outcome: Ongoing     Problem: Infection - Surgical Site:  Goal: Will show no infection signs and symptoms  Description: Will show no infection signs and symptoms  10/30/2020 0026 by Sarai Carney RN  Outcome: Ongoing  10/29/2020 1859 by Marko Zavala RN  Outcome: Ongoing     Problem: Pain:  Goal: Pain level will decrease  Description: Pain level will decrease  10/30/2020 0026 by Sarai Carney RN  Outcome: Ongoing  10/29/2020 1859 by Marko Zavala RN  Outcome: Ongoing  Goal: Control of acute pain  Description: Control of acute pain  10/30/2020 0026 by Sarai Carney RN  Outcome: Ongoing  10/29/2020 1859 by Damien Duke Elena Esquivel RN  Outcome: Ongoing  Goal: Control of chronic pain  Description: Control of chronic pain  10/30/2020 0026 by Kendal Ortega RN  Outcome: Ongoing  10/29/2020 1859 by Ana M Silva RN  Outcome: Ongoing     Problem: Skin Integrity:  Goal: Will show no infection signs and symptoms  Description: Will show no infection signs and symptoms  10/30/2020 0026 by Kendal Ortega RN  Outcome: Ongoing  10/29/2020 1859 by Ana M Silva RN  Outcome: Ongoing  Goal: Absence of new skin breakdown  Description: Absence of new skin breakdown  10/30/2020 0026 by Kendal Ortega RN  Outcome: Ongoing  10/29/2020 1859 by Ana M Silva RN  Outcome: Ongoing

## 2020-10-30 NOTE — PROGRESS NOTES
Lucian Bustos    : 1955  Acct #: [de-identified]  MRN: [id number]              PM&R Progress Note      Admitting diagnosis: Congestive heart failure after coronary bypass graft surgery     Comorbid diagnoses impacting rehabilitation: Uncontrolled pain, generalized weakness, gait disturbance, acute blood loss anemia, essential hypertension, BPH, COPD exacerbation, nicotine addiction     Chief complaint: Diarrhea    Prior (baseline) level of function: Independent. Current level of function:         Current  IRF-ESTEBAN and Goals:   Hearing, Speech, and Vision  Expression of Ideas and Wants: Without difficulty  Understanding Verbal and Non-Verbal Content: Understands  Prior Functioning: Everyday Activities  Self Care: Independent  Indoor Mobility (Ambulation): Independent  Stairs: Independent  Functional Cognition: Independent  Prior Device Use: (Ind)    Eating  Assistance Needed: Setup or clean-up assistance  CARE Score: 5  Discharge Goal: Independent  Oral Hygiene  Assistance Needed: Setup or clean-up assistance  Comment: Completed seated sink-side  CARE Score: 5  Discharge Goal: Independent  Toileting Hygiene  Assistance Needed: Dependent  Comment: nurse did hygiene   CARE Score: 1  Discharge Goal: Independent  Shower/Bathe Self  Assistance Needed: Partial/moderate assistance  Comment: Mod A for LB bathing, v/c for sternal precautions/compensatory strategies. CARE Score: 3  Discharge Goal: Independent  Upper Body Dressing  Assistance Needed: Partial/moderate assistance  Comment: Min A for threading BUE while maintaining cardiac precautions. CARE Score: 3  Discharge Goal: Independent  Lower Body Dressing  Assistance Needed: Substantial/maximal assistance  Comment: Max A to thread BLE and don over hips, pt demo buttoning pants.   CARE Score: 2  Discharge Goal: Independent  Putting On/Taking Off Footwear  Assistance Needed: Dependent  CARE Score: 1  Discharge Goal: Partial/moderate assistance    Roll Left and Right  Assistance Needed: Supervision or touching assistance  Comment: required Sup. (sequential cues following sternal precautions); tasks completed without use of bed features  CARE Score: 4  Discharge Goal: Independent  Sit to Lying  Assistance Needed: Partial/moderate assistance  Comment: required Mod A (assist on BLE), transfer completed without use of bed features  CARE Score: 3  Discharge Goal: Independent  Sit to Stand  Assistance Needed: Partial/moderate assistance  Comment: required Mod A with RW  CARE Score: 3  Discharge Goal: Independent  Chair/Bed-to-Chair Transfer  Comment: pt limited by lightheadedness  Reason if not Attempted: Not attempted due to medical condition or safety concerns  CARE Score: 88  Discharge Goal: Independent  Toilet Transfer  Assistance Needed: Partial/moderate assistance  Comment: Min A-Mod A on/off toilet and body placement. CARE Score: 3  Discharge Goal: Independent  Car Transfer  Reason if not Attempted: Not attempted due to medical condition or safety concerns  CARE Score: 88  Discharge Goal: Independent   Walk 10 Feet? Walk 10 Feet?: No  1 Step  1 Step?: Yes  Picking Up Object  Reason if not Attempted: Not attempted due to medical condition or safety concerns  CARE Score: 88  Discharge Goal: Independent  Wheelchair Ability  Uses a Wheelchair and/or Scooter?: No                  I      Exam:    Blood pressure 113/62, pulse 72, temperature 97.9 °F (36.6 °C), temperature source Oral, resp. rate 16, height 5' 9\" (1.753 m), weight 185 lb 9.6 oz (84.2 kg), SpO2 95 %. General: Lying back in bed. Oxygen per nasal cannula. In no distress. HEENT: Mucous membranes moist.  No JVD or adenopathy. Clear speech. Pulmonary: Blunted breath sounds the bases. Occasional cough. Cardiac: Regular rate and rhythm. Bruising throughout the precordium. Abdomen: Patient's abdomen is soft and nondistended. Bowel sounds were present throughout.   There was no rebound, guarding or masses noted. Upper extremities: Cautious movements at the shoulder and elbow due to chest pain. Functional  strength bilaterally. Lower extremities: Trace edema about the ankles. No DVT. Sitting balance was fair. Standing balance was poor. Lab Results   Component Value Date    WBC 24.8 (H) 10/28/2020    HGB 15.0 10/28/2020    HCT 50.3 10/28/2020    .7 (H) 10/28/2020     10/28/2020     Lab Results   Component Value Date    INR 1.02 10/28/2020    INR 1.27 10/19/2020    INR 1.00 10/12/2020    PROTIME 12.4 10/28/2020    PROTIME 15.4 (H) 10/19/2020    PROTIME 12.1 10/12/2020     Lab Results   Component Value Date    CREATININE 1.1 10/28/2020    BUN 47 (H) 10/28/2020     10/28/2020    K 4.0 10/28/2020    CL 95 (L) 10/28/2020    CO2 33 (H) 10/28/2020     Lab Results   Component Value Date    ALT 16 06/23/2020    AST 16 06/23/2020    ALKPHOS 79 06/23/2020    BILITOT 0.5 06/23/2020       Expected length of stay  prior to a supervised level of function for discharge home with a walker and [address] OT/PT is 11/10/2020. Recommendations:    1. Systolic congestive heart failure after coronary bypass graft surgery:  Diarrhea continues to interrupt his engagement in the daily occupational and physical therapy.  No fever, cramping or significant leukocytosis. Working to develop techniques for self-care and mobility while following sternal precautions.  Stable thoracic bruising. Hemoglobin stable. INR normal.  Chest x-ray with mild congestion only. Providing pulmonary hygiene, nutritional support and cautious pain management.  Monitoring his oxygen saturations through activity and daily weights. P.o. Lasix now. Beta-blocker.  Adaptive equipment training. Caregiver training closer to discharge. 2. DVT prophylaxis: Due to his anemia and bruising, chemoprophylaxis is contraindicated.  SCDs when in bed.  Weightbearing activities are pursued daily.      Stable hemoglobin.     3. COPD exacerbation: Albuterol and Symbicort inhalers.  Mucinex and Spiriva.  Monitoring O2 saturations at rest and with activity.  Nebulizers as needed.  Supplemental oxygen to keep saturations greater than 90%. 4. Hypertension: Coreg and Lasix are used to manage his hypertension.  Target systolic blood pressure is 120-140.  Vital signs are checked at rest and with activity. 5. BPH: Flomax and Proscar used to manage this issue.  Bladder protocol should he develop symptoms of retention. 6. Nicotine addiction: Topical nicotine replacement patch.      Counseling and Coordination of Care: In care conference today I met with the patient's OT, PT, RN and . We discussed the patient's problems, progress and prognosis. Disposition issues were clarified and plans were established for ongoing rehabilitation efforts beyond the ARU stay. I reviewed this information with the patient during a second distinct visit with the patient.  More than half of the total time of 33 minutes spent with the patient involved counseling and coordination of care.

## 2020-10-31 LAB
GLUCOSE BLD-MCNC: 107 MG/DL (ref 70–99)
GLUCOSE BLD-MCNC: 125 MG/DL (ref 70–99)
GLUCOSE BLD-MCNC: 167 MG/DL (ref 70–99)
GLUCOSE BLD-MCNC: 98 MG/DL (ref 70–99)

## 2020-10-31 PROCEDURE — 94150 VITAL CAPACITY TEST: CPT

## 2020-10-31 PROCEDURE — 2700000000 HC OXYGEN THERAPY PER DAY

## 2020-10-31 PROCEDURE — 94761 N-INVAS EAR/PLS OXIMETRY MLT: CPT

## 2020-10-31 PROCEDURE — 97110 THERAPEUTIC EXERCISES: CPT

## 2020-10-31 PROCEDURE — 87324 CLOSTRIDIUM AG IA: CPT

## 2020-10-31 PROCEDURE — 6370000000 HC RX 637 (ALT 250 FOR IP): Performed by: SURGERY

## 2020-10-31 PROCEDURE — 94640 AIRWAY INHALATION TREATMENT: CPT

## 2020-10-31 PROCEDURE — 97535 SELF CARE MNGMENT TRAINING: CPT

## 2020-10-31 PROCEDURE — 1280000000 HC REHAB R&B

## 2020-10-31 PROCEDURE — 82962 GLUCOSE BLOOD TEST: CPT

## 2020-10-31 PROCEDURE — 97116 GAIT TRAINING THERAPY: CPT

## 2020-10-31 PROCEDURE — 97530 THERAPEUTIC ACTIVITIES: CPT

## 2020-10-31 PROCEDURE — 6370000000 HC RX 637 (ALT 250 FOR IP): Performed by: PHYSICAL MEDICINE & REHABILITATION

## 2020-10-31 RX ADMIN — CARVEDILOL 3.12 MG: 6.25 TABLET, FILM COATED ORAL at 08:53

## 2020-10-31 RX ADMIN — ATORVASTATIN CALCIUM 20 MG: 20 TABLET, FILM COATED ORAL at 20:40

## 2020-10-31 RX ADMIN — DIPHENOXYLATE HYDROCHLORIDE AND ATROPINE SULFATE 1 TABLET: 2.5; .025 TABLET ORAL at 20:33

## 2020-10-31 RX ADMIN — TIOTROPIUM BROMIDE INHALATION SPRAY 2 PUFF: 3.12 SPRAY, METERED RESPIRATORY (INHALATION) at 06:57

## 2020-10-31 RX ADMIN — DULOXETINE HYDROCHLORIDE 60 MG: 30 CAPSULE, DELAYED RELEASE ORAL at 08:53

## 2020-10-31 RX ADMIN — TAMSULOSIN HYDROCHLORIDE 0.4 MG: 0.4 CAPSULE ORAL at 08:53

## 2020-10-31 RX ADMIN — ALBUTEROL SULFATE 2 PUFF: 90 AEROSOL, METERED RESPIRATORY (INHALATION) at 14:29

## 2020-10-31 RX ADMIN — ALBUTEROL SULFATE 2 PUFF: 90 AEROSOL, METERED RESPIRATORY (INHALATION) at 10:24

## 2020-10-31 RX ADMIN — ALBUTEROL SULFATE 2 PUFF: 90 AEROSOL, METERED RESPIRATORY (INHALATION) at 06:56

## 2020-10-31 RX ADMIN — CARVEDILOL 3.12 MG: 6.25 TABLET, FILM COATED ORAL at 20:33

## 2020-10-31 RX ADMIN — FUROSEMIDE 40 MG: 40 TABLET ORAL at 08:53

## 2020-10-31 RX ADMIN — PREDNISONE 20 MG: 20 TABLET ORAL at 08:53

## 2020-10-31 RX ADMIN — GUAIFENESIN 600 MG: 600 TABLET, EXTENDED RELEASE ORAL at 08:52

## 2020-10-31 RX ADMIN — FUROSEMIDE 40 MG: 40 TABLET ORAL at 16:38

## 2020-10-31 RX ADMIN — MULTIPLE VITAMINS W/ MINERALS TAB 1 TABLET: TAB at 08:53

## 2020-10-31 RX ADMIN — AMIODARONE HYDROCHLORIDE 200 MG: 200 TABLET ORAL at 08:53

## 2020-10-31 RX ADMIN — GUAIFENESIN 600 MG: 600 TABLET, EXTENDED RELEASE ORAL at 20:40

## 2020-10-31 RX ADMIN — HYDROCODONE BITARTRATE AND ACETAMINOPHEN 2 TABLET: 5; 325 TABLET ORAL at 07:04

## 2020-10-31 RX ADMIN — LEVOTHYROXINE SODIUM 100 MCG: 100 TABLET ORAL at 05:56

## 2020-10-31 RX ADMIN — HYDROCODONE BITARTRATE AND ACETAMINOPHEN 2 TABLET: 5; 325 TABLET ORAL at 02:48

## 2020-10-31 RX ADMIN — ASPIRIN 81 MG: 81 TABLET, FILM COATED ORAL at 08:53

## 2020-10-31 RX ADMIN — DIPHENOXYLATE HYDROCHLORIDE AND ATROPINE SULFATE 1 TABLET: 2.5; .025 TABLET ORAL at 08:52

## 2020-10-31 RX ADMIN — BUDESONIDE AND FORMOTEROL FUMARATE DIHYDRATE 2 PUFF: 160; 4.5 AEROSOL RESPIRATORY (INHALATION) at 06:57

## 2020-10-31 RX ADMIN — HYDROCODONE BITARTRATE AND ACETAMINOPHEN 2 TABLET: 5; 325 TABLET ORAL at 20:39

## 2020-10-31 RX ADMIN — FINASTERIDE 5 MG: 5 TABLET, FILM COATED ORAL at 08:53

## 2020-10-31 RX ADMIN — INSULIN LISPRO 1 UNITS: 100 INJECTION, SOLUTION INTRAVENOUS; SUBCUTANEOUS at 20:41

## 2020-10-31 RX ADMIN — HYDROCODONE BITARTRATE AND ACETAMINOPHEN 2 TABLET: 5; 325 TABLET ORAL at 16:38

## 2020-10-31 RX ADMIN — HYDROCODONE BITARTRATE AND ACETAMINOPHEN 2 TABLET: 5; 325 TABLET ORAL at 11:38

## 2020-10-31 ASSESSMENT — PAIN SCALES - GENERAL
PAINLEVEL_OUTOF10: 8
PAINLEVEL_OUTOF10: 9
PAINLEVEL_OUTOF10: 8

## 2020-10-31 ASSESSMENT — PAIN DESCRIPTION - DESCRIPTORS: DESCRIPTORS: CONSTANT

## 2020-10-31 ASSESSMENT — PAIN DESCRIPTION - PROGRESSION: CLINICAL_PROGRESSION: NOT CHANGED

## 2020-10-31 ASSESSMENT — PAIN DESCRIPTION - PAIN TYPE: TYPE: ACUTE PAIN

## 2020-10-31 ASSESSMENT — PAIN DESCRIPTION - LOCATION: LOCATION: CHEST

## 2020-10-31 ASSESSMENT — PAIN DESCRIPTION - FREQUENCY: FREQUENCY: CONTINUOUS

## 2020-10-31 ASSESSMENT — PAIN DESCRIPTION - ORIENTATION: ORIENTATION: MID;UPPER

## 2020-10-31 ASSESSMENT — PAIN DESCRIPTION - ONSET: ONSET: ON-GOING

## 2020-10-31 ASSESSMENT — PAIN SCALES - WONG BAKER: WONGBAKER_NUMERICALRESPONSE: 0

## 2020-10-31 NOTE — PROGRESS NOTES
Progress Note( Dr. Jose Emerson)  10/30/2020  Subjective:   Admit Date: 10/27/2020  PCP: Tawny Machuca MD    Admitted For : Chest pain CAD underwent CABG    Consulted For: Better control of blood glucose filed diabetes mellitus    Interval History: Post CABG patient was transferred to acute rehab unit on evening of 10/27/2020      C/O  chest pains, also with deep breathing mostly from chest wall  Has some  SOB . Denies nausea or vomiting. No new bowel or bladder symptoms. Complains of frequent diarrhea for last 2 to 3 days X watery and solid      Intake/Output Summary (Last 24 hours) at 10/30/2020 2223  Last data filed at 10/30/2020 2100  Gross per 24 hour   Intake 1045 ml   Output 925 ml   Net 120 ml       DATA    CBC:   Recent Labs     10/28/20  0624 10/28/20  1613   WBC 24.8*  --    HGB 13.8 15.0     --     CMP:  Recent Labs     10/28/20  0624      K 4.0   CL 95*   CO2 33*   BUN 47*   CREATININE 1.1   CALCIUM 9.1     Lipids:   Lab Results   Component Value Date    CHOL 155 06/23/2020    HDL 30 06/23/2020    TRIG 173 06/23/2020     Glucose:  Recent Labs     10/30/20  1135 10/30/20  1700 10/30/20  2055   POCGLU 104* 133* 131*     [id number]:  Lab Results   Component Value Date    LABA1C 6.3 10/12/2020     High Sensitivity TSH:   Lab Results   Component Value Date    TSHHS 1.340 06/23/2020     Free T3: No results found for: FT3  Free T4:  Lab Results   Component Value Date    T4FREE 1.28 06/23/2020       Xr Chest Portable    Result Date: 10/22/2020  EXAMINATION: ONE XRAY VIEW OF THE CHEST 10/22/2020 5:10 am COMPARISON: 10/21/2020 HISTORY: ORDERING SYSTEM PROVIDED HISTORY: Post op open heart surgery TECHNOLOGIST PROVIDED HISTORY: Reason for Exam:->Post op open heart surgery Reason for Exam: post op open heart surgery Acuity: Unknown Type of Exam: Ongoing FINDINGS: Central venous catheter tip overlies the SVC. Sternotomy wires. Small bilateral pleural effusion. Right lower lobe opacity. Stable cardiomegaly. Mild interstitial edema. No pneumothorax. 1. Unchanged mild pulmonary edema and small bilateral pleural effusions. 2. Stable right lower lobe atelectasis. Xr Chest Portable    Result Date: 10/21/2020  EXAMINATION: ONE XRAY VIEW OF THE CHEST 10/21/2020 1:16 pm COMPARISON: 10/21/2020 HISTORY: ORDERING SYSTEM PROVIDED HISTORY: chest tube removal   ersistent small right pleural effusion with bibasilar atelectasis. No discrete pneumothorax. The osseous structures otherwise stable. Interval removal of thoracostomy tubes. No discrete pneumothorax. Xr Chest Portable    Result Date: 10/21/2020  EXAMINATION: ONE XRAY VIEW OF THE CHEST 10/21/2020 6:06 am COMPARISON: 10/20/2020 HISTORY: ORDERING SYSTEM PROVIDED HISTORY: Post op open heart surgery  . Mildly increased interstitial changes which could be related to increased pulmonary edema or pneumonitis. Cardiomegaly with central vascular congestion. Otherwise similar appearing chest.     Xr Chest Portable    Result Date: 10/21/2020  EXAMINATION: ONE XRAY VIEW OF THE CHEST 10/20/2020 5:18 am COMPARISON: Chest radiograph dated October 19, 2020. HISTORY: ORDERING SYSTEM PROVIDED HISTORY: Post op open heart surgery TECHNOLOGIST PROVIDED HISTORY: Reason for Exam:->Post op open heart surgery Reason for Exam: Post op open heart surgery Acuity: Unknown Type of Exam: Unknown FINDINGS: Median sternotomy wires are noted. A left-sided central venous catheter is in place. A Riesel-Olga catheter is in place. Bilateral chest tubes are present. Low lung volumes with bilateral pleural effusions and bibasilar opacities are seen. Bilateral pleural effusions with bibasilar opacities without significant change.            Scheduled Medicines   Medications:    furosemide  40 mg Oral BID    aspirin  81 mg Oral Daily    atorvastatin  20 mg Oral Nightly    therapeutic multivitamin-minerals  1 tablet Oral Daily with breakfast    albuterol sulfate HFA  2 puff Inhalation Q4H WA    amiodarone  200 mg Oral Daily    budesonide-formoterol  2 puff Inhalation BID    DULoxetine  60 mg Oral Daily    finasteride  5 mg Oral Daily    guaiFENesin  600 mg Oral BID    levothyroxine  100 mcg Oral Daily    predniSONE  20 mg Oral Daily    tamsulosin  0.4 mg Oral Daily    tiotropium  2 puff Inhalation Daily    insulin lispro  0-6 Units Subcutaneous TID WC    insulin lispro  0-3 Units Subcutaneous Nightly    carvedilol  3.125 mg Oral BID      Infusions:         Objective:   Vitals: BP (!) 141/66   Pulse 65   Temp 97.5 °F (36.4 °C) (Oral)   Resp 18   Ht 5' 9\" (1.753 m)   Wt 183 lb 6.4 oz (83.2 kg)   SpO2 97%   BMI 27.08 kg/m²   General appearance: alert and cooperative with exam  Neck: no JVD or bruit  Thyroid : Normal lobes   Lungs: Has Vesicular Breath sounds somewhat diminished breath sounds right side   heart:  regular rate and rhythm  Abdomen: soft, non-tender; bowel sounds normal; no masses,  no organomegaly  Musculoskeletal: Normal  Extremities: extremities normal, , no edema  Neurologic:  Awake, alert, oriented to name, place and time. Cranial nerves II-XII are grossly intact. Motor is  intact. Sensory is intact. ,  and gait is normal.    Assessment:     Patient Active Problem List:     Juvenile rheumatoid arthritis (HCC)     Chronic bilateral low back pain without sciatica     Peripheral arterial disease (HCC)     Panlobular emphysema (HCC)     Essential hypertension     Acquired hypothyroidism     Gastroesophageal reflux disease     Non-seasonal allergic rhinitis     Benign prostatic hyperplasia without lower urinary tract symptoms     Tobacco use     Acute pulmonary edema (HCC)     Seasonal allergic rhinitis due to pollen     Mixed irritable bowel syndrome     Mixed hyperlipidemia     Prediabetes     Benign prostatic hyperplasia     Chronic combined systolic and diastolic congestive heart failure ([address]     JOSE (acute kidney injury) ([address]     LV

## 2020-10-31 NOTE — FLOWSHEET NOTE
[x] daily progress note       [] discharge       Patient Name:  Arlet Mccall   :  1955 MRN: [id number]  Room:  [address] [place] [address] Date of Admission: 10/27/2020  Rehabilitation Diagnosis:   Atherosclerotic heart disease of native coronary artery without angina pectoris [I25.10]  CHF following cardiac surgery, postop [I97.130]       Date 10/31/2020       Day of ARU Week:  5   Time IN/OUT 4856-7396  [id number]   Individual Tx Minutes 30 + 60   TOTAL Tx Time Mins 90   Variance Time -30   Variance Time []   Refusal due to:     []   Medical hold/reason:    []   Illness   []   Off Unit for test/procedure  []   Extra time needed to complete task  []   Therapeutic need  [x]   Other (specify): increased pain and no longer willing to participate   Restrictions Restrictions/Precautions  Restrictions/Precautions: Fall Risk, General Precautions  Position Activity Restriction  Sternal Precautions: No Pushing, No Pulling, 5# Lifting Restrictions  Other position/activity restrictions: 3L O2   Communication with other providers: [x]   OK to see per nursing:     []   Spoke with team member regarding:      Subjective observations and cognitive status: AM: Pt sitting in recliner, and hesitant to work with therapy as pt stated \"I just got a shower, the first one I have had since I got here. It really wore me out. I am really tired and my stomach has been upset\"; asked if he would be willing to do therapy in room and pt was agreeable  PM: Pt semifowler in bed and agreeable to therapy but not willing to leave room d/t continued concerns of loose BM and reports that he had one right after lunch and had to be cleaned up and that's why he was in bed.     Pain level/location:   9 /10       Location: AM and PM chest and lungs   Discharge recommendations  Anticipated discharge date: TBD  Destination: Home with prn A of ex-apouse   Continued therapy: Octavia Diaz PT  Equipment needs: FLORI Mccall requires the assistance of a wheeled walker to successfully ambulate from room to room at home to allow completion of daily living tasks such as: bathing, toileting, dressing and grooming. A wheeled walker is necessary due to the patient's unsteady gait, upper body weakness, inability to  a standard walker. This patient can ambulate only by pushing a walker instead of using a lesser assistive device such as a cane or crutch. Bed Mobility:           [x]   Pt received out of bed (AM)   Rolling R/L:  Mod I, HOB elevated  Scooting: Mod I  Lying --> Sit:  Supervision, HOB elevated  Sit --> lying:  Supervision, HOB elevated    Transfers:    Sit--> Stand: AM: from recliner:  Min A for first one, CGA for the next two; heart pillow used to maintain precautions  PM: from EOB: SBA  Stand --> Sit:  AM: to recliner: CGA with good use of heart pillow to maintain precautions  PM: to EOB: SBA  Assistive device required for transfer:   Heart pillow and 2WW    Gait:    Distance:  30' x 3 / with turns to the R and L/ in room, from bed to door and back towards bed   Assistance:  CGA with one moment of min A d/t slight misstep when turning towards the L   Device:  2WW  Gait Quality:  Reciprocal pattern, slow nader, vcs for relaxing shoulders        Additional Therapeutic activities/exercises completed this date:     []   Nu-step:  Time:        Level:         #Steps:       []   Rebounder:    []  Seated     []  Standing        [x]   Balance training: PM: static standing with 2WW x 2' with good tolerance, SBA         []   Postural training    []   Supine ther ex (reps/sets):     [x]   Seated ther ex (reps/sets):  AM and PM: With 1# ankle weights: hip flexion, hip abduction, LAQ, heel/toe raises; with red theraband: hamstring curls 10 reps x 2 sets on all. For strengthening.  Long rest break needed between sets  PM: CABG intermediate ex's per handout x 10 reps each, sitting EOB, supervision, with pt not able to full sidebend for overhead ex; good tolerance for all other ex.    []   Standing ther ex (reps/sets):     []   Picking up object from floor (standing):                   []   Reacher used   [x]   Other: AM: sit <> stand while maintaining precautions x 3, to improve independence in transfers, pt took his time and rested between each stand to prevent dizziness and then rested after each sit to catch breath before attempting transfer again. Pt very slow moving and needing encouragement to continue. After 3 pt too fatigued to continue and no longer willing to participate. Requesting pain meds and nurse notified   [x]   Other: B LEs pedals x 5 mins, sitting EOB, with resistance for strengthening  Comments:      Patient/Caregiver Education and Training:   [x]   Bed Mobility/Transfer technique/safety  [x]   Gait technique/sequencing  [x]   Proper use of assistive device  []   Advanced mobility safety and technique  []   Reinforced patient's precautions/mobility while maintaining precautions  [x]   Postural awareness  []   Family training  []   Progress was updated and reviewed in Rehabtracker with patient and/or family this date. Treatment Plan for Next Session: gait training, strengthening, balance     Assessment:  Pt continues to be fearful of leaving room d/t loose BMs and continues to feel weak and c/o chest pain, limiting his willingness to participate    Treatment/Activity Tolerance:   [] Tolerated treatment with no adverse effects    [] Patient limited by fatigue  [x] Patient limited by pain   [] Patient limited by medical complications:         Adverse reaction to Tx:   [] Significant change in status    Safety:       [x]  bed alarm set    [x]  chair alarm set    []  Pt refused alarms                []  Telesitter activated      [x]  Gait belt used during tx session      []other:         Number of Minutes/Billable Intervention  Gait Training 15   Therapeutic Exercise 45   Neuro Re-Ed    Therapeutic Activity 30   Wheelchair Propulsion    Group    Other:    TOTAL 90 Social History  Social/Functional History  ADL Assistance: Independent  Homemaking Assistance: Independent  Homemaking Responsibilities: Yes  Meal Prep Responsibility: Primary(Pt reports eating out most of the time.)  Laundry Responsibility: Primary  Cleaning Responsibility: Primary  Bill Paying/Finance Responsibility: Primary  Shopping Responsibility: Primary  Dependent Care Responsibility: No  Health Care Management: Primary  Ambulation Assistance: Independent  Transfer Assistance: Independent  Active : Yes  Mode of Transportation: Truck, Car  Occupation: Full time employment  Type of occupation: Truck drive  Leisure & Hobbies: none  Additional Comments: 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 Pt reports he will be staying with ex-wife at discharge and will have initial 24/7 supervision. Above information is for ex-wife's home setup.; no falls in the past year    Objective                                                                                    Goals:  (Update in navigator)  Short term goals  Time Frame for Short term goals: 10 tx days:  Short term goal 1: Pt will complete rolling L/R and sup<->sit Ind following sternal precautions. Short term goal 2: Pt will complete OOB transfers following sternal precautions Mod Ind. Short term goal 3: Pt will ambulate 150 ft using RW Mod Ind. Short term goal 4: Pt will ascend/descend 4-5 steps using bilat rails Mod Ind. Short term goal 5: Pt will ascend/descend curb step and ambulate over uneven surfaces using RW Mod Ind. Short term goal 6: Pt will complete object retrieval from the floor in standing Mod Ind-Ind.:   :        Plan of Care                                                                              Times per week: 5 days per week for a minimum of 60 minutes/day plus group as appropriate for 60 minutes.   Treatment to include Current Treatment Recommendations: Transfer Training, Endurance Training, Strengthening, Patient/Caregiver Education & Training, Pain Management, Equipment Evaluation, Education, & procurement, Balance Training, Gait Training, Home Exercise Program, Functional Mobility Training, Stair training, Safety Education & Training    Electronically signed by   Logan Lawrence, PTA [id number]  10/31/2020, 8:30 AM

## 2020-10-31 NOTE — PROGRESS NOTES
Occupational Therapy  Physical Rehabilitation: OCCUPATIONAL THERAPY     [x] daily progress note       [] discharge       Patient Name:  Fidelia Cote   :  1955 MRN: [id number]  Room:  [address] [place] [address] Date of Admission: 10/27/2020  Rehabilitation Diagnosis:   Atherosclerotic heart disease of native coronary artery without angina pectoris [I25.10]  CHF following cardiac surgery, postop [I97.130]       Date 10/31/2020       Day of ARU Week:  5   Time IN/-1030   Individual Tx Minutes 75   Group Tx Minutes    Co-Treat Minutes    Concurrent Tx Minutes    TOTAL Tx Time Mins 75   Variance Time    Variance Time []   Refusal due to:     []   Medical hold/reason:    []   Illness   []   Off Unit for test/procedure  []   Extra time needed to complete task  []   Therapeutic need  []   Other (specify):   Restrictions Restrictions/Precautions  Restrictions/Precautions: Fall Risk, General Precautions  Position Activity Restriction  Sternal Precautions: No Pushing, No Pulling, 5# Lifting Restrictions  Other position/activity restrictions: 3L O2   Communication with other providers: [x]   OK to see per nursing:     []   Spoke with team member regarding:      Subjective observations and cognitive status: Pt in semi hui's position in bed upon arrival. Pt agreeable to therapy. Pt requested taking a shower this date. Pain level/location:  9  /10       Location: Incision site   Discharge recommendations  Anticipated discharge date:  ? Destination: []home alone   []home alone w assist prn [] home w/ family    [] Continuous supervision       []SNF            [] Assisted living     [] Other:   Continued therapy: []HHC OT  []OUTPATIENT  OT   [] No Further OT  Equipment needs: TBD   (HIT F2 to transition between stars)    Grooming: Mod I in w/c at sink due to notable increase of fatigue  Oral Hygiene:   Mod I in w/c at sink due to notable increase of fatigue       Bathing:   CGA while standing with cassandra care, Min A only for B feet       Dressing:      Upper Body Dressing:  SBA with hospital gown  Lower Body Dressing:  SBA with undergarments  Footwear:  Max A due to fatigue, pt asked this therapist to perform    Toileting:   Sup       Toilet Transfers:   SBA secondary to O2 cord mgmt  Device Used:    [x]   Standard Toilet         [x]   [facility]           []  Bedside Commode       []   Elevated Toilet          []   Other:        Tub/Shower Transfer:   CGA  Device Used:    [x]   Shower Bench      []   Shower Chair      []   Tub Transfer Bench           []   Bathtub    []   Shower         []   Other:         Bed Mobility:           []   Pt received out of bed   Rolling R/L: Mod I with bed features  Scooting:    Supine --> Sit:  CGA  Sit --> Supine:  Min A for BLE    Transfers:    Sit--> Stand:  CGA  Stand --> Sit:   CGA  Stand-Pivot:   CGA  Other:    Assistive device required for transfer:   RW      Functional Mobility:  Room<>bathroom   Assistance:  SBA for O2 cord mgmt  Device:   []   Rolling Walker     []   Standard Walker []   Wheelchair        []   [employer]       []   4-Wheeled Radha Budd         []   Cardiac Walker       []   Other:        Homemaking Tasks:   NA      Additional Therapeutic activities/exercises completed this date:     [x]   ADL Training   [x]   Balance/Postural training     [x]   Bed/Transfer Training   []   Endurance Training   []   Neuromuscular Re-ed   []   Nu-step:  Time:        Level:         #Steps:       []   Rebounder:    []  Seated     []  Standing        []   Supine Ther Ex (reps/sets):     [x]   Seated Ther Ex (reps/sets): BUE strengthening utilizing  3# dowel shahzad targeting all planes of motion x15 reps in order to improve ADL performance task     []   Standing Ther Ex (reps/sets):     []   Other:      Comments:      Patient/Caregiver Education and Training:   [x]   [employer] Equipment Use  [x]   Bed Mobility/Transfer Technique/Safety  [x]   Energy Conservation Tips  []   Family training  [x]   Postural Awareness  [x]   Safety During Functional Activities  []   Reinforced Patient's Precautions   []   Progress was updated and reviewed in Rehabtracker with patient and/or family this         date. Treatment Plan for Next Session: Continue OT POC      Assessment:   Notable increase of fatigue and require frequent seated RB      Treatment/Activity Tolerance:   [] Tolerated treatment with no adverse effects    [x] Patient limited by fatigue  [] Patient limited by pain   [] Patient limited by medical complications:    [] Adverse reaction to Tx:   [] Significant change in status    Safety:       []  bed alarm set    [x]  chair alarm set    []  Pt refused alarms                []  Telesitter activated      []  Gait belt used during tx session      []other:       Number of Minutes/Billable Intervention  Therapeutic Exercise 15   ADL Self-care 60   Neuro Re-Ed    Therapeutic Activity    Group    Other:    TOTAL 75       Social History  Social/Functional History  ADL Assistance: Independent  Homemaking Assistance: Independent  Homemaking Responsibilities: Yes  Meal Prep Responsibility: Primary(Pt reports eating out most of the time.)  Laundry Responsibility: Primary  Cleaning Responsibility: Primary  Bill Paying/Finance Responsibility: Primary  Shopping Responsibility: Primary  Dependent Care Responsibility: No  Health Care Management: Primary  Ambulation Assistance: Independent  Transfer Assistance: Independent  Active : Yes  Mode of Transportation: Truck, Car  Occupation: Full time employment  Type of occupation: Truck drive  Leisure & Hobbies: none  Additional Comments: 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 Pt reports he will be staying with ex-wife at discharge and will have initial 24/7 supervision.  Above information is for ex-wife's home setup.; no falls in the past year    Objective                                                                                    Goals:  (Update in navigator)  Short term goals  Time Frame for Short term goals: STG=LTG:  Long term goals  Time Frame for Long term goals : 10-12 days or until d/c  Long term goal 1: Pt will complete feeding/grooming/oral care task c MOD I by d/c  Long term goal 2: Pt will complete total body bathing c Mod I by d/c  Long term goal 3: Pt will complete UB dressing c MOD I and retrieve items from closet by d/c  Long term goal 4: Pt will complete LB dressing c MOD I and retrieve items from closet by d/c  Long term goal 5: Pt will don/doff footwear c Min A by d/c  Long term goals 6: Pt will complete toileting c MOD I by d/c  Long term goal 7: Pt will complete functional transfer (toilet, tub, shower) c MOD I by d/c. Long term goal 8: Pt will perform therex/therax to facilitate increased strength/endurance/ax tolerance (c emphasis on maintaining sternal precautions and dynamic standing balance/tolerance >8 mins) c MOD I in order to complete ADLs. :        Plan of Care                                                                              Times per week: 5 days per week for a minimum of 60 minutes/day plus group as appropriate for 60 minutes.   Treatment to include Plan  Times per day: Daily  Current Treatment Recommendations: Strengthening, Patient/Caregiver Education & Training, Home Management Training, Functional Mobility Training, Endurance Training, Equipment Evaluation, Education, & procurement, Pain Management, Safety Education & Training, Self-Care / ADL, Balance Training    Electronically signed by   [employer], MEYER/L  10/31/2020, 8:02 AM

## 2020-11-01 LAB
GLUCOSE BLD-MCNC: 100 MG/DL (ref 70–99)
GLUCOSE BLD-MCNC: 122 MG/DL (ref 70–99)
GLUCOSE BLD-MCNC: 147 MG/DL (ref 70–99)
GLUCOSE BLD-MCNC: 151 MG/DL (ref 70–99)

## 2020-11-01 PROCEDURE — 1280000000 HC REHAB R&B

## 2020-11-01 PROCEDURE — 6370000000 HC RX 637 (ALT 250 FOR IP): Performed by: PHYSICAL MEDICINE & REHABILITATION

## 2020-11-01 PROCEDURE — 82962 GLUCOSE BLOOD TEST: CPT

## 2020-11-01 PROCEDURE — 94150 VITAL CAPACITY TEST: CPT

## 2020-11-01 PROCEDURE — 6370000000 HC RX 637 (ALT 250 FOR IP): Performed by: SURGERY

## 2020-11-01 PROCEDURE — 94761 N-INVAS EAR/PLS OXIMETRY MLT: CPT

## 2020-11-01 PROCEDURE — 94640 AIRWAY INHALATION TREATMENT: CPT

## 2020-11-01 PROCEDURE — 2700000000 HC OXYGEN THERAPY PER DAY

## 2020-11-01 RX ORDER — CHOLESTYRAMINE LIGHT 4 G/5.7G
4 POWDER, FOR SUSPENSION ORAL 2 TIMES DAILY
Status: DISCONTINUED | OUTPATIENT
Start: 2020-11-01 | End: 2020-11-04

## 2020-11-01 RX ORDER — METRONIDAZOLE 250 MG/1
500 TABLET ORAL EVERY 8 HOURS SCHEDULED
Status: DISCONTINUED | OUTPATIENT
Start: 2020-11-01 | End: 2020-11-02

## 2020-11-01 RX ADMIN — HYDROCODONE BITARTRATE AND ACETAMINOPHEN 2 TABLET: 5; 325 TABLET ORAL at 05:14

## 2020-11-01 RX ADMIN — AMIODARONE HYDROCHLORIDE 200 MG: 200 TABLET ORAL at 08:03

## 2020-11-01 RX ADMIN — ATORVASTATIN CALCIUM 20 MG: 20 TABLET, FILM COATED ORAL at 21:28

## 2020-11-01 RX ADMIN — TAMSULOSIN HYDROCHLORIDE 0.4 MG: 0.4 CAPSULE ORAL at 08:03

## 2020-11-01 RX ADMIN — CHOLESTYRAMINE 4 G: 4 POWDER, FOR SUSPENSION ORAL at 21:27

## 2020-11-01 RX ADMIN — DIPHENOXYLATE HYDROCHLORIDE AND ATROPINE SULFATE 1 TABLET: 2.5; .025 TABLET ORAL at 03:51

## 2020-11-01 RX ADMIN — ALBUTEROL SULFATE 2 PUFF: 90 AEROSOL, METERED RESPIRATORY (INHALATION) at 14:54

## 2020-11-01 RX ADMIN — HYDROCODONE BITARTRATE AND ACETAMINOPHEN 2 TABLET: 5; 325 TABLET ORAL at 18:55

## 2020-11-01 RX ADMIN — GUAIFENESIN 600 MG: 600 TABLET, EXTENDED RELEASE ORAL at 21:28

## 2020-11-01 RX ADMIN — TIOTROPIUM BROMIDE INHALATION SPRAY 2 PUFF: 3.12 SPRAY, METERED RESPIRATORY (INHALATION) at 07:20

## 2020-11-01 RX ADMIN — FINASTERIDE 5 MG: 5 TABLET, FILM COATED ORAL at 08:03

## 2020-11-01 RX ADMIN — ASPIRIN 81 MG: 81 TABLET, FILM COATED ORAL at 08:03

## 2020-11-01 RX ADMIN — ALBUTEROL SULFATE 2 PUFF: 90 AEROSOL, METERED RESPIRATORY (INHALATION) at 10:53

## 2020-11-01 RX ADMIN — CARVEDILOL 3.12 MG: 6.25 TABLET, FILM COATED ORAL at 08:02

## 2020-11-01 RX ADMIN — HYDROCODONE BITARTRATE AND ACETAMINOPHEN 2 TABLET: 5; 325 TABLET ORAL at 01:06

## 2020-11-01 RX ADMIN — MULTIPLE VITAMINS W/ MINERALS TAB 1 TABLET: TAB at 08:03

## 2020-11-01 RX ADMIN — FUROSEMIDE 40 MG: 40 TABLET ORAL at 17:15

## 2020-11-01 RX ADMIN — FUROSEMIDE 40 MG: 40 TABLET ORAL at 08:03

## 2020-11-01 RX ADMIN — PREDNISONE 20 MG: 20 TABLET ORAL at 08:02

## 2020-11-01 RX ADMIN — HYDROCODONE BITARTRATE AND ACETAMINOPHEN 2 TABLET: 5; 325 TABLET ORAL at 14:51

## 2020-11-01 RX ADMIN — HYDROCODONE BITARTRATE AND ACETAMINOPHEN 2 TABLET: 5; 325 TABLET ORAL at 09:38

## 2020-11-01 RX ADMIN — CARVEDILOL 3.12 MG: 6.25 TABLET, FILM COATED ORAL at 21:28

## 2020-11-01 RX ADMIN — BUDESONIDE AND FORMOTEROL FUMARATE DIHYDRATE 2 PUFF: 160; 4.5 AEROSOL RESPIRATORY (INHALATION) at 07:20

## 2020-11-01 RX ADMIN — METRONIDAZOLE 500 MG: 250 TABLET ORAL at 14:50

## 2020-11-01 RX ADMIN — ALBUTEROL SULFATE 2 PUFF: 90 AEROSOL, METERED RESPIRATORY (INHALATION) at 07:20

## 2020-11-01 RX ADMIN — GUAIFENESIN 600 MG: 600 TABLET, EXTENDED RELEASE ORAL at 08:03

## 2020-11-01 RX ADMIN — CHOLESTYRAMINE 4 G: 4 POWDER, FOR SUSPENSION ORAL at 12:19

## 2020-11-01 RX ADMIN — LEVOTHYROXINE SODIUM 100 MCG: 100 TABLET ORAL at 05:14

## 2020-11-01 RX ADMIN — DULOXETINE HYDROCHLORIDE 60 MG: 30 CAPSULE, DELAYED RELEASE ORAL at 08:02

## 2020-11-01 RX ADMIN — METRONIDAZOLE 500 MG: 250 TABLET ORAL at 21:28

## 2020-11-01 ASSESSMENT — PAIN SCALES - GENERAL
PAINLEVEL_OUTOF10: 9
PAINLEVEL_OUTOF10: 7

## 2020-11-01 ASSESSMENT — PAIN DESCRIPTION - PAIN TYPE
TYPE: ACUTE PAIN
TYPE: ACUTE PAIN

## 2020-11-01 ASSESSMENT — PAIN DESCRIPTION - PROGRESSION
CLINICAL_PROGRESSION: NOT CHANGED
CLINICAL_PROGRESSION: NOT CHANGED

## 2020-11-01 ASSESSMENT — PAIN DESCRIPTION - FREQUENCY
FREQUENCY: CONTINUOUS
FREQUENCY: CONTINUOUS

## 2020-11-01 ASSESSMENT — PAIN DESCRIPTION - ORIENTATION
ORIENTATION: MID
ORIENTATION: MID;UPPER

## 2020-11-01 ASSESSMENT — PAIN - FUNCTIONAL ASSESSMENT
PAIN_FUNCTIONAL_ASSESSMENT: ACTIVITIES ARE NOT PREVENTED
PAIN_FUNCTIONAL_ASSESSMENT: ACTIVITIES ARE NOT PREVENTED

## 2020-11-01 ASSESSMENT — PAIN SCALES - WONG BAKER: WONGBAKER_NUMERICALRESPONSE: 0

## 2020-11-01 ASSESSMENT — PAIN DESCRIPTION - DESCRIPTORS
DESCRIPTORS: CONSTANT
DESCRIPTORS: CONSTANT

## 2020-11-01 ASSESSMENT — PAIN DESCRIPTION - LOCATION
LOCATION: CHEST
LOCATION: CHEST

## 2020-11-01 ASSESSMENT — PAIN DESCRIPTION - ONSET
ONSET: ON-GOING
ONSET: ON-GOING

## 2020-11-01 NOTE — PROGRESS NOTES
Progress Note( Dr. Edgardo Aguayo)    Subjective:   Admit Date: 10/27/2020  PCP: Ang Ortiz MD    I saw the patient on 10/31/2020 made note later  Admitted For : Chest pain CAD underwent CABG    Consulted For: Better control of blood glucose filed diabetes mellitus    Interval History: Post CABG patient was transferred to acute rehab unit on evening of 10/27/2020      C/O  chest pains, also with deep breathing mostly from chest wall  Has some  SOB . Denies nausea or vomiting. No new bowel or bladder symptoms. Complains of frequent diarrhea for last 2 to 3 days X watery and solid      Intake/Output Summary (Last 24 hours) at 11/1/2020 1129  Last data filed at 11/1/2020 1109  Gross per 24 hour   Intake 720 ml   Output 2100 ml   Net -1380 ml       DATA    CBC:   No results for input(s): WBC, HGB, PLT in the last 72 hours. CMP:  No results for input(s): NA, K, CL, CO2, BUN, CREATININE, CALCIUM, PROT, LABALBU, BILITOT, ALKPHOS, AST, ALT in the last 72 hours. Invalid input(s): GLU  Lipids:   Lab Results   Component Value Date    CHOL 155 06/23/2020    HDL 30 06/23/2020    TRIG 173 06/23/2020     Glucose:  Recent Labs     10/31/20  1634 10/31/20  2020 11/01/20  0730   POCGLU 125* 167* 100*     [id number]:  Lab Results   Component Value Date    LABA1C 6.3 10/12/2020     High Sensitivity TSH:   Lab Results   Component Value Date    TSHHS 1.340 06/23/2020     Free T3: No results found for: FT3  Free T4:  Lab Results   Component Value Date    T4FREE 1.28 06/23/2020       Xr Chest Portable    Result Date: 10/22/2020  EXAMINATION: ONE XRAY VIEW OF THE CHEST 10/22/2020 5:10 am COMPARISON: 10/21/2020 HISTORY: ORDERING SYSTEM PROVIDED HISTORY: Post op open heart surgery TECHNOLOGIST PROVIDED HISTORY: Reason for Exam:->Post op open heart surgery Reason for Exam: post op open heart surgery Acuity: Unknown Type of Exam: Ongoing FINDINGS: Central venous catheter tip overlies the SVC. Sternotomy wires.   Small bilateral pleural effusion. Right lower lobe opacity. Stable cardiomegaly. Mild interstitial edema. No pneumothorax. 1. Unchanged mild pulmonary edema and small bilateral pleural effusions. 2. Stable right lower lobe atelectasis. Xr Chest Portable    Result Date: 10/21/2020  EXAMINATION: ONE XRAY VIEW OF THE CHEST 10/21/2020 1:16 pm COMPARISON: 10/21/2020 HISTORY: ORDERING SYSTEM PROVIDED HISTORY: chest tube removal   ersistent small right pleural effusion with bibasilar atelectasis. No discrete pneumothorax. The osseous structures otherwise stable. Interval removal of thoracostomy tubes. No discrete pneumothorax. Xr Chest Portable    Result Date: 10/21/2020  EXAMINATION: ONE XRAY VIEW OF THE CHEST 10/21/2020 6:06 am COMPARISON: 10/20/2020 HISTORY: ORDERING SYSTEM PROVIDED HISTORY: Post op open heart surgery  . Mildly increased interstitial changes which could be related to increased pulmonary edema or pneumonitis. Cardiomegaly with central vascular congestion. Otherwise similar appearing chest.     Xr Chest Portable    Result Date: 10/21/2020  EXAMINATION: ONE XRAY VIEW OF THE CHEST 10/20/2020 5:18 am COMPARISON: Chest radiograph dated October 19, 2020. HISTORY: ORDERING SYSTEM PROVIDED HISTORY: Post op open heart surgery TECHNOLOGIST PROVIDED HISTORY: Reason for Exam:->Post op open heart surgery Reason for Exam: Post op open heart surgery Acuity: Unknown Type of Exam: Unknown FINDINGS: Median sternotomy wires are noted. A left-sided central venous catheter is in place. A Tunas-Olga catheter is in place. Bilateral chest tubes are present. Low lung volumes with bilateral pleural effusions and bibasilar opacities are seen. Bilateral pleural effusions with bibasilar opacities without significant change.            Scheduled Medicines   Medications:    cholestyramine light  4 g Oral BID    furosemide  40 mg Oral BID    aspirin  81 mg Oral Daily    atorvastatin  20 mg Oral Nightly    therapeutic multivitamin-minerals  1 tablet Oral Daily with breakfast    albuterol sulfate HFA  2 puff Inhalation Q4H WA    amiodarone  200 mg Oral Daily    budesonide-formoterol  2 puff Inhalation BID    DULoxetine  60 mg Oral Daily    finasteride  5 mg Oral Daily    guaiFENesin  600 mg Oral BID    levothyroxine  100 mcg Oral Daily    predniSONE  20 mg Oral Daily    tamsulosin  0.4 mg Oral Daily    tiotropium  2 puff Inhalation Daily    insulin lispro  0-6 Units Subcutaneous TID WC    insulin lispro  0-3 Units Subcutaneous Nightly    carvedilol  3.125 mg Oral BID      Infusions:         Objective:   Vitals: BP (!) 101/52   Pulse 67   Temp 98 °F (36.7 °C) (Oral)   Resp 16   Ht 5' 9\" (1.753 m)   Wt 188 lb 6 oz (85.4 kg)   SpO2 91%   BMI 27.82 kg/m²   General appearance: alert and cooperative with exam  Neck: no JVD or bruit  Thyroid : Normal lobes   Lungs: Has Vesicular Breath sounds somewhat diminished breath sounds right side   heart:  regular rate and rhythm  Abdomen: soft, non-tender; bowel sounds normal; no masses,  no organomegaly  Musculoskeletal: Normal  Extremities: extremities normal, , no edema  Neurologic:  Awake, alert, oriented to name, place and time. Cranial nerves II-XII are grossly intact. Motor is  intact. Sensory is intact. ,  and gait is normal.    Assessment:     Patient Active Problem List:     Juvenile rheumatoid arthritis (HCC)     Chronic bilateral low back pain without sciatica     Peripheral arterial disease (HCC)     Panlobular emphysema (HCC)     Essential hypertension     Acquired hypothyroidism     Gastroesophageal reflux disease     Non-seasonal allergic rhinitis     Benign prostatic hyperplasia without lower urinary tract symptoms     Tobacco use     Acute pulmonary edema (HCC)     Seasonal allergic rhinitis due to pollen     Mixed irritable bowel syndrome     Mixed hyperlipidemia     Prediabetes     Benign prostatic hyperplasia     Chronic combined systolic and diastolic congestive heart failure (HCC)     JOSE (acute kidney injury) (HCC)     LV dysfunction     Dehydration     Vomiting     Coronary artery disease involving native coronary artery     CAD in native artery. //CABG      Plan:     1. Reviewed POC blood glucose . Labs and X ray results   2. Reviewed Current Medicines   3. on  Correction bolus Humalog Insulin regime  4. Monitor Blood glucose frequently   5. Modified  the dose of Insulin/ other medicines as needed   6. Transferred to acute rehab unit on 10/27/2020 Will follow  7. Consider sending stool for C. difficile  8.  Will follow  9.     .     Melissa Sands MD

## 2020-11-01 NOTE — PROGRESS NOTES
Progress Note( Dr. Cristobal Arnold)  11/1/2020  Subjective:   Admit Date: 10/27/2020  PCP: Andria Ritchie MD    Admitted For : Chest pain CAD underwent CABG    Consulted For: Better control of blood glucose filed diabetes mellitus    Interval History: Post CABG patient was transferred to acute rehab unit on evening of 10/27/2020      C/O  chest pains, also with deep breathing mostly from chest wall  Has some  SOB . Denies nausea or vomiting. No new bowel or bladder symptoms. Complains of frequent diarrhea for last 2 to 3 days X watery and solid      Intake/Output Summary (Last 24 hours) at 11/1/2020 1128  Last data filed at 11/1/2020 1109  Gross per 24 hour   Intake 720 ml   Output 2100 ml   Net -1380 ml       DATA    CBC:   No results for input(s): WBC, HGB, PLT in the last 72 hours. CMP:  No results for input(s): NA, K, CL, CO2, BUN, CREATININE, CALCIUM, PROT, LABALBU, BILITOT, ALKPHOS, AST, ALT in the last 72 hours. Invalid input(s): GLU  Lipids:   Lab Results   Component Value Date    CHOL 155 06/23/2020    HDL 30 06/23/2020    TRIG 173 06/23/2020     Glucose:  Recent Labs     10/31/20  1634 10/31/20  2020 11/01/20  0730   POCGLU 125* 167* 100*     [id number]:  Lab Results   Component Value Date    LABA1C 6.3 10/12/2020     High Sensitivity TSH:   Lab Results   Component Value Date    TSHHS 1.340 06/23/2020     Free T3: No results found for: FT3  Free T4:  Lab Results   Component Value Date    T4FREE 1.28 06/23/2020       Xr Chest Portable    Result Date: 10/22/2020  EXAMINATION: ONE XRAY VIEW OF THE CHEST 10/22/2020 5:10 am COMPARISON: 10/21/2020 HISTORY: ORDERING SYSTEM PROVIDED HISTORY: Post op open heart surgery TECHNOLOGIST PROVIDED HISTORY: Reason for Exam:->Post op open heart surgery Reason for Exam: post op open heart surgery Acuity: Unknown Type of Exam: Ongoing FINDINGS: Central venous catheter tip overlies the SVC. Sternotomy wires. Small bilateral pleural effusion.   Right lower lobe opacity. Stable cardiomegaly. Mild interstitial edema. No pneumothorax. 1. Unchanged mild pulmonary edema and small bilateral pleural effusions. 2. Stable right lower lobe atelectasis. Xr Chest Portable    Result Date: 10/21/2020  EXAMINATION: ONE XRAY VIEW OF THE CHEST 10/21/2020 1:16 pm COMPARISON: 10/21/2020 HISTORY: ORDERING SYSTEM PROVIDED HISTORY: chest tube removal   ersistent small right pleural effusion with bibasilar atelectasis. No discrete pneumothorax. The osseous structures otherwise stable. Interval removal of thoracostomy tubes. No discrete pneumothorax. Xr Chest Portable    Result Date: 10/21/2020  EXAMINATION: ONE XRAY VIEW OF THE CHEST 10/21/2020 6:06 am COMPARISON: 10/20/2020 HISTORY: ORDERING SYSTEM PROVIDED HISTORY: Post op open heart surgery  . Mildly increased interstitial changes which could be related to increased pulmonary edema or pneumonitis. Cardiomegaly with central vascular congestion. Otherwise similar appearing chest.     Xr Chest Portable    Result Date: 10/21/2020  EXAMINATION: ONE XRAY VIEW OF THE CHEST 10/20/2020 5:18 am COMPARISON: Chest radiograph dated October 19, 2020. HISTORY: ORDERING SYSTEM PROVIDED HISTORY: Post op open heart surgery TECHNOLOGIST PROVIDED HISTORY: Reason for Exam:->Post op open heart surgery Reason for Exam: Post op open heart surgery Acuity: Unknown Type of Exam: Unknown FINDINGS: Median sternotomy wires are noted. A left-sided central venous catheter is in place. A Congress-Olga catheter is in place. Bilateral chest tubes are present. Low lung volumes with bilateral pleural effusions and bibasilar opacities are seen. Bilateral pleural effusions with bibasilar opacities without significant change.            Scheduled Medicines   Medications:    cholestyramine light  4 g Oral BID    furosemide  40 mg Oral BID    aspirin  81 mg Oral Daily    atorvastatin  20 mg Oral Nightly    therapeutic multivitamin-minerals  1 tablet Oral Daily with breakfast    albuterol sulfate HFA  2 puff Inhalation Q4H WA    amiodarone  200 mg Oral Daily    budesonide-formoterol  2 puff Inhalation BID    DULoxetine  60 mg Oral Daily    finasteride  5 mg Oral Daily    guaiFENesin  600 mg Oral BID    levothyroxine  100 mcg Oral Daily    predniSONE  20 mg Oral Daily    tamsulosin  0.4 mg Oral Daily    tiotropium  2 puff Inhalation Daily    insulin lispro  0-6 Units Subcutaneous TID WC    insulin lispro  0-3 Units Subcutaneous Nightly    carvedilol  3.125 mg Oral BID      Infusions:         Objective:   Vitals: BP (!) 101/52   Pulse 67   Temp 98 °F (36.7 °C) (Oral)   Resp 16   Ht 5' 9\" (1.753 m)   Wt 188 lb 6 oz (85.4 kg)   SpO2 91%   BMI 27.82 kg/m²   General appearance: alert and cooperative with exam  Neck: no JVD or bruit  Thyroid : Normal lobes   Lungs: Has Vesicular Breath sounds somewhat diminished breath sounds right side   heart:  regular rate and rhythm  Abdomen: soft, non-tender; bowel sounds normal; no masses,  no organomegaly  Musculoskeletal: Normal  Extremities: extremities normal, , no edema  Neurologic:  Awake, alert, oriented to name, place and time. Cranial nerves II-XII are grossly intact. Motor is  intact. Sensory is intact. ,  and gait is normal.    Assessment:     Patient Active Problem List:     Juvenile rheumatoid arthritis (HCC)     Chronic bilateral low back pain without sciatica     Peripheral arterial disease (HCC)     Panlobular emphysema (HCC)     Essential hypertension     Acquired hypothyroidism     Gastroesophageal reflux disease     Non-seasonal allergic rhinitis     Benign prostatic hyperplasia without lower urinary tract symptoms     Tobacco use     Acute pulmonary edema (HCC)     Seasonal allergic rhinitis due to pollen     Mixed irritable bowel syndrome     Mixed hyperlipidemia     Prediabetes     Benign prostatic hyperplasia     Chronic combined systolic and diastolic congestive heart failure ([facility] [address] JOSE (acute kidney injury) ([facility] [address]     LV dysfunction     Dehydration     Vomiting     Coronary artery disease involving native coronary artery     CAD in native artery. //CABG      Plan:     1. Reviewed POC blood glucose . Labs and X ray results   2. Reviewed Current Medicines   3. on  Correction bolus Humalog Insulin regime  4. Monitor Blood glucose frequently   5. Modified  the dose of Insulin/ other medicines as needed   6. Transferred to acute rehab unit on 10/27/2020 Will follow  7. C. Difficile was ordered and results are pending  8. Will follow.      Henrietta De Jesus MD

## 2020-11-02 LAB
ANION GAP SERPL CALCULATED.3IONS-SCNC: 13 MMOL/L (ref 4–16)
BUN BLDV-MCNC: 35 MG/DL (ref 6–23)
CALCIUM SERPL-MCNC: 8.8 MG/DL (ref 8.3–10.6)
CHLORIDE BLD-SCNC: 92 MMOL/L (ref 99–110)
CLOSTRIDIUM DIFFICILE, PCR: ABNORMAL
CO2: 30 MMOL/L (ref 21–32)
CREAT SERPL-MCNC: 1 MG/DL (ref 0.9–1.3)
GFR AFRICAN AMERICAN: >60 ML/MIN/1.73M2
GFR NON-AFRICAN AMERICAN: >60 ML/MIN/1.73M2
GLUCOSE BLD-MCNC: 107 MG/DL (ref 70–99)
GLUCOSE BLD-MCNC: 118 MG/DL (ref 70–99)
GLUCOSE BLD-MCNC: 123 MG/DL (ref 70–99)
GLUCOSE BLD-MCNC: 171 MG/DL (ref 70–99)
GLUCOSE BLD-MCNC: 91 MG/DL (ref 70–99)
HCT VFR BLD CALC: 44.5 % (ref 42–52)
HEMOGLOBIN: 14.5 GM/DL (ref 13.5–18)
MCH RBC QN AUTO: 33.4 PG (ref 27–31)
MCHC RBC AUTO-ENTMCNC: 32.6 % (ref 32–36)
MCV RBC AUTO: 102.5 FL (ref 78–100)
PDW BLD-RTO: 13.3 % (ref 11.7–14.9)
PLATELET # BLD: 337 K/CU MM (ref 140–440)
PMV BLD AUTO: 8.5 FL (ref 7.5–11.1)
POTASSIUM SERPL-SCNC: 3.4 MMOL/L (ref 3.5–5.1)
RBC # BLD: 4.34 M/CU MM (ref 4.6–6.2)
SODIUM BLD-SCNC: 135 MMOL/L (ref 135–145)
WBC # BLD: 20.1 K/CU MM (ref 4–10.5)

## 2020-11-02 PROCEDURE — 99232 SBSQ HOSP IP/OBS MODERATE 35: CPT | Performed by: PHYSICAL MEDICINE & REHABILITATION

## 2020-11-02 PROCEDURE — 94640 AIRWAY INHALATION TREATMENT: CPT

## 2020-11-02 PROCEDURE — 94150 VITAL CAPACITY TEST: CPT

## 2020-11-02 PROCEDURE — 97530 THERAPEUTIC ACTIVITIES: CPT

## 2020-11-02 PROCEDURE — 97116 GAIT TRAINING THERAPY: CPT

## 2020-11-02 PROCEDURE — 97110 THERAPEUTIC EXERCISES: CPT

## 2020-11-02 PROCEDURE — 36415 COLL VENOUS BLD VENIPUNCTURE: CPT

## 2020-11-02 PROCEDURE — 82962 GLUCOSE BLOOD TEST: CPT

## 2020-11-02 PROCEDURE — 87507 IADNA-DNA/RNA PROBE TQ 12-25: CPT

## 2020-11-02 PROCEDURE — 97535 SELF CARE MNGMENT TRAINING: CPT

## 2020-11-02 PROCEDURE — 2700000000 HC OXYGEN THERAPY PER DAY

## 2020-11-02 PROCEDURE — 6370000000 HC RX 637 (ALT 250 FOR IP): Performed by: SURGERY

## 2020-11-02 PROCEDURE — 85027 COMPLETE CBC AUTOMATED: CPT

## 2020-11-02 PROCEDURE — 6370000000 HC RX 637 (ALT 250 FOR IP): Performed by: SPECIALIST

## 2020-11-02 PROCEDURE — 80048 BASIC METABOLIC PNL TOTAL CA: CPT

## 2020-11-02 PROCEDURE — 94761 N-INVAS EAR/PLS OXIMETRY MLT: CPT

## 2020-11-02 PROCEDURE — 1280000000 HC REHAB R&B

## 2020-11-02 PROCEDURE — 6370000000 HC RX 637 (ALT 250 FOR IP): Performed by: PHYSICAL MEDICINE & REHABILITATION

## 2020-11-02 RX ADMIN — PREDNISONE 20 MG: 20 TABLET ORAL at 08:25

## 2020-11-02 RX ADMIN — HYDROCODONE BITARTRATE AND ACETAMINOPHEN 2 TABLET: 5; 325 TABLET ORAL at 00:07

## 2020-11-02 RX ADMIN — HYDROCODONE BITARTRATE AND ACETAMINOPHEN 2 TABLET: 5; 325 TABLET ORAL at 18:16

## 2020-11-02 RX ADMIN — HYDROCODONE BITARTRATE AND ACETAMINOPHEN 2 TABLET: 5; 325 TABLET ORAL at 07:20

## 2020-11-02 RX ADMIN — Medication 250 MG: at 18:31

## 2020-11-02 RX ADMIN — GUAIFENESIN 600 MG: 600 TABLET, EXTENDED RELEASE ORAL at 08:25

## 2020-11-02 RX ADMIN — LEVOTHYROXINE SODIUM 100 MCG: 100 TABLET ORAL at 05:42

## 2020-11-02 RX ADMIN — CARVEDILOL 3.12 MG: 6.25 TABLET, FILM COATED ORAL at 20:34

## 2020-11-02 RX ADMIN — ALBUTEROL SULFATE 2 PUFF: 90 AEROSOL, METERED RESPIRATORY (INHALATION) at 20:42

## 2020-11-02 RX ADMIN — FINASTERIDE 5 MG: 5 TABLET, FILM COATED ORAL at 08:25

## 2020-11-02 RX ADMIN — ATORVASTATIN CALCIUM 20 MG: 20 TABLET, FILM COATED ORAL at 20:34

## 2020-11-02 RX ADMIN — DULOXETINE HYDROCHLORIDE 60 MG: 30 CAPSULE, DELAYED RELEASE ORAL at 08:24

## 2020-11-02 RX ADMIN — CHOLESTYRAMINE 4 G: 4 POWDER, FOR SUSPENSION ORAL at 20:42

## 2020-11-02 RX ADMIN — FUROSEMIDE 40 MG: 40 TABLET ORAL at 18:12

## 2020-11-02 RX ADMIN — ALBUTEROL SULFATE 2 PUFF: 90 AEROSOL, METERED RESPIRATORY (INHALATION) at 11:23

## 2020-11-02 RX ADMIN — FUROSEMIDE 40 MG: 40 TABLET ORAL at 08:25

## 2020-11-02 RX ADMIN — BUDESONIDE AND FORMOTEROL FUMARATE DIHYDRATE 2 PUFF: 160; 4.5 AEROSOL RESPIRATORY (INHALATION) at 07:39

## 2020-11-02 RX ADMIN — INSULIN LISPRO 1 UNITS: 100 INJECTION, SOLUTION INTRAVENOUS; SUBCUTANEOUS at 20:42

## 2020-11-02 RX ADMIN — TAMSULOSIN HYDROCHLORIDE 0.4 MG: 0.4 CAPSULE ORAL at 08:24

## 2020-11-02 RX ADMIN — HYDROCODONE BITARTRATE AND ACETAMINOPHEN 2 TABLET: 5; 325 TABLET ORAL at 13:41

## 2020-11-02 RX ADMIN — CARVEDILOL 3.12 MG: 6.25 TABLET, FILM COATED ORAL at 08:24

## 2020-11-02 RX ADMIN — BUDESONIDE AND FORMOTEROL FUMARATE DIHYDRATE 2 PUFF: 160; 4.5 AEROSOL RESPIRATORY (INHALATION) at 20:44

## 2020-11-02 RX ADMIN — ALBUTEROL SULFATE 2 PUFF: 90 AEROSOL, METERED RESPIRATORY (INHALATION) at 07:39

## 2020-11-02 RX ADMIN — TIOTROPIUM BROMIDE INHALATION SPRAY 2 PUFF: 3.12 SPRAY, METERED RESPIRATORY (INHALATION) at 07:39

## 2020-11-02 RX ADMIN — METRONIDAZOLE 500 MG: 250 TABLET ORAL at 05:42

## 2020-11-02 RX ADMIN — METRONIDAZOLE 500 MG: 250 TABLET ORAL at 13:36

## 2020-11-02 RX ADMIN — ALBUTEROL SULFATE 2 PUFF: 90 AEROSOL, METERED RESPIRATORY (INHALATION) at 15:09

## 2020-11-02 RX ADMIN — MULTIPLE VITAMINS W/ MINERALS TAB 1 TABLET: TAB at 08:25

## 2020-11-02 RX ADMIN — ASPIRIN 81 MG: 81 TABLET, FILM COATED ORAL at 08:25

## 2020-11-02 RX ADMIN — GUAIFENESIN 600 MG: 600 TABLET, EXTENDED RELEASE ORAL at 20:40

## 2020-11-02 RX ADMIN — CHOLESTYRAMINE 4 G: 4 POWDER, FOR SUSPENSION ORAL at 08:24

## 2020-11-02 RX ADMIN — AMIODARONE HYDROCHLORIDE 200 MG: 200 TABLET ORAL at 08:25

## 2020-11-02 ASSESSMENT — PAIN DESCRIPTION - ONSET
ONSET: ON-GOING
ONSET: ON-GOING

## 2020-11-02 ASSESSMENT — PAIN DESCRIPTION - DESCRIPTORS: DESCRIPTORS: CONSTANT

## 2020-11-02 ASSESSMENT — PAIN DESCRIPTION - LOCATION
LOCATION: CHEST
LOCATION: CHEST

## 2020-11-02 ASSESSMENT — PAIN DESCRIPTION - ORIENTATION
ORIENTATION: MID
ORIENTATION: MID

## 2020-11-02 ASSESSMENT — PAIN SCALES - GENERAL
PAINLEVEL_OUTOF10: 0
PAINLEVEL_OUTOF10: 9
PAINLEVEL_OUTOF10: 5

## 2020-11-02 ASSESSMENT — PAIN DESCRIPTION - FREQUENCY
FREQUENCY: CONTINUOUS
FREQUENCY: CONTINUOUS

## 2020-11-02 ASSESSMENT — PAIN DESCRIPTION - PAIN TYPE
TYPE: ACUTE PAIN
TYPE: ACUTE PAIN;SURGICAL PAIN

## 2020-11-02 ASSESSMENT — PAIN DESCRIPTION - PROGRESSION: CLINICAL_PROGRESSION: NOT CHANGED

## 2020-11-02 ASSESSMENT — PAIN - FUNCTIONAL ASSESSMENT: PAIN_FUNCTIONAL_ASSESSMENT: ACTIVITIES ARE NOT PREVENTED

## 2020-11-02 NOTE — PROGRESS NOTES
Occupational Therapy  Physical Rehabilitation: OCCUPATIONAL THERAPY     [x] daily progress note       [] discharge       Patient Name:  Edgard Healy   :  1955 MRN: [id number]  Room:  [address] [place] [address] Date of Admission: 10/27/2020  Rehabilitation Diagnosis:   Atherosclerotic heart disease of native coronary artery without angina pectoris [I25.10]  CHF following cardiac surgery, postop [I97.130]       Date 2020       Day of ARU Week:  7   Time IN/OUT [id number]   Individual Tx Minutes 60+60   Group Tx Minutes    Co-Treat Minutes    Concurrent Tx Minutes    TOTAL Tx Time Mins 120   Variance Time    Variance Time []   Refusal due to:     []   Medical hold/reason:    []   Illness   []   Off Unit for test/procedure  []   Extra time needed to complete task  []   Therapeutic need  []   Other (specify):   Restrictions Restrictions/Precautions  Restrictions/Precautions: Fall Risk, General Precautions  Position Activity Restriction  Sternal Precautions: No Pushing, No Pulling, 5# Lifting Restrictions  Other position/activity restrictions: 3L O2   Communication with other providers: [x]   OK to see per nursing:     []   Spoke with team member regarding:      Subjective observations and cognitive status: Pt in semi hui's position in bed upon arrival. Pt agreeable to therapy. Pain level/location:   9 /10       Location: Incision site   Discharge recommendations  Anticipated discharge date:  11/10  Destination: []home alone   []home alone w assist prn [] home w/ family    [] Continuous supervision       []SNF            [] Assisted living     [] Other:   Continued therapy: []HHC OT  []OUTPATIENT  OT   [] No Further OT  Equipment needs: TBD   (HIT F2 to transition between stars)    Grooming: Mod I in w/c at sink due to fatigue from shower  Oral Hygiene:   Mod I in w/c at sink due to fatigue from shower      Bathing:   CGA while standing+ verbal cues for thoroughness for cassandra care, completed the rest in sitting with SUP      Dressing:      Upper Body Dressing:  SBA with hospital gown  Lower Body Dressing:  Sup  Footwear: Sup    Toileting:   Sup       Toilet Transfers:   CGA  Device Used:    [x]   Standard Toilet         [x]   Grab Bars           []  Bedside Commode       []   Elevated Toilet          []   Other:        Tub/Shower Transfer:   CGA  Device Used:    [x]   Shower Bench      []   Shower Chair      []   Tub Transfer Bench           []   Bathtub    []   Shower         []   Other:         Bed Mobility:           []   Pt received out of bed   Rolling R/L:  Sup with bed features  Scooting:    Supine --> Sit:  Sup with bed features  Sit --> Supine:      Transfers:    Sit--> Stand:  SBA/Min A due to fatigue  Stand --> Sit:   CGA for eccentric control  Stand-Pivot:   CGA  Other:    Assistive device required for transfer:   CGA      Functional Mobility:  Room<>bathroom   Assistance:  SBA due to O2 cord mgmt  Device:   [x]   Rolling Walker     []   Standard Walker []   Wheelchair        []   Jonita Dwayne       []   4-Wheeled James Chip         []   Cardiac James Chip       []   Other:        Homemaking Tasks: Additional Therapeutic activities/exercises completed this date:     [x]   ADL Training   [x]   Balance/Postural training Standing balance by reaching out KAMINI and crossing midline at table top with PVC piping activity in order to improve activity tolerance, sequencing and problem solving skills during ADL task. Tolerated 13 mins total of standing with 1 extended seated RB secondary to fatigue. [x]   Bed/Transfer Training   []   Endurance Training   []   Neuromuscular Re-ed   []   Nu-step:  Time:        Level:         #Steps:       []   Rebounder:    []  Seated     []  Standing        []   Supine Ther Ex (reps/sets):     [x]   Seated Ther Ex (reps/sets):  BUE strengthening ex utilizing 3# free weight targeting all planes of motion x3 sets x10 reps in order to facilitate IND with transfers and morning ADLs.  Steven HOWARD will be staying with ex-wife at discharge and will have initial 24/7 supervision. Above information is for ex-wife's home setup.; no falls in the past year    Objective                                                                                    Goals:  (Update in navigator)  Short term goals  Time Frame for Short term goals: STG=LTG:  Long term goals  Time Frame for Long term goals : 10-12 days or until d/c  Long term goal 1: Pt will complete feeding/grooming/oral care task c MOD I by d/c  Long term goal 2: Pt will complete total body bathing c Mod I by d/c  Long term goal 3: Pt will complete UB dressing c MOD I and retrieve items from closet by d/c  Long term goal 4: Pt will complete LB dressing c MOD I and retrieve items from closet by d/c  Long term goal 5: Pt will don/doff footwear c Min A by d/c  Long term goals 6: Pt will complete toileting c MOD I by d/c  Long term goal 7: Pt will complete functional transfer (toilet, tub, shower) c MOD I by d/c. Long term goal 8: Pt will perform therex/therax to facilitate increased strength/endurance/ax tolerance (c emphasis on maintaining sternal precautions and dynamic standing balance/tolerance >8 mins) c MOD I in order to complete ADLs. :        Plan of Care                                                                              Times per week: 5 days per week for a minimum of 60 minutes/day plus group as appropriate for 60 minutes.   Treatment to include Plan  Times per day: Daily  Current Treatment Recommendations: Strengthening, Patient/Caregiver Education & Training, Home Management Training, Functional Mobility Training, Endurance Training, Equipment Evaluation, Education, & procurement, Pain Management, Safety Education & Training, Self-Care / ADL, Balance Training    Electronically signed by   [employer], MEYER/L  11/2/2020, 8:42 AM

## 2020-11-02 NOTE — PROGRESS NOTES
Brandon Robertson    : 1955  Acct #: [de-identified]  MRN: [id number]              PM&R Progress Note      Admitting diagnosis: Congestive heart failure after coronary bypass graft surgery     Comorbid diagnoses impacting rehabilitation: Uncontrolled pain, generalized weakness, gait disturbance, acute blood loss anemia, essential hypertension, BPH, COPD exacerbation, nicotine addiction     Chief complaint: Watery diarrhea is slightly better today. Prior (baseline) level of function: Independent. Current level of function:         Current  IRF-ESTEBAN and Goals:   Hearing, Speech, and Vision  Expression of Ideas and Wants: Without difficulty  Understanding Verbal and Non-Verbal Content: Understands  Prior Functioning: Everyday Activities  Self Care: Independent  Indoor Mobility (Ambulation): Independent  Stairs: Independent  Functional Cognition: Independent  Prior Device Use: (Ind)    Eating  Assistance Needed: Setup or clean-up assistance  CARE Score: 5  Discharge Goal: Independent  Oral Hygiene  Assistance Needed: Setup or clean-up assistance  Comment: Completed seated sink-side  CARE Score: 5  Discharge Goal: Independent  Toileting Hygiene  Assistance Needed: Dependent  Comment: nurse did hygiene   CARE Score: 1  Discharge Goal: Independent  Shower/Bathe Self  Assistance Needed: Partial/moderate assistance  Comment: Mod A for LB bathing, v/c for sternal precautions/compensatory strategies. CARE Score: 3  Discharge Goal: Independent  Upper Body Dressing  Assistance Needed: Partial/moderate assistance  Comment: Min A for threading BUE while maintaining cardiac precautions. CARE Score: 3  Discharge Goal: Independent  Lower Body Dressing  Assistance Needed: Substantial/maximal assistance  Comment: Max A to thread BLE and don over hips, pt demo buttoning pants.   CARE Score: 2  Discharge Goal: Independent  Putting On/Taking Off Footwear  Assistance Needed: Dependent  CARE Score: 1  Discharge Goal: Partial/moderate throughout. There was no rebound, guarding or masses noted. Upper extremities: Slowly brings both hands up to meet mine. Strong . Lower extremities: Guarded hip flexion with sternal pain. Calves are soft and there is no swelling at the ankle. Sitting balance was good. Standing balance was poor. Lab Results   Component Value Date    WBC 20.1 (H) 11/02/2020    HGB 14.5 11/02/2020    HCT 44.5 11/02/2020    .5 (H) 11/02/2020     11/02/2020     Lab Results   Component Value Date    INR 1.02 10/28/2020    INR 1.27 10/19/2020    INR 1.00 10/12/2020    PROTIME 12.4 10/28/2020    PROTIME 15.4 (H) 10/19/2020    PROTIME 12.1 10/12/2020     Lab Results   Component Value Date    CREATININE 1.0 11/02/2020    BUN 35 (H) 11/02/2020     11/02/2020    K 3.4 (L) 11/02/2020    CL 92 (L) 11/02/2020    CO2 30 11/02/2020     Lab Results   Component Value Date    ALT 16 06/23/2020    AST 16 06/23/2020    ALKPHOS 79 06/23/2020    BILITOT 0.5 06/23/2020       Expected length of stay  prior to a supervised level of function for discharge home with a walker and [facility] OT/PT is 11/10/2020. Recommendations:    1. Systolic congestive heart failure after coronary bypass graft surgery: Much brighter and cooperative with engaging in the daily occupational and physical therapy.  Occasional watery bowel movement in the last 12 hours with less cramping. No chills. Admits to being inconsistent with oral intake. Continue with the pulmonary hygiene, nutritional support and cautious pain management. Daily weights do not indicate any decompensation of CHF.  P.o. Lasix now. Beta-blocker.  Adaptive equipment training.  Caregiver training closer to discharge. 2. DVT prophylaxis: Due to his anemia and bruising, chemoprophylaxis is contraindicated.  SCDs when in bed.  Weightbearing activities are pursued daily.       Stable hemoglobin.    3. COPD exacerbation: Albuterol and Symbicort inhalers.  Mucinex and Spiriva.  Monitoring O2 saturations at rest and with activity.  Nebulizers as needed.  Supplemental oxygen to keep saturations greater than 90%. 4. Hypertension: Coreg and Lasix are used to manage his hypertension.  Target systolic blood pressure is 120-140.  Vital signs are checked at rest and with activity. 5. BPH: Flomax and Proscar used to manage this issue.  Bladder protocol should he develop symptoms of retention. 6. Nicotine addiction: Topical nicotine replacement patch. 7. C. difficile colitis: Flagyl 500 mg 3 times daily, Questran and oral hydration. GI consultation to review our treatment choices.

## 2020-11-02 NOTE — PROGRESS NOTES
Physical Therapy  [x] daily progress note       [] discharge       Patient Name:  Kristy Camargo   :  1955 MRN: [id number]  Room:  [address] [place] [address] Date of Admission: 10/27/2020  Rehabilitation Diagnosis:   Atherosclerotic heart disease of native coronary artery without angina pectoris [I25.10]  CHF following cardiac surgery, postop [I97.130]       Date 2020       Day of ARU Week:  7   Time IN/OUT 1400/1500   Individual Tx Minutes 60   Group Tx Minutes    Co-Treat Minutes    Concurrent Tx Minutes    TOTAL Tx Time Mins 60   Variance Time    Variance Time []   Refusal due to:     []   Medical hold/reason:    []   Illness   []   Off Unit for test/procedure  []   Extra time needed to complete task  []   Therapeutic need  []   Other (specify):   Restrictions Restrictions/Precautions  Restrictions/Precautions: Fall Risk, General Precautions, Contact Precautions, Cardiac(c-diff)  Position Activity Restriction  Sternal Precautions: No Pushing, No Pulling, 5# Lifting Restrictions  Other position/activity restrictions: 3L O2   Interdisciplinary communication [x]   Cleared for therapy per nursing     []   RN notified about issues during session  [x]   RN updated on pt performance: O2 sats on 3L >97% throughout session. Decreased to 2L at rest with pt maintaining 93%.  Nursing stated to keep pt on 2L and they will monitor   []   Spoke with   [x]   Spoke with OT  []   Spoke with MD  []   Other:    Subjective observations and cognitive status: Pt up in recliner and agreeable to therapy, but states he needs rests     Pain level/location:    9/10       Location: sternal incision and R pectoral muscle area   Discharge recommendations  Anticipated discharge date:  tbd  Destination: []home alone   []home alone with assist PRN     [x] home w/ family      [] Continuous supervision  []SNF    [] Assisted living     [] Other:  Continued therapy: [x]HHC PT  []OUTPATIENT  PT   [] No Further PT  []SNF PT  Caregiver training recommended: []Yes  [] No   Equipment needs: Heather Mckeon requires the assistance of a wheeled walker to successfully ambulate from room to room at home to allow completion of daily living tasks such as: bathing, toileting, dressing and grooming. A wheeled walker is necessary due to the patient's unsteady gait, upper body weakness, inability to  a standard walker. This patient can ambulate only by pushing a walker instead of using a lesser assistive device such as a cane or crutch. Bed Mobility:           []   Pt received out of bed   Scooting:  SBA  Sit --> lying: min assist for LEs  Bed features used: [] Yes  [x] No       Transfers:    Sit--> Stand:  CGA  Stand --> Sit:   CG  Assistive device required for transfer:   2ww    Gait:    Distance:  50'  Assistance:  CG with assist for tubing management and training in positioning for turning with tubing  Device:  2ww, 3L  Gait Quality:  Slow, deliberate, but steady      Additional Therapeutic activities/exercises completed this date:     []   Nu-step:  Time:        Level:         #Steps:       []   Rebounder:    []  Seated     []  Standing        []   Balance training         []   Postural training    []   Supine ther ex (reps/sets):     []   Seated ther ex (reps/sets):     []   Standing ther ex (reps/sets):     []   Picking up object from floor (standing):                   []   Reacher used   [x]   Other: intermediate cardiac ex protocol performed in sitting with 1-2 min rest between each ex, 10 reps on 3L with sats 97%, HR 90 at highest   [x]   Other: breathing ex of PLB. Assessment of O2 at rest on 2L with pt maintaining 93%. Educated pt on importance of continuing inspirometer and deep breathing to maintain sats on lower O2 level. Pt verbalized understanding.  Also encouraged pt to ambulate with staff to bathroom or from bed to recliner later this evening    Comments:      Patient/Caregiver Education and Training:   []   Role of PT  [x]   Education about Dx  [x]   Use of call light for assist   []   Wheelchair mobility/management  [x]   HEP provided and explained : reviewed Joselin scale, signs of exercise intolerance from cardiac packet with pt verbalizing understanding  []   Treatment plan reviewed  []   Home safety  []   Body mechanics  []   Positioning  [x]   Bed Mobility/Transfer technique  [x]   Gait technique/sequencing  []   Proper use of assistive device/adaptive equipment  []   Stair training/Advanced mobility safety and technique  [x]   Reinforced patient's precautions/mobility while maintaining precautions  []   Postural awareness  []   Family/caregiver training  []   Progress was updated and reviewed in Rehabtracker with patient and/or family this date. []   Other:      Treatment Plan for Next Session: continue to progress gait, cardiac ex protocol with progression to standing as tolerated      Assessment: Pt able to tolerate full minutes today and states his episodes of incontinence are decreased today.  Feel pt will continue to improve in activity tolerance as c-diff is managed     Treatment/Activity Tolerance:   [] Tolerated treatment with no adverse effects    [x] Patient limited by fatigue  [] Patient limited by pain   [] Patient limited by medical complications:    [] Adverse reaction to Tx:   [] Significant change in status    Barrier/s to progress/learning:   [x]   None  []   Cognition  []   Hearing deficit  []   Pre-morbid mental/psychological status   []   Motivation  []   Communication  []   Anxiety  []   Vision deficit  []   Attention  []   Other:      Safety:       [x]  bed alarm set    []  chair alarm set    []  Pt refused alarms                []  Telesitter activated      [x]  Gait belt used during tx session      []other:         Number of Minutes/Billable Intervention  Gait Training 12   Therapeutic Exercise 30   Neuro Re-Ed    Therapeutic Activity 18   Wheelchair Propulsion    Group    Other:    TOTAL 60         Social History  Social/Functional History  ADL Assistance: Independent  Homemaking Assistance: Independent  Homemaking Responsibilities: Yes  Meal Prep Responsibility: Primary(Pt reports eating out most of the time.)  Laundry Responsibility: Primary  Cleaning Responsibility: Primary  Bill Paying/Finance Responsibility: Primary  Shopping Responsibility: Primary  Dependent Care Responsibility: No  Health Care Management: Primary  Ambulation Assistance: Independent  Transfer Assistance: Independent  Active : Yes  Mode of Transportation: Truck, Car  Occupation: Full time employment  Type of occupation: Truck drive  Leisure & Hobbies: none  Additional Comments: 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 Pt reports he will be staying with ex-wife at discharge and will have initial 24/7 supervision. Above information is for ex-wife's home setup.; no falls in the past year    Objective                                                                                    Goals:  (Update in navigator)  Short term goals  Time Frame for Short term goals: 10 tx days:  Short term goal 1: Pt will complete rolling L/R and sup<->sit Ind following sternal precautions. Short term goal 2: Pt will complete OOB transfers following sternal precautions Mod Ind. Short term goal 3: Pt will ambulate 150 ft using RW Mod Ind. Short term goal 4: Pt will ascend/descend 4-5 steps using bilat rails Mod Ind. Short term goal 5: Pt will ascend/descend curb step and ambulate over uneven surfaces using RW Mod Ind. Short term goal 6: Pt will complete object retrieval from the floor in standing Mod Ind-Ind.:   :        Plan of Care                                                                              Times per week: 5 days per week for a minimum of 60 minutes/day plus group as appropriate for 60 minutes.   Treatment to include Current Treatment Recommendations: Transfer Training, Endurance Training, Strengthening, Patient/Caregiver Education & Training, Pain Management, Equipment Evaluation, Education, & procurement, Balance Training, Gait Training, Home Exercise Program, Functional Mobility Training, Stair training, Safety Education & Training    Electronically signed by   Abiel Jones, PT  11/2/2020, 2:45 PM

## 2020-11-02 NOTE — PROGRESS NOTES
Comprehensive Nutrition Assessment    Type and Reason for Visit:  Reassess    Nutrition Recommendations/Plan:   Continue current diet-carb controlled, low sodium   Continue to offer oral nutrition supplement as accepted by patient    Nutrition Assessment:  Remains on carb controlled low sodium diet with high protein supplement offered. Meal intake recently %, reasonable meal orders. Still having diarrhea, eval in process for C Diff. Remains moderate nutrition risk at this time. Malnutrition Assessment:  Malnutrition Status: At risk for malnutrition (Comment)    Context:  Acute Illness       Estimated Daily Nutrient Needs:  Energy (kcal):  9273-3185 (20-25 constantino/kg); Weight Used for Energy Requirements:  Current     Protein (g):  74-89 (1-1.2 g/kg); Weight Used for Protein Requirements:  Ideal        Fluid (ml/day):  8391-5826; Method Used for Fluid Requirements:  1 ml/kcal      Nutrition Related Findings:  resting up in chair, observed from doorway as now in contact plus isolation for possible C Diff      Wounds:  Surgical Wound       Current Nutrition Therapies:    DIET CARB CONTROL; Carb Control: 4 carb choices (60 gms)/meal; Low Sodium (2 GM)  Dietary Nutrition Supplements: Low Calorie High Protein Supplement    Anthropometric Measures:  · Height: 5' 9\" (175.3 cm)  · Current Body Weight: 195 lb 1.7 oz (88.5 kg)   · Admission Body Weight: 202 lb 13.2 oz (92 kg)    · Usual Body Weight: (n/a)     · Ideal Body Weight: 160 lbs; % Ideal Body Weight 117.8 %   · BMI: 28.8  · Adjusted Body Weight:  ; No Adjustment   · BMI Categories: Overweight (BMI 25.0-29. 9)       Nutrition Diagnosis:   · Predicted inadequate energy intake related to increase demand for energy/nutrients, altered GI function as evidenced by diarrhea      Nutrition Interventions:   Food and/or Nutrient Delivery:  Continue Current Diet, Continue Oral Nutrition Supplement  Nutrition Education/Counseling:  Education initiated   Coordination of

## 2020-11-03 LAB
ALBUMIN SERPL-MCNC: 3.4 GM/DL (ref 3.4–5)
ALP BLD-CCNC: 61 IU/L (ref 40–128)
ALT SERPL-CCNC: 45 U/L (ref 10–40)
AMYLASE: 57 U/L (ref 25–115)
ANION GAP SERPL CALCULATED.3IONS-SCNC: 10 MMOL/L (ref 4–16)
APTT: 26.5 SECONDS (ref 25.1–37.1)
AST SERPL-CCNC: 22 IU/L (ref 15–37)
BASOPHILS ABSOLUTE: 0 K/CU MM
BASOPHILS RELATIVE PERCENT: 0.2 % (ref 0–1)
BILIRUB SERPL-MCNC: 0.3 MG/DL (ref 0–1)
BUN BLDV-MCNC: 28 MG/DL (ref 6–23)
CALCIUM SERPL-MCNC: 8.1 MG/DL (ref 8.3–10.6)
CHLORIDE BLD-SCNC: 98 MMOL/L (ref 99–110)
CO2: 26 MMOL/L (ref 21–32)
CREAT SERPL-MCNC: 0.9 MG/DL (ref 0.9–1.3)
DIFFERENTIAL TYPE: ABNORMAL
EOSINOPHILS ABSOLUTE: 0.1 K/CU MM
EOSINOPHILS RELATIVE PERCENT: 0.7 % (ref 0–3)
GFR AFRICAN AMERICAN: >60 ML/MIN/1.73M2
GFR NON-AFRICAN AMERICAN: >60 ML/MIN/1.73M2
GLUCOSE BLD-MCNC: 153 MG/DL (ref 70–99)
GLUCOSE BLD-MCNC: 187 MG/DL (ref 70–99)
GLUCOSE BLD-MCNC: 82 MG/DL (ref 70–99)
GLUCOSE BLD-MCNC: 84 MG/DL (ref 70–99)
GLUCOSE BLD-MCNC: 91 MG/DL (ref 70–99)
HCT VFR BLD CALC: 35.8 % (ref 42–52)
HEMOGLOBIN: 12.5 GM/DL (ref 13.5–18)
IMMATURE NEUTROPHIL %: 2 % (ref 0–0.43)
INR BLD: 0.97 INDEX
LIPASE: 27 IU/L (ref 13–60)
LYMPHOCYTES ABSOLUTE: 2.3 K/CU MM
LYMPHOCYTES RELATIVE PERCENT: 12.1 % (ref 24–44)
MCH RBC QN AUTO: 34.7 PG (ref 27–31)
MCHC RBC AUTO-ENTMCNC: 34.9 % (ref 32–36)
MCV RBC AUTO: 99.4 FL (ref 78–100)
MONOCYTES ABSOLUTE: 1.3 K/CU MM
MONOCYTES RELATIVE PERCENT: 6.6 % (ref 0–4)
NUCLEATED RBC %: 0 %
PDW BLD-RTO: 13.3 % (ref 11.7–14.9)
PLATELET # BLD: 310 K/CU MM (ref 140–440)
PMV BLD AUTO: 8.5 FL (ref 7.5–11.1)
POTASSIUM SERPL-SCNC: 3.9 MMOL/L (ref 3.5–5.1)
PROTHROMBIN TIME: 11.7 SECONDS (ref 11.7–14.5)
RBC # BLD: 3.6 M/CU MM (ref 4.6–6.2)
SEGMENTED NEUTROPHILS ABSOLUTE COUNT: 14.9 K/CU MM
SEGMENTED NEUTROPHILS RELATIVE PERCENT: 78.4 % (ref 36–66)
SODIUM BLD-SCNC: 134 MMOL/L (ref 135–145)
TOTAL IMMATURE NEUTOROPHIL: 0.37 K/CU MM
TOTAL NUCLEATED RBC: 0 K/CU MM
TOTAL PROTEIN: 5.1 GM/DL (ref 6.4–8.2)
TOTAL RETICULOCYTE COUNT: 0.08 K/CU MM
WBC # BLD: 19 K/CU MM (ref 4–10.5)

## 2020-11-03 PROCEDURE — 82962 GLUCOSE BLOOD TEST: CPT

## 2020-11-03 PROCEDURE — 6370000000 HC RX 637 (ALT 250 FOR IP): Performed by: PHYSICAL MEDICINE & REHABILITATION

## 2020-11-03 PROCEDURE — 94640 AIRWAY INHALATION TREATMENT: CPT

## 2020-11-03 PROCEDURE — 97110 THERAPEUTIC EXERCISES: CPT

## 2020-11-03 PROCEDURE — 85730 THROMBOPLASTIN TIME PARTIAL: CPT

## 2020-11-03 PROCEDURE — 83690 ASSAY OF LIPASE: CPT

## 2020-11-03 PROCEDURE — 6370000000 HC RX 637 (ALT 250 FOR IP): Performed by: SURGERY

## 2020-11-03 PROCEDURE — 97116 GAIT TRAINING THERAPY: CPT

## 2020-11-03 PROCEDURE — 99232 SBSQ HOSP IP/OBS MODERATE 35: CPT | Performed by: PHYSICAL MEDICINE & REHABILITATION

## 2020-11-03 PROCEDURE — 97530 THERAPEUTIC ACTIVITIES: CPT

## 2020-11-03 PROCEDURE — 85025 COMPLETE CBC W/AUTO DIFF WBC: CPT

## 2020-11-03 PROCEDURE — 2700000000 HC OXYGEN THERAPY PER DAY

## 2020-11-03 PROCEDURE — 82150 ASSAY OF AMYLASE: CPT

## 2020-11-03 PROCEDURE — 80053 COMPREHEN METABOLIC PANEL: CPT

## 2020-11-03 PROCEDURE — 6370000000 HC RX 637 (ALT 250 FOR IP): Performed by: SPECIALIST

## 2020-11-03 PROCEDURE — 1280000000 HC REHAB R&B

## 2020-11-03 PROCEDURE — 94150 VITAL CAPACITY TEST: CPT

## 2020-11-03 PROCEDURE — 94761 N-INVAS EAR/PLS OXIMETRY MLT: CPT

## 2020-11-03 PROCEDURE — 36415 COLL VENOUS BLD VENIPUNCTURE: CPT

## 2020-11-03 PROCEDURE — 85610 PROTHROMBIN TIME: CPT

## 2020-11-03 RX ADMIN — HYDROCODONE BITARTRATE AND ACETAMINOPHEN 2 TABLET: 5; 325 TABLET ORAL at 00:07

## 2020-11-03 RX ADMIN — Medication 250 MG: at 09:31

## 2020-11-03 RX ADMIN — HYDROCODONE BITARTRATE AND ACETAMINOPHEN 2 TABLET: 5; 325 TABLET ORAL at 20:44

## 2020-11-03 RX ADMIN — ASPIRIN 81 MG: 81 TABLET, FILM COATED ORAL at 09:28

## 2020-11-03 RX ADMIN — BUDESONIDE AND FORMOTEROL FUMARATE DIHYDRATE 2 PUFF: 160; 4.5 AEROSOL RESPIRATORY (INHALATION) at 07:32

## 2020-11-03 RX ADMIN — LEVOTHYROXINE SODIUM 100 MCG: 100 TABLET ORAL at 05:16

## 2020-11-03 RX ADMIN — MULTIPLE VITAMINS W/ MINERALS TAB 1 TABLET: TAB at 09:28

## 2020-11-03 RX ADMIN — ALBUTEROL SULFATE 2 PUFF: 90 AEROSOL, METERED RESPIRATORY (INHALATION) at 19:50

## 2020-11-03 RX ADMIN — Medication 250 MG: at 15:22

## 2020-11-03 RX ADMIN — ATORVASTATIN CALCIUM 20 MG: 20 TABLET, FILM COATED ORAL at 20:45

## 2020-11-03 RX ADMIN — CARVEDILOL 3.12 MG: 6.25 TABLET, FILM COATED ORAL at 09:28

## 2020-11-03 RX ADMIN — AMIODARONE HYDROCHLORIDE 200 MG: 200 TABLET ORAL at 09:28

## 2020-11-03 RX ADMIN — TAMSULOSIN HYDROCHLORIDE 0.4 MG: 0.4 CAPSULE ORAL at 09:28

## 2020-11-03 RX ADMIN — PREDNISONE 20 MG: 20 TABLET ORAL at 09:28

## 2020-11-03 RX ADMIN — GUAIFENESIN 600 MG: 600 TABLET, EXTENDED RELEASE ORAL at 20:45

## 2020-11-03 RX ADMIN — CHOLESTYRAMINE 4 G: 4 POWDER, FOR SUSPENSION ORAL at 09:29

## 2020-11-03 RX ADMIN — FUROSEMIDE 40 MG: 40 TABLET ORAL at 17:01

## 2020-11-03 RX ADMIN — GUAIFENESIN 600 MG: 600 TABLET, EXTENDED RELEASE ORAL at 09:28

## 2020-11-03 RX ADMIN — BUDESONIDE AND FORMOTEROL FUMARATE DIHYDRATE 2 PUFF: 160; 4.5 AEROSOL RESPIRATORY (INHALATION) at 19:52

## 2020-11-03 RX ADMIN — HYDROCODONE BITARTRATE AND ACETAMINOPHEN 2 TABLET: 5; 325 TABLET ORAL at 09:27

## 2020-11-03 RX ADMIN — ALBUTEROL SULFATE 2 PUFF: 90 AEROSOL, METERED RESPIRATORY (INHALATION) at 11:37

## 2020-11-03 RX ADMIN — Medication 250 MG: at 20:44

## 2020-11-03 RX ADMIN — FINASTERIDE 5 MG: 5 TABLET, FILM COATED ORAL at 09:28

## 2020-11-03 RX ADMIN — INSULIN LISPRO 1 UNITS: 100 INJECTION, SOLUTION INTRAVENOUS; SUBCUTANEOUS at 20:40

## 2020-11-03 RX ADMIN — HYDROCODONE BITARTRATE AND ACETAMINOPHEN 2 TABLET: 5; 325 TABLET ORAL at 05:16

## 2020-11-03 RX ADMIN — TIOTROPIUM BROMIDE INHALATION SPRAY 2 PUFF: 3.12 SPRAY, METERED RESPIRATORY (INHALATION) at 07:46

## 2020-11-03 RX ADMIN — CHOLESTYRAMINE 4 G: 4 POWDER, FOR SUSPENSION ORAL at 20:45

## 2020-11-03 RX ADMIN — Medication 250 MG: at 03:16

## 2020-11-03 RX ADMIN — CARVEDILOL 3.12 MG: 6.25 TABLET, FILM COATED ORAL at 20:45

## 2020-11-03 RX ADMIN — DULOXETINE HYDROCHLORIDE 60 MG: 30 CAPSULE, DELAYED RELEASE ORAL at 09:29

## 2020-11-03 RX ADMIN — FUROSEMIDE 40 MG: 40 TABLET ORAL at 09:28

## 2020-11-03 RX ADMIN — ALBUTEROL SULFATE 2 PUFF: 90 AEROSOL, METERED RESPIRATORY (INHALATION) at 07:32

## 2020-11-03 ASSESSMENT — PAIN SCALES - GENERAL
PAINLEVEL_OUTOF10: 9

## 2020-11-03 NOTE — PROGRESS NOTES
Progress Note( Dr. Toby Palma)  11/2/2020  Subjective:   Admit Date: 10/27/2020  PCP: Lis Silva MD    Admitted For : Chest pain CAD underwent CABG    Consulted For: Better control of blood glucose filed diabetes mellitus    Interval History: Post CABG patient was transferred to acute rehab unit on evening of 10/27/2020      C/O  chest pains, also with deep breathing mostly from chest wall  Has some  SOB . Denies nausea or vomiting. No new bowel or bladder symptoms. Complains of frequent diarrhea for last 2 to 3 days X watery and solid  It turned out to be C. difficile colitis seen by GI  Change the medicine to vancomycin p.o. Intake/Output Summary (Last 24 hours) at 11/2/2020 2036  Last data filed at 11/2/2020 1225  Gross per 24 hour   Intake 480 ml   Output 650 ml   Net -170 ml       DATA    CBC:   Recent Labs     11/02/20  0916   WBC 20.1*   HGB 14.5       CMP:  Recent Labs     11/02/20  0916      K 3.4*   CL 92*   CO2 30   BUN 35*   CREATININE 1.0   CALCIUM 8.8     Lipids:   Lab Results   Component Value Date    CHOL 155 06/23/2020    HDL 30 06/23/2020    TRIG 173 06/23/2020     Glucose:  Recent Labs     11/02/20  0721 11/02/20  1144 11/02/20  1703   POCGLU 91 107* 123*     [id number]:  Lab Results   Component Value Date    LABA1C 6.3 10/12/2020     High Sensitivity TSH:   Lab Results   Component Value Date    TSHHS 1.340 06/23/2020     Free T3: No results found for: FT3  Free T4:  Lab Results   Component Value Date    T4FREE 1.28 06/23/2020       Xr Chest Portable    Result Date: 10/22/2020  EXAMINATION: ONE XRAY VIEW OF THE CHEST 10/22/2020 5:10 am COMPARISON: 10/21/2020 HISTORY: ORDERING SYSTEM PROVIDED HISTORY: Post op open heart surgery TECHNOLOGIST PROVIDED HISTORY: Reason for Exam:->Post op open heart surgery Reason for Exam: post op open heart surgery Acuity: Unknown Type of Exam: Ongoing FINDINGS: Central venous catheter tip overlies the SVC. Sternotomy wires.   Small bilateral pleural effusion. Right lower lobe opacity. Stable cardiomegaly. Mild interstitial edema. No pneumothorax. 1. Unchanged mild pulmonary edema and small bilateral pleural effusions. 2. Stable right lower lobe atelectasis. Xr Chest Portable    Result Date: 10/21/2020  EXAMINATION: ONE XRAY VIEW OF THE CHEST 10/21/2020 1:16 pm COMPARISON: 10/21/2020 HISTORY: ORDERING SYSTEM PROVIDED HISTORY: chest tube removal   ersistent small right pleural effusion with bibasilar atelectasis. No discrete pneumothorax. The osseous structures otherwise stable. Interval removal of thoracostomy tubes. No discrete pneumothorax. Xr Chest Portable    Result Date: 10/21/2020  EXAMINATION: ONE XRAY VIEW OF THE CHEST 10/21/2020 6:06 am COMPARISON: 10/20/2020 HISTORY: ORDERING SYSTEM PROVIDED HISTORY: Post op open heart surgery  . Mildly increased interstitial changes which could be related to increased pulmonary edema or pneumonitis. Cardiomegaly with central vascular congestion. Otherwise similar appearing chest.     Xr Chest Portable    Result Date: 10/21/2020  EXAMINATION: ONE XRAY VIEW OF THE CHEST 10/20/2020 5:18 am COMPARISON: Chest radiograph dated October 19, 2020. HISTORY: ORDERING SYSTEM PROVIDED HISTORY: Post op open heart surgery TECHNOLOGIST PROVIDED HISTORY: Reason for Exam:->Post op open heart surgery Reason for Exam: Post op open heart surgery Acuity: Unknown Type of Exam: Unknown FINDINGS: Median sternotomy wires are noted. A left-sided central venous catheter is in place. A [place] catheter is in place. Bilateral chest tubes are present. Low lung volumes with bilateral pleural effusions and bibasilar opacities are seen. Bilateral pleural effusions with bibasilar opacities without significant change.            Scheduled Medicines   Medications:    vancomycin  250 mg Oral 4 times per day    cholestyramine light  4 g Oral BID    furosemide  40 mg Oral BID    aspirin  81 mg Oral Daily    atorvastatin  20 mg Oral Nightly    therapeutic multivitamin-minerals  1 tablet Oral Daily with breakfast    albuterol sulfate HFA  2 puff Inhalation Q4H WA    amiodarone  200 mg Oral Daily    budesonide-formoterol  2 puff Inhalation BID    DULoxetine  60 mg Oral Daily    finasteride  5 mg Oral Daily    guaiFENesin  600 mg Oral BID    levothyroxine  100 mcg Oral Daily    predniSONE  20 mg Oral Daily    tamsulosin  0.4 mg Oral Daily    tiotropium  2 puff Inhalation Daily    insulin lispro  0-6 Units Subcutaneous TID WC    insulin lispro  0-3 Units Subcutaneous Nightly    carvedilol  3.125 mg Oral BID      Infusions:         Objective:   Vitals: BP (!) 124/58   Pulse 65   Temp 98.2 °F (36.8 °C) (Oral)   Resp 18   Ht 5' 9\" (1.753 m)   Wt 195 lb 3.2 oz (88.5 kg)   SpO2 96%   BMI 28.83 kg/m²   General appearance: alert and cooperative with exam  Neck: no JVD or bruit  Thyroid : Normal lobes   Lungs: Has Vesicular Breath sounds somewhat diminished breath sounds right side   heart:  regular rate and rhythm  Abdomen: soft, non-tender; bowel sounds normal; no masses,  no organomegaly  Musculoskeletal: Normal  Extremities: extremities normal, , no edema  Neurologic:  Awake, alert, oriented to name, place and time. Cranial nerves II-XII are grossly intact. Motor is  intact. Sensory is intact. ,  and gait is normal.    Assessment:     Patient Active Problem List:     Juvenile rheumatoid arthritis (HCC)     Chronic bilateral low back pain without sciatica     Peripheral arterial disease (HCC)     Panlobular emphysema (HCC)     Essential hypertension     Acquired hypothyroidism     Gastroesophageal reflux disease     Non-seasonal allergic rhinitis     Benign prostatic hyperplasia without lower urinary tract symptoms     Tobacco use     Acute pulmonary edema (HCC)     Seasonal allergic rhinitis due to pollen     Mixed irritable bowel syndrome     Mixed hyperlipidemia     Prediabetes     Benign

## 2020-11-03 NOTE — FLOWSHEET NOTE
RN performing CVICU rounds. Last few days weights using bed scale reflects significant weight gain over last few days. Spoke with patient's primary nurse and requested standing scale weight today and document. No other acute distress reported.      Moise Harris RN 4:37 PM

## 2020-11-03 NOTE — PROGRESS NOTES
Occupational Therapy  Physical Rehabilitation: OCCUPATIONAL THERAPY     [x] daily progress note       [] discharge       Patient Name:  Dilip Brooks   :  1955 MRN: [id number]  Room:  [address] [place] [address] Date of Admission: 10/27/2020  Rehabilitation Diagnosis:   Atherosclerotic heart disease of native coronary artery without angina pectoris [I25.10]  CHF following cardiac surgery, postop [I97.130]       Date 11/3/2020       Day of ARU Week:  1   Time IN/OUT 4320-2042   Individual Tx Minutes 60   Group Tx Minutes    Co-Treat Minutes    Concurrent Tx Minutes    TOTAL Tx Time Mins 60   Variance Time    Variance Time []   Refusal due to:     []   Medical hold/reason:    []   Illness   []   Off Unit for test/procedure  []   Extra time needed to complete task  []   Therapeutic need  []   Other (specify):   Restrictions Restrictions/Precautions: Fall Risk, General Precautions, Contact Precautions, Cardiac(c-diff)         Communication with other providers: [x]   OK to see per nursing:     []   Spoke with team member regarding:      Subjective observations and cognitive status: Pt in semi-fowlers upon arrival. Pt agreeable to OT however pt reported, \"I really can't get out of bed, I've done so much with the PT earlier I'm so tired. But I will do what I can. \"    Pain level/location:    /10       Location:    Discharge recommendations  Anticipated discharge date:  tbd  Destination: []home alone   []home alone w assist prn   [] home w/ family    [] Continuous supervision       []SNF    [] Assisted living     [] Other:   Continued therapy: []HHC OT  []OUTPATIENT  OT   [] No Further OT  Equipment needs:   Dilip Brooks requires a 3 in 1 bedside commode due to being unable to use the toilet within the home  And confined to a single room confined to one level of the home.     Dilip Brooks requires the assistance of a tub transfer bench to successfully and safely complete bathing activities necessary due to reduced balance, endurance, need for ADL assist, and LE weakness and reduced ROM. These tasks cannot be safely completed without this device and would place Laith Talamantes at greater risk of falls. Bed Mobility:           [x]   Pt received out of bed   Supine --> Sit:  SBA  Sit --> Supine:  SBA    Additional Therapeutic activities/exercises completed this date:     [x]   ADL Training  Pt demo donning/doffing socks in figure four position: distal LE over unilateral knee. [x]   Balance/Postural training 5 reps/4 sets of dynamic sitting balance exercises, pt demo laterally leaning to R/L side to increase core control while maintaining BUE over chest. Pt educated on importance of core strengthening to increase functional transfer independence. x3 seated RB.    []   Bed/Transfer Training   []   Endurance Training   []   Neuromuscular Re-ed   []   Nu-step:  Time:        Level:         #Steps:       []   Rebounder:    []  Seated     []  Standing        []   Supine Ther Ex (reps/sets):     [x]   Seated Ther Ex (reps/sets):  10 reps/5 sets unilateral UE shld flexion exercises 90* (emphasis on maintaining sternal precautions) x2 seated rest breaks in between sets. []   Standing Ther Ex (reps/sets):     [x]   Other: PLB/diaphragmatic breathing training/education. Comments:      Patient/Caregiver Education and Training:   [x]   Adaptive Equipment Use Pt educated on reacher/sock-aid benefits/ use. Pt provided with outside resources for reacher purchase to better fit pt's financial needs. Pt does not demo need for sock-aid. []   Bed Mobility/Transfer Technique/Safety  []   Energy Conservation Tips  []   Family training  []   Postural Awareness  []   Safety During Functional Activities  []   Reinforced Patient's Precautions   []   Progress was updated and reviewed in Rehabtracker with patient and/or family this         date. Treatment Plan for Next Session: cont POC      Assessment:  Pt is progressing towards goals.  Pt demo good tolerance of seated exercises seated EOB. Pt c/o coughing pains and was educated on importance of holding elbows and pillow to protect sternum. Pt demo good compensatory strategies for donning/doff footwear, eliminating the need for sock-aid use at this time. Treatment/Activity Tolerance:   [x] Tolerated treatment with no adverse effects    [] Patient limited by fatigue  [] Patient limited by pain   [] Patient limited by medical complications:    [] Adverse reaction to Tx:   [] Significant change in status    Safety:       [x]  bed alarm set    []  chair alarm set    []  Pt refused alarms                []  Telesitter activated      [x]  Gait belt used during tx session      []other:       Number of Minutes/Billable Intervention  Therapeutic Exercise 60   ADL Self-care    Neuro Re-Ed    Therapeutic Activity    Group    Other:    TOTAL 60       Social History  Social/Functional History  ADL Assistance: Independent  Homemaking Assistance: Independent  Homemaking Responsibilities: Yes  Meal Prep Responsibility: Primary(Pt reports eating out most of the time.)  Laundry Responsibility: Primary  Cleaning Responsibility: Primary  Bill Paying/Finance Responsibility: Primary  Shopping Responsibility: Primary  Dependent Care Responsibility: No  Health Care Management: Primary  Ambulation Assistance: Independent  Transfer Assistance: Independent  Active : Yes  Mode of Transportation: Truck, Car  Occupation: Full time employment  Type of occupation: Truck drive  Leisure & Hobbies: none  Additional Comments: 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 Pt reports he will be staying with ex-wife at discharge and will have initial 24/7 supervision.  Above information is for ex-wife's home setup.; no falls in the past year    Objective                                                                                    Goals:  (Update in navigator)  Short term goals  Time Frame for Short term goals: STG=LTG:  Long term goals  Time Frame for Long term goals : 10-12 days or until d/c  Long term goal 1: Pt will complete feeding/grooming/oral care task c MOD I by d/c  Long term goal 2: Pt will complete total body bathing c Mod I by d/c  Long term goal 3: Pt will complete UB dressing c MOD I and retrieve items from closet by d/c  Long term goal 4: Pt will complete LB dressing c MOD I and retrieve items from closet by d/c  Long term goal 5: Pt will don/doff footwear c Min A by d/c  Long term goals 6: Pt will complete toileting c MOD I by d/c  Long term goal 7: Pt will complete functional transfer (toilet, tub, shower) c MOD I by d/c. Long term goal 8: Pt will perform therex/therax to facilitate increased strength/endurance/ax tolerance (c emphasis on maintaining sternal precautions and dynamic standing balance/tolerance >8 mins) c MOD I in order to complete ADLs. :        Plan of Care                                                                              Times per week: 5 days per week for a minimum of 60 minutes/day plus group as appropriate for 60 minutes.   Treatment to include Plan  Times per day: Daily  Current Treatment Recommendations: Strengthening, Patient/Caregiver Education & Training, Home Management Training, Functional Mobility Training, Endurance Training, Equipment Evaluation, Education, & procurement, Pain Management, Safety Education & Training, Self-Care / ADL, Balance Training    Electronically signed by   JHOAN Macias Res OTR/L  License # [id number]    11/3/2020, 5:27 PM

## 2020-11-03 NOTE — PLAN OF CARE
Problem: SAFETY  Goal: Free from accidental physical injury  Outcome: Ongoing  Goal: Free from intentional harm  Outcome: Ongoing     Problem: DAILY CARE  Goal: Daily care needs are met  Outcome: Ongoing     Problem: PAIN  Goal: Patient's pain/discomfort is manageable  Outcome: Ongoing     Problem: SKIN INTEGRITY  Goal: Skin integrity is maintained or improved  Outcome: Ongoing     Problem: KNOWLEDGE DEFICIT  Goal: Patient/S.O. demonstrates understanding of disease process, treatment plan, medications, and discharge instructions.   Outcome: Ongoing     Problem: DISCHARGE BARRIERS  Goal: Patient's continuum of care needs are met  Outcome: Ongoing     Problem: Infection - Surgical Site:  Goal: Will show no infection signs and symptoms  Description: Will show no infection signs and symptoms  Outcome: Ongoing     Problem: Pain:  Goal: Pain level will decrease  Description: Pain level will decrease  Outcome: Ongoing  Goal: Control of acute pain  Description: Control of acute pain  Outcome: Ongoing  Goal: Control of chronic pain  Description: Control of chronic pain  Outcome: Ongoing

## 2020-11-03 NOTE — FLOWSHEET NOTE
[x] daily progress note       [] discharge       Patient Name:  Arlet Mccall   :  1955 MRN: [id number]  Room:  [address] [place] [address] Date of Admission: 10/27/2020  Rehabilitation Diagnosis:   Atherosclerotic heart disease of native coronary artery without angina pectoris [I25.10]  CHF following cardiac surgery, postop [I97.130]       Date 11/3/2020       Day of ARU Week:  1   Time IN/OUT [id number]   Individual Tx Minutes 60   Group Tx Minutes    Co-Treat Minutes    Concurrent Tx Minutes    TOTAL Tx Time Mins 60   Variance Time    Variance Time []   Refusal due to:     []   Medical hold/reason:    []   Illness   []   Off Unit for test/procedure  []   Extra time needed to complete task  []   Therapeutic need  []   Other (specify):   Restrictions Restrictions/Precautions  Restrictions/Precautions: Fall Risk, General Precautions, Contact Precautions, Cardiac(c-diff)  Position Activity Restriction  Sternal Precautions: No Pushing, No Pulling, 5# Lifting Restrictions  Other position/activity restrictions: 3L O2   Communication with other providers: [x]   OK to see per nursing:     []   Spoke with team member regarding:      Subjective observations and cognitive status: Pt laying in bed finishing breakfast. He reports not feeling well and having pain when coughing and  In chest and lungs    Pt currently on 2 L nc during session. Pt does require 25% vc for  Proper breathing technique of PLB when sob. Pt reports he has walked to bathroom for shower but does not have enough warning for bowel d/t diarrhea and is  Incontinent.      Pain level/location: 9   /10       Location: chest and lungs   Discharge recommendations  Anticipated discharge date:  11/10/2020  Destination: []home alone   []home alone with assist PRN     [x] home w/ family      [] Continuous supervision  []SNF    [] Assisted living     [] Other:   Continued therapy: [x]HHC PT  []OUTPATIENT  PT   [] No Further PT    Equipment needs:  Arlet Mccall requires the assistance of a wheeled walker to successfully ambulate from room to room at home to allow completion of daily living tasks such as: bathing, toileting, dressing and grooming. A wheeled walker is necessary due to the patient's unsteady gait, upper body weakness, inability to  a standard walker. This patient can ambulate only by pushing a walker instead of using a lesser assistive device such as a cane or crutch. :      Bed Mobility:           []   Pt received out of bed   Rolling R/L:  Sup  Scooting: mod ind  Lying --> Sit:  SBA/Sup    Transfers:    Sit--> Stand:  CGA  Stand --> Sit:   SBA  Toilet Transfer (if applicable): CG/Sup  Assistive device required for transfer:   Cardiac pillow    Gait:    Distance:  60 ft  Assistance:  CGA/Sup  Device:  FWW  Gait Quality:  Slow pace small recip steps, vc for  Change of direction and therapist managing 02 tubing this session. Additional Therapeutic activities/exercises completed this date:     []   Nu-step:  Time:        Level:         #Steps:       []   Rebounder:    []  Seated     []  Standing        []   Balance training         []   Postural training    []   Supine ther ex (reps/sets):     [x]   Seated ther ex (reps/sets):   Intermediate cardiac ex 10 reps for  Arm ex and  15 reps of le ther ex sitting eob  + hip abd/add x 15 reps in recliner   []   Standing ther ex (reps/sets):     []   Picking up object from floor (standing):                   []   Reacher used   []   Other:   []   Other:    Comments:      Patient/Caregiver Education and Training:   [x]   Bed Mobility/Transfer technique/safety  [x]   Gait technique/sequencing  [x]   Proper use of assistive device and  02 tubing management initiated  []   Advanced mobility safety and technique  []   Reinforced patient's precautions/mobility while maintaining precautions  []   Postural awareness  []   Family training  []   Progress was updated and reviewed in Rehabtracker with patient and/or family this date.    Treatment Plan for Next Session: transfer training, gait training,     Assessment:  Pt progressing with  Ther ex and mobility    Treatment/Activity Tolerance:   [] Tolerated treatment with no adverse effects    [x] Patient limited by fatigue  [x] Patient limited by pain   [] Patient limited by medical complications:    [] Adverse reaction to Tx:   [] Significant change in status    Safety:       []  bed alarm set    []  chair alarm set    []  Pt refused alarms                []  Telesitter activated      [x]  Gait belt used during tx session      []other:         Number of Minutes/Billable Intervention  Gait Training 30   Therapeutic Exercise 15   Neuro Re-Ed    Therapeutic Activity 15   Wheelchair Propulsion    Group    Other:    TOTAL 60         Social History  Social/Functional History  ADL Assistance: Independent  Homemaking Assistance: Independent  Homemaking Responsibilities: Yes  Meal Prep Responsibility: Primary(Pt reports eating out most of the time.)  Laundry Responsibility: Primary  Cleaning Responsibility: Primary  Bill Paying/Finance Responsibility: Primary  Shopping Responsibility: Primary  Dependent Care Responsibility: No  Health Care Management: Primary  Ambulation Assistance: Independent  Transfer Assistance: Independent  Active : Yes  Mode of Transportation: Truck, Car  Occupation: Full time employment  Type of occupation: Truck drive  Leisure & Hobbies: none  Additional Comments: 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 Pt reports he will be staying with ex-wife at discharge and will have initial 24/7 supervision. Above information is for ex-wife's home setup.; no falls in the past year    Objective                                                                                    Goals:  (Update in navigator)  Short term goals  Time Frame for Short term goals: 10 tx days:  Short term goal 1: Pt will complete rolling L/R and sup<->sit Ind following sternal precautions.   Short term goal 2: Pt will complete OOB transfers following sternal precautions Mod Ind. Short term goal 3: Pt will ambulate 150 ft using RW Mod Ind. Short term goal 4: Pt will ascend/descend 4-5 steps using bilat rails Mod Ind. Short term goal 5: Pt will ascend/descend curb step and ambulate over uneven surfaces using RW Mod Ind. Short term goal 6: Pt will complete object retrieval from the floor in standing Mod Ind-Ind.:   :        Plan of Care                                                                              Times per week: 5 days per week for a minimum of 60 minutes/day plus group as appropriate for 60 minutes.   Treatment to include Current Treatment Recommendations: Transfer Training, Endurance Training, Strengthening, Patient/Caregiver Education & Training, Pain Management, Equipment Evaluation, Education, & procurement, Balance Training, Gait Training, Home Exercise Program, Functional Mobility Training, Stair training, Safety Education & Training    Electronically signed by   Rosalina Davidson  11/3/2020, 8:21 AM

## 2020-11-03 NOTE — PROGRESS NOTES
A wheeled walker is necessary due to the patient's unsteady gait, upper body weakness, inability to  a standard walker. This patient can ambulate only by pushing a walker instead of using a lesser assistive device such as a cane or crutch. Bed Mobility:           []   Pt received out of bed   Scooting:  SBA, min cues for sternal precautions  Sit --> Supine:  Min A LE's    Transfers:    Sit--> Stand:  Min A due to retropulsive, cues for COG fwd; able to demonstrate sternal precautions without cueint  Stand --> Sit:   NOMI-SBAmargo sternal precautions  Stand-Pivot:   CGA  Assistive device required for transfer:   RW    Gait:    Distance: 20'+ 208'   Assistance:  CGA  Device:  RW  Gait Quality:     []None [x]Slow nader  [] Increased KAMINI  [] Staggers [x]Deviated Path  [] Decreased step length  [] Decreased step height  []Decreased arm swing  [] Shuffles  [] Decreased head and trunk rotation  [x]other:narrow KAMINI, req one standing rest break due to fatigue, otherwise good tolerance    Curb       Assistance:    CGA x 2 trials, min cues, improved second trial  Supportive Device:  RW  Height:   4\"    Uneven Surfaces:       Assistance:    CGA x 2 trials, min cues first attempt with clearance of back legs of AD over carpet edge, improved second trial.   Device:    RW  Surfaces Completed:   [] Green mat with bean bags beneath       [] Throw rugs          [] Outdoor pavements      [] Grass          [] Loose gravel   [x] Carpet      []  Other:       Additional Therapeutic activities/exercises completed this date:     []   Nu-step:  Time:        Level:         #Steps:       []   Rebounder:    []  Seated     []  Standing        []   Balance training         []   Postural training    []   Supine ther ex (reps/sets):     []   Seated ther ex (reps/sets):     []   Standing ther ex (reps/sets):     [x]   Picked up objects of  from floor of different sizes amb to each object with RW, min cues for O2 line management.  Pt req CG-, able to demo safe technique using AE after initial demo. [x]   Reacher used   []   Other:   []   Other:   []   Other:     Patient/Caregiver Education and Training:   [x]   Bed Mobility/Transfer technique/safety  [x]   Gait technique/sequencing  [x]   Proper use of assistive device  [x]   Advanced mobility safety and technique  [x]   Reinforced patient's precautions with mobility/functional tasks  [x]   Self monitoring of sx's/signs of ex intolerance  []   Family training  [x]   Progress was updated and reviewed in Rehabtracker with patient and/or family this date.     Treatment Plan for Next Session: Gait progression,  Car transfers, cardiac ex/education      Treatment/Activity Tolerance:   [x] Tolerated treatment with no adverse effects    [] Patient limited by fatigue  [] Patient limited by pain   [] Patient limited by medical complications:    [] Adverse reaction to Tx:   [] Significant change in status    Safety:       [x]  bed alarm set    []  chair alarm set    []  Pt refused alarms                []  Telesitter activated      [x]  Gait belt used during tx session      []other:         Number of Minutes/Billable Intervention  Gait Training 40   Therapeutic Exercise    Neuro Re-Ed    Therapeutic Activity 20   Wheelchair Propulsion    Group    Other:    TOTAL 60         Social History  Social/Functional History  ADL Assistance: Independent  Homemaking Assistance: Independent  Homemaking Responsibilities: Yes  Meal Prep Responsibility: Primary(Pt reports eating out most of the time.)  Laundry Responsibility: Primary  Cleaning Responsibility: Primary  Bill Paying/Finance Responsibility: Primary  Shopping Responsibility: Primary  Dependent Care Responsibility: No  Health Care Management: Primary  Ambulation Assistance: Independent  Transfer Assistance: Independent  Active : Yes  Mode of Transportation: Truck, Car  Occupation: Full time employment  Type of occupation: Truck drive  Leisure & Hobbies: none  Additional Comments: 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 Pt reports he will be staying with ex-wife at discharge and will have initial 24/7 supervision. Above information is for ex-wife's home setup.; no falls in the past year    Objective                                                                                    Goals:  (Update in navigator)  Short term goals  Time Frame for Short term goals: 10 tx days:  Short term goal 1: Pt will complete rolling L/R and sup<->sit Ind following sternal precautions. Short term goal 2: Pt will complete OOB transfers following sternal precautions Mod Ind. Short term goal 3: Pt will ambulate 150 ft using RW Mod Ind. Short term goal 4: Pt will ascend/descend 4-5 steps using bilat rails Mod Ind. Short term goal 5: Pt will ascend/descend curb step and ambulate over uneven surfaces using RW Mod Ind. Short term goal 6: Pt will complete object retrieval from the floor in standing Mod Ind-Ind.:   :        Plan of Care                                                                              Times per week: 5 days per week for a minimum of 60 minutes/day plus group as appropriate for 60 minutes.   Treatment to include Current Treatment Recommendations: Transfer Training, Endurance Training, Strengthening, Patient/Caregiver Education & Training, Pain Management, Equipment Evaluation, Education, & procurement, Balance Training, Gait Training, Home Exercise Program, Functional Mobility Training, Stair training, Safety Education & Training    Electronically signed by   Aureliano Chiu, PTA #[id number]  11/3/2020, 8:21 AM

## 2020-11-03 NOTE — PLAN OF CARE
Problem: SAFETY  Goal: Free from accidental physical injury  11/3/2020 0943 by Savanna De Jesus RN  Outcome: Ongoing  11/2/2020 2303 by Adam Whitehead RN  Outcome: Ongoing  Goal: Free from intentional harm  11/3/2020 0943 by Savanna De Jesus RN  Outcome: Ongoing  11/2/2020 2303 by Adam Whitehead RN  Outcome: Ongoing     Problem: DAILY CARE  Goal: Daily care needs are met  11/3/2020 0943 by Savanna De Jesus RN  Outcome: Ongoing  11/2/2020 2303 by Adam Whitehead RN  Outcome: Ongoing     Problem: PAIN  Goal: Patient's pain/discomfort is manageable  11/3/2020 0943 by Savanna De Jesus RN  Outcome: Ongoing  11/2/2020 2303 by Adam Whitehead RN  Outcome: Ongoing     Problem: SKIN INTEGRITY  Goal: Skin integrity is maintained or improved  11/3/2020 0943 by Savanna De Jesus RN  Outcome: Ongoing  11/2/2020 2303 by Adam Whitehead RN  Outcome: Ongoing     Problem: KNOWLEDGE DEFICIT  Goal: Patient/S.O. demonstrates understanding of disease process, treatment plan, medications, and discharge instructions.   11/3/2020 0943 by Savanna De Jesus RN  Outcome: Ongoing  11/2/2020 2303 by Adam Whitehead RN  Outcome: Ongoing     Problem: DISCHARGE BARRIERS  Goal: Patient's continuum of care needs are met  11/3/2020 0943 by Savanna De Jesus RN  Outcome: Ongoing  11/2/2020 2303 by Adam Whitehead RN  Outcome: Ongoing     Problem: Infection - Surgical Site:  Goal: Will show no infection signs and symptoms  Description: Will show no infection signs and symptoms  11/3/2020 0943 by Savanna De Jesus RN  Outcome: Ongoing  11/2/2020 2303 by Adam Whitehead RN  Outcome: Ongoing     Problem: Pain:  Goal: Pain level will decrease  Description: Pain level will decrease  11/3/2020 0943 by Savanna De Jesus RN  Outcome: Ongoing  11/2/2020 2303 by Adam Whitehead RN  Outcome: Ongoing  Goal: Control of acute pain  Description: Control of acute pain  11/3/2020 0943 by Savanna De Jesus RN  Outcome: Ongoing  11/2/2020 2303 by Adam Whitehead RN  Outcome: Ongoing  Goal: Control of chronic pain  Description: Control of chronic pain  11/3/2020 0943 by Beatris Sterling RN  Outcome: Ongoing  11/2/2020 2303 by Racheal Will RN  Outcome: Ongoing     Problem: Skin Integrity:  Goal: Will show no infection signs and symptoms  Description: Will show no infection signs and symptoms  11/3/2020 0943 by Beatris Sterling RN  Outcome: Ongoing  11/2/2020 2303 by Racheal Will RN  Outcome: Ongoing  Goal: Absence of new skin breakdown  Description: Absence of new skin breakdown  11/3/2020 0943 by Beatris Sterling RN  Outcome: Ongoing  11/2/2020 2303 by Racheal Will RN  Outcome: Ongoing

## 2020-11-03 NOTE — CARE COORDINATION
Case mgt met with patient in room. Patient had expressed interested in 10/9 discharge as his children are off work that day. After speaking with Dr Pa hendersont shared that patient won't be medically ready for discharge 10/9. Patient states that they can't take off work and he'll have to stay the whole week if he doesn't d/c 10/9. Case mgt advised that patient will be discharged when he's medically ready and he should speak with his family today to make arrangements for d/c transport next week. Patient reports his significant other is having difficultly getting patient's status info from nursing staff. Chin Rankin does have permission to ask about patient's hospital stay.   Case mgt requested patient alert her if problems persist.  She is already listed on patient's emergency contact list.

## 2020-11-04 LAB
GLUCOSE BLD-MCNC: 116 MG/DL (ref 70–99)
GLUCOSE BLD-MCNC: 127 MG/DL (ref 70–99)
GLUCOSE BLD-MCNC: 131 MG/DL (ref 70–99)
GLUCOSE BLD-MCNC: 87 MG/DL (ref 70–99)

## 2020-11-04 PROCEDURE — 6370000000 HC RX 637 (ALT 250 FOR IP): Performed by: SURGERY

## 2020-11-04 PROCEDURE — 94640 AIRWAY INHALATION TREATMENT: CPT

## 2020-11-04 PROCEDURE — 97530 THERAPEUTIC ACTIVITIES: CPT

## 2020-11-04 PROCEDURE — 99232 SBSQ HOSP IP/OBS MODERATE 35: CPT | Performed by: PHYSICAL MEDICINE & REHABILITATION

## 2020-11-04 PROCEDURE — 82962 GLUCOSE BLOOD TEST: CPT

## 2020-11-04 PROCEDURE — 6370000000 HC RX 637 (ALT 250 FOR IP): Performed by: SPECIALIST

## 2020-11-04 PROCEDURE — 97110 THERAPEUTIC EXERCISES: CPT

## 2020-11-04 PROCEDURE — 1280000000 HC REHAB R&B

## 2020-11-04 PROCEDURE — 97535 SELF CARE MNGMENT TRAINING: CPT

## 2020-11-04 PROCEDURE — 94761 N-INVAS EAR/PLS OXIMETRY MLT: CPT

## 2020-11-04 PROCEDURE — 6370000000 HC RX 637 (ALT 250 FOR IP): Performed by: PHYSICAL MEDICINE & REHABILITATION

## 2020-11-04 PROCEDURE — 2700000000 HC OXYGEN THERAPY PER DAY

## 2020-11-04 PROCEDURE — 94150 VITAL CAPACITY TEST: CPT

## 2020-11-04 PROCEDURE — 97116 GAIT TRAINING THERAPY: CPT

## 2020-11-04 RX ADMIN — BUDESONIDE AND FORMOTEROL FUMARATE DIHYDRATE 2 PUFF: 160; 4.5 AEROSOL RESPIRATORY (INHALATION) at 20:37

## 2020-11-04 RX ADMIN — BUDESONIDE AND FORMOTEROL FUMARATE DIHYDRATE 2 PUFF: 160; 4.5 AEROSOL RESPIRATORY (INHALATION) at 07:23

## 2020-11-04 RX ADMIN — GUAIFENESIN 600 MG: 600 TABLET, EXTENDED RELEASE ORAL at 07:56

## 2020-11-04 RX ADMIN — FUROSEMIDE 40 MG: 40 TABLET ORAL at 07:57

## 2020-11-04 RX ADMIN — TAMSULOSIN HYDROCHLORIDE 0.4 MG: 0.4 CAPSULE ORAL at 07:57

## 2020-11-04 RX ADMIN — MULTIPLE VITAMINS W/ MINERALS TAB 1 TABLET: TAB at 07:57

## 2020-11-04 RX ADMIN — HYDROCODONE BITARTRATE AND ACETAMINOPHEN 2 TABLET: 5; 325 TABLET ORAL at 17:16

## 2020-11-04 RX ADMIN — FINASTERIDE 5 MG: 5 TABLET, FILM COATED ORAL at 07:57

## 2020-11-04 RX ADMIN — ALBUTEROL SULFATE 2 PUFF: 90 AEROSOL, METERED RESPIRATORY (INHALATION) at 11:15

## 2020-11-04 RX ADMIN — GUAIFENESIN 600 MG: 600 TABLET, EXTENDED RELEASE ORAL at 21:51

## 2020-11-04 RX ADMIN — FUROSEMIDE 40 MG: 40 TABLET ORAL at 17:13

## 2020-11-04 RX ADMIN — HYDROCODONE BITARTRATE AND ACETAMINOPHEN 2 TABLET: 5; 325 TABLET ORAL at 12:57

## 2020-11-04 RX ADMIN — TIOTROPIUM BROMIDE INHALATION SPRAY 2 PUFF: 3.12 SPRAY, METERED RESPIRATORY (INHALATION) at 07:24

## 2020-11-04 RX ADMIN — LEVOTHYROXINE SODIUM 100 MCG: 100 TABLET ORAL at 05:50

## 2020-11-04 RX ADMIN — ALBUTEROL SULFATE 2 PUFF: 90 AEROSOL, METERED RESPIRATORY (INHALATION) at 07:22

## 2020-11-04 RX ADMIN — PREDNISONE 20 MG: 20 TABLET ORAL at 07:57

## 2020-11-04 RX ADMIN — Medication 250 MG: at 03:02

## 2020-11-04 RX ADMIN — ATORVASTATIN CALCIUM 20 MG: 20 TABLET, FILM COATED ORAL at 21:52

## 2020-11-04 RX ADMIN — HYDROCODONE BITARTRATE AND ACETAMINOPHEN 2 TABLET: 5; 325 TABLET ORAL at 21:51

## 2020-11-04 RX ADMIN — DULOXETINE HYDROCHLORIDE 60 MG: 30 CAPSULE, DELAYED RELEASE ORAL at 07:57

## 2020-11-04 RX ADMIN — CHOLESTYRAMINE 4 G: 4 POWDER, FOR SUSPENSION ORAL at 07:57

## 2020-11-04 RX ADMIN — AMIODARONE HYDROCHLORIDE 200 MG: 200 TABLET ORAL at 07:56

## 2020-11-04 RX ADMIN — CARVEDILOL 3.12 MG: 6.25 TABLET, FILM COATED ORAL at 07:56

## 2020-11-04 RX ADMIN — ALBUTEROL SULFATE 2 PUFF: 90 AEROSOL, METERED RESPIRATORY (INHALATION) at 14:29

## 2020-11-04 RX ADMIN — Medication 250 MG: at 21:52

## 2020-11-04 RX ADMIN — ALBUTEROL SULFATE 2 PUFF: 90 AEROSOL, METERED RESPIRATORY (INHALATION) at 20:35

## 2020-11-04 RX ADMIN — HYDROCODONE BITARTRATE AND ACETAMINOPHEN 2 TABLET: 5; 325 TABLET ORAL at 03:02

## 2020-11-04 RX ADMIN — Medication 250 MG: at 08:08

## 2020-11-04 RX ADMIN — HYDROCODONE BITARTRATE AND ACETAMINOPHEN 2 TABLET: 5; 325 TABLET ORAL at 08:13

## 2020-11-04 RX ADMIN — ASPIRIN 81 MG: 81 TABLET, FILM COATED ORAL at 07:57

## 2020-11-04 RX ADMIN — CARVEDILOL 3.12 MG: 6.25 TABLET, FILM COATED ORAL at 21:51

## 2020-11-04 RX ADMIN — Medication 250 MG: at 15:39

## 2020-11-04 ASSESSMENT — PAIN DESCRIPTION - LOCATION: LOCATION: CHEST

## 2020-11-04 ASSESSMENT — PAIN SCALES - GENERAL
PAINLEVEL_OUTOF10: 4
PAINLEVEL_OUTOF10: 9
PAINLEVEL_OUTOF10: 7
PAINLEVEL_OUTOF10: 9

## 2020-11-04 ASSESSMENT — PAIN DESCRIPTION - FREQUENCY: FREQUENCY: CONTINUOUS

## 2020-11-04 ASSESSMENT — PAIN DESCRIPTION - DESCRIPTORS: DESCRIPTORS: CONSTANT

## 2020-11-04 ASSESSMENT — PAIN DESCRIPTION - PAIN TYPE: TYPE: ACUTE PAIN

## 2020-11-04 ASSESSMENT — PAIN DESCRIPTION - ORIENTATION: ORIENTATION: MID

## 2020-11-04 ASSESSMENT — PAIN DESCRIPTION - ONSET: ONSET: ON-GOING

## 2020-11-04 NOTE — PATIENT CARE CONFERENCE
ACUTE REHAB TEAM CONFERENCE SUMMARY   [address] [facility]    NAME: Jaky Rater  : 1955 ADMIT DATE: 10/27/2020    Rehab Admitting Dx: Atherosclerotic heart disease of native coronary artery without angina pectoris [I25.10]  CHF following cardiac surgery, postop [I97.130]  Patient Comorbid Conditions: Active Hospital Problems    Diagnosis Date Noted    Uncontrolled pain [R52]     Generalized weakness [R53.1]     Gait disturbance [R26.9]     Acute blood loss anemia [D62]     COPD exacerbation (HCC) [J44.1]     Cigarette nicotine dependence without complication [A19.696]     CHF following cardiac surgery, postop [I97.130] 10/27/2020     Date: 2020    CASE MANAGEMENT  Current issues/needs regarding patient and family discharge status: To discharge to spouse's home (1L, 1 SEB). Additional support of children. PHYSICAL THERAPY   Short term goals  Time Frame for Short term goals: 10 tx days:  Short term goal 1: Pt will complete rolling L/R and sup<->sit Ind following sternal precautions. Part met: supervision  Short term goal 2: Pt will complete OOB transfers following sternal precautions Mod Ind. Part met: supervision  Short term goal 3: Pt will ambulate 150 ft using RW Mod IndPart met: supervision with 2ww. Short term goal 4: Pt will ascend/descend 4-5 steps using bilat rails Mod Ind. Part met: 4 steps with CG  Short term goal 5: Pt will ascend/descend curb step and ambulate over uneven surfaces using RW Mod Ind. Part met: CG  Short term goal 6: Pt will complete object retrieval from the floor in standing Mod Ind-Ind.  Part met: supervision with reacher         Impairments/deficits, barriers:O2    Body structures, Functions, Activity limitations: Decreased functional mobility , Decreased high-level IADLs, Decreased ADL status, Decreased endurance, Decreased sensation, Decreased strength, Decreased balance, Increased pain     Prognosis: Good  Decision Making: Medium Complexity  Clinical Presentation: evolving with changing characteristics  Equipment needed at discharge:2ww      PT IRF-ESTEBAN scores and goals since initial assessment:   Roll Left and Right  Assistance Needed: Supervision or touching assistance  Comment: required Sup. (sequential cues following sternal precautions); tasks completed without use of bed features  CARE Score: 4  Discharge Goal: Independent  Sit to Lying  Assistance Needed: Partial/moderate assistance  Comment: required Mod A (assist on BLE), transfer completed without use of bed features  CARE Score: 3  Discharge Goal: Independent  Lying to Sitting on Side of Bed  Assistance Needed: Partial/moderate assistance  Comment: required Mod A (assist on upper body), transfer completed without use of bed features  CARE Score: 3  Discharge Goal: Independent  Sit to Stand  Assistance Needed: Partial/moderate assistance  Comment: required Mod A with RW  CARE Score: 3  Discharge Goal: Independent  Chair/Bed-to-Chair Transfer  Comment: pt limited by lightheadedness  Reason if not Attempted: Not attempted due to medical condition or safety concerns  CARE Score: 88  Discharge Goal: Independent  Toilet Transfer  Assistance Needed: Partial/moderate assistance  Comment: Min A-Mod A on/off toilet and body placement. CARE Score: 3  Discharge Goal: Independent  Car Transfer  Reason if not Attempted: Not attempted due to medical condition or safety concerns  CARE Score: 88  Discharge Goal: Independent  Walk 10 Feet?   Walk 10 Feet?: No  1 Step  1 Step?: Yes  Picking Up Object  Reason if not Attempted: Not attempted due to medical condition or safety concerns  CARE Score: 88  Discharge Goal: Independent  Wheelchair Ability  Uses a Wheelchair and/or Scooter?: No              1 Step (Curb)  Reason if not Attempted: Not attempted due to medical condition or safety concerns  CARE Score: 88  Discharge Goal: Independent   4 Steps  Reason if not Attempted: Not attempted due to Independent  Upper Body Dressing  Assistance Needed: Partial/moderate assistance  Comment: Min A for threading BUE while maintaining cardiac precautions. CARE Score: 3  Discharge Goal: Independent  Lower Body Dressing  Assistance Needed: Substantial/maximal assistance  Comment: Max A to thread BLE and don over hips, pt demo buttoning pants. CARE Score: 2  Discharge Goal: Independent  Putting On/Taking Off Footwear  Assistance Needed: Dependent  CARE Score: 1  Discharge Goal: Partial/moderate assistance      Impairments/deficits, barriers: Decreased balance, endurance  Assessment  Performance deficits / Impairments: Decreased functional mobility , Decreased strength, Decreased endurance, Decreased ADL status, Decreased high-level IADLs, Decreased ROM, Decreased balance  Decision Making: High Complexity  REQUIRES OT FOLLOW UP: Yes  Equipment needed at discharge: BSC and TTB?       COGNITIVE FUNCTION/SPEECH THERAPY (AS INDICATED)  LTG                                                 Nursing Current Medical Status:   [x] Is continent of bowel and bladder     [] Is incontinent of bowel and bladder    [] Has had an adequate number of bowel movements   [] Urinates with no urinary retention >300ml in bladder   [] Targeting bladder protocol with dsouza removal   [x] Maintaining O2 SATs at 92% or greater   [x] Has pain managed while on ARU         [x] Has had no skin breakdown while on ARU   [] Has improved skin integrity via wound measurements   [] Has no signs/symptoms of infection at the wound site   [] Pressure wounds Stage/Location:    [] Arrived on unit with pressure wound  [x] Has been free from injury during hospitalization   [] Has experienced a fall during hospitalization  [] Ongoing education with patient/family with understanding demonstrated for:  [] Receives IV Fluids  [] Other:  2 liters of o2 , takes norco for sterum pain and is effective pain management, called Dr Valerie Vanessa concerning x 3 days no benefit from the intensive rehabilitation therapy program   []  The estimated discharge date has been changed from initial team conference due to:   []  The estimated discharge destination has been changed from initial team conference due to:         Ongoing tx following discharge: [x]HHC  PT/OT/NSG   []OUTPATIENT     [] No Further Treatment     [] Family/Caregiver Training  []  Transitional Living Arrangement   [] Home Assessment (date  )     [] Family Conference   []  Therapeutic Pass       []  Other: (specify)    Estimated Discharge Date: 11/10/2020    Estimated Discharge Destination: []home alone   []home alone with assist prn  []Continuous supervision [x]Return home with spouse/family   [] Assisted living  []SNF     Team members participating in today's conference. [x] Jessica Rodriges, Medical Director  [x] Jamison Trinh,   [x] Denice Lucas, Nurse Mgr     []  Bre Lui, PT  [x]  Teresita Morales, PT   [] Nikita Riggs, OT  [x] Elba Hanna, OT   [x] Idalia Tolbert, SLP     []  Marko Quevedo, GURMEET   [x] Mychal De Guzman,     [x]Vivien Keys,    []  Kingston Delarosa RN    [] Isra Bliss, RN  [] Duncan Hoover, RN    [] Eileen Holley RN        I have led this Team Conference and agree with the plan, Mp Suazo MD, 11/5/2020, 12:50 PM  Goals have been updated to reflect recent status.     Team conference note transcribed this date by: Pancho Galvan MA, [id number] [facility], Therapy Coordinator

## 2020-11-04 NOTE — FLOWSHEET NOTE
4810-2505 daily progress note       [] discharge       Patient Name:  Erik Purdy   :  1955 MRN: [id number]  Room:  [address] [place] [address] Date of Admission: 10/27/2020  Rehabilitation Diagnosis:   Atherosclerotic heart disease of native coronary artery without angina pectoris [I25.10]  CHF following cardiac surgery, postop [I97.130]       Date 2020       Day of ARU Week:  2   Time IN/OUT 5669-2162  4587-2068   Individual Tx Minutes 60+60   Group Tx Minutes    Co-Treat Minutes    Concurrent Tx Minutes    TOTAL Tx Time Mins 120   Variance Time    Variance Time []   Refusal due to:     []   Medical hold/reason:    []   Illness   []   Off Unit for test/procedure  []   Extra time needed to complete task  []   Therapeutic need  []   Other (specify):   Restrictions Restrictions/Precautions  Restrictions/Precautions: Fall Risk, General Precautions, Contact Precautions, Cardiac(c-diff)  Position Activity Restriction  Sternal Precautions: No Pushing, No Pulling, 5# Lifting Restrictions  Other position/activity restrictions: 3L O2   Communication with other providers: [x]   OK to see per nursing:     []   Spoke with team member regarding:      Subjective observations and cognitive status: Pt sitting up in bed awake and finishing breakfast. He is agreeable to therapy.     Pt reports no pain initially then pain 9/10 at end of session    Pt sitting up in recliner in PM and reports  Chest pain around incision 9/10 and he is requesting pain med and nurse Pallavi George notified   Pain level/location:  0 /10  In AM / 9/10 in PM     Location: chest region   Discharge recommendations  Anticipated discharge date:  11/10/2020  Destination: []home alone   []home alone with assist PRN     [x] home w/ family      [] Continuous supervision  []SNF    [] Assisted living     [] Other:   Continued therapy: [x]HHC PT  []OUTPATIENT  PT   [] No Further PT  Equipment needs: Jorge Bass the assistance of a wheeled walker to successfully ambulate from room to room at home to allow completion of daily living tasks such as: bathing, toileting, dressing and grooming.  A wheeled walker is necessary due to the patient's unsteady gait, upper body weakness, inability to  a standard walker.  This patient can ambulate only by pushing a walker instead of using a lesser assistive device such as a cane or crutch. Bed Mobility:           []   Pt received out of bed   Scooting: Mod ind  Lying --> Sit:  Sup/SBA    Transfers:    Sit--> Stand:  SBA  Stand --> Sit:   SAB  Car Transfers:  CGA  Toilet Transfer (if applicable): Other:    Assistive device required for transfer:   Heart pillow    Gait:    Distance:  185 ft, 172 ft, 120 ft in AM / 220 ft ,  50 ft, 175 ft with 4 turns  Assistance:  SBA  Device:  FWW  Gait Quality:  Slow pace, small recip steps    Stairs   # Completed:  4  Assistance:  CGA  Supportive Device:  Light touch on B rails    Uneven Surfaces:       Assistance:    SBA  Device:    *FWW  Surfaces Completed:   []  Green mat with bean bags beneath      []  Throw rugs       []  Ramp       []  Outdoor pavements        []  Grass             []  Loose gravel        [x]  Other:   Carpet with CGA 1st rep and back legs of walker pulling up carpet requiring vc for safety, SBA 2nd trial      Additional Therapeutic activities/exercises completed this date:     []   Nu-step:  Time:        Level:         #Steps:       []   Rebounder:    []  Seated     []  Standing        []   Balance training         []   Postural training    [x]   Supine ther ex (reps/sets): pf/df, hip abd/add x 20 reps each in recliner with leg rest elevated. [x]   Seated ther ex (reps/sets):   Intermediate cardiac  There ex x 10 reps with SBA and 10% vc for technique   []   Standing ther ex (reps/sets):     []   Picking up object from floor (standing):                   []   Reacher used   []   Other:   [x]   Other: curb step SBA + vc on 1st trial and no cues on 2nd trial    Comments:      Patient/Caregiver Education and Training:   [x]   Bed Mobility/Transfer technique/safety  [x]   Gait technique/sequencing  [x]   Proper use of assistive device  []   Advanced mobility safety and technique  []   Reinforced patient's precautions/mobility while maintaining precautions  []   Postural awareness  []   Family training  []   Progress was updated and reviewed in Rehabtracker with patient and/or family this date. Treatment Plan for Next Session: Transfer training, stair training, gait training      Assessment:      Treatment/Activity Tolerance:   [] Tolerated treatment with no adverse effects    [x] Patient limited by fatigue  [] Patient limited by pain   [] Patient limited by medical complications:    [] Adverse reaction to Tx:   [] Significant change in status    Safety:       []  bed alarm set    [x]  chair alarm set    []  Pt refused alarms                []  Telesitter activated      [x]  Gait belt used during tx session      []other:         Number of Minutes/Billable Intervention  Gait Training 45+30   Therapeutic Exercise 15   Neuro Re-Ed    Therapeutic Activity 15+15   Wheelchair Propulsion    Group    Other:    TOTAL 120         Social History  Social/Functional History  ADL Assistance: Independent  Homemaking Assistance: Independent  Homemaking Responsibilities: Yes  Meal Prep Responsibility: Primary(Pt reports eating out most of the time.)  Laundry Responsibility: Primary  Cleaning Responsibility: Primary  Bill Paying/Finance Responsibility: Primary  Shopping Responsibility: Primary  Dependent Care Responsibility: No  Health Care Management: Primary  Ambulation Assistance: Independent  Transfer Assistance: Independent  Active : Yes  Mode of Transportation: Truck, Car  Occupation: Full time employment  Type of occupation: Truck drive  Leisure & Hobbies: none  Additional Comments: 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  Pt reports he will be staying with

## 2020-11-04 NOTE — PLAN OF CARE
Problem: SAFETY  Goal: Free from accidental physical injury  11/4/2020 0836 by Mark Ram RN  Outcome: Ongoing  11/4/2020 0106 by Shanae Parnell RN  Outcome: Ongoing  Goal: Free from intentional harm  11/4/2020 0836 by Mark Ram RN  Outcome: Ongoing  11/4/2020 0106 by Shanae Parnell RN  Outcome: Ongoing     Problem: DAILY CARE  Goal: Daily care needs are met  11/4/2020 0836 by Mark Ram RN  Outcome: Ongoing  11/4/2020 0106 by Shanae Parnell RN  Outcome: Ongoing     Problem: PAIN  Goal: Patient's pain/discomfort is manageable  11/4/2020 0836 by Mark Ram RN  Outcome: Ongoing  11/4/2020 0106 by Shanae Parnell RN  Outcome: Ongoing     Problem: SKIN INTEGRITY  Goal: Skin integrity is maintained or improved  11/4/2020 0836 by Mark Ram RN  Outcome: Ongoing  11/4/2020 0106 by Shanae Parnell RN  Outcome: Ongoing     Problem: KNOWLEDGE DEFICIT  Goal: Patient/S.O. demonstrates understanding of disease process, treatment plan, medications, and discharge instructions.   11/4/2020 0836 by Mark Ram RN  Outcome: Ongoing  11/4/2020 0106 by Shanae Parnell RN  Outcome: Ongoing     Problem: DISCHARGE BARRIERS  Goal: Patient's continuum of care needs are met  11/4/2020 0836 by Mark Ram RN  Outcome: Ongoing  11/4/2020 0106 by Shanae Parnell RN  Outcome: Ongoing     Problem: Infection - Surgical Site:  Goal: Will show no infection signs and symptoms  Description: Will show no infection signs and symptoms  11/4/2020 0836 by Mark Ram RN  Outcome: Ongoing  11/4/2020 0106 by Shanae Parnell RN  Outcome: Ongoing     Problem: Pain:  Goal: Pain level will decrease  Description: Pain level will decrease  11/4/2020 0836 by Mark Ram RN  Outcome: Ongoing  11/4/2020 0106 by Shanae Parnell RN  Outcome: Ongoing  Goal: Control of acute pain  Description: Control of acute pain  11/4/2020 0836 by Mark Ram RN  Outcome: Ongoing  11/4/2020 0106 by Shanae Parnell RN  Outcome: Ongoing  Goal: Control of chronic pain  Description: Control of chronic pain  11/4/2020 0836 by Mike Barnes RN  Outcome: Ongoing  11/4/2020 0106 by Thaddeus Aguayo RN  Outcome: Ongoing     Problem: Skin Integrity:  Goal: Will show no infection signs and symptoms  Description: Will show no infection signs and symptoms  11/4/2020 0836 by Mike Barnes RN  Outcome: Ongoing  11/4/2020 0106 by Thaddeus Aguayo RN  Outcome: Ongoing  Goal: Absence of new skin breakdown  Description: Absence of new skin breakdown  11/4/2020 0836 by Mike Barnes RN  Outcome: Ongoing  11/4/2020 0106 by Thaddeus Aguayo RN  Outcome: Ongoing

## 2020-11-04 NOTE — PROGRESS NOTES
for Next Session: POC to continue as tolerated        Assessment:        Treatment/Activity Tolerance:   [x] Tolerated treatment with no adverse effects    [] Patient limited by fatigue  [] Patient limited by pain   [] Patient limited by medical complications:    [] Adverse reaction to Tx:   [] Significant change in status    Safety:       [x]  bed alarm set    []  chair alarm set    []  Pt refused alarms                []  Telesitter activated      [x]  Gait belt used during tx session      []other:       Number of Minutes/Billable Intervention  Therapeutic Exercise    ADL Self-care 30   Neuro Re-Ed    Therapeutic Activity 30   Group    Other:    TOTAL 60       Social History  Social/Functional History  ADL Assistance: Independent  Homemaking Assistance: Independent  Homemaking Responsibilities: Yes  Meal Prep Responsibility: Primary(Pt reports eating out most of the time.)  Laundry Responsibility: Primary  Cleaning Responsibility: Primary  Bill Paying/Finance Responsibility: Primary  Shopping Responsibility: Primary  Dependent Care Responsibility: No  Health Care Management: Primary  Ambulation Assistance: Independent  Transfer Assistance: Independent  Active : Yes  Mode of Transportation: Truck, Car  Occupation: Full time employment  Type of occupation: Truck drive  Leisure & Hobbies: none  Additional Comments: 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 Pt reports he will be staying with ex-wife at discharge and will have initial 24/7 supervision.  Above information is for ex-wife's home setup.; no falls in the past year    Objective                                                                                    Goals:  (Update in navigator)  Short term goals  Time Frame for Short term goals: STG=LTG:  Long term goals  Time Frame for Long term goals : 10-12 days or until d/c  Long term goal 1: Pt will complete feeding/grooming/oral care task c MOD I by d/c  Long term goal 2: Pt will complete total body bathing c Mod I by d/c  Long term goal 3: Pt will complete UB dressing c MOD I and retrieve items from closet by d/c  Long term goal 4: Pt will complete LB dressing c MOD I and retrieve items from closet by d/c  Long term goal 5: Pt will don/doff footwear c Min A by d/c  Long term goals 6: Pt will complete toileting c MOD I by d/c  Long term goal 7: Pt will complete functional transfer (toilet, tub, shower) c MOD I by d/c. Long term goal 8: Pt will perform therex/therax to facilitate increased strength/endurance/ax tolerance (c emphasis on maintaining sternal precautions and dynamic standing balance/tolerance >8 mins) c MOD I in order to complete ADLs. :        Plan of Care                                                                              Times per week: 5 days per week for a minimum of 60 minutes/day plus group as appropriate for 60 minutes.   Treatment to include Plan  Times per day: Daily  Current Treatment Recommendations: Strengthening, Patient/Caregiver Education & Training, Home Management Training, Functional Mobility Training, Endurance Training, Equipment Evaluation, Education, & procurement, Pain Management, Safety Education & Training, Self-Care / ADL, Balance Training    Electronically signed by   VARUN Rawls  11/4/2020, 8:02 AM

## 2020-11-04 NOTE — PROGRESS NOTES
Sharmaine Goldberg    : 1955  Acct #: [de-identified]  MRN: [id number]              PM&R Progress Note      Admitting diagnosis: Congestive heart failure after coronary bypass graft surgery     Comorbid diagnoses impacting rehabilitation: Uncontrolled pain, generalized weakness, gait disturbance, acute blood loss anemia, essential hypertension, BPH, COPD exacerbation, nicotine addiction    Chief complaint: Eating little better. Still had 3 loose stools over the last 12 hours. No chills. Prior (baseline) level of function: Independent. Current level of function:         Current  IRF-ESTEBAN and Goals:   Hearing, Speech, and Vision  Expression of Ideas and Wants: Without difficulty  Understanding Verbal and Non-Verbal Content: Understands  Prior Functioning: Everyday Activities  Self Care: Independent  Indoor Mobility (Ambulation): Independent  Stairs: Independent  Functional Cognition: Independent  Prior Device Use: (Ind)    Eating  Assistance Needed: Setup or clean-up assistance  CARE Score: 5  Discharge Goal: Independent  Oral Hygiene  Assistance Needed: Setup or clean-up assistance  Comment: Completed seated sink-side  CARE Score: 5  Discharge Goal: Independent  Toileting Hygiene  Assistance Needed: Dependent  Comment: 3(deemed usual performance per team huddle)  CARE Score: 1  Discharge Goal: Independent  Shower/Bathe Self  Assistance Needed: Partial/moderate assistance  Comment: Mod A for LB bathing, v/c for sternal precautions/compensatory strategies. CARE Score: 3  Discharge Goal: Independent  Upper Body Dressing  Assistance Needed: Partial/moderate assistance  Comment: Min A for threading BUE while maintaining cardiac precautions. CARE Score: 3  Discharge Goal: Independent  Lower Body Dressing  Assistance Needed: Substantial/maximal assistance  Comment: Max A to thread BLE and don over hips, pt demo buttoning pants.   CARE Score: 2  Discharge Goal: Independent  Putting On/Taking Off Footwear  Assistance Needed: Dependent  CARE Score: 1  Discharge Goal: Partial/moderate assistance    Roll Left and Right  Assistance Needed: Supervision or touching assistance  Comment: required Sup. (sequential cues following sternal precautions); tasks completed without use of bed features  CARE Score: 4  Discharge Goal: Independent  Sit to Lying  Assistance Needed: Partial/moderate assistance  Comment: required Mod A (assist on BLE), transfer completed without use of bed features  CARE Score: 3  Discharge Goal: Independent  Sit to Stand  Assistance Needed: Partial/moderate assistance  Comment: required Mod A with RW  CARE Score: 3  Discharge Goal: Independent  Chair/Bed-to-Chair Transfer  Comment: pt limited by lightheadedness  Reason if not Attempted: Not attempted due to medical condition or safety concerns  CARE Score: 88  Discharge Goal: Independent  Toilet Transfer  Assistance Needed: Partial/moderate assistance  Comment: Min A-Mod A on/off toilet and body placement. CARE Score: 3  Discharge Goal: Independent  Car Transfer  Reason if not Attempted: Not attempted due to medical condition or safety concerns  CARE Score: 88  Discharge Goal: Independent   Walk 10 Feet? Walk 10 Feet?: No  1 Step  1 Step?: Yes  Picking Up Object  Reason if not Attempted: Not attempted due to medical condition or safety concerns  CARE Score: 88  Discharge Goal: Independent  Wheelchair Ability  Uses a Wheelchair and/or Scooter?: No                  I      Exam:    Blood pressure (!) 121/59, pulse 78, temperature 97.9 °F (36.6 °C), temperature source Oral, resp. rate 18, height 5' 9\" (1.753 m), weight 188 lb (85.3 kg), SpO2 95 %. General: Semiupright in bed. In no distress. Alert and oriented. HEENT: MMM. Neck supple. No JVD. Pulmonary: Diminished breath sounds. No coughing. Cardiac: Sternal incision well healed and dry. Regular rate and rhythm. Abdomen: Patient's abdomen is soft and nondistended.   Bowel sounds were present throughout. There was no rebound, guarding or masses noted. Upper extremities: Slow and deliberate with arm movements. Functional  strength. Lower extremities: Trace edema with no signs of DVT. Sitting balance was good. Standing balance was poor. Lab Results   Component Value Date    WBC 19.0 (H) 11/03/2020    HGB 12.5 (L) 11/03/2020    HCT 35.8 (L) 11/03/2020    MCV 99.4 11/03/2020     11/03/2020     Lab Results   Component Value Date    INR 0.97 11/03/2020    INR 1.02 10/28/2020    INR 1.27 10/19/2020    PROTIME 11.7 11/03/2020    PROTIME 12.4 10/28/2020    PROTIME 15.4 (H) 10/19/2020     Lab Results   Component Value Date    CREATININE 0.9 11/03/2020    BUN 28 (H) 11/03/2020     (L) 11/03/2020    K 3.9 11/03/2020    CL 98 (L) 11/03/2020    CO2 26 11/03/2020     Lab Results   Component Value Date    ALT 45 (H) 11/03/2020    AST 22 11/03/2020    ALKPHOS 61 11/03/2020    BILITOT 0.3 11/03/2020       Expected length of stay  prior to a supervised level of function for discharge home with a walker and [facility] OT/PT is 11/10/2020. Recommendations:    1. Systolic congestive heart failure after coronary bypass graft surgery: Despite his diarrhea, he is regularly participating with the daily occupational and physical therapy.  3 total bowel movements in the last 12 hours with less cramping. No chills. A little better oral intake. Continue with the pulmonary hygiene, nutritional support and cautious pain management.  Daily weights do not indicate any decompensation of CHF.  P.o. Lasix now. Beta-blocker.  Adaptive equipment training.  Caregiver training closer to discharge. Contact-guard to stand transfer. 2. DVT prophylaxis: Due to his anemia and bruising, chemoprophylaxis is contraindicated.  SCDs when in bed.  Weightbearing activities are pursued daily.       Stable hemoglobin.    3.  COPD exacerbation: Albuterol and Symbicort inhalers.  Mucinex and Spiriva.  Monitoring O2 saturations at rest and with activity.  Nebulizers as needed.  Supplemental oxygen to keep saturations greater than 90%. 4. Hypertension: Coreg and Lasix are used to manage his hypertension.  Target systolic blood pressure is 120-140.  Vital signs are checked at rest and with activity. 5. BPH: Flomax and Proscar used to manage this issue.  Bladder protocol should he develop symptoms of retention. 6. Nicotine addiction: Topical nicotine replacement patch. 7. C. difficile colitis: Flagyl changed to vancomycin by the GI consultant. White blood count at 19,000 (relatively stable). Other labs were unremarkable.   Continue Questran.

## 2020-11-04 NOTE — PROGRESS NOTES
Progress Note( Dr. Simi Del Valle)  11/3/2020  Subjective:   Admit Date: 10/27/2020  PCP: Ashleigh Arriaga MD    Admitted For : Chest pain CAD underwent CABG    Consulted For: Better control of blood glucose filed diabetes mellitus    Interval History: Post CABG patient was transferred to acute rehab unit on evening of 10/27/2020      C/O  chest pains, also with deep breathing mostly from chest wall also seem to be somewhat better  Has some  SOB . Denies nausea or vomiting. No new bowel or bladder symptoms. diarrhea seems to be better  It turned out to be C. difficile colitis seen by GI  Change the medicine to vancomycin p.o.       Intake/Output Summary (Last 24 hours) at 11/3/2020 2125  Last data filed at 11/3/2020 1844  Gross per 24 hour   Intake 880 ml   Output 1000 ml   Net -120 ml       DATA    CBC:   Recent Labs     11/02/20  0916 11/03/20  0603   WBC 20.1* 19.0*   HGB 14.5 12.5*    310    CMP:  Recent Labs     11/02/20  0916 11/03/20  0603    134*   K 3.4* 3.9   CL 92* 98*   CO2 30 26   BUN 35* 28*   CREATININE 1.0 0.9   CALCIUM 8.8 8.1*   PROT  --  5.1*   LABALBU  --  3.4   BILITOT  --  0.3   ALKPHOS  --  61   AST  --  22   ALT  --  45*     Lipids:   Lab Results   Component Value Date    CHOL 155 06/23/2020    HDL 30 06/23/2020    TRIG 173 06/23/2020     Glucose:  Recent Labs     11/03/20  1149 11/03/20  1656 11/03/20  2039   POCGLU 82 153* 187*     [id number]:  Lab Results   Component Value Date    LABA1C 6.3 10/12/2020     High Sensitivity TSH:   Lab Results   Component Value Date    TSHHS 1.340 06/23/2020     Free T3: No results found for: FT3  Free T4:  Lab Results   Component Value Date    T4FREE 1.28 06/23/2020       Xr Chest Portable    Result Date: 10/22/2020  EXAMINATION: ONE XRAY VIEW OF THE CHEST 10/22/2020 5:10 am COMPARISON: 10/21/2020 HISTORY: ORDERING SYSTEM PROVIDED HISTORY: Post op open heart surgery TECHNOLOGIST PROVIDED HISTORY: Reason for Exam:->Post op open heart surgery Reason for Exam: post op open heart surgery Acuity: Unknown Type of Exam: Ongoing FINDINGS: Central venous catheter tip overlies the SVC. Sternotomy wires. Small bilateral pleural effusion. Right lower lobe opacity. Stable cardiomegaly. Mild interstitial edema. No pneumothorax. 1. Unchanged mild pulmonary edema and small bilateral pleural effusions. 2. Stable right lower lobe atelectasis. Xr Chest Portable    Result Date: 10/21/2020  EXAMINATION: ONE XRAY VIEW OF THE CHEST 10/21/2020 1:16 pm COMPARISON: 10/21/2020 HISTORY: ORDERING SYSTEM PROVIDED HISTORY: chest tube removal   ersistent small right pleural effusion with bibasilar atelectasis. No discrete pneumothorax. The osseous structures otherwise stable. Interval removal of thoracostomy tubes. No discrete pneumothorax. Xr Chest Portable    Result Date: 10/21/2020  EXAMINATION: ONE XRAY VIEW OF THE CHEST 10/21/2020 6:06 am COMPARISON: 10/20/2020 HISTORY: ORDERING SYSTEM PROVIDED HISTORY: Post op open heart surgery  . Mildly increased interstitial changes which could be related to increased pulmonary edema or pneumonitis. Cardiomegaly with central vascular congestion. Otherwise similar appearing chest.     Xr Chest Portable    Result Date: 10/21/2020  EXAMINATION: ONE XRAY VIEW OF THE CHEST 10/20/2020 5:18 am COMPARISON: Chest radiograph dated October 19, 2020. HISTORY: ORDERING SYSTEM PROVIDED HISTORY: Post op open heart surgery TECHNOLOGIST PROVIDED HISTORY: Reason for Exam:->Post op open heart surgery Reason for Exam: Post op open heart surgery Acuity: Unknown Type of Exam: Unknown FINDINGS: Median sternotomy wires are noted. A left-sided central venous catheter is in place. A [place] catheter is in place. Bilateral chest tubes are present. Low lung volumes with bilateral pleural effusions and bibasilar opacities are seen. Bilateral pleural effusions with bibasilar opacities without significant change.            Scheduled Medicines   Medications:    vancomycin  250 mg Oral 4 times per day    cholestyramine light  4 g Oral BID    furosemide  40 mg Oral BID    aspirin  81 mg Oral Daily    atorvastatin  20 mg Oral Nightly    therapeutic multivitamin-minerals  1 tablet Oral Daily with breakfast    albuterol sulfate HFA  2 puff Inhalation Q4H WA    amiodarone  200 mg Oral Daily    budesonide-formoterol  2 puff Inhalation BID    DULoxetine  60 mg Oral Daily    finasteride  5 mg Oral Daily    guaiFENesin  600 mg Oral BID    levothyroxine  100 mcg Oral Daily    predniSONE  20 mg Oral Daily    tamsulosin  0.4 mg Oral Daily    tiotropium  2 puff Inhalation Daily    insulin lispro  0-6 Units Subcutaneous TID WC    insulin lispro  0-3 Units Subcutaneous Nightly    carvedilol  3.125 mg Oral BID      Infusions:         Objective:   Vitals: BP (!) 121/59   Pulse 78   Temp 97.9 °F (36.6 °C) (Oral)   Resp 18   Ht 5' 9\" (1.753 m)   Wt 188 lb (85.3 kg)   SpO2 95%   BMI 27.76 kg/m²   General appearance: alert and cooperative with exam  Neck: no JVD or bruit  Thyroid : Normal lobes   Lungs: Has Vesicular Breath sounds somewhat diminished breath sounds right side   heart:  regular rate and rhythm  Abdomen: soft, non-tender; bowel sounds normal; no masses,  no organomegaly  Musculoskeletal: Normal  Extremities: extremities normal, , no edema  Neurologic:  Awake, alert, oriented to name, place and time. Cranial nerves II-XII are grossly intact. Motor is  intact. Sensory is intact. ,  and gait is normal.    Assessment:     Patient Active Problem List:     Juvenile rheumatoid arthritis (HCC)     Chronic bilateral low back pain without sciatica     Peripheral arterial disease (HCC)     Panlobular emphysema (HCC)     Essential hypertension     Acquired hypothyroidism     Gastroesophageal reflux disease     Non-seasonal allergic rhinitis     Benign prostatic hyperplasia without lower urinary tract symptoms     Tobacco use     Acute pulmonary edema (HCC)     Seasonal allergic rhinitis due to pollen     Mixed irritable bowel syndrome     Mixed hyperlipidemia     Prediabetes     Benign prostatic hyperplasia     Chronic combined systolic and diastolic congestive heart failure (HCC)     JOSE (acute kidney injury) (HCC)     LV dysfunction     Dehydration     Vomiting     Coronary artery disease involving native coronary artery     CAD in native artery. //CABG    C. difficile colitis      Plan:     1. Reviewed POC blood glucose . Labs and X ray results   2. Reviewed Current Medicines   3. on  Correction bolus Humalog Insulin regime  4. Monitor Blood glucose frequently   5. Modified  the dose of Insulin/ other medicines as needed   6. Transferred to acute rehab unit on 10/27/2020 Will follow  7. Will follow.      Gregory Parra MD

## 2020-11-05 LAB
GLUCOSE BLD-MCNC: 112 MG/DL (ref 70–99)
GLUCOSE BLD-MCNC: 129 MG/DL (ref 70–99)
GLUCOSE BLD-MCNC: 136 MG/DL (ref 70–99)
GLUCOSE BLD-MCNC: 80 MG/DL (ref 70–99)

## 2020-11-05 PROCEDURE — 97110 THERAPEUTIC EXERCISES: CPT

## 2020-11-05 PROCEDURE — 97116 GAIT TRAINING THERAPY: CPT

## 2020-11-05 PROCEDURE — 97530 THERAPEUTIC ACTIVITIES: CPT

## 2020-11-05 PROCEDURE — 97535 SELF CARE MNGMENT TRAINING: CPT

## 2020-11-05 PROCEDURE — 97112 NEUROMUSCULAR REEDUCATION: CPT

## 2020-11-05 PROCEDURE — 6370000000 HC RX 637 (ALT 250 FOR IP): Performed by: SURGERY

## 2020-11-05 PROCEDURE — 99233 SBSQ HOSP IP/OBS HIGH 50: CPT | Performed by: PHYSICAL MEDICINE & REHABILITATION

## 2020-11-05 PROCEDURE — 94761 N-INVAS EAR/PLS OXIMETRY MLT: CPT

## 2020-11-05 PROCEDURE — 94640 AIRWAY INHALATION TREATMENT: CPT

## 2020-11-05 PROCEDURE — 94150 VITAL CAPACITY TEST: CPT

## 2020-11-05 PROCEDURE — 2700000000 HC OXYGEN THERAPY PER DAY

## 2020-11-05 PROCEDURE — 6370000000 HC RX 637 (ALT 250 FOR IP): Performed by: SPECIALIST

## 2020-11-05 PROCEDURE — 1280000000 HC REHAB R&B

## 2020-11-05 PROCEDURE — 82962 GLUCOSE BLOOD TEST: CPT

## 2020-11-05 PROCEDURE — 6370000000 HC RX 637 (ALT 250 FOR IP): Performed by: PHYSICAL MEDICINE & REHABILITATION

## 2020-11-05 RX ADMIN — FUROSEMIDE 40 MG: 40 TABLET ORAL at 08:31

## 2020-11-05 RX ADMIN — FUROSEMIDE 40 MG: 40 TABLET ORAL at 17:24

## 2020-11-05 RX ADMIN — DULOXETINE HYDROCHLORIDE 60 MG: 30 CAPSULE, DELAYED RELEASE ORAL at 08:31

## 2020-11-05 RX ADMIN — ATORVASTATIN CALCIUM 20 MG: 20 TABLET, FILM COATED ORAL at 21:15

## 2020-11-05 RX ADMIN — HYDROCODONE BITARTRATE AND ACETAMINOPHEN 2 TABLET: 5; 325 TABLET ORAL at 12:39

## 2020-11-05 RX ADMIN — Medication 250 MG: at 08:31

## 2020-11-05 RX ADMIN — CARVEDILOL 3.12 MG: 6.25 TABLET, FILM COATED ORAL at 21:13

## 2020-11-05 RX ADMIN — Medication 250 MG: at 03:13

## 2020-11-05 RX ADMIN — ASPIRIN 81 MG: 81 TABLET, FILM COATED ORAL at 08:31

## 2020-11-05 RX ADMIN — TIOTROPIUM BROMIDE INHALATION SPRAY 2 PUFF: 3.12 SPRAY, METERED RESPIRATORY (INHALATION) at 07:40

## 2020-11-05 RX ADMIN — Medication 250 MG: at 17:25

## 2020-11-05 RX ADMIN — HYDROCODONE BITARTRATE AND ACETAMINOPHEN 2 TABLET: 5; 325 TABLET ORAL at 21:24

## 2020-11-05 RX ADMIN — GUAIFENESIN 600 MG: 600 TABLET, EXTENDED RELEASE ORAL at 21:15

## 2020-11-05 RX ADMIN — AMIODARONE HYDROCHLORIDE 200 MG: 200 TABLET ORAL at 08:31

## 2020-11-05 RX ADMIN — BUDESONIDE AND FORMOTEROL FUMARATE DIHYDRATE 2 PUFF: 160; 4.5 AEROSOL RESPIRATORY (INHALATION) at 19:37

## 2020-11-05 RX ADMIN — TAMSULOSIN HYDROCHLORIDE 0.4 MG: 0.4 CAPSULE ORAL at 08:31

## 2020-11-05 RX ADMIN — CARVEDILOL 3.12 MG: 6.25 TABLET, FILM COATED ORAL at 08:32

## 2020-11-05 RX ADMIN — HYDROCODONE BITARTRATE AND ACETAMINOPHEN 2 TABLET: 5; 325 TABLET ORAL at 03:12

## 2020-11-05 RX ADMIN — GUAIFENESIN 600 MG: 600 TABLET, EXTENDED RELEASE ORAL at 08:31

## 2020-11-05 RX ADMIN — BUDESONIDE AND FORMOTEROL FUMARATE DIHYDRATE 2 PUFF: 160; 4.5 AEROSOL RESPIRATORY (INHALATION) at 07:38

## 2020-11-05 RX ADMIN — ALBUTEROL SULFATE 2 PUFF: 90 AEROSOL, METERED RESPIRATORY (INHALATION) at 07:40

## 2020-11-05 RX ADMIN — FINASTERIDE 5 MG: 5 TABLET, FILM COATED ORAL at 08:31

## 2020-11-05 RX ADMIN — ALBUTEROL SULFATE 2 PUFF: 90 AEROSOL, METERED RESPIRATORY (INHALATION) at 10:50

## 2020-11-05 RX ADMIN — ALBUTEROL SULFATE 2 PUFF: 90 AEROSOL, METERED RESPIRATORY (INHALATION) at 19:35

## 2020-11-05 RX ADMIN — HYDROCODONE BITARTRATE AND ACETAMINOPHEN 2 TABLET: 5; 325 TABLET ORAL at 17:24

## 2020-11-05 RX ADMIN — HYDROCODONE BITARTRATE AND ACETAMINOPHEN 2 TABLET: 5; 325 TABLET ORAL at 08:31

## 2020-11-05 RX ADMIN — ALBUTEROL SULFATE 2 PUFF: 90 AEROSOL, METERED RESPIRATORY (INHALATION) at 15:18

## 2020-11-05 RX ADMIN — PREDNISONE 20 MG: 20 TABLET ORAL at 08:31

## 2020-11-05 RX ADMIN — LEVOTHYROXINE SODIUM 100 MCG: 100 TABLET ORAL at 05:53

## 2020-11-05 RX ADMIN — MULTIPLE VITAMINS W/ MINERALS TAB 1 TABLET: TAB at 08:31

## 2020-11-05 ASSESSMENT — PAIN DESCRIPTION - LOCATION: LOCATION: CHEST

## 2020-11-05 ASSESSMENT — PAIN SCALES - GENERAL
PAINLEVEL_OUTOF10: 9
PAINLEVEL_OUTOF10: 7
PAINLEVEL_OUTOF10: 8
PAINLEVEL_OUTOF10: 5
PAINLEVEL_OUTOF10: 8
PAINLEVEL_OUTOF10: 7
PAINLEVEL_OUTOF10: 9

## 2020-11-05 ASSESSMENT — PAIN DESCRIPTION - FREQUENCY: FREQUENCY: CONTINUOUS

## 2020-11-05 ASSESSMENT — PAIN DESCRIPTION - ORIENTATION: ORIENTATION: MID;UPPER

## 2020-11-05 ASSESSMENT — PAIN DESCRIPTION - ONSET: ONSET: ON-GOING

## 2020-11-05 ASSESSMENT — PAIN DESCRIPTION - DESCRIPTORS: DESCRIPTORS: CONSTANT

## 2020-11-05 ASSESSMENT — PAIN DESCRIPTION - PROGRESSION: CLINICAL_PROGRESSION: NOT CHANGED

## 2020-11-05 ASSESSMENT — PAIN DESCRIPTION - PAIN TYPE: TYPE: ACUTE PAIN

## 2020-11-05 NOTE — PROGRESS NOTES
safely complete bathing activities necessary due to reduced balance, endurance, need for ADL assist, and LE weakness and reduced ROM.  These tasks cannot be safely completed without this device and would place Keyur Adrian at greater risk of falls. (HIT F2 to transition between stars)      Grooming: Mod I in w/c at sink due to fatigue from shower  Oral Hygiene: Mod I in w/c at sink due to fatigue from shower      Bathing:   Sup, completed cassandra care while standing with 1 UE support at grab bar      Dressing:      Upper Body Dressing:  Denied regular clothes, Setup with hospital gown  Lower Body Dressing:  Setup with undergarments  Footwear: Setup with figure four method    Toileting:   Sup       Toilet Transfers:   Sup  Device Used:    [x]   Standard Toilet         []   Grab Bars           []  Bedside Commode       []   Elevated Toilet          []   Other:        Tub/Shower Transfer:   SBA due to O2 cord mgmt  Device Used:    []   Shower Bench      []   Shower Chair      []   Tub Transfer Bench           []   Bathtub    []   Shower         []   Other:         Bed Mobility:           [x]   Pt received out of bed   Rolling R/L:  Sup  Scooting:    Supine --> Sit:  Sup  Sit --> Supine:      Transfers:    Sit--> Stand:  Sup  Stand --> Sit:   Sup  Stand-Pivot:   Sup  Other:    Assistive device required for transfer:   RW      Functional Mobility:  Within room  Assistance:  SBA due to O2 cord mgmt  Device:   [x]   Rolling Walker     []   Standard Walker []   Wheelchair        []   [employer]       []   4-Wheeled WiWide         []   Cardiac WiWide       []   Other:        Homemaking Tasks:          Additional Therapeutic activities/exercises completed this date:     [x]   ADL Training   [x]   Balance/Postural training     [x]   Bed/Transfer Training   []   Endurance Training   []   Neuromuscular Re-ed   []   Nu-step:  Time:        Level:         #Steps:       []   Rebounder:    []  Seated     []  Standing        []   Supine Assistance: Independent  Transfer Assistance: Independent  Active : Yes  Mode of Transportation: Truck, Car  Occupation: Full time employment  Type of occupation: Truck drive  Leisure & Hobbies: none  Additional Comments: 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 Pt reports he will be staying with ex-wife at discharge and will have initial 24/7 supervision. Above information is for ex-wife's home setup.; no falls in the past year    Objective                                                                                    Goals:  (Update in navigator)  Short term goals  Time Frame for Short term goals: STG=LTG:  Long term goals  Time Frame for Long term goals : 10-12 days or until d/c  Long term goal 1: Pt will complete feeding/grooming/oral care task c MOD I by d/c  Long term goal 2: Pt will complete total body bathing c Mod I by d/c  Long term goal 3: Pt will complete UB dressing c MOD I and retrieve items from closet by d/c  Long term goal 4: Pt will complete LB dressing c MOD I and retrieve items from closet by d/c  Long term goal 5: Pt will don/doff footwear c Min A by d/c  Long term goals 6: Pt will complete toileting c MOD I by d/c  Long term goal 7: Pt will complete functional transfer (toilet, tub, shower) c MOD I by d/c. Long term goal 8: Pt will perform therex/therax to facilitate increased strength/endurance/ax tolerance (c emphasis on maintaining sternal precautions and dynamic standing balance/tolerance >8 mins) c MOD I in order to complete ADLs. :        Plan of Care                                                                              Times per week: 5 days per week for a minimum of 60 minutes/day plus group as appropriate for 60 minutes.   Treatment to include Plan  Times per day: Daily  Current Treatment Recommendations: Strengthening, Patient/Caregiver Education & Training, Home Management Training, Functional Mobility Training, Endurance Training, Equipment Evaluation, Education, & procurement, Pain Management, Safety Education & Training, Self-Care / ADL, Balance Training    Electronically signed by   VARUN Myers  11/5/2020, 8:06 AM

## 2020-11-05 NOTE — CARE COORDINATION
LSW met with patient following Care Conference. LSW informed patient of recommendations for a rolling walker, bedside commode, and tub transfer bench. Patient states he would use the walker, but not the other 2 pieces of equipment. LSW informed of recommendation for [address] PT, OT, and nursing and patient is agreeable to all. Patient would like [address] through [facility].     D/C Plan:  Date:  Nov.10  DME:  Rolling walker (bedside commode and tub transfer bench also recommended, but patient denies need)  HHC:  PT, OT, nursing [facility])

## 2020-11-05 NOTE — PROGRESS NOTES
Physical Therapy      [x] daily progress note       [] discharge       Patient Name:  Nusrat Fountain   :  1955 MRN: [id number]  Room:  [address] [place] [address] Date of Admission: 10/27/2020  Rehabilitation Diagnosis:   Atherosclerotic heart disease of native coronary artery without angina pectoris [I25.10]  CHF following cardiac surgery, postop [I97.130]       Date 2020       Day of ARU Week:  3   Time IN/OUT 0717-4269   Individual Tx Minutes 60   TOTAL Tx Time Mins 60   Variance Time    Variance Time []   Refusal due to:     []   Medical hold/reason:    []   Illness   []   Off Unit for test/procedure  []   Extra time needed to complete task  []   Therapeutic need  []   Other (specify):   Restrictions Restrictions/Precautions  Restrictions/Precautions: Fall Risk, General Precautions, Contact Precautions, Cardiac(c-diff)  Position Activity Restriction  Sternal Precautions: No Pushing, No Pulling, 5# Lifting Restrictions  Other position/activity restrictions: 3L O2   Communication with other providers: [x]   OK to see per nursing:     []   Spoke with team member regarding:      Subjective observations and cognitive status: Pt up in recliner, c/o fatigue from shower but willing to participate  VS at rest: /57. HR 71, OW sats 93% on 2L. With ax: /62, HR   68, O2 sats 93%   Pain level/location: 9/10       Location: chest incision, pain from coughing per pt report; meds not yet due   Discharge recommendations  Anticipated discharge date:  11/10/2020  Destination: []?home alone   []?home alone with assist PRN     [x]? home w/ family      []? Continuous supervision  []? SNF    []? Assisted living     []? Other:   Continued therapy: [x]? [facility]  []? OUTPATIENT  PT   []?  No Further PT  Equipment needs:  Keyur Nguyễn requires the assistance of a wheeled walker to successfully ambulate from room to room at home to allow completion of daily living tasks such as: bathing, toileting, dressing and grooming.  A wheeled walker is necessary due to the patient's unsteady gait, upper body weakness, inability to  a standard walker.  This patient can ambulate only by pushing a walker instead of using a lesser assistive device such as a cane or crutch.      Bed Mobility:           [x]   Pt received out of bed     Transfers:    Sit--> Stand:  First attempt CG-Min A, SBA sequential trials  Stand --> Sit:   SBA  Practiced several transfers in blocked practice with focus on hips to EOC and COG fwd to avoid retropulsion with initial stand, improved after cueing. Stand-Pivot:   SBA  Assistive device required for transfer:   RW    Gait:    Distance:  142'+242'+22'   Assistance:  SBA  Device:  RW  Gait Quality: Steady slow recip pattern, intermittent standing rest breaks due to fatigue. No signs of ax tolerance per pt report. O2 sats on 3L 93% after amb. Additional Therapeutic activities/exercises completed this date:     []   Nu-step:  Time:        Level:         #Steps:       []   Rebounder:    []  Seated     []  Standing        []   Balance training         []   Postural training    []   Supine ther ex (reps/sets):     [x]   Seated Internmediate CABG ther ex (reps/sets): per handout x 10-15 reps, pt able to follow handout without cueing, min cues for PLB. Multiple rest breaks req extra time to complete due to fatigue   [x]   Standing ther ex (reps/sets):  B ANTOHNY at [facility] for mini squats, marches, and heel raises x 10 with good tolerance    []   Picked up object from floor                       []   Reacher used   []   Other:   []   Other:   []   Other:       Patient/Caregiver Education and Training:   [x]   Bed Mobility/Transfer technique/safety  [x]   Gait technique/sequencing  [x]   Proper use of assistive device  [x]   Advanced mobility safety and technique  []   Reinforced patient's precautions with mobility/functional tasks  []   Postural awareness  []   Family training  [x]   Progress was updated and reviewed in Rehabtracker with patient and/or family this date. Treatment Plan for Next Session: community barriers, stair training if able, cardiac education/ PLB; object retrieval with reacher. Treatment/Activity Tolerance:   [x] Tolerated treatment with no adverse effects    [x] Patient limited by fatigue  [] Patient limited by pain   [] Patient limited by medical complications:    [] Adverse reaction to Tx:   [] Significant change in status    Safety:       []  bed alarm set    [x]  chair alarm set    []  Pt refused alarms                []  Telesitter activated      [x]  Gait belt used during tx session      []other:         Number of Minutes/Billable Intervention  Gait Training 20   Therapeutic Exercise 25   Neuro Re-Ed    Therapeutic Activity 15   Wheelchair Propulsion    Group    Other:    TOTAL 60         Social History  Social/Functional History  ADL Assistance: Independent  Homemaking Assistance: Independent  Homemaking Responsibilities: Yes  Meal Prep Responsibility: Primary(Pt reports eating out most of the time.)  Laundry Responsibility: Primary  Cleaning Responsibility: Primary  Bill Paying/Finance Responsibility: Primary  Shopping Responsibility: Primary  Dependent Care Responsibility: No  Health Care Management: Primary  Ambulation Assistance: Independent  Transfer Assistance: Independent  Active : Yes  Mode of Transportation: Truck, Car  Occupation: Full time employment  Type of occupation: Truck drive  Leisure & Hobbies: none  Additional Comments: 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 Pt reports he will be staying with ex-wife at discharge and will have initial 24/7 supervision.  Above information is for ex-wife's home setup.; no falls in the past year    Objective                                                                                    Goals:  (Update in navigator)  Short term goals  Time Frame for Short term goals: 10 tx days:  Short term goal 1: Pt will complete rolling L/R and sup<->sit Ind following sternal precautions. Short term goal 2: Pt will complete OOB transfers following sternal precautions Mod Ind. Short term goal 3: Pt will ambulate 150 ft using RW Mod Ind. Short term goal 4: Pt will ascend/descend 4-5 steps using bilat rails Mod Ind. Short term goal 5: Pt will ascend/descend curb step and ambulate over uneven surfaces using RW Mod Ind. Short term goal 6: Pt will complete object retrieval from the floor in standing Mod Ind-Ind.:   :        Plan of Care                                                                              Times per week: 5 days per week for a minimum of 60 minutes/day plus group as appropriate for 60 minutes.   Treatment to include Current Treatment Recommendations: Transfer Training, Endurance Training, Strengthening, Patient/Caregiver Education & Training, Pain Management, Equipment Evaluation, Education, & procurement, Balance Training, Gait Training, Home Exercise Program, Functional Mobility Training, Stair training, Safety Education & Training    Electronically signed by   Irma Hurtado, PTA #[id number]  11/5/2020, 11:12 AM

## 2020-11-05 NOTE — PROGRESS NOTES
walker is necessary due to the patient's unsteady gait, upper body weakness, inability to  a standard walker.  This patient can ambulate only by pushing a walker instead of using a lesser assistive device such as a cane or crutch.      Bed Mobility:           []   Pt received out of bed   Rolling R/L: CGA  Scooting: [facility]    Sit --> lying:  CGA    Transfers:    Sit--> Stand:  Cga  Stand --> Sit:   [facility]  Chair-->Bed/Bed --> Chair:  [facility]     Assistive device required for transfer:  FWW    Gait:    Distance:  155 ft, 192 ft, 162 ft  Assistance:  CGA  Device:  FWW  Gait Quality: Pt demo a step to pattern with short steps and required 25% cues to increase step length and height throughout gait cycle on level surfaces        Stairs   # Completed:  8 steps of 6\"  Assistance:  [facility]  Supportive Device:  B HR             Additional Therapeutic activities/exercises completed this date:     [x]   Nu-step:  Time:  5 min    Level:     1 with BLE only    #Steps:    188   []   Rebounder:    []  Seated     []  Standing        []   Balance training         []   Postural training    []   Supine ther ex (reps/sets):     []   Seated ther ex (reps/sets):     []   Standing ther ex (reps/sets):     []   Picking up object from floor (standing):                   []   Reacher used   []   Other:   []   Other:    Comments:      Patient/Caregiver Education and Training:   [x]   Bed Mobility/Transfer technique/safety  [x]   Gait technique/sequencing  [x]   Proper use of assistive device  []   Advanced mobility safety and technique  []   Reinforced patient's precautions/mobility while maintaining precautions  []   Postural awareness  []   Family training  []   Progress was updated and reviewed in Rehabtracker with patient and/or family this date.     Treatment Plan for Next Session: Continue with strength and balance training     Assessment:      Treatment/Activity Tolerance:   [x] Tolerated treatment with no adverse effects    [] Patient limited by fatigue  [] Patient limited by pain   [] Patient limited by medical complications:    [] Adverse reaction to Tx:   [] Significant change in status    Safety:       [x]  bed alarm set    []  chair alarm set    []  Pt refused alarms                []  Telesitter activated      [x]  Gait belt used during tx session      []other:         Number of Minutes/Billable Intervention  Gait Training 15   Therapeutic Exercise 15   Neuro Re-Ed 15   Therapeutic Activity 15   Wheelchair Propulsion    Group    Other:    TOTAL 60         Social History  Social/Functional History  ADL Assistance: Independent  Homemaking Assistance: Independent  Homemaking Responsibilities: Yes  Meal Prep Responsibility: Primary(Pt reports eating out most of the time.)  Laundry Responsibility: Primary  Cleaning Responsibility: Primary  Bill Paying/Finance Responsibility: Primary  Shopping Responsibility: Primary  Dependent Care Responsibility: No  Health Care Management: Primary  Ambulation Assistance: Independent  Transfer Assistance: Independent  Active : Yes  Mode of Transportation: Truck, Car  Occupation: Full time employment  Type of occupation: Truck drive  Leisure & Hobbies: none  Additional Comments: 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 Pt reports he will be staying with ex-wife at discharge and will have initial 24/7 supervision. Above information is for ex-wife's home setup.; no falls in the past year    Objective                                                                                    Goals:  (Update in navigator)  Short term goals  Time Frame for Short term goals: 10 tx days:  Short term goal 1: Pt will complete rolling L/R and sup<->sit Ind following sternal precautions. Short term goal 2: Pt will complete OOB transfers following sternal precautions Mod Ind. Short term goal 3: Pt will ambulate 150 ft using RW Mod Ind. Short term goal 4: Pt will ascend/descend 4-5 steps using bilat rails Mod Ind.   Short

## 2020-11-05 NOTE — PROGRESS NOTES
Daija Caraballo    : 1955  Acct #: [de-identified]  MRN: [id number]              PM&R Progress Note      Admitting diagnosis: Congestive heart failure after coronary bypass graft surgery     Comorbid diagnoses impacting rehabilitation: Uncontrolled pain, generalized weakness, gait disturbance, acute blood loss anemia, essential hypertension, BPH, COPD exacerbation, nicotine addiction    Chief complaint: Now he is worried about not having bowel movements. Prior (baseline) level of function: Independent. Current level of function:         Current  IRF-ESTEBAN and Goals:   Hearing, Speech, and Vision  Expression of Ideas and Wants: Without difficulty  Understanding Verbal and Non-Verbal Content: Understands  Prior Functioning: Everyday Activities  Self Care: Independent  Indoor Mobility (Ambulation): Independent  Stairs: Independent  Functional Cognition: Independent  Prior Device Use: (Ind)    Eating  Assistance Needed: Setup or clean-up assistance  CARE Score: 5  Discharge Goal: Independent  Oral Hygiene  Assistance Needed: Setup or clean-up assistance  Comment: Completed seated sink-side  CARE Score: 5  Discharge Goal: Independent  Toileting Hygiene  Assistance Needed: Dependent  Comment: 3(deemed usual performance per team huddle)  CARE Score: 1  Discharge Goal: Independent  Shower/Bathe Self  Assistance Needed: Partial/moderate assistance  Comment: Mod A for LB bathing, v/c for sternal precautions/compensatory strategies. CARE Score: 3  Discharge Goal: Independent  Upper Body Dressing  Assistance Needed: Partial/moderate assistance  Comment: Min A for threading BUE while maintaining cardiac precautions. CARE Score: 3  Discharge Goal: Independent  Lower Body Dressing  Assistance Needed: Substantial/maximal assistance  Comment: Max A to thread BLE and don over hips, pt demo buttoning pants.   CARE Score: 2  Discharge Goal: Independent  Putting On/Taking Off Footwear  Assistance Needed: Dependent  CARE Score: noted.    Upper extremities: Slow and deliberate with arm movements with functional  strength. Lower extremities: Minimal peripheral edema. Give way with MMT. No signs of DVT. Sitting balance was good. Standing balance was poor. Lab Results   Component Value Date    WBC 19.0 (H) 11/03/2020    HGB 12.5 (L) 11/03/2020    HCT 35.8 (L) 11/03/2020    MCV 99.4 11/03/2020     11/03/2020     Lab Results   Component Value Date    INR 0.97 11/03/2020    INR 1.02 10/28/2020    INR 1.27 10/19/2020    PROTIME 11.7 11/03/2020    PROTIME 12.4 10/28/2020    PROTIME 15.4 (H) 10/19/2020     Lab Results   Component Value Date    CREATININE 0.9 11/03/2020    BUN 28 (H) 11/03/2020     (L) 11/03/2020    K 3.9 11/03/2020    CL 98 (L) 11/03/2020    CO2 26 11/03/2020     Lab Results   Component Value Date    ALT 45 (H) 11/03/2020    AST 22 11/03/2020    ALKPHOS 61 11/03/2020    BILITOT 0.3 11/03/2020       Expected length of stay  prior to a supervised level of function for discharge home with a walker and [facility] OT/PT is 11/10/2020. Recommendations:    1. Systolic congestive heart failure after coronary bypass graft surgery: Consistent participation with the daily occupational and physical therapy. He is offering conflicting history now regarding his bowel movement history of the last 72 hours. Nursing records are not convincing, but now he is worried that he is not going enough. No chills. More consistent oral intake.  Continue with the pulmonary hygiene, nutritional support and cautious pain management.  Daily weights do not indicate any decompensation of CHF.  P.o. Lasix now. Beta-blocker.  Adaptive equipment training.  Caregiver training closer to discharge. Contact-guard to stand and transfer. 2. DVT prophylaxis: Due to his anemia and bruising, chemoprophylaxis is contraindicated.  SCDs when in bed.  Weightbearing activities are pursued daily.       Stable hemoglobin.    3.  COPD exacerbation:

## 2020-11-05 NOTE — PROGRESS NOTES
Progress Note( Dr. Joaquin Points)  11/4/2020  Subjective:   Admit Date: 10/27/2020  PCP: Laith Calixto MD    Admitted For : Chest pain CAD underwent CABG    Consulted For: Better control of blood glucose filed diabetes mellitus    Interval History: Post CABG patient was transferred to acute rehab unit on evening of 10/27/2020      C/O  chest pains, also with deep breathing mostly from chest wall also seem to be somewhat better  Has some  SOB . Denies nausea or vomiting. No new bowel or bladder symptoms. diarrhea seems to be better  It turned out to be C. difficile colitis seen by GI  Change the medicine to vancomycin p.o.       Intake/Output Summary (Last 24 hours) at 11/4/2020 2133  Last data filed at 11/4/2020 1230  Gross per 24 hour   Intake 480 ml   Output 425 ml   Net 55 ml       DATA    CBC:   Recent Labs     11/02/20  0916 11/03/20  0603   WBC 20.1* 19.0*   HGB 14.5 12.5*    310    CMP:  Recent Labs     11/02/20  0916 11/03/20  0603    134*   K 3.4* 3.9   CL 92* 98*   CO2 30 26   BUN 35* 28*   CREATININE 1.0 0.9   CALCIUM 8.8 8.1*   PROT  --  5.1*   LABALBU  --  3.4   BILITOT  --  0.3   ALKPHOS  --  61   AST  --  22   ALT  --  45*     Lipids:   Lab Results   Component Value Date    CHOL 155 06/23/2020    HDL 30 06/23/2020    TRIG 173 06/23/2020     Glucose:  Recent Labs     11/04/20  0732 11/04/20  1145 11/04/20  1654   POCGLU 87 116* 131*     [id number]:  Lab Results   Component Value Date    LABA1C 6.3 10/12/2020     High Sensitivity TSH:   Lab Results   Component Value Date    TSHHS 1.340 06/23/2020     Free T3: No results found for: FT3  Free T4:  Lab Results   Component Value Date    T4FREE 1.28 06/23/2020       Xr Chest Portable    Result Date: 10/22/2020  EXAMINATION: ONE XRAY VIEW OF THE CHEST 10/22/2020 5:10 am COMPARISON: 10/21/2020 HISTORY: ORDERING SYSTEM PROVIDED HISTORY: Post op open heart surgery TECHNOLOGIST PROVIDED HISTORY: Reason for Exam:->Post op open heart surgery allergic rhinitis due to pollen     Mixed irritable bowel syndrome     Mixed hyperlipidemia     Prediabetes     Benign prostatic hyperplasia     Chronic combined systolic and diastolic congestive heart failure (HCC)     JOSE (acute kidney injury) (HCC)     LV dysfunction     Dehydration     Vomiting     Coronary artery disease involving native coronary artery     CAD in native artery. //CABG    C. difficile colitis      Plan:     1. Reviewed POC blood glucose . Labs and X ray results   2. Reviewed Current Medicines   3. on  Correction bolus Humalog Insulin regime  4. Monitor Blood glucose frequently   5. Modified  the dose of Insulin/ other medicines as needed   6. Transferred to acute rehab unit on 10/27/2020 Will follow  7. Will follow.      Henrietta De Jesus MD

## 2020-11-06 LAB
GLUCOSE BLD-MCNC: 107 MG/DL (ref 70–99)
GLUCOSE BLD-MCNC: 124 MG/DL (ref 70–99)
GLUCOSE BLD-MCNC: 129 MG/DL (ref 70–99)
GLUCOSE BLD-MCNC: 83 MG/DL (ref 70–99)

## 2020-11-06 PROCEDURE — 6370000000 HC RX 637 (ALT 250 FOR IP): Performed by: SURGERY

## 2020-11-06 PROCEDURE — 82962 GLUCOSE BLOOD TEST: CPT

## 2020-11-06 PROCEDURE — 97530 THERAPEUTIC ACTIVITIES: CPT

## 2020-11-06 PROCEDURE — 94150 VITAL CAPACITY TEST: CPT

## 2020-11-06 PROCEDURE — 97110 THERAPEUTIC EXERCISES: CPT

## 2020-11-06 PROCEDURE — 1280000000 HC REHAB R&B

## 2020-11-06 PROCEDURE — 94640 AIRWAY INHALATION TREATMENT: CPT

## 2020-11-06 PROCEDURE — 94761 N-INVAS EAR/PLS OXIMETRY MLT: CPT

## 2020-11-06 PROCEDURE — 99232 SBSQ HOSP IP/OBS MODERATE 35: CPT | Performed by: PHYSICAL MEDICINE & REHABILITATION

## 2020-11-06 PROCEDURE — 6370000000 HC RX 637 (ALT 250 FOR IP): Performed by: PHYSICAL MEDICINE & REHABILITATION

## 2020-11-06 PROCEDURE — 2700000000 HC OXYGEN THERAPY PER DAY

## 2020-11-06 PROCEDURE — 6370000000 HC RX 637 (ALT 250 FOR IP): Performed by: SPECIALIST

## 2020-11-06 PROCEDURE — 97116 GAIT TRAINING THERAPY: CPT

## 2020-11-06 RX ADMIN — ALBUTEROL SULFATE 2 PUFF: 90 AEROSOL, METERED RESPIRATORY (INHALATION) at 22:54

## 2020-11-06 RX ADMIN — BUDESONIDE AND FORMOTEROL FUMARATE DIHYDRATE 2 PUFF: 160; 4.5 AEROSOL RESPIRATORY (INHALATION) at 07:29

## 2020-11-06 RX ADMIN — HYDROCODONE BITARTRATE AND ACETAMINOPHEN 2 TABLET: 5; 325 TABLET ORAL at 21:34

## 2020-11-06 RX ADMIN — GUAIFENESIN 600 MG: 600 TABLET, EXTENDED RELEASE ORAL at 08:13

## 2020-11-06 RX ADMIN — GUAIFENESIN 600 MG: 600 TABLET, EXTENDED RELEASE ORAL at 21:34

## 2020-11-06 RX ADMIN — ASPIRIN 81 MG: 81 TABLET, FILM COATED ORAL at 08:13

## 2020-11-06 RX ADMIN — Medication 250 MG: at 21:35

## 2020-11-06 RX ADMIN — FUROSEMIDE 40 MG: 40 TABLET ORAL at 08:13

## 2020-11-06 RX ADMIN — CARVEDILOL 3.12 MG: 6.25 TABLET, FILM COATED ORAL at 08:13

## 2020-11-06 RX ADMIN — Medication 250 MG: at 12:04

## 2020-11-06 RX ADMIN — HYDROCODONE BITARTRATE AND ACETAMINOPHEN 2 TABLET: 5; 325 TABLET ORAL at 06:07

## 2020-11-06 RX ADMIN — TIOTROPIUM BROMIDE INHALATION SPRAY 2 PUFF: 3.12 SPRAY, METERED RESPIRATORY (INHALATION) at 07:29

## 2020-11-06 RX ADMIN — DULOXETINE HYDROCHLORIDE 60 MG: 30 CAPSULE, DELAYED RELEASE ORAL at 08:13

## 2020-11-06 RX ADMIN — CARVEDILOL 3.12 MG: 6.25 TABLET, FILM COATED ORAL at 21:35

## 2020-11-06 RX ADMIN — TAMSULOSIN HYDROCHLORIDE 0.4 MG: 0.4 CAPSULE ORAL at 08:13

## 2020-11-06 RX ADMIN — BUDESONIDE AND FORMOTEROL FUMARATE DIHYDRATE 2 PUFF: 160; 4.5 AEROSOL RESPIRATORY (INHALATION) at 22:54

## 2020-11-06 RX ADMIN — HYDROCODONE BITARTRATE AND ACETAMINOPHEN 2 TABLET: 5; 325 TABLET ORAL at 10:55

## 2020-11-06 RX ADMIN — ATORVASTATIN CALCIUM 20 MG: 20 TABLET, FILM COATED ORAL at 21:34

## 2020-11-06 RX ADMIN — AMIODARONE HYDROCHLORIDE 200 MG: 200 TABLET ORAL at 08:13

## 2020-11-06 RX ADMIN — PREDNISONE 20 MG: 20 TABLET ORAL at 08:12

## 2020-11-06 RX ADMIN — FINASTERIDE 5 MG: 5 TABLET, FILM COATED ORAL at 08:13

## 2020-11-06 RX ADMIN — HYDROCODONE BITARTRATE AND ACETAMINOPHEN 2 TABLET: 5; 325 TABLET ORAL at 15:07

## 2020-11-06 RX ADMIN — MULTIPLE VITAMINS W/ MINERALS TAB 1 TABLET: TAB at 08:12

## 2020-11-06 RX ADMIN — Medication 250 MG: at 00:05

## 2020-11-06 RX ADMIN — FUROSEMIDE 40 MG: 40 TABLET ORAL at 17:54

## 2020-11-06 RX ADMIN — ALBUTEROL SULFATE 2 PUFF: 90 AEROSOL, METERED RESPIRATORY (INHALATION) at 07:29

## 2020-11-06 RX ADMIN — LEVOTHYROXINE SODIUM 100 MCG: 100 TABLET ORAL at 06:07

## 2020-11-06 RX ADMIN — ALBUTEROL SULFATE 2 PUFF: 90 AEROSOL, METERED RESPIRATORY (INHALATION) at 11:39

## 2020-11-06 RX ADMIN — Medication 250 MG: at 06:08

## 2020-11-06 ASSESSMENT — PAIN DESCRIPTION - PAIN TYPE: TYPE: ACUTE PAIN

## 2020-11-06 ASSESSMENT — PAIN SCALES - GENERAL
PAINLEVEL_OUTOF10: 9
PAINLEVEL_OUTOF10: 9
PAINLEVEL_OUTOF10: 8
PAINLEVEL_OUTOF10: 9
PAINLEVEL_OUTOF10: 9

## 2020-11-06 ASSESSMENT — PAIN DESCRIPTION - LOCATION: LOCATION: CHEST

## 2020-11-06 ASSESSMENT — PAIN DESCRIPTION - DESCRIPTORS: DESCRIPTORS: CONSTANT

## 2020-11-06 NOTE — PROGRESS NOTES
Physical Therapy      [x] daily progress note       [] discharge       Patient Name:  Thalia Murphy   :  1955 MRN: [id number]  Room:  [address] [place] [address] Date of Admission: 10/27/2020  Rehabilitation Diagnosis:   Atherosclerotic heart disease of native coronary artery without angina pectoris [I25.10]  CHF following cardiac surgery, postop [I97.130]       Date 2020       Day of ARU Week:  4   Time IN/OUT 7361-3157   Individual Tx Minutes 45   TOTAL Tx Time Mins 45   Variance Time    Variance Time []   Refusal due to:     []   Medical hold/reason:    []   Illness   []   Off Unit for test/procedure  []   Extra time needed to complete task  []   Therapeutic need  []   Other (specify):   Restrictions Restrictions/Precautions  Restrictions/Precautions: Fall Risk, General Precautions, Contact Precautions, Cardiac(c-diff)  Position Activity Restriction  Sternal Precautions: No Pushing, No Pulling, 5# Lifting Restrictions  Other position/activity restrictions: 3L O2   Communication with other providers: [x]   OK to see per nursing:     []   Spoke with team member regarding:      Subjective observations and cognitive status: Pt up in recliner, c/o fatigue but agreeable to session  VS WNL during tx. Pain level/location: 9/10       Location: chest incision; pain meds due toward end of session, nsg called. Discharge recommendations  Anticipated discharge date:  11/10/2020  Destination: []??home alone   []? ?home alone with assist PRN     [x]? ? home w/ family      []? ? Continuous supervision  []? ?SNF    []? ? Assisted living     []? ? Other:   Continued therapy: [x]? ?[facility] PT  []??OUTPATIENT  PT   []? ? No Further PT  Equipment needs:  Keyur Adrian requires the assistance of a wheeled walker to successfully ambulate from room to room at home to allow completion of daily living tasks such as: bathing, toileting, dressing and grooming.  A wheeled walker is necessary due to the patient's unsteady gait, upper body weakness, inability to  a standard walker.  This patient can ambulate only by pushing a walker instead of using a lesser assistive device such as a cane or crutch.      Bed Mobility:           [x]   Pt received out of bed     Transfers:    Sit--> Stand:  SB-CGA due to slightly retro one occasion  Stand --> Sit:   SBA  Stand-Pivot:   SBA  Assistive device required for transfer:   RW    Gait:    Distance:  212'+181'   Assistance:  SBA  Device:  RW  Gait Quality: initially mild staggering first 13' but no LOB. Pt with improved with distance. Otherwise pt maintains SBA for balance amb in slow recip pattern. Curb       Assistance:    SBA x 2 trials, demonstrates safe techniequ  Supportive Device:  RW  Height:   4\"    Uneven Surfaces:       Assistance:    SBA with good safety  Device:    RW  Surfaces Completed:   [] Green mat with bean bags beneath       [] Throw rugs          [] Outdoor pavements      [] Grass          [] Loose gravel   [x] Carpet      []  Other:       Additional Therapeutic activities/exercises completed this date:     []   Nu-step:  Time:        Level:         #Steps:       []   Rebounder:    []  Seated     []  Standing        []   Balance training         []   Postural training    []   Supine ther ex (reps/sets):     []   Seated ther ex (reps/sets):     []   Standing ther ex (reps/sets):     []   Picked up object from floor                       []   Reacher used   [x]   Other: Practiced O2 line management amb around obstacles with RW with SBA and mod cues.     []   Other:   []   Other:      Patient/Caregiver Education and Training:   [x]   Bed Mobility/Transfer technique/safety  [x]   Gait technique/sequencing  [x]   Proper use of assistive device  [x]   Advanced mobility safety and technique  []   Reinforced patient's precautions with mobility/functional tasks  []   Postural awareness  []   Family training  [x]   Progress was updated and reviewed in Rehabtracker with patient and/or family this date.    Treatment Plan for Next Session: gait progression, cardiac ed/ex, standing balance        Treatment/Activity Tolerance:   [x] Tolerated treatment with no adverse effects    [] Patient limited by fatigue  [] Patient limited by pain   [] Patient limited by medical complications:    [] Adverse reaction to Tx:   [] Significant change in status    Safety:       []  bed alarm set    []  chair alarm set    []  Pt refused alarms                []  Telesitter activated      [x]  Gait belt used during tx session      [x]other:Pt left in c/o OT         Number of Minutes/Billable Intervention  Gait Training 30   Therapeutic Exercise    Neuro Re-Ed    Therapeutic Activity 15   Wheelchair Propulsion    Group    Other:    TOTAL 45         Social History  Social/Functional History  ADL Assistance: Independent  Homemaking Assistance: Independent  Homemaking Responsibilities: Yes  Meal Prep Responsibility: Primary(Pt reports eating out most of the time.)  Laundry Responsibility: Primary  Cleaning Responsibility: Primary  Bill Paying/Finance Responsibility: Primary  Shopping Responsibility: Primary  Dependent Care Responsibility: No  Health Care Management: Primary  Ambulation Assistance: Independent  Transfer Assistance: Independent  Active : Yes  Mode of Transportation: Truck, Car  Occupation: Full time employment  Type of occupation: Truck drive  Leisure & Hobbies: none  Additional Comments: 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 Pt reports he will be staying with ex-wife at discharge and will have initial 24/7 supervision. Above information is for ex-wife's home setup.; no falls in the past year    Objective                                                                                    Goals:  (Update in navigator)  Short term goals  Time Frame for Short term goals: 10 tx days:  Short term goal 1: Pt will complete rolling L/R and sup<->sit Ind following sternal precautions.   Short term goal 2: Pt will complete OOB transfers following sternal precautions Mod Ind. Short term goal 3: Pt will ambulate 150 ft using RW Mod Ind. Short term goal 4: Pt will ascend/descend 4-5 steps using bilat rails Mod Ind. Short term goal 5: Pt will ascend/descend curb step and ambulate over uneven surfaces using RW Mod Ind. Short term goal 6: Pt will complete object retrieval from the floor in standing Mod Ind-Ind.:   :        Plan of Care                                                                              Times per week: 5 days per week for a minimum of 60 minutes/day plus group as appropriate for 60 minutes.   Treatment to include Current Treatment Recommendations: Transfer Training, Endurance Training, Strengthening, Patient/Caregiver Education & Training, Pain Management, Equipment Evaluation, Education, & procurement, Balance Training, Gait Training, Home Exercise Program, Functional Mobility Training, Stair training, Safety Education & Training    Electronically signed by   Arlette Arellano PTA #[id number]  11/6/2020, 8:40 AM

## 2020-11-06 NOTE — PROGRESS NOTES
Daisy Olivares    : 1955  Acct #: [de-identified]  MRN: [id number]              PM&R Progress Note      Admitting diagnosis: Congestive heart failure after coronary bypass graft surgery     Comorbid diagnoses impacting rehabilitation: Uncontrolled pain, generalized weakness, gait disturbance, acute blood loss anemia, essential hypertension, BPH, COPD exacerbation, nicotine addiction    Chief complaint: Feeling stronger. Up on his feet with less dizziness and chest wall pain. Prior (baseline) level of function: Independent. Current level of function:         Current  IRF-ESTEBAN and Goals:   Hearing, Speech, and Vision  Expression of Ideas and Wants: Without difficulty  Understanding Verbal and Non-Verbal Content: Understands  Prior Functioning: Everyday Activities  Self Care: Independent  Indoor Mobility (Ambulation): Independent  Stairs: Independent  Functional Cognition: Independent  Prior Device Use: (Ind)    Eating  Assistance Needed: Setup or clean-up assistance  CARE Score: 5  Discharge Goal: Independent  Oral Hygiene  Assistance Needed: Setup or clean-up assistance  Comment: Completed seated sink-side  CARE Score: 5  Discharge Goal: Independent  Toileting Hygiene  Assistance Needed: Dependent  Comment: 3(deemed usual performance per team huddle)  CARE Score: 1  Discharge Goal: Independent  Shower/Bathe Self  Assistance Needed: Partial/moderate assistance  Comment: Mod A for LB bathing, v/c for sternal precautions/compensatory strategies. CARE Score: 3  Discharge Goal: Independent  Upper Body Dressing  Assistance Needed: Partial/moderate assistance  Comment: Min A for threading BUE while maintaining cardiac precautions. CARE Score: 3  Discharge Goal: Independent  Lower Body Dressing  Assistance Needed: Substantial/maximal assistance  Comment: Max A to thread BLE and don over hips, pt demo buttoning pants.   CARE Score: 2  Discharge Goal: Independent  Putting On/Taking Off Footwear  Assistance Needed: Dependent  CARE Score: 1  Discharge Goal: Partial/moderate assistance    Roll Left and Right  Assistance Needed: Supervision or touching assistance  Comment: required Sup. (sequential cues following sternal precautions); tasks completed without use of bed features  CARE Score: 4  Discharge Goal: Independent  Sit to Lying  Assistance Needed: Partial/moderate assistance  Comment: required Mod A (assist on BLE), transfer completed without use of bed features  CARE Score: 3  Discharge Goal: Independent  Sit to Stand  Assistance Needed: Partial/moderate assistance  Comment: required Mod A with RW  CARE Score: 3  Discharge Goal: Independent  Chair/Bed-to-Chair Transfer  Comment: pt limited by lightheadedness  Reason if not Attempted: Not attempted due to medical condition or safety concerns  CARE Score: 88  Discharge Goal: Independent  Toilet Transfer  Assistance Needed: Partial/moderate assistance  Comment: Min A-Mod A on/off toilet and body placement. CARE Score: 3  Discharge Goal: Independent  Car Transfer  Reason if not Attempted: Not attempted due to medical condition or safety concerns  CARE Score: 88  Discharge Goal: Independent   Walk 10 Feet? Walk 10 Feet?: No  1 Step  1 Step?: Yes  Picking Up Object  Reason if not Attempted: Not attempted due to medical condition or safety concerns  CARE Score: 88  Discharge Goal: Independent  Wheelchair Ability  Uses a Wheelchair and/or Scooter?: No                  I      Exam:    Blood pressure 113/61, pulse 71, temperature 97.4 °F (36.3 °C), temperature source Oral, resp. rate 18, height 5' 9\" (1.753 m), weight 185 lb 3.2 oz (84 kg), SpO2 93 %. General: Observed walking on a walker. Good posture and fair nader of gait. HEENT: MMM. Neck supple. Speech strong. Pulmonary: Blunted breath sounds in the bases without wheezes, rales or sensory muscle use. Cardiac: Regular rate and rhythm. Incision clean and dry. Faint bruising over the sternal area.     Abdomen: picking up significantly each day now.   Stable hemoglobin.    3. COPD exacerbation: Albuterol and Symbicort inhalers.  Mucinex and Spiriva.  Monitoring O2 saturations at rest and with activity.  Nebulizers as needed.  Supplemental oxygen to keep saturations greater than 90%. 4. Hypertension: Coreg and Lasix are used to manage his hypertension.  Target systolic blood pressure is 120-140.  Vital signs are checked at rest and with activity. Blood pressure generally in target range now. 5. BPH: Flomax and Proscar used to manage this issue.  Bladder protocol should he develop symptoms of retention. 6. Nicotine addiction: Topical nicotine replacement patch. 7. C. difficile colitis: Flagyl changed to vancomycin by the GI consultant.  White blood count at 19,000 (relatively stable).  Other labs were unremarkable. Keeping him off Questran. No stool softeners yet.       Counseling and Coordination of Care: In care conference today I met with the patient's OT, PT, RN and . We discussed the patient's problems, progress and prognosis. Disposition issues were clarified and plans were established for ongoing rehabilitation efforts beyond the ARU stay. I reviewed this information with the patient during a second distinct visit with the patient. More than half of the total time of 34 minutes spent with the patient involved counseling and coordination of care.

## 2020-11-06 NOTE — FLOWSHEET NOTE
[x] daily progress note       [] discharge       Patient Name:  Milton Raymond   :  1955 MRN: [id number]  Room:  [address] [place] [address] Date of Admission: 10/27/2020  Rehabilitation Diagnosis:   Atherosclerotic heart disease of native coronary artery without angina pectoris [I25.10]  CHF following cardiac surgery, postop [I97.130]       Date 2020       Day of ARU Week:  4   Time IN/OUT 8897-8002   Individual Tx Minutes 75   Group Tx Minutes    Co-Treat Minutes    Concurrent Tx Minutes    TOTAL Tx Time Mins 75   Variance Time    Variance Time []   Refusal due to:     []   Medical hold/reason:    []   Illness   []   Off Unit for test/procedure  []   Extra time needed to complete task  []   Therapeutic need  []   Other (specify):   Restrictions Restrictions/Precautions  Restrictions/Precautions: Fall Risk, General Precautions, Contact Precautions, Cardiac(c-diff)  Position Activity Restriction  Sternal Precautions: No Pushing, No Pulling, 5# Lifting Restrictions  Other position/activity restrictions: 3L O2   Communication with other providers: [x]   OK to see per nursing:     []   Spoke with team member regarding:      Subjective observations and cognitive status: Pt sitting up in recliner   Pain level/location:  9  /10       Location: chest region   Discharge recommendations  Anticipated discharge date:  11/10/2020  Destination: []home alone   []home alone with assist PRN     [] home w/ family      [] Continuous supervision  []SNF    [] Assisted living     [] Other:   Continued therapy: []HHC PT  []OUTPATIENT  PT   [] No Further PT  Equipment needs: Raji Kumar the assistance of a wheeled walker to successfully ambulate from room to room at home to allow completion of daily living tasks such as: bathing, toileting, dressing and grooming.  A wheeled walker is necessary due to the patient's unsteady gait, upper body weakness, inability to  a standard walker.  This patient can ambulate only by pushing a walker instead of using a lesser assistive device such as a cane or crutch.      Bed Mobility:           []   Pt received out of bed  Scooting: Mod ind from recliner, w/c and bed    Transfers:    Sit--> Stand:  SUP on 2nd trialas pt unable to  Achieve full stand on 1st attempt3 reps from eob with SBA. / 3 reps from recliner with sba and improved control following session  Stand --> Sit:   SBA/Mod ind with heart pillow  Toilet Transfer (if applicable):    Assistive device required for transfer:   FWW / no AD    Gait:    Distance:  183 ft, 155 ft with fww/ 10 ft in room without AD with cg/sba   Assistance:  Sup/mod ind  Device:  FWW  Gait Quality:  Slow pace,      Stairs   # Completed:  12  Assistance:  Sup/Mod ind  Supportive Device:  B rails         Additional Therapeutic activities/exercises completed this date:     []   Nu-step:  Time:        Level:         #Steps:       []   Rebounder:    []  Seated     []  Standing        [x]   Balance training  Standing reaching x 5 reps each ue with SBA, side stepping along counter x 10 ft each direction x 2 reps each direction. []   Postural training    []   Supine ther ex (reps/sets):     [x]   Seated ther ex (reps/sets): cardiac there ex in  Handout x 10 reps each.     [x]   Standing ther ex (reps/sets): cardiac ex; hip flex x 10 reps with fww support, forward arm raises, side arm raises and  Arm crosses x 10 reps each with  No external support and sup    [x]   Picking up object from floor (standing):    4 items with sba and 25% vc for reacher and fww safety               [x]   Reacher used   []   Other:   []   Other:    Comments:      Patient/Caregiver Education and Training:   []   Bed Mobility/Transfer technique/safety  []   Gait technique/sequencing  []   Proper use of assistive device  []   Advanced mobility safety and technique  []   Reinforced patient's precautions/mobility while maintaining precautions  []   Postural awareness  []   Family training  [] Progress was updated and reviewed in Rehabtracker with patient and/or family this date. Treatment Plan for Next Session: standing  cardiac  There ex, transfer training, gait training, uneven surfaces     Assessment:      Treatment/Activity Tolerance:   [] Tolerated treatment with no adverse effects    [x] Patient limited by fatigue  [] Patient limited by pain   [] Patient limited by medical complications:    [] Adverse reaction to Tx:   [] Significant change in status    Safety:       []  bed alarm set    [x]  chair alarm set    []  Pt refused alarms                []  Telesitter activated      [x]  Gait belt used during tx session      []other:         Number of Minutes/Billable Intervention  Gait Training 45   Therapeutic Exercise 30   Neuro Re-Ed    Therapeutic Activity    Wheelchair Propulsion    Group    Other:    TOTAL 75         Social History  Social/Functional History  ADL Assistance: Independent  Homemaking Assistance: Independent  Homemaking Responsibilities: Yes  Meal Prep Responsibility: Primary(Pt reports eating out most of the time.)  Laundry Responsibility: Primary  Cleaning Responsibility: Primary  Bill Paying/Finance Responsibility: Primary  Shopping Responsibility: Primary  Dependent Care Responsibility: No  Health Care Management: Primary  Ambulation Assistance: Independent  Transfer Assistance: Independent  Active : Yes  Mode of Transportation: Truck, Car  Occupation: Full time employment  Type of occupation: Truck drive  Leisure & Hobbies: none  Additional Comments: 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 Pt reports he will be staying with ex-wife at discharge and will have initial 24/7 supervision.  Above information is for ex-wife's home setup.; no falls in the past year    Objective                                                                                    Goals:  (Update in navigator)  Short term goals  Time Frame for Short term goals: 10 tx days:  Short term goal 1: Pt will complete rolling L/R and sup<->sit Ind following sternal precautions. Short term goal 2: Pt will complete OOB transfers following sternal precautions Mod Ind. Short term goal 3: Pt will ambulate 150 ft using RW Mod Ind. Short term goal 4: Pt will ascend/descend 4-5 steps using bilat rails Mod Ind. Short term goal 5: Pt will ascend/descend curb step and ambulate over uneven surfaces using RW Mod Ind. Short term goal 6: Pt will complete object retrieval from the floor in standing Mod Ind-Ind.:   :        Plan of Care                                                                              Times per week: 5 days per week for a minimum of 60 minutes/day plus group as appropriate for 60 minutes.   Treatment to include Current Treatment Recommendations: Transfer Training, Endurance Training, Strengthening, Patient/Caregiver Education & Training, Pain Management, Equipment Evaluation, Education, & procurement, Balance Training, Gait Training, Home Exercise Program, Functional Mobility Training, Stair training, Safety Education & Training    Electronically signed by   Stephens Dandy  11/6/2020, 8:10 AM Ensure clear BID

## 2020-11-06 NOTE — PROGRESS NOTES
venous catheter tip overlies the SVC. Sternotomy wires. Small bilateral pleural effusion. Right lower lobe opacity. Stable cardiomegaly. Mild interstitial edema. No pneumothorax. 1. Unchanged mild pulmonary edema and small bilateral pleural effusions. 2. Stable right lower lobe atelectasis. Xr Chest Portable    Result Date: 10/21/2020  EXAMINATION: ONE XRAY VIEW OF THE CHEST 10/21/2020 1:16 pm COMPARISON: 10/21/2020 HISTORY: ORDERING SYSTEM PROVIDED HISTORY: chest tube removal   ersistent small right pleural effusion with bibasilar atelectasis. No discrete pneumothorax. The osseous structures otherwise stable. Interval removal of thoracostomy tubes. No discrete pneumothorax. Xr Chest Portable    Result Date: 10/21/2020  EXAMINATION: ONE XRAY VIEW OF THE CHEST 10/21/2020 6:06 am COMPARISON: 10/20/2020 HISTORY: ORDERING SYSTEM PROVIDED HISTORY: Post op open heart surgery  . Mildly increased interstitial changes which could be related to increased pulmonary edema or pneumonitis. Cardiomegaly with central vascular congestion. Otherwise similar appearing chest.     Xr Chest Portable    Result Date: 10/21/2020  EXAMINATION: ONE XRAY VIEW OF THE CHEST 10/20/2020 5:18 am COMPARISON: Chest radiograph dated October 19, 2020. HISTORY: ORDERING SYSTEM PROVIDED HISTORY: Post op open heart surgery TECHNOLOGIST PROVIDED HISTORY: Reason for Exam:->Post op open heart surgery Reason for Exam: Post op open heart surgery Acuity: Unknown Type of Exam: Unknown FINDINGS: Median sternotomy wires are noted. A left-sided central venous catheter is in place. A Alex-Olga catheter is in place. Bilateral chest tubes are present. Low lung volumes with bilateral pleural effusions and bibasilar opacities are seen. Bilateral pleural effusions with bibasilar opacities without significant change.            Scheduled Medicines   Medications:    insulin lispro  0-6 Units Subcutaneous Nightly    vancomycin  250 mg Oral 4 times per day    furosemide  40 mg Oral BID    aspirin  81 mg Oral Daily    atorvastatin  20 mg Oral Nightly    therapeutic multivitamin-minerals  1 tablet Oral Daily with breakfast    albuterol sulfate HFA  2 puff Inhalation Q4H WA    amiodarone  200 mg Oral Daily    budesonide-formoterol  2 puff Inhalation BID    DULoxetine  60 mg Oral Daily    finasteride  5 mg Oral Daily    guaiFENesin  600 mg Oral BID    levothyroxine  100 mcg Oral Daily    predniSONE  20 mg Oral Daily    tamsulosin  0.4 mg Oral Daily    tiotropium  2 puff Inhalation Daily    insulin lispro  0-6 Units Subcutaneous TID WC    carvedilol  3.125 mg Oral BID      Infusions:         Objective:   Vitals: /61   Pulse 71   Temp 97.4 °F (36.3 °C) (Oral)   Resp 18   Ht 5' 9\" (1.753 m)   Wt 185 lb 3.2 oz (84 kg)   SpO2 93%   BMI 27.35 kg/m²   General appearance: alert and cooperative with exam  Neck: no JVD or bruit  Thyroid : Normal lobes   Lungs: Has Vesicular Breath sounds somewhat diminished breath sounds right side   heart:  regular rate and rhythm  Abdomen: soft, non-tender; bowel sounds normal; no masses,  no organomegaly  Musculoskeletal: Normal  Extremities: extremities normal, , no edema  Neurologic:  Awake, alert, oriented to name, place and time. Cranial nerves II-XII are grossly intact. Motor is  intact. Sensory is intact. ,  and gait is normal.    Assessment:     Patient Active Problem List:     Juvenile rheumatoid arthritis (HCC)     Chronic bilateral low back pain without sciatica     Peripheral arterial disease (HCC)     Panlobular emphysema (HCC)     Essential hypertension     Acquired hypothyroidism     Gastroesophageal reflux disease     Non-seasonal allergic rhinitis     Benign prostatic hyperplasia without lower urinary tract symptoms     Tobacco use     Acute pulmonary edema (HCC)     Seasonal allergic rhinitis due to pollen     Mixed irritable bowel syndrome     Mixed hyperlipidemia Prediabetes     Benign prostatic hyperplasia     Chronic combined systolic and diastolic congestive heart failure (HCC)     JOSE (acute kidney injury) (HCC)     LV dysfunction     Dehydration     Vomiting     Coronary artery disease involving native coronary artery     CAD in native artery. //CABG    C. difficile colitis      Plan:     1. Reviewed POC blood glucose . Labs and X ray results   2. Reviewed Current Medicines   3. on  Correction bolus Humalog Insulin regime  4. Monitor Blood glucose frequently   5. Modified  the dose of Insulin/ other medicines as needed   6. Transferred to acute rehab unit on 10/27/2020 Will follow  7. Will follow.      Rios Wharton MD

## 2020-11-06 NOTE — PROGRESS NOTES
Occupational Therapy  Physical Rehabilitation: OCCUPATIONAL THERAPY     [x] daily progress note       [] discharge       Patient Name:  Ana Bonner   :  1955 MRN: [id number]  Room:  [address] [place] [address] Date of Admission: 10/27/2020  Rehabilitation Diagnosis:   Atherosclerotic heart disease of native coronary artery without angina pectoris [I25.10]  CHF following cardiac surgery, postop [I97.130]       Date 2020       Day of ARU Week:  4   Time IN/OUT [id number]   Individual Tx Minutes 60   Group Tx Minutes    Co-Treat Minutes    Concurrent Tx Minutes    TOTAL Tx Time Mins 60   Variance Time    Variance Time []   Refusal due to:     []   Medical hold/reason:    []   Illness   []   Off Unit for test/procedure  []   Extra time needed to complete task  []   Therapeutic need  []   Other (specify):   Restrictions Restrictions/Precautions: Fall Risk, General Precautions, Contact Precautions, Cardiac(c-diff)         Communication with other providers: [x]   OK to see per nursing:     []   Spoke with team member regarding:      Subjective observations and cognitive status: Pt seated in w/c upon arrival, pt agreeable to therapy after PT handoff in therapy gym. Pt c/o pain in chest and waiting for CANDIS Root for pain medication. Pt demo some poor self-awareness and need for assistance in regards to current level of function this date. Pt states, \"I feel ready to go home, I could now. \" Pt educated on poor activity tolerance during therapy session and Pt was educated on benefits of TTB and energy conservation techniques.        Pain level/location:    9/10       Location: incision area- chest/lungs   Discharge recommendations  Anticipated discharge date:  11/10  Destination: []home alone   []home alone w assist prn   [] home w/ family    [] Continuous supervision       []SNF    [] Assisted living     [] Other:   Continued therapy: [x]HHC OT  []OUTPATIENT  OT   [] No Further OT  Equipment needs: Ana Bonner requires the assistance of a tub transfer bench to successfully and safely complete bathing activities necessary due to reduced balance, endurance, need for ADL assist, and LE weakness and reduced ROM. These tasks cannot be safely completed without this device and would place Nancy Mast at greater risk of falls. Bed Mobility:           [x]   Pt received out of bed   Sit --> Supine:  SBA- safety and maintaining sternal precautions, pt did not require cues for sternal precautions this date. Transfers:    Sit--> Stand:  CGA  Stand --> Sit:   CGA  Stand-Pivot:   CGA  Other:    Assistive device required for transfer:   FWW      Functional Mobility:    Assistance:  ~20 ft c FWW and w/c follow while managaing O2 line c SBA. Device:   [x]   Rolling Walker     []   Standard Jeananne Garre []   Wheelchair        []   Asa Held       []   Jason Ngo         []   Cardiac Jeananne Garre       []   Other:      Additional Therapeutic activities/exercises completed this date:     []   ADL Training   [x]   Balance/Postural training     [x]   Tub Transfer Training: education provided on TTB and benefits. [x]   Endurance Training   []   Neuromuscular Re-ed   []   Nu-step:  Time:        Level:         #Steps:       []   Rebounder:    []  Seated     []  Standing        []   Supine Ther Ex (reps/sets):     []   Seated Ther Ex (reps/sets):     [x]   Standing Ther Ex (reps/sets):  Pt tolerated 2 min 38 seconds of dynamic standing ax: armbike (RPM 33, total count 82) RB required. 2nd attempt: 4 mins 4 seconds 121 total count   [x]   Other: HEP: ADL's after heart surgery education/demo.        Comments:      Patient/Caregiver Education and Training:   []   [employer] Equipment Use  []   Bed Mobility/Transfer Technique/Safety  [x]   Energy Conservation Tips  []   Family training  []   Postural Awareness  [x]   Safety During Functional Activities  [x]   Reinforced Patient's Precautions   []   Progress was updated and reviewed in Rehabtracker with patient and/or family this         date. Treatment Plan for Next Session: Trial tub transfer bench training, increase activity tolerance. Assessment:  Pt's pain and decreased activity tolerance appears to be the major barrier this date. However pt's schedule may play a role on decreased activity tolerance as pt had back to back therapy sessions this afternoon. See Subject line for cognitive concerns. Treatment/Activity Tolerance:   [x] Tolerated treatment with no adverse effects    [x] Patient limited by fatigue  [] Patient limited by pain   [] Patient limited by medical complications:    [] Adverse reaction to Tx:   [] Significant change in status    Safety:       [x]  bed alarm set    []  chair alarm set    []  Pt refused alarms                []  Telesitter activated      [x]  Gait belt used during tx session      [x]other: SCD      Number of Minutes/Billable Intervention  Therapeutic Exercise 30   ADL Self-care    Neuro Re-Ed    Therapeutic Activity 30   Group    Other:    TOTAL 60       Social History  Social/Functional History  ADL Assistance: Independent  Homemaking Assistance: Independent  Homemaking Responsibilities: Yes  Meal Prep Responsibility: Primary(Pt reports eating out most of the time.)  Laundry Responsibility: Primary  Cleaning Responsibility: Primary  Bill Paying/Finance Responsibility: Primary  Shopping Responsibility: Primary  Dependent Care Responsibility: No  Health Care Management: Primary  Ambulation Assistance: Independent  Transfer Assistance: Independent  Active : Yes  Mode of Transportation: Truck, Car  Occupation: Full time employment  Type of occupation: Truck drive  Leisure & Hobbies: none  Additional Comments: 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 Pt reports he will be staying with ex-wife at discharge and will have initial 24/7 supervision.  Above information is for ex-wife's home setup.; no falls in the past year    Objective Goals:  (Update in navigator)  Short term goals  Time Frame for Short term goals: STG=LTG:  Long term goals  Time Frame for Long term goals : 10-12 days or until d/c  Long term goal 1: Pt will complete feeding/grooming/oral care task c MOD I by d/c  Long term goal 2: Pt will complete total body bathing c Mod I by d/c  Long term goal 3: Pt will complete UB dressing c MOD I and retrieve items from closet by d/c  Long term goal 4: Pt will complete LB dressing c MOD I and retrieve items from closet by d/c  Long term goal 5: Pt will don/doff footwear c Min A by d/c  Long term goals 6: Pt will complete toileting c MOD I by d/c  Long term goal 7: Pt will complete functional transfer (toilet, tub, shower) c MOD I by d/c. Long term goal 8: Pt will perform therex/therax to facilitate increased strength/endurance/ax tolerance (c emphasis on maintaining sternal precautions and dynamic standing balance/tolerance >8 mins) c MOD I in order to complete ADLs. :        Plan of Care                                                                              Times per week: 5 days per week for a minimum of 60 minutes/day plus group as appropriate for 60 minutes.   Treatment to include Plan  Times per day: Daily  Current Treatment Recommendations: Strengthening, Patient/Caregiver Education & Training, Home Management Training, Functional Mobility Training, Endurance Training, Equipment Evaluation, Education, & procurement, Pain Management, Safety Education & Training, Self-Care / ADL, Balance Training    Electronically signed by   JHOAN Burgess OTR/L  License # [id number]    11/6/2020, 4:45 PM

## 2020-11-06 NOTE — PROGRESS NOTES
initiated   Coordination of Nutrition Care:  Continue to monitor while inpatient    Goals:  Patient will tolerate diet to consume at least 50-75% at meals       Nutrition Monitoring and Evaluation:   Behavioral-Environmental Outcomes:  None Identified   Food/Nutrient Intake Outcomes:  Diet Advancement/Tolerance, Food and Nutrient Intake  Physical Signs/Symptoms Outcomes:  Biochemical Data, Diarrhea, Skin, Weight     Discharge Planning:    Continue current diet     Electronically signed by Evan Fleming RD, LD on 11/6/20 at 12:59 PM EST    Contact: [phone]

## 2020-11-07 LAB
GLUCOSE BLD-MCNC: 119 MG/DL (ref 70–99)
GLUCOSE BLD-MCNC: 134 MG/DL (ref 70–99)
GLUCOSE BLD-MCNC: 139 MG/DL (ref 70–99)
GLUCOSE BLD-MCNC: 86 MG/DL (ref 70–99)

## 2020-11-07 PROCEDURE — 1280000000 HC REHAB R&B

## 2020-11-07 PROCEDURE — 82962 GLUCOSE BLOOD TEST: CPT

## 2020-11-07 PROCEDURE — 94640 AIRWAY INHALATION TREATMENT: CPT

## 2020-11-07 PROCEDURE — 6370000000 HC RX 637 (ALT 250 FOR IP): Performed by: SPECIALIST

## 2020-11-07 PROCEDURE — 94761 N-INVAS EAR/PLS OXIMETRY MLT: CPT

## 2020-11-07 PROCEDURE — 2700000000 HC OXYGEN THERAPY PER DAY

## 2020-11-07 PROCEDURE — 6370000000 HC RX 637 (ALT 250 FOR IP): Performed by: SURGERY

## 2020-11-07 PROCEDURE — 6370000000 HC RX 637 (ALT 250 FOR IP): Performed by: PHYSICAL MEDICINE & REHABILITATION

## 2020-11-07 RX ADMIN — TAMSULOSIN HYDROCHLORIDE 0.4 MG: 0.4 CAPSULE ORAL at 08:51

## 2020-11-07 RX ADMIN — HYDROCODONE BITARTRATE AND ACETAMINOPHEN 2 TABLET: 5; 325 TABLET ORAL at 01:22

## 2020-11-07 RX ADMIN — MULTIPLE VITAMINS W/ MINERALS TAB 1 TABLET: TAB at 08:51

## 2020-11-07 RX ADMIN — HYDROCODONE BITARTRATE AND ACETAMINOPHEN 2 TABLET: 5; 325 TABLET ORAL at 20:47

## 2020-11-07 RX ADMIN — HYDROCODONE BITARTRATE AND ACETAMINOPHEN 2 TABLET: 5; 325 TABLET ORAL at 11:20

## 2020-11-07 RX ADMIN — ASPIRIN 81 MG: 81 TABLET, FILM COATED ORAL at 08:51

## 2020-11-07 RX ADMIN — ALBUTEROL SULFATE 2 PUFF: 90 AEROSOL, METERED RESPIRATORY (INHALATION) at 17:45

## 2020-11-07 RX ADMIN — AMIODARONE HYDROCHLORIDE 200 MG: 200 TABLET ORAL at 08:51

## 2020-11-07 RX ADMIN — FUROSEMIDE 40 MG: 40 TABLET ORAL at 17:58

## 2020-11-07 RX ADMIN — Medication 250 MG: at 01:22

## 2020-11-07 RX ADMIN — FUROSEMIDE 40 MG: 40 TABLET ORAL at 08:51

## 2020-11-07 RX ADMIN — ALBUTEROL SULFATE 2 PUFF: 90 AEROSOL, METERED RESPIRATORY (INHALATION) at 10:43

## 2020-11-07 RX ADMIN — FINASTERIDE 5 MG: 5 TABLET, FILM COATED ORAL at 08:51

## 2020-11-07 RX ADMIN — BUDESONIDE AND FORMOTEROL FUMARATE DIHYDRATE 2 PUFF: 160; 4.5 AEROSOL RESPIRATORY (INHALATION) at 10:41

## 2020-11-07 RX ADMIN — HYDROCODONE BITARTRATE AND ACETAMINOPHEN 2 TABLET: 5; 325 TABLET ORAL at 15:52

## 2020-11-07 RX ADMIN — GUAIFENESIN 600 MG: 600 TABLET, EXTENDED RELEASE ORAL at 08:51

## 2020-11-07 RX ADMIN — ATORVASTATIN CALCIUM 20 MG: 20 TABLET, FILM COATED ORAL at 20:47

## 2020-11-07 RX ADMIN — GUAIFENESIN 600 MG: 600 TABLET, EXTENDED RELEASE ORAL at 20:47

## 2020-11-07 RX ADMIN — LEVOTHYROXINE SODIUM 100 MCG: 100 TABLET ORAL at 06:26

## 2020-11-07 RX ADMIN — Medication 250 MG: at 17:58

## 2020-11-07 RX ADMIN — ALBUTEROL SULFATE 2 PUFF: 90 AEROSOL, METERED RESPIRATORY (INHALATION) at 19:37

## 2020-11-07 RX ADMIN — CARVEDILOL 3.12 MG: 6.25 TABLET, FILM COATED ORAL at 08:50

## 2020-11-07 RX ADMIN — Medication 250 MG: at 11:46

## 2020-11-07 RX ADMIN — TIOTROPIUM BROMIDE INHALATION SPRAY 2 PUFF: 3.12 SPRAY, METERED RESPIRATORY (INHALATION) at 10:42

## 2020-11-07 RX ADMIN — CARVEDILOL 3.12 MG: 6.25 TABLET, FILM COATED ORAL at 20:48

## 2020-11-07 RX ADMIN — HYDROCODONE BITARTRATE AND ACETAMINOPHEN 2 TABLET: 5; 325 TABLET ORAL at 06:26

## 2020-11-07 RX ADMIN — Medication 250 MG: at 06:26

## 2020-11-07 RX ADMIN — BUDESONIDE AND FORMOTEROL FUMARATE DIHYDRATE 2 PUFF: 160; 4.5 AEROSOL RESPIRATORY (INHALATION) at 19:37

## 2020-11-07 RX ADMIN — DULOXETINE HYDROCHLORIDE 60 MG: 30 CAPSULE, DELAYED RELEASE ORAL at 08:50

## 2020-11-07 RX ADMIN — PREDNISONE 20 MG: 20 TABLET ORAL at 08:51

## 2020-11-07 ASSESSMENT — PAIN DESCRIPTION - PAIN TYPE
TYPE: ACUTE PAIN;SURGICAL PAIN
TYPE: SURGICAL PAIN

## 2020-11-07 ASSESSMENT — PAIN DESCRIPTION - FREQUENCY
FREQUENCY: INTERMITTENT
FREQUENCY: INTERMITTENT

## 2020-11-07 ASSESSMENT — PAIN DESCRIPTION - DESCRIPTORS
DESCRIPTORS: CONSTANT

## 2020-11-07 ASSESSMENT — PAIN DESCRIPTION - LOCATION
LOCATION: CHEST

## 2020-11-07 ASSESSMENT — PAIN DESCRIPTION - ORIENTATION
ORIENTATION: MID

## 2020-11-07 ASSESSMENT — PAIN SCALES - GENERAL
PAINLEVEL_OUTOF10: 9

## 2020-11-07 NOTE — PROGRESS NOTES
Thalia Murphy    : 1955  Acct #: [de-identified]  MRN: [id number]              PM&R Progress Note      Admitting diagnosis: Congestive heart failure after coronary bypass graft surgery     Comorbid diagnoses impacting rehabilitation: Uncontrolled pain, generalized weakness, gait disturbance, acute blood loss anemia, essential hypertension, BPH, COPD exacerbation, nicotine addiction    Chief complaint: Please to be weaning down off the oxygen. Less chest wall pain. Prior (baseline) level of function: Independent. Current level of function:         Current  IRF-ESTEBAN and Goals:   Hearing, Speech, and Vision  Expression of Ideas and Wants: Without difficulty  Understanding Verbal and Non-Verbal Content: Understands  Prior Functioning: Everyday Activities  Self Care: Independent  Indoor Mobility (Ambulation): Independent  Stairs: Independent  Functional Cognition: Independent  Prior Device Use: (Ind)    Eating  Assistance Needed: Setup or clean-up assistance  CARE Score: 5  Discharge Goal: Independent  Oral Hygiene  Assistance Needed: Setup or clean-up assistance  Comment: Completed seated sink-side  CARE Score: 5  Discharge Goal: Independent  Toileting Hygiene  Assistance Needed: Dependent  Comment: 3(deemed usual performance per team huddle)  CARE Score: 1  Discharge Goal: Independent  Shower/Bathe Self  Assistance Needed: Partial/moderate assistance  Comment: Mod A for LB bathing, v/c for sternal precautions/compensatory strategies. CARE Score: 3  Discharge Goal: Independent  Upper Body Dressing  Assistance Needed: Partial/moderate assistance  Comment: Min A for threading BUE while maintaining cardiac precautions. CARE Score: 3  Discharge Goal: Independent  Lower Body Dressing  Assistance Needed: Substantial/maximal assistance  Comment: Max A to thread BLE and don over hips, pt demo buttoning pants.   CARE Score: 2  Discharge Goal: Independent  Putting On/Taking Off Footwear  Assistance Needed: Dependent  CARE Score: 1  Discharge Goal: Partial/moderate assistance    Roll Left and Right  Assistance Needed: Supervision or touching assistance  Comment: required Sup. (sequential cues following sternal precautions); tasks completed without use of bed features  CARE Score: 4  Discharge Goal: Independent  Sit to Lying  Assistance Needed: Partial/moderate assistance  Comment: required Mod A (assist on BLE), transfer completed without use of bed features  CARE Score: 3  Discharge Goal: Independent  Sit to Stand  Assistance Needed: Partial/moderate assistance  Comment: required Mod A with RW  CARE Score: 3  Discharge Goal: Independent  Chair/Bed-to-Chair Transfer  Comment: pt limited by lightheadedness  Reason if not Attempted: Not attempted due to medical condition or safety concerns  CARE Score: 88  Discharge Goal: Independent  Toilet Transfer  Assistance Needed: Partial/moderate assistance  Comment: Min A-Mod A on/off toilet and body placement. CARE Score: 3  Discharge Goal: Independent  Car Transfer  Reason if not Attempted: Not attempted due to medical condition or safety concerns  CARE Score: 88  Discharge Goal: Independent   Walk 10 Feet? Walk 10 Feet?: No  1 Step  1 Step?: Yes  Picking Up Object  Reason if not Attempted: Not attempted due to medical condition or safety concerns  CARE Score: 88  Discharge Goal: Independent  Wheelchair Ability  Uses a Wheelchair and/or Scooter?: No                  I      Exam:    Blood pressure 114/66, pulse 72, temperature 98.2 °F (36.8 °C), temperature source Oral, resp. rate 16, height 5' 9\" (1.753 m), weight 184 lb 3.2 oz (83.6 kg), SpO2 94 %. General: Observed standing up in therapy and in wheelchair. Was not in good spirits. HEENT: MMM. Neck supple. No JVD. Pulmonary: Unlabored and clear. Blunted breath sounds in the bases. Cardiac: RRR. Abdomen: Patient's abdomen is soft and nondistended. Bowel sounds were present throughout.   There was no rebound, guarding or masses noted. Upper extremities: Guarded shoulder movements but able to bring both hands up to meet mine. Stronger . Lower extremities: Trace edema bilaterally. No signs of DVT. Sitting balance was good. Standing balance was fair-.    Lab Results   Component Value Date    WBC 19.0 (H) 11/03/2020    HGB 12.5 (L) 11/03/2020    HCT 35.8 (L) 11/03/2020    MCV 99.4 11/03/2020     11/03/2020     Lab Results   Component Value Date    INR 0.97 11/03/2020    INR 1.02 10/28/2020    INR 1.27 10/19/2020    PROTIME 11.7 11/03/2020    PROTIME 12.4 10/28/2020    PROTIME 15.4 (H) 10/19/2020     Lab Results   Component Value Date    CREATININE 0.9 11/03/2020    BUN 28 (H) 11/03/2020     (L) 11/03/2020    K 3.9 11/03/2020    CL 98 (L) 11/03/2020    CO2 26 11/03/2020     Lab Results   Component Value Date    ALT 45 (H) 11/03/2020    AST 22 11/03/2020    ALKPHOS 61 11/03/2020    BILITOT 0.3 11/03/2020       Expected length of stay  prior to a supervised level of function for discharge home with a walker and Oct[facility] 77 OT/PT is 11/10/2020. Recommendations:    1. Systolic congestive heart failure after coronary bypass graft surgery: He is demonstrating significant benefit from participating in the daily occupational and physical therapy.    Bowel movements are quite infrequent now but he has no abdominal distention or cramping. No chills.    More consistent oral intake.  Continue with the pulmonary hygiene, nutritional support and cautious pain management.  Daily weights do not indicate any decompensation of CHF.  Tolerating the p.o. Lasix and his beta-blocker.  Adaptive equipment training.  Caregiver training closer to discharge.  Contact-guard to stand and transfer. 2. DVT prophylaxis: Due to his anemia and bruising, chemoprophylaxis is contraindicated.  SCDs when in bed.  Weightbearing activities are picking up significantly each day now.   Stable hemoglobin.    3.  COPD exacerbation: Albuterol and Symbicort inhalers.  Mucinex and Spiriva.  Monitoring O2 saturations at rest and with activity.  Nebulizers as needed.  Weaning down his supplemental oxygen. .  4. Hypertension: Coreg and Lasix are used to manage his hypertension.  Target systolic blood pressure is 120-140.  Vital signs are checked at rest and with activity. Blood pressure generally in target range now. 5. BPH: Flomax and Proscar used to manage this issue. No current symptoms of retention. 6. Nicotine addiction: Topical nicotine replacement patch. 7. C. difficile colitis: Continue with Flagyl 3 times daily. No recent bowel movements now but flatus is present. No abdominal distention or nausea. Last white blood count was 19,000 (relatively stable).  Other labs were unremarkable.    Keeping him off Questran.   No stool softeners yet.

## 2020-11-07 NOTE — PLAN OF CARE
Problem: SAFETY  Goal: Free from accidental physical injury  11/7/2020 0937 by Leah Drummond RN  Outcome: Ongoing  11/6/2020 2314 by Audrey Oconnor RN  Outcome: Ongoing  Goal: Free from intentional harm  11/7/2020 0937 by Leah Drummond RN  Outcome: Ongoing  11/6/2020 2314 by Audrey Oconnor RN  Outcome: Ongoing     Problem: DAILY CARE  Goal: Daily care needs are met  11/7/2020 0937 by Leah Drummond RN  Outcome: Ongoing  11/6/2020 2314 by Audrey Oconnor RN  Outcome: Ongoing     Problem: PAIN  Goal: Patient's pain/discomfort is manageable  11/7/2020 0937 by Leah Drummond RN  Outcome: Ongoing  11/6/2020 2314 by Audrey Oconnor RN  Outcome: Ongoing     Problem: SKIN INTEGRITY  Goal: Skin integrity is maintained or improved  11/7/2020 0937 by Leah Drummond RN  Outcome: Ongoing  11/6/2020 2314 by Audrey Oconnor RN  Outcome: Ongoing     Problem: KNOWLEDGE DEFICIT  Goal: Patient/S.O. demonstrates understanding of disease process, treatment plan, medications, and discharge instructions.   11/7/2020 9486 by Leah Drummond RN  Outcome: Ongoing  11/6/2020 2314 by Audrey Oconnor RN  Outcome: Ongoing     Problem: DISCHARGE BARRIERS  Goal: Patient's continuum of care needs are met  11/7/2020 0937 by Leah Drummond RN  Outcome: Ongoing  11/6/2020 2314 by Audrey Oconnor RN  Outcome: Ongoing     Problem: Infection - Surgical Site:  Goal: Will show no infection signs and symptoms  Description: Will show no infection signs and symptoms  11/7/2020 0937 by Leah Drummond RN  Outcome: Ongoing  11/6/2020 2314 by Audrey Oconnor RN  Outcome: Ongoing     Problem: Pain:  Goal: Pain level will decrease  Description: Pain level will decrease  11/7/2020 0937 by Leah Drummond RN  Outcome: Ongoing  11/6/2020 2314 by Audrey Oconnor RN  Outcome: Ongoing  Goal: Control of acute pain  Description: Control of acute pain  11/7/2020 0937 by Leah Drummond RN  Outcome: Ongoing  11/6/2020 2314 by Audrey Oconnor RN  Outcome: Ongoing  Goal: Control of chronic pain  Description: Control of chronic pain  11/7/2020 0937 by Sherita Dean RN  Outcome: Ongoing  11/6/2020 2314 by Iesha Pascual RN  Outcome: Ongoing     Problem: Skin Integrity:  Goal: Will show no infection signs and symptoms  Description: Will show no infection signs and symptoms  11/7/2020 0937 by Sherita Dean RN  Outcome: Ongoing  11/6/2020 2314 by Iesha Pascual RN  Outcome: Ongoing  Goal: Absence of new skin breakdown  Description: Absence of new skin breakdown  11/7/2020 0937 by Sherita Dean RN  Outcome: Ongoing  11/6/2020 2314 by Iesha Pascual RN  Outcome: Ongoing

## 2020-11-07 NOTE — PROGRESS NOTES
SVC.  Sternotomy wires. Small bilateral pleural effusion. Right lower lobe opacity. Stable cardiomegaly. Mild interstitial edema. No pneumothorax. 1. Unchanged mild pulmonary edema and small bilateral pleural effusions. 2. Stable right lower lobe atelectasis. Xr Chest Portable    Result Date: 10/21/2020  EXAMINATION: ONE XRAY VIEW OF THE CHEST 10/21/2020 1:16 pm COMPARISON: 10/21/2020 HISTORY: ORDERING SYSTEM PROVIDED HISTORY: chest tube removal   ersistent small right pleural effusion with bibasilar atelectasis. No discrete pneumothorax. The osseous structures otherwise stable. Interval removal of thoracostomy tubes. No discrete pneumothorax. Xr Chest Portable    Result Date: 10/21/2020  EXAMINATION: ONE XRAY VIEW OF THE CHEST 10/21/2020 6:06 am COMPARISON: 10/20/2020 HISTORY: ORDERING SYSTEM PROVIDED HISTORY: Post op open heart surgery  . Mildly increased interstitial changes which could be related to increased pulmonary edema or pneumonitis. Cardiomegaly with central vascular congestion. Otherwise similar appearing chest.     Xr Chest Portable    Result Date: 10/21/2020  EXAMINATION: ONE XRAY VIEW OF THE CHEST 10/20/2020 5:18 am COMPARISON: Chest radiograph dated October 19, 2020. HISTORY: ORDERING SYSTEM PROVIDED HISTORY: Post op open heart surgery TECHNOLOGIST PROVIDED HISTORY: Reason for Exam:->Post op open heart surgery Reason for Exam: Post op open heart surgery Acuity: Unknown Type of Exam: Unknown FINDINGS: Median sternotomy wires are noted. A left-sided central venous catheter is in place. A Hobson-Olga catheter is in place. Bilateral chest tubes are present. Low lung volumes with bilateral pleural effusions and bibasilar opacities are seen. Bilateral pleural effusions with bibasilar opacities without significant change.            Scheduled Medicines   Medications:    insulin lispro  0-6 Units Subcutaneous Nightly    vancomycin  250 mg Oral 4 times per day    furosemide  40 mg Oral BID    aspirin  81 mg Oral Daily    atorvastatin  20 mg Oral Nightly    therapeutic multivitamin-minerals  1 tablet Oral Daily with breakfast    albuterol sulfate HFA  2 puff Inhalation Q4H WA    amiodarone  200 mg Oral Daily    budesonide-formoterol  2 puff Inhalation BID    DULoxetine  60 mg Oral Daily    finasteride  5 mg Oral Daily    guaiFENesin  600 mg Oral BID    levothyroxine  100 mcg Oral Daily    predniSONE  20 mg Oral Daily    tamsulosin  0.4 mg Oral Daily    tiotropium  2 puff Inhalation Daily    insulin lispro  0-6 Units Subcutaneous TID WC    carvedilol  3.125 mg Oral BID      Infusions:         Objective:   Vitals: /69   Pulse 70   Temp 98.2 °F (36.8 °C) (Oral)   Resp 16   Ht 5' 9\" (1.753 m)   Wt 184 lb 3.2 oz (83.6 kg)   SpO2 94%   BMI 27.20 kg/m²   General appearance: alert and cooperative with exam  Neck: no JVD or bruit  Thyroid : Normal lobes   Lungs: Has Vesicular Breath sounds somewhat diminished breath sounds right side   heart:  regular rate and rhythm  Abdomen: soft, non-tender; bowel sounds normal; no masses,  no organomegaly  Musculoskeletal: Normal  Extremities: extremities normal, , no edema  Neurologic:  Awake, alert, oriented to name, place and time. Cranial nerves II-XII are grossly intact. Motor is  intact. Sensory is intact. ,  and gait is normal.    Assessment:     Patient Active Problem List:     Juvenile rheumatoid arthritis (HCC)     Chronic bilateral low back pain without sciatica     Peripheral arterial disease (HCC)     Panlobular emphysema (HCC)     Essential hypertension     Acquired hypothyroidism     Gastroesophageal reflux disease     Non-seasonal allergic rhinitis     Benign prostatic hyperplasia without lower urinary tract symptoms     Tobacco use     Acute pulmonary edema (HCC)     Seasonal allergic rhinitis due to pollen     Mixed irritable bowel syndrome     Mixed hyperlipidemia     Prediabetes     Benign prostatic hyperplasia     Chronic combined systolic and diastolic congestive heart failure (HCC)     JOSE (acute kidney injury) (HCC)     LV dysfunction     Dehydration     Vomiting     Coronary artery disease involving native coronary artery     CAD in native artery. //CABG    C. difficile colitis      Plan:     1. Reviewed POC blood glucose . Labs and X ray results   2. Reviewed Current Medicines   3. on  Correction bolus Humalog Insulin regime  4. Monitor Blood glucose frequently   5. Modified  the dose of Insulin/ other medicines as needed   6. Transferred to acute rehab unit on 10/27/2020 Will follow    Will follow.      Franklin Soto MD

## 2020-11-08 LAB
GLUCOSE BLD-MCNC: 111 MG/DL (ref 70–99)
GLUCOSE BLD-MCNC: 129 MG/DL (ref 70–99)
GLUCOSE BLD-MCNC: 156 MG/DL (ref 70–99)
GLUCOSE BLD-MCNC: 93 MG/DL (ref 70–99)

## 2020-11-08 PROCEDURE — 82962 GLUCOSE BLOOD TEST: CPT

## 2020-11-08 PROCEDURE — 6370000000 HC RX 637 (ALT 250 FOR IP): Performed by: PHYSICAL MEDICINE & REHABILITATION

## 2020-11-08 PROCEDURE — 6370000000 HC RX 637 (ALT 250 FOR IP): Performed by: SURGERY

## 2020-11-08 PROCEDURE — 2700000000 HC OXYGEN THERAPY PER DAY

## 2020-11-08 PROCEDURE — 94150 VITAL CAPACITY TEST: CPT

## 2020-11-08 PROCEDURE — 1280000000 HC REHAB R&B

## 2020-11-08 PROCEDURE — 6370000000 HC RX 637 (ALT 250 FOR IP): Performed by: SPECIALIST

## 2020-11-08 PROCEDURE — 94761 N-INVAS EAR/PLS OXIMETRY MLT: CPT

## 2020-11-08 PROCEDURE — 94640 AIRWAY INHALATION TREATMENT: CPT

## 2020-11-08 RX ORDER — POLYETHYLENE GLYCOL 3350 17 G/17G
17 POWDER, FOR SOLUTION ORAL DAILY PRN
Status: DISCONTINUED | OUTPATIENT
Start: 2020-11-08 | End: 2020-11-10 | Stop reason: HOSPADM

## 2020-11-08 RX ADMIN — HYDROCODONE BITARTRATE AND ACETAMINOPHEN 2 TABLET: 5; 325 TABLET ORAL at 08:56

## 2020-11-08 RX ADMIN — HYDROCODONE BITARTRATE AND ACETAMINOPHEN 2 TABLET: 5; 325 TABLET ORAL at 04:46

## 2020-11-08 RX ADMIN — ALBUTEROL SULFATE 2 PUFF: 90 AEROSOL, METERED RESPIRATORY (INHALATION) at 14:51

## 2020-11-08 RX ADMIN — HYDROCODONE BITARTRATE AND ACETAMINOPHEN 2 TABLET: 5; 325 TABLET ORAL at 13:36

## 2020-11-08 RX ADMIN — ALBUTEROL SULFATE 2 PUFF: 90 AEROSOL, METERED RESPIRATORY (INHALATION) at 10:29

## 2020-11-08 RX ADMIN — AMIODARONE HYDROCHLORIDE 200 MG: 200 TABLET ORAL at 08:55

## 2020-11-08 RX ADMIN — GUAIFENESIN 600 MG: 600 TABLET, EXTENDED RELEASE ORAL at 08:55

## 2020-11-08 RX ADMIN — ALBUTEROL SULFATE 2 PUFF: 90 AEROSOL, METERED RESPIRATORY (INHALATION) at 20:55

## 2020-11-08 RX ADMIN — FINASTERIDE 5 MG: 5 TABLET, FILM COATED ORAL at 08:55

## 2020-11-08 RX ADMIN — CARVEDILOL 3.12 MG: 6.25 TABLET, FILM COATED ORAL at 21:23

## 2020-11-08 RX ADMIN — Medication 250 MG: at 12:32

## 2020-11-08 RX ADMIN — Medication 250 MG: at 05:00

## 2020-11-08 RX ADMIN — CARVEDILOL 3.12 MG: 6.25 TABLET, FILM COATED ORAL at 08:54

## 2020-11-08 RX ADMIN — HYDROCODONE BITARTRATE AND ACETAMINOPHEN 2 TABLET: 5; 325 TABLET ORAL at 00:24

## 2020-11-08 RX ADMIN — MULTIPLE VITAMINS W/ MINERALS TAB 1 TABLET: TAB at 08:55

## 2020-11-08 RX ADMIN — PREDNISONE 20 MG: 20 TABLET ORAL at 08:55

## 2020-11-08 RX ADMIN — DULOXETINE HYDROCHLORIDE 60 MG: 30 CAPSULE, DELAYED RELEASE ORAL at 08:55

## 2020-11-08 RX ADMIN — GUAIFENESIN 600 MG: 600 TABLET, EXTENDED RELEASE ORAL at 21:22

## 2020-11-08 RX ADMIN — TAMSULOSIN HYDROCHLORIDE 0.4 MG: 0.4 CAPSULE ORAL at 08:55

## 2020-11-08 RX ADMIN — HYDROCODONE BITARTRATE AND ACETAMINOPHEN 2 TABLET: 5; 325 TABLET ORAL at 21:22

## 2020-11-08 RX ADMIN — TIOTROPIUM BROMIDE INHALATION SPRAY 2 PUFF: 3.12 SPRAY, METERED RESPIRATORY (INHALATION) at 10:32

## 2020-11-08 RX ADMIN — BUDESONIDE AND FORMOTEROL FUMARATE DIHYDRATE 2 PUFF: 160; 4.5 AEROSOL RESPIRATORY (INHALATION) at 10:31

## 2020-11-08 RX ADMIN — ASPIRIN 81 MG: 81 TABLET, FILM COATED ORAL at 08:55

## 2020-11-08 RX ADMIN — LEVOTHYROXINE SODIUM 100 MCG: 100 TABLET ORAL at 04:46

## 2020-11-08 RX ADMIN — FUROSEMIDE 40 MG: 40 TABLET ORAL at 17:27

## 2020-11-08 RX ADMIN — HYDROCODONE BITARTRATE AND ACETAMINOPHEN 2 TABLET: 5; 325 TABLET ORAL at 17:35

## 2020-11-08 RX ADMIN — Medication 250 MG: at 00:23

## 2020-11-08 RX ADMIN — POLYETHYLENE GLYCOL (3350) 17 G: 17 POWDER, FOR SOLUTION ORAL at 12:27

## 2020-11-08 RX ADMIN — BUDESONIDE AND FORMOTEROL FUMARATE DIHYDRATE 2 PUFF: 160; 4.5 AEROSOL RESPIRATORY (INHALATION) at 20:55

## 2020-11-08 RX ADMIN — Medication 250 MG: at 21:28

## 2020-11-08 RX ADMIN — FUROSEMIDE 40 MG: 40 TABLET ORAL at 08:56

## 2020-11-08 RX ADMIN — ATORVASTATIN CALCIUM 20 MG: 20 TABLET, FILM COATED ORAL at 21:22

## 2020-11-08 ASSESSMENT — PAIN DESCRIPTION - FREQUENCY
FREQUENCY: INTERMITTENT
FREQUENCY: INTERMITTENT

## 2020-11-08 ASSESSMENT — PAIN SCALES - GENERAL
PAINLEVEL_OUTOF10: 9
PAINLEVEL_OUTOF10: 4
PAINLEVEL_OUTOF10: 9
PAINLEVEL_OUTOF10: 7
PAINLEVEL_OUTOF10: 9

## 2020-11-08 ASSESSMENT — PAIN DESCRIPTION - ORIENTATION
ORIENTATION: MID
ORIENTATION: MID

## 2020-11-08 ASSESSMENT — PAIN DESCRIPTION - PAIN TYPE
TYPE: SURGICAL PAIN

## 2020-11-08 ASSESSMENT — PAIN DESCRIPTION - LOCATION
LOCATION: STERNUM
LOCATION: STERNUM
LOCATION: CHEST

## 2020-11-08 NOTE — PROGRESS NOTES
Progress Note( Dr. Jose Emerson)  11/8/2020  Subjective:   Admit Date: 10/27/2020  PCP: Tawny Machuca MD    Admitted For : Chest pain CAD underwent CABG    Consulted For: Better control of blood glucose filed diabetes mellitus    Interval History: Post CABG patient was transferred to acute rehab unit on evening of 10/27/2020      C/O  chest pains, also with deep breathing mostly from chest wall also seem to be somewhat better  Has some  SOB . Denies nausea or vomiting. No new bowel or bladder symptoms. diarrhea seems to be better  It turned out to be C. difficile colitis seen by GI  Change the medicine to vancomycin p.o. Intake/Output Summary (Last 24 hours) at 11/8/2020 1515  Last data filed at 11/8/2020 1342  Gross per 24 hour   Intake 880 ml   Output 2325 ml   Net -1445 ml       DATA    CBC:   No results for input(s): WBC, HGB, PLT in the last 72 hours. CMP:  No results for input(s): NA, K, CL, CO2, BUN, CREATININE, CALCIUM, PROT, LABALBU, BILITOT, ALKPHOS, AST, ALT in the last 72 hours.     Invalid input(s): GLU  Lipids:   Lab Results   Component Value Date    CHOL 155 06/23/2020    HDL 30 06/23/2020    TRIG 173 06/23/2020     Glucose:  Recent Labs     11/07/20  2053 11/08/20  0747 11/08/20  1159   POCGLU 119* 93 111*     [id number]:  Lab Results   Component Value Date    LABA1C 6.3 10/12/2020     High Sensitivity TSH:   Lab Results   Component Value Date    TSHHS 1.340 06/23/2020     Free T3: No results found for: FT3  Free T4:  Lab Results   Component Value Date    T4FREE 1.28 06/23/2020       Xr Chest Portable    Result Date: 10/22/2020  EXAMINATION: ONE XRAY VIEW OF THE CHEST 10/22/2020 5:10 am COMPARISON: 10/21/2020 HISTORY: ORDERING SYSTEM PROVIDED HISTORY: Post op open heart surgery TECHNOLOGIST PROVIDED HISTORY: Reason for Exam:->Post op open heart surgery Reason for Exam: post op open heart surgery Acuity: Unknown Type of Exam: Ongoing FINDINGS: Central venous catheter tip overlies the SVC.  Sternotomy wires. Small bilateral pleural effusion. Right lower lobe opacity. Stable cardiomegaly. Mild interstitial edema. No pneumothorax. 1. Unchanged mild pulmonary edema and small bilateral pleural effusions. 2. Stable right lower lobe atelectasis. Xr Chest Portable    Result Date: 10/21/2020  EXAMINATION: ONE XRAY VIEW OF THE CHEST 10/21/2020 1:16 pm COMPARISON: 10/21/2020 HISTORY: ORDERING SYSTEM PROVIDED HISTORY: chest tube removal   ersistent small right pleural effusion with bibasilar atelectasis. No discrete pneumothorax. The osseous structures otherwise stable. Interval removal of thoracostomy tubes. No discrete pneumothorax. Xr Chest Portable    Result Date: 10/21/2020  EXAMINATION: ONE XRAY VIEW OF THE CHEST 10/21/2020 6:06 am COMPARISON: 10/20/2020 HISTORY: ORDERING SYSTEM PROVIDED HISTORY: Post op open heart surgery  . Mildly increased interstitial changes which could be related to increased pulmonary edema or pneumonitis. Cardiomegaly with central vascular congestion. Otherwise similar appearing chest.     Xr Chest Portable    Result Date: 10/21/2020  EXAMINATION: ONE XRAY VIEW OF THE CHEST 10/20/2020 5:18 am COMPARISON: Chest radiograph dated October 19, 2020. HISTORY: ORDERING SYSTEM PROVIDED HISTORY: Post op open heart surgery TECHNOLOGIST PROVIDED HISTORY: Reason for Exam:->Post op open heart surgery Reason for Exam: Post op open heart surgery Acuity: Unknown Type of Exam: Unknown FINDINGS: Median sternotomy wires are noted. A left-sided central venous catheter is in place. A [place] catheter is in place. Bilateral chest tubes are present. Low lung volumes with bilateral pleural effusions and bibasilar opacities are seen. Bilateral pleural effusions with bibasilar opacities without significant change.            Scheduled Medicines   Medications:    insulin lispro  0-6 Units Subcutaneous Nightly    vancomycin  250 mg Oral 4 times per day    furosemide  40 mg Oral BID    aspirin  81 mg Oral Daily    atorvastatin  20 mg Oral Nightly    therapeutic multivitamin-minerals  1 tablet Oral Daily with breakfast    albuterol sulfate HFA  2 puff Inhalation Q4H WA    amiodarone  200 mg Oral Daily    budesonide-formoterol  2 puff Inhalation BID    DULoxetine  60 mg Oral Daily    finasteride  5 mg Oral Daily    guaiFENesin  600 mg Oral BID    levothyroxine  100 mcg Oral Daily    predniSONE  20 mg Oral Daily    tamsulosin  0.4 mg Oral Daily    tiotropium  2 puff Inhalation Daily    insulin lispro  0-6 Units Subcutaneous TID WC    carvedilol  3.125 mg Oral BID      Infusions:         Objective:   Vitals: /68   Pulse 69   Temp 97.4 °F (36.3 °C) (Oral)   Resp 16   Ht 5' 9\" (1.753 m)   Wt 182 lb 6 oz (82.7 kg)   SpO2 93%   BMI 26.93 kg/m²   General appearance: alert and cooperative with exam  Neck: no JVD or bruit  Thyroid : Normal lobes   Lungs: Has Vesicular Breath sounds somewhat diminished breath sounds right side   heart:  regular rate and rhythm  Abdomen: soft, non-tender; bowel sounds normal; no masses,  no organomegaly  Musculoskeletal: Normal  Extremities: extremities normal, , no edema  Neurologic:  Awake, alert, oriented to name, place and time. Cranial nerves II-XII are grossly intact. Motor is  intact. Sensory is intact. ,  and gait is normal.    Assessment:     Patient Active Problem List:     Juvenile rheumatoid arthritis (HCC)     Chronic bilateral low back pain without sciatica     Peripheral arterial disease (HCC)     Panlobular emphysema (HCC)     Essential hypertension     Acquired hypothyroidism     Gastroesophageal reflux disease     Non-seasonal allergic rhinitis     Benign prostatic hyperplasia without lower urinary tract symptoms     Tobacco use     Acute pulmonary edema (HCC)     Seasonal allergic rhinitis due to pollen     Mixed irritable bowel syndrome     Mixed hyperlipidemia     Prediabetes     Benign prostatic hyperplasia     Chronic combined systolic and diastolic congestive heart failure (HCC)     JOSE (acute kidney injury) (HCC)     LV dysfunction     Dehydration     Vomiting     Coronary artery disease involving native coronary artery     CAD in native artery. //CABG    C. difficile colitis      Plan:     1. Reviewed POC blood glucose . Labs and X ray results   2. Reviewed Current Medicines   3. on  Correction bolus Humalog Insulin regime  4. Monitor Blood glucose frequently   5. Modified  the dose of Insulin/ other medicines as needed   6. Transferred to acute rehab unit on 10/27/2020 Will follow    Will follow.      Natalie Bob MD

## 2020-11-09 ENCOUNTER — APPOINTMENT (OUTPATIENT)
Dept: GENERAL RADIOLOGY | Age: 65
DRG: 949 | End: 2020-11-09
Attending: PHYSICAL MEDICINE & REHABILITATION
Payer: MEDICARE

## 2020-11-09 LAB
GLUCOSE BLD-MCNC: 109 MG/DL (ref 70–99)
GLUCOSE BLD-MCNC: 89 MG/DL (ref 70–99)
HCT VFR BLD CALC: 44.6 % (ref 42–52)
HEMOGLOBIN: 14.1 GM/DL (ref 13.5–18)
MCH RBC QN AUTO: 32.3 PG (ref 27–31)
MCHC RBC AUTO-ENTMCNC: 31.6 % (ref 32–36)
MCV RBC AUTO: 102.3 FL (ref 78–100)
PDW BLD-RTO: 14.8 % (ref 11.7–14.9)
PLATELET # BLD: 230 K/CU MM (ref 140–440)
PMV BLD AUTO: 8.3 FL (ref 7.5–11.1)
RBC # BLD: 4.36 M/CU MM (ref 4.6–6.2)
T4 FREE: 1.13 NG/DL (ref 0.9–1.8)
TSH HIGH SENSITIVITY: 7.6 UIU/ML (ref 0.27–4.2)
WBC # BLD: 13.4 K/CU MM (ref 4–10.5)

## 2020-11-09 PROCEDURE — 6370000000 HC RX 637 (ALT 250 FOR IP): Performed by: PHYSICAL MEDICINE & REHABILITATION

## 2020-11-09 PROCEDURE — 84443 ASSAY THYROID STIM HORMONE: CPT

## 2020-11-09 PROCEDURE — 6370000000 HC RX 637 (ALT 250 FOR IP): Performed by: SPECIALIST

## 2020-11-09 PROCEDURE — 99232 SBSQ HOSP IP/OBS MODERATE 35: CPT | Performed by: PHYSICAL MEDICINE & REHABILITATION

## 2020-11-09 PROCEDURE — 1280000000 HC REHAB R&B

## 2020-11-09 PROCEDURE — 97535 SELF CARE MNGMENT TRAINING: CPT

## 2020-11-09 PROCEDURE — 84439 ASSAY OF FREE THYROXINE: CPT

## 2020-11-09 PROCEDURE — 97116 GAIT TRAINING THERAPY: CPT

## 2020-11-09 PROCEDURE — 82962 GLUCOSE BLOOD TEST: CPT

## 2020-11-09 PROCEDURE — 94640 AIRWAY INHALATION TREATMENT: CPT

## 2020-11-09 PROCEDURE — 94150 VITAL CAPACITY TEST: CPT

## 2020-11-09 PROCEDURE — 97530 THERAPEUTIC ACTIVITIES: CPT

## 2020-11-09 PROCEDURE — 2700000000 HC OXYGEN THERAPY PER DAY

## 2020-11-09 PROCEDURE — 97110 THERAPEUTIC EXERCISES: CPT

## 2020-11-09 PROCEDURE — 85027 COMPLETE CBC AUTOMATED: CPT

## 2020-11-09 PROCEDURE — 6370000000 HC RX 637 (ALT 250 FOR IP): Performed by: SURGERY

## 2020-11-09 PROCEDURE — 94761 N-INVAS EAR/PLS OXIMETRY MLT: CPT

## 2020-11-09 PROCEDURE — 36415 COLL VENOUS BLD VENIPUNCTURE: CPT

## 2020-11-09 PROCEDURE — 71046 X-RAY EXAM CHEST 2 VIEWS: CPT

## 2020-11-09 RX ORDER — ALBUTEROL SULFATE 90 UG/1
2 AEROSOL, METERED RESPIRATORY (INHALATION)
Qty: 1 INHALER | Refills: 0 | Status: SHIPPED | OUTPATIENT
Start: 2020-11-09 | End: 2021-02-24 | Stop reason: SDUPTHER

## 2020-11-09 RX ORDER — AMIODARONE HYDROCHLORIDE 200 MG/1
200 TABLET ORAL DAILY
Qty: 30 TABLET | Refills: 0 | Status: SHIPPED | OUTPATIENT
Start: 2020-11-10 | End: 2020-12-10

## 2020-11-09 RX ORDER — HYDROCODONE BITARTRATE AND ACETAMINOPHEN 5; 325 MG/1; MG/1
1 TABLET ORAL EVERY 6 HOURS PRN
Qty: 28 TABLET | Refills: 0 | Status: SHIPPED | OUTPATIENT
Start: 2020-11-09 | End: 2020-11-16

## 2020-11-09 RX ORDER — CARVEDILOL 3.12 MG/1
3.12 TABLET ORAL 2 TIMES DAILY
Qty: 60 TABLET | Refills: 0 | Status: SHIPPED | OUTPATIENT
Start: 2020-11-09 | End: 2020-12-10

## 2020-11-09 RX ORDER — PREDNISONE 10 MG/1
10 TABLET ORAL DAILY
Status: DISCONTINUED | OUTPATIENT
Start: 2020-11-10 | End: 2020-11-10 | Stop reason: HOSPADM

## 2020-11-09 RX ORDER — FUROSEMIDE 40 MG/1
40 TABLET ORAL 2 TIMES DAILY
Qty: 60 TABLET | Refills: 0 | Status: SHIPPED | OUTPATIENT
Start: 2020-11-09 | End: 2020-12-10

## 2020-11-09 RX ORDER — HYDROCODONE BITARTRATE AND ACETAMINOPHEN 5; 325 MG/1; MG/1
1 TABLET ORAL EVERY 6 HOURS PRN
Status: DISCONTINUED | OUTPATIENT
Start: 2020-11-09 | End: 2020-11-10 | Stop reason: HOSPADM

## 2020-11-09 RX ORDER — ATORVASTATIN CALCIUM 20 MG/1
20 TABLET, FILM COATED ORAL NIGHTLY
Qty: 30 TABLET | Refills: 0 | Status: SHIPPED | OUTPATIENT
Start: 2020-11-09 | End: 2020-12-22 | Stop reason: SDUPTHER

## 2020-11-09 RX ORDER — FINASTERIDE 5 MG/1
5 TABLET, FILM COATED ORAL DAILY
Qty: 30 TABLET | Refills: 0 | Status: SHIPPED | OUTPATIENT
Start: 2020-11-10 | End: 2020-12-22 | Stop reason: SDUPTHER

## 2020-11-09 RX ORDER — M-VIT,TX,IRON,MINS/CALC/FOLIC 27MG-0.4MG
1 TABLET ORAL
Refills: 0 | COMMUNITY
Start: 2020-11-10

## 2020-11-09 RX ORDER — PREDNISONE 10 MG/1
TABLET ORAL
Qty: 8 TABLET | Refills: 0 | Status: SHIPPED | OUTPATIENT
Start: 2020-11-09 | End: 2020-12-10 | Stop reason: ALTCHOICE

## 2020-11-09 RX ORDER — TAMSULOSIN HYDROCHLORIDE 0.4 MG/1
0.4 CAPSULE ORAL DAILY
Qty: 30 CAPSULE | Refills: 0 | Status: SHIPPED | OUTPATIENT
Start: 2020-11-10 | End: 2020-12-22 | Stop reason: SDUPTHER

## 2020-11-09 RX ORDER — GUAIFENESIN 600 MG/1
600 TABLET, EXTENDED RELEASE ORAL 2 TIMES DAILY
Refills: 0 | COMMUNITY
Start: 2020-11-09 | End: 2020-12-10 | Stop reason: ALTCHOICE

## 2020-11-09 RX ADMIN — ALBUTEROL SULFATE 2 PUFF: 90 AEROSOL, METERED RESPIRATORY (INHALATION) at 10:22

## 2020-11-09 RX ADMIN — ATORVASTATIN CALCIUM 20 MG: 20 TABLET, FILM COATED ORAL at 20:49

## 2020-11-09 RX ADMIN — Medication 250 MG: at 14:13

## 2020-11-09 RX ADMIN — MULTIPLE VITAMINS W/ MINERALS TAB 1 TABLET: TAB at 08:21

## 2020-11-09 RX ADMIN — TIOTROPIUM BROMIDE INHALATION SPRAY 2 PUFF: 3.12 SPRAY, METERED RESPIRATORY (INHALATION) at 06:41

## 2020-11-09 RX ADMIN — CARVEDILOL 3.12 MG: 6.25 TABLET, FILM COATED ORAL at 20:49

## 2020-11-09 RX ADMIN — Medication 250 MG: at 03:23

## 2020-11-09 RX ADMIN — GUAIFENESIN 600 MG: 600 TABLET, EXTENDED RELEASE ORAL at 08:21

## 2020-11-09 RX ADMIN — CARVEDILOL 3.12 MG: 6.25 TABLET, FILM COATED ORAL at 08:21

## 2020-11-09 RX ADMIN — Medication 250 MG: at 20:50

## 2020-11-09 RX ADMIN — ALBUTEROL SULFATE 2 PUFF: 90 AEROSOL, METERED RESPIRATORY (INHALATION) at 06:41

## 2020-11-09 RX ADMIN — ALBUTEROL SULFATE 2 PUFF: 90 AEROSOL, METERED RESPIRATORY (INHALATION) at 14:34

## 2020-11-09 RX ADMIN — HYDROCODONE BITARTRATE AND ACETAMINOPHEN 1 TABLET: 5; 325 TABLET ORAL at 20:50

## 2020-11-09 RX ADMIN — ALBUTEROL SULFATE 2 PUFF: 90 AEROSOL, METERED RESPIRATORY (INHALATION) at 21:21

## 2020-11-09 RX ADMIN — HYDROCODONE BITARTRATE AND ACETAMINOPHEN 2 TABLET: 5; 325 TABLET ORAL at 05:29

## 2020-11-09 RX ADMIN — FUROSEMIDE 40 MG: 40 TABLET ORAL at 17:58

## 2020-11-09 RX ADMIN — FINASTERIDE 5 MG: 5 TABLET, FILM COATED ORAL at 08:21

## 2020-11-09 RX ADMIN — TAMSULOSIN HYDROCHLORIDE 0.4 MG: 0.4 CAPSULE ORAL at 08:21

## 2020-11-09 RX ADMIN — AMIODARONE HYDROCHLORIDE 200 MG: 200 TABLET ORAL at 08:21

## 2020-11-09 RX ADMIN — BUDESONIDE AND FORMOTEROL FUMARATE DIHYDRATE 2 PUFF: 160; 4.5 AEROSOL RESPIRATORY (INHALATION) at 06:41

## 2020-11-09 RX ADMIN — HYDROCODONE BITARTRATE AND ACETAMINOPHEN 2 TABLET: 5; 325 TABLET ORAL at 01:32

## 2020-11-09 RX ADMIN — HYDROCODONE BITARTRATE AND ACETAMINOPHEN 2 TABLET: 5; 325 TABLET ORAL at 13:34

## 2020-11-09 RX ADMIN — GUAIFENESIN 600 MG: 600 TABLET, EXTENDED RELEASE ORAL at 20:50

## 2020-11-09 RX ADMIN — DULOXETINE HYDROCHLORIDE 60 MG: 30 CAPSULE, DELAYED RELEASE ORAL at 08:21

## 2020-11-09 RX ADMIN — BUDESONIDE AND FORMOTEROL FUMARATE DIHYDRATE 2 PUFF: 160; 4.5 AEROSOL RESPIRATORY (INHALATION) at 21:22

## 2020-11-09 RX ADMIN — ASPIRIN 81 MG: 81 TABLET, FILM COATED ORAL at 08:20

## 2020-11-09 RX ADMIN — Medication 250 MG: at 08:20

## 2020-11-09 RX ADMIN — PREDNISONE 20 MG: 20 TABLET ORAL at 08:21

## 2020-11-09 RX ADMIN — FUROSEMIDE 40 MG: 40 TABLET ORAL at 08:21

## 2020-11-09 RX ADMIN — LEVOTHYROXINE SODIUM 100 MCG: 100 TABLET ORAL at 05:29

## 2020-11-09 ASSESSMENT — PAIN SCALES - GENERAL
PAINLEVEL_OUTOF10: 9

## 2020-11-09 ASSESSMENT — PAIN DESCRIPTION - PAIN TYPE
TYPE: SURGICAL PAIN
TYPE: SURGICAL PAIN

## 2020-11-09 ASSESSMENT — PAIN DESCRIPTION - LOCATION
LOCATION: STERNUM
LOCATION: STERNUM

## 2020-11-09 ASSESSMENT — PAIN SCALES - WONG BAKER
WONGBAKER_NUMERICALRESPONSE: 0

## 2020-11-09 ASSESSMENT — PAIN DESCRIPTION - ORIENTATION
ORIENTATION: MID
ORIENTATION: MID

## 2020-11-09 NOTE — PROGRESS NOTES
Nusrat Fountain    : 1955  Acct #: [de-identified]  MRN: [id number]              PM&R Progress Note      Admitting diagnosis: Congestive heart failure after coronary bypass graft surgery     Comorbid diagnoses impacting rehabilitation: Uncontrolled pain, generalized weakness, gait disturbance, acute blood loss anemia, essential hypertension, BPH, COPD exacerbation, nicotine addiction     Chief complaint: Excited to get home. Wishes he was off the oxygen. No new chills, cough or sputum production. Prior (baseline) level of function: Independent. Current level of function:         Current  IRF-ESTEBAN and Goals:   Hearing, Speech, and Vision  Expression of Ideas and Wants: Without difficulty  Understanding Verbal and Non-Verbal Content: Understands  Prior Functioning: Everyday Activities  Self Care: Independent  Indoor Mobility (Ambulation): Independent  Stairs: Independent  Functional Cognition: Independent  Prior Device Use: (Ind)    Eating  Assistance Needed: Independent  CARE Score: 6  Discharge Goal: Independent  Oral Hygiene  Assistance Needed: Independent  Comment: Mod I  CARE Score: 6  Discharge Goal: Independent  Toileting Hygiene  Assistance Needed: Independent  Comment: Mod I, pt carin maintaining sternal precautions c use of compensatory strategies for cassandra-care  CARE Score: 6  Discharge Goal: Independent  Shower/Bathe Self  Assistance Needed: Independent  Comment: Mod I- completed seated on shower bench  CARE Score: 6  Discharge Goal: Independent  Upper Body Dressing  Assistance Needed: Partial/moderate assistance  Comment: Min A for threading BUE while maintaining cardiac precautions.   CARE Score: 3  Discharge Goal: Independent  Lower Body Dressing  Assistance Needed: Independent  Comment: MOD I- completed seated to thread BLE through pant legs and depends  CARE Score: 6  Discharge Goal: Independent  Putting On/Taking Off Footwear  Assistance Needed: Independent  Comment: Carin figure four posture  CARE Score: 6  Discharge Goal: Partial/moderate assistance    Roll Left and Right  Assistance Needed: Independent  Comment: uses heart pillow  CARE Score: 6  Discharge Goal: Independent  Sit to Lying  Assistance Needed: Partial/moderate assistance  Comment: uses heart pillow  CARE Score: 3  Discharge Goal: Independent  Sit to Stand  Assistance Needed: Independent  Comment: uses heart pillow  CARE Score: 6  Discharge Goal: Independent  Chair/Bed-to-Chair Transfer  Assistance Needed: Independent  Comment: uses heart pillow  Reason if not Attempted: Not attempted due to medical condition or safety concerns  CARE Score: 6  Discharge Goal: Independent  Toilet Transfer  Assistance Needed: Independent  Comment: Mod I- increased time, demo sternal precaution compliance, no grab bars required  CARE Score: 6  Discharge Goal: Independent  Car Transfer  Assistance Needed: Independent  Comment: uses heart pillow  Reason if not Attempted: Not attempted due to medical condition or safety concerns  CARE Score: 6  Discharge Goal: Independent   Walk 10 Feet? Walk 10 Feet?: Yes  1 Step  1 Step?: Yes  Picking Up Object  Assistance Needed: Independent  Comment: uses 2ww and reacher  Reason if not Attempted: Not attempted due to medical condition or safety concerns  CARE Score: 6  Discharge Goal: Independent  Wheelchair Ability  Uses a Wheelchair and/or Scooter?: No                  I      Exam:    Blood pressure 125/68, pulse 66, temperature 97.8 °F (36.6 °C), temperature source Oral, resp. rate 16, height 5' 9\" (1.753 m), weight 182 lb 2 oz (82.6 kg), SpO2 90 %. General: Up in a bedside chair eating lunch. Talkative. In good spirits. No distress. HEENT: MMM. Neck supple. No JVD. Pulmonary: Blunted breath sounds in the bases. No wheezes or rales. Cardiac: Sternal incision is well-healed. Regular rate and rhythm. Abdomen: Patient's abdomen is soft and nondistended. Bowel sounds were present throughout. There was no rebound, guarding or masses noted. Upper extremities: Able to bring both hands up overhead. Cautious upper limb strength testing due to the sternal pain. Fair dexterity. Lower extremities: Trace edema. No signs of DVT. Sitting balance was good. Standing balance was fair. Lab Results   Component Value Date    WBC 13.4 (H) 11/09/2020    HGB 14.1 11/09/2020    HCT 44.6 11/09/2020    .3 (H) 11/09/2020     11/09/2020     Lab Results   Component Value Date    INR 0.97 11/03/2020    INR 1.02 10/28/2020    INR 1.27 10/19/2020    PROTIME 11.7 11/03/2020    PROTIME 12.4 10/28/2020    PROTIME 15.4 (H) 10/19/2020     Lab Results   Component Value Date    CREATININE 0.9 11/03/2020    BUN 28 (H) 11/03/2020     (L) 11/03/2020    K 3.9 11/03/2020    CL 98 (L) 11/03/2020    CO2 26 11/03/2020     Lab Results   Component Value Date    ALT 45 (H) 11/03/2020    AST 22 11/03/2020    ALKPHOS 61 11/03/2020    BILITOT 0.3 11/03/2020       Expected length of stay  prior to a supervised level of function for discharge home with a walker and Octavia Diaz OT/PT is 11/10/2020. Recommendations:    1. Systolic congestive heart failure after coronary bypass graft surgery: Continues to benefit from participating in the daily occupational and physical therapy.    Bowel movements are quite infrequent now but he has no abdominal distention or cramping.  No chills.    More consistent oral intake.  Continue with the pulmonary hygiene, nutritional support and cautious pain management.  Daily weights do not indicate any decompensation of CHF.  Tolerating the p.o. Lasix and his beta-blocker.  Adaptive equipment training.  Caregiver training tomorrow at discharge. Supervision for transfers and walking with a walker. We will get a chest x-ray to see if that helps us understand why he cannot get off the oxygen.     2. DVT prophylaxis: Due to his anemia and bruising, chemoprophylaxis is contraindicated.  SCDs when in bed.  Weightbearing activities are picking up significantly each day now.   Stable hemoglobin.    3. COPD exacerbation: Albuterol and Symbicort inhalers.  Mucinex and Spiriva.  Monitoring O2 saturations at rest and with activity.  Nebulizers as needed.  Weaning down his supplemental oxygen. .  4. Hypertension: Coreg and Lasix are used to manage his hypertension.  Target systolic blood pressure is 120-140.  Vital signs are checked at rest and with activity.  Blood pressure generally in target range now. 5. BPH: Flomax and Proscar used to manage this issue. No current symptoms of retention. 6. Nicotine addiction: Topical nicotine replacement patch. 7. C. difficile colitis: Continue with Flagyl 3 times daily. No recent bowel movements now but flatus is present. No abdominal distention or nausea. Last white blood count was 19,000 (relatively stable).  Other labs were unremarkable.    Keeping him off Questran.  No stool softeners yet.   1 more week of Flagyl.

## 2020-11-09 NOTE — PROGRESS NOTES
11/9/2020 2:31 PM  Patient Room #: 7530/3320-D  Patient Name: Maria Dolores Cool    (Step 1 Done by RN if possible otherwise call Pulmonary Diagnostics)  1. Place patient on room air at rest for at least 30 minutes. If patient falls below 88% before 30 minutes then you can record the level and stop. Record room air saturation level _93_ %. If patient is at 88% or below, they will qualify for home oxygen and you can stop. If level does not fall below 88%, fill in level above. If indicated continue to Step 2. Signature:_Estiven Moran [facility]__ Date: _11/9/2020__  (Step 2&3 Done by [facility])  2. Ambulate patient on room air until saturation falls below 89%. Record level of room air saturation with ambulation_81__ %. Next, place patient back on _2_lpm oxygen and ambulate, record level 99_%. (Note:  this level must show improvement from room air level done with ambulation.)  If patients saturation on room air with ambulation is 88% or below AND patient shows improvement with oxygen during ambulation, they will qualify for home oxygen and you can stop. If patient does not drop below 89%, then patient should have an overnight oximetry trending on room air to see if level falls below 88%. Complete level in Step 3 below. 3. Room air overnight oximetry level 88 % for___  cumulative minutes. If patients room air oxygen level is < 89% for at least 5 cumulative minutes, patient will qualify for home oxygen and you can stop. (Attach Night Trending Report)    Complete order below: Diagnosis:__CHF, CAD, COPD_  Home oxygen at:  Length of Need: ?X Lifetime ?  3 Months     _2_lpm or __%   via  [x] nasal cannula  []mask  [] other:___         []continuous [x]  with activity  []  Nocturnal   [x] Portable Tanks [x]  Concentrator        Therapist Signature:_Adali Guallpa RRT__    Date:  _11/10/2020__  Physician Signature:  __Electronically Signed in EMR_    Date:___  Physician Printed Name:  Aubery Simmonds, MD NPI:  [id number]    [x] Patient Qualifies      [] Patient Does NOT qualify

## 2020-11-09 NOTE — CARE COORDINATION
LSW called Dr. Pepe Brochure office ([phone]) to set patient follow-up. Surgery follow-up is set for 11/18/20 at 1 PM.  AVS updated to reflect. Dr. Tina Gross is also willing to sign and follow for patient's Octavia 77. [facility] ([phone]) and spoke with Elena RN,  They are able to provide PT, OT, and RN service for patient. Information faxed to [facility] at [phone]. LSW then called Karla with [phone] [employer] [facility] ([phone]) to start referral process for patient's rolling walker. LSW to fax orders once signed. LSW told by patient that he would prescriptions sent to [facility] in [facility] and his kids will be here at 2 PM to pick him up.    2:30 PM  DME orders faxed to [id number] [employer].

## 2020-11-09 NOTE — PROGRESS NOTES
Physical Therapy  [x] daily progress note       [] discharge       Patient Name:  Thomas Quintero   :  1955 MRN: [id number]  Room:  [address] [place] [address] Date of Admission: 10/27/2020  Rehabilitation Diagnosis:   Atherosclerotic heart disease of native coronary artery without angina pectoris [I25.10]  CHF following cardiac surgery, postop [I97.130]       Date 2020       Day of ARU Week:  7   Time IN/OUT 1100/1200, 1436/1536   Individual Tx Minutes 60x2   Group Tx Minutes    Co-Treat Minutes    Concurrent Tx Minutes    TOTAL Tx Time Mins 120   Variance Time    Variance Time []   Refusal due to:     []   Medical hold/reason:    []   Illness   []   Off Unit for test/procedure  []   Extra time needed to complete task  []   Therapeutic need  []   Other (specify):   Restrictions Restrictions/Precautions  Restrictions/Precautions: Fall Risk, General Precautions, Contact Precautions, Cardiac(c-diff)  Position Activity Restriction  Sternal Precautions: No Pushing, No Pulling, 5# Lifting Restrictions  Other position/activity restrictions: 3L O2   Interdisciplinary communication [x]   Cleared for therapy per nursing     []   RN notified about issues during session  [x]   RN updated on pt performance  []   Spoke with   []   Spoke with OT  []   Spoke with MD  []   Other:    Subjective observations and cognitive status: In pm treatment, RT had just taken pt off O2 to assess on room air.  Pt 97% during activity , 93% at rest     Pain level/location:   0 /10       Location:    Discharge recommendations  Anticipated discharge date: 11/10  Destination: []home alone   []home alone with assist PRN     [x] home w/ family      [] Continuous supervision  []SNF    [] Assisted living     [] Other:  Continued therapy: [x]HHC PT  []OUTPATIENT  PT   [] No Further PT  []SNF PT  Caregiver training recommended: []Yes  [] No   Equipment needs: Thomas Quintero requires the assistance of a wheeled walker to successfully ambulate from room to room at home to allow completion of daily living tasks such as: bathing, toileting, dressing and grooming. A wheeled walker is necessary due to the patient's unsteady gait, upper body weakness, inability to  a standard walker. This patient can ambulate only by pushing a walker instead of using a lesser assistive device such as a cane or crutch. PT IRF-ESTEBAN scores and goals for discharge assessment:   Roll Left and Right  Assistance Needed: Independent  Comment: uses heart pillow  CARE Score: 6  Discharge Goal: Independent    Sit to Lying  Assistance Needed: Independent  Comment: uses heart pillow  CARE Score: 6  Discharge Goal: Independent    Sit to Stand  Assistance Needed: Independent  Comment: uses heart pillow  CARE Score: 6  Discharge Goal: Independent    Chair/Bed-to-Chair Transfer  Assistance Needed: Independent  Comment: uses heart pillow  CARE Score: 6  Discharge Goal: Independent    Car Transfer  Assistance Needed: Independent  Comment: uses heart pillow  CARE Score: 6  Discharge Goal: Independent    Walk 10 Feet?   Walk 10 Feet?: Yes  1 Step  1 Step?: Yes  Picking Up Object  Assistance Needed: Independent  Comment: uses 2ww and reacher  CARE Score: 6  Discharge Goal: Independent    Wheelchair Ability  Uses a Wheelchair and/or Scooter?: No     Walking Ability  Does the Patient Walk?: Yes  Walk 10 Feet  Assistance Needed: Independent  Comment: 2ww  CARE Score: 6  Discharge Goal: Independent    Walk 50 Feet with Two Turns  Assistance Needed: Independent  Comment: 2ww  CARE Score: 6  Discharge Goal: Independent    Walk 150 Feet  Assistance Needed: Independent  Comment: 2ww  CARE Score: 6  Discharge Goal: Independent    Walking 10 Feet on Uneven Surfaces  Assistance Needed: Supervision or touching assistance  Comment: supervision with 2ww  CARE Score: 4  Discharge Goal: Independent    1 Step (Curb)  Assistance Needed: Independent  CARE Score: 6  Discharge Goal: Independent    4 Steps  Assistance Needed: Supervision or touching assistance  Comment: b rails  CARE Score: 4  Discharge Goal: Independent    12 Steps  Comment: 8 steps max due to fatigue  CARE Score: 88  Discharge Goal: Not Applicable    Additional Therapeutic activities/exercises completed this date:     []   Nu-step:  Time:        Level:         #Steps:       []   Rebounder:    []  Seated     []  Standing        []   Balance training         []   Postural training    []   Supine ther ex (reps/sets):     []   Seated ther ex (reps/sets):     []   Standing ther ex (reps/sets):     []   Picking up object from floor (standing):                   []   Reacher used   [x]   Other: assessment of performance in am on O2 @ .5L, in pm on room air. []   Other:    Comments:      Patient/Caregiver Education and Training:   []   Role of PT  []   Education about Dx  []   Use of call light for assist   []   Wheelchair mobility/management  [x]   HEP provided and explained: Had pt follow intermediate cardiac exercise handout with pt able to demonstrate 100% understanding. Reviewed signs of exercise intolerance, progression of cardiac ex and Walking program, TESS scale, energy conservation techniques. []   Treatment plan reviewed  [x]   Home safety  []   Body mechanics  []   Positioning  [x]   Bed Mobility/Transfer technique  [x]   Gait technique/sequencing  []   Proper use of assistive device/adaptive equipment  [x]   Stair training/Advanced mobility safety and technique  [x]   Reinforced patient's precautions/mobility while maintaining precautions: education regarding sitting in back seat for drive to [employer] and ex to perform in car for circulatory issues  []   Postural awareness  []   Family/caregiver training  []   Progress was updated and reviewed in Rehabtracker with patient and/or family this date.   []   Other:      Treatment Plan for Next Session: n/a      Assessment: Pt made good progress while on rehab with pt initially with poor tolerance to therapy. Found to have c-diff and once treated, pt able to more fully participate, though needing max rest breaks. Pt had been weaned from O2, but then in past few days was needing O2 again. As of this pm, pt on room air. Pt has been trained in O2 line management during mobility if would need to go home with supplemental O2. Pt is recommended to use 2ww, follow HEP and continue with HHC to increase independence and decrease dependency on gait device to return to PLOF.  Goal status noted below     Treatment/Activity Tolerance:   [] Tolerated treatment with no adverse effects    [x] Patient limited by fatigue  [] Patient limited by pain   [] Patient limited by medical complications:    [] Adverse reaction to Tx:   [] Significant change in status    Barrier/s to progress/learning:   [x]   None  []   Cognition  []   Hearing deficit  []   Pre-morbid mental/psychological status   []   Motivation  []   Communication  []   Anxiety  []   Vision deficit  []   Attention  []   Other:      Safety:       []  bed alarm set    [x]  chair alarm set    []  Pt refused alarms                []  Telesitter activated      [x]  Gait belt used during tx session      []other:         Number of Minutes/Billable Intervention  Gait Training 40   Therapeutic Exercise 35   Neuro Re-Ed    Therapeutic Activity 45   Wheelchair Propulsion    Group    Other:    TOTAL 120         Social History  Social/Functional History  ADL Assistance: Independent  Homemaking Assistance: Independent  Homemaking Responsibilities: Yes  Meal Prep Responsibility: Primary(Pt reports eating out most of the time.)  Laundry Responsibility: Primary  Cleaning Responsibility: Primary  Bill Paying/Finance Responsibility: Primary  Shopping Responsibility: Primary  Dependent Care Responsibility: No  Health Care Management: Primary  Ambulation Assistance: Independent  Transfer Assistance: Independent  Active : Yes  Mode of Transportation: Truck, Car  Occupation: Full time employment  Type of occupation: Truck drive  Leisure & Hobbies: none  Additional Comments: 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 Pt reports he will be staying with ex-wife at discharge and will have initial 24/7 supervision. Above information is for ex-wife's home setup.; no falls in the past year    Objective                                                                                    Goals:  (Update in navigator)  Short term goals  Time Frame for Short term goals: 10 tx days:  Short term goal 1: Pt will complete rolling L/R and sup<->sit Ind following sternal precautions. MET  Short term goal 2: Pt will complete OOB transfers following sternal precautions Mod IndMET. Short term goal 3: Pt will ambulate 150 ft using RW Mod Ind. MET  Short term goal 4: Pt will ascend/descend 4-5 steps using bilat rails Mod IndPart met: supervision due to fatigue. Short term goal 5: Pt will ascend/descend curb step and ambulate over uneven surfaces using RW Mod IndPart met: met for curb step, supervision on uneven surfaces  Short term goal 6: Pt will complete object retrieval from the floor in standing Mod Ind-IndMET.:   :        Plan of Care                                                                              Times per week: 5 days per week for a minimum of 60 minutes/day plus group as appropriate for 60 minutes.   Treatment to include Current Treatment Recommendations: Transfer Training, Endurance Training, Strengthening, Patient/Caregiver Education & Training, Pain Management, Equipment Evaluation, Education, & procurement, Balance Training, Gait Training, Home Exercise Program, Functional Mobility Training, Stair training, Safety Education & Training    Electronically signed by   Ping Lee  11/9/2020, 3:27 PM

## 2020-11-09 NOTE — PROGRESS NOTES
Occupational Therapy    Physical Rehabilitation: OCCUPATIONAL THERAPY     [x] daily progress note       [x] discharge       Patient Name:  Thalia Murphy   :  1955 MRN: [id number]  Room:  [address] [place] [address] Date of Admission: 10/27/2020  Rehabilitation Diagnosis:   Atherosclerotic heart disease of native coronary artery without angina pectoris [I25.10]  CHF following cardiac surgery, postop [I97.130]       Date 2020       Day of ARU Week:  7   Time IN/OUT 9345-3797   Individual Tx Minutes 60   Group Tx Minutes    Co-Treat Minutes    Concurrent Tx Minutes    TOTAL Tx Time Mins 60   Variance Time    Variance Time []   Refusal due to:     []   Medical hold/reason:    []   Illness   []   Off Unit for test/procedure  []   Extra time needed to complete task  []   Therapeutic need  []   Other (specify):   Restrictions Restrictions/Precautions: Fall Risk, General Precautions, Contact Precautions, Cardiac(c-diff)         Communication with other providers: [x]   OK to see per nursing:     []   Spoke with team member regarding:      Subjective observations and cognitive status: Pt seated in bedside chair upon arrival. Pt agreeable to OT session this date. Pain level/location:    /10       Location:    Discharge recommendations  Anticipated discharge date:  11/10  Destination: []home alone   []home alone w assist prn   [x] home w/ family    [] Continuous supervision       []SNF    [] Assisted living     [] Other:   Continued therapy: [x]HHC OT  []OUTPATIENT  OT   [] No Further OT  Equipment needs: Thalia Murphy requires the assistance of a tub transfer bench to successfully and safely complete bathing activities necessary due to reduced balance, endurance, need for ADL assist, and LE weakness and reduced ROM. These tasks cannot be safely completed without this device and would place Thalia Murphy at greater risk of falls.              ADLs:    Eating: Eating  Assistance Needed: Independent  CARE Score: 6  Discharge Goal: Independent       Oral Hygiene: Oral Hygiene  Assistance Needed: Independent  Comment: Mod I  CARE Score: 6  Discharge Goal: Independent    UB/LB Bathing: Shower/Bathe Self  Assistance Needed: Independent  Comment: Mod I- completed seated on shower bench  CARE Score: 6  Discharge Goal: Independent    UB Dressing: Upper Body Dressing  Assistance Needed: Independent  Comment: Mod I- demo compensatory strategies in standing/sitting  CARE Score: 6  Discharge Goal: Independent         LB Dressing: Lower Body Dressing  Assistance Needed: Independent  Comment: MOD I- completed seated to thread BLE through pant legs and depends  CARE Score: 6  Discharge Goal: Independent    Donning and [place] Footwear: Putting On/Taking Off Footwear  Assistance Needed: Independent  Comment: Steven figure four posture  CARE Score: 6  Discharge Goal: Partial/moderate assistance      Toileting: Toileting Hygiene  Assistance Needed: Independent  Comment: Mod I, pt demo maintaining sternal precautions c use of compensatory strategies for cassandra-care  CARE Score: 6  Discharge Goal: Independent      Toilet Transfers: Toilet Transfer  Assistance Needed: Independent  Comment:  Mod I- increased time, demo sternal precaution compliance, no grab bars required  CARE Score: 6  Discharge Goal: Independent  Device Used:    [x]   Standard Toilet         []   Grab Bars           []  Bedside Commode       []   Elevated Toilet          []   Other:        Bed Mobility:           [x]   Pt received out of bed     Transfers:    Sit--> Stand:  SBA  Stand --> Sit:   MOD I  Stand-Pivot:   MOD I  Other:    Assistive device required for transfer:   FWW      Functional Mobility:    Assistance:  Functionally ambulated to/from bedroom <>bathroom MOD I  Device:   [x]   Rolling Walker     []   Standard Walker []   Wheelchair        []   [place]       []   4-Wheeled Rosa Sarks         []   Cardiac Walker       []   Other:        Additional Therapeutic activities/exercises completed this date:     [x]   ADL Training   []   Balance/Postural training     []   Bed/Transfer Training   []   Endurance Training   []   Neuromuscular Re-ed   []   Nu-step:  Time:        Level:         #Steps:       []   Rebounder:    []  Seated     []  Standing        []   Supine Ther Ex (reps/sets):     []   Seated Ther Ex (reps/sets):     []   Standing Ther Ex (reps/sets):     []   Other:      Comments:      Patient/Caregiver Education and Training:   []   [employer] Equipment Use  []   Bed Mobility/Transfer Technique/Safety  []   Energy Conservation Tips  []   Family training  []   Postural Awareness  []   Safety During Functional Activities  []   Reinforced Patient's Precautions   []   Progress was updated and reviewed in Rehabtracker with patient and/or family this         date. Treatment Plan for Next Session: Pt to d/c 11/9/2020      Assessment:  Pt demo meeting all OT goals set this date. Pt removed O2 for ADL session, post shower and completion of groom. Pt's SpO2 89% able to bring back up to 93%. RN notified. Pt will benefit from continued skilled OT services in [facility] to increase pt's safety and independence in the patients own home environment.       Treatment/Activity Tolerance:   [x] Tolerated treatment with no adverse effects    [] Patient limited by fatigue  [] Patient limited by pain   [] Patient limited by medical complications:    [] Adverse reaction to Tx:   [] Significant change in status    Safety:       []  bed alarm set    [x]  chair alarm set    []  Pt refused alarms                []  Telesitter activated      [x]  Gait belt used during tx session      []other:       Number of Minutes/Billable Intervention  Therapeutic Exercise    ADL Self-care 60   Neuro Re-Ed    Therapeutic Activity    Group    Other:    TOTAL 60       Social History  Social/Functional History  ADL Assistance: Independent  Homemaking Assistance: Independent  Homemaking Responsibilities: Yes  Meal Prep Responsibility: Primary(Pt reports eating out most of the time.)  Laundry Responsibility: Primary  Cleaning Responsibility: Primary  Bill Paying/Finance Responsibility: Primary  Shopping Responsibility: Primary  Dependent Care Responsibility: No  Health Care Management: Primary  Ambulation Assistance: Independent  Transfer Assistance: Independent  Active : Yes  Mode of Transportation: Truck, Car  Occupation: Full time employment  Type of occupation: Truck drive  Leisure & Hobbies: none  Additional Comments: 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 Pt reports he will be staying with ex-wife at discharge and will have initial 24/7 supervision. Above information is for ex-wife's home setup.; no falls in the past year    Objective                                                                                    Goals:  (Update in navigator)  Short term goals  Time Frame for Short term goals: STG=LTG:  Long term goals  Time Frame for Long term goals : 10-12 days or until d/c  Long term goal 1: Pt will complete feeding/grooming/oral care task c MOD I by d/c  Long term goal 2: Pt will complete total body bathing c Mod I by d/c  Long term goal 3: Pt will complete UB dressing c MOD I and retrieve items from closet by d/c  Long term goal 4: Pt will complete LB dressing c MOD I and retrieve items from closet by d/c  Long term goal 5: Pt will don/doff footwear c Min A by d/c  Long term goals 6: Pt will complete toileting c MOD I by d/c  Long term goal 7: Pt will complete functional transfer (toilet, tub, shower) c MOD I by d/c. Long term goal 8: Pt will perform therex/therax to facilitate increased strength/endurance/ax tolerance (c emphasis on maintaining sternal precautions and dynamic standing balance/tolerance >8 mins) c MOD I in order to complete ADLs. :        Plan of Care                                                                              Times per week: 5 days per week for a minimum of 60 minutes/day plus group as appropriate for 60 minutes.   Treatment to include Plan  Times per day: Daily  Current Treatment Recommendations: Strengthening, Patient/Caregiver Education & Training, Home Management Training, Functional Mobility Training, Endurance Training, Equipment Evaluation, Education, & procurement, Pain Management, Safety Education & Training, Self-Care / ADL, Balance Training    Electronically signed by   JHOAN Dooley OTR/L  License # [id number]    11/9/2020, 5:09 PM

## 2020-11-09 NOTE — PROGRESS NOTES
Checked SPO2 prior to ambulation and he was 92% on room air while sitting in the chair. The patient ambulated 60 feet in the [place] and his SPO2 dropped to 81% on room air while ambulating. Placed on 2 LPM via nasal cannula and he maintained 99% ambulating the same distance.

## 2020-11-10 VITALS
BODY MASS INDEX: 27.12 KG/M2 | TEMPERATURE: 98.1 F | SYSTOLIC BLOOD PRESSURE: 125 MMHG | HEART RATE: 68 BPM | RESPIRATION RATE: 16 BRPM | DIASTOLIC BLOOD PRESSURE: 69 MMHG | HEIGHT: 69 IN | WEIGHT: 183.1 LBS | OXYGEN SATURATION: 94 %

## 2020-11-10 LAB
ANION GAP SERPL CALCULATED.3IONS-SCNC: 15 MMOL/L (ref 4–16)
BUN BLDV-MCNC: 33 MG/DL (ref 6–23)
CALCIUM SERPL-MCNC: 9.3 MG/DL (ref 8.3–10.6)
CHLORIDE BLD-SCNC: 98 MMOL/L (ref 99–110)
CO2: 22 MMOL/L (ref 21–32)
CREAT SERPL-MCNC: 1.1 MG/DL (ref 0.9–1.3)
GFR AFRICAN AMERICAN: >60 ML/MIN/1.73M2
GFR NON-AFRICAN AMERICAN: >60 ML/MIN/1.73M2
GLUCOSE BLD-MCNC: 90 MG/DL (ref 70–99)
GLUCOSE BLD-MCNC: 95 MG/DL (ref 70–99)
POTASSIUM SERPL-SCNC: 4.7 MMOL/L (ref 3.5–5.1)
SODIUM BLD-SCNC: 135 MMOL/L (ref 135–145)

## 2020-11-10 PROCEDURE — 36415 COLL VENOUS BLD VENIPUNCTURE: CPT

## 2020-11-10 PROCEDURE — 99239 HOSP IP/OBS DSCHRG MGMT >30: CPT | Performed by: PHYSICAL MEDICINE & REHABILITATION

## 2020-11-10 PROCEDURE — 6370000000 HC RX 637 (ALT 250 FOR IP): Performed by: SPECIALIST

## 2020-11-10 PROCEDURE — 6370000000 HC RX 637 (ALT 250 FOR IP): Performed by: PHYSICAL MEDICINE & REHABILITATION

## 2020-11-10 PROCEDURE — 6370000000 HC RX 637 (ALT 250 FOR IP): Performed by: SURGERY

## 2020-11-10 PROCEDURE — 94618 PULMONARY STRESS TESTING: CPT

## 2020-11-10 PROCEDURE — 80048 BASIC METABOLIC PNL TOTAL CA: CPT

## 2020-11-10 PROCEDURE — 82962 GLUCOSE BLOOD TEST: CPT

## 2020-11-10 RX ADMIN — TAMSULOSIN HYDROCHLORIDE 0.4 MG: 0.4 CAPSULE ORAL at 08:12

## 2020-11-10 RX ADMIN — FINASTERIDE 5 MG: 5 TABLET, FILM COATED ORAL at 08:12

## 2020-11-10 RX ADMIN — LEVOTHYROXINE SODIUM 100 MCG: 100 TABLET ORAL at 06:26

## 2020-11-10 RX ADMIN — AMIODARONE HYDROCHLORIDE 200 MG: 200 TABLET ORAL at 08:12

## 2020-11-10 RX ADMIN — FUROSEMIDE 40 MG: 40 TABLET ORAL at 08:12

## 2020-11-10 RX ADMIN — Medication 250 MG: at 03:06

## 2020-11-10 RX ADMIN — GUAIFENESIN 600 MG: 600 TABLET, EXTENDED RELEASE ORAL at 08:12

## 2020-11-10 RX ADMIN — Medication 250 MG: at 08:23

## 2020-11-10 RX ADMIN — ASPIRIN 81 MG: 81 TABLET, FILM COATED ORAL at 08:11

## 2020-11-10 RX ADMIN — CARVEDILOL 3.12 MG: 6.25 TABLET, FILM COATED ORAL at 08:12

## 2020-11-10 RX ADMIN — DULOXETINE HYDROCHLORIDE 60 MG: 30 CAPSULE, DELAYED RELEASE ORAL at 08:11

## 2020-11-10 RX ADMIN — MULTIPLE VITAMINS W/ MINERALS TAB 1 TABLET: TAB at 08:12

## 2020-11-10 RX ADMIN — HYDROCODONE BITARTRATE AND ACETAMINOPHEN 1 TABLET: 5; 325 TABLET ORAL at 03:06

## 2020-11-10 RX ADMIN — PREDNISONE 10 MG: 10 TABLET ORAL at 08:12

## 2020-11-10 RX ADMIN — HYDROCODONE BITARTRATE AND ACETAMINOPHEN 1 TABLET: 5; 325 TABLET ORAL at 09:06

## 2020-11-10 ASSESSMENT — PAIN SCALES - WONG BAKER
WONGBAKER_NUMERICALRESPONSE: 0
WONGBAKER_NUMERICALRESPONSE: 0

## 2020-11-10 ASSESSMENT — PAIN SCALES - GENERAL
PAINLEVEL_OUTOF10: 9
PAINLEVEL_OUTOF10: 9

## 2020-11-10 NOTE — PROGRESS NOTES
Home Oxygen Evaluation is complete and faxed to [address] ([phone]) . Please call [address] when pt is being discharged. Do not let the patient go home without an oxygen concentrator.

## 2020-11-10 NOTE — PROGRESS NOTES
Patient was seen in hospital for CHF, CAD, COPD. I am prescribing oxygen because the diagnosis and testing requires the patient to have oxygen in the home. Conditions will improve or be benefited by oxygen use. The patient is able to perform good mobility and therefore requires the use of a portable oxygen system for ambulation.

## 2020-11-10 NOTE — PLAN OF CARE
Problem: SAFETY  Goal: Free from accidental physical injury  11/10/2020 1456 by Isra Bliss RN  Outcome: Ongoing  11/9/2020 2143 by Maryse Gastelum RN  Outcome: Ongoing  Goal: Free from intentional harm  11/10/2020 0922 by Isra Bliss RN  Outcome: Ongoing  11/9/2020 2143 by Maryse Gastelum RN  Outcome: Ongoing     Problem: DAILY CARE  Goal: Daily care needs are met  11/10/2020 2498 by Isra Bliss RN  Outcome: Ongoing  11/9/2020 2143 by Maryse Gastelum RN  Outcome: Ongoing     Problem: PAIN  Goal: Patient's pain/discomfort is manageable  11/10/2020 0922 by Isra Bliss RN  Outcome: Ongoing  11/9/2020 2143 by Maryse Gastelum RN  Outcome: Ongoing     Problem: SKIN INTEGRITY  Goal: Skin integrity is maintained or improved  11/10/2020 0922 by Isra Bliss RN  Outcome: Ongoing  11/9/2020 2143 by Maryse Gastelum RN  Outcome: Ongoing     Problem: KNOWLEDGE DEFICIT  Goal: Patient/S.O. demonstrates understanding of disease process, treatment plan, medications, and discharge instructions.   11/10/2020 1420 by Isra Bliss RN  Outcome: Ongoing  11/9/2020 2143 by Maryse Gastelum RN  Outcome: Ongoing     Problem: DISCHARGE BARRIERS  Goal: Patient's continuum of care needs are met  11/10/2020 7614 by Isra Bliss RN  Outcome: Ongoing  11/9/2020 2143 by Maryse Gastelum RN  Outcome: Ongoing     Problem: Infection - Surgical Site:  Goal: Will show no infection signs and symptoms  Description: Will show no infection signs and symptoms  11/10/2020 0922 by Isra Bliss RN  Outcome: Ongoing  11/9/2020 2143 by Maryse Gastelum RN  Outcome: Ongoing     Problem: Pain:  Goal: Pain level will decrease  Description: Pain level will decrease  11/10/2020 0922 by Isra Bliss RN  Outcome: Ongoing  11/9/2020 2143 by Maryse Gastelum RN  Outcome: Ongoing  Goal: Control of acute pain  Description: Control of acute pain  11/10/2020 0922 by Isra Bliss RN  Outcome: Ongoing  11/9/2020 2143 by Maryse Gastelum RN  Outcome: Ongoing  Goal: Control of chronic pain  Description: Control of chronic pain  11/10/2020 0922 by Marcus Woodard RN  Outcome: Ongoing  11/9/2020 2143 by Gabriella Washington RN  Outcome: Ongoing     Problem: Skin Integrity:  Goal: Will show no infection signs and symptoms  Description: Will show no infection signs and symptoms  11/10/2020 0922 by Marcus Woodard RN  Outcome: Ongoing  11/9/2020 2143 by Gabriella Washington RN  Outcome: Ongoing  Goal: Absence of new skin breakdown  Description: Absence of new skin breakdown  11/10/2020 0922 by Marcus Woodard RN  Outcome: Ongoing  11/9/2020 2143 by Gabriella Washington RN  Outcome: Ongoing

## 2020-11-10 NOTE — PROGRESS NOTES
Progress Note( Dr. Joaquin Points)  11/9/2020  Subjective:   Admit Date: 10/27/2020  PCP: Laith Calixto MD    Admitted For : Chest pain CAD underwent CABG    Consulted For: Better control of blood glucose filed diabetes mellitus    Interval History: Post CABG patient was transferred to acute rehab unit on evening of 10/27/2020      C/O chest pain is a lot better  Has some  SOB . Denies nausea or vomiting. No new bowel or bladder symptoms. diarrhea seems to be better  It turned out to be C. difficile colitis seen by GI  Change the medicine to vancomycin p.o. Intake/Output Summary (Last 24 hours) at 11/9/2020 2205  Last data filed at 11/9/2020 1540  Gross per 24 hour   Intake 480 ml   Output 1875 ml   Net -1395 ml       DATA    CBC:   Recent Labs     11/09/20  0823   WBC 13.4*   HGB 14.1       CMP:  No results for input(s): NA, K, CL, CO2, BUN, CREATININE, CALCIUM, PROT, LABALBU, BILITOT, ALKPHOS, AST, ALT in the last 72 hours. Invalid input(s): GLU  Lipids:   Lab Results   Component Value Date    CHOL 155 06/23/2020    HDL 30 06/23/2020    TRIG 173 06/23/2020     Glucose:  Recent Labs     11/08/20  2130 11/09/20  0750 11/09/20  1200   POCGLU 129* 89 109*     [id number]:  Lab Results   Component Value Date    LABA1C 6.3 10/12/2020     High Sensitivity TSH:   Lab Results   Component Value Date    TSHHS 7.600 11/09/2020     Free T3: No results found for: FT3  Free T4:  Lab Results   Component Value Date    T4FREE 1.13 11/09/2020       Xr Chest Portable    Result Date: 10/22/2020  EXAMINATION: ONE XRAY VIEW OF THE CHEST 10/22/2020 5:10 am COMPARISON: 10/21/2020 HISTORY: ORDERING SYSTEM PROVIDED HISTORY: Post op open heart surgery TECHNOLOGIST PROVIDED HISTORY: Reason for Exam:->Post op open heart surgery Reason for Exam: post op open heart surgery Acuity: Unknown Type of Exam: Ongoing FINDINGS: Central venous catheter tip overlies the SVC. Sternotomy wires. Small bilateral pleural effusion. Right lower lobe opacity. Stable cardiomegaly. Mild interstitial edema. No pneumothorax. 1. Unchanged mild pulmonary edema and small bilateral pleural effusions. 2. Stable right lower lobe atelectasis. Xr Chest Portable    Result Date: 10/21/2020  EXAMINATION: ONE XRAY VIEW OF THE CHEST 10/21/2020 1:16 pm COMPARISON: 10/21/2020 HISTORY: ORDERING SYSTEM PROVIDED HISTORY: chest tube removal   ersistent small right pleural effusion with bibasilar atelectasis. No discrete pneumothorax. The osseous structures otherwise stable. Interval removal of thoracostomy tubes. No discrete pneumothorax. Xr Chest Portable    Result Date: 10/21/2020  EXAMINATION: ONE XRAY VIEW OF THE CHEST 10/21/2020 6:06 am COMPARISON: 10/20/2020 HISTORY: ORDERING SYSTEM PROVIDED HISTORY: Post op open heart surgery  . Mildly increased interstitial changes which could be related to increased pulmonary edema or pneumonitis. Cardiomegaly with central vascular congestion. Otherwise similar appearing chest.     Xr Chest Portable    Result Date: 10/21/2020  EXAMINATION: ONE XRAY VIEW OF THE CHEST 10/20/2020 5:18 am COMPARISON: Chest radiograph dated October 19, 2020. HISTORY: ORDERING SYSTEM PROVIDED HISTORY: Post op open heart surgery TECHNOLOGIST PROVIDED HISTORY: Reason for Exam:->Post op open heart surgery Reason for Exam: Post op open heart surgery Acuity: Unknown Type of Exam: Unknown FINDINGS: Median sternotomy wires are noted. A left-sided central venous catheter is in place. A [place] catheter is in place. Bilateral chest tubes are present. Low lung volumes with bilateral pleural effusions and bibasilar opacities are seen. Bilateral pleural effusions with bibasilar opacities without significant change.            Scheduled Medicines   Medications:    [START ON 11/10/2020] predniSONE  10 mg Oral Daily    vancomycin  250 mg Oral 4 times per day    furosemide  40 mg Oral BID    aspirin  81 mg Oral Daily    atorvastatin  20 mg Oral Nightly    therapeutic multivitamin-minerals  1 tablet Oral Daily with breakfast    albuterol sulfate HFA  2 puff Inhalation Q4H WA    amiodarone  200 mg Oral Daily    budesonide-formoterol  2 puff Inhalation BID    DULoxetine  60 mg Oral Daily    finasteride  5 mg Oral Daily    guaiFENesin  600 mg Oral BID    levothyroxine  100 mcg Oral Daily    tamsulosin  0.4 mg Oral Daily    tiotropium  2 puff Inhalation Daily    carvedilol  3.125 mg Oral BID      Infusions:         Objective:   Vitals: /63   Pulse 75   Temp 98.1 °F (36.7 °C) (Oral)   Resp 18   Ht 5' 9\" (1.753 m)   Wt 182 lb 2 oz (82.6 kg)   SpO2 94%   BMI 26.90 kg/m²   General appearance: alert and cooperative with exam  Neck: no JVD or bruit  Thyroid : Normal lobes   Lungs: Has Vesicular Breath sounds somewhat diminished breath sounds right side   heart:  regular rate and rhythm  Abdomen: soft, non-tender; bowel sounds normal; no masses,  no organomegaly  Musculoskeletal: Normal  Extremities: extremities normal, , no edema  Neurologic:  Awake, alert, oriented to name, place and time. Cranial nerves II-XII are grossly intact. Motor is  intact. Sensory is intact. ,  and gait is normal.    Assessment:     Patient Active Problem List:     Juvenile rheumatoid arthritis (HCC)     Chronic bilateral low back pain without sciatica     Peripheral arterial disease (HCC)     Panlobular emphysema (HCC)     Essential hypertension     Acquired hypothyroidism     Gastroesophageal reflux disease     Non-seasonal allergic rhinitis     Benign prostatic hyperplasia without lower urinary tract symptoms     Tobacco use     Acute pulmonary edema (HCC)     Seasonal allergic rhinitis due to pollen     Mixed irritable bowel syndrome     Mixed hyperlipidemia     Prediabetes     Benign prostatic hyperplasia     Chronic combined systolic and diastolic congestive heart failure (HCC)     JOSE (acute kidney injury) ([facility] [address]     LV dysfunction

## 2020-11-10 NOTE — DISCHARGE SUMMARY
Patient Name: Nano Carcamo  Patient :  1955  Patient MRN:   [id number]      Admission Date:  10/27/2020  Discharge Date: 11/10/2020    Admitting diagnosis: Congestive heart failure after coronary bypass graft surgery     Comorbid diagnoses impacting rehabilitation: Uncontrolled pain, generalized weakness, gait disturbance, acute blood loss anemia, essential hypertension, BPH, COPD exacerbation, nicotine addiction     Discharging diagnosis: Congestive heart failure after coronary bypass graft surgery     Comorbid diagnoses impacting rehabilitation: Uncontrolled pain, generalized weakness, gait disturbance, acute blood loss anemia, essential hypertension, BPH, COPD exacerbation, nicotine addiction      History of present illness: The patient is a 67-year-old right-hand-dominant male whose progressive shortness of breath and intermittent chest pain led to a cardiac evaluation. Eventually he qualified for coronary bypass graft surgery. On 10/19/2020 he underwent coronary artery bypass graft surgery. Postoperatively he had significant hypoxia requiring aggressive pulmonary hygiene, oxygen support and steroid therapy. A Fowler catheter was used for urinary retention. He had significant fatigue and positional dizziness with fluctuating blood pressure. Prior (baseline) level of function: Independent. Current level of function:         Current  IRF-ESTEBAN and Goals:   Hearing, Speech, and Vision  Expression of Ideas and Wants: Without difficulty  Understanding Verbal and Non-Verbal Content: Understands  Prior Functioning: Everyday Activities  Self Care: Independent  Indoor Mobility (Ambulation): Independent  Stairs: Independent  Functional Cognition: Independent  Prior Device Use: (Ind)    Eating  Assistance Needed: Independent  CARE Score: 6  Discharge Goal: Independent  Oral Hygiene  Assistance Needed: Independent  Comment:  Mod I  CARE Score: 6  Discharge Goal: [address] Needed: Independent  Comment: Mod I, pt demo maintaining sternal precautions c use of compensatory strategies for cassandra-care  CARE Score: 6  Discharge Goal: Independent  Shower/Bathe Self  Assistance Needed: Independent  Comment: Mod I- completed seated on shower bench  CARE Score: 6  Discharge Goal: Independent  Upper Body Dressing  Assistance Needed: Independent  Comment: Mod I- demo compensatory strategies in standing/sitting  CARE Score: 6  Discharge Goal: Independent  Lower Body Dressing  Assistance Needed: Independent  Comment: MOD I- completed seated to thread BLE through pant legs and depends  CARE Score: 6  Discharge Goal: Independent  Putting On/Taking Off Footwear  Assistance Needed: Independent  Comment: Steven figure four posture  CARE Score: 6  Discharge Goal: Partial/moderate assistance    Roll Left and Right  Assistance Needed: Independent  Comment: uses heart pillow  CARE Score: 6  Discharge Goal: Independent  Sit to Lying  Assistance Needed: Independent  Comment: uses heart pillow  CARE Score: 6  Discharge Goal: Independent  Sit to Stand  Assistance Needed: Independent  Comment: uses heart pillow  CARE Score: 6  Discharge Goal: Independent  Chair/Bed-to-Chair Transfer  Assistance Needed: Independent  Comment: uses heart pillow  Reason if not Attempted: Not attempted due to medical condition or safety concerns  CARE Score: 6  Discharge Goal: Independent  Toilet Transfer  Assistance Needed: Independent  Comment: Mod I- increased time, demo sternal precaution compliance, no grab bars required  CARE Score: 6  Discharge Goal: Independent  Car Transfer  Assistance Needed: Independent  Comment: uses heart pillow  Reason if not Attempted: Not attempted due to medical condition or safety concerns  CARE Score: 6  Discharge Goal: Independent   Walk 10 Feet?   Walk 10 Feet?: Yes  1 Step  1 Step?: Yes  Picking Up Object  Assistance Needed: Independent  Comment: uses 2ww and reacher  Reason if not Attempted: Not attempted due to medical condition or safety concerns  CARE Score: 6  Discharge Goal: Independent  Wheelchair Ability  Uses a Wheelchair and/or Scooter?: No                  I      Exam:    Blood pressure 125/69, pulse 68, temperature 98.1 °F (36.7 °C), resp. rate 16, height 5' 9\" (1.753 m), weight 183 lb 1.6 oz (83.1 kg), SpO2 94 %. General: Observed sitting up in bed with oxygen per nasal cannula. Breathing comfortably. Appears more calm and I have seen him before. Oriented x3. In no distress. HEENT: Clear speech. MMM. Neck supple. No JVD. Pulmonary: Mildly diminished breath sounds in the bases. No wheezes, rales or coughing. Cardiac: Sternal incision is well-healed. Regular rate and rhythm. Abdomen: Patient's abdomen is soft and nondistended. Bowel sounds were present throughout. There was no rebound, guarding or masses noted. Upper extremities: Able to bring both hands up to meet mine. Strong . Fair dexterity. No new bruising. Lower extremities: No significant edema. No signs of DVT. 4+/5 MMT at the knees and ankles. Sitting balance was good. Standing balance was fair.     Lab Results   Component Value Date    WBC 13.4 (H) 11/09/2020    HGB 14.1 11/09/2020    HCT 44.6 11/09/2020    .3 (H) 11/09/2020     11/09/2020     Lab Results   Component Value Date    INR 0.97 11/03/2020    INR 1.02 10/28/2020    INR 1.27 10/19/2020    PROTIME 11.7 11/03/2020    PROTIME 12.4 10/28/2020    PROTIME 15.4 (H) 10/19/2020     Lab Results   Component Value Date    CREATININE 1.1 11/10/2020    BUN 33 (H) 11/10/2020     11/10/2020    K 4.7 11/10/2020    CL 98 (L) 11/10/2020    CO2 22 11/10/2020     Lab Results   Component Value Date    ALT 45 (H) 11/03/2020    AST 22 11/03/2020    ALKPHOS 61 11/03/2020    BILITOT 0.3 11/03/2020         The patient presented to the ARU with the above history requiring a multidisciplinary treatment plan including close medical supervision by the [facility] Director. The patient participated in the prescribed therapy treatment plan with reasonable compliance and progressive tolerance. They avoided significant medical complications. By the time of discharge the patient had become safer with adaptive equipment to transfer and toilet with a FWW with the supervision of family/caregivers. The patient was tolerating an oral diet without choking/coughing and was back to their baseline with regards to bowel and bladder control. Discharge instructions were reviewed with the patient. The patient is to follow up with the PCP in 1-2 weeks. He should see his surgeon in 2-4 weeks. No driving. [facility] PT/OT/RN will be arranged in [facility] where he will be staying with family for a couple of weeks. He has qualified for supplemental oxygen.      Veronica Posada, [address] Medication Instructions F:[id number]    Printed on:11/10/20 1037   Medication Information                      albuterol sulfate  (90 Base) MCG/ACT inhaler  Inhale 2 puffs into the lungs every 4 hours (while awake)             amiodarone (CORDARONE) 200 MG tablet  Take 1 tablet by mouth daily             aspirin EC 81 MG EC tablet  Take 1 tablet by mouth daily             atorvastatin (LIPITOR) 20 MG tablet  Take 1 tablet by mouth nightly             budesonide-formoterol (SYMBICORT) 160-4.5 MCG/ACT AERO  Inhale 2 puffs into the lungs 2 times daily             carvedilol (COREG) 3.125 MG tablet  Take 1 tablet by mouth 2 times daily             cetirizine (ZYRTEC) 10 MG tablet  TAKE 1 TABLET BY MOUTH EVERY DAY             DULoxetine (CYMBALTA) 60 MG extended release capsule  Take 1 capsule by mouth daily             finasteride (PROSCAR) 5 MG tablet  Take 1 tablet by mouth daily             fluticasone (FLONASE) 50 MCG/ACT nasal spray  SPRAY 1 SPRAY INTO EACH NOSTRIL EVERY DAY             furosemide (LASIX) 40 MG tablet  Take 1 tablet by mouth 2 times daily guaiFENesin (MUCINEX) 600 MG extended release tablet  Take 1 tablet by mouth 2 times daily             HYDROcodone-acetaminophen (NORCO) 5-325 MG per tablet  Take 1 tablet by mouth every 6 hours as needed for Pain for up to 7 days. levothyroxine (SYNTHROID) 100 MCG tablet  Take 1 tablet by mouth daily             Multiple Vitamins-Minerals (THERAPEUTIC MULTIVITAMIN-MINERALS) tablet  Take 1 tablet by mouth daily (with breakfast)             predniSONE (DELTASONE) 10 MG tablet  1 tab daily for 5 days, then 1/2 tab daily for 6 days, then stop.             tamsulosin (FLOMAX) 0.4 MG capsule  Take 1 capsule by mouth daily             tiotropium (SPIRIVA HANDIHALER) 18 MCG inhalation capsule  Inhale 1 capsule into the lungs daily             vancomycin (VANCOCIN) 50 mg/mL oral solution  Take 5 mLs by mouth 4 times daily for 8 days             varenicline (CHANTIX) 0.5 MG tablet  Take 1-2 tablets by mouth See Admin Instructions 0.5mg DAILY for 3 days followed by 0.5mg TWICE DAILY for 4 days followed by 1mg TWICE DAILY                 CONDITION ON DISCHARGE: Stable. The prognosis is fair for further improvements in ADL's and safety with adapted gait/transfers.      Record review, patient exam, discharge instructions, medication reconciliation and summary for this discharge visit took more than 30 minutes.

## 2020-11-10 NOTE — PROGRESS NOTES
Doctor Please copy and paste below in your progress note per DME requirements. Patient was seen in hospital for CHF, CAD, COPD. I am prescribing oxygen because the diagnosis and testing requires the patient to have oxygen in the home. Conditions will improve or be benefited by oxygen use. The patient is able to perform good mobility and therefore requires the use of a portable oxygen system for ambulation.

## 2020-11-10 NOTE — CARE COORDINATION
LSW returned call to Summer with [employer]  ([phone]) for patient's address as facesheet lists a [address]. [address] [place] [address], [place], [address] [place] [address]    10:50 AM  Discharge summary faxed to [employer] at [phone].    11:30 AM  LSW met with patient to ensure DME had been delivered; noted it had been. LSW also informed patient of upcoming appointment on 11/18/20 for surgery follow-up and importance of keeping this appointment.   Patient verbalized understanding

## 2020-11-10 NOTE — PLAN OF CARE
Problem: SAFETY  Goal: Free from accidental physical injury  11/9/2020 2143 by Irving Arroyo RN  Outcome: Ongoing  11/9/2020 0835 by Anayeli Chin RN  Outcome: Ongoing  Goal: Free from intentional harm  11/9/2020 2143 by Irving Arroyo RN  Outcome: Ongoing  11/9/2020 0835 by Anayeli Chin RN  Outcome: Ongoing     Problem: DAILY CARE  Goal: Daily care needs are met  11/9/2020 2143 by Irving Arroyo RN  Outcome: Ongoing  11/9/2020 0835 by Anayeli Chin RN  Outcome: Ongoing     Problem: PAIN  Goal: Patient's pain/discomfort is manageable  11/9/2020 2143 by Irving Arroyo RN  Outcome: Ongoing  11/9/2020 0835 by Anayeli Chin RN  Outcome: Ongoing     Problem: SKIN INTEGRITY  Goal: Skin integrity is maintained or improved  11/9/2020 2143 by Irving Arroyo RN  Outcome: Ongoing  11/9/2020 0835 by Anayeli Chin RN  Outcome: Ongoing     Problem: KNOWLEDGE DEFICIT  Goal: Patient/S.O. demonstrates understanding of disease process, treatment plan, medications, and discharge instructions.   11/9/2020 2143 by Irving Arroyo RN  Outcome: Ongoing  11/9/2020 0835 by Anayeli Chin RN  Outcome: Ongoing     Problem: DISCHARGE BARRIERS  Goal: Patient's continuum of care needs are met  11/9/2020 2143 by Irving Arroyo RN  Outcome: Ongoing  11/9/2020 0835 by Anayeli Chin RN  Outcome: Ongoing     Problem: Infection - Surgical Site:  Goal: Will show no infection signs and symptoms  Description: Will show no infection signs and symptoms  11/9/2020 2143 by Irving Arroyo RN  Outcome: Ongoing  11/9/2020 0835 by Anayeli Chin RN  Outcome: Ongoing     Problem: Pain:  Goal: Pain level will decrease  Description: Pain level will decrease  11/9/2020 2143 by Irving Arroyo RN  Outcome: Ongoing  11/9/2020 0835 by Anayeli Chin RN  Outcome: Ongoing  Goal: Control of acute pain  Description: Control of acute pain  11/9/2020 2143 by Irving Arroyo RN  Outcome: Ongoing  11/9/2020 0835 by Miko Blood RN  Outcome: Ongoing  Goal: Control of chronic pain  Description: Control of chronic pain  11/9/2020 2143 by Maury Mark RN  Outcome: Ongoing  11/9/2020 0835 by Miko Blood RN  Outcome: Ongoing     Problem: Skin Integrity:  Goal: Will show no infection signs and symptoms  Description: Will show no infection signs and symptoms  11/9/2020 2143 by Maury Mark RN  Outcome: Ongoing  11/9/2020 0835 by Miko Blood RN  Outcome: Ongoing  Goal: Absence of new skin breakdown  Description: Absence of new skin breakdown  11/9/2020 2143 by Maury Mark RN  Outcome: Ongoing  11/9/2020 0835 by Miko Blood RN  Outcome: Ongoing

## 2020-11-11 NOTE — PROGRESS NOTES
Progress Note( Dr. Jeannie Jacobo)  11/10/2020  Subjective:   Admit Date: 10/27/2020  PCP: Zeina Dewitt MD    Admitted For : Chest pain CAD underwent CABG    Consulted For: Better control of blood glucose filed diabetes mellitus    Interval History: Post CABG patient was transferred to acute rehab unit on evening of 10/27/2020      C/O chest pain is a lot better  Has some  SOB . Denies nausea or vomiting. No new bowel or bladder symptoms. diarrhea seems to be better  It turned out to be C. difficile colitis seen by GI  Change the medicine to vancomycin p.o. Intake/Output Summary (Last 24 hours) at 11/10/2020 2344  Last data filed at 11/10/2020 1039  Gross per 24 hour   Intake 240 ml   Output 425 ml   Net -185 ml       DATA    CBC:   Recent Labs     11/09/20  0823   WBC 13.4*   HGB 14.1       CMP:  Recent Labs     11/10/20  0644      K 4.7   CL 98*   CO2 22   BUN 33*   CREATININE 1.1   CALCIUM 9.3     Lipids:   Lab Results   Component Value Date    CHOL 155 06/23/2020    HDL 30 06/23/2020    TRIG 173 06/23/2020     Glucose:  Recent Labs     11/09/20  0750 11/09/20  1200 11/10/20  0722   POCGLU 89 109* 95     [id number]:  Lab Results   Component Value Date    LABA1C 6.3 10/12/2020     High Sensitivity TSH:   Lab Results   Component Value Date    TSHHS 7.600 11/09/2020     Free T3: No results found for: FT3  Free T4:  Lab Results   Component Value Date    T4FREE 1.13 11/09/2020       Xr Chest Portable    Result Date: 10/22/2020  EXAMINATION: ONE XRAY VIEW OF THE CHEST 10/22/2020 5:10 am COMPARISON: 10/21/2020 HISTORY: ORDERING SYSTEM PROVIDED HISTORY: Post op open heart surgery TECHNOLOGIST PROVIDED HISTORY: Reason for Exam:->Post op open heart surgery Reason for Exam: post op open heart surgery Acuity: Unknown Type of Exam: Ongoing FINDINGS: Central venous catheter tip overlies the SVC. Sternotomy wires. Small bilateral pleural effusion. Right lower lobe opacity. Stable cardiomegaly. Mild interstitial edema. No pneumothorax. 1. Unchanged mild pulmonary edema and small bilateral pleural effusions. 2. Stable right lower lobe atelectasis. Xr Chest Portable    Result Date: 10/21/2020  EXAMINATION: ONE XRAY VIEW OF THE CHEST 10/21/2020 1:16 pm COMPARISON: 10/21/2020 HISTORY: ORDERING SYSTEM PROVIDED HISTORY: chest tube removal   ersistent small right pleural effusion with bibasilar atelectasis. No discrete pneumothorax. The osseous structures otherwise stable. Interval removal of thoracostomy tubes. No discrete pneumothorax. Xr Chest Portable    Result Date: 10/21/2020  EXAMINATION: ONE XRAY VIEW OF THE CHEST 10/21/2020 6:06 am COMPARISON: 10/20/2020 HISTORY: ORDERING SYSTEM PROVIDED HISTORY: Post op open heart surgery  . Mildly increased interstitial changes which could be related to increased pulmonary edema or pneumonitis. Cardiomegaly with central vascular congestion. Otherwise similar appearing chest.     Xr Chest Portable    Result Date: 10/21/2020  EXAMINATION: ONE XRAY VIEW OF THE CHEST 10/20/2020 5:18 am COMPARISON: Chest radiograph dated October 19, 2020. HISTORY: ORDERING SYSTEM PROVIDED HISTORY: Post op open heart surgery TECHNOLOGIST PROVIDED HISTORY: Reason for Exam:->Post op open heart surgery Reason for Exam: Post op open heart surgery Acuity: Unknown Type of Exam: Unknown FINDINGS: Median sternotomy wires are noted. A left-sided central venous catheter is in place. A [place] catheter is in place. Bilateral chest tubes are present. Low lung volumes with bilateral pleural effusions and bibasilar opacities are seen. Bilateral pleural effusions with bibasilar opacities without significant change.            Scheduled Medicines   Medications:      Infusions:         Objective:   Vitals: /69   Pulse 68   Temp 98.1 °F (36.7 °C)   Resp 16   Ht 5' 9\" (1.753 m)   Wt 183 lb 1.6 oz (83.1 kg)   SpO2 94%   BMI 27.04 kg/m²   General appearance: alert and cooperative with exam  Neck: no JVD or bruit  Thyroid : Normal lobes   Lungs: Has Vesicular Breath sounds somewhat diminished breath sounds right side   heart:  regular rate and rhythm  Abdomen: soft, non-tender; bowel sounds normal; no masses,  no organomegaly  Musculoskeletal: Normal  Extremities: extremities normal, , no edema  Neurologic:  Awake, alert, oriented to name, place and time. Cranial nerves II-XII are grossly intact. Motor is  intact. Sensory is intact. ,  and gait is normal.    Assessment:     Patient Active Problem List:     Juvenile rheumatoid arthritis (HCC)     Chronic bilateral low back pain without sciatica     Peripheral arterial disease (HCC)     Panlobular emphysema (HCC)     Essential hypertension     Acquired hypothyroidism     Gastroesophageal reflux disease     Non-seasonal allergic rhinitis     Benign prostatic hyperplasia without lower urinary tract symptoms     Tobacco use     Acute pulmonary edema (HCC)     Seasonal allergic rhinitis due to pollen     Mixed irritable bowel syndrome     Mixed hyperlipidemia     Prediabetes     Benign prostatic hyperplasia     Chronic combined systolic and diastolic congestive heart failure (HCC)     JOSE (acute kidney injury) (HCC)     LV dysfunction     Dehydration     Vomiting     Coronary artery disease involving native coronary artery     CAD in native artery. //CABG    C. difficile colitis      Plan:     1. Reviewed POC blood glucose . Labs and X ray results   2. Reviewed Current Medicines   3. on  Correction bolus Humalog Insulin regime  4. Monitor Blood glucose frequently   5. Modified  the dose of Insulin/ other medicines as needed   6. Transferred to acute rehab unit on 10/27/2020 Will follow  7. Possibly discharge tomorrow    Will follow.      Chevy Alanis MD

## 2020-11-12 ENCOUNTER — TELEPHONE (OUTPATIENT)
Dept: INTERNAL MEDICINE CLINIC | Age: 65
End: 2020-11-12

## 2020-11-12 NOTE — TELEPHONE ENCOUNTER
[facility] Initial Follow Up Call    Outreach made within 2 business days of discharge: Yes    Patient: Heather Mckeon Patient : 1955   MRN: [id number]  Reason for Admission: There are no discharge diagnoses documented for the most recent discharge.   Discharge Date: 11/10/20       Spoke with: Left message with patient     Discharge department/facility: inpatient         Follow Up  Future Appointments   Date Time Provider Petra Albert   2020  1:00 PM Mita Pompa MD [facility]  [facility]       Luis Alfredo Rodriguez CMA

## 2020-11-30 RX ORDER — LEVOTHYROXINE SODIUM 0.1 MG/1
TABLET ORAL
Qty: 30 TABLET | Refills: 0 | Status: SHIPPED | OUTPATIENT
Start: 2020-11-30 | End: 2020-12-22

## 2020-11-30 NOTE — TELEPHONE ENCOUNTER
TSH elevated in Nov.   Will refill Synthroid but readdress this next visit with me in a few weeks. Will only give him 30 days.

## 2020-12-02 ENCOUNTER — TELEPHONE (OUTPATIENT)
Dept: CARDIOLOGY CLINIC | Age: 65
End: 2020-12-02

## 2020-12-02 NOTE — TELEPHONE ENCOUNTER
LM with wife Falguni [phone]) that pt's appt is in the [facility] office 12-10-20 @ 1:30.

## 2020-12-10 ENCOUNTER — OFFICE VISIT (OUTPATIENT)
Dept: CARDIOLOGY CLINIC | Age: 65
End: 2020-12-10
Payer: MEDICARE

## 2020-12-10 VITALS
SYSTOLIC BLOOD PRESSURE: 92 MMHG | BODY MASS INDEX: 28.44 KG/M2 | HEART RATE: 80 BPM | WEIGHT: 192 LBS | HEIGHT: 69 IN | DIASTOLIC BLOOD PRESSURE: 60 MMHG

## 2020-12-10 PROCEDURE — 99214 OFFICE O/P EST MOD 30 MIN: CPT | Performed by: INTERNAL MEDICINE

## 2020-12-10 PROCEDURE — 93000 ELECTROCARDIOGRAM COMPLETE: CPT | Performed by: INTERNAL MEDICINE

## 2020-12-10 RX ORDER — CARVEDILOL 3.12 MG/1
3.12 TABLET ORAL 2 TIMES DAILY
Qty: 60 TABLET | Refills: 0 | Status: CANCELLED | OUTPATIENT
Start: 2020-12-10

## 2020-12-10 RX ORDER — FUROSEMIDE 40 MG/1
40 TABLET ORAL 2 TIMES DAILY
Qty: 60 TABLET | Refills: 0 | Status: CANCELLED | OUTPATIENT
Start: 2020-12-10

## 2020-12-10 RX ORDER — AMLODIPINE BESYLATE 5 MG/1
TABLET ORAL
Qty: 30 TABLET | Status: CANCELLED | OUTPATIENT
Start: 2020-12-10

## 2020-12-10 RX ORDER — CYCLOBENZAPRINE HCL 10 MG
10 TABLET ORAL 3 TIMES DAILY PRN
COMMUNITY
End: 2020-12-11 | Stop reason: SDUPTHER

## 2020-12-10 RX ORDER — FAMOTIDINE 10 MG
10 TABLET ORAL 2 TIMES DAILY PRN
COMMUNITY
End: 2020-12-11 | Stop reason: SDUPTHER

## 2020-12-10 RX ORDER — AMLODIPINE BESYLATE 5 MG/1
TABLET ORAL
COMMUNITY
Start: 2020-12-04 | End: 2020-12-10

## 2020-12-10 RX ORDER — ASPIRIN 81 MG/1
81 TABLET ORAL DAILY
Qty: 90 TABLET | Refills: 1 | Status: SHIPPED | OUTPATIENT
Start: 2020-12-10 | End: 2021-03-03

## 2020-12-10 NOTE — PROGRESS NOTES
Tessa Pfeiffer MD        OFFICE  FOLLOWUP NOTE    Chief complaints: patient is here for management of CAD, S/P cabg , HTN, DYSLPIDEMIA    Subjective: patient feels better, + chest pain, no shortness of breath, + dizziness, no palpitations    HPI Gerard Torres is a 76 y. o.year old who  has a past medical history of CAD (coronary artery disease), COPD (chronic obstructive pulmonary disease) ([facility] [address] H/O cardiac catheterization, H/O echocardiogram, Hyperlipidemia, Hypertension, IBS (irritable bowel syndrome), Pancreatitis, Rheumatoid arthritis ([facility] [address] and Thyroid disease. and presents for management ofCAD, S/P cabg , HTN, DYSLPIDEMIA, which are well controlled  He has rt sided chest wall pain. he feel dizzy when he stands up, has low blood pressure    Current Outpatient Medications   Medication Sig Dispense Refill    amLODIPine (NORVASC) 5 MG tablet       famotidine (PEPCID) 10 MG tablet Take 10 mg by mouth 2 times daily as needed      cyclobenzaprine (FLEXERIL) 10 MG tablet Take 10 mg by mouth 3 times daily as needed for Muscle spasms      levothyroxine (SYNTHROID) 100 MCG tablet TAKE 1 TABLET BY MOUTH EVERY DAY 30 tablet 0    albuterol sulfate  (90 Base) MCG/ACT inhaler Inhale 2 puffs into the lungs every 4 hours (while awake) 1 Inhaler 0    atorvastatin (LIPITOR) 20 MG tablet Take 1 tablet by mouth nightly 30 tablet 0    carvedilol (COREG) 3.125 MG tablet Take 1 tablet by mouth 2 times daily 60 tablet 0    finasteride (PROSCAR) 5 MG tablet Take 1 tablet by mouth daily 30 tablet 0    tamsulosin (FLOMAX) 0.4 MG capsule Take 1 capsule by mouth daily 30 capsule 0    Multiple Vitamins-Minerals (THERAPEUTIC MULTIVITAMIN-MINERALS) tablet Take 1 tablet by mouth daily (with breakfast)  0    fluticasone (FLONASE) 50 MCG/ACT nasal spray SPRAY 1 SPRAY INTO EACH NOSTRIL EVERY DAY 1 Bottle 0    cetirizine (ZYRTEC) 10 MG tablet TAKE 1 TABLET BY MOUTH EVERY DAY      aspirin EC 81 MG EC tablet Take 1 tablet by mouth daily 90 tablet 1    budesonide-formoterol (SYMBICORT) 160-4.5 MCG/ACT AERO Inhale 2 puffs into the lungs 2 times daily 1 Inhaler 11    DULoxetine (CYMBALTA) 60 MG extended release capsule Take 1 capsule by mouth daily 90 capsule 1    tiotropium (SPIRIVA HANDIHALER) 18 MCG inhalation capsule Inhale 1 capsule into the lungs daily 30 capsule 3    amiodarone (CORDARONE) 200 MG tablet Take 1 tablet by mouth daily 30 tablet 0    furosemide (LASIX) 40 MG tablet Take 1 tablet by mouth 2 times daily (Patient not taking: Reported on 12/10/2020) 60 tablet 0     No current facility-administered medications for this visit. Allergies: Patient has no known allergies. Past Medical History:   Diagnosis Date    CAD (coronary artery disease)     Dr. Lona Rod COPD (chronic obstructive pulmonary disease) ([facility] [address]     No pulmonologist - follows with PCP    H/O cardiac catheterization 09/24/2020     Severe calcified multivessel CAD with LV dysfuntion, PCWP is 12, CABG consult.  H/O echocardiogram 06/23/2020     Mild concentric LVH with moderate systolic impairment. EF is 40-45 % .  Hyperlipidemia     Hypertension     Follows with PCP    IBS (irritable bowel syndrome)     Pancreatitis 2013    Rheumatoid arthritis ([facility] [address]     Thyroid disease      Past Surgical History:   Procedure Laterality Date    CARDIAC CATHETERIZATION  09/24/2020     Severe calcified multivessel CAD with LV dysfuntion, PCWP is 12, CABG consult.     COLONOSCOPY  2017    Polyps - [facility] [place], [place])    CORONARY ARTERY BYPASS GRAFT N/A 10/19/2020    CABG CORONARY ARTERY BYPASS x3 WITH LIMA, INTRAOP BETH, ENDOHARVEST OF THE LEFT SAPHENOUS VEIN performed by Zunilda Castillo MD at [address], COLON, DIAGNOSTIC  2017    Normal exam per pt (done in [place], [place])     Family History   Problem Relation Age of Onset    Alzheimer's Disease Mother     Heart Disease Father     Heart Attack Father     High Blood Pressure Father  High Cholesterol Father      Social History     Tobacco Use    Smoking status: Former Smoker     Packs/day: 2.00     Years: 52.00     Pack years: 104.00     Types: Cigarettes     Start date:      Last attempt to quit: 10/1/2020     Years since quittin.1    Smokeless tobacco: Never Used   Substance Use Topics    Alcohol use: Not Currently      [unfilled]  Review of Systems:   · Constitutional: No Fever or Weight Loss   · Eyes: No Decreased Vision  · ENT: No Headaches, Hearing Loss or Vertigo  · Cardiovascular: + chest pain, dyspnea on exertion, palpitations or loss of consciousness  · Respiratory: No cough or wheezing    · Gastrointestinal: No abdominal pain, appetite loss, blood in stools, constipation, diarrhea or heartburn  · Genitourinary: No dysuria, trouble voiding, or hematuria  · Musculoskeletal:  No gait disturbance, weakness or joint complaints  · Integumentary: No rash or pruritis  · Neurological: No TIA or stroke symptoms  · Psychiatric: No anxiety or depression  · Endocrine: No malaise, fatigue or temperature intolerance  · Hematologic/Lymphatic: No bleeding problems, blood clots or swollen lymph nodes  · Allergic/Immunologic: No nasal congestion or hives  All systems negative except as marked. Objective:  BP 92/60   Pulse 80   Ht 5' 9\" (1.753 m)   Wt 192 lb (87.1 kg)   BMI 28.35 kg/m²   Wt Readings from Last 3 Encounters:   12/10/20 192 lb (87.1 kg)   20 183 lb (83 kg)   11/10/20 183 lb 1.6 oz (83.1 kg)     Body mass index is 28.35 kg/m². GENERAL - Alert, oriented, pleasant, in no apparent distress,normal grooming  HEENT - pupils are reactive to light and accomodation, cornea intact, conjunctive normal color, ears are normal in exam,throat exam in normal, teeth, gum and palate are normal, oral mucosa is normal without any notation of pallor or cyanosis  Neck - Supple. No jugular venous distention noted.  No carotid bruits, no apical -carotid delay  Respiratory:  Normal breath sounds, No respiratory distress, No wheezing, No chest tenderness. ,no use of accessory muscles, diaphragm movement is normal  Cardiovascular: (PMI) apex non displaced,no lifts no thrills, no s3,no s4, Normal heart rate, Normal rhythm, No murmurs, No rubs, No gallops. Carotid arteries pulse and amplitude are normal no bruit, no abdominal bruit noted ( normal abdominal aorta ausculation), femoral arteries pulse and amplitude are normal no bruit, pedal pulses are normal  Femoral pulses have normal amplitude, no bruits   Extremities - No cyanosis, clubbing, or significant edema, no varicose veins    Abdomen - No masses, tenderness, or organomegaly, no hepato-splenomegally, no bruits  Musculoskeletal - No significant edema, no kyphosis or scoliosis, no deformity in any extremity noted, muscle strength and tone are normal  Skin: no ulcer,no scar,no stasis dermatitis   Neurologic - alert oriented times 3,Cranial nerves II through XII are grossly intact. There were no gross focal neurologic abnormalities. All sensory and motor nerves examined and were normal  Psychiatric: normal mood and affect    No results found for: CKTOTAL, CKMB, CKMBINDEX, TROPONINI  BNP:  No results found for: BNP  PT/INR:  No results found for: PTINR  Lab Results   Component Value Date    LABA1C 6.3 10/12/2020    LABA1C 6.3 09/23/2020     Lab Results   Component Value Date    CHOL 155 06/23/2020    TRIG 173 (H) 06/23/2020    HDL 30 (L) 06/23/2020    LDLDIRECT 106 (H) 06/23/2020     Lab Results   Component Value Date    ALT 45 (H) 11/03/2020    AST 22 11/03/2020     TSH:  No results found for: TSH    Impression:  Gregory Anderson is a 76 y. o.year old who  has a past medical history of CAD (coronary artery disease), COPD (chronic obstructive pulmonary disease) ([facility] [address] H/O cardiac catheterization, H/O echocardiogram, Hyperlipidemia, Hypertension, IBS (irritable bowel syndrome), Pancreatitis, Rheumatoid arthritis ([facility] [address] and Thyroid disease.  and presents with Plan:  1. Hypotension: d/c lasix, alpa norvasc and hold coreg for now  2. Chest pain with cough and muscle tenderness: ok to use OTC PAIN MEDICATIONS, he has some leg pain at the site of venous stripping at left leg too. 3. CAD:s/o CABG X3 Continue aspirin , continue statins, hold off starting ACEinhibitors due to low blood pressure, hold , betablockers, WILL GET STRESS TEST AND CARDIAC REHAB once blood pressure is better, d/c amiodarone  4. Dyslipidemia: continue statins, d  5. Chf: no evidence of heart failure, d.catina lasix, will start lisinopril once blood pressure improves Health maintenance: exerise and diet  All labs, medications and tests reviewed, continue all other medications of all above medical condition listed as is.     [unfilled]

## 2020-12-10 NOTE — LETTER
[address]  [address] [place] [address]  Phone: [phone]  Fax: [phone]    Hayden Henry MD        December 10, 2020     Patient: Layla Contreras   YOB: 1955   Date of Visit: 12/10/2020       To Whom it May Concern:    Layla Contreras has been seen in my clinic on 12/10/20 for a follow up visit from a cardiac procedure. He is to be off of work. He may return to work on 1/31/2021. If you have any questions or concerns, please don't hesitate to call.     Sincerely,         Hayden Henry MD

## 2020-12-11 RX ORDER — FAMOTIDINE 10 MG
10 TABLET ORAL 2 TIMES DAILY PRN
Qty: 60 TABLET | Refills: 3 | Status: SHIPPED | OUTPATIENT
Start: 2020-12-11 | End: 2020-12-22 | Stop reason: SDUPTHER

## 2020-12-11 RX ORDER — CYCLOBENZAPRINE HCL 10 MG
10 TABLET ORAL 3 TIMES DAILY PRN
Qty: 90 TABLET | Refills: 1 | Status: SHIPPED | OUTPATIENT
Start: 2020-12-11 | End: 2020-12-22 | Stop reason: SDUPTHER

## 2020-12-11 NOTE — TELEPHONE ENCOUNTER
Dr. Shelvy Fabry stopped all patient's bp medication yesterday at his appointment he is not sure why

## 2020-12-16 ENCOUNTER — PROCEDURE VISIT (OUTPATIENT)
Dept: CARDIOLOGY CLINIC | Age: 65
End: 2020-12-16
Payer: MEDICARE

## 2020-12-16 VITALS
WEIGHT: 192 LBS | DIASTOLIC BLOOD PRESSURE: 70 MMHG | SYSTOLIC BLOOD PRESSURE: 128 MMHG | BODY MASS INDEX: 28.44 KG/M2 | HEIGHT: 69 IN | HEART RATE: 89 BPM | TEMPERATURE: 96.4 F

## 2020-12-16 PROBLEM — Z95.1 S/P CABG X 3: Status: ACTIVE | Noted: 2020-10-19

## 2020-12-16 PROCEDURE — 93015 CV STRESS TEST SUPVJ I&R: CPT | Performed by: INTERNAL MEDICINE

## 2020-12-16 RX ORDER — TIOTROPIUM BROMIDE 18 UG/1
18 CAPSULE ORAL; RESPIRATORY (INHALATION) DAILY
Qty: 30 CAPSULE | Refills: 3 | Status: SHIPPED | OUTPATIENT
Start: 2020-12-16 | End: 2021-02-24 | Stop reason: SDUPTHER

## 2020-12-19 ENCOUNTER — HOSPITAL ENCOUNTER (EMERGENCY)
Age: 65
Discharge: HOME | End: 2020-12-19
Attending: EMERGENCY MEDICINE
Payer: MEDICARE

## 2020-12-19 ENCOUNTER — APPOINTMENT (OUTPATIENT)
Dept: GENERAL RADIOLOGY | Age: 65
End: 2020-12-19
Payer: MEDICARE

## 2020-12-19 VITALS
BODY MASS INDEX: 27.55 KG/M2 | SYSTOLIC BLOOD PRESSURE: 99 MMHG | TEMPERATURE: 98.2 F | DIASTOLIC BLOOD PRESSURE: 86 MMHG | WEIGHT: 186 LBS | RESPIRATION RATE: 18 BRPM | HEIGHT: 69 IN

## 2020-12-19 LAB
ALBUMIN SERPL-MCNC: 4.1 GM/DL (ref 3.4–5)
ALP BLD-CCNC: 108 IU/L (ref 40–129)
ALT SERPL-CCNC: 35 U/L (ref 10–40)
ANION GAP SERPL CALCULATED.3IONS-SCNC: ABNORMAL MMOL/L (ref 4–16)
AST SERPL-CCNC: 25 IU/L (ref 15–37)
BASOPHILS ABSOLUTE: 0.1 K/CU MM
BASOPHILS RELATIVE PERCENT: 0.5 % (ref 0–1)
BILIRUB SERPL-MCNC: 0.2 MG/DL (ref 0–1)
BUN BLDV-MCNC: 19 MG/DL (ref 6–23)
CALCIUM SERPL-MCNC: 9.4 MG/DL (ref 8.3–10.6)
CHLORIDE BLD-SCNC: 102 MMOL/L (ref 99–110)
CO2: 38 MMOL/L (ref 21–32)
CREAT SERPL-MCNC: 1.1 MG/DL (ref 0.9–1.3)
DIFFERENTIAL TYPE: ABNORMAL
EOSINOPHILS ABSOLUTE: 0.3 K/CU MM
EOSINOPHILS RELATIVE PERCENT: 2.6 % (ref 0–3)
GFR AFRICAN AMERICAN: >60 ML/MIN/1.73M2
GFR NON-AFRICAN AMERICAN: >60 ML/MIN/1.73M2
GLUCOSE BLD-MCNC: 115 MG/DL (ref 70–99)
HCT VFR BLD CALC: 44.4 % (ref 42–52)
HEMOGLOBIN: 14.3 GM/DL (ref 13.5–18)
IMMATURE NEUTROPHIL %: 0.5 % (ref 0–0.43)
LYMPHOCYTES ABSOLUTE: 1.4 K/CU MM
LYMPHOCYTES RELATIVE PERCENT: 14.3 % (ref 24–44)
MAGNESIUM: 2.2 MG/DL (ref 1.8–2.4)
MCH RBC QN AUTO: 33.1 PG (ref 27–31)
MCHC RBC AUTO-ENTMCNC: 32.2 % (ref 32–36)
MCV RBC AUTO: 102.8 FL (ref 78–100)
MONOCYTES ABSOLUTE: 0.6 K/CU MM
MONOCYTES RELATIVE PERCENT: 6 % (ref 0–4)
PDW BLD-RTO: 14.3 % (ref 11.7–14.9)
PLATELET # BLD: 193 K/CU MM (ref 140–440)
PMV BLD AUTO: 9.2 FL (ref 7.5–11.1)
POTASSIUM SERPL-SCNC: 4.5 MMOL/L (ref 3.5–5.1)
RBC # BLD: 4.32 M/CU MM (ref 4.6–6.2)
SEGMENTED NEUTROPHILS ABSOLUTE COUNT: 7.4 K/CU MM
SEGMENTED NEUTROPHILS RELATIVE PERCENT: 76.1 % (ref 36–66)
SODIUM BLD-SCNC: 139 MMOL/L (ref 135–145)
TOTAL IMMATURE NEUTOROPHIL: 0.05 K/CU MM
TOTAL PROTEIN: 6.9 GM/DL (ref 6.4–8.2)
TROPONIN T: <0.01 NG/ML
WBC # BLD: 9.8 K/CU MM (ref 4–10.5)

## 2020-12-19 PROCEDURE — 84484 ASSAY OF TROPONIN QUANT: CPT

## 2020-12-19 PROCEDURE — 12011 RPR F/E/E/N/L/M 2.5 CM/<: CPT

## 2020-12-19 PROCEDURE — 93005 ELECTROCARDIOGRAM TRACING: CPT | Performed by: EMERGENCY MEDICINE

## 2020-12-19 PROCEDURE — 83735 ASSAY OF MAGNESIUM: CPT

## 2020-12-19 PROCEDURE — 99284 EMERGENCY DEPT VISIT MOD MDM: CPT

## 2020-12-19 PROCEDURE — 85025 COMPLETE CBC W/AUTO DIFF WBC: CPT

## 2020-12-19 PROCEDURE — 2500000003 HC RX 250 WO HCPCS: Performed by: EMERGENCY MEDICINE

## 2020-12-19 PROCEDURE — 80053 COMPREHEN METABOLIC PANEL: CPT

## 2020-12-19 PROCEDURE — 71045 X-RAY EXAM CHEST 1 VIEW: CPT

## 2020-12-19 RX ORDER — LIDOCAINE HYDROCHLORIDE AND EPINEPHRINE BITARTRATE 20; .01 MG/ML; MG/ML
20 INJECTION, SOLUTION SUBCUTANEOUS ONCE
Status: COMPLETED | OUTPATIENT
Start: 2020-12-19 | End: 2020-12-19

## 2020-12-19 RX ADMIN — LIDOCAINE HYDROCHLORIDE,EPINEPHRINE BITARTRATE 20 ML: 20; .01 INJECTION, SOLUTION INFILTRATION; PERINEURAL at 23:50

## 2020-12-19 ASSESSMENT — PAIN SCALES - GENERAL
PAINLEVEL_OUTOF10: 4
PAINLEVEL_OUTOF10: 9

## 2020-12-19 ASSESSMENT — PAIN DESCRIPTION - PAIN TYPE: TYPE: ACUTE PAIN

## 2020-12-19 ASSESSMENT — PAIN DESCRIPTION - LOCATION: LOCATION: INCISION;CHEST

## 2020-12-20 PROCEDURE — 93010 ELECTROCARDIOGRAM REPORT: CPT | Performed by: INTERNAL MEDICINE

## 2020-12-20 NOTE — ED PROVIDER NOTES
Emergency Department Encounter    Patient: Arlet Mccall  MRN: [id number]  : 1955  Date of Evaluation: 2020  ED Provider:  Haja Martinez    Triage Chief Complaint:   Laceration (under chin pt got light headed and hit stove pt has pain to his chest from recent sx)    [place]:  Arlet Mccall is a 76 y.o. male that presents with complaint of laceration under his chin. He had a CABG at the end of October. He had seen his cardiologist as recently as this week, has had low blood pressures recently, they stopped some og his medications. He does still sometimes feel the sternal components moving and has some pain with that but that has been stable for the most part. Tonight he had gotten up and gone to the kitchen and made himself some food, was wiping the counter and started to feel lightheaded, fell and hit his chin on the cystoscopy, he also struck part of his upper chest on the stove and is having some pain there from striking his chest and was concerned. Pain is 9 out of 10. He did not fully pass out, did not strike his head, was able to stand and had no further episodes. Felt blood trickling down his chin and that is when noted the laceration. Primarily was concerned because  he can't get his chin to stop bleeding, went through multiple paper towels, so he locked his house and got in the car and drove here. No further episodes of lightheadedness. No nausea or vomiting. No chest pain prior to the event other than what he has been having postoperatively. No shortness of breath. He had recently been stopped on all of his blood pressure medications and this last week due to having some issues with hypotension, they had discontinued his Lasix, Norvasc and were holding his Coreg as well. He is still on his aspirin and statins. They were not starting an ACE inhibitor due to his low blood pressures.   They had planned for stress test and cardiac rehab at the most recent visit on the  of this month it appears. No fevers or cough or other recent illnesses. He has been doing well otherwise      Reviewed his cardiology follow-ups and appears that he had a cardiac stress on the 16th, submaximal study due to failure to achieve target heart rate response and limited by symptoms suggestive of angina without exam objective evidence of ischemia, exercise tolerance was poor, could only exercise for a minute and 20 seconds due to chest discomfort and shortness of breath and fatigue. Had an appropriate blood pressure response to exercise and they felt he would benefit from a cardiac rehab program      ROS - see HPI, below listed is current ROS at time of my eval:  10 systems reviewed and negative except as above. Past Medical History:   Diagnosis Date    CAD (coronary artery disease)     Dr. Waylon Betancourt COPD (chronic obstructive pulmonary disease) ([address]     No pulmonologist - follows with PCP    H/O cardiac catheterization 09/24/2020     Severe calcified multivessel CAD with LV dysfuntion, PCWP is 12, CABG consult.  H/O echocardiogram 06/23/2020     Mild concentric LVH with moderate systolic impairment. EF is 40-45 % .  Hyperlipidemia     Hypertension     Follows with PCP    IBS (irritable bowel syndrome)     Pancreatitis 2013    Rheumatoid arthritis ([address]     S/P CABG x 3 10/19/2020    Thyroid disease      Past Surgical History:   Procedure Laterality Date    CARDIAC CATHETERIZATION  09/24/2020     Severe calcified multivessel CAD with LV dysfuntion, PCWP is 12, CABG consult.     COLONOSCOPY  2017    Polyps - [place]    CORONARY ARTERY BYPASS GRAFT N/A 10/19/2020    CABG CORONARY ARTERY BYPASS x3 WITH LIMA, INTRAOP BETH, ENDOHARVEST OF THE LEFT SAPHENOUS VEIN performed by Martine Irwin MD at [address] [place] [address], COLON, DIAGNOSTIC  2017    Normal exam per pt (done in [place], [place])     Family History   Problem Relation Age of Onset    Alzheimer's Disease Mother     Heart Disease Father  Heart Attack Father     High Blood Pressure Father     High Cholesterol Father      Social History     Socioeconomic History    Marital status:      Spouse name: Not on file    Number of children: Not on file    Years of education: Not on file    Highest education level: Not on file   Occupational History    Not on file   Social Needs    Financial resource strain: Not hard at all   Adele[place]men[place] insecurity     Worry: Never true     Inability: Never true   [employer] needs     Medical: No     Non-medical: No   Tobacco Use    Smoking status: Former Smoker     Packs/day: 2.00     Years: 52.00     Pack years: 104.00     Types: Cigarettes     Start date:      Quit date: 10/1/2020     Years since quittin.2    Smokeless tobacco: Never Used   Substance and Sexual Activity    Alcohol use: Not Currently    Drug use: Never    Sexual activity: Not on file   Lifestyle    Physical activity     Days per week: Not on file     Minutes per session: Not on file    Stress: Not on file   Relationships    Social connections     Talks on phone: Not on file     Gets together: Not on file     Attends Mosque service: Not on file     Active member of club or organization: Not on file     Attends meetings of clubs or organizations: Not on file     Relationship status: Not on file    Intimate partner violence     Fear of current or ex partner: Not on file     Emotionally abused: Not on file     Physically abused: Not on file     Forced sexual activity: Not on file   Other Topics Concern    Not on file   Social History Narrative    Not on file     No current facility-administered medications for this encounter.       Current Outpatient Medications   Medication Sig Dispense Refill    SPIRIVA HANDIHALER 18 MCG inhalation capsule INHALE 1 CAPSULE INTO THE LUNGS DAILY 30 capsule 3    cyclobenzaprine (FLEXERIL) 10 MG tablet Take 1 tablet by mouth 3 times daily as needed for Muscle spasms 90 tablet 1    famotidine (PEPCID) 10 MG tablet Take 1 tablet by mouth 2 times daily as needed (reflux) 60 tablet 3    aspirin EC 81 MG EC tablet Take 1 tablet by mouth daily 90 tablet 1    levothyroxine (SYNTHROID) 100 MCG tablet TAKE 1 TABLET BY MOUTH EVERY DAY 30 tablet 0    albuterol sulfate  (90 Base) MCG/ACT inhaler Inhale 2 puffs into the lungs every 4 hours (while awake) 1 Inhaler 0    atorvastatin (LIPITOR) 20 MG tablet Take 1 tablet by mouth nightly 30 tablet 0    finasteride (PROSCAR) 5 MG tablet Take 1 tablet by mouth daily 30 tablet 0    tamsulosin (FLOMAX) 0.4 MG capsule Take 1 capsule by mouth daily 30 capsule 0    Multiple Vitamins-Minerals (THERAPEUTIC MULTIVITAMIN-MINERALS) tablet Take 1 tablet by mouth daily (with breakfast)  0    fluticasone (FLONASE) 50 MCG/ACT nasal spray SPRAY 1 SPRAY INTO EACH NOSTRIL EVERY DAY 1 Bottle 0    cetirizine (ZYRTEC) 10 MG tablet TAKE 1 TABLET BY MOUTH EVERY DAY      budesonide-formoterol (SYMBICORT) 160-4.5 MCG/ACT AERO Inhale 2 puffs into the lungs 2 times daily 1 Inhaler 11    DULoxetine (CYMBALTA) 60 MG extended release capsule Take 1 capsule by mouth daily 90 capsule 1     No Known Allergies    Nursing Notes Reviewed    Physical Exam:  Triage VS:    ED Triage Vitals [12/19/20 2232]   Enc Vitals Group      BP 99/86      Pulse       Resp 18      Temp 98.2 °F (36.8 °C)      Temp Source Oral      SpO2       Weight 186 lb (84.4 kg)      Height 5' 9\" (1.753 m)      Head Circumference       Peak Flow       Pain Score       Pain Loc       Pain Edu? Excl. in [address]? My pulse ox interpretation is - normal    General appearance:  No acute distress. Skin:  Warm. Dry. Sternal scar well healed, no erythema or edema, no openings or drainage. No rashes. Submental area with 2cm horizantal laceration, mild oozing, no active bleeding, not gaping, no muscle or subcutaneous exposure. No swelling or bruising. No sublingual swelling.  No trismus or difficulty with tolerating secretions. Eye:  Extraocular movements intact. Ears, nose, mouth and throat:  Oral mucosa moist   Neck:  Trachea midline. Extremity:  No swelling. Normal ROM     Heart:  Regular rate and rhythm, normal S1 & S2, no extra heart sounds. Perfusion:  intact  Respiratory:  Lungs clear to auscultation bilaterally. Respirations nonlabored. Abdominal:  Normal bowel sounds. Soft. Nontender. Non distended.   Neurological:  Alert and oriented           Psychiatric:  Appropriate    I have reviewed and interpreted all of the currently available lab results from this visit (if applicable):  Results for orders placed or performed during the hospital encounter of 12/19/20   CBC Auto Differential   Result Value Ref Range    WBC 9.8 4.0 - 10.5 K/CU MM    RBC 4.32 (L) 4.6 - 6.2 M/CU MM    Hemoglobin 14.3 13.5 - 18.0 GM/DL    Hematocrit 44.4 42 - 52 %    .8 (H) 78 - 100 FL    MCH 33.1 (H) 27 - 31 PG    MCHC 32.2 32.0 - 36.0 %    RDW 14.3 11.7 - 14.9 %    Platelets 759 946 - 967 K/CU MM    MPV 9.2 7.5 - 11.1 FL    Differential Type AUTOMATED DIFFERENTIAL     Segs Relative 76.1 (H) 36 - 66 %    Lymphocytes % 14.3 (L) 24 - 44 %    Monocytes % 6.0 (H) 0 - 4 %    Eosinophils % 2.6 0 - 3 %    Basophils % 0.5 0 - 1 %    Segs Absolute 7.4 K/CU MM    Lymphocytes Absolute 1.4 K/CU MM    Monocytes Absolute 0.6 K/CU MM    Eosinophils Absolute 0.3 K/CU MM    Basophils Absolute 0.1 K/CU MM    Immature Neutrophil % 0.5 (H) 0 - 0.43 %    Total Immature Neutrophil 0.05 K/CU MM   Comprehensive Metabolic Panel   Result Value Ref Range    Sodium 139 135 - 145 MMOL/L    Potassium 4.5 3.5 - 5.1 MMOL/L    Chloride 102 99 - 110 mMol/L    CO2 38 (H) 21 - 32 MMOL/L    BUN 19 6 - 23 MG/DL    CREATININE 1.1 0.9 - 1.3 MG/DL    Glucose 115 (H) 70 - 99 MG/DL    Calcium 9.4 8.3 - 10.6 MG/DL    Alb 4.1 3.4 - 5.0 GM/DL    Total Protein 6.9 6.4 - 8.2 GM/DL    Total Bilirubin 0.2 0.0 - 1.0 MG/DL    ALT 35 10 - 40 U/L    AST 25 15 - 37 IU/L    Alkaline Phosphatase 108 40 - 129 IU/L    GFR Non-African American >60 >60 mL/min/1.73m2    GFR African American >60 >60 mL/min/1.73m2    Anion Gap NEG 1 4 - 16   Troponin   Result Value Ref Range    Troponin T <0.010 <0.01 NG/ML   Magnesium   Result Value Ref Range    Magnesium 2.2 1.8 - 2.4 mg/dl   EKG 12 Lead   Result Value Ref Range    Ventricular Rate 78 BPM    Atrial Rate 78 BPM    P-R Interval 180 ms    QRS Duration 90 ms    Q-T Interval 446 ms    QTc Calculation (Bazett) 508 ms    P Axis -26 degrees    R Axis 2 degrees    T Axis 103 degrees    Diagnosis       Normal sinus rhythm  Low voltage QRS  Inferior infarct (cited on or before 23-SEP-2020)  Prolonged QT  Abnormal ECG  When compared with ECG of 20-OCT-2020 01:20,  ST no longer elevated in Lateral leads  T wave inversion no longer evident in Anterolateral leads        Radiographs (if obtained):  Radiologist's Report Reviewed:  No results found. EKG (if obtained): (All EKG's are interpreted by myself in the absence of a cardiologist)  Normal sinus rhythm with rate of 78 bpm, no ST elevation. Prolonged QT interval, QT of 446 ms, QTC of 508 ms. No previous to compare    MDM:  29-year-old male with history as above presents with concern for near syncopal episode, fell and struck his chin. He did have a recent CABG, did strike his chest, has been having some pain at the sternal components but the scarred healed well, however they had had to stop his blood pressure medications a week ago due to having lower blood pressures. He is not been having any other associated symptoms and was concerned primarily because he struck his chest but then also because his chin would not stop bleeding. Chest x-ray is stable, he is not anemic, white count is normal, his electrolytes are normal, troponin is normal, glucose is 115.   His magnesium is normal, we will did have a very mildly prolonged QT interval.  I discussed possible observation with him in the

## 2020-12-20 NOTE — ED NOTES
Discharge instructions reviewed with pt and verbalizes understanding.      Hakeem Hill RN  12/19/20 [id number]

## 2020-12-21 ENCOUNTER — TELEPHONE (OUTPATIENT)
Dept: INTERNAL MEDICINE CLINIC | Age: 65
End: 2020-12-21

## 2020-12-21 NOTE — TELEPHONE ENCOUNTER
[facility] Initial Follow Up Call    Outreach made within 2 business days of discharge: Yes    Patient: Lucian Bustos Patient : 1955   MRN: [id number]  Reason for Admission: There are no discharge diagnoses documented for the most recent discharge. Discharge Date: 20       Spoke with: Patient     Discharge department/facility: ED     TCM Interactive Patient Contact:  Was patient able to fill all prescriptions: Yes  Was patient instructed to bring all medications to the follow-up visit: Yes  Is patient taking all medications as directed in the discharge summary? Yes  Does patient understand their discharge instructions: Yes  Does patient have questions or concerns that need addressed prior to 7-14 day follow up office visit: no    Patient has a follow up with us and cardiology.      Follow Up  Future Appointments   Date Time Provider Petra Albert   2020  9:00 AM Tasneem Newman MD [facility]   2021 11:20 AM Alysa England MD [facility]   2021  2:00 PM Eladia Urbina MD [facility]  [facility]       [facility]

## 2020-12-22 ENCOUNTER — OFFICE VISIT (OUTPATIENT)
Dept: INTERNAL MEDICINE CLINIC | Age: 65
End: 2020-12-22
Payer: MEDICARE

## 2020-12-22 VITALS
BODY MASS INDEX: 29.98 KG/M2 | HEART RATE: 83 BPM | OXYGEN SATURATION: 99 % | SYSTOLIC BLOOD PRESSURE: 178 MMHG | WEIGHT: 203 LBS | DIASTOLIC BLOOD PRESSURE: 100 MMHG

## 2020-12-22 LAB
EKG ATRIAL RATE: 78 BPM
EKG DIAGNOSIS: NORMAL
EKG P AXIS: -26 DEGREES
EKG P-R INTERVAL: 180 MS
EKG Q-T INTERVAL: 446 MS
EKG QRS DURATION: 90 MS
EKG QTC CALCULATION (BAZETT): 508 MS
EKG R AXIS: 2 DEGREES
EKG T AXIS: 103 DEGREES
EKG VENTRICULAR RATE: 78 BPM

## 2020-12-22 PROCEDURE — 99214 OFFICE O/P EST MOD 30 MIN: CPT | Performed by: INTERNAL MEDICINE

## 2020-12-22 RX ORDER — ATORVASTATIN CALCIUM 20 MG/1
20 TABLET, FILM COATED ORAL NIGHTLY
Qty: 30 TABLET | Refills: 3 | Status: SHIPPED | OUTPATIENT
Start: 2020-12-22 | End: 2021-02-24 | Stop reason: SDUPTHER

## 2020-12-22 RX ORDER — CYCLOBENZAPRINE HCL 10 MG
10 TABLET ORAL 3 TIMES DAILY PRN
Qty: 90 TABLET | Refills: 3 | Status: SHIPPED | OUTPATIENT
Start: 2020-12-22 | End: 2021-02-24 | Stop reason: SDUPTHER

## 2020-12-22 RX ORDER — LEVOTHYROXINE SODIUM 0.1 MG/1
TABLET ORAL
Qty: 30 TABLET | Refills: 0 | Status: CANCELLED | OUTPATIENT
Start: 2020-12-22

## 2020-12-22 RX ORDER — FAMOTIDINE 10 MG
10 TABLET ORAL 2 TIMES DAILY PRN
Qty: 60 TABLET | Refills: 3 | Status: SHIPPED | OUTPATIENT
Start: 2020-12-22 | End: 2021-02-24 | Stop reason: SDUPTHER

## 2020-12-22 RX ORDER — CETIRIZINE HYDROCHLORIDE 10 MG/1
TABLET ORAL
Qty: 30 TABLET | Refills: 3 | Status: SHIPPED | OUTPATIENT
Start: 2020-12-22 | End: 2021-03-25

## 2020-12-22 RX ORDER — LISINOPRIL 5 MG/1
5 TABLET ORAL DAILY
Qty: 30 TABLET | Refills: 0 | Status: SHIPPED | OUTPATIENT
Start: 2020-12-22 | End: 2020-12-24 | Stop reason: SDUPTHER

## 2020-12-22 RX ORDER — DULOXETIN HYDROCHLORIDE 60 MG/1
60 CAPSULE, DELAYED RELEASE ORAL DAILY
Qty: 30 CAPSULE | Refills: 3 | Status: SHIPPED | OUTPATIENT
Start: 2020-12-22 | End: 2021-02-24 | Stop reason: SDUPTHER

## 2020-12-22 RX ORDER — FINASTERIDE 5 MG/1
5 TABLET, FILM COATED ORAL DAILY
Qty: 30 TABLET | Refills: 3 | Status: SHIPPED | OUTPATIENT
Start: 2020-12-22 | End: 2021-02-24 | Stop reason: SDUPTHER

## 2020-12-22 RX ORDER — TAMSULOSIN HYDROCHLORIDE 0.4 MG/1
0.4 CAPSULE ORAL DAILY
Qty: 30 CAPSULE | Refills: 3 | Status: SHIPPED | OUTPATIENT
Start: 2020-12-22 | End: 2021-02-24 | Stop reason: SDUPTHER

## 2020-12-22 RX ORDER — LEVOTHYROXINE SODIUM 112 UG/1
112 TABLET ORAL DAILY
Qty: 30 TABLET | Refills: 3 | Status: SHIPPED | OUTPATIENT
Start: 2020-12-22 | End: 2021-03-25 | Stop reason: SDUPTHER

## 2020-12-22 ASSESSMENT — PATIENT HEALTH QUESTIONNAIRE - PHQ9
SUM OF ALL RESPONSES TO PHQ QUESTIONS 1-9: 0
SUM OF ALL RESPONSES TO PHQ9 QUESTIONS 1 & 2: 0
2. FEELING DOWN, DEPRESSED OR HOPELESS: 0
1. LITTLE INTEREST OR PLEASURE IN DOING THINGS: 0

## 2020-12-22 NOTE — PROGRESS NOTES
times. # Chantix helped him quit smoking 4 days before CABG. # Patient was in the ED on 5/28 for acute onset SOB. New diagnosis of CHF. EKG wo acute ischemia. Neg Mainor. BNP elevated. CT showed no PE but did show pulmonary edema (high d-dimer). COVID test negative. He was hypoxic, requiring 4L O2 however patient declined admission.   He is not having worsening SOB, but at his baseline. He was given in the ED Lasix IV 40mg once.   First time I met patient in June I prescribed Lasix 20mg QD. He reports his SOB improved over 70% with the Lasix. TTE done 6/23/2020 shows e/o mixed systolic and diastolic heart failure. He has never had LHC done. Patient denies alcohol abuse. He developed pre-renal JOSE which resolved after Lasix was held for 1 week (Cr up to 1.4 in June then down to 1.0 with GFR > 60 in July). He has not had ischemic work up done in the past.   # Takes Pepcid for GERD. Helping. He was on Zantac in the past but I discontinued it due to recall. No heartburn or reflux or nausea.   # HTN: stable and controlled. No CP, palpitations, dizziness, or light-headedness.   # Takes Synthroid. No symptoms of hyper or hypothyroidism. TSH was elevated in Nov 2020 during hospital stay. Synthroid dose was not changed. He continues to be on Synthroid 100mcg. # Takes Zyrtec for allergic rhinitis. This is helping. Tolerating well. He is also using Flonase PRN. No coughing or sneezing.   # Takes Finasteride and Flomax for BPH. Symptoms stable on these meds. Tolerating meds. No LUTS.   # Takes Cymbalta and Flexeril for chronic back pain. Both helping. No side effects such as drowsiness.   # Smokes cigarettes. He is trying to quit smoking. He bought OTC patches which have not been helping. Nicorette gum has also not been helping.   # Uses Albuterol and Symbicort for COPD, as well as recently added Spiriva. 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 # High MCV on recent labs. # As per patient he should have had his gall bladder removed however due to pandemic restrictions he had to hold off.   # He takes ASA for PAD. No bleeding or bruising on ASA. No h/o CAD or ischemic stroke. # ? H/o dyslipidemia. Not on statin therapy. He does have RA however. # Patient's mother was diabetic. His BG was high in ER in May. Prediabetic based on A1C drawn in June.   # Patient is having ED over the past few months.   # Has lesion on L side of face. Sun-exposed area. Growing over the past 2 months. Sent him to see Derm.      Drives a semi-truck       Health maintenance:   Health Maintenance Due   Topic Date Due    AAA screen  1955    Hepatitis C screen  1955    Colon cancer screen colonoscopy  10/26/2004    Shingles Vaccine (2 of 3) 04/02/2018    Pneumococcal 65+ years Vaccine (1 of 1 - PPSV23) 10/26/2019    Annual Wellness Visit (AWV)  06/01/2020    Flu vaccine (1) 09/01/2020       Past Medical History:  Past Medical History:   Diagnosis Date    CAD (coronary artery disease)     Dr. Jannet Meigs COPD (chronic obstructive pulmonary disease) ([facility] [address]     No pulmonologist - follows with PCP    H/O cardiac catheterization 09/24/2020     Severe calcified multivessel CAD with LV dysfuntion, PCWP is 12, CABG consult.  H/O echocardiogram 06/23/2020     Mild concentric LVH with moderate systolic impairment. EF is 40-45 % .  Hyperlipidemia     Hypertension     Follows with PCP    IBS (irritable bowel syndrome)     Pancreatitis 2013    Rheumatoid arthritis ([facility] [address]     S/P CABG x 3 10/19/2020    Thyroid disease        Past Surgical History:  Past Surgical History:   Procedure Laterality Date    CARDIAC CATHETERIZATION  09/24/2020     Severe calcified multivessel CAD with LV dysfuntion, PCWP is 12, CABG consult.     COLONOSCOPY  2017    [facility] [place], [place])    CORONARY ARTERY BYPASS GRAFT N/A 10/19/2020    CABG CORONARY ARTERY BYPASS x3 WITH LIMA, INTRAOP BETH, ENDOHARVEST OF THE LEFT SAPHENOUS VEIN performed by Isabelle Hernandez MD at [facility], DIAGNOSTIC  2017    Normal exam per pt (done in [place], [place])       Allergies:  No Known Allergies    Medications:  Current outpatient prescriptions:  Outpatient Medications Marked as Taking for the 12/22/20 encounter (Office Visit) with Dinesh Willis MD   Medication Sig Dispense Refill    atorvastatin (LIPITOR) 20 MG tablet Take 1 tablet by mouth nightly 30 tablet 3    finasteride (PROSCAR) 5 MG tablet Take 1 tablet by mouth daily 30 tablet 3    tamsulosin (FLOMAX) 0.4 MG capsule Take 1 capsule by mouth daily 30 capsule 3    lisinopril (PRINIVIL;ZESTRIL) 5 MG tablet Take 1 tablet by mouth daily 30 tablet 0    cyclobenzaprine (FLEXERIL) 10 MG tablet Take 1 tablet by mouth 3 times daily as needed for Muscle spasms 90 tablet 3    famotidine (PEPCID) 10 MG tablet Take 1 tablet by mouth 2 times daily as needed (reflux) 60 tablet 3    cetirizine (ZYRTEC) 10 MG tablet TAKE 1 TABLET BY MOUTH EVERY DAY 30 tablet 3    DULoxetine (CYMBALTA) 60 MG extended release capsule Take 1 capsule by mouth daily 30 capsule 3    levothyroxine (SYNTHROID) 112 MCG tablet Take 1 tablet by mouth daily 30 tablet 3    SPIRIVA HANDIHALER 18 MCG inhalation capsule INHALE 1 CAPSULE INTO THE LUNGS DAILY 30 capsule 3    aspirin EC 81 MG EC tablet Take 1 tablet by mouth daily 90 tablet 1    albuterol sulfate  (90 Base) MCG/ACT inhaler Inhale 2 puffs into the lungs every 4 hours (while awake) 1 Inhaler 0    Multiple Vitamins-Minerals (THERAPEUTIC MULTIVITAMIN-MINERALS) tablet Take 1 tablet by mouth daily (with breakfast)  0    budesonide-formoterol (SYMBICORT) 160-4.5 MCG/ACT AERO Inhale 2 puffs into the lungs 2 times daily 1 Inhaler 11       Social History:  Social History     Socioeconomic History    Marital status:      Spouse name: Not on file    Number of children: Not on file    Years of education: Not on file   Rony Younger Highest education level: Not on file   Occupational History    Not on file   Social Needs    Financial resource strain: Not hard at all    Food insecurity     Worry: Never true     Inability: Never true   [employer] needs     Medical: No     Non-medical: No   Tobacco Use    Smoking status: Former Smoker     Packs/day: 2.00     Years: 52.00     Pack years: 104.00     Types: Cigarettes     Start date:      Quit date: 10/1/2020     Years since quittin.2    Smokeless tobacco: Never Used   Substance and Sexual Activity    Alcohol use: Not Currently    Drug use: Never    Sexual activity: Not on file   Lifestyle    Physical activity     Days per week: Not on file     Minutes per session: Not on file    Stress: Not on file   Relationships    Social connections     Talks on phone: Not on file     Gets together: Not on file     Attends Worship service: Not on file     Active member of club or organization: Not on file     Attends meetings of clubs or organizations: Not on file     Relationship status: Not on file    Intimate partner violence     Fear of current or ex partner: Not on file     Emotionally abused: Not on file     Physically abused: Not on file     Forced sexual activity: Not on file   Other Topics Concern    Not on file   Social History Narrative    Not on file       Family History:  Family History   Problem Relation Age of Onset    Alzheimer's Disease Mother     Heart Disease Father     Heart Attack Father     High Blood Pressure Father     High Cholesterol Father          Review of Systems:  Constitutional: no fevers, no chills, no night sweats, no weight loss, no weight gain, fatigue/weak  Head: no headaches  Ears: no hearing loss, no tinnitus, no vertigo  Eyes: no blurry vision, no diplopia, no dryness, no itchiness  Mouth: no oral ulcers, no dry mouth, no sore throat  Nose: no nasal congestion, no epistaxis  Cardiac: sternal pain from OHS, no palpitations, no leg swelling, no orthopnea, no PND, no syncope  Pulmonary: dyspnea on exertion, no cough, no wheezing, no hemoptysis  GI: no nausea, no vomiting, no diarrhea, no constipation, no abdominal pain, no hematochezia  : no dysuria, no frequency, no urgency, no hematuria, no frothy urine  MSK: no arthralgias, no myalgias, no early morning stiffness, no Raynaud's   Neuro: no focal neurological deficits, no seizures  Sleep: no snoring, no daytime somnolence   Psych: no depression, no anxiety, no suicidal ideation      Physical Exam:  VITALS:   BP (!) 178/100   Pulse 83   Wt 203 lb (92.1 kg)   SpO2 99%   BMI 29.98 kg/m²     PHYSICAL EXAMINATION:  General: alert, awake, and oriented to time, place, person, and situation. Not in acute distress   Skin: sternotomy scar appreciated CDI, sutures on chin CDI  Head: normocephalic/atraumatic  Eyes: anicteric sclera, well-injected conjunctiva. Pupils are equally round and reactive to light. Extraocular movements are intact   Nose: no septal deviation evident  Sinuses: no sinus tenderness  Ears: external ears normal  Neck: supple, no cervical lymphadenopathy, thyroid symmetric and not enlarged, no bruits, no JVD  Heart: regular rate and rhythm, regular S1/S2, no S3/S4, no audible murmurs, no audible friction rub  Lungs: clear to auscultation bilaterally, no audible crackles, no audible wheezes, no audible rhonchi    Abdomen: normal bowel sounds, soft abdomen, non-tender, no palpable masses  Extremities: 1-2+ pitting edema BLE, warm, no cyanosis, no clubbing. Good capillary refill   MSK: no tenderness across spinous processes, full ROM in all 4 extremities. No joint swelling or tenderness   Peripheral vascular: 2+ pulses symmetric (radial)  Neuro: gait normal, CN II-XII intact, motor power 5/5 in all 4 extremities, sensation intact and symmetric    Labs   Reviewed with patient     Imaging   Reviewed with patient      Assessment/Plan:     1.  Essential hypertension  Patient having fluctuating BP  He is off all his BP/HfrEF meds at the moment (Amlodipine, Coreg, Lisinopril, Lasix)  Re-introduce low dose Lisinopril 5mg QD, as he has h/o near syncope from hypotension, so will make adjustments slowly. Has peripheral edema and gaining weight but no JVD or pulmonary edema and no signs of ADHF. May need low dose Lasix 20mg QD or QOD but will hold off for now   Will return in 2 days for suture removal so will recheck BP   Unlikely other meds causing orthostatic hypotension as the remaining are all chronic     2. Acquired hypothyroidism  TSH elevated Nov 2020 during hospitalization. Synthroid not addressed at that time? Increase Synthroid to 112mcg QD  Recheck in 2-3 months     3. Mixed hyperlipidemia  ,  in June 2020  Continue Atorvastatin 20mg QD    4. Coronary artery disease involving native coronary artery of native heart with other form of angina pectoris [facility])  S/p CABG 10/2020  Continue ASA & statin  Off BB at the moment due to hypotension. 5. Chronic bilateral low back pain without sciatica  Stable   Continue Flexeril 10mg TID PRN   Continue Cymbalta 60mg QD    6. Benign prostatic hyperplasia, unspecified whether lower urinary tract symptoms present  Stable  Continue Flomax and Proscar     7. Seasonal allergic rhinitis due to pollen  Stable  Continue Zyrtec       Care discussed with patient and questions answered. Patient verbalizes understanding and agrees with plan. Discussed with patient the importance of continuity of care. I encouraged patient to schedule next appointment within 2 days for suture removal with me. Patient prefers to be reached by Phone call at [phone] for future medical correspondence. Encouraged to activate [employer]. I reviewed and reconciled the medications this visit. I reviewed and updated the past medical, surgical, social, and family history during this visit. After visit summary provided.        Quinten Munoz MD  Internal Medicine  12/22/2020   9:41 AM

## 2020-12-24 ENCOUNTER — OFFICE VISIT (OUTPATIENT)
Dept: INTERNAL MEDICINE CLINIC | Age: 65
End: 2020-12-24
Payer: MEDICARE

## 2020-12-24 VITALS
BODY MASS INDEX: 29.09 KG/M2 | DIASTOLIC BLOOD PRESSURE: 60 MMHG | OXYGEN SATURATION: 94 % | TEMPERATURE: 97.7 F | RESPIRATION RATE: 20 BRPM | HEART RATE: 76 BPM | SYSTOLIC BLOOD PRESSURE: 108 MMHG | WEIGHT: 197 LBS

## 2020-12-24 PROCEDURE — 99214 OFFICE O/P EST MOD 30 MIN: CPT | Performed by: INTERNAL MEDICINE

## 2020-12-24 RX ORDER — LISINOPRIL 5 MG/1
5 TABLET ORAL DAILY
Qty: 30 TABLET | Refills: 3 | Status: SHIPPED | OUTPATIENT
Start: 2020-12-24 | End: 2021-02-24 | Stop reason: SDUPTHER

## 2020-12-24 ASSESSMENT — PATIENT HEALTH QUESTIONNAIRE - PHQ9
SUM OF ALL RESPONSES TO PHQ QUESTIONS 1-9: 0
SUM OF ALL RESPONSES TO PHQ QUESTIONS 1-9: 0
1. LITTLE INTEREST OR PLEASURE IN DOING THINGS: 0
SUM OF ALL RESPONSES TO PHQ QUESTIONS 1-9: 0
SUM OF ALL RESPONSES TO PHQ9 QUESTIONS 1 & 2: 0
2. FEELING DOWN, DEPRESSED OR HOPELESS: 0

## 2020-12-24 NOTE — PROGRESS NOTES
12/24/20    Heather Mckeon  1955    Chief Complaint   Patient presents with    Suture / Staple Removal     chin    Edema     bilateral lower ext. x 2 weeks    Medication Refill       History of Present Illness:  Heather Mckeon is a 76 y.o. pleasant gentleman presenting today with a chief complaint of suture removal, HTN, peripheral edema. He has a past medical history significant for:  HTN, on Lisinopril 5mg QD   HL (,  on 6/23/2020), on Atorvastatin 20mg  Prediabetes (HbA1C 6.3% on 9/23/2020), not on meds  CAD s/p CABG (10/2020)  HFrEF/HFpEF (EF 15-73%, diastolic dysfunction, mild MR on 6/23/2020), off Lisinopril, Metoprolol, Lasix   Hypothyroidism (TSH 7.6 high on 11/9/2020), on Synthroid 100mcg QD  PAD, on ASA   GERD, on Pepcid 20mg BID   Allergic rhinitis, on Zyrtec, Flonase   BPH, on Flomax, Finasteride   COPD, on Albuterol PRN, Symbicort BID, Spiriva QD   Chronic back pain, on Cymbalta 60mg QHS, Flexeril 10mg BID PRN   IBS-mixed, on Bentyl QID   H/o C diff (10/2020)  Juvenile RA   Rheumatic fever   Former smoker (10/2020)     Suture Removal: Patient here for suture removal. he obtained a laceration on chin 5 days ago. He was seen at ER. He had 5 suture (s). Mechanism of injury: Fall. His  last tetanus  was 1 year(s) ago. # Patient was admitted 9/23-9/24/2020 for hypotension and bradycardia following C. He had N/V and was hypovolemic. He was thought to be in cardiogenic shock s/p dopamine gtt. He also received IVF resuscitation. He was discharged home ambulating, in a stable condition. LHC showed multi-vessel CAD. He is s/p CABG in Oct 2020. Following with Dr. Junior Story outpatient. He is s/p cardiac rehab  He also had C diff s/p PO Vanc. Continues to have sternal pain. Was taking Tramadol and has run out. [place] previously reviewed. He has not had prescription from me. # Patient was in ER on 12/19 for near syncope and chin laceration s/p sutured. QTc was 508 ms.  Patient Tolerating meds. No LUTS.   # Takes Cymbalta and Flexeril for chronic back pain. Both helping. No side effects such as drowsiness.   # Smokes cigarettes. He is trying to quit smoking. He bought OTC patches which have not been helping. Nicorette gum has also not been helping.   # Uses Albuterol and Symbicort for COPD, as well as recently added Spiriva. 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 # High MCV on recent labs. # As per patient he should have had his gall bladder removed however due to pandemic restrictions he had to hold off.   # He takes ASA for PAD. No bleeding or bruising on ASA. No h/o CAD or ischemic stroke. # ? H/o dyslipidemia. Not on statin therapy. He does have RA however. # Patient's mother was diabetic. His BG was high in ER in May. Prediabetic based on A1C drawn in June.   # Patient is having ED over the past few months.   # Has lesion on L side of face. Sun-exposed area. Growing over the past 2 months. Sent him to see Derm.      Drives a semi-truck       Health maintenance:   Health Maintenance Due   Topic Date Due    AAA screen  1955    Hepatitis C screen  1955    Colon cancer screen colonoscopy  10/26/2004    Shingles Vaccine (2 of 3) 04/02/2018    Pneumococcal 65+ years Vaccine (1 of 1 - PPSV23) 10/26/2019    Annual Wellness Visit (AWV)  06/01/2020    Flu vaccine (1) 09/01/2020       Past Medical History:  Past Medical History:   Diagnosis Date    CAD (coronary artery disease)     Dr. Randi Espinosa COPD (chronic obstructive pulmonary disease) ([facility] [address]     No pulmonologist - follows with PCP    H/O cardiac catheterization 09/24/2020     Severe calcified multivessel CAD with LV dysfuntion, PCWP is 12, CABG consult.  H/O echocardiogram 06/23/2020     Mild concentric LVH with moderate systolic impairment. EF is 40-45 % .     H/O exercise stress test 12/16/2020    Submaximal study due to failure to achieve target heart rate daily 90 tablet 1    albuterol sulfate  (90 Base) MCG/ACT inhaler Inhale 2 puffs into the lungs every 4 hours (while awake) 1 Inhaler 0    Multiple Vitamins-Minerals (THERAPEUTIC MULTIVITAMIN-MINERALS) tablet Take 1 tablet by mouth daily (with breakfast)  0    budesonide-formoterol (SYMBICORT) 160-4.5 MCG/ACT AERO Inhale 2 puffs into the lungs 2 times daily 1 Inhaler 11       Social History:  Social History     Socioeconomic History    Marital status:      Spouse name: Not on file    Number of children: Not on file    Years of education: Not on file    Highest education level: Not on file   Occupational History    Not on file   Social Needs    Financial resource strain: Not hard at all   [employer] insecurity     Worry: Never true     Inability: Never true   [employer] needs     Medical: No     Non-medical: No   Tobacco Use    Smoking status: Former Smoker     Packs/day: 2.00     Years: 52.00     Pack years: 104.00     Types: Cigarettes     Start date:      Quit date: 10/1/2020     Years since quittin.2    Smokeless tobacco: Never Used   Substance and Sexual Activity    Alcohol use: Not Currently    Drug use: Never    Sexual activity: Not on file   Lifestyle    Physical activity     Days per week: Not on file     Minutes per session: Not on file    Stress: Not on file   Relationships    Social connections     Talks on phone: Not on file     Gets together: Not on file     Attends Gnosticist service: Not on file     Active member of club or organization: Not on file     Attends meetings of clubs or organizations: Not on file     Relationship status: Not on file    Intimate partner violence     Fear of current or ex partner: Not on file     Emotionally abused: Not on file     Physically abused: Not on file     Forced sexual activity: Not on file   Other Topics Concern    Not on file   Social History Narrative    Not on file       Family History:  Family History   Problem Relation Age of Onset    Alzheimer's Disease Mother     Heart Disease Father     Heart Attack Father     High Blood Pressure Father     High Cholesterol Father          Review of Systems:  Constitutional: no fevers, no chills, no night sweats, no weight loss, no weight gain, no fatigue   Pain assessment: sternal pain from OHS  Head: no headaches  Ears: no hearing loss, no tinnitus, no vertigo  Eyes: no blurry vision, no diplopia, no dryness, no itchiness  Mouth: no oral ulcers, no dry mouth, no sore throat  Nose: no nasal congestion, no epistaxis  Cardiac: no chest pain, no palpitations, no leg swelling, no orthopnea, no PND, no syncope  Pulmonary: no dyspnea, no cough, no wheezing, no hemoptysis  GI: no nausea, no vomiting, no diarrhea, no constipation, no abdominal pain, no hematochezia  : no dysuria, no frequency, no urgency, no hematuria, no frothy urine  MSK: no arthralgias, no myalgias, no early morning stiffness, no Raynaud's   Neuro: no focal neurological deficits, no seizures  Sleep: no snoring, no daytime somnolence   Psych: no depression, no anxiety, no suicidal ideation      Physical Exam:  VITALS:   /60   Pulse 76   Temp 97.7 °F (36.5 °C) (Oral)   Resp 20   Wt 197 lb (89.4 kg)   SpO2 94%   BMI 29.09 kg/m²     PHYSICAL EXAMINATION:  General: alert, awake, and oriented to time, place, person, and situation. Not in acute distress   Skin: sternotomy scar appreciated CDI, sutures on chin CDI  Head: normocephalic/atraumatic  Eyes: anicteric sclera, well-injected conjunctiva. Pupils are equally round and reactive to light.  Extraocular movements are intact   Nose: no septal deviation evident  Sinuses: no sinus tenderness  Ears: external ears normal  Neck: supple, no cervical lymphadenopathy, thyroid symmetric and not enlarged, no bruits, no JVD  Heart: regular rate and rhythm, regular S1/S2, no S3/S4, no audible murmurs, no audible friction rub  Lungs: clear to auscultation bilaterally, no

## 2021-01-07 ENCOUNTER — OFFICE VISIT (OUTPATIENT)
Dept: CARDIOLOGY CLINIC | Age: 66
End: 2021-01-07
Payer: MEDICARE

## 2021-01-07 VITALS
WEIGHT: 201.8 LBS | HEART RATE: 80 BPM | HEIGHT: 69 IN | BODY MASS INDEX: 29.89 KG/M2 | DIASTOLIC BLOOD PRESSURE: 80 MMHG | SYSTOLIC BLOOD PRESSURE: 126 MMHG

## 2021-01-07 DIAGNOSIS — Z95.1 S/P CABG X 3: Primary | ICD-10-CM

## 2021-01-07 PROCEDURE — 99213 OFFICE O/P EST LOW 20 MIN: CPT | Performed by: INTERNAL MEDICINE

## 2021-01-07 RX ORDER — FUROSEMIDE 20 MG/1
20 TABLET ORAL 2 TIMES DAILY
Qty: 60 TABLET | Refills: 3 | Status: SHIPPED | OUTPATIENT
Start: 2021-01-07 | End: 2021-02-24

## 2021-01-07 NOTE — PROGRESS NOTES
 High Cholesterol Father      Social History     Tobacco Use    Smoking status: Former Smoker     Packs/day: 2.00     Years: 52.00     Pack years: 104.00     Types: Cigarettes     Start date:      Quit date: 10/1/2020     Years since quittin.2    Smokeless tobacco: Never Used   Substance Use Topics    Alcohol use: Not Currently      [unfilled]  Review of Systems:   · Constitutional: No Fever or Weight Loss   · Eyes: No Decreased Vision  · ENT: No Headaches, Hearing Loss or Vertigo  · Cardiovascular: No chest pain, dyspnea on exertion, palpitations or loss of consciousness  · Respiratory: No cough or wheezing    · Gastrointestinal: No abdominal pain, appetite loss, blood in stools, constipation, diarrhea or heartburn  · Genitourinary: No dysuria, trouble voiding, or hematuria  · Musculoskeletal:  No gait disturbance, weakness or joint complaints  · Integumentary: No rash or pruritis  · Neurological: No TIA or stroke symptoms  · Psychiatric: No anxiety or depression  · Endocrine: No malaise, fatigue or temperature intolerance  · Hematologic/Lymphatic: No bleeding problems, blood clots or swollen lymph nodes  · Allergic/Immunologic: No nasal congestion or hives  All systems negative except as marked. Objective:  /80   Pulse 80   Ht 5' 9\" (1.753 m)   Wt 201 lb 12.8 oz (91.5 kg)   BMI 29.80 kg/m²   Wt Readings from Last 3 Encounters:   21 201 lb 12.8 oz (91.5 kg)   20 197 lb (89.4 kg)   20 203 lb (92.1 kg)     Body mass index is 29.8 kg/m². GENERAL - Alert, oriented, pleasant, in no apparent distress,normal grooming  HEENT - pupils are intact, cornea intact, conjunctive normal color, ears are normal in exam,  Neck - Supple. No jugular venous distention noted. No carotid bruits, no apical -carotid delay  Respiratory:  Normal breath sounds, No respiratory distress, No wheezing, No chest tenderness. ,no use of accessory muscles, diaphragm movement is normal  Cardiovascular: (PMI) apex non displaced,no lifts no thrills, no s3,no s4, Normal heart rate, Normal rhythm, No murmurs, No rubs, No gallops. Carotid arteries pulse and amplitude are normal no bruit, no abdominal bruit noted ( normal abdominal aorta ausculation),   Extremities - No cyanosis, clubbing, or significant edema, no varicose veins    Abdomen - No masses, tenderness, or organomegaly, no hepato-splenomegally, no bruits  Musculoskeletal - No significant edema, no kyphosis or scoliosis, no deformity in any extremity noted, muscle strength and tone are normal  Skin: no ulcer,no scar,no stasis dermatitis   Neurologic - alert oriented times 3,Cranial nerves II through XII are grossly intact. There were no gross focal neurologic abnormalities. Psychiatric: normal mood and affect    No results found for: CKTOTAL, CKMB, CKMBINDEX, TROPONINI  BNP:  No results found for: BNP  PT/INR:  No results found for: PTINR  Lab Results   Component Value Date    LABA1C 6.3 10/12/2020    LABA1C 6.3 09/23/2020     Lab Results   Component Value Date    CHOL 155 06/23/2020    TRIG 173 (H) 06/23/2020    HDL 30 (L) 06/23/2020    LDLDIRECT 106 (H) 06/23/2020     Lab Results   Component Value Date    ALT 35 12/19/2020    AST 25 12/19/2020     TSH:  No results found for: TSH    Impression:  Trudi Sr is a 76 y. o.year old who  has a past medical history of CAD (coronary artery disease), COPD (chronic obstructive pulmonary disease) ([facility] [address] H/O cardiac catheterization, H/O echocardiogram, H/O exercise stress test, Hyperlipidemia, Hypertension, IBS (irritable bowel syndrome), Pancreatitis, Rheumatoid arthritis ([facility] [address] S/P CABG x 3, and Thyroid disease. and presents with     Plan:  1. Hypotension: resolved, off  lasix, norvasc coreg,he is on lisinopril now, will add low dose toprol xl if blood pressure is high again  2.  Chest pain with cough and muscle tenderness: ok to use OTC PAIN MEDICATIONS, he has some leg pain at the site of venous stripping at left leg too.  3. CAD:s/o CABG X3 Continue aspirin , continue statins, continue ACEinhibitors hold  betablockers,had  STRESS TEST AND he is not interested in [address]  4. Ok to start work  5. Dyslipidemia: continue statins,   6. Chf: no evidence of heart failure, add low dose lasix, will continue  lisinopril   7. maintenance: exerise and diet  All labs, medications and tests reviewed, continue all other medications of all above medical condition listed as is.     [unfilled]

## 2021-01-07 NOTE — LETTER
Anuj Patricio [facility]  1955  [id number]    Have you had any Chest Pain that is not new? - Yes  If Yes DO EKG - How does it feel - Sharp Pain   How long does the pain last - 6 All Day    How long have you been having the pain - Months   Did you take a none   And did it relieve the pain - No    ? DO EKG IF: Patient has a Heart Rate above 100 or below 40     CAD (Coronary Artery Disease) patient should have one on file every 6 months        Have you had any Shortness of Breath - No      Have you had any dizziness - No      ? Sitting wait 5 minutes do supine (laying down) wait 5 minutes then do standing - log each in \"vitals\" area in Epic  ? Be sure to ask what symptoms they are having if they get dizzy while completing ortho stats such as room spinning, nausea, etc.    Have you had any palpitations that are not new? - No    Is the patient on any of the following medications -   If Yes DO EKG - Needs done every 6 months    Do you have any edema - swelling in legs    If Yes - CHECK TO SEE IF THE EDEMA IS PITTING  How long have they been having edema - Months  If Yes - Have they worn compression stockings No      Do you have a surgery or procedure scheduled in the near future -       ? Ask patient if they want to sign [facility] for [facility] if they are not already signed up    ? Check to see if we have an E-MAIL on file for the patient    ? Check medication list thoroughly!!! AND RECONCILE OUTSIDE MEDICATIONS  If dose has changed change the entire order not just the MG  BE SURE TO ASK PATIENT IF THEY NEED MEDICATION REFILLS    ?  At check out add to every patient's \"wrap up\" the following dot phrase AFTERHOURSEDUCATION and ensure we explain this to our patients

## 2021-01-07 NOTE — LETTER
[address]  [address] [id number]  Phone: [phone]  Fax: [phone]    Kelsey Harding MD        January 7, 2021     Patient: Melvin Garcia   YOB: 1955   Date of Visit: 1/7/2021       To Whom It May Concern: It is my medical opinion that Melvin Garcia may return to participation at work on 2/1/21. If you have any questions or concerns, please don't hesitate to call.     Sincerely,        Kelsey Harding MD

## 2021-01-15 ENCOUNTER — TELEPHONE (OUTPATIENT)
Dept: CARDIOLOGY CLINIC | Age: 66
End: 2021-01-15

## 2021-01-21 ENCOUNTER — CARE COORDINATION (OUTPATIENT)
Dept: CARE COORDINATION | Age: 66
End: 2021-01-21

## 2021-01-29 DIAGNOSIS — I25.10 CORONARY ARTERY DISEASE INVOLVING NATIVE CORONARY ARTERY OF NATIVE HEART WITHOUT ANGINA PECTORIS: Primary | ICD-10-CM

## 2021-01-29 DIAGNOSIS — I50.42 CHRONIC COMBINED SYSTOLIC AND DIASTOLIC CONGESTIVE HEART FAILURE (HCC): ICD-10-CM

## 2021-02-02 ENCOUNTER — PROCEDURE VISIT (OUTPATIENT)
Dept: CARDIOLOGY CLINIC | Age: 66
End: 2021-02-02
Payer: MEDICARE

## 2021-02-02 DIAGNOSIS — I50.42 CHRONIC COMBINED SYSTOLIC AND DIASTOLIC CONGESTIVE HEART FAILURE (HCC): Primary | ICD-10-CM

## 2021-02-02 DIAGNOSIS — I25.10 CORONARY ARTERY DISEASE INVOLVING NATIVE CORONARY ARTERY OF NATIVE HEART WITHOUT ANGINA PECTORIS: ICD-10-CM

## 2021-02-02 PROCEDURE — 93308 TTE F-UP OR LMTD: CPT | Performed by: INTERNAL MEDICINE

## 2021-02-04 ENCOUNTER — TELEPHONE (OUTPATIENT)
Dept: CARDIOLOGY CLINIC | Age: 66
End: 2021-02-04

## 2021-02-04 NOTE — TELEPHONE ENCOUNTER
Notified pt of results. Summary   Left ventricular systolic function is mildly depressed with an ejection   fraction of 45-50%. Hypokinesis of the inferior basal wall segments. Mild concentric left ventricular hypertrophy.

## 2021-02-24 ENCOUNTER — OFFICE VISIT (OUTPATIENT)
Dept: INTERNAL MEDICINE CLINIC | Age: 66
End: 2021-02-24
Payer: MEDICARE

## 2021-02-24 VITALS
SYSTOLIC BLOOD PRESSURE: 130 MMHG | HEART RATE: 52 BPM | BODY MASS INDEX: 30.36 KG/M2 | OXYGEN SATURATION: 97 % | WEIGHT: 205 LBS | TEMPERATURE: 97.8 F | DIASTOLIC BLOOD PRESSURE: 80 MMHG | HEIGHT: 69 IN

## 2021-02-24 DIAGNOSIS — E03.9 ACQUIRED HYPOTHYROIDISM: ICD-10-CM

## 2021-02-24 DIAGNOSIS — N40.0 BENIGN PROSTATIC HYPERPLASIA, UNSPECIFIED WHETHER LOWER URINARY TRACT SYMPTOMS PRESENT: ICD-10-CM

## 2021-02-24 DIAGNOSIS — M54.50 CHRONIC BILATERAL LOW BACK PAIN WITHOUT SCIATICA: ICD-10-CM

## 2021-02-24 DIAGNOSIS — J43.1 PANLOBULAR EMPHYSEMA (HCC): ICD-10-CM

## 2021-02-24 DIAGNOSIS — I25.118 CORONARY ARTERY DISEASE INVOLVING NATIVE CORONARY ARTERY OF NATIVE HEART WITH OTHER FORM OF ANGINA PECTORIS (HCC): ICD-10-CM

## 2021-02-24 DIAGNOSIS — I10 ESSENTIAL HYPERTENSION: Primary | ICD-10-CM

## 2021-02-24 DIAGNOSIS — E78.2 MIXED HYPERLIPIDEMIA: ICD-10-CM

## 2021-02-24 DIAGNOSIS — R60.9 PERIPHERAL EDEMA: ICD-10-CM

## 2021-02-24 DIAGNOSIS — G89.29 CHRONIC BILATERAL LOW BACK PAIN WITHOUT SCIATICA: ICD-10-CM

## 2021-02-24 PROBLEM — R60.0 PERIPHERAL EDEMA: Status: ACTIVE | Noted: 2021-02-24

## 2021-02-24 PROCEDURE — G8484 FLU IMMUNIZE NO ADMIN: HCPCS | Performed by: INTERNAL MEDICINE

## 2021-02-24 PROCEDURE — 99214 OFFICE O/P EST MOD 30 MIN: CPT | Performed by: INTERNAL MEDICINE

## 2021-02-24 PROCEDURE — 1123F ACP DISCUSS/DSCN MKR DOCD: CPT | Performed by: INTERNAL MEDICINE

## 2021-02-24 PROCEDURE — 4040F PNEUMOC VAC/ADMIN/RCVD: CPT | Performed by: INTERNAL MEDICINE

## 2021-02-24 PROCEDURE — G8427 DOCREV CUR MEDS BY ELIG CLIN: HCPCS | Performed by: INTERNAL MEDICINE

## 2021-02-24 PROCEDURE — 3017F COLORECTAL CA SCREEN DOC REV: CPT | Performed by: INTERNAL MEDICINE

## 2021-02-24 PROCEDURE — G8926 SPIRO NO PERF OR DOC: HCPCS | Performed by: INTERNAL MEDICINE

## 2021-02-24 PROCEDURE — 1036F TOBACCO NON-USER: CPT | Performed by: INTERNAL MEDICINE

## 2021-02-24 PROCEDURE — 3023F SPIROM DOC REV: CPT | Performed by: INTERNAL MEDICINE

## 2021-02-24 PROCEDURE — G8417 CALC BMI ABV UP PARAM F/U: HCPCS | Performed by: INTERNAL MEDICINE

## 2021-02-24 RX ORDER — ALBUTEROL SULFATE 90 UG/1
2 AEROSOL, METERED RESPIRATORY (INHALATION)
Qty: 1 INHALER | Refills: 0 | Status: SHIPPED | OUTPATIENT
Start: 2021-02-24 | End: 2021-04-15 | Stop reason: SDUPTHER

## 2021-02-24 RX ORDER — LISINOPRIL 5 MG/1
5 TABLET ORAL DAILY
Qty: 30 TABLET | Refills: 3 | Status: SHIPPED | OUTPATIENT
Start: 2021-02-24 | End: 2021-03-25

## 2021-02-24 RX ORDER — DULOXETIN HYDROCHLORIDE 60 MG/1
60 CAPSULE, DELAYED RELEASE ORAL DAILY
Qty: 30 CAPSULE | Refills: 3 | Status: SHIPPED | OUTPATIENT
Start: 2021-02-24 | End: 2021-03-25 | Stop reason: SDUPTHER

## 2021-02-24 RX ORDER — ATORVASTATIN CALCIUM 20 MG/1
20 TABLET, FILM COATED ORAL NIGHTLY
Qty: 30 TABLET | Refills: 3 | Status: SHIPPED | OUTPATIENT
Start: 2021-02-24 | End: 2021-03-25 | Stop reason: SDUPTHER

## 2021-02-24 RX ORDER — FAMOTIDINE 10 MG
10 TABLET ORAL 2 TIMES DAILY PRN
Qty: 60 TABLET | Refills: 3 | Status: SHIPPED | OUTPATIENT
Start: 2021-02-24 | End: 2021-03-25 | Stop reason: SDUPTHER

## 2021-02-24 RX ORDER — FINASTERIDE 5 MG/1
5 TABLET, FILM COATED ORAL DAILY
Qty: 30 TABLET | Refills: 3 | Status: SHIPPED | OUTPATIENT
Start: 2021-02-24 | End: 2021-03-25 | Stop reason: SDUPTHER

## 2021-02-24 RX ORDER — BUDESONIDE AND FORMOTEROL FUMARATE DIHYDRATE 160; 4.5 UG/1; UG/1
2 AEROSOL RESPIRATORY (INHALATION) 2 TIMES DAILY
Qty: 1 INHALER | Refills: 11 | Status: SHIPPED | OUTPATIENT
Start: 2021-02-24 | End: 2021-03-11

## 2021-02-24 RX ORDER — TIOTROPIUM BROMIDE 18 UG/1
18 CAPSULE ORAL; RESPIRATORY (INHALATION) DAILY
Qty: 30 CAPSULE | Refills: 3 | Status: SHIPPED | OUTPATIENT
Start: 2021-02-24 | End: 2021-04-15 | Stop reason: SDUPTHER

## 2021-02-24 RX ORDER — CYCLOBENZAPRINE HCL 10 MG
10 TABLET ORAL 3 TIMES DAILY PRN
Qty: 90 TABLET | Refills: 3 | Status: SHIPPED | OUTPATIENT
Start: 2021-02-24 | End: 2021-03-25 | Stop reason: SDUPTHER

## 2021-02-24 RX ORDER — TAMSULOSIN HYDROCHLORIDE 0.4 MG/1
0.4 CAPSULE ORAL DAILY
Qty: 30 CAPSULE | Refills: 3 | Status: SHIPPED | OUTPATIENT
Start: 2021-02-24 | End: 2021-03-25 | Stop reason: SDUPTHER

## 2021-02-24 RX ORDER — FUROSEMIDE 20 MG/1
20 TABLET ORAL DAILY PRN
Qty: 30 TABLET | Refills: 0 | Status: SHIPPED | OUTPATIENT
Start: 2021-02-24 | End: 2021-03-25 | Stop reason: SDUPTHER

## 2021-02-24 ASSESSMENT — PATIENT HEALTH QUESTIONNAIRE - PHQ9
SUM OF ALL RESPONSES TO PHQ9 QUESTIONS 1 & 2: 0
SUM OF ALL RESPONSES TO PHQ QUESTIONS 1-9: 0
2. FEELING DOWN, DEPRESSED OR HOPELESS: 0
SUM OF ALL RESPONSES TO PHQ QUESTIONS 1-9: 0

## 2021-02-24 NOTE — PROGRESS NOTES
Patient has been gaining weightall of a sudden and left leg swollen, red , painful and some swelling in right x 4 days  Has sob at times with walking and activity

## 2021-02-24 NOTE — PROGRESS NOTES
2/24/21    Lester Hays  1955    Chief Complaint   Patient presents with    Other     2 u       History of Present Illness:  Lester Hays is a 76 y.o. pleasant gentleman presenting today with a chief complaint of HTN, peripheral edema, hypothyroidism. He has a past medical history significant for:  HTN, on Lisinopril 5mg QD   HL (,  on 6/23/2020), on Atorvastatin 20mg  Prediabetes (HbA1C 6.3% on 9/23/2020), not on meds  CAD s/p CABG (10/2020), on ASA   HFrEF/HFpEF (EF 82-83%, diastolic dysfunction, mild MR on 6/23/2020; EF up to 45-50% on TTE 2/4/2021), on Lisinopril, off Metoprolol and Lasix   Hypothyroidism (TSH 7.6 high on 11/9/2020), on Synthroid 112mcg QD  PAD, on ASA   GERD, on Pepcid 20mg BID   Allergic rhinitis, on Zyrtec, Flonase   BPH, on Flomax, Finasteride   COPD, on Albuterol PRN, Symbicort BID, Spiriva QD   Chronic back pain, on Cymbalta 60mg QHS, Flexeril 10mg BID PRN   IBS-mixed, on Bentyl QID   H/o C diff (10/2020)  Juvenile RA   Rheumatic fever   Former smoker (10/2020)     # Patient was admitted 9/23-9/24/2020 for hypotension and bradycardia following LHC. He had N/V and was hypovolemic. He was thought to be in cardiogenic shock s/p dopamine gtt. He also received IVF resuscitation. He was discharged home ambulating, in a stable condition. LHC showed multi-vessel CAD. He is s/p CABG in Oct 2020.   Following with Dr. Tom Madsen outpatient. He is s/p cardiac rehab  He also had C diff s/p PO Vanc. Continues to have sternal pain. Was taking Tramadol and has run out. [place]AR[place] previously reviewed. He has not had prescription from me. # Patient was in ER on 12/19 for near syncope and chin laceration s/p sutured.   QTc was 508 ms. Patient refused to stay in ER longer for further cardiac eval.   He has been having issues with hypotension.  He is off Lasix as a result, per Dr. Allyson Lopez, and off Amlodipine and Coreg. Off Lisinopril.   Patient is also off Amiodarone.   Add Metoprolol XL per Cardiology if hypertensive. Patient reports he has been gaining weight. Feels SOB at times.   BP fluctuates. BP today 130/80. No CP, SOB, palpitations, dizziness, or light-headedness. Continues to have peripheral edema. Not interested in cardiac rehab. # Chantix helped him quit smoking 4 days before CABG.    # Patient was in the ED on 5/28 for acute onset SOB. New diagnosis of CHF. EKG wo acute ischemia. Neg Mainor. BNP elevated. CT showed no PE but did show pulmonary edema (high d-dimer). COVID test negative. He was hypoxic, requiring 4L O2 however patient declined admission.   He is not having worsening SOB, but at his baseline. He was given in the ED Lasix IV 40mg once.   First time I met patient in June I prescribed Lasix 20mg QD. He reports his SOB improved over 70% with the Lasix. TTE done 6/23/2020 shows e/o mixed systolic and diastolic heart failure. He has never had LHC done. Patient denies alcohol abuse. He developed pre-renal JOSE which resolved after Lasix was held for 1 week (Cr up to 1.4 in June then down to 1.0 with GFR > 60 in July). He had not had ischemic work up done in the past.   # Takes Pepcid for GERD. Helping. He was on Zantac in the past but I discontinued it due to recall. No heartburn or reflux or nausea.   # HTN: stable and controlled. No CP, palpitations, dizziness, or light-headedness.   # Takes Synthroid. No symptoms of hyper or hypothyroidism. TSH was elevated in Nov 2020 during hospital stay. Increased Synthroid to 112mcg. # Takes Zyrtec for allergic rhinitis. This is helping. Tolerating well. He is also using Flonase PRN. No coughing or sneezing.   # Takes Finasteride and Flomax for BPH. Symptoms stable on these meds. Tolerating meds. No LUTS.   # Takes Cymbalta and Flexeril for chronic back pain. Both helping.  No side effects such as drowsiness.   # Uses Albuterol and Symbicort for COPD, as well as recently added Spiriva. 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 # High MCV on recent labs.    # As per patient he should have had his gall bladder removed however due to pandemic restrictions he had to hold off.   # He takes ASA for PAD. No bleeding or bruising on ASA. No h/o CAD or ischemic stroke. # ? H/o dyslipidemia. Not on statin therapy. He does have RA however. # Patient's mother was diabetic. His BG was high in ER in May. Prediabetic based on A1C drawn in June.   # Patient is having ED over the past few months.   # Has lesion on L side of face. Sun-exposed area.  Growing over the past 2 months. Sent him to see Derm.      Drives a semi-truck       Health maintenance:   Health Maintenance Due   Topic Date Due    AAA screen  1955    Hepatitis C screen  1955    COVID-19 Vaccine (1 of 2) 10/26/1970    Colon cancer screen colonoscopy  10/26/2004    Shingles Vaccine (2 of 3) 04/02/2018    Pneumococcal 65+ years Vaccine (1 of 1 - PPSV23) 10/26/2019    Annual Wellness Visit (AWV)  06/01/2020    Flu vaccine (1) 09/01/2020         Review of Systems:  Constitutional: no fevers, no chills, no night sweats, no weight loss, no weight gain, no fatigue   Pain assessment: no pain  Head: no headaches  Ears: no hearing loss, no tinnitus, no vertigo  Eyes: no blurry vision, no diplopia, no dryness, no itchiness  Mouth: no oral ulcers, no dry mouth, no sore throat  Nose: no nasal congestion, no epistaxis  Cardiac: no chest pain, no palpitations, no leg swelling, no orthopnea, no PND, no syncope  Pulmonary: no dyspnea, no cough, no wheezing, no hemoptysis  GI: no nausea, no vomiting, no diarrhea, no constipation, no abdominal pain, no hematochezia  : no dysuria, no frequency, no urgency, no hematuria, no frothy urine  MSK: no arthralgias, no myalgias, no early morning stiffness, no Raynaud's   Neuro: no focal neurological deficits, no seizures  Sleep: no snoring, no daytime somnolence   Psych: no depression, no anxiety, no suicidal ideation      Physical Exam:  VITALS:   /80   Pulse 52   Temp 97.8 °F (36.6 °C)   Ht 5' 9\" (1.753 m)   Wt 205 lb (93 kg)   SpO2 97%   BMI 30.27 kg/m²     PHYSICAL EXAMINATION:  General: alert, awake, and oriented to time, place, person, and situation. Not in acute distress   Skin:  no suspicious rashes, no jaundice  Head: normocephalic/atraumatic  Eyes: anicteric sclera, well-injected conjunctiva. Pupils are equally round and reactive to light. Extraocular movements are intact   Nose: no septal deviation evident  Sinuses: no sinus tenderness  Ears: external ears normal  Neck: supple, no cervical lymphadenopathy, thyroid symmetric and not enlarged, no bruits   Heart: regular rate and rhythm, regular S1/S2, no S3/S4, no audible murmurs, no audible friction rub  Lungs: clear to auscultation bilaterally, no audible crackles, no audible wheezes, no audible rhonchi    Abdomen: normal bowel sounds, soft abdomen, non-tender, no palpable masses  Extremities: 1-2+ pitting edema BLE L > R on side of vein harvest, warm, no cyanosis, no clubbing. Good capillary refill   MSK: no tenderness across spinous processes, full ROM in all 4 extremities. No joint swelling or tenderness   Peripheral vascular: 2+ pulses symmetric (radial)  Neuro: gait normal, CN II-XII intact, motor power 5/5 in all 4 extremities, sensation intact and symmetric    Labs   I have personally reviewed labs, and discussed pertinent findings with patient     Imaging   I have personally reviewed imaging, and discussed pertinent findings with patient    Other notes  I have personally reviewed other notes, and discussed pertinent findings with patient      Assessment/Plan:     1. Essential hypertension  Stable  Continue Lisinopril 5mg QD only as he had hypotension with higher doses  - CBC Auto Differential; Future  - COMPREHENSIVE METABOLIC PANEL; Future    2.  Peripheral edema  Worsening, mostly on side of vein harvest  Re-introduce low dose Lasix 20mg QD PRN   Compression stockings  Leg elevation  - CBC Auto Differential; Future  - COMPREHENSIVE METABOLIC PANEL; Future    3. Mixed hyperlipidemia  ,  in June 2020  Continue Atorvastatin 20mg QD  - CBC Auto Differential; Future  - COMPREHENSIVE METABOLIC PANEL; Future    4. Acquired hypothyroidism  TSH high in Nov 2020  Continue highered dose of Synthroid 112mcg QD since Nov 2020  Recheck TSH this week since it's been 3 months on higher dose  - CBC Auto Differential; Future  - COMPREHENSIVE METABOLIC PANEL; Future  - TSH with Reflex; Future    5. Coronary artery disease involving native coronary artery of native heart with other form of angina pectoris ([facility] [address]  Stable s/p CABG Oct 2020  Continue ASA     6. Panlobular emphysema (HCC)  Stable  Continue Albuterol inh PRN  Continue Spiriva  Continue Symbicort    7. Chronic bilateral low back pain without sciatica  Stable  Continue Cymbalta 60mg QHS  Continue Flexeril 10mg BID PRN     8. Benign prostatic hyperplasia, unspecified whether lower urinary tract symptoms present  Stable  Continue Flomax  Continue Proscar      Care discussed with patient and questions answered. Patient verbalizes understanding and agrees with plan. Discussed with patient the importance of continuity of care. I encouraged patient to schedule next appointment within 3 months with me. Patient prefers to be reached by Phone call at [phone] for future medical correspondence. Encouraged to activate [employer]. I reviewed and reconciled the medications this visit. I reviewed and updated the past medical, surgical, social, and family history during this visit. After visit summary provided.        Kai Rodriguez MD  Internal Medicine  2/24/2021   9:09 AM

## 2021-02-25 ENCOUNTER — CARE COORDINATION (OUTPATIENT)
Dept: CARE COORDINATION | Age: 66
End: 2021-02-25

## 2021-02-25 SDOH — HEALTH STABILITY: PHYSICAL HEALTH: ON AVERAGE, HOW MANY DAYS PER WEEK DO YOU ENGAGE IN MODERATE TO STRENUOUS EXERCISE (LIKE A BRISK WALK)?: 0 DAYS

## 2021-02-25 SDOH — SOCIAL STABILITY: SOCIAL NETWORK: IN A TYPICAL WEEK, HOW MANY TIMES DO YOU TALK ON THE PHONE WITH FAMILY, FRIENDS, OR NEIGHBORS?: THREE TIMES A WEEK

## 2021-02-25 SDOH — SOCIAL STABILITY: SOCIAL NETWORK: HOW OFTEN DO YOU GET TOGETHER WITH FRIENDS OR RELATIVES?: THREE TIMES A WEEK

## 2021-02-25 NOTE — CARE COORDINATION
Goal Completion Date: 4/1/21              Prior to Admission medications    Medication Sig Start Date End Date Taking?  Authorizing Provider   furosemide (LASIX) 20 MG tablet Take 1 tablet by mouth daily as needed (leg swelling) 2/24/21   Shanna Galloway MD   budesonide-formoterol [facility]) 160-4.5 MCG/ACT AERO Inhale 2 puffs into the lungs 2 times daily 2/24/21   Shanna Galloway MD   albuterol sulfate  (90 Base) MCG/ACT inhaler Inhale 2 puffs into the lungs every 4 hours (while awake) 2/24/21   Shanna Galloway MD   tiotropium (Virgil Virgilio) 18 MCG inhalation capsule Inhale 1 capsule into the lungs daily 2/24/21   Shanna Galloway MD   DULoxetine (CYMBALTA) 60 MG extended release capsule Take 1 capsule by mouth daily 2/24/21   Shanna Galloway MD   famotidine (PEPCID) 10 MG tablet Take 1 tablet by mouth 2 times daily as needed (reflux) 2/24/21   Shanna Galloway MD   cyclobenzaprine (FLEXERIL) 10 MG tablet Take 1 tablet by mouth 3 times daily as needed for Muscle spasms 2/24/21   Shanna Galloway MD   tamsulosin [facility]) 0.4 MG capsule Take 1 capsule by mouth daily 2/24/21   Shanna Galloway MD   finasteride (PROSCAR) 5 MG tablet Take 1 tablet by mouth daily 2/24/21   Shanna Galloway MD   atorvastatin (LIPITOR) 20 MG tablet Take 1 tablet by mouth nightly 2/24/21   Shanna Galloway MD   lisinopril (PRINIVIL;ZESTRIL) 5 MG tablet Take 1 tablet by mouth daily 2/24/21   Shanna Galloway MD   cetirizine (ZYRTEC) 10 MG tablet TAKE 1 TABLET BY MOUTH EVERY DAY 12/22/20   Shanna Galloway MD   levothyroxine (SYNTHROID) 112 MCG tablet Take 1 tablet by mouth daily 12/22/20   Shanna Galloway MD   aspirin EC 81 MG EC tablet Take 1 tablet by mouth daily 12/10/20   Twyla See MD   Multiple Vitamins-Minerals (THERAPEUTIC MULTIVITAMIN-MINERALS) tablet Take 1 tablet by mouth daily (with breakfast)  Patient not taking: Reported on 2/24/2021 11/10/20   Chary Guido MD       Future Appointments   Date Time Provider Petra Martii   4/14/2021  3:10 PM Barbie Britt MD [address] [place]   5/24/2021  9:00 AM Sidney Mesa MD [facility]  [facility]     ,   Congestive Heart Failure Assessment    Are you currently restricting fluids?: No Restriction  Do you understand a low sodium diet?: Yes  Do you understand how to read food labels?: Yes  How many restaurant meals do you eat per week?: 1-2  Do you salt your food before tasting it?: No     Swelling (worse than baseline) in hands, feet/legs or around abdomen      Symptoms:  CHF associated leg swelling: Pos      Symptom course: no change  Weight trend: stable  Salt intake watch compared to last visit: stable     ,   COPD Assessment    Does the patient understand envrionmental exposure?: Yes  Is the patient able to verbalize Rescue vs. Long Acting medications?: Yes  Does the patient have a nebulizer?: No  Does the patient use a space with inhaled medications?: No     No patient-reported symptoms         Symptoms:         and   General Assessment    Do you have any symptoms that are causing concern?: Yes  Progression since Onset: Unchanged  Reported Symptoms: Other (Comment: LLE swelling)

## 2021-03-03 RX ORDER — ASPIRIN 81 MG/1
81 TABLET ORAL DAILY
Qty: 90 TABLET | Refills: 3 | Status: SHIPPED | OUTPATIENT
Start: 2021-03-03 | End: 2021-03-25 | Stop reason: SDUPTHER

## 2021-03-11 ENCOUNTER — TELEPHONE (OUTPATIENT)
Dept: INTERNAL MEDICINE CLINIC | Age: 66
End: 2021-03-11

## 2021-03-11 RX ORDER — BUDESONIDE AND FORMOTEROL FUMARATE DIHYDRATE 80; 4.5 UG/1; UG/1
2 AEROSOL RESPIRATORY (INHALATION) 2 TIMES DAILY
Qty: 1 INHALER | Refills: 3 | Status: SHIPPED | OUTPATIENT
Start: 2021-03-11 | End: 2021-03-30

## 2021-03-12 ENCOUNTER — TELEPHONE (OUTPATIENT)
Dept: INTERNAL MEDICINE CLINIC | Age: 66
End: 2021-03-12

## 2021-03-12 NOTE — TELEPHONE ENCOUNTER
Left leg still swollen red and painful.   Per Dr. Andersen Masters lasix 40x3 days and back to 20 mg

## 2021-03-16 ENCOUNTER — APPOINTMENT (OUTPATIENT)
Dept: CT IMAGING | Age: 66
End: 2021-03-16
Payer: MEDICARE

## 2021-03-16 ENCOUNTER — APPOINTMENT (OUTPATIENT)
Dept: GENERAL RADIOLOGY | Age: 66
End: 2021-03-16
Payer: MEDICARE

## 2021-03-16 ENCOUNTER — HOSPITAL ENCOUNTER (EMERGENCY)
Age: 66
Discharge: ANOTHER HOSPITAL | End: 2021-03-16
Attending: EMERGENCY MEDICINE
Payer: MEDICARE

## 2021-03-16 VITALS
SYSTOLIC BLOOD PRESSURE: 114 MMHG | RESPIRATION RATE: 18 BRPM | HEIGHT: 70 IN | HEART RATE: 84 BPM | WEIGHT: 195 LBS | DIASTOLIC BLOOD PRESSURE: 65 MMHG | OXYGEN SATURATION: 93 % | BODY MASS INDEX: 27.92 KG/M2

## 2021-03-16 DIAGNOSIS — I60.9 SAH (SUBARACHNOID HEMORRHAGE) (HCC): Primary | ICD-10-CM

## 2021-03-16 DIAGNOSIS — R55 SYNCOPE AND COLLAPSE: ICD-10-CM

## 2021-03-16 LAB
ALBUMIN SERPL-MCNC: 4.6 GM/DL (ref 3.4–5)
ALP BLD-CCNC: 122 IU/L (ref 40–129)
ALT SERPL-CCNC: 21 U/L (ref 10–40)
ANION GAP SERPL CALCULATED.3IONS-SCNC: 6 MMOL/L (ref 4–16)
APTT: 30.8 SECONDS (ref 25.1–37.1)
AST SERPL-CCNC: 21 IU/L (ref 15–37)
BASOPHILS ABSOLUTE: 0.1 K/CU MM
BASOPHILS RELATIVE PERCENT: 0.5 % (ref 0–1)
BILIRUB SERPL-MCNC: 0.4 MG/DL (ref 0–1)
BUN BLDV-MCNC: 15 MG/DL (ref 6–23)
CALCIUM SERPL-MCNC: 9.6 MG/DL (ref 8.3–10.6)
CHLORIDE BLD-SCNC: 95 MMOL/L (ref 99–110)
CO2: 31 MMOL/L (ref 21–32)
CREAT SERPL-MCNC: 1 MG/DL (ref 0.9–1.3)
DIFFERENTIAL TYPE: ABNORMAL
EOSINOPHILS ABSOLUTE: 0.4 K/CU MM
EOSINOPHILS RELATIVE PERCENT: 4.5 % (ref 0–3)
GFR AFRICAN AMERICAN: >60 ML/MIN/1.73M2
GFR NON-AFRICAN AMERICAN: >60 ML/MIN/1.73M2
GLUCOSE BLD-MCNC: 84 MG/DL (ref 70–99)
HCT VFR BLD CALC: 49.1 % (ref 42–52)
HEMOGLOBIN: 16 GM/DL (ref 13.5–18)
IMMATURE NEUTROPHIL %: 0.3 % (ref 0–0.43)
INR BLD: 1.07 INDEX
LIPASE: 23 IU/L (ref 13–60)
LYMPHOCYTES ABSOLUTE: 2.9 K/CU MM
LYMPHOCYTES RELATIVE PERCENT: 31.7 % (ref 24–44)
MCH RBC QN AUTO: 31.7 PG (ref 27–31)
MCHC RBC AUTO-ENTMCNC: 32.6 % (ref 32–36)
MCV RBC AUTO: 97.2 FL (ref 78–100)
MONOCYTES ABSOLUTE: 0.9 K/CU MM
MONOCYTES RELATIVE PERCENT: 9.9 % (ref 0–4)
PDW BLD-RTO: 13.8 % (ref 11.7–14.9)
PLATELET # BLD: 204 K/CU MM (ref 140–440)
PMV BLD AUTO: 8.6 FL (ref 7.5–11.1)
POTASSIUM SERPL-SCNC: 3.5 MMOL/L (ref 3.5–5.1)
PROTHROMBIN TIME: 12.2 SECONDS (ref 11.7–14.5)
RBC # BLD: 5.05 M/CU MM (ref 4.6–6.2)
SEGMENTED NEUTROPHILS ABSOLUTE COUNT: 4.8 K/CU MM
SEGMENTED NEUTROPHILS RELATIVE PERCENT: 53.1 % (ref 36–66)
SODIUM BLD-SCNC: 132 MMOL/L (ref 135–145)
TOTAL IMMATURE NEUTOROPHIL: 0.03 K/CU MM
TOTAL PROTEIN: 7 GM/DL (ref 6.4–8.2)
TROPONIN T: <0.01 NG/ML
WBC # BLD: 9.1 K/CU MM (ref 4–10.5)

## 2021-03-16 PROCEDURE — 99284 EMERGENCY DEPT VISIT MOD MDM: CPT

## 2021-03-16 PROCEDURE — 84484 ASSAY OF TROPONIN QUANT: CPT

## 2021-03-16 PROCEDURE — 85730 THROMBOPLASTIN TIME PARTIAL: CPT

## 2021-03-16 PROCEDURE — 85610 PROTHROMBIN TIME: CPT

## 2021-03-16 PROCEDURE — 70450 CT HEAD/BRAIN W/O DYE: CPT

## 2021-03-16 PROCEDURE — 80053 COMPREHEN METABOLIC PANEL: CPT

## 2021-03-16 PROCEDURE — 72125 CT NECK SPINE W/O DYE: CPT

## 2021-03-16 PROCEDURE — 83690 ASSAY OF LIPASE: CPT

## 2021-03-16 PROCEDURE — 93005 ELECTROCARDIOGRAM TRACING: CPT | Performed by: EMERGENCY MEDICINE

## 2021-03-16 PROCEDURE — 70486 CT MAXILLOFACIAL W/O DYE: CPT

## 2021-03-16 PROCEDURE — 85025 COMPLETE CBC W/AUTO DIFF WBC: CPT

## 2021-03-16 PROCEDURE — 71046 X-RAY EXAM CHEST 2 VIEWS: CPT

## 2021-03-16 RX ORDER — ONDANSETRON 2 MG/ML
4 INJECTION INTRAMUSCULAR; INTRAVENOUS ONCE
Status: DISCONTINUED | OUTPATIENT
Start: 2021-03-16 | End: 2021-03-17 | Stop reason: HOSPADM

## 2021-03-16 RX ORDER — MORPHINE SULFATE 2 MG/ML
2 INJECTION, SOLUTION INTRAMUSCULAR; INTRAVENOUS ONCE
Status: DISCONTINUED | OUTPATIENT
Start: 2021-03-16 | End: 2021-03-17 | Stop reason: HOSPADM

## 2021-03-16 ASSESSMENT — PAIN DESCRIPTION - LOCATION: LOCATION: CHEST;HEAD

## 2021-03-16 ASSESSMENT — PAIN SCALES - GENERAL: PAINLEVEL_OUTOF10: 7

## 2021-03-17 PROCEDURE — 93010 ELECTROCARDIOGRAM REPORT: CPT | Performed by: INTERNAL MEDICINE

## 2021-03-17 ASSESSMENT — ENCOUNTER SYMPTOMS
VISUAL CHANGE: 0
RECTAL BLEEDING: 0
EYES NEGATIVE: 1
NAUSEA: 1
RESPIRATORY NEGATIVE: 1
SHORTNESS OF BREATH: 0
VOMITING: 0
DIFFICULTY BREATHING: 0

## 2021-03-17 NOTE — ED PROVIDER NOTES
level: Not on file   Occupational History    Not on file   Social Needs    Financial resource strain: Not hard at all    Food insecurity     Worry: Never true     Inability: Never true   [employer] needs     Medical: No     Non-medical: No   Tobacco Use    Smoking status: Former Smoker     Packs/day: 2.00     Years: 52.00     Pack years: 104.00     Types: Cigarettes     Start date:      Quit date: 10/1/2020     Years since quittin.4    Smokeless tobacco: Never Used   Substance and Sexual Activity    Alcohol use: Not Currently    Drug use: Never    Sexual activity: Not on file   Lifestyle    Physical activity     Days per week: 0 days     Minutes per session: Not on file    Stress: Not on file   Relationships    Social connections     Talks on phone: Three times a week     Gets together: Three times a week     Attends Anabaptist service: Not on file     Active member of club or organization: Not on file     Attends meetings of clubs or organizations: Not on file     Relationship status: Not on file    Intimate partner violence     Fear of current or ex partner: Not on file     Emotionally abused: Not on file     Physically abused: Not on file     Forced sexual activity: Not on file   Other Topics Concern    Not on file   Social History Narrative    Not on file     Past Surgical History:   Procedure Laterality Date    CARDIAC CATHETERIZATION  2020     Severe calcified multivessel CAD with LV dysfuntion, PCWP is 12, CABG consult.     COLONOSCOPY  2017    [facility]AR [place], [facility] [address])    CORONARY ARTERY BYPASS GRAFT N/A 10/19/2020    CABG CORONARY ARTERY BYPASS x3 WITH LIMA, INTRAOP BETH, ENDOHARVEST OF THE LEFT SAPHENOUS VEIN performed by Vishal Ziegler MD at [address], COLON, DIAGNOSTIC  2017    Normal exam per pt (done in [place] [address] [address])     Past Medical History:   Diagnosis Date    CAD (coronary artery disease)     Dr. Citlalli Lim COPD (chronic obstructive pulmonary disease) ([facility]     No pulmonologist - follows with PCP    H/O cardiac catheterization 09/24/2020     Severe calcified multivessel CAD with LV dysfuntion, PCWP is 12, CABG consult.  H/O echocardiogram 06/23/2020     Mild concentric LVH with moderate systolic impairment. EF is 40-45 % .  H/O echocardiogram 02/02/2021    ef 45-50%,  Mild LVH.    H/O exercise stress test 12/16/2020    Submaximal study due to failure to achieve target heart rate response and    Hyperlipidemia     Hypertension     Follows with PCP    IBS (irritable bowel syndrome)     Pancreatitis 2013    [facility] ([facility]     S/P CABG x 3 10/19/2020    Thyroid disease      No Known Allergies  Prior to Admission medications    Medication Sig Start Date End Date Taking? Authorizing Provider   budesonide-formoterol (SYMBICORT) 80-4.5 MCG/ACT AERO Inhale 2 puffs into the lungs 2 times daily 3/11/21   Mikael Sung MD   aspirin (ASPIRIN LOW DOSE) 81 MG EC tablet Take 1 tablet by mouth daily 3/3/21   Rico Boyd, APRN - CNP   furosemide (LASIX) 20 MG tablet Take 1 tablet by mouth daily as needed (leg swelling) 2/24/21   Mikael Sung MD   albuterol sulfate  (90 Base) MCG/ACT inhaler Inhale 2 puffs into the lungs every 4 hours (while awake) 2/24/21   Mikael Sung MD   tiotropium (Bo Fernandes) 18 MCG inhalation capsule Inhale 1 capsule into the lungs daily 2/24/21   Mikael Sung MD   DULoxetine (CYMBALTA) 60 MG extended release capsule Take 1 capsule by mouth daily 2/24/21   Mikael Sung MD   famotidine (PEPCID) 10 MG tablet Take 1 tablet by mouth 2 times daily as needed (reflux) 2/24/21   Mikael Sung MD   cyclobenzaprine (FLEXERIL) 10 MG tablet Take 1 tablet by mouth 3 times daily as needed for Muscle spasms 2/24/21   Mikael Sung MD   tamsulosin [facility]) 0.4 MG capsule Take 1 capsule by mouth daily 2/24/21   Mikael Sung MD   finasteride (PROSCAR) 5 MG tablet Take 1 tablet by mouth daily 2/24/21   Guru Michel MD   atorvastatin (LIPITOR) 20 MG tablet Take 1 tablet by mouth nightly 2/24/21   Guru Michel MD   lisinopril (PRINIVIL;ZESTRIL) 5 MG tablet Take 1 tablet by mouth daily 2/24/21   Guru Michel MD   cetirizine (ZYRTEC) 10 MG tablet TAKE 1 TABLET BY MOUTH EVERY DAY 12/22/20   Guru Michel MD   levothyroxine (SYNTHROID) 112 MCG tablet Take 1 tablet by mouth daily 12/22/20   Guru Michel MD   Multiple Vitamins-Minerals (THERAPEUTIC MULTIVITAMIN-MINERALS) tablet Take 1 tablet by mouth daily (with breakfast)  Patient not taking: Reported on 2/24/2021 11/10/20   Adri Sanz MD       /65   Pulse 84   Resp 18   Ht 5' 10\" (1.778 m)   Wt 195 lb (88.5 kg)   SpO2 93%   BMI 27.98 kg/m²     Physical Exam  Vitals signs and nursing note reviewed. Constitutional:       Appearance: Normal appearance. HENT:      Head: Normocephalic. Comments: Red is areas of tenderness and blue is eccymosis     Nose: No congestion or rhinorrhea. Right Nostril: No septal hematoma. Left Nostril: No septal hematoma. Mouth/Throat:      Mouth: Mucous membranes are moist.   Eyes:      Extraocular Movements: Extraocular movements intact. Pupils: Pupils are equal, round, and reactive to light. Neck:      Musculoskeletal: Normal range of motion and neck supple. Normal range of motion. Muscular tenderness present. No pain with movement, torticollis or spinous process tenderness. Cardiovascular:      Rate and Rhythm: Normal rate. Pulses: Normal pulses. Pulmonary:      Effort: Pulmonary effort is normal.      Breath sounds: Normal breath sounds. Chest:          Comments: Red is tender no palpable deformity  Abdominal:      General: There is no distension. Palpations: Abdomen is soft. Tenderness: There is no abdominal tenderness. There is no guarding or rebound. Musculoskeletal:         General: No tenderness or deformity.    Skin: General: Skin is warm and dry. Capillary Refill: Capillary refill takes less than 2 seconds. Neurological:      General: No focal deficit present. Mental Status: He is alert and oriented to person, place, and time. Mental status is at baseline. GCS: GCS eye subscore is 4. GCS verbal subscore is 5. GCS motor subscore is 6. Cranial Nerves: Cranial nerves are intact. Sensory: Sensation is intact. Motor: Motor function is intact. Coordination: Coordination is intact. Gait: Gait is intact. Deep Tendon Reflexes: Reflexes are normal and symmetric. MDM:    Labs Reviewed   CBC WITH AUTO DIFFERENTIAL - Abnormal; Notable for the following components:       Result Value    MCH 31.7 (*)     Monocytes % 9.9 (*)     Eosinophils % 4.5 (*)     All other components within normal limits   COMPREHENSIVE METABOLIC PANEL - Abnormal; Notable for the following components:    Sodium 132 (*)     Chloride 95 (*)     All other components within normal limits   LIPASE   TROPONIN   PROTIME/INR & PTT       CT CERVICAL SPINE WO CONTRAST   Final Result   Degenerative changes in the cervical spine as above with no acute fracture or   traumatic malalignment. Bullous emphysema at the lung apices. XR CHEST (2 VW)   Preliminary Result   1. No definite acute traumatic injury. 2. Cardiomegaly with pulmonary vascular congestion and a small left pleural   effusion with adjacent atelectasis. 3. COPD. CT FACIAL BONES WO CONTRAST   Final Result   No acute traumatic injury of the facial bones. CT HEAD WO CONTRAST   Final Result   1. Acute subarachnoid hemorrhage involving the left cerebral hemisphere with   subarachnoid blood noted at the left frontal, temporal, and parietal lobes. Small amount of intraventricular blood also present. No mass effect or   midline shift. 2. Mild chronic microvascular ischemic changes and age-appropriate global   cerebral atrophy. Critical results were called by Chary Brantley MD to Dr. Melissa Watts   on 3/16/2021 at 21:42. EKG shows NSR with no acute changes    Upon arrival to the emergency department patient was seen and evaluated. His CT head shows SAH and he is stable with stable GCS and no AMS. The patient is not on blood thinners. His medication list was recently updated by his PCM and our review reveals no blood thinner. There was some confusion as he thought he was on a thinner but this was confused with a blood pressure medication. He does take asa. His GCS has remained stable and I have discussed case with [facility] and the patient will be transferred to [facility] trauma service. ER physician has accepted in transfer. My typical dicussion, presentation,and considerations for this patients' chief complaint, diagnosis, and differential diagnosis have been considered and discussed. I have stressed need for follow up and reexamination for this encounter. The patient  was informed and educated concerning his condition and the transfer to [facility]. Final Impression    1. SAH (subarachnoid hemorrhage) ([facility]    2.  Syncope and collapse              Katty Mora DO  03/17/21 0007

## 2021-03-19 ENCOUNTER — CARE COORDINATION (OUTPATIENT)
Dept: CARE COORDINATION | Age: 66
End: 2021-03-19

## 2021-03-24 ENCOUNTER — HOSPITAL ENCOUNTER (OUTPATIENT)
Age: 66
Discharge: HOME | End: 2021-03-24
Payer: MEDICARE

## 2021-03-24 ENCOUNTER — TELEPHONE (OUTPATIENT)
Dept: INTERNAL MEDICINE CLINIC | Age: 66
End: 2021-03-24

## 2021-03-24 LAB
ALBUMIN SERPL-MCNC: 4.7 GM/DL (ref 3.4–5)
ALP BLD-CCNC: 114 IU/L (ref 40–129)
ALT SERPL-CCNC: 45 U/L (ref 10–40)
ANION GAP SERPL CALCULATED.3IONS-SCNC: 10 MMOL/L (ref 4–16)
AST SERPL-CCNC: 22 IU/L (ref 15–37)
BASOPHILS ABSOLUTE: 0.1 K/CU MM
BASOPHILS RELATIVE PERCENT: 0.6 % (ref 0–1)
BILIRUB SERPL-MCNC: 0.3 MG/DL (ref 0–1)
BUN BLDV-MCNC: 14 MG/DL (ref 6–23)
CALCIUM SERPL-MCNC: 10.1 MG/DL (ref 8.3–10.6)
CHLORIDE BLD-SCNC: 102 MMOL/L (ref 99–110)
CO2: 29 MMOL/L (ref 21–32)
CREAT SERPL-MCNC: 1 MG/DL (ref 0.9–1.3)
DIFFERENTIAL TYPE: ABNORMAL
EOSINOPHILS ABSOLUTE: 0.4 K/CU MM
EOSINOPHILS RELATIVE PERCENT: 4.7 % (ref 0–3)
GFR AFRICAN AMERICAN: >60 ML/MIN/1.73M2
GFR NON-AFRICAN AMERICAN: >60 ML/MIN/1.73M2
GLUCOSE BLD-MCNC: 86 MG/DL (ref 70–99)
HCT VFR BLD CALC: 52.3 % (ref 42–52)
HEMOGLOBIN: 17.1 GM/DL (ref 13.5–18)
IMMATURE NEUTROPHIL %: 0.4 % (ref 0–0.43)
LYMPHOCYTES ABSOLUTE: 2.1 K/CU MM
LYMPHOCYTES RELATIVE PERCENT: 27.1 % (ref 24–44)
MCH RBC QN AUTO: 31.7 PG (ref 27–31)
MCHC RBC AUTO-ENTMCNC: 32.7 % (ref 32–36)
MCV RBC AUTO: 97 FL (ref 78–100)
MONOCYTES ABSOLUTE: 0.7 K/CU MM
MONOCYTES RELATIVE PERCENT: 9.3 % (ref 0–4)
PDW BLD-RTO: 13.9 % (ref 11.7–14.9)
PLATELET # BLD: 214 K/CU MM (ref 140–440)
PMV BLD AUTO: 8.8 FL (ref 7.5–11.1)
POTASSIUM SERPL-SCNC: 4.1 MMOL/L (ref 3.5–5.1)
RBC # BLD: 5.39 M/CU MM (ref 4.6–6.2)
SEGMENTED NEUTROPHILS ABSOLUTE COUNT: 4.5 K/CU MM
SEGMENTED NEUTROPHILS RELATIVE PERCENT: 57.9 % (ref 36–66)
SODIUM BLD-SCNC: 141 MMOL/L (ref 135–145)
TOTAL IMMATURE NEUTOROPHIL: 0.03 K/CU MM
TOTAL PROTEIN: 7.6 GM/DL (ref 6.4–8.2)
TSH HIGH SENSITIVITY: 1.95 UIU/ML (ref 0.27–4.2)
WBC # BLD: 7.7 K/CU MM (ref 4–10.5)

## 2021-03-24 PROCEDURE — 85025 COMPLETE CBC W/AUTO DIFF WBC: CPT

## 2021-03-24 PROCEDURE — 80053 COMPREHEN METABOLIC PANEL: CPT

## 2021-03-24 PROCEDURE — 84443 ASSAY THYROID STIM HORMONE: CPT

## 2021-03-24 PROCEDURE — 36415 COLL VENOUS BLD VENIPUNCTURE: CPT

## 2021-03-24 NOTE — TELEPHONE ENCOUNTER
Received letter of denial for medication budesonide-formoterol 80.-4.5 symbicort hfa aerosol inhaler     Pa started for medication      To find formulary medication [email or web address]

## 2021-03-25 ENCOUNTER — OFFICE VISIT (OUTPATIENT)
Dept: INTERNAL MEDICINE CLINIC | Age: 66
End: 2021-03-25
Payer: MEDICARE

## 2021-03-25 ENCOUNTER — TELEPHONE (OUTPATIENT)
Dept: INTERNAL MEDICINE CLINIC | Age: 66
End: 2021-03-25

## 2021-03-25 ENCOUNTER — OFFICE VISIT (OUTPATIENT)
Dept: CARDIOLOGY CLINIC | Age: 66
End: 2021-03-25
Payer: MEDICARE

## 2021-03-25 VITALS
BODY MASS INDEX: 29.03 KG/M2 | SYSTOLIC BLOOD PRESSURE: 110 MMHG | HEIGHT: 70 IN | HEART RATE: 92 BPM | WEIGHT: 202.8 LBS | DIASTOLIC BLOOD PRESSURE: 68 MMHG

## 2021-03-25 VITALS
TEMPERATURE: 97.8 F | WEIGHT: 194 LBS | OXYGEN SATURATION: 94 % | SYSTOLIC BLOOD PRESSURE: 90 MMHG | BODY MASS INDEX: 24.9 KG/M2 | HEIGHT: 74 IN | HEART RATE: 81 BPM | DIASTOLIC BLOOD PRESSURE: 60 MMHG

## 2021-03-25 DIAGNOSIS — I60.9 SUBARACHNOID HEMORRHAGE (HCC): Primary | ICD-10-CM

## 2021-03-25 DIAGNOSIS — E03.9 ACQUIRED HYPOTHYROIDISM: ICD-10-CM

## 2021-03-25 DIAGNOSIS — J43.1 PANLOBULAR EMPHYSEMA (HCC): ICD-10-CM

## 2021-03-25 DIAGNOSIS — I10 ESSENTIAL HYPERTENSION: ICD-10-CM

## 2021-03-25 DIAGNOSIS — M54.50 CHRONIC BILATERAL LOW BACK PAIN WITHOUT SCIATICA: ICD-10-CM

## 2021-03-25 DIAGNOSIS — M79.89 LEFT LEG SWELLING: ICD-10-CM

## 2021-03-25 DIAGNOSIS — M79.89 LEG SWELLING: Primary | ICD-10-CM

## 2021-03-25 DIAGNOSIS — N40.0 BENIGN PROSTATIC HYPERPLASIA, UNSPECIFIED WHETHER LOWER URINARY TRACT SYMPTOMS PRESENT: ICD-10-CM

## 2021-03-25 DIAGNOSIS — E78.2 MIXED HYPERLIPIDEMIA: ICD-10-CM

## 2021-03-25 DIAGNOSIS — I50.42 CHRONIC COMBINED SYSTOLIC AND DIASTOLIC CONGESTIVE HEART FAILURE (HCC): ICD-10-CM

## 2021-03-25 DIAGNOSIS — I25.118 CORONARY ARTERY DISEASE INVOLVING NATIVE CORONARY ARTERY OF NATIVE HEART WITH OTHER FORM OF ANGINA PECTORIS (HCC): ICD-10-CM

## 2021-03-25 DIAGNOSIS — G89.29 CHRONIC BILATERAL LOW BACK PAIN WITHOUT SCIATICA: ICD-10-CM

## 2021-03-25 LAB
EKG ATRIAL RATE: 77 BPM
EKG DIAGNOSIS: NORMAL
EKG P AXIS: 36 DEGREES
EKG P-R INTERVAL: 182 MS
EKG Q-T INTERVAL: 418 MS
EKG QRS DURATION: 96 MS
EKG QTC CALCULATION (BAZETT): 473 MS
EKG R AXIS: 18 DEGREES
EKG T AXIS: 51 DEGREES
EKG VENTRICULAR RATE: 77 BPM

## 2021-03-25 PROCEDURE — 99214 OFFICE O/P EST MOD 30 MIN: CPT | Performed by: INTERNAL MEDICINE

## 2021-03-25 PROCEDURE — 1111F DSCHRG MED/CURRENT MED MERGE: CPT | Performed by: INTERNAL MEDICINE

## 2021-03-25 PROCEDURE — G8484 FLU IMMUNIZE NO ADMIN: HCPCS | Performed by: INTERNAL MEDICINE

## 2021-03-25 PROCEDURE — G8420 CALC BMI NORM PARAMETERS: HCPCS | Performed by: INTERNAL MEDICINE

## 2021-03-25 PROCEDURE — 1123F ACP DISCUSS/DSCN MKR DOCD: CPT | Performed by: INTERNAL MEDICINE

## 2021-03-25 PROCEDURE — 4004F PT TOBACCO SCREEN RCVD TLK: CPT | Performed by: INTERNAL MEDICINE

## 2021-03-25 PROCEDURE — 4040F PNEUMOC VAC/ADMIN/RCVD: CPT | Performed by: INTERNAL MEDICINE

## 2021-03-25 PROCEDURE — G8427 DOCREV CUR MEDS BY ELIG CLIN: HCPCS | Performed by: INTERNAL MEDICINE

## 2021-03-25 PROCEDURE — 3017F COLORECTAL CA SCREEN DOC REV: CPT | Performed by: INTERNAL MEDICINE

## 2021-03-25 PROCEDURE — 99215 OFFICE O/P EST HI 40 MIN: CPT | Performed by: INTERNAL MEDICINE

## 2021-03-25 RX ORDER — DULOXETIN HYDROCHLORIDE 60 MG/1
60 CAPSULE, DELAYED RELEASE ORAL DAILY
Qty: 30 CAPSULE | Refills: 3 | Status: SHIPPED | OUTPATIENT
Start: 2021-03-25 | End: 2021-04-15 | Stop reason: SDUPTHER

## 2021-03-25 RX ORDER — BLOOD PRESSURE TEST KIT
KIT MISCELLANEOUS
Qty: 1 KIT | Refills: 0 | Status: SHIPPED | OUTPATIENT
Start: 2021-03-25

## 2021-03-25 RX ORDER — ATORVASTATIN CALCIUM 20 MG/1
20 TABLET, FILM COATED ORAL NIGHTLY
Qty: 30 TABLET | Refills: 3 | Status: SHIPPED | OUTPATIENT
Start: 2021-03-25 | End: 2021-04-15 | Stop reason: SDUPTHER

## 2021-03-25 RX ORDER — FINASTERIDE 5 MG/1
5 TABLET, FILM COATED ORAL DAILY
Qty: 30 TABLET | Refills: 3 | Status: SHIPPED | OUTPATIENT
Start: 2021-03-25 | End: 2021-04-15 | Stop reason: SDUPTHER

## 2021-03-25 RX ORDER — FAMOTIDINE 10 MG
10 TABLET ORAL 2 TIMES DAILY PRN
Qty: 60 TABLET | Refills: 3 | Status: SHIPPED | OUTPATIENT
Start: 2021-03-25 | End: 2021-04-15 | Stop reason: SDUPTHER

## 2021-03-25 RX ORDER — FUROSEMIDE 20 MG/1
20 TABLET ORAL DAILY PRN
Qty: 30 TABLET | Refills: 3 | Status: SHIPPED | OUTPATIENT
Start: 2021-03-25 | End: 2021-03-25

## 2021-03-25 RX ORDER — LEVOTHYROXINE SODIUM 112 UG/1
112 TABLET ORAL DAILY
Qty: 30 TABLET | Refills: 3 | Status: SHIPPED | OUTPATIENT
Start: 2021-03-25 | End: 2021-04-15 | Stop reason: SDUPTHER

## 2021-03-25 RX ORDER — TAMSULOSIN HYDROCHLORIDE 0.4 MG/1
0.4 CAPSULE ORAL DAILY
Qty: 30 CAPSULE | Refills: 3 | Status: SHIPPED | OUTPATIENT
Start: 2021-03-25 | End: 2021-04-15 | Stop reason: SDUPTHER

## 2021-03-25 RX ORDER — ASPIRIN 81 MG/1
81 TABLET ORAL DAILY
Qty: 90 TABLET | Refills: 3 | Status: SHIPPED | OUTPATIENT
Start: 2021-03-25 | End: 2021-03-25

## 2021-03-25 RX ORDER — CYCLOBENZAPRINE HCL 10 MG
10 TABLET ORAL 3 TIMES DAILY PRN
Qty: 90 TABLET | Refills: 3 | Status: SHIPPED | OUTPATIENT
Start: 2021-03-25 | End: 2021-04-15 | Stop reason: SDUPTHER

## 2021-03-25 ASSESSMENT — PATIENT HEALTH QUESTIONNAIRE - PHQ9
1. LITTLE INTEREST OR PLEASURE IN DOING THINGS: 0
2. FEELING DOWN, DEPRESSED OR HOPELESS: 0

## 2021-03-25 NOTE — PROGRESS NOTES
Taking for the 3/25/21 encounter (Office Visit) with Sidney Mesa MD   Medication Sig Dispense Refill    aspirin (ASPIRIN LOW DOSE) 81 MG EC tablet Take 1 tablet by mouth daily 90 tablet 3    furosemide (LASIX) 20 MG tablet Take 1 tablet by mouth daily as needed (leg swelling) 30 tablet 3    DULoxetine (CYMBALTA) 60 MG extended release capsule Take 1 capsule by mouth daily 30 capsule 3    famotidine (PEPCID) 10 MG tablet Take 1 tablet by mouth 2 times daily as needed (reflux) 60 tablet 3    cyclobenzaprine (FLEXERIL) 10 MG tablet Take 1 tablet by mouth 3 times daily as needed for Muscle spasms 90 tablet 3    tamsulosin (FLOMAX) 0.4 MG capsule Take 1 capsule by mouth daily 30 capsule 3    finasteride (PROSCAR) 5 MG tablet Take 1 tablet by mouth daily 30 tablet 3    atorvastatin (LIPITOR) 20 MG tablet Take 1 tablet by mouth nightly 30 tablet 3    levothyroxine (SYNTHROID) 112 MCG tablet Take 1 tablet by mouth daily 30 tablet 3    Blood Pressure KIT Check BP once daily 1 kit 0    budesonide-formoterol (SYMBICORT) 80-4.5 MCG/ACT AERO Inhale 2 puffs into the lungs 2 times daily 1 Inhaler 3    albuterol sulfate  (90 Base) MCG/ACT inhaler Inhale 2 puffs into the lungs every 4 hours (while awake) 1 Inhaler 0    tiotropium (SPIRIVA HANDIHALER) 18 MCG inhalation capsule Inhale 1 capsule into the lungs daily 30 capsule 3    Multiple Vitamins-Minerals (THERAPEUTIC MULTIVITAMIN-MINERALS) tablet Take 1 tablet by mouth daily (with breakfast)  0        Medications patient taking as of now reconciled against medications ordered at time of hospital discharge: Yes    Chief Complaint   Patient presents with    Follow-Up from Hospital     lab results,weight gain, legs swollen, cough with congestion       Inpatient course: Discharge summary reviewed- see chart.      Interval history/Current status:      History of Present Illness:  Gunner Abrams is a 76 y.o. pleasant gentleman presenting today with a chief complaint of hospital follow up for [facility]. He has a past medical history significant for:  HTN, on Lisinopril 5mg QD   HL (,  on 6/23/2020), on Atorvastatin 20mg  Prediabetes (HbA1C 6.3% on 9/23/2020), not on meds  CAD s/p CABG (10/2020), on ASA   HFrEF/HFpEF (EF 47-03%, diastolic dysfunction, mild MR on 6/23/2020; EF up to 45-50% on TTE 2/4/2021), on Lisinopril 5mg QD, Lasix 20mg QD, off Metoprolol   Hypothyroidism (TSH 1.95 normal on 3/24/2021), on Synthroid 112mcg QD  PAD, on ASA   GERD, on Pepcid 20mg BID   Allergic rhinitis, off Zyrtec  BPH, on Flomax, Finasteride   COPD, on Albuterol PRN, Symbicort BID, Spiriva QD   Chronic back pain, on Cymbalta 60mg QHS, Flexeril 10mg BID PRN   IBS-mixed, on Bentyl QID   H/o C diff (10/2020)  Juvenile RA   Rheumatic fever   Former smoker (10/2020)      # Patient was admitted 3/15-3/19/2021 after syncopal episode when he got up from a sitting position, and hit his head. Had LOC. CTH showed SAH. He was transferred to [facility] trauma service. NSGY consulted and recommended no surgical intervention. No intracranial aneurysm noted. He was cleared to be on ASA. Will have FU with NSGY outpatient in 4 weeks for repeat CTH wo. Also had TTE per Cardiology stable. Off driving for 1 month and recommended compression stockings for his orthostatic hypotension. He now has a holter monitor for 30 days. He watches his salt intake. # Patient was admitted 9/23-9/24/2020 for hypotension and bradycardia following LHC. He had N/V and was hypovolemic. He was thought to be in cardiogenic shock s/p dopamine gtt. He also received IVF resuscitation. He was discharged home ambulating, in a stable condition. LHC showed multi-vessel CAD. He is s/p CABG in Oct 2020.   Following with Dr. Nohemy Carpio outpatient. He is s/p cardiac rehab  He also had C diff s/p PO Vanc. Continues to have sternal pain. Was taking Tramadol and has run out.  OARRS previously reviewed. He has not had prescription from me.   # Patient was in ER on 12/19 for near syncope and chin laceration s/p sutured.   QTc was 508 ms. Patient refused to stay in ER longer for further cardiac eval.   He has been having issues with hypotension. He was off Lasix and Lisinopril, per Dr. Ro Menon, and off Amlodipine and Coreg. Recently re-introduced low dose Lasix and low dose Lisinopril. Patient is also off Amiodarone.   Add Metoprolol XL per Cardiology if hypertensive. Patient reports he has been gaining weight. Feels SOB at times.   BP fluctuates. BP today 90/60. No CP, SOB, palpitations, dizziness, or light-headedness. Continues to have peripheral edema.   Not interested in cardiac rehab. # Chantix helped him quit smoking 4 days before CABG.    # Patient was in the ED on 5/28 for acute onset SOB. New diagnosis of CHF. EKG wo acute ischemia. Neg Mainor. BNP elevated. CT showed no PE but did show pulmonary edema (high d-dimer). COVID test negative. He was hypoxic, requiring 4L O2 however patient declined admission.   He is not having worsening SOB, but at his baseline. He was given in the ED Lasix IV 40mg once.   First time I met patient in June I prescribed Lasix 20mg QD. He reports his SOB improved over 70% with the Lasix. TTE done 6/23/2020 shows e/o mixed systolic and diastolic heart failure. He has never had LHC done. Patient denies alcohol abuse. He developed pre-renal JOSE which resolved after Lasix was held for 1 week (Cr up to 1.4 in June then down to 1.0 with GFR > 60 in July). He had not had ischemic work up done in the past.   # Takes Pepcid for GERD. Helping. He was on Zantac in the past but I discontinued it due to recall. No heartburn or reflux or nausea.   # HTN: stable and controlled. No CP, palpitations, dizziness, or light-headedness.   # Takes Synthroid. No symptoms of hyper or hypothyroidism. TSH was elevated in Nov 2020 during hospital stay. Increased Synthroid to 112mcg.    # Takes Zyrtec for allergic rhinitis. This is helping. Tolerating well. He is also using Flonase PRN. No coughing or sneezing.   # Takes Finasteride and Flomax for BPH. Symptoms stable on these meds. Tolerating meds. No LUTS.   # Takes Cymbalta and Flexeril for chronic back pain. Both helping. No side effects such as drowsiness.   # Uses Albuterol and Symbicort for COPD, as well as recently added Spiriva. 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 # High MCV on recent labs.    # As per patient he should have had his gall bladder removed however due to pandemic restrictions he had to hold off.   # He takes ASA for PAD. No bleeding or bruising on ASA. No h/o CAD or ischemic stroke. # ? H/o dyslipidemia. Not on statin therapy. He does have RA however. # Patient's mother was diabetic. His BG was high in ER in May. Prediabetic based on A1C drawn in June.   # Patient is having ED over the past few months.   # Has lesion on L side of face. Sun-exposed area.  Growing over the past 2 months. Sent him to see Derm.      Drives a semi-truck        Review of Systems:  Constitutional: no fevers, no chills, no night sweats, no weight loss, no weight gain, no fatigue   Pain assessment: no pain  Head: no headaches  Ears: no hearing loss, no tinnitus, no vertigo  Eyes: no blurry vision, no diplopia, no dryness, no itchiness  Mouth: no oral ulcers, no dry mouth, no sore throat  Nose: no nasal congestion, no epistaxis  Cardiac: no chest pain, no palpitations, leg swelling, no orthopnea, no PND, no syncope  Pulmonary: no dyspnea, no cough, no wheezing, no hemoptysis  GI: no nausea, no vomiting, no diarrhea, no constipation, no abdominal pain, no hematochezia  : no dysuria, no frequency, no urgency, no hematuria, no frothy urine  MSK: no arthralgias, no myalgias, no early morning stiffness, no Raynaud's   Neuro: no focal neurological deficits, no seizures  Sleep: no snoring, no daytime somnolence   Psych: no depression, no anxiety, no suicidal ideation      Physical Exam:  VITALS:   BP 90/60   Pulse 81   Temp 97.8 °F (36.6 °C)   Ht 6' 2\" (1.88 m)   Wt 194 lb (88 kg)   SpO2 94%   BMI 24.91 kg/m²     PHYSICAL EXAMINATION:  General: alert, awake, and oriented to time, place, person, and situation. Not in acute distress   Skin:  no suspicious rashes, no jaundice  Head: normocephalic/atraumatic  Eyes: anicteric sclera, well-injected conjunctiva. Pupils are equally round and reactive to light. Extraocular movements are intact   Nose: no septal deviation evident  Sinuses: no sinus tenderness  Ears: external ears normal  Neck: supple, no cervical lymphadenopathy, thyroid symmetric and not enlarged, no bruits   Heart: regular rate and rhythm, regular S1/S2, no S3/S4, no audible murmurs, no audible friction rub, no JVD, holter monitor intact  Lungs: clear to auscultation bilaterally, no audible crackles, no audible wheezes, no audible rhonchi    Abdomen: normal bowel sounds, soft abdomen, non-tender, no palpable masses  Extremities: L > RLE pitting edema with tenderness in L calf area, warm, no cyanosis, no clubbing. Good capillary refill   MSK: no tenderness across spinous processes, full ROM in all 4 extremities. No joint swelling or tenderness   Peripheral vascular: 2+ pulses symmetric (radial)  Neuro: gait normal, CN II-XII intact, motor power 5/5 in all 4 extremities, sensation intact and symmetric        Labs   I have personally reviewed labs, and discussed pertinent findings with patient      Imaging   I have personally reviewed imaging, and discussed pertinent findings with patient     Other notes  I have personally reviewed other notes, and discussed pertinent findings with patient        Assessment/Plan:     1.  Subarachnoid hemorrhage (HCC)  Patient sustained fall after syncopal episode and has holter   SAH found on CTH  FU with me in 3 weeks and will get [facility] then   Urged him to contact [address] [place] [address] office to make a 4-week FU appointment (we will have [address] [facility] done by then)  - [place] DISCHARGE MEDS RECONCILED W/ CURRENT OUTPATIENT MED LIST    2. Left leg swelling  L > R with calf tenderness so will get US to RO DVT  Compression stockings   Continue Lasix 20mg QD low dose only due to orthostatic hypotension. Encouraged limiting fluids to 1.5L per day due to being on diuretic, but not excessively limiting fluid intake to avoid orthostasis and JOSE  - VL LOWER EXTREMITY VENOUS LEFT; Future    3. Essential hypertension  BP has been low   Will DC Lisinopril and monitor his BP closely  Urged him to discuss this with Dr. Jacqueline Pfeiffer today as well during his appointment  In the future, if having HTN again, will start Metoprolol instead  - Blood Pressure KIT; Check BP once daily  Dispense: 1 kit; Refill: 0    4. Coronary artery disease involving native coronary artery of native heart with other form of angina pectoris ([facility]  Stable  S/p CABG  Continue ASA, statin. Off BB due to hypotension     5. Chronic combined systolic and diastolic congestive heart failure ([facility]  Gaining weight and having LE edema  Otherwise no crackles or JVD   Continue low dose Lasix 20mg QD and discussed fluid restrictions   ALT only 45 so low suspicion for cardiac cirrhosis/ascites   DC ACEi due to hypotension  Off BB due to hypotension   - Blood Pressure KIT; Check BP once daily  Dispense: 1 kit; Refill: 0    6. Mixed hyperlipidemia  Continue Atorvastatin 20mg QD  - atorvastatin (LIPITOR) 20 MG tablet; Take 1 tablet by mouth nightly  Dispense: 30 tablet; Refill: 3    7. Acquired hypothyroidism  Stable  TSH normal in March 2021  Continue Synthroid 112mcg QD    8. Panlobular emphysema (HCC)  Stable  Continue Albuterol inh PRN, Symbicort, Spiriva     9. Chronic bilateral low back pain without sciatica  Stable  Continue Cymbalta 60mg QD  Continue Flexeril 10mg TID PRN     10.  Benign prostatic hyperplasia, unspecified whether lower urinary tract symptoms present  Stable  Low suspicion for Flomax causing orthostatic hypotension as he was doing well on it and starting having symptoms after his CABG  Continue for now Flomax and Proscar      Care discussed with patient and questions answered. Patient verbalizes understanding and agrees with plan. Discussed with patient the importance of continuity of care. I encouraged patient to schedule next appointment within 3 weeks with me. Patient prefers to be reached by Phone call at [phone] for future medical correspondence. Encouraged to activate [employer]. I reviewed and reconciled the medications this visit. I reviewed and updated the past medical, surgical, social, and family history during this visit. After visit summary provided.        Lavonne Farah MD  Internal Medicine  3/25/2021   9:25 AM     Medical Decision Making: high complexity

## 2021-03-25 NOTE — PROGRESS NOTES
Lul Osborne MD        OFFICE  FOLLOWUP NOTE    Chief complaints: patient is here for management of CAD, S/P cabg , HTN, DYSLPIDEMIA, subarachnoid hemorrhage    F/u after subarachnoid hemorroga  Subjective: patient feels better, no chest pain, no shortness of breath, no dizziness, no palpitations    HPI Joe Khalil is a 76 y. o.year old who  has a past medical history of CAD (coronary artery disease), COPD (chronic obstructive pulmonary disease) ([facility] [address] H/O cardiac catheterization, H/O echocardiogram, H/O echocardiogram, H/O exercise stress test, Hyperlipidemia, Hypertension, IBS (irritable bowel syndrome), Pancreatitis, Rheumatoid arthritis ([address] S/P CABG x 3, and Thyroid disease.  and presents for management ofCAD, S/P cabg , HTN, DYSLPIDEMIA, subarachnoid hemorrhage which are well controlled  He was admitted to [facility], [place] for fall and spontaneous subarachnoid hemorrhage,was cleared by [facility] ( Dr. Domenic Rhoades to start aspirin ) , he was given 30days event monitor at [facility]    Current Outpatient Medications   Medication Sig Dispense Refill    aspirin (ASPIRIN LOW DOSE) 81 MG EC tablet Take 1 tablet by mouth daily 90 tablet 3    furosemide (LASIX) 20 MG tablet Take 1 tablet by mouth daily as needed (leg swelling) 30 tablet 3    DULoxetine (CYMBALTA) 60 MG extended release capsule Take 1 capsule by mouth daily 30 capsule 3    famotidine (PEPCID) 10 MG tablet Take 1 tablet by mouth 2 times daily as needed (reflux) 60 tablet 3    cyclobenzaprine (FLEXERIL) 10 MG tablet Take 1 tablet by mouth 3 times daily as needed for Muscle spasms 90 tablet 3    tamsulosin (FLOMAX) 0.4 MG capsule Take 1 capsule by mouth daily 30 capsule 3    finasteride (PROSCAR) 5 MG tablet Take 1 tablet by mouth daily 30 tablet 3    atorvastatin (LIPITOR) 20 MG tablet Take 1 tablet by mouth nightly 30 tablet 3    levothyroxine (SYNTHROID) 112 MCG tablet Take 1 tablet by mouth daily 30 tablet 3    Blood Pressure KIT Check BP once daily 1 kit 0    budesonide-formoterol (SYMBICORT) 80-4.5 MCG/ACT AERO Inhale 2 puffs into the lungs 2 times daily 1 Inhaler 3    albuterol sulfate  (90 Base) MCG/ACT inhaler Inhale 2 puffs into the lungs every 4 hours (while awake) 1 Inhaler 0    tiotropium (SPIRIVA HANDIHALER) 18 MCG inhalation capsule Inhale 1 capsule into the lungs daily 30 capsule 3    Multiple Vitamins-Minerals (THERAPEUTIC MULTIVITAMIN-MINERALS) tablet Take 1 tablet by mouth daily (with breakfast)  0     No current facility-administered medications for this visit. Allergies: Patient has no known allergies. Past Medical History:   Diagnosis Date    CAD (coronary artery disease)     Dr. Carla Benoit COPD (chronic obstructive pulmonary disease) ([facility] [address]     No pulmonologist - follows with PCP    H/O cardiac catheterization 09/24/2020     Severe calcified multivessel CAD with LV dysfuntion, PCWP is 12, CABG consult.  H/O echocardiogram 06/23/2020     Mild concentric LVH with moderate systolic impairment. EF is 40-45 % .  H/O echocardiogram 02/02/2021    ef 45-50%,  Mild LVH.    H/O exercise stress test 12/16/2020    Submaximal study due to failure to achieve target heart rate response and    Hyperlipidemia     Hypertension     Follows with PCP    IBS (irritable bowel syndrome)     Pancreatitis 2013    Rheumatoid arthritis ([facility] [address]     S/P CABG x 3 10/19/2020    Thyroid disease      Past Surgical History:   Procedure Laterality Date    CARDIAC CATHETERIZATION  09/24/2020     Severe calcified multivessel CAD with LV dysfuntion, PCWP is 12, CABG consult.     COLONOSCOPY  2017    Polyps - [place], [place])    CORONARY ARTERY BYPASS GRAFT N/A 10/19/2020    CABG CORONARY ARTERY BYPASS x3 WITH LIMA, INTRAOP BETH, ENDOHARVEST OF THE LEFT SAPHENOUS VEIN performed by Ganga Pardo MD at [address] , DIAGNOSTIC  2017    Normal exam per pt (done in [place], [place])     Family History   Problem Relation Age of -carotid delay  Respiratory:  Normal breath sounds, No respiratory distress, No wheezing, No chest tenderness. ,no use of accessory muscles, diaphragm movement is normal  Cardiovascular: (PMI) apex non displaced,no lifts no thrills, no s3,no s4, Normal heart rate, Normal rhythm, No murmurs, No rubs, No gallops. Carotid arteries pulse and amplitude are normal no bruit, no abdominal bruit noted ( normal abdominal aorta ausculation),   Extremities - No cyanosis, clubbing, or significant edema, no varicose veins    Abdomen - No masses, tenderness, or organomegaly, no hepato-splenomegally, no bruits  Musculoskeletal left leg swelling, edema, no kyphosis or scoliosis, no deformity in any extremity noted, muscle strength and tone are normal  Skin: no ulcer,no scar,no stasis dermatitis   Neurologic - alert oriented times 3,Cranial nerves II through XII are grossly intact. There were no gross focal neurologic abnormalities. Psychiatric: normal mood and affect    No results found for: CKTOTAL, CKMB, CKMBINDEX, TROPONINI  BNP:  No results found for: BNP  PT/INR:  No results found for: PTINR  Lab Results   Component Value Date    LABA1C 6.3 10/12/2020    LABA1C 6.3 09/23/2020     Lab Results   Component Value Date    CHOL 155 06/23/2020    TRIG 173 (H) 06/23/2020    HDL 30 (L) 06/23/2020    LDLDIRECT 106 (H) 06/23/2020     Lab Results   Component Value Date    ALT 45 (H) 03/24/2021    AST 22 03/24/2021     TSH:  No results found for: TSH    Impression:  Bradley Lee is a 76 y. o.year old who  has a past medical history of CAD (coronary artery disease), COPD (chronic obstructive pulmonary disease) ([facility] [address] H/O cardiac catheterization, H/O echocardiogram, H/O echocardiogram, H/O exercise stress test, Hyperlipidemia, Hypertension, IBS (irritable bowel syndrome), Pancreatitis, Rheumatoid arthritis ([facility] [address] S/P CABG x 3, and Thyroid disease. and presents with     Plan:  1.  Hypotension: it was 90/60, hold lasix, hold bb and lisinopril ,will follow up 3- days event report from [facility], [address] scan head reviewed with patient, his blood pressure would drop 20 points with standing, recommend to hold off on working at this time. will fill up office papers to hold off from working  2. Subarachnoid hemorrage: recommend to stop aspirin for now, he will see neurosx  3. CAD:s/o CABG X3 hold aspirin , continue statins, hold  ACEinhibitors hold  betablockers,had  STRESS TEST AND he is not interested in [address]  4. Dyslipidemia: continue statins,   5. Chf: no evidence of heart failure, left leg swelling is after venous stripping for CABG, will get venous doppler in [facility] office  All labs, medications and tests reviewed, continue all other medications of all above medical condition listed as is.     [unfilled]

## 2021-03-25 NOTE — LETTER
When did you have your last labs drawn 03/2021  Where did you have them done   What doctor ordered     If we do not have these labs you are retrieve these labs for these providers! Do you have a surgery or procedure scheduled in the near future - No  If Yes- DO EKG  If Yes - Who is the surgery with?    Phone number of physician   Fax number of physician   Type of surgery   GIVE THIS INFORMATION TO KADEEM ANDRADE     Ask patient if they want to sign up for [employer] if they are not already signed up     Check to see if we have an E-MAIL on file for the patient     Check medication list thoroughly!!! AND RECONCILE OUTSIDE MEDICATIONS  If dose has changed change the entire order not just the [place] At check out add to every patient's \"wrap up\" the following dot phrase AFTERHOURSEDUCATION and ensure we explain this to our patients

## 2021-03-26 ENCOUNTER — TELEPHONE (OUTPATIENT)
Dept: INTERNAL MEDICINE CLINIC | Age: 66
End: 2021-03-26

## 2021-03-29 ENCOUNTER — TELEPHONE (OUTPATIENT)
Dept: INTERNAL MEDICINE CLINIC | Age: 66
End: 2021-03-29

## 2021-03-29 NOTE — TELEPHONE ENCOUNTER
Please inform patient that his labs are stable. Thyroid is within normal limits, so continue same dose of Synthroid. One of his liver enzymes is slightly elevated but we will continue to monitor that.  Will get blood work for that in 3 months

## 2021-03-30 ENCOUNTER — TELEPHONE (OUTPATIENT)
Dept: INTERNAL MEDICINE CLINIC | Age: 66
End: 2021-03-30

## 2021-03-30 RX ORDER — BUDESONIDE AND FORMOTEROL FUMARATE DIHYDRATE 160; 4.5 UG/1; UG/1
AEROSOL RESPIRATORY (INHALATION)
Qty: 1 INHALER | Refills: 5 | Status: SHIPPED | OUTPATIENT
Start: 2021-03-30 | End: 2021-04-15 | Stop reason: SDUPTHER

## 2021-03-30 NOTE — TELEPHONE ENCOUNTER
Insurance will not cover symbicort - prefers symbicort hfa will this be okay for alternative.  If so please send new prescription

## 2021-03-31 ENCOUNTER — TELEPHONE (OUTPATIENT)
Dept: INTERNAL MEDICINE CLINIC | Age: 66
End: 2021-03-31

## 2021-04-13 ENCOUNTER — PROCEDURE VISIT (OUTPATIENT)
Dept: CARDIOLOGY CLINIC | Age: 66
End: 2021-04-13
Payer: MEDICARE

## 2021-04-13 DIAGNOSIS — M79.89 LEG SWELLING: Primary | ICD-10-CM

## 2021-04-13 PROCEDURE — 93970 EXTREMITY STUDY: CPT | Performed by: INTERNAL MEDICINE

## 2021-04-14 ENCOUNTER — OFFICE VISIT (OUTPATIENT)
Dept: CARDIOLOGY CLINIC | Age: 66
End: 2021-04-14
Payer: MEDICARE

## 2021-04-14 VITALS
BODY MASS INDEX: 29.6 KG/M2 | HEART RATE: 100 BPM | DIASTOLIC BLOOD PRESSURE: 86 MMHG | HEIGHT: 70 IN | WEIGHT: 206.8 LBS | SYSTOLIC BLOOD PRESSURE: 142 MMHG

## 2021-04-14 DIAGNOSIS — I10 ESSENTIAL HYPERTENSION: ICD-10-CM

## 2021-04-14 DIAGNOSIS — I50.42 CHRONIC COMBINED SYSTOLIC AND DIASTOLIC CONGESTIVE HEART FAILURE (HCC): ICD-10-CM

## 2021-04-14 PROCEDURE — 4004F PT TOBACCO SCREEN RCVD TLK: CPT | Performed by: INTERNAL MEDICINE

## 2021-04-14 PROCEDURE — 1123F ACP DISCUSS/DSCN MKR DOCD: CPT | Performed by: INTERNAL MEDICINE

## 2021-04-14 PROCEDURE — G8427 DOCREV CUR MEDS BY ELIG CLIN: HCPCS | Performed by: INTERNAL MEDICINE

## 2021-04-14 PROCEDURE — 4040F PNEUMOC VAC/ADMIN/RCVD: CPT | Performed by: INTERNAL MEDICINE

## 2021-04-14 PROCEDURE — 99214 OFFICE O/P EST MOD 30 MIN: CPT | Performed by: INTERNAL MEDICINE

## 2021-04-14 PROCEDURE — G8417 CALC BMI ABV UP PARAM F/U: HCPCS | Performed by: INTERNAL MEDICINE

## 2021-04-14 PROCEDURE — 3017F COLORECTAL CA SCREEN DOC REV: CPT | Performed by: INTERNAL MEDICINE

## 2021-04-14 RX ORDER — LISINOPRIL 5 MG/1
5 TABLET ORAL DAILY
Qty: 30 TABLET | Refills: 3 | Status: SHIPPED | OUTPATIENT
Start: 2021-04-14 | End: 2021-04-15 | Stop reason: SDUPTHER

## 2021-04-14 RX ORDER — CARVEDILOL 3.12 MG/1
3.12 TABLET ORAL 2 TIMES DAILY WITH MEALS
Qty: 60 TABLET | Refills: 3 | Status: SHIPPED | OUTPATIENT
Start: 2021-04-14 | End: 2021-04-15 | Stop reason: SDUPTHER

## 2021-04-14 RX ORDER — FUROSEMIDE 20 MG/1
20 TABLET ORAL DAILY PRN
Qty: 30 TABLET | Refills: 3 | Status: SHIPPED | OUTPATIENT
Start: 2021-04-14 | End: 2021-04-15 | Stop reason: SDUPTHER

## 2021-04-14 NOTE — PROGRESS NOTES
Irving Santillan MD        OFFICE  FOLLOWUP NOTE    Chief complaints: patient is here for management of CAD, S/P cabg , HTN, DYSLPIDEMIA, subarachnoid hemorrhage    Follow up after stopping coreg, lisinopril and lasix,   Subjective: patient feels better, no chest pain, no shortness of breath, no dizziness, no palpitations    BRENDAN Sierra is a 76 y. o.year old who  has a past medical history of CAD (coronary artery disease), COPD (chronic obstructive pulmonary disease) ([facility] [address] H/O cardiac catheterization, H/O echocardiogram, H/O echocardiogram, H/O exercise stress test, Hyperlipidemia, Hypertension, IBS (irritable bowel syndrome), Pancreatitis, Rheumatoid arthritis ([address] S/P CABG x 3, and Thyroid disease. and presents for management of CAD, S/P cabg , HTN, DYSLPIDEMIA, subarachnoid hemorrhage which are well controlled    He was admitted to [facility] for fall and spontaneous subarachnoid hemorrhage,was cleared by [facility] ( Dr. Hyde Estimable to start aspirin ) , he was given 30days event monitor at [facility]    His home blood pressure is veryhigh, systolic was 115 and diastolic was 192.  ( off coreg, lisinopriol and lasix)  Current Outpatient Medications   Medication Sig Dispense Refill    budesonide-formoterol (SYMBICORT) 160-4.5 MCG/ACT AERO Symbicort HFA 1 puff inhaled twice daily by mouth 1 Inhaler 5    DULoxetine (CYMBALTA) 60 MG extended release capsule Take 1 capsule by mouth daily 30 capsule 3    famotidine (PEPCID) 10 MG tablet Take 1 tablet by mouth 2 times daily as needed (reflux) 60 tablet 3    cyclobenzaprine (FLEXERIL) 10 MG tablet Take 1 tablet by mouth 3 times daily as needed for Muscle spasms 90 tablet 3    tamsulosin (FLOMAX) 0.4 MG capsule Take 1 capsule by mouth daily 30 capsule 3    finasteride (PROSCAR) 5 MG tablet Take 1 tablet by mouth daily 30 tablet 3    atorvastatin (LIPITOR) 20 MG tablet Take 1 tablet by mouth nightly 30 tablet 3    levothyroxine (SYNTHROID) 112 MCG tablet Take 1 tablet by mouth daily 30 tablet 3    Blood Pressure KIT Check BP once daily 1 kit 0    albuterol sulfate  (90 Base) MCG/ACT inhaler Inhale 2 puffs into the lungs every 4 hours (while awake) 1 Inhaler 0    tiotropium (SPIRIVA HANDIHALER) 18 MCG inhalation capsule Inhale 1 capsule into the lungs daily 30 capsule 3    Multiple Vitamins-Minerals (THERAPEUTIC MULTIVITAMIN-MINERALS) tablet Take 1 tablet by mouth daily (with breakfast)  0     No current facility-administered medications for this visit. Allergies: Patient has no known allergies. Past Medical History:   Diagnosis Date    CAD (coronary artery disease)     Dr. Harry Rossi COPD (chronic obstructive pulmonary disease) ([facility] [address]     No pulmonologist - follows with PCP    H/O cardiac catheterization 09/24/2020     Severe calcified multivessel CAD with LV dysfuntion, PCWP is 12, CABG consult.  H/O echocardiogram 06/23/2020     Mild concentric LVH with moderate systolic impairment. EF is 40-45 % .  H/O echocardiogram 02/02/2021    ef 45-50%,  Mild LVH.    H/O exercise stress test 12/16/2020    Submaximal study due to failure to achieve target heart rate response and    Hyperlipidemia     Hypertension     Follows with PCP    IBS (irritable bowel syndrome)     Pancreatitis 2013    Rheumatoid arthritis ([facility] [address]     S/P CABG x 3 10/19/2020    Thyroid disease      Past Surgical History:   Procedure Laterality Date    CARDIAC CATHETERIZATION  09/24/2020     Severe calcified multivessel CAD with LV dysfuntion, PCWP is 12, CABG consult.     COLONOSCOPY  2017    Polyps - [place], [place])    CORONARY ARTERY BYPASS GRAFT N/A 10/19/2020    CABG CORONARY ARTERY BYPASS x3 WITH LIMA, INTRAOP BETH, ENDOHARVEST OF THE LEFT SAPHENOUS VEIN performed by Brad Todd MD at [facility], DIAGNOSTIC  2017    Normal exam per pt (done in [place], [place])     Family History   Problem Relation Age of Onset    Alzheimer's Disease Mother    [facility] Normal breath sounds, No respiratory distress, No wheezing, No chest tenderness. ,no use of accessory muscles, diaphragm movement is normal  Cardiovascular: (PMI) apex non displaced,no lifts no thrills, no s3,no s4, Normal heart rate, Normal rhythm, No murmurs, No rubs, No gallops. Carotid arteries pulse and amplitude are normal no bruit, no abdominal bruit noted ( normal abdominal aorta ausculation),   Extremities - No cyanosis, clubbing, or significant edema, no varicose veins    Abdomen - No masses, tenderness, or organomegaly, no hepato-splenomegally, no bruits  Musculoskeletal - No significant edema, no kyphosis or scoliosis, no deformity in any extremity noted, muscle strength and tone are normal  Skin: no ulcer,no scar,no stasis dermatitis   Neurologic - alert oriented times 3,Cranial nerves II through XII are grossly intact. There were no gross focal neurologic abnormalities. Psychiatric: normal mood and affect    No results found for: CKTOTAL, CKMB, CKMBINDEX, TROPONINI  BNP:  No results found for: BNP  PT/INR:  No results found for: PTINR  Lab Results   Component Value Date    LABA1C 6.3 10/12/2020    LABA1C 6.3 09/23/2020     Lab Results   Component Value Date    CHOL 155 06/23/2020    TRIG 173 (H) 06/23/2020    HDL 30 (L) 06/23/2020    LDLDIRECT 106 (H) 06/23/2020     Lab Results   Component Value Date    ALT 45 (H) 03/24/2021    AST 22 03/24/2021     TSH:  No results found for: TSH    Impression:  Finn Leon is a 76 y. o.year old who  has a past medical history of CAD (coronary artery disease), COPD (chronic obstructive pulmonary disease) ([facility] [address] H/O cardiac catheterization, H/O echocardiogram, H/O echocardiogram, H/O exercise stress test, Hyperlipidemia, Hypertension, IBS (irritable bowel syndrome), Pancreatitis, Rheumatoid arthritis ([address] S/P CABG x 3, and Thyroid disease. and presents with     Plan:  1. Subarachnoid hemorrage: recommend to stop aspirin for now, he will see neurosx  2.  CAD:s/o CABG X3 hold aspirin , continue statins, hold  ACEinhibitors hold  betablockers,had  STRESS TEST AND he is not interested in CARDIAC REHAB  3. Dyslipidemia: continue statins,   4. Chf: no evidence of heart failure, left leg swelling is after venous stripping for CABG,t venous doppler in [facility] offices, reviewed, has left SSS reflux and b/l common femoral vein reflux disease, recommend compression socks  5. HTN: restart lisinopril and bb and lasix  1. Health maintenance: exerise and diet  All labs, medications and tests reviewed, continue all other medications of all above medical condition listed as is.     [unfilled]

## 2021-04-15 ENCOUNTER — TELEPHONE (OUTPATIENT)
Dept: CARDIOLOGY CLINIC | Age: 66
End: 2021-04-15

## 2021-04-15 ENCOUNTER — OFFICE VISIT (OUTPATIENT)
Dept: INTERNAL MEDICINE CLINIC | Age: 66
End: 2021-04-15
Payer: MEDICARE

## 2021-04-15 VITALS
HEIGHT: 70 IN | WEIGHT: 196 LBS | SYSTOLIC BLOOD PRESSURE: 130 MMHG | HEART RATE: 90 BPM | DIASTOLIC BLOOD PRESSURE: 80 MMHG | OXYGEN SATURATION: 96 % | BODY MASS INDEX: 28.06 KG/M2

## 2021-04-15 DIAGNOSIS — I10 ESSENTIAL HYPERTENSION: ICD-10-CM

## 2021-04-15 DIAGNOSIS — N40.0 BENIGN PROSTATIC HYPERPLASIA, UNSPECIFIED WHETHER LOWER URINARY TRACT SYMPTOMS PRESENT: ICD-10-CM

## 2021-04-15 DIAGNOSIS — J43.1 PANLOBULAR EMPHYSEMA (HCC): ICD-10-CM

## 2021-04-15 DIAGNOSIS — E03.9 ACQUIRED HYPOTHYROIDISM: ICD-10-CM

## 2021-04-15 DIAGNOSIS — Z91.81 AT HIGH RISK FOR FALLS: ICD-10-CM

## 2021-04-15 DIAGNOSIS — E78.2 MIXED HYPERLIPIDEMIA: ICD-10-CM

## 2021-04-15 DIAGNOSIS — M54.50 CHRONIC BILATERAL LOW BACK PAIN WITHOUT SCIATICA: ICD-10-CM

## 2021-04-15 DIAGNOSIS — I60.9 SUBARACHNOID HEMORRHAGE (HCC): Primary | ICD-10-CM

## 2021-04-15 DIAGNOSIS — G89.29 CHRONIC BILATERAL LOW BACK PAIN WITHOUT SCIATICA: ICD-10-CM

## 2021-04-15 DIAGNOSIS — I50.42 CHRONIC COMBINED SYSTOLIC AND DIASTOLIC CONGESTIVE HEART FAILURE (HCC): ICD-10-CM

## 2021-04-15 DIAGNOSIS — N52.9 VASCULOGENIC ERECTILE DYSFUNCTION, UNSPECIFIED VASCULOGENIC ERECTILE DYSFUNCTION TYPE: ICD-10-CM

## 2021-04-15 PROCEDURE — G8417 CALC BMI ABV UP PARAM F/U: HCPCS | Performed by: INTERNAL MEDICINE

## 2021-04-15 PROCEDURE — 3017F COLORECTAL CA SCREEN DOC REV: CPT | Performed by: INTERNAL MEDICINE

## 2021-04-15 PROCEDURE — 99214 OFFICE O/P EST MOD 30 MIN: CPT | Performed by: INTERNAL MEDICINE

## 2021-04-15 PROCEDURE — 4004F PT TOBACCO SCREEN RCVD TLK: CPT | Performed by: INTERNAL MEDICINE

## 2021-04-15 PROCEDURE — G8926 SPIRO NO PERF OR DOC: HCPCS | Performed by: INTERNAL MEDICINE

## 2021-04-15 PROCEDURE — G8427 DOCREV CUR MEDS BY ELIG CLIN: HCPCS | Performed by: INTERNAL MEDICINE

## 2021-04-15 PROCEDURE — 4040F PNEUMOC VAC/ADMIN/RCVD: CPT | Performed by: INTERNAL MEDICINE

## 2021-04-15 PROCEDURE — 3023F SPIROM DOC REV: CPT | Performed by: INTERNAL MEDICINE

## 2021-04-15 PROCEDURE — 1123F ACP DISCUSS/DSCN MKR DOCD: CPT | Performed by: INTERNAL MEDICINE

## 2021-04-15 RX ORDER — FINASTERIDE 5 MG/1
5 TABLET, FILM COATED ORAL DAILY
Qty: 30 TABLET | Refills: 3 | Status: SHIPPED | OUTPATIENT
Start: 2021-04-15 | End: 2021-06-15 | Stop reason: SDUPTHER

## 2021-04-15 RX ORDER — BUDESONIDE AND FORMOTEROL FUMARATE DIHYDRATE 160; 4.5 UG/1; UG/1
AEROSOL RESPIRATORY (INHALATION)
Qty: 1 INHALER | Refills: 5 | Status: SHIPPED | OUTPATIENT
Start: 2021-04-15 | End: 2021-06-15 | Stop reason: SDUPTHER

## 2021-04-15 RX ORDER — CYCLOBENZAPRINE HCL 10 MG
10 TABLET ORAL 3 TIMES DAILY PRN
Qty: 90 TABLET | Refills: 3 | Status: SHIPPED | OUTPATIENT
Start: 2021-04-15 | End: 2021-06-15 | Stop reason: SDUPTHER

## 2021-04-15 RX ORDER — LISINOPRIL 5 MG/1
5 TABLET ORAL DAILY
Qty: 30 TABLET | Refills: 3 | Status: SHIPPED | OUTPATIENT
Start: 2021-04-15 | End: 2021-06-15 | Stop reason: SDUPTHER

## 2021-04-15 RX ORDER — LEVOTHYROXINE SODIUM 112 UG/1
112 TABLET ORAL DAILY
Qty: 30 TABLET | Refills: 3 | Status: SHIPPED | OUTPATIENT
Start: 2021-04-15 | End: 2021-06-15 | Stop reason: SDUPTHER

## 2021-04-15 RX ORDER — ATORVASTATIN CALCIUM 20 MG/1
20 TABLET, FILM COATED ORAL NIGHTLY
Qty: 30 TABLET | Refills: 3 | Status: SHIPPED | OUTPATIENT
Start: 2021-04-15 | End: 2021-06-15 | Stop reason: SDUPTHER

## 2021-04-15 RX ORDER — TAMSULOSIN HYDROCHLORIDE 0.4 MG/1
0.4 CAPSULE ORAL DAILY
Qty: 30 CAPSULE | Refills: 3 | Status: SHIPPED | OUTPATIENT
Start: 2021-04-15 | End: 2021-06-15 | Stop reason: SDUPTHER

## 2021-04-15 RX ORDER — TIOTROPIUM BROMIDE 18 UG/1
18 CAPSULE ORAL; RESPIRATORY (INHALATION) DAILY
Qty: 30 CAPSULE | Refills: 3 | Status: SHIPPED | OUTPATIENT
Start: 2021-04-15 | End: 2021-06-15 | Stop reason: SDUPTHER

## 2021-04-15 RX ORDER — DULOXETIN HYDROCHLORIDE 60 MG/1
60 CAPSULE, DELAYED RELEASE ORAL DAILY
Qty: 30 CAPSULE | Refills: 3 | Status: SHIPPED | OUTPATIENT
Start: 2021-04-15 | End: 2021-06-15 | Stop reason: SDUPTHER

## 2021-04-15 RX ORDER — ALBUTEROL SULFATE 90 UG/1
2 AEROSOL, METERED RESPIRATORY (INHALATION)
Qty: 1 INHALER | Refills: 5 | Status: SHIPPED | OUTPATIENT
Start: 2021-04-15 | End: 2021-06-15 | Stop reason: SDUPTHER

## 2021-04-15 RX ORDER — CARVEDILOL 3.12 MG/1
3.12 TABLET ORAL 2 TIMES DAILY WITH MEALS
Qty: 60 TABLET | Refills: 3 | Status: SHIPPED | OUTPATIENT
Start: 2021-04-15 | End: 2021-06-15 | Stop reason: SDUPTHER

## 2021-04-15 RX ORDER — SILDENAFIL 50 MG/1
50 TABLET, FILM COATED ORAL DAILY PRN
Qty: 10 TABLET | Refills: 0 | Status: SHIPPED | OUTPATIENT
Start: 2021-04-15 | End: 2021-06-15

## 2021-04-15 RX ORDER — FUROSEMIDE 20 MG/1
20 TABLET ORAL DAILY PRN
Qty: 30 TABLET | Refills: 3 | Status: SHIPPED | OUTPATIENT
Start: 2021-04-15 | End: 2021-06-15 | Stop reason: SDUPTHER

## 2021-04-15 RX ORDER — FAMOTIDINE 10 MG
10 TABLET ORAL 2 TIMES DAILY PRN
Qty: 60 TABLET | Refills: 3 | Status: SHIPPED | OUTPATIENT
Start: 2021-04-15 | End: 2021-06-15

## 2021-04-15 NOTE — LETTER
[address] [facility]  [address]  Phone: [phone]  Fax: [phone]    Tamia Castillo MD        April 15, 2021     Patient: Shabana Loya   YOB: 1955   Date of Visit: 4/15/2021       To Whom It May Concern: It is my medical opinion that Shabana Loya may return to work on 4/19/2021 with no restrictions. 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 He will continue to be under my care. If you have any questions or concerns, please don't hesitate to call.     Sincerely,           Tamia Castillo MD

## 2021-04-15 NOTE — TELEPHONE ENCOUNTER
Patient called requesting a return to work letter, going back on 4/20/21. Please call him back at ph# [phone].

## 2021-04-15 NOTE — PROGRESS NOTES
multi-vessel CAD. He is s/p CABG in Oct 2020.   Following with Dr. Liza Garvey outpatient. He is s/p cardiac rehab  He also had C diff s/p PO Vanc. Continues to have sternal pain. Was taking Tramadol and has run out. OARRS previously reviewed. He has not had prescription from me.   # Patient was in ER on 12/19 for near syncope and chin laceration s/p sutured.   QTc was 508 ms. Patient refused to stay in ER longer for further cardiac eval.   He has been having issues with hypotension and fluctuating BP. Patient is now taking Lisinopril 5mg, Coreg 3.125mg BID, Lasix 20mg. BP today 130/80. No CP, SOB, palpitations, dizziness, or light-headedness. Not interested in cardiac rehab.   # Chantix helped him quit smoking 4 days before CABG.    # Patient was in the ED on 5/28 for acute onset SOB. New diagnosis of CHF. EKG wo acute ischemia. Neg Mainor. BNP elevated. CT showed no PE but did show pulmonary edema (high d-dimer). COVID test negative. He was hypoxic, requiring 4L O2 however patient declined admission.   He is not having worsening SOB, but at his baseline. He was given in the ED Lasix IV 40mg once.   First time I met patient in June I prescribed Lasix 20mg QD. He reports his SOB improved over 70% with the Lasix. TTE done 6/23/2020 shows e/o mixed systolic and diastolic heart failure. He has never had LHC done. Patient denies alcohol abuse. He developed pre-renal JOSE which resolved after Lasix was held for 1 week (Cr up to 1.4 in June then down to 1.0 with GFR > 60 in July). He had not had ischemic work up done in the past.   # Takes Pepcid for GERD. Helping. He was on Zantac in the past but I discontinued it due to recall. No heartburn or reflux or nausea.   # Takes Synthroid. No symptoms of hyper or hypothyroidism. TSH was elevated in Nov 2020 during hospital stay. Increased Synthroid to 112mcg.  Repeat in March 2021 WNL. # Takes Zyrtec for allergic rhinitis. This is helping.  Tolerating well. He is also using Flonase PRN. No coughing or sneezing.   # Takes Finasteride and Flomax for BPH. Symptoms stable on these meds. Tolerating meds. No LUTS.   # Takes Cymbalta and Flexeril for chronic back pain. Both helping. No side effects such as drowsiness.   # Uses Albuterol and Symbicort for COPD, as well as recently added Spiriva. 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 # High MCV on recent labs.    # As per patient he should have had his gall bladder removed however due to pandemic restrictions he had to hold off.   # He takes ASA for PAD. No bleeding or bruising on ASA. No h/o CAD or ischemic stroke. # ? H/o dyslipidemia. Not on statin therapy. He does have RA however. # Patient's mother was diabetic. His BG was high in ER in May. Prediabetic based on A1C drawn in June.   # Patient is having ED over the past few months.   # Has lesion on L side of face. Sun-exposed area.  Growing over the past 2 months. Sent him to see Derm.      Drives a semi-truck       Health maintenance:   Health Maintenance Due   Topic Date Due    AAA screen  Never done    Hepatitis C screen  Never done    COVID-19 Vaccine (1) Never done    Colon cancer screen colonoscopy  Never done    Shingles Vaccine (2 of 3) 04/02/2018    Pneumococcal 65+ years Vaccine (1 of 1 - PPSV23) Never done   [employer] Visit (AWV)  Never done         Review of Systems:  Constitutional: no fevers, no chills, no night sweats, no weight loss, no weight gain, no fatigue   Pain assessment: no pain  Head: no headaches  Ears: no hearing loss, no tinnitus, no vertigo  Eyes: no blurry vision, no diplopia, no dryness, no itchiness  Mouth: no oral ulcers, no dry mouth, no sore throat  Nose: no nasal congestion, no epistaxis  Cardiac: no chest pain, no palpitations, leg swelling, no orthopnea, no PND, no syncope  Pulmonary: no dyspnea, no cough, no wheezing, no hemoptysis  GI: no nausea, no vomiting, no diarrhea, no findings with patient      Assessment/Plan:     1. Subarachnoid hemorrhage (HCC)  Sustained fall after syncope likely related to hypotension  Holter clear with no significant life-threatening arrhythmias  Repeat [facility] OSH on 4/1 with resolution of SAH  Continue Tylenol PRN for headaches  Released from NSGY     2. Essential hypertension  Improving but still elevated at home  Continue for now Coreg 3.125mg BID and Lisinopril 5mg QD   He recently started these meds 24 hours ago  Following closely with Dr. Nathan Mabry as well   Continue BP log at home BID  FU with Cardiology in 4 weeks and me in 6 weeks    3. Chronic combined systolic and diastolic congestive heart failure (HCC)  HFrEF/HFpEF EF 52-72%, diastolic dysfunction, mild MR on 6/23/2020; EF up to 45-50% on TTE 2/4/2021  Continue ACEi, BB, diuretic Lasix 20mg QD  Compensated  Maintain euvolemia  Compression stockings thigh-high for LE edema from reflux     4. Vasculogenic erectile dysfunction, unspecified vasculogenic erectile dysfunction type  No contraindication for PDE-5i as he is not on nitro. I did discuss with him the side effects and warning signs that warrant discontinuation of Sildenafil     5. Mixed hyperlipidemia  ,  in June 2020  Continue heart healthy diet  Continue Atorvastatin 20mg QD  May consider WMS for weight loss     6. Acquired hypothyroidism  Stable  TSH normal in March 2021  Continue Synthroid 112mcg QD    7. Panlobular emphysema (HCC)  Stable  Continue Albuterol inh PRN  Continue Symbicort  Continue Spiriva    8. Chronic bilateral low back pain without sciatica  Stable  Continue Cymbalta 60mg QD  Continue Flexeril PRN     9. Benign prostatic hyperplasia, unspecified whether lower urinary tract symptoms present  Stable  Continue Flomax and Proscar    10. At high risk for falls  On the basis of positive falls risk screening, assessment and plan is as follows: home safety tips provided.       Care discussed with patient and questions answered. Patient verbalizes understanding and agrees with plan. Discussed with patient the importance of continuity of care. I encouraged patient to schedule next appointment within 6 weeks (already scheduled) with me. Patient prefers to be reached by Phone call at [phone] for future medical correspondence. Encouraged to activate MyChart. I reviewed and reconciled the medications this visit. I reviewed and updated the past medical, surgical, social, and family history during this visit. After visit summary provided.        Ceferino Trevino MD  Internal Medicine  4/15/2021   9:19 AM

## 2021-04-15 NOTE — TELEPHONE ENCOUNTER
Summary        No evidence of DVT or SVT in the bilateral common femoral vein, femoral    vein, popliteal vein, greater saphenous vein or small saphenous vein.    Significant reflux noted of the Right CFV (0.7s).  Significant reflux noted in the Left SSV Distal(1.1s).      Recommendations        compression socks recommended     Spoke with patient regarding results of lower extremity doppler. Pt is wearing compression stockings. Pt voiced understanding.

## 2021-04-15 NOTE — LETTER
[address] [address] [facility]  [facility]  Phone: [phone]  Fax: [phone]    Mari Ha MD        April 15, 2021     Patient: Thalia Mckinley   YOB: 1955   Date of Visit: 4/15/2021       To Whom It May Concern: It is my medical opinion that Thalia Mckinley may return to work on 4/19/2021 with no restrictions. 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 He will continue to be under my care. If you have any questions or concerns, please don't hesitate to call.     Sincerely,         Mari Ha MD

## 2021-04-16 NOTE — TELEPHONE ENCOUNTER
Per Dr Miguelito Davis, pt may return to work 4/20/2021.  Left message for patient to call back if he needs letter

## 2021-04-22 ENCOUNTER — CARE COORDINATION (OUTPATIENT)
Dept: CARE COORDINATION | Age: 66
End: 2021-04-22

## 2021-04-22 SDOH — HEALTH STABILITY: PHYSICAL HEALTH: ON AVERAGE, HOW MANY DAYS PER WEEK DO YOU ENGAGE IN MODERATE TO STRENUOUS EXERCISE (LIKE A BRISK WALK)?: 2 DAYS

## 2021-04-22 SDOH — SOCIAL STABILITY: SOCIAL NETWORK: HOW OFTEN DO YOU ATTENT MEETINGS OF THE CLUB OR ORGANIZATION YOU BELONG TO?: NEVER

## 2021-04-22 SDOH — HEALTH STABILITY: PHYSICAL HEALTH: ON AVERAGE, HOW MANY MINUTES DO YOU ENGAGE IN EXERCISE AT THIS LEVEL?: 20 MIN

## 2021-04-22 ASSESSMENT — ENCOUNTER SYMPTOMS: DYSPNEA ASSOCIATED WITH: EXERTION

## 2021-04-22 NOTE — CARE COORDINATION
Call to pt for acm f/u, no answer, lmtc. Ambulatory Care Coordination Note  4/22/2021  CM Risk Score: 2  Charlson 10 Year Mortality Risk Score: 79%     ACC: García Andujar RN    Summary Note:Call to pt for acm f/u. Reports Some soboe at times. Reports he is Back on lasix, and thinks dose needs to be stronger (currently on 20 mg/day). Reports he has gained 20 lbs since heart surgery. Normal weight is 180-185 before heart surgery, weight has been 198-200 now. Still having clear sputum, no change in cough or sputum. Pulse ox 92-94. Back and stomach hurting from weight, left leg swollen since CABG. Was advsied to use tall compression hose and pt obtained, reports is to wear until he comes back to see cardiology, and is keeping bp log. Pt reports he Thinks he has gained 7-8 lbs in last 5 days. Will forward to pcp. Reviewed copd/chf zm with pt and to monitor symptoms closely and to call pcp or cardiology with any worsening symptoms. Will continue to follow. Care Coordination Interventions    Program Enrollment: Rising Risk  Referral from Primary Care Provider: No  Suggested Interventions and Community Resources  Physical Therapy: Completed  Social Work: Declined  Other Therapy Services: Completed  Zone Management Tools: Completed         Goals Addressed                 This Visit's Progress     Conditions and Symptoms   On track     I will notify my provider of any symptoms that indicate a worsening of my condition. Barriers: lack of support, overwhelmed by complexity of regimen, and lack of education  Plan for overcoming my barriers: support aned ed from [facility]  Confidence: 7/10  Anticipated Goal Completion Date: 4/1/21              Prior to Admission medications    Medication Sig Start Date End Date Taking?  Authorizing Provider   albuterol sulfate  (90 Base) MCG/ACT inhaler Inhale 2 puffs into the lungs every 4 hours (while awake) 4/15/21   America Roche MD   atorvastatin (LIPITOR) 20 MG tablet Take 1 tablet by mouth nightly 4/15/21   Darron Roper MD   budesonide-formoterol [facility]) 160-4.5 MCG/ACT AERO Symbicort HFA 1 puff inhaled twice daily by mouth 4/15/21   Darron Roper MD   carvedilol (COREG) 3.125 MG tablet Take 1 tablet by mouth 2 times daily (with meals) 4/15/21   Darron Roper MD   cyclobenzaprine (FLEXERIL) 10 MG tablet Take 1 tablet by mouth 3 times daily as needed for Muscle spasms 4/15/21   Darron Roper MD   DULoxetine (CYMBALTA) 60 MG extended release capsule Take 1 capsule by mouth daily 4/15/21   Darron Roper MD   famotidine (PEPCID) 10 MG tablet Take 1 tablet by mouth 2 times daily as needed (reflux) 4/15/21   Darron Roper MD   finasteride (PROSCAR) 5 MG tablet Take 1 tablet by mouth daily 4/15/21   Darron Roper MD   furosemide (LASIX) 20 MG tablet Take 1 tablet by mouth daily as needed (leg swelling) 4/15/21   Darron Roper MD   levothyroxine (SYNTHROID) 112 MCG tablet Take 1 tablet by mouth daily 4/15/21   Darron Roper MD   lisinopril (PRINIVIL;ZESTRIL) 5 MG tablet Take 1 tablet by mouth daily 4/15/21   Darron Roper MD   tiotropium ([place]) 18 MCG inhalation capsule Inhale 1 capsule into the lungs daily 4/15/21   Darron Roper MD   tamsulosin [facility]) 0.4 MG capsule Take 1 capsule by mouth daily 4/15/21   Darron Roper MD   sildenafil (VIAGRA) 50 MG tablet Take 1 tablet by mouth daily as needed for Erectile Dysfunction 4/15/21   Darron Roper MD   Blood Pressure KIT Check BP once daily 3/25/21   Darron Roper MD   Multiple Vitamins-Minerals (THERAPEUTIC MULTIVITAMIN-MINERALS) tablet Take 1 tablet by mouth daily (with breakfast) 11/10/20   CHERRY Pat MD       Future Appointments   Date Time Provider Petra Albert   5/19/2021  8:40 AM Judge Charan MD [facility]   5/24/2021  9:00 AM Darron Roper MD [facility]     ,   Congestive Heart Failure Assessment    Are you

## 2021-04-22 NOTE — CARE COORDINATION
Pt advised of info from pcp and voices understanding with readback. Advised to call if any concerns arise, voices understanding.

## 2021-05-19 ENCOUNTER — OFFICE VISIT (OUTPATIENT)
Dept: CARDIOLOGY CLINIC | Age: 66
End: 2021-05-19
Payer: MEDICARE

## 2021-05-19 VITALS
WEIGHT: 204 LBS | DIASTOLIC BLOOD PRESSURE: 76 MMHG | SYSTOLIC BLOOD PRESSURE: 124 MMHG | HEART RATE: 90 BPM | HEIGHT: 70 IN | BODY MASS INDEX: 29.2 KG/M2

## 2021-05-19 DIAGNOSIS — Z95.1 S/P CABG X 3: Primary | ICD-10-CM

## 2021-05-19 PROCEDURE — 4040F PNEUMOC VAC/ADMIN/RCVD: CPT | Performed by: INTERNAL MEDICINE

## 2021-05-19 PROCEDURE — 1123F ACP DISCUSS/DSCN MKR DOCD: CPT | Performed by: INTERNAL MEDICINE

## 2021-05-19 PROCEDURE — G8417 CALC BMI ABV UP PARAM F/U: HCPCS | Performed by: INTERNAL MEDICINE

## 2021-05-19 PROCEDURE — 99214 OFFICE O/P EST MOD 30 MIN: CPT | Performed by: INTERNAL MEDICINE

## 2021-05-19 PROCEDURE — G8427 DOCREV CUR MEDS BY ELIG CLIN: HCPCS | Performed by: INTERNAL MEDICINE

## 2021-05-19 PROCEDURE — 4004F PT TOBACCO SCREEN RCVD TLK: CPT | Performed by: INTERNAL MEDICINE

## 2021-05-19 PROCEDURE — 3017F COLORECTAL CA SCREEN DOC REV: CPT | Performed by: INTERNAL MEDICINE

## 2021-05-19 NOTE — PROGRESS NOTES
Nely Rosa MD        OFFICE  FOLLOWUP NOTE    Chief complaints: patient is here for management of  CAD, S/P cabg , HTN, DYSLPIDEMIA, subarachnoid hemorrhage    Subjective: patient feels better, no chest pain, no shortness of breath, no dizziness, no palpitations    HPI Sharon Rivers is a 76 y. o.year old who  has a past medical history of CAD (coronary artery disease), COPD (chronic obstructive pulmonary disease) ([facility] [address] H/O cardiac catheterization, H/O echocardiogram, H/O echocardiogram, H/O exercise stress test, Hx of Doppler ultrasound, Hyperlipidemia, Hypertension, IBS (irritable bowel syndrome), Pancreatitis, Rheumatoid arthritis ([facility] [address] S/P CABG x 3, and Thyroid disease. and presents for management of  CAD, S/P cabg , HTN, DYSLPIDEMIA, subarachnoid hemorrhage which are well controlled    He is on aspirin now, blood pressure is better now, using compression tights, although still has leg swelling. he is not able to lose weight, he used to be 180 lbs and now 206 lbs, has back pain due to being over weight, he has started working again as     Current Outpatient Medications   Medication Sig Dispense Refill    albuterol sulfate  (90 Base) MCG/ACT inhaler Inhale 2 puffs into the lungs every 4 hours (while awake) 1 Inhaler 5    atorvastatin (LIPITOR) 20 MG tablet Take 1 tablet by mouth nightly 30 tablet 3    budesonide-formoterol (SYMBICORT) 160-4.5 MCG/ACT AERO Symbicort HFA 1 puff inhaled twice daily by mouth 1 Inhaler 5    carvedilol (COREG) 3.125 MG tablet Take 1 tablet by mouth 2 times daily (with meals) 60 tablet 3    cyclobenzaprine (FLEXERIL) 10 MG tablet Take 1 tablet by mouth 3 times daily as needed for Muscle spasms 90 tablet 3    DULoxetine (CYMBALTA) 60 MG extended release capsule Take 1 capsule by mouth daily 30 capsule 3    famotidine (PEPCID) 10 MG tablet Take 1 tablet by mouth 2 times daily as needed (reflux) 60 tablet 3    finasteride (PROSCAR) 5 MG tablet Take 1 tablet by mouth daily 30 tablet 3    furosemide (LASIX) 20 MG tablet Take 1 tablet by mouth daily as needed (leg swelling) 30 tablet 3    levothyroxine (SYNTHROID) 112 MCG tablet Take 1 tablet by mouth daily 30 tablet 3    lisinopril (PRINIVIL;ZESTRIL) 5 MG tablet Take 1 tablet by mouth daily 30 tablet 3    tiotropium (SPIRIVA HANDIHALER) 18 MCG inhalation capsule Inhale 1 capsule into the lungs daily 30 capsule 3    tamsulosin (FLOMAX) 0.4 MG capsule Take 1 capsule by mouth daily 30 capsule 3    sildenafil (VIAGRA) 50 MG tablet Take 1 tablet by mouth daily as needed for Erectile Dysfunction 10 tablet 0    Blood Pressure KIT Check BP once daily 1 kit 0    Multiple Vitamins-Minerals (THERAPEUTIC MULTIVITAMIN-MINERALS) tablet Take 1 tablet by mouth daily (with breakfast)  0     No current facility-administered medications for this visit. Allergies: Patient has no known allergies. Past Medical History:   Diagnosis Date    CAD (coronary artery disease)     Dr. Anthony Tello [facility]     No pulmonologist - follows with PCP    H/O cardiac catheterization 09/24/2020     Severe calcified multivessel CAD with LV dysfuntion, PCWP is 12, CABG consult.  H/O echocardiogram 06/23/2020     Mild concentric LVH with moderate systolic impairment. EF is 40-45 % .  H/O echocardiogram 02/02/2021    ef 45-50%,  Mild LVH.    H/O exercise stress test 12/16/2020    Submaximal study due to failure to achieve target heart rate response and    Hx of Doppler ultrasound 04/13/2021    significant reflux noted of the right CFV.  Significant reflux noted in the Left SSV Distal.    Hyperlipidemia     Hypertension     Follows with PCP    IBS (irritable bowel syndrome)     Pancreatitis 2013    Rheumatoid arthritis ([facility] [address]     S/P CABG x 3 10/19/2020    Thyroid disease      Past Surgical History:   Procedure Laterality Date    CARDIAC CATHETERIZATION  09/24/2020     Severe calcified multivessel CAD with LV dysfuntion, PCWP is 12, CABG consult.  [facility]  [facility]    [facility] [place]    CORONARY ARTERY BYPASS GRAFT N/A 10/19/2020    CABG CORONARY ARTERY BYPASS x3 WITH LIMA, INTRAOP BETH, ENDOHARVEST OF THE LEFT SAPHENOUS VEIN performed by Ara Ruelas MD at [address], COLON, DIAGNOSTIC  2017    Normal exam per pt (done in [place], [place])     Family History   Problem Relation Age of Onset    Alzheimer's Disease Mother     Heart Disease Father     Heart Attack Father     High Blood Pressure Father     High Cholesterol Father      Social History     Tobacco Use    Smoking status: Former Smoker     Packs/day: 2.00     Years: 52.00     Pack years: 104.00     Types: Cigarettes     Start date:      Quit date: 10/1/2020     Years since quittin.6    Smokeless tobacco: Current User     Types: Chew   Substance Use Topics    Alcohol use: Not Currently     Comment: caffeine 3 cups of coffee and 1 pop a day      [unfilled]  Review of Systems:   · Constitutional: No Fever or Weight Loss   · Eyes: No Decreased Vision  · ENT: No Headaches, Hearing Loss or Vertigo  · Cardiovascular: No chest pain, dyspnea on exertion, palpitations or loss of consciousness  · Respiratory: No cough or wheezing    · Gastrointestinal: No abdominal pain, appetite loss, blood in stools, constipation, diarrhea or heartburn  · Genitourinary: No dysuria, trouble voiding, or hematuria  · Musculoskeletal:  No gait disturbance, weakness or joint complaints  · Integumentary: No rash or pruritis  · Neurological: No TIA or stroke symptoms  · Psychiatric: No anxiety or depression  · Endocrine: No malaise, fatigue or temperature intolerance  · Hematologic/Lymphatic: No bleeding problems, blood clots or swollen lymph nodes  · Allergic/Immunologic: No nasal congestion or hives  All systems negative except as marked.    Objective:  /76   Pulse 90   Ht 5' 10\" (1.778 m)   Wt 204 lb (92.5 kg)   BMI 29.27 kg/m²   Wt Readings from Last 3 Encounters:   05/19/21 204 lb (92.5 kg)   04/15/21 196 lb (88.9 kg)   04/14/21 206 lb 12.8 oz (93.8 kg)     Body mass index is 29.27 kg/m². GENERAL - Alert, oriented, pleasant, in no apparent distress,normal grooming  HEENT - pupils are intact, cornea intact, conjunctive normal color, ears are normal in exam,  Neck - Supple. No jugular venous distention noted. No carotid bruits, no apical -carotid delay  Respiratory:  Normal breath sounds, No respiratory distress, No wheezing, No chest tenderness. ,no use of accessory muscles, diaphragm movement is normal  Cardiovascular: (PMI) apex non displaced,no lifts no thrills, no s3,no s4, Normal heart rate, Normal rhythm, No murmurs, No rubs, No gallops. Carotid arteries pulse and amplitude are normal no bruit, no abdominal bruit noted ( normal abdominal aorta ausculation),   Extremities - No cyanosis, clubbing, or significant edema, no varicose veins    Abdomen - No masses, tenderness, or organomegaly, no hepato-splenomegally, no bruits  Musculoskeletal - No significant edema, no kyphosis or scoliosis, no deformity in any extremity noted, muscle strength and tone are normal  Skin: no ulcer,no scar,no stasis dermatitis   Neurologic - alert oriented times 3,Cranial nerves II through XII are grossly intact. There were no gross focal neurologic abnormalities. Psychiatric: normal mood and affect    No results found for: CKTOTAL, CKMB, CKMBINDEX, TROPONINI  BNP:  No results found for: BNP  PT/INR:  No results found for: PTINR  Lab Results   Component Value Date    LABA1C 6.3 10/12/2020    LABA1C 6.3 09/23/2020     Lab Results   Component Value Date    CHOL 155 06/23/2020    TRIG 173 (H) 06/23/2020    HDL 30 (L) 06/23/2020    LDLDIRECT 106 (H) 06/23/2020     Lab Results   Component Value Date    ALT 45 (H) 03/24/2021    AST 22 03/24/2021     TSH:  No results found for: TSH    Impression:  Terrance Morrison is a 76 y. o.year old who  has a past medical history of CAD (coronary artery disease), COPD (chronic obstructive pulmonary disease) ([facility] [address] H/O cardiac catheterization, H/O echocardiogram, H/O echocardiogram, H/O exercise stress test, Hx of Doppler ultrasound, Hyperlipidemia, Hypertension, IBS (irritable bowel syndrome), Pancreatitis, Rheumatoid arthritis ([facility] [address] S/P CABG x 3, and Thyroid disease. and presents with     Plan:  1. Subarachnoid hemorrage: back on aspirin and has seen Neurosx   2. Obesity: recommend to cut back on calories,t 1200 calori restriction  3. CAD:s/o CABG X3 continue aspirin , continue statins,cntimnue  ACEinhibitors  4. betablockers,had  STRESS TEST AND he is not interested in CARDIAC REHAB  5. Dyslipidemia: continue statins,   6. Chf: no evidence of heart failure, left leg swelling is after venous stripping for CABG,t venous doppler in [facility] offices, reviewed, has left SSS reflux and b/l common femoral vein reflux disease, recommend compression socks and compression tights  7. HTN: continue lisinopril and bb and lasix  1. Health maintenance: exerise and diet  All labs, medications and tests reviewed, continue all other medications of all above medical condition listed as is.

## 2021-05-25 ENCOUNTER — CARE COORDINATION (OUTPATIENT)
Dept: CARE COORDINATION | Age: 66
End: 2021-05-25

## 2021-06-02 ENCOUNTER — CARE COORDINATION (OUTPATIENT)
Dept: CARE COORDINATION | Age: 66
End: 2021-06-02

## 2021-06-02 ASSESSMENT — ENCOUNTER SYMPTOMS: DYSPNEA ASSOCIATED WITH: EXERTION

## 2021-06-02 NOTE — TELEPHONE ENCOUNTER
I will discuss these concerns with the patient during his upcoming appt with me and will place referral to [facility]

## 2021-06-02 NOTE — CARE COORDINATION
7/10  Anticipated Goal Completion Date: 4/1/21              Prior to Admission medications    Medication Sig Start Date End Date Taking?  Authorizing Provider   albuterol sulfate  (90 Base) MCG/ACT inhaler Inhale 2 puffs into the lungs every 4 hours (while awake) 4/15/21   Denver Prophet, MD   atorvastatin (LIPITOR) 20 MG tablet Take 1 tablet by mouth nightly 4/15/21   Denver Prophet, MD   budesonide-formoterol (SYMBICORT) 160-4.5 MCG/ACT AERO Symbicort HFA 1 puff inhaled twice daily by mouth 4/15/21   Denver Prophet, MD   carvedilol (COREG) 3.125 MG tablet Take 1 tablet by mouth 2 times daily (with meals) 4/15/21   Denver Prophet, MD   cyclobenzaprine (FLEXERIL) 10 MG tablet Take 1 tablet by mouth 3 times daily as needed for Muscle spasms 4/15/21   Denver Prophet, MD   DULoxetine (CYMBALTA) 60 MG extended release capsule Take 1 capsule by mouth daily 4/15/21   Denver Prophet, MD   famotidine (PEPCID) 10 MG tablet Take 1 tablet by mouth 2 times daily as needed (reflux) 4/15/21   Denver Prophet, MD   finasteride (PROSCAR) 5 MG tablet Take 1 tablet by mouth daily 4/15/21   Denver Prophet, MD   furosemide (LASIX) 20 MG tablet Take 1 tablet by mouth daily as needed (leg swelling) 4/15/21   Denver Prophet, MD   levothyroxine (SYNTHROID) 112 MCG tablet Take 1 tablet by mouth daily 4/15/21   Denver Prophet, MD   lisinopril (PRINIVIL;ZESTRIL) 5 MG tablet Take 1 tablet by mouth daily 4/15/21   Denver Prophet, MD   tiotropium ([facility]) 18 MCG inhalation capsule Inhale 1 capsule into the lungs daily 4/15/21   Denver Prophet, MD   tamsulosin [facility]) 0.4 MG capsule Take 1 capsule by mouth daily 4/15/21   Denver Prophet, MD   sildenafil (VIAGRA) 50 MG tablet Take 1 tablet by mouth daily as needed for Erectile Dysfunction 4/15/21   Denver Prophet, MD   Blood Pressure KIT Check BP once daily 3/25/21   Denver Prophet, MD   Multiple Vitamins-Minerals (THERAPEUTIC MULTIVITAMIN-MINERALS) tablet Take 1 tablet by mouth daily (with breakfast) 11/10/20   CHERRY Hermosillo MD       Future Appointments   Date Time Provider Petra Martii   6/15/2021 10:45 AM Genie Maza MD [facility]   8/18/2021  9:40 AM Kelly Posada MD [facility]     ,   Congestive Heart Failure Assessment    Are you currently restricting fluids?: No Restriction  Do you understand a low sodium diet?: Yes  Do you understand how to read food labels?: Yes  How many restaurant meals do you eat per week?: 1-2  Do you salt your food before tasting it?: No     No patient-reported symptoms      Symptoms:  None: Yes      Symptom course: stable  Weight trend: stable     ,   COPD Assessment    Does the patient understand envrionmental exposure?: Yes  Is the patient able to verbalize Rescue vs. Long Acting medications?: Yes  Does the patient have a nebulizer?: No  Does the patient use a space with inhaled medications?: No     No patient-reported symptoms         Symptoms:  None: Yes      Symptom course: stable  Breathlessness: exertion  Increase use of rapid acting/rescue inhaled medications?: No  Change in chronic cough?: No/At Baseline  Change in sputum?: No/At Baseline  Sputum characteristics: Clear, Green  Self Monitoring - SaO2: No  Have you had a recent diagnosis of pneumonia either by PCP or at a hospital?: No      and   General Assessment    Do you have any symptoms that are causing concern?: No

## 2021-06-04 ENCOUNTER — TELEPHONE (OUTPATIENT)
Dept: INTERNAL MEDICINE CLINIC | Age: 66
End: 2021-06-04

## 2021-06-15 ENCOUNTER — OFFICE VISIT (OUTPATIENT)
Dept: INTERNAL MEDICINE CLINIC | Age: 66
End: 2021-06-15
Payer: MEDICARE

## 2021-06-15 VITALS
DIASTOLIC BLOOD PRESSURE: 80 MMHG | WEIGHT: 192 LBS | HEIGHT: 70 IN | OXYGEN SATURATION: 98 % | HEART RATE: 86 BPM | BODY MASS INDEX: 27.49 KG/M2 | SYSTOLIC BLOOD PRESSURE: 110 MMHG

## 2021-06-15 DIAGNOSIS — I50.42 CHRONIC COMBINED SYSTOLIC AND DIASTOLIC CONGESTIVE HEART FAILURE (HCC): ICD-10-CM

## 2021-06-15 DIAGNOSIS — N40.0 BENIGN PROSTATIC HYPERPLASIA, UNSPECIFIED WHETHER LOWER URINARY TRACT SYMPTOMS PRESENT: ICD-10-CM

## 2021-06-15 DIAGNOSIS — E78.2 MIXED HYPERLIPIDEMIA: ICD-10-CM

## 2021-06-15 DIAGNOSIS — M54.50 CHRONIC BILATERAL LOW BACK PAIN WITHOUT SCIATICA: ICD-10-CM

## 2021-06-15 DIAGNOSIS — R73.03 PREDIABETES: ICD-10-CM

## 2021-06-15 DIAGNOSIS — J43.1 PANLOBULAR EMPHYSEMA (HCC): ICD-10-CM

## 2021-06-15 DIAGNOSIS — I10 ESSENTIAL HYPERTENSION: Primary | ICD-10-CM

## 2021-06-15 DIAGNOSIS — K21.9 GASTROESOPHAGEAL REFLUX DISEASE, UNSPECIFIED WHETHER ESOPHAGITIS PRESENT: ICD-10-CM

## 2021-06-15 DIAGNOSIS — G89.29 CHRONIC BILATERAL LOW BACK PAIN WITHOUT SCIATICA: ICD-10-CM

## 2021-06-15 DIAGNOSIS — N52.9 VASCULOGENIC ERECTILE DYSFUNCTION, UNSPECIFIED VASCULOGENIC ERECTILE DYSFUNCTION TYPE: ICD-10-CM

## 2021-06-15 DIAGNOSIS — E03.9 ACQUIRED HYPOTHYROIDISM: ICD-10-CM

## 2021-06-15 PROCEDURE — G8926 SPIRO NO PERF OR DOC: HCPCS | Performed by: INTERNAL MEDICINE

## 2021-06-15 PROCEDURE — 3023F SPIROM DOC REV: CPT | Performed by: INTERNAL MEDICINE

## 2021-06-15 PROCEDURE — 3017F COLORECTAL CA SCREEN DOC REV: CPT | Performed by: INTERNAL MEDICINE

## 2021-06-15 PROCEDURE — 4004F PT TOBACCO SCREEN RCVD TLK: CPT | Performed by: INTERNAL MEDICINE

## 2021-06-15 PROCEDURE — 1123F ACP DISCUSS/DSCN MKR DOCD: CPT | Performed by: INTERNAL MEDICINE

## 2021-06-15 PROCEDURE — G8427 DOCREV CUR MEDS BY ELIG CLIN: HCPCS | Performed by: INTERNAL MEDICINE

## 2021-06-15 PROCEDURE — G8417 CALC BMI ABV UP PARAM F/U: HCPCS | Performed by: INTERNAL MEDICINE

## 2021-06-15 PROCEDURE — 4040F PNEUMOC VAC/ADMIN/RCVD: CPT | Performed by: INTERNAL MEDICINE

## 2021-06-15 PROCEDURE — 99214 OFFICE O/P EST MOD 30 MIN: CPT | Performed by: INTERNAL MEDICINE

## 2021-06-15 RX ORDER — TIOTROPIUM BROMIDE 18 UG/1
18 CAPSULE ORAL; RESPIRATORY (INHALATION) DAILY
Qty: 30 CAPSULE | Refills: 3 | Status: SHIPPED | OUTPATIENT
Start: 2021-06-15 | End: 2021-09-17 | Stop reason: SDUPTHER

## 2021-06-15 RX ORDER — DULOXETIN HYDROCHLORIDE 60 MG/1
60 CAPSULE, DELAYED RELEASE ORAL DAILY
Qty: 30 CAPSULE | Refills: 3 | Status: SHIPPED | OUTPATIENT
Start: 2021-06-15 | End: 2021-09-17 | Stop reason: SDUPTHER

## 2021-06-15 RX ORDER — FINASTERIDE 5 MG/1
5 TABLET, FILM COATED ORAL DAILY
Qty: 30 TABLET | Refills: 3 | Status: SHIPPED | OUTPATIENT
Start: 2021-06-15 | End: 2021-09-17 | Stop reason: SDUPTHER

## 2021-06-15 RX ORDER — CARVEDILOL 3.12 MG/1
3.12 TABLET ORAL 2 TIMES DAILY WITH MEALS
Qty: 60 TABLET | Refills: 3 | Status: SHIPPED | OUTPATIENT
Start: 2021-06-15 | End: 2021-09-17 | Stop reason: SDUPTHER

## 2021-06-15 RX ORDER — TAMSULOSIN HYDROCHLORIDE 0.4 MG/1
0.4 CAPSULE ORAL DAILY
Qty: 30 CAPSULE | Refills: 3 | Status: SHIPPED | OUTPATIENT
Start: 2021-06-15 | End: 2021-09-17 | Stop reason: SDUPTHER

## 2021-06-15 RX ORDER — FAMOTIDINE 20 MG/1
20 TABLET, FILM COATED ORAL 2 TIMES DAILY
Qty: 60 TABLET | Refills: 3 | Status: SHIPPED | OUTPATIENT
Start: 2021-06-15 | End: 2021-08-30

## 2021-06-15 RX ORDER — CYCLOBENZAPRINE HCL 10 MG
10 TABLET ORAL 3 TIMES DAILY PRN
Qty: 90 TABLET | Refills: 3 | Status: SHIPPED | OUTPATIENT
Start: 2021-06-15 | End: 2022-01-11 | Stop reason: SDUPTHER

## 2021-06-15 RX ORDER — BUDESONIDE AND FORMOTEROL FUMARATE DIHYDRATE 160; 4.5 UG/1; UG/1
AEROSOL RESPIRATORY (INHALATION)
Qty: 1 INHALER | Refills: 5 | Status: SHIPPED | OUTPATIENT
Start: 2021-06-15 | End: 2021-06-28

## 2021-06-15 RX ORDER — ALBUTEROL SULFATE 90 UG/1
2 AEROSOL, METERED RESPIRATORY (INHALATION)
Qty: 1 INHALER | Refills: 5 | Status: SHIPPED | OUTPATIENT
Start: 2021-06-15 | End: 2022-01-11 | Stop reason: SDUPTHER

## 2021-06-15 RX ORDER — ATORVASTATIN CALCIUM 20 MG/1
20 TABLET, FILM COATED ORAL NIGHTLY
Qty: 30 TABLET | Refills: 3 | Status: SHIPPED | OUTPATIENT
Start: 2021-06-15 | End: 2021-09-17 | Stop reason: SDUPTHER

## 2021-06-15 RX ORDER — FUROSEMIDE 20 MG/1
20 TABLET ORAL DAILY PRN
Qty: 30 TABLET | Refills: 3 | Status: SHIPPED | OUTPATIENT
Start: 2021-06-15 | End: 2021-09-17 | Stop reason: SDUPTHER

## 2021-06-15 RX ORDER — LEVOTHYROXINE SODIUM 112 UG/1
112 TABLET ORAL DAILY
Qty: 30 TABLET | Refills: 3 | Status: SHIPPED | OUTPATIENT
Start: 2021-06-15 | End: 2021-09-13

## 2021-06-15 RX ORDER — SILDENAFIL 100 MG/1
100 TABLET, FILM COATED ORAL DAILY PRN
Qty: 10 TABLET | Refills: 1 | Status: SHIPPED | OUTPATIENT
Start: 2021-06-15 | End: 2021-09-17 | Stop reason: SDUPTHER

## 2021-06-15 RX ORDER — LISINOPRIL 5 MG/1
5 TABLET ORAL DAILY
Qty: 30 TABLET | Refills: 3 | Status: SHIPPED | OUTPATIENT
Start: 2021-06-15 | End: 2021-09-17 | Stop reason: SDUPTHER

## 2021-06-15 ASSESSMENT — PATIENT HEALTH QUESTIONNAIRE - PHQ9
SUM OF ALL RESPONSES TO PHQ QUESTIONS 1-9: 0
SUM OF ALL RESPONSES TO PHQ QUESTIONS 1-9: 0
1. LITTLE INTEREST OR PLEASURE IN DOING THINGS: 0
2. FEELING DOWN, DEPRESSED OR HOPELESS: 0
SUM OF ALL RESPONSES TO PHQ9 QUESTIONS 1 & 2: 0
SUM OF ALL RESPONSES TO PHQ QUESTIONS 1-9: 0

## 2021-06-15 NOTE — PATIENT INSTRUCTIONS
when you are able to complete your fast.    When can I eat and drink normally again? As soon as your test is over. You may want to bring a snack with you, so you can eat right away. Is there anything else I need to know about fasting before a blood test?  Be sure to talk to your healthcare provider if you have any questions or concerns about fasting. You should talk to your provider before taking any lab test. Most tests don't require fasting or other special preparations. For others, you may need to avoid certain foods, medicines, or activities.        [facility] Internal Medicine  [phone]

## 2021-06-15 NOTE — PROGRESS NOTES
6/15/21    Thalia Mckinley  1955    Chief Complaint   Patient presents with    3 Month Follow-Up       History of Present Illness:  Thalia Mckinley is a 76 y.o. pleasant gentleman presenting today with a chief complaint of HTN, CHF, HL, ED, hypothyroidism. He has a past medical history significant for:  HTN, on Lisinopril 5mg QD, Coreg 3.125mg BID  HL (,  on 6/23/2020), on Atorvastatin 20mg  Prediabetes (HbA1C 6.3% on 9/23/2020), not on meds  CAD s/p CABG (10/2020), on ASA   HFrEF/HFpEF (EF 68-70%, diastolic dysfunction, mild MR on 6/23/2020; EF up to 45-50% on TTE 2/4/2021), on Lisinopril 5mg QD, Coreg 3.125mg BID, Lasix 20mg QD  Hypothyroidism (TSH 1.95 normal on 3/24/2021), on Synthroid 112mcg QD  PAD, on ASA   GERD, on Pepcid 20mg BID   Allergic rhinitis, off Zyrtec  BPH, on Flomax, Finasteride   COPD, on Albuterol PRN, Symbicort BID, Spiriva QD   Chronic back pain, on Cymbalta 60mg QHS, Flexeril 10mg BID PRN   IBS-mixed, on Bentyl QID   H/o C diff (10/2020)  Juvenile RA   Rheumatic fever   Former smoker (10/2020)     # Patient was admitted 3/15-3/19/2021 after syncopal episode when he got up from a sitting position, and hit his head. Had LOC. CTH showed SAH. He was transferred to [facility] trauma service. NSGY consulted and recommended no surgical intervention. No intracranial aneurysm noted. He was cleared to be on ASA. Had FU with NSGY on 4/5 after CTH done. Also had TTE per Cardiology stable. Off driving for 1 month and recommended compression stockings for his orthostatic hypotension.   He is s/p Holter 30-days. He watches his salt intake.   Yesterday Dr. Vanessa Kilpatrick re-introduced low dose Lisinopril, Lasix, Coreg due to high BP. # Patient was admitted 9/23-9/24/2020 for hypotension and bradycardia following LHC. He had N/V and was hypovolemic. He was thought to be in cardiogenic shock s/p dopamine gtt. He also received IVF resuscitation.  He was discharged home ambulating, in a stable itchiness  Mouth: no oral ulcers, no dry mouth, no sore throat  Nose: no nasal congestion, no epistaxis  Cardiac: no chest pain, no palpitations, leg swelling, no orthopnea, no PND, no syncope  Pulmonary: no dyspnea, no cough, no wheezing, no hemoptysis  GI: no nausea, no vomiting, no diarrhea, no constipation, no abdominal pain, no hematochezia  : no dysuria, no frequency, no urgency, no hematuria, no frothy urine  MSK: no arthralgias, no myalgias, no early morning stiffness, no Raynaud's   Neuro: no focal neurological deficits, no seizures  Sleep: no snoring, no daytime somnolence   Psych: no depression, no anxiety, no suicidal ideation      Physical Exam:  VITALS:   /80   Pulse 86   Ht 5' 10\" (1.778 m)   Wt 192 lb (87.1 kg)   SpO2 98%   BMI 27.55 kg/m²     PHYSICAL EXAMINATION:  General: alert, awake, and oriented to time, place, person, and situation. Not in acute distress   Skin:  no suspicious rashes, no jaundice  Head: normocephalic/atraumatic  Eyes: anicteric sclera, well-injected conjunctiva. Pupils are equally round and reactive to light. Extraocular movements are intact   Nose: no septal deviation evident  Sinuses: no sinus tenderness  Ears: external ears normal  Neck: supple, no cervical lymphadenopathy, thyroid symmetric and not enlarged, no bruits   Heart: regular rate and rhythm, regular S1/S2, no S3/S4, no audible murmurs, no audible friction rub, no JVD  Lungs: clear to auscultation bilaterally, no audible crackles, no audible wheezes, no audible rhonchi    Abdomen: normal bowel sounds, soft abdomen, non-tender, no palpable masses  Extremities: L > RLE pitting edema 1+ (site of L vein retrieval for CABG), warm, no cyanosis, no clubbing. Good capillary refill   MSK: no tenderness across spinous processes, full ROM in all 4 extremities.  No joint swelling or tenderness   Peripheral vascular: 2+ pulses symmetric (radial)  Neuro: gait normal, CN II-XII intact, motor power 5/5 in all 4 extremities, sensation intact and symmetric    Labs   I have personally reviewed labs, and discussed pertinent findings with patient on this date 6/15/2021     Imaging   I have personally reviewed imaging, and discussed pertinent findings with patient on this date 6/15/2021     Other notes  I have personally reviewed other notes, and discussed pertinent findings with patient on this date 6/15/2021       Assessment/Plan:     1. Essential hypertension  Stable  Continue Lisinopril 5mg QD, Coreg 3.125mg BID  - COMPREHENSIVE METABOLIC PANEL; Future    2. Mixed hyperlipidemia  ,  in June 2020  Will need updated labs  Target LDL < 70  Continue Atorvastatin 20mg QD  - CBC Auto Differential; Future  - COMPREHENSIVE METABOLIC PANEL; Future  - Lipid, Fasting; Future    3. Prediabetes  A1C 6.3% in Sept 2020  Diet controlled  Needs updated labs   - CBC Auto Differential; Future  - COMPREHENSIVE METABOLIC PANEL; Future  - HEMOGLOBIN A1C; Future    4. Chronic combined systolic and diastolic congestive heart failure (HCC)  HFrEF/HFpEF (EF 49-49%, diastolic dysfunction, mild MR on 6/23/2020; EF up to 45-50% on TTE 2/4/2021)  Compensated  Maintain euvolemia  Continue ACEi Lisinopril 5mg QD  Continue BB Coreg 3.125mg BID  Continue diuretic Lasix 20mg QD PRN   Compression stockings thigh-high for LE edema from reflux     5. Acquired hypothyroidism  TSH normal in March 2021  Continue Synthroid 112mcg QD  Updated labs prior to next visit  - CBC Auto Differential; Future  - COMPREHENSIVE METABOLIC PANEL; Future  - TSH with Reflex; Future    6. Panlobular emphysema (HCC)  Stable  Continue Albuterol inh PRN  Continue Symbicort BID  Continue Spiriva QD     7. Benign prostatic hyperplasia, unspecified whether lower urinary tract symptoms present  Continue Flomax and Proscar     8. Chronic bilateral low back pain without sciatica  Continue Flexeril 10mg TID PRN  Continue Cymbalta 60mg QD    9.  Vasculogenic erectile dysfunction, unspecified vasculogenic erectile dysfunction type  No contraindication for PDE-5i as he is not on nitro. I did discuss with him the side effects and warning signs that warrant discontinuation of Sildenafil. He wants to increase to 100mg.   - sildenafil (VIAGRA) 100 MG tablet; Take 1 tablet by mouth daily as needed for Erectile Dysfunction  Dispense: 10 tablet; Refill: 1    10. Gastroesophageal reflux disease, unspecified whether esophagitis present  Increase Pepcid to 20mg BID       Care discussed with patient and questions answered. Patient verbalizes understanding and agrees with plan. Discussed with patient the importance of continuity of care. I encouraged patient to schedule next appointment within 3 months with me. Patient prefers to be reached by Phone call at [phone] for future medical correspondence. Encouraged to activate [employer]. I reviewed and reconciled the medications this visit. I reviewed and updated the past medical, surgical, social, and family history during this visit. After visit summary provided.        Gerald Eisenberg MD  Internal Medicine  6/15/2021   12:14 PM

## 2021-06-15 NOTE — PROGRESS NOTES
C/O chest pain where incision from heart surgery, patient stated he is coughing up green mucus and cough is deep - stated that hurts his chest, patient stated he has been having a lot of headaches again      Left leg swelling at times -  Patient stated he would like to discuss weight loss - (patient stated that he would not have time to see nutritionist or weight loss provider due to work)    Patient stated that 10 mg at a time of famotidine is not helping with reflux would like increasse  Cyclobenzaprine dosage is not helping would like to discuss increasing

## 2021-06-28 RX ORDER — FLUTICASONE FUROATE AND VILANTEROL 100; 25 UG/1; UG/1
1 POWDER RESPIRATORY (INHALATION) DAILY
Qty: 30 EACH | Refills: 3 | Status: SHIPPED | OUTPATIENT
Start: 2021-06-28 | End: 2021-09-17 | Stop reason: SDUPTHER

## 2021-07-07 ENCOUNTER — CARE COORDINATION (OUTPATIENT)
Dept: CARE COORDINATION | Age: 66
End: 2021-07-07

## 2021-07-07 ASSESSMENT — ENCOUNTER SYMPTOMS: DYSPNEA ASSOCIATED WITH: EXERTION

## 2021-07-07 NOTE — CARE COORDINATION
MD Destinee   sildenafil (VIAGRA) 100 MG tablet Take 1 tablet by mouth daily as needed for Erectile Dysfunction 6/15/21   Meliton Cantu MD   tamsulosin [facility]) 0.4 MG capsule Take 1 capsule by mouth daily 6/15/21   Meliton Cantu MD   tiotropium (Vanessa Ours) 18 MCG inhalation capsule Inhale 1 capsule into the lungs daily 6/15/21   Meliton Cantu MD   lisinopril (PRINIVIL;ZESTRIL) 5 MG tablet Take 1 tablet by mouth daily 6/15/21   Meliton Cantu MD   levothyroxine (SYNTHROID) 112 MCG tablet Take 1 tablet by mouth daily 6/15/21   Meliton Cantu MD   furosemide (LASIX) 20 MG tablet Take 1 tablet by mouth daily as needed (leg swelling) 6/15/21   Meliton Cantu MD   finasteride (PROSCAR) 5 MG tablet Take 1 tablet by mouth daily 6/15/21   Meliton Cantu MD   DULoxetine (CYMBALTA) 60 MG extended release capsule Take 1 capsule by mouth daily 6/15/21   Meliton Cantu MD   carvedilol (COREG) 3.125 MG tablet Take 1 tablet by mouth 2 times daily (with meals) 6/15/21   Meliton Cantu MD   atorvastatin (LIPITOR) 20 MG tablet Take 1 tablet by mouth nightly 6/15/21   Meliton Cantu MD   albuterol sulfate  (90 Base) MCG/ACT inhaler Inhale 2 puffs into the lungs every 4 hours (while awake) 6/15/21   Meliton Cantu MD   famotidine (PEPCID) 20 MG tablet Take 1 tablet by mouth 2 times daily 6/15/21   Meliton Cantu MD   cyclobenzaprine (FLEXERIL) 10 MG tablet Take 1 tablet by mouth 3 times daily as needed for Muscle spasms 6/15/21   Meliton Cantu MD   Blood Pressure KIT Check BP once daily 3/25/21   Meliton Cantu MD   Multiple Vitamins-Minerals (THERAPEUTIC MULTIVITAMIN-MINERALS) tablet Take 1 tablet by mouth daily (with breakfast) 11/10/20   CHERRY Perez MD       Future Appointments   Date Time Provider Petra Albert   8/18/2021  9:40 AM Nikhil Raya MD [facility]   9/16/2021  8:30 AM Meliton Cantu MD [facility]     ,   Congestive Heart Failure Assessment    Are you currently restricting fluids?: No Restriction  Do you understand a low sodium diet?: Yes  Do you understand how to read food labels?: Yes  How many restaurant meals do you eat per week?: 1-2  Do you salt your food before tasting it?: No     Swelling (worse than baseline) in hands, feet/legs or around abdomen, Increase in weights (more than 3lbs overnight or 5lbs in one week)      Symptoms:     Symptom course: worsening  Patient-reported weight (lb): 210  Weight trend: increasing steadily  Salt intake watch compared to last visit: stable      and   COPD Assessment    Does the patient understand envrionmental exposure?: Yes  Is the patient able to verbalize Rescue vs. Long Acting medications?: Yes  Does the patient have a nebulizer?: No  Does the patient use a space with inhaled medications?: No     No patient-reported symptoms         Symptoms:  None: Yes      Symptom course: stable  Breathlessness: exertion  Increase use of rapid acting/rescue inhaled medications?: No  Change in chronic cough?: No/At Baseline  Change in sputum?: No/At Baseline  Sputum characteristics: Marny Hamman  Self Monitoring - SaO2: No  Have you had a recent diagnosis of pneumonia either by PCP or at a hospital?: No

## 2021-07-07 NOTE — TELEPHONE ENCOUNTER
Thyroid was recently checked in March and it was within normal limits. Recommend cutting back on salt to < 1500mg per day.

## 2021-07-08 RX ORDER — DILTIAZEM HYDROCHLORIDE 60 MG/1
TABLET, FILM COATED ORAL
Qty: 3 INHALER | Refills: 1 | OUTPATIENT
Start: 2021-07-08

## 2021-08-04 ENCOUNTER — HOSPITAL ENCOUNTER (EMERGENCY)
Age: 66
Discharge: HOME | End: 2021-08-04
Attending: EMERGENCY MEDICINE
Payer: MEDICARE

## 2021-08-04 ENCOUNTER — APPOINTMENT (OUTPATIENT)
Dept: GENERAL RADIOLOGY | Age: 66
End: 2021-08-04
Payer: MEDICARE

## 2021-08-04 VITALS
OXYGEN SATURATION: 93 % | RESPIRATION RATE: 16 BRPM | TEMPERATURE: 97.7 F | BODY MASS INDEX: 29.62 KG/M2 | SYSTOLIC BLOOD PRESSURE: 154 MMHG | DIASTOLIC BLOOD PRESSURE: 90 MMHG | WEIGHT: 200 LBS | HEIGHT: 69 IN | HEART RATE: 91 BPM

## 2021-08-04 DIAGNOSIS — Z95.1 HX OF CABG: ICD-10-CM

## 2021-08-04 DIAGNOSIS — R07.9 CHEST PAIN, UNSPECIFIED TYPE: Primary | ICD-10-CM

## 2021-08-04 DIAGNOSIS — I15.9 SECONDARY HYPERTENSION: ICD-10-CM

## 2021-08-04 LAB
ANION GAP SERPL CALCULATED.3IONS-SCNC: 2 MMOL/L (ref 4–16)
BASOPHILS ABSOLUTE: 0 K/CU MM
BASOPHILS RELATIVE PERCENT: 0.4 % (ref 0–1)
BUN BLDV-MCNC: 12 MG/DL (ref 6–23)
CALCIUM SERPL-MCNC: 9.1 MG/DL (ref 8.3–10.6)
CHLORIDE BLD-SCNC: 103 MMOL/L (ref 99–110)
CO2: 35 MMOL/L (ref 21–32)
CREAT SERPL-MCNC: 0.9 MG/DL (ref 0.9–1.3)
DIFFERENTIAL TYPE: ABNORMAL
EKG ATRIAL RATE: 88 BPM
EKG DIAGNOSIS: NORMAL
EKG P AXIS: -16 DEGREES
EKG P-R INTERVAL: 182 MS
EKG Q-T INTERVAL: 402 MS
EKG QRS DURATION: 100 MS
EKG QTC CALCULATION (BAZETT): 486 MS
EKG R AXIS: 63 DEGREES
EKG T AXIS: 40 DEGREES
EKG VENTRICULAR RATE: 88 BPM
EOSINOPHILS ABSOLUTE: 0.5 K/CU MM
EOSINOPHILS RELATIVE PERCENT: 5.1 % (ref 0–3)
GFR AFRICAN AMERICAN: >60 ML/MIN/1.73M2
GFR NON-AFRICAN AMERICAN: >60 ML/MIN/1.73M2
GLUCOSE BLD-MCNC: 190 MG/DL (ref 70–99)
HCT VFR BLD CALC: 52.1 % (ref 42–52)
HEMOGLOBIN: 17.2 GM/DL (ref 13.5–18)
IMMATURE NEUTROPHIL %: 0.3 % (ref 0–0.43)
LIPASE: 14 IU/L (ref 13–60)
LYMPHOCYTES ABSOLUTE: 2.1 K/CU MM
LYMPHOCYTES RELATIVE PERCENT: 20.7 % (ref 24–44)
MCH RBC QN AUTO: 33.1 PG (ref 27–31)
MCHC RBC AUTO-ENTMCNC: 33 % (ref 32–36)
MCV RBC AUTO: 100.4 FL (ref 78–100)
MONOCYTES ABSOLUTE: 0.9 K/CU MM
MONOCYTES RELATIVE PERCENT: 8.6 % (ref 0–4)
PDW BLD-RTO: 14.5 % (ref 11.7–14.9)
PLATELET # BLD: 163 K/CU MM (ref 140–440)
PMV BLD AUTO: 9.4 FL (ref 7.5–11.1)
POTASSIUM SERPL-SCNC: 3.9 MMOL/L (ref 3.5–5.1)
PRO-BNP: 264.8 PG/ML
RBC # BLD: 5.19 M/CU MM (ref 4.6–6.2)
SEGMENTED NEUTROPHILS ABSOLUTE COUNT: 6.5 K/CU MM
SEGMENTED NEUTROPHILS RELATIVE PERCENT: 64.9 % (ref 36–66)
SODIUM BLD-SCNC: 140 MMOL/L (ref 135–145)
TOTAL IMMATURE NEUTOROPHIL: 0.03 K/CU MM
TROPONIN T: <0.01 NG/ML
TROPONIN T: <0.01 NG/ML
WBC # BLD: 10.1 K/CU MM (ref 4–10.5)

## 2021-08-04 PROCEDURE — 71045 X-RAY EXAM CHEST 1 VIEW: CPT

## 2021-08-04 PROCEDURE — 85025 COMPLETE CBC W/AUTO DIFF WBC: CPT

## 2021-08-04 PROCEDURE — 83690 ASSAY OF LIPASE: CPT

## 2021-08-04 PROCEDURE — 93010 ELECTROCARDIOGRAM REPORT: CPT | Performed by: INTERNAL MEDICINE

## 2021-08-04 PROCEDURE — 80048 BASIC METABOLIC PNL TOTAL CA: CPT

## 2021-08-04 PROCEDURE — 99285 EMERGENCY DEPT VISIT HI MDM: CPT

## 2021-08-04 PROCEDURE — 84484 ASSAY OF TROPONIN QUANT: CPT

## 2021-08-04 PROCEDURE — 93005 ELECTROCARDIOGRAM TRACING: CPT | Performed by: EMERGENCY MEDICINE

## 2021-08-04 PROCEDURE — 83880 ASSAY OF NATRIURETIC PEPTIDE: CPT

## 2021-08-04 PROCEDURE — 6370000000 HC RX 637 (ALT 250 FOR IP): Performed by: EMERGENCY MEDICINE

## 2021-08-04 RX ORDER — ASPIRIN 81 MG/1
162 TABLET, CHEWABLE ORAL ONCE
Status: COMPLETED | OUTPATIENT
Start: 2021-08-04 | End: 2021-08-04

## 2021-08-04 RX ADMIN — ASPIRIN 162 MG: 81 TABLET, CHEWABLE ORAL at 09:28

## 2021-08-04 ASSESSMENT — PAIN DESCRIPTION - LOCATION: LOCATION: CHEST

## 2021-08-04 ASSESSMENT — PAIN DESCRIPTION - ORIENTATION: ORIENTATION: LEFT

## 2021-08-04 ASSESSMENT — PAIN SCALES - GENERAL: PAINLEVEL_OUTOF10: 5

## 2021-08-04 ASSESSMENT — PAIN DESCRIPTION - PAIN TYPE: TYPE: ACUTE PAIN

## 2021-08-04 ASSESSMENT — PAIN DESCRIPTION - DESCRIPTORS: DESCRIPTORS: ACHING;DISCOMFORT

## 2021-08-04 NOTE — ED NOTES
Discharge instructions reviewed and pt acknowledges understanding. Ambulatory at discharge.        Joselo Jara RN  08/04/21 7622

## 2021-08-04 NOTE — ED NOTES
Phlebotomy done and saline lock initiated. Samples to lab. Pt. Alert and oriented to person, place, and events. Pupils equal and reactive to light. Pulse, motor and sensory intact to all 4 extremities. Follows commands. Bilateral  and legs strong and equal.  Skin warm, dry, and acyanotic. No peripheral edema noted. Chest clear to auscultation with bilateral equal breath sounds. Respirations even and unlabored with symmetrical expansion. Heart sounds clear without rub or murmur. Denies chest pain. Abdomen soft and non-tender. Bowel sound present all quadrants. Denies dysuria or hematuria.             Loras Fabry, RN  08/04/21 0918

## 2021-08-04 NOTE — ED PROVIDER NOTES
CHIEF COMPLAINT  Chief Complaint   Patient presents with    Chest Pain     c/o chest pains and SOB since 5am       BRENDAN Mckeon is a 76 y.o. male with history of CAD status post CABG in October 2020, COPD, pancreatitis who presents substernal chest pressure and shortness of breath that started this morning around 5, when he was making his coffee slightly later he noticed that it worsen, it was substernal, is currently mild to moderate and persistent. It is aching, well localized without radiation. Denies any abdominal pain, vomiting. His leg swelling has decreased, he still has his baseline cough productive of whitish sputum which has been there ever since October. He also has his baseline headache which is unchanged since subarachnoid for which she was hospitalized. [place] any focal weakness or numbness, any blurred vision, neck stiffness, falls, trauma or other concerns. He denies any unilateral leg swelling or history of DVT or PE. He did take an aspirin this morning which is his baseline dose. He follows with Dr. Pawan Adame as well as Dr. Junior Story.       REVIEW OF SYSTEMS  Review of Systems   History obtained from chart review and the patient  General ROS: negative for - chills or fever  Ophthalmic ROS: negative for - decreased vision or double vision  ENT ROS: negative for - nasal congestion or nasal discharge  Hematological and Lymphatic ROS: negative for - bleeding problems or blood clots  Endocrine ROS: negative for - unexpected weight changes  Respiratory ROS: positive for - cough  Cardiovascular ROS: positive for - chest pain  Gastrointestinal ROS: no abdominal pain, change in bowel habits, or black or bloody stools  Genito-Urinary ROS: no dysuria, trouble voiding, or hematuria  Musculoskeletal ROS: negative for - joint stiffness or joint swelling  Neurological ROS: no TIA or stroke symptoms      PAST MEDICAL HISTORY  Past Medical History:   Diagnosis Date    CAD (coronary artery disease)      SORAIDA Jones    [facility] ([facility]     No pulmonologist - follows with PCP    H/O cardiac catheterization 2020     Severe calcified multivessel CAD with LV dysfuntion, PCWP is 12, CABG consult.  H/O echocardiogram 2020     Mild concentric LVH with moderate systolic impairment. EF is 40-45 % .  H/O echocardiogram 2021    ef 45-50%,  Mild LVH.    H/O exercise stress test 2020    Submaximal study due to failure to achieve target heart rate response and    Hx of Doppler ultrasound 2021    significant reflux noted of the right CFV.  Significant reflux noted in the Left SSV Distal.    Hyperlipidemia     Hypertension     Follows with PCP    IBS (irritable bowel syndrome)     Pancreatitis     Rheumatoid arthritis ([facility]     S/P CABG x 3 10/19/2020    Thyroid disease        FAMILY HISTORY  Family History   Problem Relation Age of Onset    Alzheimer's Disease Mother     Heart Disease Father     Heart Attack Father     High Blood Pressure Father     High Cholesterol Father        SOCIAL HISTORY  Social History     Socioeconomic History    Marital status:      Spouse name: None    Number of children: None    Years of education: None    Highest education level: None   Occupational History    None   Tobacco Use    Smoking status: Former Smoker     Packs/day: 2.00     Years: 52.00     Pack years: 104.00     Types: Cigarettes     Start date:      Quit date: 10/1/2020     Years since quittin.8    Smokeless tobacco: Current User     Types: Chew   Vaping Use    Vaping Use: Never used   Substance and Sexual Activity    Alcohol use: Not Currently     Comment: caffeine 3 cups of coffee and 1 pop a day    Drug use: Never    Sexual activity: Yes     Partners: Female   Other Topics Concern    None   Social History Narrative    None     Social Determinants of Health     Financial Resource Strain:     Difficulty of Paying Living Expenses:    Food Insecurity:     Worried About Running Out of Food in the Last Year:     [address] in the Last Year:    Transportation Needs:     Lack of Transportation (Medical):  Lack of Transportation (Non-Medical):    Physical Activity: Insufficiently Active    Days of Exercise per Week: 2 days    Minutes of Exercise per Session: 20 min   Stress:     Feeling of Stress :    Social Connections: Unknown    Frequency of Communication with Friends and Family: Three times a week    Frequency of Social Gatherings with Friends and Family: Three times a week    Attends Congregational Services: Never    Active Member of Clubs or Organizations: No    Attends Club or Organization Meetings: Never    Marital Status: Not on file   Intimate Partner Violence:     Fear of Current or Ex-Partner:     Emotionally Abused:     Physically Abused:     Sexually Abused:        SURGICAL HISTORY  Past Surgical History:   Procedure Laterality Date    CARDIAC CATHETERIZATION  09/24/2020     Severe calcified multivessel CAD with LV dysfuntion, PCWP is 12, CABG consult.  COLONOSCOPY  2017    [place], [place])    CORONARY ARTERY BYPASS GRAFT N/A 10/19/2020    CABG CORONARY ARTERY BYPASS x3 WITH LIMA, INTRAOP BETH, ENDOHARVEST OF THE LEFT SAPHENOUS VEIN performed by Vick Leslie MD at [address] [place] [address], DIAGNOSTIC  2017    Normal exam per pt (done in [place], [place])       [address]  No current facility-administered medications on file prior to encounter.      Current Outpatient Medications on File Prior to Encounter   Medication Sig Dispense Refill    fluticasone-vilanterol (BREO ELLIPTA) 100-25 MCG/INH AEPB inhaler Inhale 1 puff into the lungs daily 30 each 3    sildenafil (VIAGRA) 100 MG tablet Take 1 tablet by mouth daily as needed for Erectile Dysfunction 10 tablet 1    tamsulosin (FLOMAX) 0.4 MG capsule Take 1 capsule by mouth daily 30 capsule 3    tiotropium ([place]) 18 MCG inhalation capsule Inhale 1 capsule into the lungs daily 30 capsule 3    lisinopril (PRINIVIL;ZESTRIL) 5 MG tablet Take 1 tablet by mouth daily 30 tablet 3    levothyroxine (SYNTHROID) 112 MCG tablet Take 1 tablet by mouth daily 30 tablet 3    furosemide (LASIX) 20 MG tablet Take 1 tablet by mouth daily as needed (leg swelling) 30 tablet 3    finasteride (PROSCAR) 5 MG tablet Take 1 tablet by mouth daily 30 tablet 3    DULoxetine (CYMBALTA) 60 MG extended release capsule Take 1 capsule by mouth daily 30 capsule 3    carvedilol (COREG) 3.125 MG tablet Take 1 tablet by mouth 2 times daily (with meals) 60 tablet 3    atorvastatin (LIPITOR) 20 MG tablet Take 1 tablet by mouth nightly 30 tablet 3    albuterol sulfate  (90 Base) MCG/ACT inhaler Inhale 2 puffs into the lungs every 4 hours (while awake) 1 Inhaler 5    famotidine (PEPCID) 20 MG tablet Take 1 tablet by mouth 2 times daily 60 tablet 3    cyclobenzaprine (FLEXERIL) 10 MG tablet Take 1 tablet by mouth 3 times daily as needed for Muscle spasms 90 tablet 3    Blood Pressure KIT Check BP once daily 1 kit 0    Multiple Vitamins-Minerals (THERAPEUTIC MULTIVITAMIN-MINERALS) tablet Take 1 tablet by mouth daily (with breakfast)  0         ALLERGIES  No Known Allergies    PHYSICAL EXAM  VITAL SIGNS: BP (!) 148/87   Pulse 81   Temp 97.7 °F (36.5 °C) (Oral)   Resp 14   Ht 5' 9\" (1.753 m)   Wt 200 lb (90.7 kg)   SpO2 93%   BMI 29.53 kg/m²   Constitutional: Well developed, Well nourished, resting in bed, pleasant  HENT: Normocephalic, Atraumatic, Bilateral external ears normal, Oropharynx moist, No oral exudates, Nose normal.   Eyes: PERRL, EOMI, Conjunctiva normal, No discharge. Neck: Normal range of motion, Supple, No stridor. Cardiovascular: Normal heart rate, Normal rhythm, No murmurs, No rubs, No gallops. Thorax & Lungs: Normal breath sounds, No respiratory distress, No wheezing, No chest tenderness. Abdomen:  Bowel sounds normal, Soft, No tenderness, no guarding, no rebound, No masses, No pulsatile masses. Skin: Warm, Dry, No erythema, No rash. Extremities: Intact distal pulses, No edema, No tenderness, No cyanosis, No clubbing. Musculoskeletal: Good gross range of motion in all major joints. No major deformities noted. Neurologic: Alert & oriented x 3, Normal gross motor function, Normal gross sensory function, No focal deficits noted. Psychiatric: Affect normal    EKG  EKG Interpretation    Interpreted by emergency department physician from August 4 at 822    Rhythm: normal sinus   Rate: normal  Axis: normal  Ectopy: PAC and PVC  Conduction: normal  ST Segments: nonspecific changes  T Waves: no acute change  Q Waves: none    Clinical Impression: Sinus rhythm with sinus arrhythmia and PVCs, rate of 88 and QTC of 486 with no STEMI    Eulalia Giron MD      RADIOLOGY/PROCEDURES/LABS  Last Imaging results   XR CHEST PORTABLE   Final Result   1. Emphysema. No superimposed acute pulmonary abnormality to account for   patient's chest pain. 2. Stable mild enlargement of the cardiac silhouette. Imaging reviewed by myself    Labs Reviewed   CBC WITH AUTO DIFFERENTIAL - Abnormal; Notable for the following components:       Result Value    Hematocrit 52.1 (*)     .4 (*)     MCH 33.1 (*)     Lymphocytes % 20.7 (*)     Monocytes % 8.6 (*)     Eosinophils % 5.1 (*)     All other components within normal limits   BASIC METABOLIC PANEL W/ REFLEX TO MG FOR LOW K - Abnormal; Notable for the following components:    CO2 35 (*)     Anion Gap 2 (*)     Glucose 190 (*)     All other components within normal limits   TROPONIN   BRAIN NATRIURETIC PEPTIDE   LIPASE   TROPONIN         Medications   aspirin chewable tablet 162 mg (162 mg Oral Given 8/4/21 0928)       COURSE & MEDICAL DECISION MAKING  Pertinent Labs & Imaging studies reviewed. (See chart for details)    57-year-old male presents with chest pain.  He has a known history of CAD status post CABG. Based on his signs and symptoms I did contact Dr. Adrianna Barrett of cardiology. I relayed to the patient that both his cardiologist and myself would prefer to hospitalize him based on his risk factors and to facilitate a stress test tomorrow. He states he has upcoming follow-up on the 18th and he does not wish to stay in the hospital. I was able to have shared decision-making convince him to stay for second troponin. EKG and 2 troponins not suggestive of ACS today as a cause of his signs and symptoms. His work-up is not suggestive of aortic dissection, PE. His chest x-rays not demonstrate pneumonia, significant pulmonary edema or pneumothorax. He he was sleeping soundly at multiple times of recheck, at time of recheck his chest pain has resolved. He is treated with aspirin in the department. Based on his signs and symptoms, he is agreeable to call his cardiologist office so they may arrange close follow-up. He is agreeable to return with any new worsening signs or symptoms. He is discharged per his wishes after shared decision making regarding his risk factors and my concern for underlying chance of cardiac disease. FINAL IMPRESSION  Problem List Items Addressed This Visit     None      Visit Diagnoses     Chest pain, unspecified type    -  Primary    Secondary hypertension        Hx of CABG          1.    2.   3.    Patient gave me permission to discuss medical history, care, and plan with those present in the room.   Electronically signed by: Beryl Esquivel MD, 8/4/2021  MD Beryl Boss MD  08/04/21 9766

## 2021-08-06 ENCOUNTER — CARE COORDINATION (OUTPATIENT)
Dept: CARE COORDINATION | Age: 66
End: 2021-08-06

## 2021-08-18 ENCOUNTER — OFFICE VISIT (OUTPATIENT)
Dept: CARDIOLOGY CLINIC | Age: 66
End: 2021-08-18
Payer: MEDICARE

## 2021-08-18 VITALS
OXYGEN SATURATION: 94 % | DIASTOLIC BLOOD PRESSURE: 68 MMHG | HEIGHT: 70 IN | SYSTOLIC BLOOD PRESSURE: 108 MMHG | WEIGHT: 198.8 LBS | BODY MASS INDEX: 28.46 KG/M2 | HEART RATE: 108 BPM

## 2021-08-18 DIAGNOSIS — E78.2 MIXED HYPERLIPIDEMIA: ICD-10-CM

## 2021-08-18 DIAGNOSIS — R07.89 OTHER CHEST PAIN: ICD-10-CM

## 2021-08-18 DIAGNOSIS — I87.2 VENOUS REFLUX: Primary | ICD-10-CM

## 2021-08-18 DIAGNOSIS — I10 ESSENTIAL HYPERTENSION: ICD-10-CM

## 2021-08-18 DIAGNOSIS — I25.10 CORONARY ARTERY DISEASE INVOLVING NATIVE CORONARY ARTERY OF NATIVE HEART WITHOUT ANGINA PECTORIS: ICD-10-CM

## 2021-08-18 PROBLEM — N40.0 BENIGN PROSTATIC HYPERPLASIA: Status: RESOLVED | Noted: 2020-07-15 | Resolved: 2021-08-18

## 2021-08-18 PROBLEM — J81.0 ACUTE PULMONARY EDEMA (HCC): Status: RESOLVED | Noted: 2020-06-01 | Resolved: 2021-08-18

## 2021-08-18 PROCEDURE — 1123F ACP DISCUSS/DSCN MKR DOCD: CPT | Performed by: NURSE PRACTITIONER

## 2021-08-18 PROCEDURE — 4004F PT TOBACCO SCREEN RCVD TLK: CPT | Performed by: NURSE PRACTITIONER

## 2021-08-18 PROCEDURE — 4040F PNEUMOC VAC/ADMIN/RCVD: CPT | Performed by: NURSE PRACTITIONER

## 2021-08-18 PROCEDURE — 3017F COLORECTAL CA SCREEN DOC REV: CPT | Performed by: NURSE PRACTITIONER

## 2021-08-18 PROCEDURE — G8427 DOCREV CUR MEDS BY ELIG CLIN: HCPCS | Performed by: NURSE PRACTITIONER

## 2021-08-18 PROCEDURE — 99214 OFFICE O/P EST MOD 30 MIN: CPT | Performed by: NURSE PRACTITIONER

## 2021-08-18 PROCEDURE — G8417 CALC BMI ABV UP PARAM F/U: HCPCS | Performed by: NURSE PRACTITIONER

## 2021-08-18 ASSESSMENT — ENCOUNTER SYMPTOMS: SHORTNESS OF BREATH: 0

## 2021-08-18 NOTE — ASSESSMENT & PLAN NOTE
-At or near goal No    -No recent labs available- lab slip given   -He is to continue current medications (Lipitor 20 mg) Hepatic function panel WNL. No abdominal pain. No myalgias.     -The nature of cardiac risk has been fully discussed with this patient. I have made him aware of his LDL target goal given his cardiovascular risk analysis. I have discussed the appropriate diet. The need for lifelong compliance in order to reduce risk is stressed. A regular exercise program is recommended to help achieve and maintain normal body weight, fitness and improve lipid balance. A written copy of a low fat, low cholesterol diet has been given to the patient.

## 2021-08-18 NOTE — ASSESSMENT & PLAN NOTE
-Patient recently in emergency department with complaints of chest pain. Patient did not want to stay in hospital and was instructed to follow-up with cardiology. He reports the chest pain being sharp midsternal and mild diaphoresis. No acute abnormalities found on testing in emergency department.   Will get stress test.

## 2021-08-18 NOTE — ASSESSMENT & PLAN NOTE
-Recent ultrasound of lower extremities showed significant reflux and right CFV and left SSV. Continue Lasix as needed for edema and compression stockings.

## 2021-08-18 NOTE — LETTER
Kris Coleman  1955  [id number]    Have you had any Chest Pain that is not new? - Yes  If Yes DO EKG - How does it feel - Sharp Pain (accompanied by shaking)   How long does the pain last - 2.5 to 3 hours    How long have you been having the pain - Within the week   Patient did not take any medications to alleviate the pain. Have you had any Shortness of Breath - Yes  If Yes - When at rest and on exertion    Have you had any dizziness - Yes  If Yes DO ORTHOSTATIC BP - when do you feel dizzy with position change   How long does it last - 3 seconds   Has happened ever since patient's open heart surgery (triple bypass). Have you had any palpitations that are not new? - No    Do you have any edema - swelling in legs (left is the worst, due to bypass)  If Yes - CHECK TO SEE IF THE EDEMA IS PITTING  How long have they been having edema - Since 10-, cardiothoracic surgery   If Yes - Have they worn compression stockings Yes  If they have worn compression stockings - almost 1 year     Vein \"LEG PROBLEM Questionnaire\"  1. Do you have prominent leg veins? Yes   2. Do you have any skin discoloration? Yes  3. Do you have any healed or active sores? Yes  4. Do you have swelling of the legs? Yes  5. Do you have a family history of varicose veins? No  6. Does your profession involve pro-longed        standing or heavy lifting? Yes  7. Have you been fighting overweight problems? Yes  8. Do you have restless legs? Yes  9. Do you have any night time cramps? No  10.  Do you have any of the following in your legs:        Occasional weakness     Do you have a surgery or procedure scheduled in the near future - No

## 2021-08-18 NOTE — PATIENT INSTRUCTIONS
**It is YOUR responsibilty to bring medication bottles and/or updated medication list to [address] [place] [address]. This will allow us to better serve you and all your healthcare needs**    Please be informed that if you contact our office outside of normal business hours the physician on call cannot help with any scheduling or rescheduling issues, procedure instruction questions or any type of medication issue. We advise you for any urgent/emergency that you go to the nearest emergency room!     PLEASE CALL OUR OFFICE DURING NORMAL BUSINESS HOURS    Monday - Friday   8 am to 5 pm    [place]: [place] [address]: [phone]    [place]:  [phone]

## 2021-08-18 NOTE — PROGRESS NOTES
[facility]    [address]  Phone: [phone]    Fax [phone]                  Jeremias Meléndez MD, Juanito Gil MD, Kandice Bone MD, MD Mimi Gaines MD, Hermelindo Chavez MD, Shirin Cabrera MD, [address] [facility], [facility], [place]        Cardiology Progress Note      8/18/2021    RE: Cynthia Pastrana  (1955)                             Primary cardiologist: Dr. Mimi Lacey       Subjective:  CC:   1. Venous reflux    2. Coronary artery disease involving native coronary artery of native heart without angina pectoris    3. Essential hypertension    4. Mixed hyperlipidemia    5. Other chest pain        HPI: Cynthia Pastrana, who is a  76y.o. year old male with a past medical history as listed below. Patient presents to the office for follow up on CAD (CABG x 3 in 2020), HTN, acid reflux, chest pain, and hyperlipidemia. Patient reports chest pain started in a.m. lasting several hours and resolved spontaneously while in emergency department being evaluated. patient is  compliant with medications. Patient denies any  shortness of breath, dizziness, syncope, or palpitations. Past Medical History:   Diagnosis Date    CAD (coronary artery disease)     Dr. Elsa Najjar COPD (chronic obstructive pulmonary disease) ([facility] [address]     No pulmonologist - follows with PCP    H/O cardiac catheterization 09/24/2020     Severe calcified multivessel CAD with LV dysfuntion, PCWP is 12, CABG consult.  H/O echocardiogram 06/23/2020     Mild concentric LVH with moderate systolic impairment. EF is 40-45 % .     H/O echocardiogram 02/02/2021    ef 45-50%,  Mild LVH.    H/O exercise stress test 12/16/2020    Submaximal study due to failure to achieve target heart rate response and    Hx of Doppler ultrasound 04/13/2021    significant reflux noted of the right CFV.  Significant reflux noted in the Left SSV Distal.    Hyperlipidemia     Hypertension     Follows with PCP    IBS (irritable bowel syndrome)     Pancreatitis 2013    Rheumatoid arthritis ([facility] [address]     S/P CABG x 3 10/19/2020    Thyroid disease        Current Outpatient Medications   Medication Sig Dispense Refill    fluticasone-vilanterol (BREO ELLIPTA) 100-25 MCG/INH AEPB inhaler Inhale 1 puff into the lungs daily 30 each 3    sildenafil (VIAGRA) 100 MG tablet Take 1 tablet by mouth daily as needed for Erectile Dysfunction 10 tablet 1    tamsulosin (FLOMAX) 0.4 MG capsule Take 1 capsule by mouth daily 30 capsule 3    tiotropium (SPIRIVA HANDIHALER) 18 MCG inhalation capsule Inhale 1 capsule into the lungs daily 30 capsule 3    lisinopril (PRINIVIL;ZESTRIL) 5 MG tablet Take 1 tablet by mouth daily 30 tablet 3    levothyroxine (SYNTHROID) 112 MCG tablet Take 1 tablet by mouth daily 30 tablet 3    furosemide (LASIX) 20 MG tablet Take 1 tablet by mouth daily as needed (leg swelling) 30 tablet 3    finasteride (PROSCAR) 5 MG tablet Take 1 tablet by mouth daily 30 tablet 3    DULoxetine (CYMBALTA) 60 MG extended release capsule Take 1 capsule by mouth daily 30 capsule 3    carvedilol (COREG) 3.125 MG tablet Take 1 tablet by mouth 2 times daily (with meals) 60 tablet 3    atorvastatin (LIPITOR) 20 MG tablet Take 1 tablet by mouth nightly 30 tablet 3    albuterol sulfate  (90 Base) MCG/ACT inhaler Inhale 2 puffs into the lungs every 4 hours (while awake) 1 Inhaler 5    famotidine (PEPCID) 20 MG tablet Take 1 tablet by mouth 2 times daily 60 tablet 3    cyclobenzaprine (FLEXERIL) 10 MG tablet Take 1 tablet by mouth 3 times daily as needed for Muscle spasms 90 tablet 3    Blood Pressure KIT Check BP once daily 1 kit 0    Multiple Vitamins-Minerals (THERAPEUTIC MULTIVITAMIN-MINERALS) tablet Take 1 tablet by mouth daily (with breakfast)  0     No current facility-administered medications for this visit. Review of Systems:  Review of Systems   Respiratory: Negative for shortness of breath. Cardiovascular: Positive for chest pain. Negative for palpitations and leg swelling. Musculoskeletal: Negative. Skin: Negative. Neurological: Negative for dizziness and weakness. All other systems reviewed and are negative. Objective:      Physical Exam:  /68 (Site: Right Upper Arm, Position: Sitting, Cuff Size: Medium Adult)   Pulse 108   Ht 5' 10\" (1.778 m)   Wt 198 lb 12.8 oz (90.2 kg)   SpO2 94%   BMI 28.52 kg/m²   Wt Readings from Last 3 Encounters:   08/18/21 198 lb 12.8 oz (90.2 kg)   08/04/21 200 lb (90.7 kg)   06/15/21 192 lb (87.1 kg)     Body mass index is 28.52 kg/m². Physical exam:  Physical Exam  Constitutional:       Appearance: He is well-developed. Cardiovascular:      Rate and Rhythm: Normal rate and regular rhythm. Pulses: Intact distal pulses. Dorsalis pedis pulses are 2+ on the right side and 2+ on the left side. Posterior tibial pulses are 2+ on the right side and 2+ on the left side. Heart sounds: Normal heart sounds, S1 normal and S2 normal.   Pulmonary:      Effort: Pulmonary effort is normal.      Breath sounds: Normal breath sounds. Musculoskeletal:         General: Normal range of motion. Skin:     General: Skin is warm and dry. Neurological:      Mental Status: He is alert and oriented to person, place, and time.           DATA:  No results found for: CKTOTAL, CKMB, CKMBINDEX, TROPONINI  BNP:  No results found for: BNP  PT/INR:  No results found for: PTINR  Lab Results   Component Value Date    LABA1C 6.3 10/12/2020    LABA1C 6.3 09/23/2020     Lab Results   Component Value Date    CHOL 155 06/23/2020    TRIG 173 (H) 06/23/2020    HDL 30 (L) 06/23/2020    LDLDIRECT 106 (H) 06/23/2020     Lab Results   Component Value Date    ALT 45 (H) 03/24/2021    AST 22 03/24/2021     TSH:  No results found for: TSH    Vitals: 08/18/21 1320   BP: 108/68   Pulse: 108   SpO2: 94%       Echo:2/2/21   Left ventricular systolic function is mildly depressed with an ejection   fraction of 45-50%. Hypokinesis of the inferior basal wall segments. Mild concentric left ventricular hypertrophy. Cath:9/23/20  severe calcified multivessel CAD with LV dysfuntion, PCWP is 12      The 10-year ASCVD risk score (Kaya Cantu, et al., 2013) is: 13.4%    Values used to calculate the score:      Age: 76 years      Sex: Male      Is Non- : No      Diabetic: No      Tobacco smoker: No      Systolic Blood Pressure: 135 mmHg      Is BP treated: Yes      HDL Cholesterol: 30 MG/DL      Total Cholesterol: 155 MG/DL      Assessment/ Plan:     Coronary artery disease involving native coronary artery   -CABG x3 in 2020. Primary and secondary prevention discussed with patient:   -Low sodium cardiac diet   -exercise 30 min a day three days a week    Continue current medications Coreg, Lasix, lisinopril, Lipitor        Essential hypertension   -Stable, continue Coreg 3.125 mg twice daily, Lasix 20 mg as needed, lisinopril 5 mg daily    Mixed hyperlipidemia   -At or near goal No    -No recent labs available- lab slip given   -He is to continue current medications (Lipitor 20 mg) Hepatic function panel WNL. No abdominal pain. No myalgias.     -The nature of cardiac risk has been fully discussed with this patient. I have made him aware of his LDL target goal given his cardiovascular risk analysis. I have discussed the appropriate diet. The need for lifelong compliance in order to reduce risk is stressed. A regular exercise program is recommended to help achieve and maintain normal body weight, fitness and improve lipid balance. A written copy of a low fat, low cholesterol diet has been given to the patient. Venous reflux   -Recent ultrasound of lower extremities showed significant reflux and right CFV and left SSV.   Continue Lasix as needed for edema and compression stockings. Other chest pain   -Patient recently in emergency department with complaints of chest pain. Patient did not want to stay in hospital and was instructed to follow-up with cardiology. He reports the chest pain being sharp midsternal and mild diaphoresis. No acute abnormalities found on testing in emergency department. Will get stress test.      Patient seen, interviewed and examined. Testing was reviewed. Patient is encouraged to exercise even a brisk walk for 30 minutes at least 3 to 4 times a week. Lifestyle and risk factor modificatons discussed. Various goals are discussed and questions answered. Continue current medications. Appropriate prescriptions are addressed. Questions answered and patient verbalizes understanding. Call for any problems, questions, or concerns. Pt is to follow up in 1 months for Cardiac management    Electronically signed by Ursula Severs.  GETACHEW Greenwood CNP on 8/18/2021 at 3:47 PM

## 2021-08-18 NOTE — ASSESSMENT & PLAN NOTE
-CABG x3 in 2020.     Primary and secondary prevention discussed with patient:   -Low sodium cardiac diet   -exercise 30 min a day three days a week    Continue current medications Coreg, Lasix, lisinopril, Lipitor

## 2021-08-29 DIAGNOSIS — K21.9 GASTROESOPHAGEAL REFLUX DISEASE, UNSPECIFIED WHETHER ESOPHAGITIS PRESENT: ICD-10-CM

## 2021-08-30 RX ORDER — FAMOTIDINE 20 MG/1
TABLET, FILM COATED ORAL
Qty: 90 TABLET | Refills: 1 | Status: SHIPPED | OUTPATIENT
Start: 2021-08-30 | End: 2021-09-17 | Stop reason: SDUPTHER

## 2021-09-11 DIAGNOSIS — E03.9 ACQUIRED HYPOTHYROIDISM: ICD-10-CM

## 2021-09-13 RX ORDER — LEVOTHYROXINE SODIUM 112 UG/1
TABLET ORAL
Qty: 90 TABLET | Refills: 1 | Status: SHIPPED | OUTPATIENT
Start: 2021-09-13 | End: 2021-09-17 | Stop reason: SDUPTHER

## 2021-09-15 ENCOUNTER — HOSPITAL ENCOUNTER (OUTPATIENT)
Age: 66
Discharge: HOME | End: 2021-09-15
Payer: MEDICARE

## 2021-09-15 LAB
ALBUMIN SERPL-MCNC: 4.1 GM/DL (ref 3.4–5)
ALP BLD-CCNC: 98 IU/L (ref 40–129)
ALT SERPL-CCNC: 16 U/L (ref 10–40)
ANION GAP SERPL CALCULATED.3IONS-SCNC: 1 MMOL/L (ref 4–16)
AST SERPL-CCNC: 5 IU/L (ref 8–33)
BASOPHILS ABSOLUTE: 0 K/CU MM
BASOPHILS RELATIVE PERCENT: 0.4 % (ref 0–1)
BILIRUB SERPL-MCNC: 0.4 MG/DL (ref 0–1)
BUN BLDV-MCNC: 17 MG/DL (ref 6–23)
CALCIUM SERPL-MCNC: 9.9 MG/DL (ref 8.3–10.6)
CHLORIDE BLD-SCNC: 99 MMOL/L (ref 99–110)
CHOLESTEROL, FASTING: 128 MG/DL
CO2: 35 MMOL/L (ref 21–32)
CREAT SERPL-MCNC: 1 MG/DL (ref 0.9–1.3)
DIFFERENTIAL TYPE: ABNORMAL
EOSINOPHILS ABSOLUTE: 0.4 K/CU MM
EOSINOPHILS RELATIVE PERCENT: 4.3 % (ref 0–3)
ESTIMATED AVERAGE GLUCOSE: 134 MG/DL
GFR AFRICAN AMERICAN: >60 ML/MIN/1.73M2
GFR NON-AFRICAN AMERICAN: >60 ML/MIN/1.73M2
GLUCOSE FASTING: 111 MG/DL (ref 70–99)
HBA1C MFR BLD: 6.3 % (ref 4.2–6.3)
HCT VFR BLD CALC: 53.5 % (ref 42–52)
HDLC SERPL-MCNC: 36 MG/DL
HEMOGLOBIN: 17.8 GM/DL (ref 13.5–18)
IMMATURE NEUTROPHIL %: 0.3 % (ref 0–0.43)
LDL CHOLESTEROL DIRECT: 71 MG/DL
LYMPHOCYTES ABSOLUTE: 2.2 K/CU MM
LYMPHOCYTES RELATIVE PERCENT: 22.7 % (ref 24–44)
MCH RBC QN AUTO: 33.5 PG (ref 27–31)
MCHC RBC AUTO-ENTMCNC: 33.3 % (ref 32–36)
MCV RBC AUTO: 100.8 FL (ref 78–100)
MONOCYTES ABSOLUTE: 0.8 K/CU MM
MONOCYTES RELATIVE PERCENT: 7.8 % (ref 0–4)
PDW BLD-RTO: 13.7 % (ref 11.7–14.9)
PLATELET # BLD: 187 K/CU MM (ref 140–440)
PMV BLD AUTO: 9.2 FL (ref 7.5–11.1)
POTASSIUM SERPL-SCNC: 4.3 MMOL/L (ref 3.5–5.1)
RBC # BLD: 5.31 M/CU MM (ref 4.6–6.2)
SEGMENTED NEUTROPHILS ABSOLUTE COUNT: 6.2 K/CU MM
SEGMENTED NEUTROPHILS RELATIVE PERCENT: 64.5 % (ref 36–66)
SODIUM BLD-SCNC: 135 MMOL/L (ref 135–145)
T4 FREE: 0.89 NG/DL (ref 0.9–1.8)
TOTAL IMMATURE NEUTOROPHIL: 0.03 K/CU MM
TOTAL PROTEIN: 7.1 GM/DL (ref 6.4–8.2)
TRIGLYCERIDE, FASTING: 89 MG/DL
TSH HIGH SENSITIVITY: 2.62 UIU/ML (ref 0.27–4.2)
WBC # BLD: 9.6 K/CU MM (ref 4–10.5)

## 2021-09-15 PROCEDURE — 85025 COMPLETE CBC W/AUTO DIFF WBC: CPT

## 2021-09-15 PROCEDURE — 83036 HEMOGLOBIN GLYCOSYLATED A1C: CPT

## 2021-09-15 PROCEDURE — 84443 ASSAY THYROID STIM HORMONE: CPT

## 2021-09-15 PROCEDURE — 36415 COLL VENOUS BLD VENIPUNCTURE: CPT

## 2021-09-15 PROCEDURE — 84439 ASSAY OF FREE THYROXINE: CPT

## 2021-09-15 PROCEDURE — 80053 COMPREHEN METABOLIC PANEL: CPT

## 2021-09-15 PROCEDURE — 80061 LIPID PANEL: CPT

## 2021-09-17 ENCOUNTER — TELEPHONE (OUTPATIENT)
Dept: INTERNAL MEDICINE CLINIC | Age: 66
End: 2021-09-17

## 2021-09-17 ENCOUNTER — OFFICE VISIT (OUTPATIENT)
Dept: INTERNAL MEDICINE CLINIC | Age: 66
End: 2021-09-17
Payer: MEDICARE

## 2021-09-17 VITALS
HEART RATE: 62 BPM | HEIGHT: 70 IN | RESPIRATION RATE: 16 BRPM | BODY MASS INDEX: 28.06 KG/M2 | SYSTOLIC BLOOD PRESSURE: 130 MMHG | DIASTOLIC BLOOD PRESSURE: 82 MMHG | OXYGEN SATURATION: 100 % | WEIGHT: 196 LBS

## 2021-09-17 DIAGNOSIS — N40.0 BENIGN PROSTATIC HYPERPLASIA, UNSPECIFIED WHETHER LOWER URINARY TRACT SYMPTOMS PRESENT: ICD-10-CM

## 2021-09-17 DIAGNOSIS — M54.50 CHRONIC BILATERAL LOW BACK PAIN WITHOUT SCIATICA: ICD-10-CM

## 2021-09-17 DIAGNOSIS — E78.2 MIXED HYPERLIPIDEMIA: ICD-10-CM

## 2021-09-17 DIAGNOSIS — R73.03 PREDIABETES: ICD-10-CM

## 2021-09-17 DIAGNOSIS — D75.89 MACROCYTOSIS: ICD-10-CM

## 2021-09-17 DIAGNOSIS — N52.9 VASCULOGENIC ERECTILE DYSFUNCTION, UNSPECIFIED VASCULOGENIC ERECTILE DYSFUNCTION TYPE: ICD-10-CM

## 2021-09-17 DIAGNOSIS — I50.42 CHRONIC COMBINED SYSTOLIC AND DIASTOLIC CONGESTIVE HEART FAILURE (HCC): ICD-10-CM

## 2021-09-17 DIAGNOSIS — S22.32XK CLOSED FRACTURE OF ONE RIB OF LEFT SIDE WITH NONUNION, SUBSEQUENT ENCOUNTER: Primary | ICD-10-CM

## 2021-09-17 DIAGNOSIS — K21.9 GASTROESOPHAGEAL REFLUX DISEASE, UNSPECIFIED WHETHER ESOPHAGITIS PRESENT: ICD-10-CM

## 2021-09-17 DIAGNOSIS — E03.9 ACQUIRED HYPOTHYROIDISM: ICD-10-CM

## 2021-09-17 DIAGNOSIS — J43.1 PANLOBULAR EMPHYSEMA (HCC): ICD-10-CM

## 2021-09-17 DIAGNOSIS — I10 ESSENTIAL HYPERTENSION: ICD-10-CM

## 2021-09-17 DIAGNOSIS — G89.29 CHRONIC BILATERAL LOW BACK PAIN WITHOUT SCIATICA: ICD-10-CM

## 2021-09-17 PROCEDURE — 99214 OFFICE O/P EST MOD 30 MIN: CPT | Performed by: INTERNAL MEDICINE

## 2021-09-17 PROCEDURE — G8427 DOCREV CUR MEDS BY ELIG CLIN: HCPCS | Performed by: INTERNAL MEDICINE

## 2021-09-17 PROCEDURE — 3017F COLORECTAL CA SCREEN DOC REV: CPT | Performed by: INTERNAL MEDICINE

## 2021-09-17 PROCEDURE — 3023F SPIROM DOC REV: CPT | Performed by: INTERNAL MEDICINE

## 2021-09-17 PROCEDURE — G8926 SPIRO NO PERF OR DOC: HCPCS | Performed by: INTERNAL MEDICINE

## 2021-09-17 PROCEDURE — G8417 CALC BMI ABV UP PARAM F/U: HCPCS | Performed by: INTERNAL MEDICINE

## 2021-09-17 PROCEDURE — 4004F PT TOBACCO SCREEN RCVD TLK: CPT | Performed by: INTERNAL MEDICINE

## 2021-09-17 PROCEDURE — 1123F ACP DISCUSS/DSCN MKR DOCD: CPT | Performed by: INTERNAL MEDICINE

## 2021-09-17 PROCEDURE — 4040F PNEUMOC VAC/ADMIN/RCVD: CPT | Performed by: INTERNAL MEDICINE

## 2021-09-17 RX ORDER — LEVOTHYROXINE SODIUM 112 UG/1
TABLET ORAL
Qty: 30 TABLET | Refills: 3 | Status: SHIPPED | OUTPATIENT
Start: 2021-09-17 | End: 2022-01-11 | Stop reason: SDUPTHER

## 2021-09-17 RX ORDER — TAMSULOSIN HYDROCHLORIDE 0.4 MG/1
0.4 CAPSULE ORAL DAILY
Qty: 30 CAPSULE | Refills: 3 | Status: SHIPPED | OUTPATIENT
Start: 2021-09-17 | End: 2022-01-11 | Stop reason: SDUPTHER

## 2021-09-17 RX ORDER — GUAIFENESIN 600 MG/1
600 TABLET, EXTENDED RELEASE ORAL 2 TIMES DAILY
Qty: 30 TABLET | Refills: 0 | Status: SHIPPED | OUTPATIENT
Start: 2021-09-17 | End: 2021-10-08 | Stop reason: SDUPTHER

## 2021-09-17 RX ORDER — FAMOTIDINE 20 MG/1
TABLET, FILM COATED ORAL
Qty: 30 TABLET | Refills: 3 | Status: SHIPPED | OUTPATIENT
Start: 2021-09-17 | End: 2022-01-11 | Stop reason: SDUPTHER

## 2021-09-17 RX ORDER — CARVEDILOL 3.12 MG/1
3.12 TABLET ORAL 2 TIMES DAILY WITH MEALS
Qty: 60 TABLET | Refills: 3 | Status: SHIPPED | OUTPATIENT
Start: 2021-09-17 | End: 2022-01-11 | Stop reason: SDUPTHER

## 2021-09-17 RX ORDER — TRAMADOL HYDROCHLORIDE 50 MG/1
50 TABLET ORAL DAILY PRN
Qty: 5 TABLET | Refills: 0 | Status: SHIPPED | OUTPATIENT
Start: 2021-09-17 | End: 2021-09-22

## 2021-09-17 RX ORDER — SILDENAFIL 100 MG/1
100 TABLET, FILM COATED ORAL DAILY PRN
Qty: 10 TABLET | Refills: 1 | Status: SHIPPED | OUTPATIENT
Start: 2021-09-17 | End: 2022-01-11 | Stop reason: SDUPTHER

## 2021-09-17 RX ORDER — LIDOCAINE 4 G/G
1 PATCH TOPICAL DAILY
Qty: 30 PATCH | Refills: 0 | Status: SHIPPED | OUTPATIENT
Start: 2021-09-17 | End: 2021-10-17

## 2021-09-17 RX ORDER — LISINOPRIL 5 MG/1
5 TABLET ORAL DAILY
Qty: 30 TABLET | Refills: 3 | Status: SHIPPED | OUTPATIENT
Start: 2021-09-17 | End: 2022-01-11 | Stop reason: SDUPTHER

## 2021-09-17 RX ORDER — TIOTROPIUM BROMIDE 18 UG/1
18 CAPSULE ORAL; RESPIRATORY (INHALATION) DAILY
Qty: 30 CAPSULE | Refills: 3 | Status: SHIPPED | OUTPATIENT
Start: 2021-09-17 | End: 2022-01-11 | Stop reason: SDUPTHER

## 2021-09-17 RX ORDER — FLUTICASONE FUROATE AND VILANTEROL 100; 25 UG/1; UG/1
1 POWDER RESPIRATORY (INHALATION) DAILY
Qty: 30 EACH | Refills: 3 | Status: SHIPPED | OUTPATIENT
Start: 2021-09-17 | End: 2022-01-11 | Stop reason: SDUPTHER

## 2021-09-17 RX ORDER — SEMAGLUTIDE 1.34 MG/ML
INJECTION, SOLUTION SUBCUTANEOUS
Qty: 1 PEN | Refills: 3 | Status: SHIPPED | OUTPATIENT
Start: 2021-09-17 | End: 2022-01-11 | Stop reason: SDUPTHER

## 2021-09-17 RX ORDER — DULOXETIN HYDROCHLORIDE 60 MG/1
60 CAPSULE, DELAYED RELEASE ORAL DAILY
Qty: 30 CAPSULE | Refills: 3 | Status: SHIPPED | OUTPATIENT
Start: 2021-09-17 | End: 2022-01-11 | Stop reason: SDUPTHER

## 2021-09-17 RX ORDER — FINASTERIDE 5 MG/1
5 TABLET, FILM COATED ORAL DAILY
Qty: 30 TABLET | Refills: 3 | Status: SHIPPED | OUTPATIENT
Start: 2021-09-17 | End: 2022-01-11 | Stop reason: SDUPTHER

## 2021-09-17 RX ORDER — ATORVASTATIN CALCIUM 20 MG/1
20 TABLET, FILM COATED ORAL NIGHTLY
Qty: 30 TABLET | Refills: 3 | Status: SHIPPED | OUTPATIENT
Start: 2021-09-17 | End: 2022-01-11 | Stop reason: SDUPTHER

## 2021-09-17 RX ORDER — FUROSEMIDE 20 MG/1
20 TABLET ORAL DAILY PRN
Qty: 30 TABLET | Refills: 3 | Status: SHIPPED | OUTPATIENT
Start: 2021-09-17 | End: 2022-01-11 | Stop reason: SDUPTHER

## 2021-09-17 SDOH — ECONOMIC STABILITY: FOOD INSECURITY: WITHIN THE PAST 12 MONTHS, THE FOOD YOU BOUGHT JUST DIDN'T LAST AND YOU DIDN'T HAVE MONEY TO GET MORE.: NEVER TRUE

## 2021-09-17 SDOH — ECONOMIC STABILITY: FOOD INSECURITY: WITHIN THE PAST 12 MONTHS, YOU WORRIED THAT YOUR FOOD WOULD RUN OUT BEFORE YOU GOT MONEY TO BUY MORE.: NEVER TRUE

## 2021-09-17 ASSESSMENT — PATIENT HEALTH QUESTIONNAIRE - PHQ9
SUM OF ALL RESPONSES TO PHQ QUESTIONS 1-9: 0
2. FEELING DOWN, DEPRESSED OR HOPELESS: 0
SUM OF ALL RESPONSES TO PHQ QUESTIONS 1-9: 0
1. LITTLE INTEREST OR PLEASURE IN DOING THINGS: 0
SUM OF ALL RESPONSES TO PHQ QUESTIONS 1-9: 0
SUM OF ALL RESPONSES TO PHQ9 QUESTIONS 1 & 2: 0

## 2021-09-17 ASSESSMENT — SOCIAL DETERMINANTS OF HEALTH (SDOH): HOW HARD IS IT FOR YOU TO PAY FOR THE VERY BASICS LIKE FOOD, HOUSING, MEDICAL CARE, AND HEATING?: NOT HARD AT ALL

## 2021-09-17 NOTE — TELEPHONE ENCOUNTER
[facility] Trans[facility] Initial Follow Up Call    Outreach made within 2 business days of discharge: Yes    Patient: Thalia Murphy Patient : 1955   MRN: [id number]  Reason for Admission: There are no discharge diagnoses documented for the most recent discharge. Discharge Date: 21       Spoke with: patient    Discharge department/facility: ed     TCM Interactive Patient Contact:  Was patient able to fill all prescriptions: Yes  Was patient instructed to bring all medications to the follow-up visit: Yes  Is patient taking all medications as directed in the discharge summary? Yes  Does patient understand their discharge instructions: Yes  Does patient have questions or concerns that need addressed prior to 7-14 day follow up office visit: yes     Scheduled appointment with PCP within 7-14 days    Follow Up  Future Appointments   Date Time Provider Petra Albert   2021 11:15 AM An Gill MD [address] [facility]   2021  2:00 PM SCHEDULE, [facility] STRESS TEST [facility]   2021  3:30 PM Rico Baxter, APRN - CNP [facility] Heart [facility]   11/10/2021  1:30 PM Ash Brownlee MD [facility]   11/15/2021  8:00 AM An Gill MD [facility]   11/15/2021  8:30 AM An Gill MD [facility]       Brandee Navas MA

## 2021-09-17 NOTE — PATIENT INSTRUCTIONS
Fasting for a blood test: taking the right steps before testing helps ensure your results will be accurate. Why do I need to fast before my blood test?  If your healthcare provider has told you to fast before a blood test, it means you should not eat or drink anything, except water, for several hours before your test. When you eat and drink normally, those foods and beverages are absorbed into your bloodstream. That could affect the results of certain types of blood tests. What types of blood tests require fasting? The most common tests that require fasting are:   Glucose tests, which measure your blood sugar.  Lipid tests, which measure cholesterol and triglycerides. You do not need to be fasting for HbA1C test.     How long do I have to fast before the test?  You usually need to fast for 8-12 hours before the test. Most tests that require fasting are scheduled for early in the morning. That way, most of your fasting time will be overnight. Can I drink anything besides water during a fast?  No. Juice, coffee, soda, and other beverages can get in your bloodstream and affect your results. In addition, you should not:   Chew gum    Smoke    Exercise  These activities can also affect your results. But you can drink water. It's actually encouraged that you drink 2 glasses of water before any blood test. It helps keep more fluid in your veins, which can make it easier to draw blood. If you are dehydrated, your blood draw experience may be unpleasant. Can I continue taking medicine during a fast?  Most of the time it's OK to take your usual medicines with water, unless otherwise specified by your healthcare provider. You may need to avoid certain medicines that you normally take with food.      What if I make a mistake and have something to eat or drink besides water during my fast?  Tell your healthcare provider before your test. Your test will most likely have to be re-scheduled for another time when you are able to complete your fast.    When can I eat and drink normally again? As soon as your test is over. You may want to bring a snack with you, so you can eat right away. Is there anything else I need to know about fasting before a blood test?  Be sure to talk to your healthcare provider if you have any questions or concerns about fasting. You should talk to your provider before taking any lab test. Most tests don't require fasting or other special preparations. For others, you may need to avoid certain foods, medicines, or activities.        [facility]  [phone]

## 2021-09-17 NOTE — PROGRESS NOTES
9/17/21    Milton Raymond  1955    Chief Complaint   Patient presents with    Other     follow up from ER       History of Present Illness:  Milton Raymond is a 76 y.o. pleasant gentleman presenting today with a chief complaint of ED follow up from 9/16/2021. He has a past medical history significant for:  HTN, on Lisinopril 5mg QD, Coreg 3.125mg BID  HL (LDL 71, TG 89 on 9/15/2021), on Atorvastatin 20mg  Prediabetes (HbA1C 6.3% on 9/15/2021), not on meds  CAD s/p CABG (10/2020), on ASA   HFrEF/HFpEF (EF 29-72%, diastolic dysfunction, mild MR on 6/23/2020; EF up to 45-50% on TTE 2/4/2021), on Lisinopril 5mg QD, Coreg 3.125mg BID, Lasix 20mg QD  Hypothyroidism (TSH 2.62 normal on 9/15/2021), on Synthroid 112mcg QD  PAD, on ASA   GERD, on Pepcid 10mg BID   Allergic rhinitis, off Zyrtec  BPH, on Flomax, Finasteride   ED, on Sildenafil 100mg QD PRN   COPD, on Albuterol PRN, Symbicort BID, Spiriva QD   Chronic back pain, on Cymbalta 60mg QHS, Flexeril 10mg BID PRN   IBS-mixed, on Bentyl QID   H/o C diff (10/2020)  Juvenile RA   Rheumatic fever   Former smoker (10/2020)      # Patient was admitted 3/15-3/19/2021 after syncopal episode when he got up from a sitting position, and hit his head. Had LOC. CTH showed SAH. He was transferred to [facility] trauma service. NSGY consulted and recommended no surgical intervention. No intracranial aneurysm noted. He was cleared to be on ASA. Had FU with NSGY on 4/5 after CTH done. Also had TTE per Cardiology stable. Off driving for 1 month and recommended compression stockings for his orthostatic hypotension.   He is s/p Holter 30-days.   He watches his salt intake.   Yesterday Dr. Rose Mary Sung re-introduced low dose Lisinopril, Lasix, Coreg due to high BP.   # Patient was admitted 9/23-9/24/2020 for hypotension and bradycardia following LHC. He had N/V and was hypovolemic. He was thought to be in cardiogenic shock s/p dopamine gtt. He also received IVF resuscitation.  He was discharged home ambulating, in a stable condition. LHC showed multi-vessel CAD. He is s/p CABG in Oct 2020.   Following with Dr. Gloria Dickey outpatient. He is s/p cardiac rehab  He also had C diff s/p PO Vanc. Continues to have sternal pain. Was taking Tramadol and has run out. OARRS previously reviewed. He has not had prescription from me.   # Patient was in ER on 12/19 for near syncope and chin laceration s/p sutured.   QTc was 508 ms. Patient refused to stay in ER longer for further cardiac eval.   He has been having issues with hypotension and fluctuating BP.   Patient was in ED 9/16/2021 for bradycardia and hypotension. Sustained fall and fractured L 6th rib. EKG wo ischemia. Mainor unremarkable. CBC, BMP stable. Reports he was out in the heat when he felt dizzy prior to falling. Refused to be admitted. Patient is now taking Lisinopril 5mg, Coreg 3.125mg BID, Lasix 20mg.   BP today 130/82, HR 62. No CP, SOB, palpitations, dizziness, or light-headedness. Not interested in cardiac rehab.   Will be getting stress test and Cardiology follow up on 9/20/2021. His LE swelling is 2/2 significant reflux bilaterally (evident on US). # Chantix helped him quit smoking 4 days before CABG.    # Patient was in the ED on 5/28 for acute onset SOB. New diagnosis of CHF. EKG wo acute ischemia. Neg Mainor. BNP elevated. CT showed no PE but did show pulmonary edema (high d-dimer). COVID test negative. He was hypoxic, requiring 4L O2 however patient declined admission.   He is not having worsening SOB, but at his baseline. He was given in the ED Lasix IV 40mg once.   First time I met patient in June I prescribed Lasix 20mg QD. He reports his SOB improved over 70% with the Lasix. TTE done 6/23/2020 shows e/o mixed systolic and diastolic heart failure. He has never had LHC done.  Patient denies alcohol abuse. He developed pre-renal JOSE which resolved after Lasix was held for 1 week (Cr up to 1.4 in June then down to 1.0 with GFR > 60 in July). He had not had ischemic work up done in the past.   # [address] for GERD. Helping. He was on Zantac in the past but I discontinued it due to recall. No heartburn or reflux or nausea.   # Takes Synthroid. No symptoms of hyper or hypothyroidism. TSH was elevated in Nov 2020 during hospital stay. Increased Synthroid to 112mcg.  Repeat in March 2021 WNL.    # Takes Zyrtec for allergic rhinitis. This is helping. Tolerating well. He is also using Flonase PRN. No coughing or sneezing.   # Takes Finasteride and Flomax for BPH. Symptoms stable on these meds. Tolerating meds. No LUTS.   # Takes Cymbalta and Flexeril for chronic back pain. Both helping. No side effects such as drowsiness.   # Uses Albuterol and Symbicort for COPD, as well as recently added Spiriva. 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 # High MCV on recent labs.    # As per patient he should have had his gall bladder removed however due to pandemic restrictions he had to hold off.   # He takes ASA for PAD. No bleeding or bruising on ASA. No h/o CAD or ischemic stroke. # ? H/o dyslipidemia. Not on statin therapy. He does have RA however. # Patient's mother was diabetic. He has prediabetes. Well controlled off meds. # Patient is having ED over the past few months.   # Has lesion on L side of face. Sun-exposed area. Growing over the past 2 months. Sent him to see Derm.   # Cannot afford time or finances to go to [facility] at the moment due to work.  Trying OTC weight loss pills but I recommended against this.      Drives a semi-truck       Health maintenance:   Health Maintenance Due   Topic Date Due    AAA screen  Never done    Hepatitis C screen  Never done    COVID-19 Vaccine (1) Never done    Colon cancer screen colonoscopy  Never done    Shingles Vaccine (2 of 3) 04/02/2018    Pneumococcal 65+ years Vaccine (1 of 1 - PPSV23) Never done   [employer] Visit (AWV)  Never done    Flu vaccine (1) Never done         Review of Systems:  Constitutional: no fevers, no chills, no night sweats, no weight loss, no weight gain, no fatigue   Pain assessment: pain L rib area  Head: no headaches  Ears: no hearing loss, no tinnitus, no vertigo  Eyes: no blurry vision, no diplopia, no dryness, no itchiness  Mouth: no oral ulcers, no dry mouth, no sore throat  Nose: no nasal congestion, no epistaxis  Cardiac: no chest pain, no palpitations, leg swelling, no orthopnea, no PND, no syncope  Pulmonary: no dyspnea, no cough, no wheezing, no hemoptysis  GI: no nausea, no vomiting, no diarrhea, no constipation, no abdominal pain, no hematochezia  : no dysuria, no frequency, no urgency, no hematuria, no frothy urine  MSK: no arthralgias, no myalgias, no early morning stiffness, no Raynaud's   Neuro: no focal neurological deficits, no seizures  Sleep: no snoring, no daytime somnolence   Psych: no depression, no anxiety, no suicidal ideation    Physical Exam:  VITALS:   /82 (Site: Left Upper Arm, Position: Sitting, Cuff Size: Medium Adult)   Pulse 62   Resp 16   Ht 5' 10\" (1.778 m)   Wt 196 lb (88.9 kg)   SpO2 100%   BMI 28.12 kg/m²     PHYSICAL EXAMINATION:  General: alert, awake, and oriented to time, place, person, and situation. Not in acute distress   Skin:  no suspicious rashes, no jaundice  Head: normocephalic/atraumatic  Eyes: anicteric sclera, well-injected conjunctiva. Pupils are equally round and reactive to light.  Extraocular movements are intact   Nose: no septal deviation evident  Sinuses: no sinus tenderness  Ears: external ears normal  Neck: supple, no cervical lymphadenopathy, thyroid symmetric and not enlarged, no bruits   Heart: regular rate and rhythm, regular S1/S2, no S3/S4, no audible murmurs, no audible friction rub, no JVD  Lungs: rhonchi bilaterally  Abdomen: normal bowel sounds, soft abdomen, non-tender, no palpable masses  Extremities: L > RLE pitting edema 1+ (site of L vein retrieval for CABG), warm, no cyanosis, no clubbing. Good capillary refill   MSK: no tenderness across spinous processes, full ROM in all 4 extremities. No joint swelling or tenderness   Peripheral vascular: 2+ pulses symmetric (radial)  Neuro: gait normal, CN II-XII intact, motor power 5/5 in all 4 extremities, sensation intact and symmetric    Labs   I have personally reviewed labs, and discussed pertinent findings with patient on this date 9/17/2021     Imaging   I have personally reviewed imaging, and discussed pertinent findings with patient on this date 9/17/2021     Other notes  I have personally reviewed other notes, and discussed pertinent findings with patient on this date 9/17/2021       Assessment/Plan:     1. Closed fracture of one rib of left side with nonunion, subsequent encounter  Chart reviewed from ED yesterday  Patient had syncopal episode likely due to hypovolemia/vasovagal from heat  HR and BP were low. HR today 62, BP today 130/82 on his regular meds. Will not make changes. He has FU with Cardiology for appointment and stress test on 9/20. EKG, Mainor, remainder of labs stable in ED yesterday  OARRS reviewed  Discussed side effects   - traMADol (ULTRAM) 50 MG tablet; Take 1 tablet by mouth daily as needed for Pain for up to 5 days. Intended supply: 5 days. Take lowest dose possible to manage pain  Dispense: 5 tablet; Refill: 0  - lidocaine 4 % external patch; Place 1 patch onto the skin daily  Dispense: 30 patch; Refill: 0    2. Essential hypertension  Stable  Continue for now Lisinopril 5mg QD, Coreg 3.125mg BID  - CBC Auto Differential; Future  - COMPREHENSIVE METABOLIC PANEL; Future    3. Mixed hyperlipidemia  LDL 71, TG 89 in Sept 2021  Continue Atorvastatin 20mg QD. Target LDL < 70   - Lipid, Fasting; Future    4. Prediabetes  A1C 6.3% in Sept 2021  Patient wants to lose weight. Does not want WMS.  I recommend against weight loss pills due to side effects in his co-morbid conditions  Trial Ozempic   - Semaglutide,0.25 or 0.5MG/DOS, (OZEMPIC, 0.25 OR 0.5 MG/DOSE,) 2 MG/1.5ML SOPN; Inject 0.25mg once weekly under the skin  Dispense: 1 pen; Refill: 3    5. Acquired hypothyroidism  TSH normal Sept 2021  Continue Synthroid 112mcg QD   - TSH with Reflex; Future    6. Macrocytosis  - VITAMIN B12 & FOLATE; Future  - VITAMIN B1; Future    7. Chronic combined systolic and diastolic congestive heart failure (HCC)  HFrEF/HFpEF (EF 33-69%, diastolic dysfunction, mild MR on 6/23/2020; EF up to 45-50% on TTE 2/4/2021)  Continue ACEi, BB, diuretics: Lisinopril 5mg QD, Coreg 3.125mg BID, Lasix 20mg QD   Compensated  Maintain euvolemia     8. Chronic bilateral low back pain without sciatica  Continue Cymbalta 60mg QHS   Continue Flexeril 10mg BID PRN     9. Benign prostatic hyperplasia, unspecified whether lower urinary tract symptoms present  Continue Flomax and Proscar     10. Panlobular emphysema (HCC)  Continue Albuterol inh PRN, Symbicort BID, Spiriva QD     11. Vasculogenic erectile dysfunction, unspecified vasculogenic erectile dysfunction type  - sildenafil (VIAGRA) 100 MG tablet; Take 1 tablet by mouth daily as needed for Erectile Dysfunction  Dispense: 10 tablet; Refill: 1    12. Gastroesophageal reflux disease, unspecified whether esophagitis present  Continue Pepcid 10mg BID PRN       Care discussed with patient and questions answered. Patient verbalizes understanding and agrees with plan. Discussed with patient the importance of continuity of care. I encouraged patient to schedule next appointment within 3 months with me. Patient prefers to be reached by Phone call at [phone] for future medical correspondence. Encouraged to activate [email or web address]. I reviewed and reconciled the medications this visit. I reviewed and updated the past medical, surgical, social, and family history during this visit. After visit summary provided.        Zeina Dewitt MD  Internal Medicine  9/17/2021   1:06 PM

## 2021-09-17 NOTE — PROGRESS NOTES
Patient states he passed out at work yesterday. He skinned both knees, left arm and elbow and ribs. He continues to have a lot of pain in his ribs.

## 2021-09-20 ENCOUNTER — OFFICE VISIT (OUTPATIENT)
Dept: CARDIOLOGY CLINIC | Age: 66
End: 2021-09-20
Payer: MEDICARE

## 2021-09-20 ENCOUNTER — PROCEDURE VISIT (OUTPATIENT)
Dept: CARDIOLOGY CLINIC | Age: 66
End: 2021-09-20

## 2021-09-20 ENCOUNTER — NURSE ONLY (OUTPATIENT)
Dept: CARDIOLOGY CLINIC | Age: 66
End: 2021-09-20
Payer: MEDICARE

## 2021-09-20 VITALS
WEIGHT: 196 LBS | SYSTOLIC BLOOD PRESSURE: 114 MMHG | BODY MASS INDEX: 28.06 KG/M2 | HEIGHT: 70 IN | DIASTOLIC BLOOD PRESSURE: 62 MMHG | HEART RATE: 80 BPM

## 2021-09-20 VITALS
HEART RATE: 80 BPM | HEIGHT: 70 IN | BODY MASS INDEX: 28.98 KG/M2 | WEIGHT: 202.4 LBS | DIASTOLIC BLOOD PRESSURE: 62 MMHG | SYSTOLIC BLOOD PRESSURE: 114 MMHG

## 2021-09-20 DIAGNOSIS — I87.2 VENOUS REFLUX: ICD-10-CM

## 2021-09-20 DIAGNOSIS — E78.2 MIXED HYPERLIPIDEMIA: ICD-10-CM

## 2021-09-20 DIAGNOSIS — I25.10 CORONARY ARTERY DISEASE INVOLVING NATIVE CORONARY ARTERY OF NATIVE HEART WITHOUT ANGINA PECTORIS: ICD-10-CM

## 2021-09-20 DIAGNOSIS — R55 SYNCOPE, UNSPECIFIED SYNCOPE TYPE: Primary | ICD-10-CM

## 2021-09-20 DIAGNOSIS — R06.02 SOB (SHORTNESS OF BREATH): ICD-10-CM

## 2021-09-20 DIAGNOSIS — I10 ESSENTIAL HYPERTENSION: ICD-10-CM

## 2021-09-20 DIAGNOSIS — R55 SYNCOPE, UNSPECIFIED SYNCOPE TYPE: ICD-10-CM

## 2021-09-20 DIAGNOSIS — I50.42 CHRONIC COMBINED SYSTOLIC AND DIASTOLIC CONGESTIVE HEART FAILURE (HCC): ICD-10-CM

## 2021-09-20 DIAGNOSIS — I25.10 CORONARY ARTERY DISEASE INVOLVING NATIVE CORONARY ARTERY OF NATIVE HEART WITHOUT ANGINA PECTORIS: Primary | ICD-10-CM

## 2021-09-20 DIAGNOSIS — R07.89 OTHER CHEST PAIN: ICD-10-CM

## 2021-09-20 PROCEDURE — 1123F ACP DISCUSS/DSCN MKR DOCD: CPT | Performed by: NURSE PRACTITIONER

## 2021-09-20 PROCEDURE — 4004F PT TOBACCO SCREEN RCVD TLK: CPT | Performed by: NURSE PRACTITIONER

## 2021-09-20 PROCEDURE — 93228 REMOTE 30 DAY ECG REV/REPORT: CPT | Performed by: INTERNAL MEDICINE

## 2021-09-20 PROCEDURE — 99214 OFFICE O/P EST MOD 30 MIN: CPT | Performed by: NURSE PRACTITIONER

## 2021-09-20 PROCEDURE — 3017F COLORECTAL CA SCREEN DOC REV: CPT | Performed by: NURSE PRACTITIONER

## 2021-09-20 PROCEDURE — 93015 CV STRESS TEST SUPVJ I&R: CPT | Performed by: INTERNAL MEDICINE

## 2021-09-20 PROCEDURE — G8417 CALC BMI ABV UP PARAM F/U: HCPCS | Performed by: NURSE PRACTITIONER

## 2021-09-20 PROCEDURE — G8427 DOCREV CUR MEDS BY ELIG CLIN: HCPCS | Performed by: NURSE PRACTITIONER

## 2021-09-20 PROCEDURE — 4040F PNEUMOC VAC/ADMIN/RCVD: CPT | Performed by: NURSE PRACTITIONER

## 2021-09-20 ASSESSMENT — ENCOUNTER SYMPTOMS: SHORTNESS OF BREATH: 0

## 2021-09-20 NOTE — PATIENT INSTRUCTIONS
**It is YOUR responsibilty to bring medication bottles and/or updated medication list to [address] [place] [address]. This will allow us to better serve you and all your healthcare needs**    Please be informed that if you contact our office outside of normal business hours the physician on call cannot help with any scheduling or rescheduling issues, procedure instruction questions or any type of medication issue. We advise you for any urgent/emergency that you go to the nearest emergency room! PLEASE CALL OUR OFFICE DURING NORMAL BUSINESS HOURS    Monday - Friday   8 am to 5 pm    Carl[address] Taylor [address]: [phone]    Michie:  [phone]    Please hold on to these instructions the  will call you within 1-9 business days when we receive authorization from your insurance. Nuclear Stress Test    WHAT TO EXPECT:   ? You will need to confirm the test or it could be cancelled. ? This test will take approximately 2 hours: 1 hour in the AM &    1 hour in the PM. You will be given a time by the Technologist after the first part is completed to come back. ? You will be given a medication, through an IV in the hand, this will safely simulate exercise. This IV is also needed to inject the radioactive isotope unless you are able toe walk the treadmill. ? You will receive an injection in the AM & PM before the pictures. ? Using a special camera, you will have one set of pictures of your heart taken in the AM and a set of pictures in the PM.     PREPARATION FOR TEST:  ? Eat a light breakfast such as water or juice and toast.  ? If you are DIABETIC: Eat a normal breakfast with NO CAFFEINE and take your insulin as normal.   ? AVOID ALL FOODS & DRINKS containing CAFFEINE 12 HOURS PRIOR TO THE TEST: Including coffee, Tea, Joanna and other soft drinks even those labeled  caffeine free or decaffeinated.  ?  HOLD THESE MEDICATIONS Persantine & Theophylline (Theodur)  24 hours prior & bring your inhaler with you.   The physician will specify if the following Beta blockers need to be held for 24 hours prior to test: Coreg (carvedilol)

## 2021-09-20 NOTE — PROGRESS NOTES
14 days e-cardio monitor placed.  # [id number]. Instructed patient on how to record the event and to call monitoring center at [phone] if any problems arise. Instructed patient to disconnect the lead wires from the electrodes before bathing or showering and reattach them afterwards. Instructed patient that the electrodes should be changed every 3 days or if they no longer adhere to the skin. Patient to mail package after the monitor has ended. Patient verbalized understanding.

## 2021-09-20 NOTE — PROGRESS NOTES
02/02/2021    ef 45-50%,  Mild LVH.    H/O exercise stress test 12/16/2020    Submaximal study due to failure to achieve target heart rate response and    Hx of Doppler ultrasound 04/13/2021    significant reflux noted of the right CFV.  Significant reflux noted in the Left SSV Distal.    Hyperlipidemia     Hypertension     Follows with PCP    IBS (irritable bowel syndrome)     Pancreatitis 2013    Rheumatoid arthritis ([address]     S/P CABG x 3 10/19/2020    SOB (shortness of breath)     Thyroid disease        Current Outpatient Medications   Medication Sig Dispense Refill    atorvastatin (LIPITOR) 20 MG tablet Take 1 tablet by mouth nightly 30 tablet 3    carvedilol (COREG) 3.125 MG tablet Take 1 tablet by mouth 2 times daily (with meals) 60 tablet 3    DULoxetine (CYMBALTA) 60 MG extended release capsule Take 1 capsule by mouth daily 30 capsule 3    finasteride (PROSCAR) 5 MG tablet Take 1 tablet by mouth daily 30 tablet 3    furosemide (LASIX) 20 MG tablet Take 1 tablet by mouth daily as needed (leg swelling) 30 tablet 3    lisinopril (PRINIVIL;ZESTRIL) 5 MG tablet Take 1 tablet by mouth daily 30 tablet 3    tiotropium (SPIRIVA HANDIHALER) 18 MCG inhalation capsule Inhale 1 capsule into the lungs daily 30 capsule 3    tamsulosin (FLOMAX) 0.4 MG capsule Take 1 capsule by mouth daily 30 capsule 3    sildenafil (VIAGRA) 100 MG tablet Take 1 tablet by mouth daily as needed for Erectile Dysfunction 10 tablet 1    fluticasone-vilanterol (BREO ELLIPTA) 100-25 MCG/INH AEPB inhaler Inhale 1 puff into the lungs daily 30 each 3    levothyroxine (SYNTHROID) 112 MCG tablet TAKE 1 TABLET BY MOUTH EVERY DAY 30 tablet 3    famotidine (PEPCID) 20 MG tablet TAKE 1/2 TABLET BY MOUTH 2 TIMES DAILY AS NEEDED (REFLUX) 30 tablet 3    Semaglutide,0.25 or 0.5MG/DOS, (OZEMPIC, 0.25 OR 0.5 MG/DOSE,) 2 MG/1.5ML SOPN Inject 0.25mg once weekly under the skin 1 pen 3    guaiFENesin (MUCINEX) 600 MG extended release tablet Normal heart sounds, S1 normal and S2 normal.   Pulmonary:      Effort: Pulmonary effort is normal.      Breath sounds: Normal breath sounds. Musculoskeletal:         General: Normal range of motion. Skin:     General: Skin is warm and dry. Neurological:      Mental Status: He is alert and oriented to person, place, and time. DATA:  No results found for: CKTOTAL, CKMB, CKMBINDEX, TROPONINI  BNP:  No results found for: BNP  PT/INR:  No results found for: PTINR  Lab Results   Component Value Date    LABA1C 6.3 09/15/2021    LABA1C 6.3 10/12/2020     Lab Results   Component Value Date    CHOL 155 06/23/2020    TRIG 173 (H) 06/23/2020    HDL 36 (L) 09/15/2021    LDLDIRECT 71 09/15/2021     Lab Results   Component Value Date    ALT 16 09/15/2021    AST 5 (L) 09/15/2021     TSH:  No results found for: TSH    Vitals:    09/20/21 1438   BP: 114/62   Pulse: 80       Echo:2/2/21   Left ventricular systolic function is mildly depressed with an ejection   fraction of 45-50%. Hypokinesis of the inferior basal wall segments. Mild concentric left ventricular hypertrophy. Cath:9/23/20  severe calcified multivessel CAD with LV dysfuntion, PCWP is 12      The ASCVD Risk score (Keyur Cullen., et al., 2013) failed to calculate for the following reasons: The valid total cholesterol range is 130 to 320 mg/dL      Assessment/ Plan:     Coronary artery disease involving native coronary artery   -CABG x3 in 2020. Patient had stress test due to chest pain. Stress test was completed today and patient unable to reach target heart rate. Patient reports taking Coreg prior to a.m. exam.  He was recently hospitalized for syncopal episode at work. Patient denies ever having syncope in the past.    Mixed hyperlipidemia   -Not at goal, awaiting recently ordered labs. Continue with Lipitor    Essential hypertension   -Currently stable but patient had recent syncopal event.     Syncope   -Patient was at work and had sudden loss of consciousness resulting in rib fracture on fall. Patient reports having compression stockings on during syncopal episode. Patient reports being out in the heat at work. He was standing during episode of syncope. US of carotids show mild disease. Stress test completed today and did not reach target heart rate. Likely vasovagal, will get NM due to previously having CP and now new syncope. Will place 2 week event monitor on patient. Patient is to be off work until [employer] med completed. Patient seen, interviewed and examined. Testing was reviewed. Patient is encouraged to exercise even a brisk walk for 30 minutes at least 3 to 4 times a week. Lifestyle and risk factor modificatons discussed. Various goals are discussed and questions answered. Continue current medications. Appropriate prescriptions are addressed. Questions answered and patient verbalizes understanding. Call for any problems, questions, or concerns. Pt is to follow up in 1 months for Cardiac management    Electronically signed by James Xie.  GETACHEW Diallo CNP on 9/20/2021 at 4:39 PM

## 2021-09-21 ENCOUNTER — PROCEDURE VISIT (OUTPATIENT)
Dept: CARDIOLOGY CLINIC | Age: 66
End: 2021-09-21

## 2021-09-21 DIAGNOSIS — I25.10 CORONARY ARTERY DISEASE INVOLVING NATIVE CORONARY ARTERY OF NATIVE HEART WITHOUT ANGINA PECTORIS: ICD-10-CM

## 2021-09-21 DIAGNOSIS — R07.89 OTHER CHEST PAIN: ICD-10-CM

## 2021-09-21 DIAGNOSIS — R55 SYNCOPE, UNSPECIFIED SYNCOPE TYPE: ICD-10-CM

## 2021-09-21 DIAGNOSIS — E78.2 MIXED HYPERLIPIDEMIA: ICD-10-CM

## 2021-09-21 DIAGNOSIS — I10 ESSENTIAL HYPERTENSION: ICD-10-CM

## 2021-09-21 LAB
LV EF: 49 %
LVEF MODALITY: NORMAL

## 2021-09-21 PROCEDURE — A9500 TC99M SESTAMIBI: HCPCS | Performed by: INTERNAL MEDICINE

## 2021-09-21 PROCEDURE — 78452 HT MUSCLE IMAGE SPECT MULT: CPT | Performed by: INTERNAL MEDICINE

## 2021-09-21 PROCEDURE — 93016 CV STRESS TEST SUPVJ ONLY: CPT | Performed by: INTERNAL MEDICINE

## 2021-09-21 PROCEDURE — 93017 CV STRESS TEST TRACING ONLY: CPT | Performed by: INTERNAL MEDICINE

## 2021-09-21 PROCEDURE — 93018 CV STRESS TEST I&R ONLY: CPT | Performed by: INTERNAL MEDICINE

## 2021-09-21 NOTE — LETTER
Rk            [address] [facility], [address], [facility], [address] [facility]  [address] , [address]  INFORMED CONSENT FOR NUCLEAR STRESS TESTING - (Treadmill & Pharmacologic)    Patient Lynsey Werner      :1955      VSR#[id number]    Purpose & Explanation  In order to assist in the evaluation & treatment of a possible cardiac illness, your physician has ordered a nuclear stress test for you. The test will determine the state of blood flow to your heart muscle. This information will aid your physician in determining the state of my cardiovascular health. This test requires a small IV to be inserted into your hand/arm. Baseline EKGs & blood pressures will be obtained before, during, & after test. Depending on your ability to walk & your EKG, your physician my decide to preform a treadmill or pharmacologic (medication) stress test. If it is felt that you are able to walk, the test will be performed on a monitor driven treadmill with the amount of effort gradually increasing with advance stages, depending on your heart rate & fitness level. I do understand that I may stop the test at anytime do to signs of discomfort or fatigue. At peak exercise, the Cardiolite imaging agent will be injected through your established IV. Exercise will continue for one more minute & then the treadmill will slowly stop. This is the recovery stage. If your physician decides to perform the pharmacological stress test, you will be given an IV medication called Lexiscan that will increase blood flow to the heart that are similar to the treadmill exercise test. No matter which test you have done, you will be require to return hours later for the second set of images. Risks & Discomforts   There exists the possibility of certain changes to occur during either test. The medication normally produces changes in the heart rate & blood pressure.  Side-effects include shortness of breath, chest pain, nausea, headache, abdominal cramping, dizziness, flushed/sweaty-feeling, heart rhythm changes, & in rare instances, heart attack, stroke, or even death. Every effort will be made to minimize these risks by evaluation of preliminary information & by observations during the test.     Responsibilities of the patient  Information you possess about your health status or previous experience of unusual feelings with physical effort will affect the safety & value of your test. Your prompt reporting of feelings with the effort during the test itself, are also of great importance. You are responsible to fully disclose such information when requested by the testing staff. Inquires  Any questions about the procedures used during testing are encouraged. If you have any doubts or questions, please ask us for further explanations. The information which is obtained will be treated as privileged & confidential and will not be released or revealed to such person except my physician without my written consent. The information obtained, however, may be used for a statistical or scientific purpose with my right to privacy retained. Freedom of consents  Your permission to perform this testing is voluntary. You are free to deny consent or stop the test at any point, if you so desire. I HAVE READ THE FORGOING, & UNDERSTAND THE PROCEDURE, ALONG WITH THE POTENTIAL RISKS INVOLVED. I ALSO UNDERSTAND ANY QUESTION THAT MAY OCCUR TO ME, HAS BEEN ANSWERED TO MY SATISFACTION. SIGNATURE OF PATIENT:_____________________________________DATE: _____________    [place] OF WITNESS:____________________________________DATE: _____________               [place]            [address] [facility], [address], [facility], [address] [facility]  [address] [facility], [address] [facility]  Date:_______________     Patient Xiomara Alicea    :1955    [facility]#[id number]    Doctor: Dr. Brook Munson    PCP:    Age:65 y.o. Sex:male     Allergies:Patient has no known allergies. Ht:  70\"         Wt:  202 lbs     Medical History:                                                            **Indications for Test: CP, Syncope  Past Medical History:   Diagnosis Date    CAD (coronary artery disease)     Dr. Elsa Najjar Chest pain     COPD (chronic obstructive pulmonary disease) ([facility]     No pulmonologist - follows with PCP    H/O cardiac catheterization 09/24/2020     Severe calcified multivessel CAD with LV dysfuntion, PCWP is 12, CABG consult.  H/O echocardiogram 06/23/2020     Mild concentric LVH with moderate systolic impairment. EF is 40-45 % .  H/O echocardiogram 02/02/2021    ef 45-50%,  Mild LVH.    H/O exercise stress test 12/16/2020    Submaximal study due to failure to achieve target heart rate response and    Hx of Doppler ultrasound 04/13/2021    significant reflux noted of the right CFV. Significant reflux noted in the Left SSV Distal.    Hyperlipidemia     Hypertension     Follows with PCP    IBS (irritable bowel syndrome)     Pancreatitis 2013    Rheumatoid arthritis ([facility]     S/P CABG x 3 10/19/2020    SOB (shortness of breath)     Thyroid disease       Stress Cardiolite   Lexiscan=Reason: ______________  Chest Pain Character: DENIES Burning  Pounding  Dull Ache/Discomfort    Squeezing  Pressure Twinge Sharp/Stab    Palpitation Heaviness  Soreness SOB/Tightness   Location: Substernal  Anterior Chest  Precordial Pain  Other   Radiation:  None  Left Arm  Right Arm Lt. Rt.    Jaw    Lt. Rt.  Neck Back  Rt. Shoulder  Lt. Shoulder   Precipitating Factors:  None  Exertion  Exercise  Stress     Eating  Other      Relieved By:  Rest  NTG Antacids  Other    **COMMENTS DURING STRESS**  Chest Pain:   Arrhythmia:   ST Changes:   Symptoms:   SOB  Fatigue  Flushed/Diaphoresis  HA  Palpitations  Cramps/Nausea  Dizziness  ___Sx's Resolved   THR Achieved:           YES         NO          N/A   Exercise Effort: Poor        Fair         Good          Excellent         N/A   B-Blocker Held:          YES         NO          N/A          **Beta Blocker: Coreg (carvedilol)  *Blood Thinner: None   Shar-dur Held:            YES         NO         N/A      METS:                                         %MPHR:                                  **THR: _____________**   Stress Activity:_________mCi of Cardiolite. Rest Activity:________mCi of Cardiolite. **Cardiolite Injected: @                       to                        ____*0.4 mg Lexiscan (Regadenoson) IV Given                                    ____Stress/Rest    ____*50 mg Aminophylline IV Given          ____*Caffeine Given          ____Rest/Stress        Pre BP:  Recovery BP:  Recovery HR:   Post BP:  2 min BP:    Resting HR:  3 min BP:    Maximum HR: 4 min BP:     Tot. Exer. Time:       min Max. Speed:  Elevation:                        %           Stage  MPH Grade Time B/P HR   I 1.7 10%      II 2.5 12%      III 3.4 14%      IV 4.2 16%      V 5 18%

## 2021-09-22 ENCOUNTER — TELEPHONE (OUTPATIENT)
Dept: CARDIOLOGY CLINIC | Age: 66
End: 2021-09-22

## 2021-09-22 NOTE — TELEPHONE ENCOUNTER
Conclusions       [facility] physician Dr. Cyndee Garcia .abnormal stress test due to mildly depressed    LVEF, increased EDV, Normal tracer uptake in all myocardial segment during    rest and stress. Decreased uptake inferiorly due to diaphragmatic artifact.        Recommendation    office visit to discuss results     Left message on voice mail for patient to return call regarding results of stress test. Patient needs an appointment.

## 2021-09-28 ENCOUNTER — OFFICE VISIT (OUTPATIENT)
Dept: CARDIOLOGY CLINIC | Age: 66
End: 2021-09-28

## 2021-09-28 VITALS
SYSTOLIC BLOOD PRESSURE: 132 MMHG | WEIGHT: 200.8 LBS | BODY MASS INDEX: 28.75 KG/M2 | HEIGHT: 70 IN | HEART RATE: 104 BPM | DIASTOLIC BLOOD PRESSURE: 86 MMHG

## 2021-09-28 DIAGNOSIS — I25.10 CORONARY ARTERY DISEASE INVOLVING NATIVE CORONARY ARTERY OF NATIVE HEART WITHOUT ANGINA PECTORIS: Primary | ICD-10-CM

## 2021-09-28 PROCEDURE — G8427 DOCREV CUR MEDS BY ELIG CLIN: HCPCS | Performed by: INTERNAL MEDICINE

## 2021-09-28 PROCEDURE — 99214 OFFICE O/P EST MOD 30 MIN: CPT | Performed by: INTERNAL MEDICINE

## 2021-09-28 PROCEDURE — 3017F COLORECTAL CA SCREEN DOC REV: CPT | Performed by: INTERNAL MEDICINE

## 2021-09-28 PROCEDURE — G8417 CALC BMI ABV UP PARAM F/U: HCPCS | Performed by: INTERNAL MEDICINE

## 2021-09-28 PROCEDURE — 4004F PT TOBACCO SCREEN RCVD TLK: CPT | Performed by: INTERNAL MEDICINE

## 2021-09-28 PROCEDURE — 4040F PNEUMOC VAC/ADMIN/RCVD: CPT | Performed by: INTERNAL MEDICINE

## 2021-09-28 PROCEDURE — 1123F ACP DISCUSS/DSCN MKR DOCD: CPT | Performed by: INTERNAL MEDICINE

## 2021-09-28 NOTE — PROGRESS NOTES
Milton Delgado MD        OFFICE  FOLLOWUP NOTE    Chief complaints: patient is here for management of CAD, S/P cabg , HTN, DYSLPIDEMIA, subarachnoid hemorrhage, syncoe    F/u after [facility] hospital discharge frio syncope: he was diagnosed with dehydration    Subjective: patient feels better, no chest pain, no shortness of breath, no dizziness, no palpitations    HPI Jamie Rodgers is a 76 y. o.year old who  has a past medical history of CAD (coronary artery disease), Chest pain, COPD (chronic obstructive pulmonary disease) ([facility] H/O cardiac catheterization, H/O cardiovascular stress test, H/O echocardiogram, H/O echocardiogram, H/O exercise stress test, Hx of cardiovascular stress test, Hx of Doppler ultrasound, Hyperlipidemia, Hypertension, IBS (irritable bowel syndrome), Pancreatitis, Rheumatoid arthritis ([facility] S/P CABG x 3, SOB (shortness of breath), and Thyroid disease.  and presents for management of CAD, DM, HTN, AFIB, which are well controlled      Current Outpatient Medications   Medication Sig Dispense Refill    atorvastatin (LIPITOR) 20 MG tablet Take 1 tablet by mouth nightly 30 tablet 3    carvedilol (COREG) 3.125 MG tablet Take 1 tablet by mouth 2 times daily (with meals) 60 tablet 3    DULoxetine (CYMBALTA) 60 MG extended release capsule Take 1 capsule by mouth daily 30 capsule 3    finasteride (PROSCAR) 5 MG tablet Take 1 tablet by mouth daily 30 tablet 3    furosemide (LASIX) 20 MG tablet Take 1 tablet by mouth daily as needed (leg swelling) 30 tablet 3    lisinopril (PRINIVIL;ZESTRIL) 5 MG tablet Take 1 tablet by mouth daily 30 tablet 3    tiotropium (SPIRIVA HANDIHALER) 18 MCG inhalation capsule Inhale 1 capsule into the lungs daily 30 capsule 3    tamsulosin (FLOMAX) 0.4 MG capsule Take 1 capsule by mouth daily 30 capsule 3    sildenafil (VIAGRA) 100 MG tablet Take 1 tablet by mouth daily as needed for Erectile Dysfunction 10 tablet 1    fluticasone-vilanterol (BREO ELLIPTA) 100-25 MCG/INH AEPB inhaler Inhale 1 puff into the lungs daily 30 each 3    levothyroxine (SYNTHROID) 112 MCG tablet TAKE 1 TABLET BY MOUTH EVERY DAY 30 tablet 3    famotidine (PEPCID) 20 MG tablet TAKE 1/2 TABLET BY MOUTH 2 TIMES DAILY AS NEEDED (REFLUX) 30 tablet 3    Semaglutide,0.25 or 0.5MG/DOS, (OZEMPIC, 0.25 OR 0.5 MG/DOSE,) 2 MG/1.5ML SOPN Inject 0.25mg once weekly under the skin 1 pen 3    guaiFENesin (MUCINEX) 600 MG extended release tablet Take 1 tablet by mouth 2 times daily for 15 days 30 tablet 0    lidocaine 4 % external patch Place 1 patch onto the skin daily 30 patch 0    albuterol sulfate  (90 Base) MCG/ACT inhaler Inhale 2 puffs into the lungs every 4 hours (while awake) 1 Inhaler 5    cyclobenzaprine (FLEXERIL) 10 MG tablet Take 1 tablet by mouth 3 times daily as needed for Muscle spasms 90 tablet 3    Blood Pressure KIT Check BP once daily 1 kit 0    Multiple Vitamins-Minerals (THERAPEUTIC MULTIVITAMIN-MINERALS) tablet Take 1 tablet by mouth daily (with breakfast)  0     No current facility-administered medications for this visit. Allergies: Patient has no known allergies. Past Medical History:   Diagnosis Date    CAD (coronary artery disease)     Dr. Leigha Moser Chest pain     COPD (chronic obstructive pulmonary disease) ([facility]     No pulmonologist - follows with PCP    H/O cardiac catheterization 09/24/2020     Severe calcified multivessel CAD with LV dysfuntion, PCWP is 12, CABG consult.  H/O cardiovascular stress test 09/21/2021    abnormal stress test due to mildly depressed LVEF, increased EDV, Normal tracer uptake in all myocardial segment during  rest and stress. Decreased uptake inferiorly due to diaphragmatic artifact.  H/O echocardiogram 06/23/2020     Mild concentric LVH with moderate systolic impairment. EF is 40-45 % .     H/O echocardiogram 02/02/2021    ef 45-50%,  Mild LVH.    H/O exercise stress test 12/16/2020    Submaximal study due to failure to achieve target heart rate response and    Hx of cardiovascular stress test 2021    LVEF, increased EDB, Normal tracer uptake in all myocardial segment during rest and stress. Decreased uptake inferiorly due to diaphragmatic artifact.  Hx of Doppler ultrasound 2021    significant reflux noted of the right CFV. Significant reflux noted in the Left SSV Distal.    Hyperlipidemia     Hypertension     Follows with PCP    IBS (irritable bowel syndrome)     Pancreatitis     Rheumatoid arthritis ([address]     S/P CABG x 3 10/19/2020    SOB (shortness of breath)     Thyroid disease      Past Surgical History:   Procedure Laterality Date    CARDIAC CATHETERIZATION  2020     Severe calcified multivessel CAD with LV dysfuntion, PCWP is 12, CABG consult.     COLONOSCOPY  2017    [place], [place])    CORONARY ARTERY BYPASS GRAFT N/A 10/19/2020    CABG CORONARY ARTERY BYPASS x3 WITH LIMA, INTRAOP BETH, ENDOHARVEST OF THE LEFT SAPHENOUS VEIN performed by Daryle Buoy, MD at [facility], DIAGNOSTIC  2017    Normal exam per pt (done in [place], [place])     Family History   Problem Relation Age of Onset    Alzheimer's Disease Mother     Heart Disease Father     Heart Attack Father     High Blood Pressure Father     High Cholesterol Father      Social History     Tobacco Use    Smoking status: Former Smoker     Packs/day: 2.00     Years: 52.00     Pack years: 104.00     Types: Cigarettes     Start date:      Quit date: 10/1/2020     Years since quittin.9    Smokeless tobacco: Current User     Types: Chew   Substance Use Topics    Alcohol use: Not Currently     Comment: caffeine 3 cups of coffee and 1 pop a day      [unfilled]  Review of Systems:   · Constitutional: No Fever or Weight Loss   · Eyes: No Decreased Vision  · ENT: No Headaches, Hearing Loss or Vertigo  · Cardiovascular: No chest pain, dyspnea on exertion, palpitations or loss of consciousness  · Respiratory: No cough or wheezing    · Gastrointestinal: No abdominal pain, appetite loss, blood in stools, constipation, diarrhea or heartburn  · Genitourinary: No dysuria, trouble voiding, or hematuria  · Musculoskeletal:  No gait disturbance, weakness or joint complaints  · Integumentary: No rash or pruritis  · Neurological: No TIA or stroke symptoms  · Psychiatric: No anxiety or depression  · Endocrine: No malaise, fatigue or temperature intolerance  · Hematologic/Lymphatic: No bleeding problems, blood clots or swollen lymph nodes  · Allergic/Immunologic: No nasal congestion or hives  All systems negative except as marked. Objective:  /86 (Site: Right Upper Arm, Position: Sitting, Cuff Size: Medium Adult)   Pulse 104   Ht 5' 10\" (1.778 m)   Wt 200 lb 12.8 oz (91.1 kg)   BMI 28.81 kg/m²   Wt Readings from Last 3 Encounters:   09/28/21 200 lb 12.8 oz (91.1 kg)   09/20/21 202 lb 6.4 oz (91.8 kg)   09/20/21 196 lb (88.9 kg)     Body mass index is 28.81 kg/m². GENERAL - Alert, oriented, pleasant, in no apparent distress,normal grooming  HEENT - pupils are intact, cornea intact, conjunctive normal color, ears are normal in exam,  Neck - Supple. No jugular venous distention noted. No carotid bruits, no apical -carotid delay  Respiratory:  Normal breath sounds, No respiratory distress, No wheezing, No chest tenderness. ,no use of accessory muscles, diaphragm movement is normal  Cardiovascular: (PMI) apex non displaced,no lifts no thrills, no s3,no s4, Normal heart rate, Normal rhythm, No murmurs, No rubs, No gallops.  Carotid arteries pulse and amplitude are normal no bruit, no abdominal bruit noted ( normal abdominal aorta ausculation),   Extremities - No cyanosis, clubbing, or significant edema, no varicose veins    Abdomen - No masses, tenderness, or organomegaly, no hepato-splenomegally, no bruits  Musculoskeletal - No significant edema, no kyphosis or scoliosis, no deformity in any extremity noted, muscle strength and tone are normal  Skin: no ulcer,no scar,no stasis dermatitis   Neurologic - alert oriented times 3,Cranial nerves II through XII are grossly intact. There were no gross focal neurologic abnormalities. Psychiatric: normal mood and affect    No results found for: CKTOTAL, CKMB, CKMBINDEX, TROPONINI  BNP:  No results found for: BNP  PT/INR:  No results found for: PTINR  Lab Results   Component Value Date    LABA1C 6.3 09/15/2021    LABA1C 6.3 10/12/2020     Lab Results   Component Value Date    CHOL 155 06/23/2020    TRIG 173 (H) 06/23/2020    HDL 36 (L) 09/15/2021    LDLDIRECT 71 09/15/2021     Lab Results   Component Value Date    ALT 16 09/15/2021    AST 5 (L) 09/15/2021     TSH:  No results found for: TSH    Impression:  Radha Cary is a 76 y. o.year old who  has a past medical history of CAD (coronary artery disease), Chest pain, COPD (chronic obstructive pulmonary disease) ([facility] [facility] H/O cardiac catheterization, H/O cardiovascular stress test, H/O echocardiogram, H/O echocardiogram, H/O exercise stress test, Hx of cardiovascular stress test, Hx of Doppler ultrasound, Hyperlipidemia, Hypertension, IBS (irritable bowel syndrome), Pancreatitis, Rheumatoid arthritis ([facility] S/P CABG x 3, SOB (shortness of breath), and Thyroid disease. and presents with     Plan:  1. Syncope: was diagnosed with dehydration: had stress test here in office which showed LVEF 49%, diaphragmatic artifact, he haf left rib fracture with fall and has reporducible pain. increase oral fluids,  2. Insurance paper for short term disability signed  3. Ok to gome back to work on 10/4/21  4. Subarachnoid hemorrage: back on aspirin and has seen Neuro[facility]   5. Obesity: recommend to cut back on calories,t 1200 calori restriction  6. CAD:s/o CABG X3 continue aspirin , continue statins,cntimnue  ACEinhibitors  7. betablockers,had  STRESS TEST AND he is not interested in CARDIAC REHAB  8.  Dyslipidemia: continue statins, 9. Chf: no evidence of heart failure, left leg swelling is after venous stripping for CABG, venous doppler in [facility], reviewed, has left SSS reflux and b/l common femoral vein reflux disease, recommend compression socks and compression tights  10. HTN: continue lisinopril and bb and lasix  11. Health maintenance: exerise and dietHealth maintenance: exerise and diet  All labs, medications and tests reviewed, continue all other medications of all above medical condition listed as is.

## 2021-09-28 NOTE — LETTER
[facility] [address]  [address] [address]  Phone: [phone]  Fax: [phone]    Servando Lebron MD        September 28, 2021     Patient: Brandon Robertson   YOB: 1955   Date of Visit: 9/28/2021       To Whom It May Concern: It is my medical opinion that Brandon Robertson is ok to start work on 10/4/21  If you have any questions or concerns, please don't hesitate to call.     Sincerely,        Servando Lebron MD

## 2021-09-28 NOTE — PATIENT INSTRUCTIONS
**It is YOUR responsibilty to bring medication bottles and/or updated medication list to [address] [place] [address]. This will allow us to better serve you and all your healthcare needs**  Please be informed that if you contact our office outside of normal business hours the physician on call cannot help with any scheduling or rescheduling issues, procedure instruction questions or any type of medication issue. We advise you for any urgent/emergency that you go to the nearest emergency room!     PLEASE CALL OUR OFFICE DURING NORMAL BUSINESS HOURS    Monday - Friday   8 am to 5 pm    [place]: [place] 12: [phone]    [place]:  [phone]

## 2021-09-29 ENCOUNTER — TELEPHONE (OUTPATIENT)
Dept: CARDIOLOGY CLINIC | Age: 66
End: 2021-09-29

## 2021-09-29 NOTE — TELEPHONE ENCOUNTER
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

## 2021-09-30 ENCOUNTER — TELEPHONE (OUTPATIENT)
Dept: CARDIOLOGY CLINIC | Age: 66
End: 2021-09-30

## 2021-09-30 DIAGNOSIS — I10 ESSENTIAL HYPERTENSION: ICD-10-CM

## 2021-09-30 DIAGNOSIS — I25.10 CORONARY ARTERY DISEASE INVOLVING NATIVE CORONARY ARTERY OF NATIVE HEART WITHOUT ANGINA PECTORIS: ICD-10-CM

## 2021-09-30 DIAGNOSIS — E78.2 MIXED HYPERLIPIDEMIA: ICD-10-CM

## 2021-09-30 DIAGNOSIS — R55 SYNCOPE, UNSPECIFIED SYNCOPE TYPE: ICD-10-CM

## 2021-09-30 NOTE — LETTER
[address] [address]  Phone: [phone]  Fax: [phone]    Blas Dominguez MD        October 1, 2021     Patient: Cheryl Mcnamara   YOB: 1955   Date of Visit: 9/30/2021       To Whom It May Concern: It is my medical opinion that from a cardiology standpoint Cheryl Mcnamara may return to work 10/4/2021  as a  with no restrictions. If you have any questions or concerns, please don't hesitate to call.     Sincerely,          MD Blas Peralta MD

## 2021-09-30 NOTE — TELEPHONE ENCOUNTER
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 Did try to contact Mike Sr to advise, no answer. Did leave detailed message advising and asked for return phone call to the office with any further questions or concerns.

## 2021-10-08 ENCOUNTER — TELEPHONE (OUTPATIENT)
Dept: INTERNAL MEDICINE CLINIC | Age: 66
End: 2021-10-08

## 2021-10-08 RX ORDER — GUAIFENESIN 600 MG/1
600 TABLET, EXTENDED RELEASE ORAL 2 TIMES DAILY
Qty: 30 TABLET | Refills: 0 | Status: SHIPPED | OUTPATIENT
Start: 2021-10-08 | End: 2021-10-23

## 2021-10-19 ENCOUNTER — CARE COORDINATION (OUTPATIENT)
Dept: CARE COORDINATION | Age: 66
End: 2021-10-19

## 2021-10-19 NOTE — CARE COORDINATION
Ambulatory Care Coordination Note  CM Risk Score: 2  Charlson 10 Year Mortality Risk Score: 79%     ACC: Ihsan Terry, RN    Summary Note:   Call to pt for acm f/u. Reports he has Been doing ok, staying hydrated. Cough is at baseline, no symptoms. Denies concerns, reviewed upcoming apptmts. Call was disconnected on pt's end, attempted call back, no answer. Will plan to outreach pt one more time and graduate as pt denies any needs and has f/u visits scheduled. Pt has acm contact info if any needs arise. Care Coordination Interventions    Program Enrollment: Rising Risk  Referral from Primary Care Provider: No  Suggested Interventions and Community Resources  Physical Therapy: Completed  Social Work: Declined  Other Therapy Services: Completed  Zone Management Tools: Completed         Goals Addressed                 This Visit's Progress     Conditions and Symptoms   On track     I will notify my provider of any symptoms that indicate a worsening of my condition. Barriers: lack of support, overwhelmed by complexity of regimen, and lack of education  Plan for overcoming my barriers: support aned ed from ac  Confidence: 7/10  Anticipated Goal Completion Date: 4/1/21              Prior to Admission medications    Medication Sig Start Date End Date Taking?  Authorizing Provider   atorvastatin (LIPITOR) 20 MG tablet Take 1 tablet by mouth nightly 9/17/21   Erica Gamble MD   carvedilol (COREG) 3.125 MG tablet Take 1 tablet by mouth 2 times daily (with meals) 9/17/21   Erica Gamble MD   DULoxetine (CYMBALTA) 60 MG extended release capsule Take 1 capsule by mouth daily 9/17/21   Erica Gamble MD   finasteride (PROSCAR) 5 MG tablet Take 1 tablet by mouth daily 9/17/21   Erica Gamble MD   furosemide (LASIX) 20 MG tablet Take 1 tablet by mouth daily as needed (leg swelling) 9/17/21   Erica Gamble MD   lisinopril (PRINIVIL;ZESTRIL) 5 MG tablet Take 1 tablet by mouth daily 9/17/21 Tena Thapa MD   tiotropium (Crystal Noun) 18 MCG inhalation capsule Inhale 1 capsule into the lungs daily 9/17/21   Tena Thapa MD   tamsulosin [facility]) 0.4 MG capsule Take 1 capsule by mouth daily 9/17/21   Tena Thapa MD   sildenafil (VIAGRA) 100 MG tablet Take 1 tablet by mouth daily as needed for Erectile Dysfunction 9/17/21   Tena Thapa MD   fluticasone-vilanterol (BREO ELLIPTA) 100-25 MCG/INH AEPB inhaler Inhale 1 puff into the lungs daily 9/17/21   Tena Thapa MD   levothyroxine (SYNTHROID) 112 MCG tablet TAKE 1 TABLET BY MOUTH EVERY DAY 9/17/21   Tena Thapa MD   famotidine (PEPCID) 20 MG tablet TAKE 1/2 TABLET BY MOUTH 2 TIMES DAILY AS NEEDED (REFLUX) 9/17/21   Tena Thapa MD   Semaglutide,0.25 or 0.5MG/DOS, (OZEMPIC, 0.25 OR 0.5 MG/DOSE,) 2 MG/1.5ML SOPN Inject 0.25mg once weekly under the skin 9/17/21   Tena Thapa MD   albuterol sulfate  (90 Base) MCG/ACT inhaler Inhale 2 puffs into the lungs every 4 hours (while awake) 6/15/21   Tena Thapa MD   cyclobenzaprine (FLEXERIL) 10 MG tablet Take 1 tablet by mouth 3 times daily as needed for Muscle spasms 6/15/21   Tena Thapa MD   Blood Pressure KIT Check BP once daily 3/25/21   Tena Thapa MD   Multiple Vitamins-Minerals (THERAPEUTIC MULTIVITAMIN-MINERALS) tablet Take 1 tablet by mouth daily (with breakfast) 11/10/20   C Dedrick hCacon MD       Future Appointments   Date Time Provider Petra Albert   11/10/2021  1:30 PM Armando Zapien MD [facility]   12/17/2021  9:30 AM Tena Thapa MD [facility]       ,   Congestive Heart Failure Assessment    Are you currently restricting fluids?: No Restriction  Do you understand a low sodium diet?: Yes  Do you understand how to read food labels?: Yes  How many restaurant meals do you eat per week?: 1-2  Do you salt your food before tasting it?: No         Symptoms:         and   COPD Assessment    Does the patient understand envrionmental exposure?: Yes  Is the patient able to verbalize Rescue vs. Long Acting medications?: Yes  Does the patient have a nebulizer?: No  Does the patient use a space with inhaled medications?: No     No patient-reported symptoms         Symptoms:          General Assessment    Do you have any symptoms that are causing concern?: No

## 2021-11-15 ENCOUNTER — CARE COORDINATION (OUTPATIENT)
Dept: CARE COORDINATION | Age: 66
End: 2021-11-15

## 2021-11-23 ENCOUNTER — CARE COORDINATION (OUTPATIENT)
Dept: CARE COORDINATION | Age: 66
End: 2021-11-23

## 2021-12-20 ENCOUNTER — CARE COORDINATION (OUTPATIENT)
Dept: CARE COORDINATION | Age: 66
End: 2021-12-20

## 2021-12-29 ENCOUNTER — TELEPHONE (OUTPATIENT)
Dept: INTERNAL MEDICINE CLINIC | Age: 66
End: 2021-12-29

## 2022-01-11 ENCOUNTER — OFFICE VISIT (OUTPATIENT)
Dept: INTERNAL MEDICINE CLINIC | Age: 67
End: 2022-01-11
Payer: MEDICARE

## 2022-01-11 ENCOUNTER — TELEPHONE (OUTPATIENT)
Dept: INTERNAL MEDICINE CLINIC | Age: 67
End: 2022-01-11

## 2022-01-11 VITALS
RESPIRATION RATE: 18 BRPM | OXYGEN SATURATION: 94 % | HEART RATE: 91 BPM | WEIGHT: 185 LBS | DIASTOLIC BLOOD PRESSURE: 82 MMHG | SYSTOLIC BLOOD PRESSURE: 138 MMHG | HEIGHT: 70 IN | BODY MASS INDEX: 26.48 KG/M2

## 2022-01-11 VITALS
HEART RATE: 91 BPM | WEIGHT: 185 LBS | BODY MASS INDEX: 26.48 KG/M2 | HEIGHT: 70 IN | RESPIRATION RATE: 18 BRPM | OXYGEN SATURATION: 94 %

## 2022-01-11 DIAGNOSIS — I10 ESSENTIAL HYPERTENSION: ICD-10-CM

## 2022-01-11 DIAGNOSIS — N52.9 VASCULOGENIC ERECTILE DYSFUNCTION, UNSPECIFIED VASCULOGENIC ERECTILE DYSFUNCTION TYPE: ICD-10-CM

## 2022-01-11 DIAGNOSIS — J43.1 PANLOBULAR EMPHYSEMA (HCC): ICD-10-CM

## 2022-01-11 DIAGNOSIS — E78.2 MIXED HYPERLIPIDEMIA: ICD-10-CM

## 2022-01-11 DIAGNOSIS — I50.42 CHRONIC COMBINED SYSTOLIC AND DIASTOLIC CONGESTIVE HEART FAILURE (HCC): Primary | ICD-10-CM

## 2022-01-11 DIAGNOSIS — E03.9 ACQUIRED HYPOTHYROIDISM: ICD-10-CM

## 2022-01-11 DIAGNOSIS — M54.50 CHRONIC BILATERAL LOW BACK PAIN WITHOUT SCIATICA: ICD-10-CM

## 2022-01-11 DIAGNOSIS — D75.89 MACROCYTOSIS: ICD-10-CM

## 2022-01-11 DIAGNOSIS — E55.9 VITAMIN D DEFICIENCY: ICD-10-CM

## 2022-01-11 DIAGNOSIS — K21.9 GASTROESOPHAGEAL REFLUX DISEASE, UNSPECIFIED WHETHER ESOPHAGITIS PRESENT: ICD-10-CM

## 2022-01-11 DIAGNOSIS — Z00.00 ROUTINE GENERAL MEDICAL EXAMINATION AT A HEALTH CARE FACILITY: Primary | ICD-10-CM

## 2022-01-11 DIAGNOSIS — G89.29 CHRONIC BILATERAL LOW BACK PAIN WITHOUT SCIATICA: ICD-10-CM

## 2022-01-11 DIAGNOSIS — R73.03 PREDIABETES: ICD-10-CM

## 2022-01-11 DIAGNOSIS — N40.0 BENIGN PROSTATIC HYPERPLASIA, UNSPECIFIED WHETHER LOWER URINARY TRACT SYMPTOMS PRESENT: ICD-10-CM

## 2022-01-11 PROCEDURE — G8484 FLU IMMUNIZE NO ADMIN: HCPCS | Performed by: INTERNAL MEDICINE

## 2022-01-11 PROCEDURE — 3017F COLORECTAL CA SCREEN DOC REV: CPT | Performed by: INTERNAL MEDICINE

## 2022-01-11 PROCEDURE — G8417 CALC BMI ABV UP PARAM F/U: HCPCS | Performed by: INTERNAL MEDICINE

## 2022-01-11 PROCEDURE — 3023F SPIROM DOC REV: CPT | Performed by: INTERNAL MEDICINE

## 2022-01-11 PROCEDURE — G8427 DOCREV CUR MEDS BY ELIG CLIN: HCPCS | Performed by: INTERNAL MEDICINE

## 2022-01-11 PROCEDURE — 4004F PT TOBACCO SCREEN RCVD TLK: CPT | Performed by: INTERNAL MEDICINE

## 2022-01-11 PROCEDURE — 99214 OFFICE O/P EST MOD 30 MIN: CPT | Performed by: INTERNAL MEDICINE

## 2022-01-11 PROCEDURE — G0438 PPPS, INITIAL VISIT: HCPCS | Performed by: INTERNAL MEDICINE

## 2022-01-11 PROCEDURE — 1123F ACP DISCUSS/DSCN MKR DOCD: CPT | Performed by: INTERNAL MEDICINE

## 2022-01-11 PROCEDURE — 4040F PNEUMOC VAC/ADMIN/RCVD: CPT | Performed by: INTERNAL MEDICINE

## 2022-01-11 RX ORDER — TIOTROPIUM BROMIDE 18 UG/1
18 CAPSULE ORAL; RESPIRATORY (INHALATION) DAILY
Qty: 90 CAPSULE | Refills: 1 | Status: SHIPPED | OUTPATIENT
Start: 2022-01-11 | End: 2022-05-11 | Stop reason: SDUPTHER

## 2022-01-11 RX ORDER — FLUTICASONE FUROATE AND VILANTEROL 100; 25 UG/1; UG/1
1 POWDER RESPIRATORY (INHALATION) DAILY
Qty: 30 EACH | Refills: 5 | Status: SHIPPED | OUTPATIENT
Start: 2022-01-11 | End: 2022-05-11 | Stop reason: SDUPTHER

## 2022-01-11 RX ORDER — ALBUTEROL SULFATE 90 UG/1
2 AEROSOL, METERED RESPIRATORY (INHALATION)
Qty: 1 EACH | Refills: 5 | Status: SHIPPED | OUTPATIENT
Start: 2022-01-11 | End: 2022-05-11 | Stop reason: SDUPTHER

## 2022-01-11 RX ORDER — FINASTERIDE 5 MG/1
5 TABLET, FILM COATED ORAL DAILY
Qty: 90 TABLET | Refills: 1 | Status: SHIPPED | OUTPATIENT
Start: 2022-01-11 | End: 2022-05-11 | Stop reason: SDUPTHER

## 2022-01-11 RX ORDER — SILDENAFIL 100 MG/1
100 TABLET, FILM COATED ORAL DAILY PRN
Qty: 10 TABLET | Refills: 3 | Status: SHIPPED | OUTPATIENT
Start: 2022-01-11 | End: 2022-05-11 | Stop reason: SDUPTHER

## 2022-01-11 RX ORDER — LISINOPRIL 5 MG/1
5 TABLET ORAL DAILY
Qty: 90 TABLET | Refills: 1 | Status: SHIPPED | OUTPATIENT
Start: 2022-01-11 | End: 2022-05-11 | Stop reason: SDUPTHER

## 2022-01-11 RX ORDER — M-VIT,TX,IRON,MINS/CALC/FOLIC 27MG-0.4MG
1 TABLET ORAL
Refills: 0 | Status: CANCELLED | OUTPATIENT
Start: 2022-01-11

## 2022-01-11 RX ORDER — FAMOTIDINE 20 MG/1
TABLET, FILM COATED ORAL
Qty: 90 TABLET | Refills: 1 | Status: SHIPPED | OUTPATIENT
Start: 2022-01-11 | End: 2022-05-11 | Stop reason: SDUPTHER

## 2022-01-11 RX ORDER — SEMAGLUTIDE 1.34 MG/ML
INJECTION, SOLUTION SUBCUTANEOUS
Qty: 1 PEN | Refills: 5 | Status: SHIPPED | OUTPATIENT
Start: 2022-01-11 | End: 2022-05-11 | Stop reason: SDUPTHER

## 2022-01-11 RX ORDER — ATORVASTATIN CALCIUM 20 MG/1
20 TABLET, FILM COATED ORAL NIGHTLY
Qty: 90 TABLET | Refills: 1 | Status: SHIPPED | OUTPATIENT
Start: 2022-01-11 | End: 2022-05-11 | Stop reason: SDUPTHER

## 2022-01-11 RX ORDER — CYCLOBENZAPRINE HCL 10 MG
10 TABLET ORAL 3 TIMES DAILY PRN
Qty: 90 TABLET | Refills: 5 | Status: SHIPPED | OUTPATIENT
Start: 2022-01-11 | End: 2022-05-11 | Stop reason: SDUPTHER

## 2022-01-11 RX ORDER — LEVOTHYROXINE SODIUM 112 UG/1
TABLET ORAL
Qty: 90 TABLET | Refills: 1 | Status: SHIPPED | OUTPATIENT
Start: 2022-01-11 | End: 2022-05-11 | Stop reason: SDUPTHER

## 2022-01-11 RX ORDER — FUROSEMIDE 20 MG/1
20 TABLET ORAL DAILY PRN
Qty: 90 TABLET | Refills: 1 | Status: SHIPPED | OUTPATIENT
Start: 2022-01-11 | End: 2022-05-11 | Stop reason: SDUPTHER

## 2022-01-11 RX ORDER — CARVEDILOL 3.12 MG/1
3.12 TABLET ORAL 2 TIMES DAILY WITH MEALS
Qty: 180 TABLET | Refills: 1 | Status: SHIPPED | OUTPATIENT
Start: 2022-01-11 | End: 2022-05-11 | Stop reason: SDUPTHER

## 2022-01-11 RX ORDER — DULOXETIN HYDROCHLORIDE 60 MG/1
60 CAPSULE, DELAYED RELEASE ORAL DAILY
Qty: 90 CAPSULE | Refills: 1 | Status: SHIPPED | OUTPATIENT
Start: 2022-01-11 | End: 2022-05-11 | Stop reason: SDUPTHER

## 2022-01-11 RX ORDER — TAMSULOSIN HYDROCHLORIDE 0.4 MG/1
0.4 CAPSULE ORAL DAILY
Qty: 90 CAPSULE | Refills: 1 | Status: SHIPPED | OUTPATIENT
Start: 2022-01-11 | End: 2022-05-11 | Stop reason: SDUPTHER

## 2022-01-11 ASSESSMENT — PATIENT HEALTH QUESTIONNAIRE - PHQ9
SUM OF ALL RESPONSES TO PHQ9 QUESTIONS 1 & 2: 0
SUM OF ALL RESPONSES TO PHQ QUESTIONS 1-9: 0
2. FEELING DOWN, DEPRESSED OR HOPELESS: 0
SUM OF ALL RESPONSES TO PHQ QUESTIONS 1-9: 0
1. LITTLE INTEREST OR PLEASURE IN DOING THINGS: 0
SUM OF ALL RESPONSES TO PHQ9 QUESTIONS 1 & 2: 0
1. LITTLE INTEREST OR PLEASURE IN DOING THINGS: 0
SUM OF ALL RESPONSES TO PHQ QUESTIONS 1-9: 0
SUM OF ALL RESPONSES TO PHQ QUESTIONS 1-9: 0
2. FEELING DOWN, DEPRESSED OR HOPELESS: 0
SUM OF ALL RESPONSES TO PHQ QUESTIONS 1-9: 0

## 2022-01-11 ASSESSMENT — LIFESTYLE VARIABLES: HOW OFTEN DO YOU HAVE A DRINK CONTAINING ALCOHOL: 0

## 2022-01-11 NOTE — PROGRESS NOTES
Medicare Annual Wellness Visit  Name: Ladonna Gaytan Date: 2022   MRN: [id number] Sex: Male   Age: 78 y.o. Ethnicity: Non- / Non    : 1955 Race: White (non-)      Pacheco Hayden is here for [employer]    Screenings for behavioral, psychosocial and functional/safety risks, and cognitive dysfunction are all negative except as indicated below. These results, as well as other patient data from the [address] form, are documented in Flowsheets linked to this Encounter. No Known Allergies    Prior to Visit Medications    Medication Sig Taking?  Authorizing Provider   atorvastatin (LIPITOR) 20 MG tablet Take 1 tablet by mouth nightly  Vikram Foss MD   carvedilol (COREG) 3.125 MG tablet Take 1 tablet by mouth 2 times daily (with meals)  Vikram Foss MD   DULoxetine (CYMBALTA) 60 MG extended release capsule Take 1 capsule by mouth daily  Vikram Foss MD   finasteride (PROSCAR) 5 MG tablet Take 1 tablet by mouth daily  Vikram Foss MD   furosemide (LASIX) 20 MG tablet Take 1 tablet by mouth daily as needed (leg swelling)  Vikram Foss MD   lisinopril (PRINIVIL;ZESTRIL) 5 MG tablet Take 1 tablet by mouth daily  Vikram Foss MD   tiotropium (SPIRIVA HANDIHALER) 18 MCG inhalation capsule Inhale 1 capsule into the lungs daily  Vikram Foss MD   tamsulosin (FLOMAX) 0.4 MG capsule Take 1 capsule by mouth daily  Vikram Foss MD   sildenafil (VIAGRA) 100 MG tablet Take 1 tablet by mouth daily as needed for Erectile Dysfunction  Vikram Foss MD   fluticasone-vilanterol (BREO ELLIPTA) 100-25 MCG/INH AEPB inhaler Inhale 1 puff into the lungs daily  Vikram Foss MD   levothyroxine (SYNTHROID) 112 MCG tablet TAKE 1 TABLET BY MOUTH EVERY DAY  Vikram Foss MD   famotidine (PEPCID) 20 MG tablet TAKE 1/2 TABLET BY MOUTH 2 TIMES DAILY AS NEEDED (REFLUX)  Vikram Foss MD   Semaglutide,0.25 or 0.5MG/DOS, (OZEMPIC, 0.25 OR 0.5 MG/DOSE,) 2 MG/1.5ML SOPN Inject 0.25mg once weekly under the skin  Dat Dougherty MD   albuterol sulfate  (90 Base) MCG/ACT inhaler Inhale 2 puffs into the lungs every 4 hours (while awake)  Dat Dougherty MD   cyclobenzaprine (FLEXERIL) 10 MG tablet Take 1 tablet by mouth 3 times daily as needed for Muscle spasms  Dat Dougherty MD   Blood Pressure KIT Check BP once daily  Dat Dougherty MD   Multiple Vitamins-Minerals (THERAPEUTIC MULTIVITAMIN-MINERALS) tablet Take 1 tablet by mouth daily (with breakfast)  Apurva Maya MD       Past Medical History:   Diagnosis Date    CAD (coronary artery disease)     Dr. Jordon Singh Chest pain     COPD (chronic obstructive pulmonary disease) ([facility] [address]     No pulmonologist - follows with PCP    H/O cardiac catheterization 09/24/2020     Severe calcified multivessel CAD with LV dysfuntion, PCWP is 12, CABG consult.  H/O cardiovascular stress test 09/21/2021    abnormal stress test due to mildly depressed LVEF, increased EDV, Normal tracer uptake in all myocardial segment during  rest and stress. Decreased uptake inferiorly due to diaphragmatic artifact.  H/O echocardiogram 06/23/2020     Mild concentric LVH with moderate systolic impairment. EF is 40-45 % .  H/O echocardiogram 02/02/2021    ef 45-50%,  Mild LVH.    H/O exercise stress test 12/16/2020    Submaximal study due to failure to achieve target heart rate response and    Hx of cardiovascular stress test 09/20/2021    LVEF, increased EDB, Normal tracer uptake in all myocardial segment during rest and stress. Decreased uptake inferiorly due to diaphragmatic artifact.  Hx of Doppler ultrasound 04/13/2021    significant reflux noted of the right CFV.  Significant reflux noted in the Left SSV Distal.    Hyperlipidemia     Hypertension     Follows with PCP    IBS (irritable bowel syndrome)     Pancreatitis 2013    Rheumatoid arthritis ([facility] [address]     S/P CABG x 3 10/19/2020    SOB (shortness of breath)     Thyroid disease        Past Surgical History:   Procedure Laterality Date    CARDIAC CATHETERIZATION  09/24/2020     Severe calcified multivessel CAD with LV dysfuntion, PCWP is 12, CABG consult.  COLONOSCOPY  2017    [facility] [place], [place])    CORONARY ARTERY BYPASS GRAFT N/A 10/19/2020    CABG CORONARY ARTERY BYPASS x3 WITH LIMA, INTRAOP BETH, ENDOHARVEST OF THE LEFT SAPHENOUS VEIN performed by Kirsty Weston MD at [facility], DIAGNOSTIC  2017    Normal exam per pt (done in [place], [place])       Family History   Problem Relation Age of Onset    Alzheimer's Disease Mother     Heart Disease Father     Heart Attack Father     High Blood Pressure Father     High Cholesterol Father        CareTeam (Including outside providers/suppliers regularly involved in providing care):   Patient Care Team:  Jorene Crigler, MD as PCP - General (Internal Medicine)  Jorene Crigler, MD as PCP - REHABILITATION [facility] Empaneled Provider  Pankaj Javier RN as [facility]    Wt Readings from Last 3 Encounters:   01/11/22 185 lb (83.9 kg)   01/11/22 185 lb (83.9 kg)   09/28/21 200 lb 12.8 oz (91.1 kg)     Vitals:    01/11/22 1442   Pulse: 91   Resp: 18   SpO2: 94%   Weight: 185 lb (83.9 kg)   Height: 5' 10\" (1.778 m)     Body mass index is 26.54 kg/m². Based upon direct observation of the patient, evaluation of cognition reveals recent and remote memory intact. Patient's complete Health Risk Assessment and screening values have been reviewed and are found in Flowsheets. The following problems were reviewed today and where indicated follow up appointments were made and/or referrals ordered. Positive Risk Factor Screenings with Interventions:     Fall Risk:  Timed Up and Go Test > 12 seconds?  (Complete if either Fall Risk answers are Yes): no  2 or more falls in past year?: (!) yes  Fall with injury in past year?: (!) yes  Fall Risk Interventions:    · Home safety tips provided       Substance History:  Social History     Tobacco History     Smoking Status  Former Smoker Smoking Start Date  1/1/1968 Quit date  10/1/2020 Smoking Frequency  2 packs/day for 52 years (104 pk yrs)    Smoking Tobacco Type  Cigarettes    Smokeless Tobacco Use  Current User Smokeless Tobacco Type  Chew          Alcohol History     Alcohol Use Status  Not Currently Comment  caffeine 3 cups of coffee and 1 pop a day          Drug Use     Drug Use Status  Never          Sexual Activity     Sexually Active  Yes Partners  Female               Alcohol Screening:       A score of 8 or more is associated with harmful or hazardous drinking. A score of 13 or more in women, and 15 or more in men, is likely to indicate alcohol dependence. Substance Abuse Interventions:  · Joe Dugan is primary decision maker      General Health and ACP:  General  In general, how would you say your health is?: Good  In the past 7 days, have you experienced any of the following?  New or Increased Pain, New or Increased Fatigue, Loneliness, Social Isolation, Stress or Anger?: None of These  Do you get the social and emotional support that you need?: Yes  Do you have a Living Will?: (!) No  Advance Directives     Power of [address] [place] [address] Will ACP-Advance Directive ACP-Power of     Not on File Not on File Not on File Not on File      General Health Risk Interventions:  · No Living Will: Advance Care Planning addressed with patient today    Health Habits/Nutrition:  Health Habits/Nutrition  Do you exercise for at least 20 minutes 2-3 times per week?: Yes  Have you lost any weight without trying in the past 3 months?: No  Do you eat only one meal per day?: (!) Yes  Have you seen the dentist within the past year?: (!) No  Body mass index: (!) 26.54  Health Habits/Nutrition Interventions:  · has full dentures    Hearing/Vision:  No exam data present  Hearing/Vision  Do you or your family notice any trouble with your hearing that hasn't been managed with hearing aids?: No  Do you have difficulty driving, watching TV, or doing any of your daily activities because of your eyesight?: No  Have you had an eye exam within the past year?: (!) No  Hearing/Vision Interventions:  · Vision concerns:  patient encouraged to make appointment with his/her eye specialist    Safety:  Safety  Do you have working smoke detectors?: Yes  Have all throw rugs been removed or fastened?: (!) No  Do you have non-slip mats or surfaces in all bathtubs/showers?: Yes  Do all of your stairways have a railing or banister?: (!) No  Are your doorways, halls and stairs free of clutter?: Yes  Do you always fasten your seatbelt when you are in a car?: Yes  Safety Interventions:  · Home safety tips provided       Personalized Preventive Plan   Current Health Maintenance Status    There is no immunization history on file for this patient. Health Maintenance   Topic Date Due    AAA screen  Never done    Hepatitis C screen  Never done    COVID-19 Vaccine (1) Never done    Colon cancer screen colonoscopy  Never done    Shingles Vaccine (2 of 3) 04/02/2018    Pneumococcal 65+ years Vaccine (1 of 1 - PPSV23) Never done   [employer] Visit (AWV)  Never done    Flu vaccine (1) Never done    A1C test (Diabetic or Prediabetic)  09/15/2022    Lipid screen  09/15/2022    TSH testing  09/15/2022    Potassium monitoring  09/15/2022    Creatinine monitoring  09/15/2022    Low dose CT lung screening  09/16/2022    Depression Screen  09/17/2022    DTaP/Tdap/Td vaccine (3 - Td or Tdap) 09/16/2031    Hepatitis A vaccine  Aged Out    Hepatitis B vaccine  Aged Out    Hib vaccine  Aged Out    Meningococcal (ACWY) vaccine  Aged Out     Recommendations for Team [employer] Due: see orders and patient instructions/AVS.  .   Recommended screening schedule for the next 5-10 years is provided to the patient in written form: see Patient Instructions/AVS.    Jaycee Cleaning was seen today for wellness program.    Diagnoses and all orders for this visit:    Routine general medical examination at a health care facility           Tamia Castillo MD  Internal Medicine  1/11/2022  3:03 PM

## 2022-01-11 NOTE — PATIENT INSTRUCTIONS
Fasting for a blood test: taking the right steps before testing helps ensure your results will be accurate. Why do I need to fast before my blood test?  If your healthcare provider has told you to fast before a blood test, it means you should not eat or drink anything, except water, for several hours before your test. When you eat and drink normally, those foods and beverages are absorbed into your bloodstream. That could affect the results of certain types of blood tests. What types of blood tests require fasting? The most common tests that require fasting are:   Glucose tests, which measure your blood sugar.  Lipid tests, which measure cholesterol and triglycerides. You do not need to be fasting for HbA1C test.     How long do I have to fast before the test?  You usually need to fast for 8-12 hours before the test. Most tests that require fasting are scheduled for early in the morning. That way, most of your fasting time will be overnight. Can I drink anything besides water during a fast?  No. Juice, coffee, soda, and other beverages can get in your bloodstream and affect your results. In addition, you should not:   Chew gum    Smoke    Exercise  These activities can also affect your results. But you can drink water. It's actually encouraged that you drink 2 glasses of water before any blood test. It helps keep more fluid in your veins, which can make it easier to draw blood. If you are dehydrated, your blood draw experience may be unpleasant. Can I continue taking medicine during a fast?  Most of the time it's OK to take your usual medicines with water, unless otherwise specified by your healthcare provider. You may need to avoid certain medicines that you normally take with food.      What if I make a mistake and have something to eat or drink besides water during my fast?  Tell your healthcare provider before your test. Your test will most likely have to be re-scheduled for another time when you are able to complete your fast.    When can I eat and drink normally again? As soon as your test is over. You may want to bring a snack with you, so you can eat right away. Is there anything else I need to know about fasting before a blood test?  Be sure to talk to your healthcare provider if you have any questions or concerns about fasting. You should talk to your provider before taking any lab test. Most tests don't require fasting or other special preparations. For others, you may need to avoid certain foods, medicines, or activities.        [facility] Internal Medicine  [phone]

## 2022-01-11 NOTE — PROGRESS NOTES
1/11/22    Deandra Brandt  1955    Chief Complaint   Patient presents with   [facility] Other    3 Month Follow-Up       History of Present Illness:  Deandra Brandt is a 78 y.o. pleasant gentleman presenting today with a chief complaint of COPD, HTN, HL, prediabetes, CAD. He has a past medical history significant for:  HTN, on Lisinopril 5mg QD, Coreg 3.125mg BID  HL (LDL 71, TG 89 on 9/15/2021), on Atorvastatin 20mg  Prediabetes (HbA1C 6.3% on 9/15/2021), on Ozempic 0.25mg weekly  CAD s/p CABG (10/2020), on ASA   HFrEF/HFpEF (EF 49-52%, diastolic dysfunction, mild MR on 6/23/2020; EF up to 45-50% on TTE 2/4/2021), on Lisinopril 5mg QD, Coreg 3.125mg BID, Lasix 20mg QD  Hypothyroidism (TSH 2.62 normal on 9/15/2021), on Synthroid 112mcg QD  PAD, on ASA   GERD, on Pepcid 10mg BID   Allergic rhinitis, off Zyrtec  BPH, on Flomax, Finasteride   ED, on Sildenafil 100mg QD PRN   COPD, on Albuterol PRN, Symbicort BID, Spiriva QD   Chronic back pain, on Cymbalta 60mg QHS, Flexeril 10mg BID PRN   IBS-mixed, on Bentyl QID   H/o C diff (10/2020)  Juvenile RA   Rheumatic fever   Former smoker (10/2020)     # Patient was admitted 3/15-3/19/2021 after syncopal episode when he got up from a sitting position, and hit his head. Had LOC. CTH showed SAH. He was transferred to [facility] trauma service. NSGY consulted and recommended no surgical intervention. No intracranial aneurysm noted. He was cleared to be on ASA. Had FU with NSGY on 4/5 after CTH done. Also had TTE per Cardiology stable. Off driving for 1 month and recommended compression stockings for his orthostatic hypotension.   He is s/p Holter 30-days.   He watches his salt intake.   Yesterday Dr. Rochelle Lee re-introduced low dose Lisinopril, Lasix, Coreg due to high BP.   # Patient was admitted 9/23-9/24/2020 for hypotension and bradycardia following LHC. He had N/V and was hypovolemic. He was thought to be in cardiogenic shock s/p dopamine gtt. He also received IVF resuscitation. He was discharged home ambulating, in a stable condition. LHC showed multi-vessel CAD. He is s/p CABG in Oct 2020.   Following with Dr. Analy Gonsalves outpatient. He is s/p cardiac rehab  He also had C diff s/p PO Vanc. Continues to have sternal pain. Was taking Tramadol and has run out. OARRS previously reviewed. He has not had prescription from me.   # Patient was in ER on 12/19 for near syncope and chin laceration s/p sutured.   QTc was 508 ms. Patient refused to stay in ER longer for further cardiac eval.   He has been having issues with hypotension and fluctuating BP.   Patient was in ED 9/16/2021 for bradycardia and hypotension. Sustained fall and fractured L 6th rib. EKG wo ischemia. Mainor unremarkable. CBC, BMP stable. Reports he was out in the heat when he felt dizzy prior to falling. Refused to be admitted. Patient is now taking Lisinopril 5mg, Coreg 3.125mg BID, Lasix 20mg.   BP today 138/82, HR 91. No CP, SOB, palpitations, dizziness, or light-headedness. Not interested in cardiac rehab.   His LE swelling is 2/2 significant reflux bilaterally (evident on US).    # Chantix helped him quit smoking 4 days before CABG.    # Patient was in the ED on 5/28 for acute onset SOB. New diagnosis of CHF. EKG wo acute ischemia. Neg Mainor. BNP elevated. CT showed no PE but did show pulmonary edema (high d-dimer). COVID test negative. He was hypoxic, requiring 4L O2 however patient declined admission.   He is not having worsening SOB, but at his baseline. He was given in the ED Lasix IV 40mg once.   First time I met patient in June I prescribed Lasix 20mg QD. He reports his SOB improved over 70% with the Lasix. TTE done 6/23/2020 shows e/o mixed systolic and diastolic heart failure. He has never had LHC done.  Patient denies alcohol abuse. He developed pre-renal JOSE which resolved after Lasix was held for 1 week (Cr up to 1.4 in June then down to 1.0 with GFR > 60 in July). He had not had ischemic work up done in the past.   # Takes Pepcid for GERD. Helping. He was on Zantac in the past but I discontinued it due to recall. No heartburn or reflux or nausea.   # Takes Synthroid. No symptoms of hyper or hypothyroidism. TSH was elevated in Nov 2020 during hospital stay. Increased Synthroid to 112mcg.  Repeat in March 2021 WNL.    # Takes Zyrtec for allergic rhinitis. This is helping. Tolerating well. He is also using Flonase PRN. No coughing or sneezing.   # Takes Finasteride and Flomax for BPH. Symptoms stable on these meds. Tolerating meds. No LUTS.   # Takes Cymbalta and Flexeril for chronic back pain. Both helping. No side effects such as drowsiness.   # Uses Albuterol and Symbicort for COPD, as well as recently added Spiriva. 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 # High MCV on recent labs.    # As per patient he should have had his gall bladder removed however due to pandemic restrictions he had to hold off.   # He takes ASA for PAD. No bleeding or bruising on ASA. No h/o CAD or ischemic stroke. # ? H/o dyslipidemia. Not on statin therapy. He does have RA however. # Patient's mother was diabetic. He has prediabetes. Well controlled off meds. # Patient is having ED over the past few months.   # Has lesion on L side of face. Sun-exposed area. Growing over the past 2 months. Sent him to see Derm.   # Cannot afford time or finances to go to [facility] at the moment due to work. Trying OTC weight loss pills but I recommended against this.   Started Ozempic. Starting to lose weight.  Tolerating well.      Drives a semi-truck       Health maintenance:   Health Maintenance Due   Topic Date Due    AAA screen  Never done    Hepatitis C screen  Never done    COVID-19 Vaccine (1) Never done    Colon cancer screen colonoscopy  Never done    Shingles Vaccine (2 of 3) 04/02/2018    Pneumococcal 65+ years Vaccine (1 of 1 - PPSV23) Never done   [employer] Visit (AWV)  Never done   [place] Flu vaccine (1) Never done         Review of Systems:  Constitutional: no fevers, no chills, no night sweats, no weight loss, no weight gain, no fatigue   Pain assessment: no pain  Head: no headaches  Ears: no hearing loss, no tinnitus, no vertigo  Eyes: no blurry vision, no diplopia, no dryness, no itchiness  Mouth: no oral ulcers, no dry mouth, no sore throat  Nose: no nasal congestion, no epistaxis  Cardiac: no chest pain, no palpitations, leg swelling, no orthopnea, no PND, no syncope  Pulmonary: no dyspnea, no cough, no wheezing, no hemoptysis  GI: no nausea, no vomiting, no diarrhea, no constipation, no abdominal pain, no hematochezia  : no dysuria, no frequency, no urgency, no hematuria, no frothy urine  MSK: no arthralgias, no myalgias, no early morning stiffness, no Raynaud's   Neuro: no focal neurological deficits, no seizures  Sleep: no snoring, no daytime somnolence   Psych: no depression, no anxiety, no suicidal ideation      Physical Exam:  VITALS:   /82 (Site: Right Upper Arm)   Pulse 91   Resp 18   Ht 5' 10\" (1.778 m)   Wt 185 lb (83.9 kg)   SpO2 94%   BMI 26.54 kg/m²     PHYSICAL EXAMINATION:  General: alert, awake, and oriented to time, place, person, and situation. Not in acute distress   Skin:  no suspicious rashes, no jaundice  Head: normocephalic/atraumatic  Eyes: anicteric sclera, well-injected conjunctiva. Pupils are equally round and reactive to light.  Extraocular movements are intact   Nose: no septal deviation evident  Sinuses: no sinus tenderness  Ears: external ears normal  Neck: supple, no cervical lymphadenopathy, thyroid symmetric and not enlarged, no bruits, no JVD  Heart: regular rate and rhythm, regular S1/S2, no S3/S4, no audible murmurs, no audible friction rub   Lungs: clear to auscultation bilaterally, no audible crackles, no audible wheezes, no audible rhonchi    Abdomen: normal bowel sounds, soft abdomen, non-tender, no palpable masses  Extremities: L > RLE pitting edema 1+ (site of L vein retrieval for CABG), warm, no cyanosis, no clubbing. Good capillary refill   MSK: no tenderness across spinous processes, full ROM in all 4 extremities. No joint swelling or tenderness   Peripheral vascular: 2+ pulses symmetric (radial)  Neuro: gait normal, CN II-XII intact, motor power 5/5 in all 4 extremities, sensation intact and symmetric    Labs   I have personally reviewed labs, and discussed pertinent findings with patient on this date 1/11/2022     Imaging   I have personally reviewed imaging, and discussed pertinent findings with patient on this date 1/11/2022     Other notes  I have personally reviewed other notes, and discussed pertinent findings with patient on this date 1/11/2022       Assessment/Plan:     1. Chronic combined systolic and diastolic congestive heart failure (HCC)  HFrEF/HFpEF (EF 02-66%, diastolic dysfunction, mild MR on 6/23/2020; EF up to 45-50% on TTE 2/4/2021)  Compensated  Maintain euvolemia  Continue Lisinopril 5mg QD, Coreg 3.125mg BID, Lasix 20mg QD    2. Panlobular emphysema (HCC)  Continue Albuterol inh PRN, Symbicort BID, Spiriva QD     3. Prediabetes  A1C 6.3% in Sept 2021  Continue Ozempic 0.25mg weekly   - HEMOGLOBIN A1C; Future    4. Mixed hyperlipidemia  LDL 71, TG 89 in Sept 2021  Continue Atorvastatin 20mg QD  - Lipid, Fasting; Future    5. Essential hypertension  Stable  Continue Lisinopril 5mg QD, Coreg 3.125mg BID  - CBC Auto Differential; Future  - COMPREHENSIVE METABOLIC PANEL; Future    6. Acquired hypothyroidism  TSH normal Sept 2021  Continue Synthroid 112mcg QD   - TSH with Reflex; Future    7. Chronic bilateral low back pain without sciatica  Continue Cymbalta 60mg QD, Flexeril 10mg TID PRN     8. Gastroesophageal reflux disease, unspecified whether esophagitis present  Continue Pepcid 10mg BID PRN     9. Benign prostatic hyperplasia, unspecified whether lower urinary tract symptoms present  Continue Flomax, Proscar     10. Vasculogenic erectile dysfunction, unspecified vasculogenic erectile dysfunction type  - sildenafil (VIAGRA) 100 MG tablet; Take 1 tablet by mouth daily as needed for Erectile Dysfunction  Dispense: 10 tablet; Refill: 3    11. Macrocytosis  - VITAMIN B12 & FOLATE; Future  - VITAMIN B1; Future    12. Vitamin D deficiency  - VITAMIN D 25 HYDROXY; Future      Care discussed with patient and questions answered. Patient verbalizes understanding and agrees with plan. Discussed with patient the importance of continuity of care. I encouraged patient to schedule next appointment within 4 months with me. Patient prefers to be reached by Phone call at [phone] for future medical correspondence. Encouraged to activate [employer]. I reviewed and reconciled the medications this visit. I reviewed and updated the past medical, surgical, social, and family history during this visit. After visit summary provided.        Graeme Osler, MD  Internal Medicine  1/11/2022   3:23 PM

## 2022-01-11 NOTE — PATIENT INSTRUCTIONS
Personalized Preventive Plan for Eliane Yahir - 1/11/2022  Medicare offers a range of preventive health benefits. Some of the tests and screenings are paid in full while other may be subject to a deductible, co-insurance, and/or copay. Some of these benefits include a comprehensive review of your medical history including lifestyle, illnesses that may run in your family, and various assessments and screenings as appropriate. After reviewing your medical record and screening and assessments performed today your provider may have ordered immunizations, labs, imaging, and/or referrals for you. A list of these orders (if applicable) as well as your Preventive Care list are included within your After Visit Summary for your review. Other Preventive Recommendations:    · A preventive eye exam performed by an eye specialist is recommended every 1-2 years to screen for glaucoma; cataracts, macular degeneration, and other eye disorders. · A preventive dental visit is recommended every 6 months. · Try to get at least 150 minutes of exercise per week or 10,000 steps per day on a pedometer . · Order or download the FREE \"Exercise & Physical Activity: Your Everyday Guide\" from [employer]. Call 9-882.607.6686 or search [employer] online. · You need 7948-6962 mg of calcium and 2213-4470 IU of vitamin D per day. It is possible to meet your calcium requirement with diet alone, but a vitamin D supplement is usually necessary to meet this goal.  · When exposed to the sun, use a sunscreen that protects against both UVA and UVB radiation with an SPF of 30 or greater. Reapply every 2 to 3 hours or after sweating, drying off with a towel, or swimming. · Always wear a seat belt when traveling in a car. Always wear a helmet when riding a bicycle or motorcycle.

## 2022-01-12 ENCOUNTER — CARE COORDINATION (OUTPATIENT)
Dept: CARE COORDINATION | Age: 67
End: 2022-01-12

## 2022-01-19 ENCOUNTER — CARE COORDINATION (OUTPATIENT)
Dept: CARE COORDINATION | Age: 67
End: 2022-01-19

## 2022-01-27 ENCOUNTER — CARE COORDINATION (OUTPATIENT)
Dept: CARE COORDINATION | Age: 67
End: 2022-01-27

## 2022-02-02 ENCOUNTER — CARE COORDINATION (OUTPATIENT)
Dept: CARE COORDINATION | Age: 67
End: 2022-02-02

## 2022-02-08 ENCOUNTER — NURSE ONLY (OUTPATIENT)
Dept: INTERNAL MEDICINE CLINIC | Age: 67
End: 2022-02-08
Payer: MEDICARE

## 2022-02-08 ENCOUNTER — TELEPHONE (OUTPATIENT)
Dept: CARDIOLOGY CLINIC | Age: 67
End: 2022-02-08

## 2022-02-08 DIAGNOSIS — I25.118 CORONARY ARTERY DISEASE INVOLVING NATIVE CORONARY ARTERY OF NATIVE HEART WITH OTHER FORM OF ANGINA PECTORIS (HCC): Primary | ICD-10-CM

## 2022-02-09 ENCOUNTER — TELEPHONE (OUTPATIENT)
Dept: CARDIOLOGY CLINIC | Age: 67
End: 2022-02-09

## 2022-02-09 NOTE — LETTER
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 The patient's history of cardiac disease should not effect employment or work activities. Also patient's prescribed cardiac medications should not interfere with or impair performance, nor adversely affect the ability to safely operate commercial motor vehicles. Patient cleared, EF on NM 2021 was 49%. A copy of this patient's last cardiac testing is atached. Also if you have any questions or concerns, please do not hesitate to call this office at [phone].     Sincerely,       POONAM Dean

## 2022-02-09 NOTE — LETTER
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 The patient's history of cardiac disease should not effect employment or work activities. Also patient's prescribed cardiac medications should not interfere with or impair performance, nor adversely affect the ability to safely operate commercial motor vehicles. Patient is cleared, EF 49% NM 2021  A copy of this patient's last cardiac testing is attached. Also if you have any questions or concerns, please do not hesitate to call this office at [phone].     Sincerely,

## 2022-04-03 NOTE — TELEPHONE ENCOUNTER
11/06/20 rto [FreeTextEntry1] : The patient walks for half an hour and has no difficulty with stairs.  10 days ago he felt off balance for 1 to 2 minutes.  It started while walking and he continued to walk.  He denied vertigo.  There was no change with position.  His diet is fair.  He had an eye exam a year ago.  He has nocturia on some nights.  He had 2 Covid vaccines.

## 2022-05-06 ENCOUNTER — HOSPITAL ENCOUNTER (OUTPATIENT)
Age: 67
Discharge: HOME | End: 2022-05-06
Payer: MEDICARE

## 2022-05-06 LAB
ALBUMIN SERPL-MCNC: 4.4 GM/DL (ref 3.4–5)
ALP BLD-CCNC: 90 IU/L (ref 40–129)
ALT SERPL-CCNC: 22 U/L (ref 10–40)
ANION GAP SERPL CALCULATED.3IONS-SCNC: 13 MMOL/L (ref 4–16)
AST SERPL-CCNC: 16 IU/L (ref 15–37)
BASOPHILS ABSOLUTE: 0 K/CU MM
BASOPHILS RELATIVE PERCENT: 0.3 % (ref 0–1)
BILIRUB SERPL-MCNC: 0.6 MG/DL (ref 0–1)
BUN BLDV-MCNC: 16 MG/DL (ref 6–23)
CALCIUM SERPL-MCNC: 9.6 MG/DL (ref 8.3–10.6)
CHLORIDE BLD-SCNC: 100 MMOL/L (ref 99–110)
CHOLESTEROL, FASTING: 98 MG/DL
CO2: 25 MMOL/L (ref 21–32)
CREAT SERPL-MCNC: 1 MG/DL (ref 0.9–1.3)
DIFFERENTIAL TYPE: ABNORMAL
EOSINOPHILS ABSOLUTE: 0.3 K/CU MM
EOSINOPHILS RELATIVE PERCENT: 2.5 % (ref 0–3)
ESTIMATED AVERAGE GLUCOSE: 120 MG/DL
FOLATE: >20 NG/ML (ref 3.1–17.5)
GFR AFRICAN AMERICAN: >60 ML/MIN/1.73M2
GFR NON-AFRICAN AMERICAN: >60 ML/MIN/1.73M2
GLUCOSE FASTING: 95 MG/DL (ref 70–99)
HBA1C MFR BLD: 5.8 % (ref 4.2–6.3)
HCT VFR BLD CALC: 53 % (ref 42–52)
HDLC SERPL-MCNC: 38 MG/DL
HEMOGLOBIN: 18.5 GM/DL (ref 13.5–18)
IMMATURE NEUTROPHIL %: 0.3 % (ref 0–0.43)
LDL CHOLESTEROL CALCULATED: 45 MG/DL
LYMPHOCYTES ABSOLUTE: 1.6 K/CU MM
LYMPHOCYTES RELATIVE PERCENT: 13.2 % (ref 24–44)
MCH RBC QN AUTO: 34.7 PG (ref 27–31)
MCHC RBC AUTO-ENTMCNC: 34.9 % (ref 32–36)
MCV RBC AUTO: 99.4 FL (ref 78–100)
MONOCYTES ABSOLUTE: 0.9 K/CU MM
MONOCYTES RELATIVE PERCENT: 7.6 % (ref 0–4)
PDW BLD-RTO: 12.8 % (ref 11.7–14.9)
PLATELET # BLD: 181 K/CU MM (ref 140–440)
PMV BLD AUTO: 9 FL (ref 7.5–11.1)
POTASSIUM SERPL-SCNC: 4.3 MMOL/L (ref 3.5–5.1)
RBC # BLD: 5.33 M/CU MM (ref 4.6–6.2)
SEGMENTED NEUTROPHILS ABSOLUTE COUNT: 9 K/CU MM
SEGMENTED NEUTROPHILS RELATIVE PERCENT: 76.1 % (ref 36–66)
SODIUM BLD-SCNC: 138 MMOL/L (ref 135–145)
TOTAL IMMATURE NEUTOROPHIL: 0.04 K/CU MM
TOTAL PROTEIN: 7.2 GM/DL (ref 6.4–8.2)
TRIGLYCERIDE, FASTING: 73 MG/DL
TSH HIGH SENSITIVITY: 2.08 UIU/ML (ref 0.27–4.2)
VITAMIN B-12: 535.7 PG/ML (ref 211–911)
WBC # BLD: 11.8 K/CU MM (ref 4–10.5)

## 2022-05-06 PROCEDURE — 85025 COMPLETE CBC W/AUTO DIFF WBC: CPT

## 2022-05-06 PROCEDURE — 82607 VITAMIN B-12: CPT

## 2022-05-06 PROCEDURE — 83036 HEMOGLOBIN GLYCOSYLATED A1C: CPT

## 2022-05-06 PROCEDURE — 84443 ASSAY THYROID STIM HORMONE: CPT

## 2022-05-06 PROCEDURE — 82306 VITAMIN D 25 HYDROXY: CPT

## 2022-05-06 PROCEDURE — 82746 ASSAY OF FOLIC ACID SERUM: CPT

## 2022-05-06 PROCEDURE — 80061 LIPID PANEL: CPT

## 2022-05-06 PROCEDURE — 80053 COMPREHEN METABOLIC PANEL: CPT

## 2022-05-06 PROCEDURE — 36415 COLL VENOUS BLD VENIPUNCTURE: CPT

## 2022-05-06 PROCEDURE — 84425 ASSAY OF VITAMIN B-1: CPT

## 2022-05-10 LAB — VITAMIN D 25-HYDROXY: 29.3 NG/ML

## 2022-05-11 ENCOUNTER — OFFICE VISIT (OUTPATIENT)
Dept: INTERNAL MEDICINE CLINIC | Age: 67
End: 2022-05-11
Payer: MEDICARE

## 2022-05-11 VITALS
DIASTOLIC BLOOD PRESSURE: 84 MMHG | WEIGHT: 184 LBS | OXYGEN SATURATION: 95 % | BODY MASS INDEX: 26.34 KG/M2 | RESPIRATION RATE: 20 BRPM | HEIGHT: 70 IN | SYSTOLIC BLOOD PRESSURE: 138 MMHG | HEART RATE: 85 BPM

## 2022-05-11 DIAGNOSIS — D72.825 BANDEMIA: ICD-10-CM

## 2022-05-11 DIAGNOSIS — D75.1 ERYTHROCYTOSIS: ICD-10-CM

## 2022-05-11 DIAGNOSIS — K63.5 POLYP OF DESCENDING COLON, UNSPECIFIED TYPE: ICD-10-CM

## 2022-05-11 DIAGNOSIS — G89.29 CHRONIC BILATERAL LOW BACK PAIN WITHOUT SCIATICA: ICD-10-CM

## 2022-05-11 DIAGNOSIS — N40.0 BENIGN PROSTATIC HYPERPLASIA, UNSPECIFIED WHETHER LOWER URINARY TRACT SYMPTOMS PRESENT: ICD-10-CM

## 2022-05-11 DIAGNOSIS — I50.42 CHRONIC COMBINED SYSTOLIC AND DIASTOLIC CONGESTIVE HEART FAILURE (HCC): Primary | ICD-10-CM

## 2022-05-11 DIAGNOSIS — K21.9 GASTROESOPHAGEAL REFLUX DISEASE, UNSPECIFIED WHETHER ESOPHAGITIS PRESENT: ICD-10-CM

## 2022-05-11 DIAGNOSIS — J43.1 PANLOBULAR EMPHYSEMA (HCC): ICD-10-CM

## 2022-05-11 DIAGNOSIS — N52.9 VASCULOGENIC ERECTILE DYSFUNCTION, UNSPECIFIED VASCULOGENIC ERECTILE DYSFUNCTION TYPE: ICD-10-CM

## 2022-05-11 DIAGNOSIS — M54.50 CHRONIC BILATERAL LOW BACK PAIN WITHOUT SCIATICA: ICD-10-CM

## 2022-05-11 DIAGNOSIS — E78.2 MIXED HYPERLIPIDEMIA: ICD-10-CM

## 2022-05-11 DIAGNOSIS — R73.03 PREDIABETES: ICD-10-CM

## 2022-05-11 DIAGNOSIS — E03.9 ACQUIRED HYPOTHYROIDISM: ICD-10-CM

## 2022-05-11 DIAGNOSIS — L98.9 SKIN LESION: ICD-10-CM

## 2022-05-11 DIAGNOSIS — Z91.81 AT HIGH RISK FOR FALLS: ICD-10-CM

## 2022-05-11 DIAGNOSIS — I10 ESSENTIAL HYPERTENSION: ICD-10-CM

## 2022-05-11 PROCEDURE — 3017F COLORECTAL CA SCREEN DOC REV: CPT | Performed by: INTERNAL MEDICINE

## 2022-05-11 PROCEDURE — G8427 DOCREV CUR MEDS BY ELIG CLIN: HCPCS | Performed by: INTERNAL MEDICINE

## 2022-05-11 PROCEDURE — G8417 CALC BMI ABV UP PARAM F/U: HCPCS | Performed by: INTERNAL MEDICINE

## 2022-05-11 PROCEDURE — 4004F PT TOBACCO SCREEN RCVD TLK: CPT | Performed by: INTERNAL MEDICINE

## 2022-05-11 PROCEDURE — 4040F PNEUMOC VAC/ADMIN/RCVD: CPT | Performed by: INTERNAL MEDICINE

## 2022-05-11 PROCEDURE — 3023F SPIROM DOC REV: CPT | Performed by: INTERNAL MEDICINE

## 2022-05-11 PROCEDURE — 99214 OFFICE O/P EST MOD 30 MIN: CPT | Performed by: INTERNAL MEDICINE

## 2022-05-11 PROCEDURE — 1123F ACP DISCUSS/DSCN MKR DOCD: CPT | Performed by: INTERNAL MEDICINE

## 2022-05-11 RX ORDER — FAMOTIDINE 20 MG/1
TABLET, FILM COATED ORAL
Qty: 90 TABLET | Refills: 1 | Status: SHIPPED | OUTPATIENT
Start: 2022-05-11 | End: 2022-08-12 | Stop reason: SDUPTHER

## 2022-05-11 RX ORDER — FINASTERIDE 5 MG/1
5 TABLET, FILM COATED ORAL DAILY
Qty: 90 TABLET | Refills: 1 | Status: SHIPPED | OUTPATIENT
Start: 2022-05-11 | End: 2022-08-12 | Stop reason: SDUPTHER

## 2022-05-11 RX ORDER — CARVEDILOL 3.12 MG/1
3.12 TABLET ORAL 2 TIMES DAILY WITH MEALS
Qty: 180 TABLET | Refills: 1 | Status: SHIPPED | OUTPATIENT
Start: 2022-05-11 | End: 2022-08-12 | Stop reason: SDUPTHER

## 2022-05-11 RX ORDER — SEMAGLUTIDE 1.34 MG/ML
INJECTION, SOLUTION SUBCUTANEOUS
Qty: 1 PEN | Refills: 5 | Status: SHIPPED | OUTPATIENT
Start: 2022-05-11 | End: 2022-08-12 | Stop reason: SDUPTHER

## 2022-05-11 RX ORDER — FUROSEMIDE 20 MG/1
20 TABLET ORAL DAILY PRN
Qty: 90 TABLET | Refills: 1 | Status: SHIPPED | OUTPATIENT
Start: 2022-05-11 | End: 2022-08-12 | Stop reason: SDUPTHER

## 2022-05-11 RX ORDER — ALBUTEROL SULFATE 90 UG/1
2 AEROSOL, METERED RESPIRATORY (INHALATION)
Qty: 1 EACH | Refills: 5 | Status: SHIPPED | OUTPATIENT
Start: 2022-05-11 | End: 2022-08-12 | Stop reason: SDUPTHER

## 2022-05-11 RX ORDER — DULOXETIN HYDROCHLORIDE 60 MG/1
60 CAPSULE, DELAYED RELEASE ORAL DAILY
Qty: 90 CAPSULE | Refills: 1 | Status: SHIPPED | OUTPATIENT
Start: 2022-05-11 | End: 2022-08-12 | Stop reason: SDUPTHER

## 2022-05-11 RX ORDER — SILDENAFIL 100 MG/1
100 TABLET, FILM COATED ORAL DAILY PRN
Qty: 10 TABLET | Refills: 3 | Status: SHIPPED | OUTPATIENT
Start: 2022-05-11 | End: 2022-08-12 | Stop reason: SDUPTHER

## 2022-05-11 RX ORDER — LEVOTHYROXINE SODIUM 112 UG/1
TABLET ORAL
Qty: 90 TABLET | Refills: 1 | Status: SHIPPED | OUTPATIENT
Start: 2022-05-11 | End: 2022-08-12 | Stop reason: SDUPTHER

## 2022-05-11 RX ORDER — CYCLOBENZAPRINE HCL 10 MG
10 TABLET ORAL 3 TIMES DAILY PRN
Qty: 90 TABLET | Refills: 5 | Status: SHIPPED | OUTPATIENT
Start: 2022-05-11 | End: 2022-08-12 | Stop reason: SDUPTHER

## 2022-05-11 RX ORDER — ATORVASTATIN CALCIUM 20 MG/1
20 TABLET, FILM COATED ORAL NIGHTLY
Qty: 90 TABLET | Refills: 1 | Status: SHIPPED | OUTPATIENT
Start: 2022-05-11 | End: 2022-08-12

## 2022-05-11 RX ORDER — TIOTROPIUM BROMIDE 18 UG/1
18 CAPSULE ORAL; RESPIRATORY (INHALATION) DAILY
Qty: 90 CAPSULE | Refills: 1 | Status: SHIPPED | OUTPATIENT
Start: 2022-05-11 | End: 2022-08-12 | Stop reason: SDUPTHER

## 2022-05-11 RX ORDER — LISINOPRIL 5 MG/1
5 TABLET ORAL DAILY
Qty: 90 TABLET | Refills: 1 | Status: SHIPPED | OUTPATIENT
Start: 2022-05-11 | End: 2022-08-12 | Stop reason: SDUPTHER

## 2022-05-11 RX ORDER — TAMSULOSIN HYDROCHLORIDE 0.4 MG/1
0.4 CAPSULE ORAL DAILY
Qty: 90 CAPSULE | Refills: 1 | Status: SHIPPED | OUTPATIENT
Start: 2022-05-11 | End: 2022-08-12 | Stop reason: SDUPTHER

## 2022-05-11 RX ORDER — FLUTICASONE FUROATE AND VILANTEROL 100; 25 UG/1; UG/1
1 POWDER RESPIRATORY (INHALATION) DAILY
Qty: 30 EACH | Refills: 5 | Status: SHIPPED | OUTPATIENT
Start: 2022-05-11 | End: 2022-08-12 | Stop reason: SDUPTHER

## 2022-05-11 NOTE — PROGRESS NOTES
5/11/22    Domi Power  1955    Chief Complaint   Patient presents with    3 Month Follow-Up       History of Present Illness:  Domi Power is a 78 y.o. pleasant gentleman presenting today with a chief complaint of HTN, HL, prediabetes, elevated WBC/RBC, hypothyroidism. He has a past medical history significant for:  HTN, on Lisinopril 5mg QD, Coreg 3.125mg BID  HL (LDL 45, TG 73 on 5/6/2022), on Atorvastatin 20mg  Prediabetes (HbA1C 5.8% on 5/6/2022), on Ozempic 0.25mg weekly  CAD s/p CABG (10/2020), on ASA   HFrEF/HFpEF (EF 86-43%, diastolic dysfunction, mild MR on 6/23/2020; EF up to 45-50% on TTE 2/4/2021), on Lisinopril 5mg QD, Coreg 3.125mg BID, Lasix 20mg QD  Hypothyroidism (TSH 2.080 normal on 5/6/2022), on Synthroid 112mcg QD  PAD, on ASA   GERD, on Pepcid 10mg BID   Allergic rhinitis, off Zyrtec  BPH, on Flomax, Finasteride   ED, on Sildenafil 100mg QD PRN   COPD, on Albuterol inh PRN, Breo Ellipta, Spiriva QD   Chronic back pain, on Cymbalta 60mg QHS, Flexeril 10mg BID PRN   IBS-mixed, on Bentyl QID   H/o C diff (10/2020)  Juvenile RA   Rheumatic fever   Former smoker (10/2020)      # Patient was admitted 3/15-3/19/2021 after syncopal episode when he got up from a sitting position, and hit his head. Had LOC. CTH showed SAH. He was transferred to [facility] trauma service. NSGY consulted and recommended no surgical intervention. No intracranial aneurysm noted. He was cleared to be on ASA. Had FU with NSGY on 4/5 after CTH done. Also had TTE per Cardiology stable. Was off driving for 1 month and recommended compression stockings for his orthostatic hypotension.   He is s/p Holter 30-days.   He watches his salt intake.   # Patient was admitted 9/23-9/24/2020 for hypotension and bradycardia following LHC. He had N/V and was hypovolemic. He was thought to be in cardiogenic shock s/p dopamine gtt. He also received IVF resuscitation. He was discharged home ambulating, in a stable condition.  [facility] showed multi-vessel CAD. He is s/p CABG in Oct 2020.   Following with Dr. Jonnathan Grier outpatient. He is s/p cardiac rehab  He also had C diff s/p PO Vanc. Continues to have sternal pain. Was taking Tramadol and has run out. OARRS previously reviewed. He has not had prescription from me.   # Patient was in ER on 12/19 for near syncope and chin laceration s/p sutured.   QTc was 508 ms. Patient refused to stay in ER longer for further cardiac eval.   He has been having issues with hypotension and fluctuating BP.   Patient was in ED 9/16/2021 for bradycardia and hypotension. Sustained fall and fractured L 6th rib. EKG wo ischemia. Mainor unremarkable. CBC, BMP stable. Reports he was out in the heat when he felt dizzy prior to falling. Refused to be admitted.   Patient is now taking Lisinopril 5mg, Coreg 3.125mg BID, Lasix 20mg.   Follows with Dr. Jacinto Landry from Cardiology. BP today 138/84, HR 85. No CP, SOB, palpitations, dizziness, or light-headedness. Not interested in cardiac rehab.   His LE swelling is 2/2 significant reflux bilaterally (evident on US).    # Chantix helped him quit smoking 4 days before CABG.    # Patient was in the ED May 2020 for acute onset SOB. New diagnosis of CHF. EKG wo acute ischemia. Neg Mainor. BNP elevated. CT showed no PE but did show pulmonary edema (high d-dimer). COVID test negative. He was hypoxic, requiring 4L O2 however patient declined admission.   He is not having worsening SOB, but at his baseline. He was given in the ED Lasix IV 40mg once.   First time I met patient in June I prescribed Lasix 20mg QD. He reports his SOB improved over 70% with the Lasix. TTE done 6/23/2020 shows e/o mixed systolic and diastolic heart failure. He has never had LHC done.  Patient denies alcohol abuse. He developed pre-renal JOSE which resolved after Lasix was held for 1 week (Cr up to 1.4 in June then down to 1.0 with GFR > 60 in July). He had not had ischemic work up done in the past.   [employer] [facility] [employer] [facility] Helping. He was on Zantac in the past but I discontinued it due to recall. No heartburn or reflux or nausea.   # Takes Synthroid. No symptoms of hyper or hypothyroidism. TSH was elevated in Nov 2020 during hospital stay. Increased Synthroid to 112mcg.  Repeat in March 2021 WNL.    # Takes Zyrtec for allergic rhinitis. This is helping. Tolerating well. He is also using Flonase PRN. No coughing or sneezing.   # Takes Finasteride and Flomax for BPH. Symptoms stable on these meds. Tolerating meds. No LUTS.   # Takes Cymbalta and Flexeril for chronic back pain. Both helping. No side effects such as drowsiness.   # Uses Albuterol and Symbicort for COPD, as well as recently added Spiriva. 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 # High MCV on recent labs.    # As per patient he should have had his gall bladder removed however due to pandemic restrictions he had to hold off.   # He takes ASA for PAD. No bleeding or bruising on ASA. No h/o CAD or ischemic stroke. # ? H/o dyslipidemia. Not on statin therapy. He does have RA however. # Patient's mother was diabetic. He has prediabetes. Well controlled off meds.   # Patient is having ED over the past few months.   # Cannot afford time or finances to go to [facility] at the moment due to work. Trying OTC weight loss pills but I recommended against this.   Started Ozempic. Starting to lose weight. Tolerating well. # Patient had colonoscopy in 2014: 4 polyps, 1 of which was 20mm. Was recommended repeat after 1 year. Did not follow through.   # Labs May 2022 with elevated WBC (left shift and monocytosis) and RBC.      Drives a semi-truck        Health maintenance:   Health Maintenance Due   Topic Date Due    Pneumococcal 65+ years Vaccine (1 - PCV) Never done    Hepatitis C screen  Never done    Low dose CT lung screening  Never done    Shingles vaccine (2 of 3) 04/02/2018         Review of Systems:  Constitutional: no fevers, no chills, no night sweats, no weight loss, no weight gain, no fatigue   Pain assessment: no pain  Head: no headaches  Ears: no hearing loss, no tinnitus, no vertigo  Eyes: no blurry vision, no diplopia, no dryness, no itchiness  Mouth: no oral ulcers, no dry mouth, no sore throat  Nose: no nasal congestion, no epistaxis  Cardiac: no chest pain, no palpitations, leg swelling, no orthopnea, no PND, no syncope  Pulmonary: no dyspnea, no cough, no wheezing, no hemoptysis  GI: no nausea, no vomiting, no diarrhea, no constipation, no abdominal pain, no hematochezia  : no dysuria, no frequency, no urgency, no hematuria, no frothy urine  MSK: no arthralgias, no myalgias, no early morning stiffness, no Raynaud's   Neuro: no focal neurological deficits, no seizures  Sleep: no snoring, no daytime somnolence   Psych: no depression, no anxiety, no suicidal ideation      Physical Exam:  VITALS:   /84 (Site: Left Upper Arm, Position: Sitting, Cuff Size: Medium Adult)   Pulse 85   Resp 20   Ht 5' 10\" (1.778 m)   Wt 184 lb (83.5 kg)   SpO2 95%   BMI 26.40 kg/m²     PHYSICAL EXAMINATION:  General: alert, awake, and oriented to time, place, person, and situation. Not in acute distress   Skin:  hyperpigmented nodular lesion on L shoulder   Head: normocephalic/atraumatic  Eyes: anicteric sclera, well-injected conjunctiva. Pupils are equally round and reactive to light.  Extraocular movements are intact   Nose: no septal deviation evident  Sinuses: no sinus tenderness  Ears: external ears normal  Neck: supple, no cervical lymphadenopathy, thyroid symmetric and not enlarged, no bruits, no JVD  Heart: regular rate and rhythm, regular S1/S2, no S3/S4, no audible murmurs, no audible friction rub   Lungs: clear to auscultation bilaterally, no audible crackles, no audible wheezes, no audible rhonchi    Abdomen: normal bowel sounds, soft abdomen, non-tender, no palpable masses  Extremities: L > RLE pitting edema 1+ (site of L vein retrieval for CABG), warm, no cyanosis, no clubbing. Good capillary refill   MSK: no tenderness across spinous processes, full ROM in all 4 extremities. No joint swelling or tenderness   Peripheral vascular: 2+ pulses symmetric (radial)  Neuro: gait normal, CN II-XII intact, motor power 5/5 in all 4 extremities, sensation intact and symmetric    Labs   I have personally reviewed labs, and discussed pertinent findings with patient on this date 5/11/2022     Imaging   I have personally reviewed imaging, and discussed pertinent findings with patient on this date 5/11/2022     Other notes  I have personally reviewed other notes, and discussed pertinent findings with patient on this date 5/11/2022       Assessment/Plan:     1. Chronic combined systolic and diastolic congestive heart failure (HCC)  HFrEF/HFpEF (EF 65-16%, diastolic dysfunction, mild MR on 6/23/2020; EF up to 45-50% on TTE 2/4/2021)  Compensated  Maintain euvolemia  Continue ACEi, BB, diuretic    2. Essential hypertension  Stable  Continue Lisinopril 5mg QD, Coreg 3.125mg BID    3. Mixed hyperlipidemia  LDL 45, TG 73 in May 2022  Continue Atorvastatin 20mg QD  - Comprehensive Metabolic Panel; Future  - Lipid, Fasting; Future    4. Prediabetes  A1C 5.8% in May 2022  Continue Ozempic 0.25mg weekly  - Hemoglobin A1C; Future    5. Acquired hypothyroidism  TSH normal May 2022  Continue Synthroid 112mcg QD  - TSH with Reflex; Future    6. Vasculogenic erectile dysfunction, unspecified vasculogenic erectile dysfunction type  - sildenafil (VIAGRA) 100 MG tablet; Take 1 tablet by mouth daily as needed for Erectile Dysfunction  Dispense: 10 tablet; Refill: 3    7. Panlobular emphysema (HCC)  Continue Albuterol inh PRN, Breo Ellipta, Spiriva QD    8. Gastroesophageal reflux disease, unspecified whether esophagitis present  Continue Pepcid 10mg BID PRN     9. Chronic bilateral low back pain without sciatica  Continue Cymbalta 60mg QHS, Flexeril 10mg BID PRN     10. Benign prostatic hyperplasia, unspecified whether lower urinary tract symptoms present  Continue Proscar    11. Polyp of descending colon, unspecified type  2014 with 4 polyps on colonoscopy, 1 of which was 20mm. Was due for repeat in 1 year. LTFU. Encouraged to get colonoscopy done     12. Erythrocytosis  - CBC with Auto Differential; Future    13. Bandemia  - CBC with Auto Differential; Future    14. At high risk for falls  On the basis of positive falls risk screening, assessment and plan is as follows: home safety tips provided. 15. Skin lesion  [facility] and questions answered. Patient verbalizes understanding and agrees with plan. Discussed with patient the importance of continuity of care. I encouraged patient to schedule next appointment within 3 months with me. Patient prefers to be reached by Phone call at [phone] for future medical correspondence. Encouraged to activate MyChart. I reviewed and reconciled the medications this visit. I reviewed and updated the past medical, surgical, social, and family history during this visit. After visit summary provided.        Nate Tay MD  Internal Medicine  5/11/2022   3:01 PM

## 2022-05-11 NOTE — PATIENT INSTRUCTIONS
Fasting for a blood test: taking the right steps before testing helps ensure your results will be accurate. Why do I need to fast before my blood test?  If your healthcare provider has told you to fast before a blood test, it means you should not eat or drink anything, except water, for several hours before your test. When you eat and drink normally, those foods and beverages are absorbed into your bloodstream. That could affect the results of certain types of blood tests. What types of blood tests require fasting? The most common tests that require fasting are:   Glucose tests, which measure your blood sugar.  Lipid tests, which measure cholesterol and triglycerides. You do not need to be fasting for HbA1C test.     How long do I have to fast before the test?  You usually need to fast for 8-12 hours before the test. Most tests that require fasting are scheduled for early in the morning. That way, most of your fasting time will be overnight. Can I drink anything besides water during a fast?  No. Juice, coffee, soda, and other beverages can get in your bloodstream and affect your results. In addition, you should not:   Chew gum    Smoke    Exercise  These activities can also affect your results. But you can drink water. It's actually encouraged that you drink 2 glasses of water before any blood test. It helps keep more fluid in your veins, which can make it easier to draw blood. If you are dehydrated, your blood draw experience may be unpleasant. Can I continue taking medicine during a fast?  Most of the time it's OK to take your usual medicines with water, unless otherwise specified by your healthcare provider. You may need to avoid certain medicines that you normally take with food.      What if I make a mistake and have something to eat or drink besides water during my fast?  Tell your healthcare provider before your test. Your test will most likely have to be re-scheduled for another time when you are able to complete your fast.    When can I eat and drink normally again? As soon as your test is over. You may want to bring a snack with you, so you can eat right away. Is there anything else I need to know about fasting before a blood test?  Be sure to talk to your healthcare provider if you have any questions or concerns about fasting. You should talk to your provider before taking any lab test. Most tests don't require fasting or other special preparations. For others, you may need to avoid certain foods, medicines, or activities.        [facility] Internal Medicine  [phone]

## 2022-05-14 LAB — VITAMIN B1, PLASMA: 237 NMOL/L (ref 70–180)

## 2022-05-26 ENCOUNTER — HOSPITAL ENCOUNTER (OUTPATIENT)
Age: 67
Setting detail: SPECIMEN
Discharge: HOME | End: 2022-05-26
Payer: MEDICARE

## 2022-05-26 ENCOUNTER — OFFICE VISIT (OUTPATIENT)
Dept: SURGERY | Age: 67
End: 2022-05-26
Payer: MEDICARE

## 2022-05-26 VITALS
WEIGHT: 184 LBS | SYSTOLIC BLOOD PRESSURE: 126 MMHG | OXYGEN SATURATION: 91 % | HEIGHT: 70 IN | BODY MASS INDEX: 26.34 KG/M2 | DIASTOLIC BLOOD PRESSURE: 74 MMHG | HEART RATE: 88 BPM

## 2022-05-26 DIAGNOSIS — L98.9 SKIN LESION OF LEFT UPPER EXTREMITY: Primary | ICD-10-CM

## 2022-05-26 DIAGNOSIS — L98.9 BENIGN SKIN LESION OF FOREHEAD: ICD-10-CM

## 2022-05-26 PROCEDURE — 88305 TISSUE EXAM BY PATHOLOGIST: CPT | Performed by: PATHOLOGY

## 2022-05-26 PROCEDURE — 11441 EXC FACE-MM B9+MARG 0.6-1 CM: CPT | Performed by: SURGERY

## 2022-05-26 PROCEDURE — 11403 EXC TR-EXT B9+MARG 2.1-3CM: CPT | Performed by: SURGERY

## 2022-05-26 ASSESSMENT — PATIENT HEALTH QUESTIONNAIRE - PHQ9
SUM OF ALL RESPONSES TO PHQ QUESTIONS 1-9: 0
SUM OF ALL RESPONSES TO PHQ QUESTIONS 1-9: 0
1. LITTLE INTEREST OR PLEASURE IN DOING THINGS: 0
SUM OF ALL RESPONSES TO PHQ9 QUESTIONS 1 & 2: 0
2. FEELING DOWN, DEPRESSED OR HOPELESS: 0
SUM OF ALL RESPONSES TO PHQ QUESTIONS 1-9: 0
SUM OF ALL RESPONSES TO PHQ QUESTIONS 1-9: 0

## 2022-05-26 NOTE — PROGRESS NOTES
Chief Complaint   Patient presents with    New Patient     Forehead skin lesion/Left Should lesion REF: Dr. Fine Breeding:  HPI: Patient presents today for an office procedure. Complaining of skin lesions on the right forehead and left shoulder. He reports they have increased in size. The shoulder lesion also rubs on his shirts and becomes irritated. Pt is ready to have this lesion removed. I have reviewed the patient's(pertinent information tothis visit) medical history, family history(scanned in  the Media tab under \"patient questioner\"), social history and review of systems with the patient today in the office. Past Surgical History:   Procedure Laterality Date    CARDIAC CATHETERIZATION  09/24/2020     Severe calcified multivessel CAD with LV dysfuntion, PCWP is 12, CABG consult.  COLONOSCOPY  2017    [facility])    CORONARY ARTERY BYPASS GRAFT N/A 10/19/2020    CABG CORONARY ARTERY BYPASS x3 WITH LIMA, INTRAOP BETH, ENDOHARVEST OF THE LEFT SAPHENOUS VEIN performed by Adair Turk MD at [facility], DIAGNOSTIC  2017    Normal exam per pt (done in [facility])     Past Medical History:   Diagnosis Date    CAD (coronary artery disease)     Dr. Memo Gonzáles Chest pain     COPD (chronic obstructive pulmonary disease) ([facility] [address]     No pulmonologist - follows with PCP    H/O cardiac catheterization 09/24/2020     Severe calcified multivessel CAD with LV dysfuntion, PCWP is 12, CABG consult.  H/O cardiovascular stress test 09/21/2021    abnormal stress test due to mildly depressed LVEF, increased EDV, Normal tracer uptake in all myocardial segment during  rest and stress. Decreased uptake inferiorly due to diaphragmatic artifact.  H/O echocardiogram 06/23/2020     Mild concentric LVH with moderate systolic impairment. EF is 40-45 % .     H/O echocardiogram 02/02/2021    ef 45-50%,  Mild LVH.    H/O exercise stress test 12/16/2020    Submaximal study due to failure to achieve target heart rate response and    Hx of cardiovascular stress test 2021    LVEF, increased EDB, Normal tracer uptake in all myocardial segment during rest and stress. Decreased uptake inferiorly due to diaphragmatic artifact.  Hx of Doppler ultrasound 2021    significant reflux noted of the right CFV.  Significant reflux noted in the Left SSV Distal.    Hyperlipidemia     Hypertension     Follows with PCP    IBS (irritable bowel syndrome)     Pancreatitis     Rheumatoid arthritis ([facility] [address]     S/P CABG x 3 10/19/2020    SOB (shortness of breath)     Thyroid disease      Family History   Problem Relation Age of Onset    Alzheimer's Disease Mother     Heart Disease Father     Heart Attack Father     High Blood Pressure Father     High Cholesterol Father      Social History     Socioeconomic History    Marital status:      Spouse name: Not on file    Number of children: Not on file    Years of education: Not on file    Highest education level: Not on file   Occupational History    Not on file   Tobacco Use    Smoking status: Former Smoker     Packs/day: 2.00     Years: 52.00     Pack years: 104.00     Types: Cigarettes     Start date:      Quit date: 10/1/2020     Years since quittin.6    Smokeless tobacco: Current User     Types: Chew   Vaping Use    Vaping Use: Never used   Substance and Sexual Activity    Alcohol use: Not Currently     Comment: caffeine 3 cups of coffee and 1 pop a day    Drug use: Never    Sexual activity: Yes     Partners: Female   Other Topics Concern    Not on file   Social History Narrative    Not on file     Social Determinants of Health     Financial Resource Strain: Low Risk     Difficulty of Paying Living Expenses: Not hard at all   Food Insecurity: No Food Insecurity    Worried About Running Out of Food in the Last Year: Never true    Donn of Food in the Last Year: Never true   Transportation DAY 90 tablet 1    lisinopril (PRINIVIL;ZESTRIL) 5 MG tablet Take 1 tablet by mouth daily 90 tablet 1    Semaglutide,0.25 or 0.5MG/DOS, (OZEMPIC, 0.25 OR 0.5 MG/DOSE,) 2 MG/1.5ML SOPN Inject 0.25mg once weekly under the skin 1 pen 5    sildenafil (VIAGRA) 100 MG tablet Take 1 tablet by mouth daily as needed for Erectile Dysfunction 10 tablet 3    tamsulosin (FLOMAX) 0.4 MG capsule Take 1 capsule by mouth daily 90 capsule 1    tiotropium (SPIRIVA HANDIHALER) 18 MCG inhalation capsule Inhale 1 capsule into the lungs daily 90 capsule 1    cyclobenzaprine (FLEXERIL) 10 MG tablet Take 1 tablet by mouth 3 times daily as needed for Muscle spasms 90 tablet 5    Blood Pressure KIT Check BP once daily 1 kit 0    Multiple Vitamins-Minerals (THERAPEUTIC MULTIVITAMIN-MINERALS) tablet Take 1 tablet by mouth daily (with breakfast)  0     No current facility-administered medications for this visit. No Known Allergies    Review of Systems:         Review of Systems   Constitutional: Negative for chills. Negative for fever. HENT: Negative for congestion. Respiratory: Negative for shortness of breath. Negative for wheezing. Cardiovascular: Negative for chest pain. Gastrointestinal: Negative for nausea and vomiting. Neurological: Negative for dizziness, syncope and numbness. Hematological: Does not bruise/bleed easily. OBJECTIVE:  Physical Exam:    /74 (Site: Left Upper Arm, Position: Sitting, Cuff Size: Large Adult)   Pulse 88   Ht 5' 10\" (1.778 m)   Wt 184 lb (83.5 kg)   SpO2 91%   BMI 26.40 kg/m²      Physical Exam  General: awake, alert, in no acute distress  HEENT: mucous membranes moist  Respiratory: normal effort, no wheezes appreciated  CV: appears well perfused, regular rate and rhythm  Abdomen: Soft, non-tender, non-distended. No guarding or rebound tenderness.     Skin: 1cm raised waxy skin lesion on the right forehead, 2.5cm raised waxy skin lesion on the left superior shoulder    Extremities: atraumatic  Neuro: no focal deficits noted  Psych: mood normal        ASSESSMENT:  1. Skin lesion of left upper extremity    2. Benign skin lesion of forehead          PLAN[de-identified]  Patient counseled on risks, benefits, and alternatives of treatment plan at length today. Patient states an understanding and willingness to proceed with plan. Office Procedure note:      Pre-op Diagnosis:    1. Skin lesion of left upper extremity    2. Benign skin lesion of forehead           Post-op Diagnosis:  Same. Procedure:   Excision of skin lesion from the right forehead and left shoulder    Surgeon:   Billy Olea MD    Anesthesia:   Local with 1% Lidocaine local infiltration,with epinephrine. Complications:  None. Drains: None    Indications:   See progress note. .     Blood loss:   10cc    Procedure: The patient is placed with the above described areas exposed. This was  prepped, draped and anesthestized in the usual sterile manner with local anesthetic. Elliptical incisions were created around each skin lesion and including a small rim of normal tissue. The lesions were excised completely. The lesions were 1x1cm on the right forehead and 2.5cm x 2.5cm on the left shoulder and each was excised to a depth of 0.5cm into the subcutaneous tissue. The resulting wound is closed with nylon. A sterile dressing was applied. The patient is instructed on wound care. .        Orders Placed This Encounter   Procedures    SURGICAL PATHOLOGY    SURGICAL PATHOLOGY            Follow Up:  2 weeks to remove sutures and review path report.        Jacquelyn Ryan MD

## 2022-06-07 ENCOUNTER — OFFICE VISIT (OUTPATIENT)
Dept: SURGERY | Age: 67
End: 2022-06-07

## 2022-06-07 VITALS — HEART RATE: 85 BPM | SYSTOLIC BLOOD PRESSURE: 148 MMHG | DIASTOLIC BLOOD PRESSURE: 82 MMHG

## 2022-06-07 DIAGNOSIS — Z09 POSTOPERATIVE EXAMINATION: Primary | ICD-10-CM

## 2022-06-07 PROCEDURE — 99024 POSTOP FOLLOW-UP VISIT: CPT | Performed by: SURGERY

## 2022-06-07 NOTE — PROGRESS NOTES
Post-Operative Clinic Note    Chief Complaint   Patient presents with    Follow-up     1st F/U Ov-Proc Excision Skin Lesion of Forehead & Left Shoulder, 5/26/22         SUBJECTIVE:  Patient is here today for a post-operative visit. Patient is s/p excision of skin lesions from the forehead and left shoulder on 5/26. He reports doing well. No problems with his incisions. Path showed actinic keratosis     Past Surgical History:   Procedure Laterality Date    CARDIAC CATHETERIZATION  09/24/2020     Severe calcified multivessel CAD with LV dysfuntion, PCWP is 12, CABG consult.  COLONOSCOPY  2017    [facility] [place], [place])    CORONARY ARTERY BYPASS GRAFT N/A 10/19/2020    CABG CORONARY ARTERY BYPASS x3 WITH LIMA, INTRAOP BETH, ENDOHARVEST OF THE LEFT SAPHENOUS VEIN performed by Gregory Fernandez MD at [facility], DIAGNOSTIC  2017    Normal exam per pt (done in [place], [place])     Past Medical History:   Diagnosis Date    CAD (coronary artery disease)     Dr. Chaney [place] Chest pain     COPD (chronic obstructive pulmonary disease) ([facility] [address]     No pulmonologist - follows with PCP    H/O cardiac catheterization 09/24/2020     Severe calcified multivessel CAD with LV dysfuntion, PCWP is 12, CABG consult.  H/O cardiovascular stress test 09/21/2021    abnormal stress test due to mildly depressed LVEF, increased EDV, Normal tracer uptake in all myocardial segment during  rest and stress. Decreased uptake inferiorly due to diaphragmatic artifact.  H/O echocardiogram 06/23/2020     Mild concentric LVH with moderate systolic impairment. EF is 40-45 % .  H/O echocardiogram 02/02/2021    ef 45-50%,  Mild LVH.    H/O exercise stress test 12/16/2020    Submaximal study due to failure to achieve target heart rate response and    Hx of cardiovascular stress test 09/20/2021    LVEF, increased EDB, Normal tracer uptake in all myocardial segment during rest and stress.  Decreased uptake inferiorly due to diaphragmatic artifact.  Hx of Doppler ultrasound 2021    significant reflux noted of the right CFV. Significant reflux noted in the Left SSV Distal.    Hyperlipidemia     Hypertension     Follows with PCP    IBS (irritable bowel syndrome)     Pancreatitis 2013    [facility]oid arthritis ([facility] [address]     S/P CABG x 3 10/19/2020    SOB (shortness of breath)     Thyroid disease      Family History   Problem Relation Age of Onset    Alzheimer's Disease Mother     Heart Disease Father     Heart Attack Father     High Blood Pressure Father     High Cholesterol Father      Social History     Socioeconomic History    Marital status:      Spouse name: Not on file    Number of children: Not on file    Years of education: Not on file    Highest education level: Not on file   Occupational History    Not on file   Tobacco Use    Smoking status: Former Smoker     Packs/day: 2.00     Years: 52.00     Pack years: 104.00     Types: Cigarettes     Start date:      Quit date: 10/1/2020     Years since quittin.6    Smokeless tobacco: Current User     Types: Chew   Vaping Use    Vaping Use: Never used   Substance and Sexual Activity    Alcohol use: Not Currently     Comment: caffeine 3 cups of coffee and 1 pop a day    Drug use: Never    Sexual activity: Yes     Partners: Female   Other Topics Concern    Not on file   Social History Narrative    Not on file     Social Determinants of Health     Financial Resource Strain: Low Risk     Difficulty of Paying Living Expenses: Not hard at all   Food Insecurity: No Food Insecurity    Worried About Running Out of Food in the Last Year: Never true    Donn of Food in the Last Year: Never true   Transportation Needs:     Lack of Transportation (Medical): Not on file    Lack of Transportation (Non-Medical):  Not on file   Physical Activity:     Days of Exercise per Week: Not on file    Minutes of Exercise per Session: Not on file   Stress:     Feeling of Stress : Not on file   Social Connections:     Frequency of Communication with Friends and Family: Not on file    Frequency of Social Gatherings with Friends and Family: Not on file    Attends Anglican Services: Not on file    Active Member of Clubs or Organizations: Not on file    Attends Club or Organization Meetings: Not on file    Marital Status: Not on file   Intimate Partner Violence:     Fear of Current or Ex-Partner: Not on file    Emotionally Abused: Not on file    Physically Abused: Not on file    Sexually Abused: Not on file   Housing Stability:     Unable to Pay for Housing in the Last Year: Not on file    Number of [place] in the Last Year: Not on file    Unstable Housing in the Last Year: Not on file       OBJECTIVE:  Physical Exam  General: awake, alert, in no acute distress  HEENT: incisions clean and dry with staples  Respiratory: normal effort, no wheezes appreciated  CV: appears well perfused  Extremities: atraumatic  Neuro: no focal deficits noted  Psych: mood normal        Pathology report reveals:   Actinic keratosis    ASSESSMENT:  78 y.o. male s/p skin lesion excisions. Doing well. Pathology report was reviewed.     1. Postoperative examination        PLAN:  - call if problems arise      Timo Mcfarland MD  6/7/2022  2:23 PM

## 2022-08-06 ENCOUNTER — HOSPITAL ENCOUNTER (OUTPATIENT)
Age: 67
Discharge: HOME | End: 2022-08-06
Payer: MEDICARE

## 2022-08-06 LAB
ALBUMIN SERPL-MCNC: 4.4 GM/DL (ref 3.4–5)
ALP BLD-CCNC: 90 IU/L (ref 40–129)
ALT SERPL-CCNC: 22 U/L (ref 10–40)
ANION GAP SERPL CALCULATED.3IONS-SCNC: 12 MMOL/L (ref 4–16)
AST SERPL-CCNC: 17 IU/L (ref 15–37)
BASOPHILS ABSOLUTE: 0.1 K/CU MM
BASOPHILS RELATIVE PERCENT: 0.5 % (ref 0–1)
BILIRUB SERPL-MCNC: 0.6 MG/DL (ref 0–1)
BUN BLDV-MCNC: 13 MG/DL (ref 6–23)
CALCIUM SERPL-MCNC: 10.1 MG/DL (ref 8.3–10.6)
CHLORIDE BLD-SCNC: 101 MMOL/L (ref 99–110)
CHOLESTEROL, FASTING: 129 MG/DL
CO2: 26 MMOL/L (ref 21–32)
CREAT SERPL-MCNC: 1 MG/DL (ref 0.9–1.3)
DIFFERENTIAL TYPE: ABNORMAL
EOSINOPHILS ABSOLUTE: 0.5 K/CU MM
EOSINOPHILS RELATIVE PERCENT: 4.8 % (ref 0–3)
ESTIMATED AVERAGE GLUCOSE: 120 MG/DL
GFR AFRICAN AMERICAN: >60 ML/MIN/1.73M2
GFR NON-AFRICAN AMERICAN: >60 ML/MIN/1.73M2
GLUCOSE BLD-MCNC: 99 MG/DL (ref 70–99)
HBA1C MFR BLD: 5.8 % (ref 4.2–6.3)
HCT VFR BLD CALC: 54.6 % (ref 42–52)
HDLC SERPL-MCNC: 44 MG/DL
HEMOGLOBIN: 19 GM/DL (ref 13.5–18)
IMMATURE NEUTROPHIL %: 0.3 % (ref 0–0.43)
LDL CHOLESTEROL CALCULATED: ABNORMAL MG/DL
LDL CHOLESTEROL DIRECT: 73 MG/DL
LYMPHOCYTES ABSOLUTE: 2.4 K/CU MM
LYMPHOCYTES RELATIVE PERCENT: 22.5 % (ref 24–44)
MCH RBC QN AUTO: 34.9 PG (ref 27–31)
MCHC RBC AUTO-ENTMCNC: 34.8 % (ref 32–36)
MCV RBC AUTO: 100.2 FL (ref 78–100)
MONOCYTES ABSOLUTE: 0.8 K/CU MM
MONOCYTES RELATIVE PERCENT: 8 % (ref 0–4)
PDW BLD-RTO: 13.3 % (ref 11.7–14.9)
PLATELET # BLD: 193 K/CU MM (ref 140–440)
PMV BLD AUTO: 8.8 FL (ref 7.5–11.1)
POTASSIUM SERPL-SCNC: 4.9 MMOL/L (ref 3.5–5.1)
RBC # BLD: 5.45 M/CU MM (ref 4.6–6.2)
SEGMENTED NEUTROPHILS ABSOLUTE COUNT: 6.7 K/CU MM
SEGMENTED NEUTROPHILS RELATIVE PERCENT: 63.9 % (ref 36–66)
SODIUM BLD-SCNC: 139 MMOL/L (ref 135–145)
T4 FREE: 1.11 NG/DL (ref 0.9–1.8)
TOTAL IMMATURE NEUTOROPHIL: 0.03 K/CU MM
TOTAL PROTEIN: 7.5 GM/DL (ref 6.4–8.2)
TRIGLYCERIDE, FASTING: 436 MG/DL
TSH HIGH SENSITIVITY: 4.01 UIU/ML (ref 0.27–4.2)
WBC # BLD: 10.5 K/CU MM (ref 4–10.5)

## 2022-08-06 PROCEDURE — 84439 ASSAY OF FREE THYROXINE: CPT

## 2022-08-06 PROCEDURE — 83721 ASSAY OF BLOOD LIPOPROTEIN: CPT

## 2022-08-06 PROCEDURE — 80053 COMPREHEN METABOLIC PANEL: CPT

## 2022-08-06 PROCEDURE — 83036 HEMOGLOBIN GLYCOSYLATED A1C: CPT

## 2022-08-06 PROCEDURE — 84443 ASSAY THYROID STIM HORMONE: CPT

## 2022-08-06 PROCEDURE — 80061 LIPID PANEL: CPT

## 2022-08-06 PROCEDURE — 85025 COMPLETE CBC W/AUTO DIFF WBC: CPT

## 2022-08-12 ENCOUNTER — OFFICE VISIT (OUTPATIENT)
Dept: INTERNAL MEDICINE CLINIC | Age: 67
End: 2022-08-12
Payer: MEDICARE

## 2022-08-12 VITALS
OXYGEN SATURATION: 94 % | SYSTOLIC BLOOD PRESSURE: 122 MMHG | HEART RATE: 87 BPM | DIASTOLIC BLOOD PRESSURE: 76 MMHG | WEIGHT: 184 LBS | RESPIRATION RATE: 18 BRPM | BODY MASS INDEX: 26.34 KG/M2 | HEIGHT: 70 IN

## 2022-08-12 DIAGNOSIS — I50.42 CHRONIC COMBINED SYSTOLIC AND DIASTOLIC CONGESTIVE HEART FAILURE (HCC): ICD-10-CM

## 2022-08-12 DIAGNOSIS — I10 ESSENTIAL HYPERTENSION: ICD-10-CM

## 2022-08-12 DIAGNOSIS — J43.1 PANLOBULAR EMPHYSEMA (HCC): ICD-10-CM

## 2022-08-12 DIAGNOSIS — M25.531 RIGHT WRIST PAIN: ICD-10-CM

## 2022-08-12 DIAGNOSIS — R73.03 PREDIABETES: ICD-10-CM

## 2022-08-12 DIAGNOSIS — D75.1 ERYTHROCYTOSIS: Primary | ICD-10-CM

## 2022-08-12 DIAGNOSIS — M54.50 CHRONIC BILATERAL LOW BACK PAIN WITHOUT SCIATICA: ICD-10-CM

## 2022-08-12 DIAGNOSIS — E03.9 ACQUIRED HYPOTHYROIDISM: ICD-10-CM

## 2022-08-12 DIAGNOSIS — G89.29 CHRONIC BILATERAL LOW BACK PAIN WITHOUT SCIATICA: ICD-10-CM

## 2022-08-12 DIAGNOSIS — D75.89 MACROCYTOSIS: ICD-10-CM

## 2022-08-12 DIAGNOSIS — N40.0 BENIGN PROSTATIC HYPERPLASIA, UNSPECIFIED WHETHER LOWER URINARY TRACT SYMPTOMS PRESENT: ICD-10-CM

## 2022-08-12 DIAGNOSIS — K21.9 GASTROESOPHAGEAL REFLUX DISEASE, UNSPECIFIED WHETHER ESOPHAGITIS PRESENT: ICD-10-CM

## 2022-08-12 DIAGNOSIS — E78.2 MIXED HYPERLIPIDEMIA: ICD-10-CM

## 2022-08-12 DIAGNOSIS — N52.9 VASCULOGENIC ERECTILE DYSFUNCTION, UNSPECIFIED VASCULOGENIC ERECTILE DYSFUNCTION TYPE: ICD-10-CM

## 2022-08-12 PROCEDURE — 3023F SPIROM DOC REV: CPT | Performed by: INTERNAL MEDICINE

## 2022-08-12 PROCEDURE — G8417 CALC BMI ABV UP PARAM F/U: HCPCS | Performed by: INTERNAL MEDICINE

## 2022-08-12 PROCEDURE — G8427 DOCREV CUR MEDS BY ELIG CLIN: HCPCS | Performed by: INTERNAL MEDICINE

## 2022-08-12 PROCEDURE — 1123F ACP DISCUSS/DSCN MKR DOCD: CPT | Performed by: INTERNAL MEDICINE

## 2022-08-12 PROCEDURE — 3017F COLORECTAL CA SCREEN DOC REV: CPT | Performed by: INTERNAL MEDICINE

## 2022-08-12 PROCEDURE — 4004F PT TOBACCO SCREEN RCVD TLK: CPT | Performed by: INTERNAL MEDICINE

## 2022-08-12 PROCEDURE — 99214 OFFICE O/P EST MOD 30 MIN: CPT | Performed by: INTERNAL MEDICINE

## 2022-08-12 RX ORDER — ALBUTEROL SULFATE 90 UG/1
2 AEROSOL, METERED RESPIRATORY (INHALATION)
Qty: 1 EACH | Refills: 5 | Status: SHIPPED | OUTPATIENT
Start: 2022-08-12

## 2022-08-12 RX ORDER — FINASTERIDE 5 MG/1
5 TABLET, FILM COATED ORAL DAILY
Qty: 90 TABLET | Refills: 1 | Status: SHIPPED | OUTPATIENT
Start: 2022-08-12

## 2022-08-12 RX ORDER — FLUTICASONE FUROATE AND VILANTEROL 100; 25 UG/1; UG/1
1 POWDER RESPIRATORY (INHALATION) DAILY
Qty: 30 EACH | Refills: 5 | Status: SHIPPED | OUTPATIENT
Start: 2022-08-12

## 2022-08-12 RX ORDER — FAMOTIDINE 20 MG/1
TABLET, FILM COATED ORAL
Qty: 90 TABLET | Refills: 1 | Status: SHIPPED | OUTPATIENT
Start: 2022-08-12

## 2022-08-12 RX ORDER — LISINOPRIL 5 MG/1
5 TABLET ORAL DAILY
Qty: 90 TABLET | Refills: 1 | Status: SHIPPED | OUTPATIENT
Start: 2022-08-12

## 2022-08-12 RX ORDER — CARVEDILOL 3.12 MG/1
3.12 TABLET ORAL 2 TIMES DAILY WITH MEALS
Qty: 180 TABLET | Refills: 1 | Status: SHIPPED | OUTPATIENT
Start: 2022-08-12

## 2022-08-12 RX ORDER — DULOXETIN HYDROCHLORIDE 60 MG/1
60 CAPSULE, DELAYED RELEASE ORAL DAILY
Qty: 90 CAPSULE | Refills: 1 | Status: SHIPPED | OUTPATIENT
Start: 2022-08-12

## 2022-08-12 RX ORDER — LEVOTHYROXINE SODIUM 112 UG/1
TABLET ORAL
Qty: 90 TABLET | Refills: 1 | Status: SHIPPED | OUTPATIENT
Start: 2022-08-12

## 2022-08-12 RX ORDER — ATORVASTATIN CALCIUM 20 MG/1
20 TABLET, FILM COATED ORAL NIGHTLY
Qty: 90 TABLET | Refills: 1 | Status: CANCELLED | OUTPATIENT
Start: 2022-08-12

## 2022-08-12 RX ORDER — SILDENAFIL 100 MG/1
100 TABLET, FILM COATED ORAL DAILY PRN
Qty: 10 TABLET | Refills: 3 | Status: SHIPPED | OUTPATIENT
Start: 2022-08-12

## 2022-08-12 RX ORDER — TIOTROPIUM BROMIDE 18 UG/1
18 CAPSULE ORAL; RESPIRATORY (INHALATION) DAILY
Qty: 90 CAPSULE | Refills: 1 | Status: SHIPPED | OUTPATIENT
Start: 2022-08-12

## 2022-08-12 RX ORDER — CYCLOBENZAPRINE HCL 10 MG
10 TABLET ORAL 3 TIMES DAILY PRN
Qty: 90 TABLET | Refills: 5 | Status: SHIPPED | OUTPATIENT
Start: 2022-08-12

## 2022-08-12 RX ORDER — SEMAGLUTIDE 1.34 MG/ML
INJECTION, SOLUTION SUBCUTANEOUS
Qty: 1 PEN | Refills: 5 | Status: SHIPPED | OUTPATIENT
Start: 2022-08-12 | End: 2022-10-31

## 2022-08-12 RX ORDER — FUROSEMIDE 20 MG/1
20 TABLET ORAL DAILY PRN
Qty: 90 TABLET | Refills: 1 | Status: SHIPPED | OUTPATIENT
Start: 2022-08-12

## 2022-08-12 RX ORDER — METHYLPREDNISOLONE 4 MG/1
TABLET ORAL
Qty: 1 KIT | Refills: 0 | Status: SHIPPED | OUTPATIENT
Start: 2022-08-12 | End: 2022-08-18

## 2022-08-12 RX ORDER — ATORVASTATIN CALCIUM 40 MG/1
40 TABLET, FILM COATED ORAL EVERY EVENING
Qty: 90 TABLET | Refills: 1 | Status: SHIPPED | OUTPATIENT
Start: 2022-08-12

## 2022-08-12 RX ORDER — TAMSULOSIN HYDROCHLORIDE 0.4 MG/1
0.4 CAPSULE ORAL DAILY
Qty: 90 CAPSULE | Refills: 1 | Status: SHIPPED | OUTPATIENT
Start: 2022-08-12

## 2022-08-12 NOTE — PROGRESS NOTES
8/12/22    Feng Leon  1955    Chief Complaint   Patient presents with    3 Month Follow-Up       History of Present Illness:  Feng Leon is a 78 y.o. pleasant gentleman presenting today with a chief complaint of erythrocytosis, macrocytosis, HL. He has a past medical history significant for:  HTN, on Lisinopril 5mg QD, Coreg 3.125mg BID  HL (LDL 73,  on 8/6/2022), on Atorvastatin 20mg  Prediabetes (HbA1C 5.8% on 8/6/2022), on Ozempic 0.25mg weekly  CAD s/p CABG (10/2020), on ASA   HFrEF/HFpEF (EF 14-55%, diastolic dysfunction, mild MR on 6/23/2020; EF up to 45-50% on TTE 2/4/2021), on Lisinopril 5mg QD, Coreg 3.125mg BID, Lasix 20mg QD  Hypothyroidism (TSH 4.010 normal on 8/6/2022), on Synthroid 112mcg QD  PAD, on ASA   GERD, on Pepcid 10mg BID   Allergic rhinitis, off Zyrtec  BPH, on Flomax, Finasteride   ED, on Sildenafil 100mg QD PRN   COPD, on Albuterol inh PRN, Breo Ellipta, Spiriva QD   Chronic back pain, on Cymbalta 60mg QHS, Flexeril 10mg BID PRN  IBS-mixed, on Bentyl QID   H/o C diff (10/2020)  Juvenile RA   Rheumatic fever   Former smoker (10/2020)     # Patient was admitted 3/15-3/19/2021 after syncopal episode when he got up from a sitting position, and hit his head. Had LOC. CTH showed SAH. He was transferred to [facility] trauma service. NSGY consulted and recommended no surgical intervention. No intracranial aneurysm noted. He was cleared to be on ASA. Had FU with NSGY on 4/5 after CTH done. Also had TTE per Cardiology stable. Was off driving for 1 month and recommended compression stockings for his orthostatic hypotension. He is s/p Holter 30-days. He watches his salt intake. # Patient was admitted 9/23-9/24/2020 for hypotension and bradycardia following LHC. He had N/V and was hypovolemic. He was thought to be in cardiogenic shock s/p dopamine gtt. He also received IVF resuscitation. He was discharged home ambulating, in a stable condition. LHC showed multi-vessel CAD.  He is s/p CABG in Oct 2020. Following with Dr. Amberly Harvey outpatient. He is s/p cardiac rehab  He also had C diff s/p PO Vanc. Continues to have sternal pain. Was taking Tramadol and has run out. OARRS previously reviewed. He has not had prescription from me. # Patient was in ER on 12/19 for near syncope and chin laceration s/p sutured. QTc was 508 ms. Patient refused to stay in ER longer for further cardiac eval.   He has been having issues with hypotension and fluctuating BP. Patient was in ED 9/16/2021 for bradycardia and hypotension. Sustained fall and fractured L 6th rib. EKG wo ischemia. Mainor unremarkable. CBC, BMP stable. Reports he was out in the heat when he felt dizzy prior to falling. Refused to be admitted. Patient is now taking Lisinopril 5mg, Coreg 3.125mg BID, Lasix 20mg. Follows with Dr. Yossi Virgen from Cardiology. BP today 122/76, HR 87. No CP, SOB, palpitations, dizziness, or light-headedness. Not interested in cardiac rehab. His LE swelling is 2/2 significant reflux bilaterally (evident on US). # Chantix helped him quit smoking 4 days before CABG. # Patient was in the ED May 2020 for acute onset SOB. New diagnosis of CHF. EKG wo acute ischemia. Neg Mainor. BNP elevated. CT showed no PE but did show pulmonary edema (high d-dimer). COVID test negative. He was hypoxic, requiring 4L O2 however patient declined admission. He is not having worsening SOB, but at his baseline. He was given in the ED Lasix IV 40mg once. First time I met patient in June I prescribed Lasix 20mg QD. He reports his SOB improved over 70% with the Lasix. TTE done 6/23/2020 shows e/o mixed systolic and diastolic heart failure. He has never had LHC done. Patient denies alcohol abuse. He developed pre-renal JOSE which resolved after Lasix was held for 1 week (Cr up to 1.4 in June then down to 1.0 with GFR > 60 in July). He had not had ischemic work up done in the past.   # Takes Pepcid for GERD. Helping.  He was on 04/02/2018    COVID-19 Vaccine (4 - Booster for Pfizer series) 05/17/2022         Review of Systems:  Constitutional: no fevers, no chills, no night sweats, no weight loss, no weight gain, no fatigue  Pain assessment: no pain  Head: no headaches  Ears: no hearing loss, no tinnitus, no vertigo  Eyes: no blurry vision, no diplopia, no dryness, no itchiness  Mouth: no oral ulcers, no dry mouth, no sore throat  Nose: no nasal congestion, no epistaxis  Cardiac: no chest pain, no palpitations, leg swelling, no orthopnea, no PND, no syncope  Pulmonary: no dyspnea, no cough, no wheezing, no hemoptysis  GI: no nausea, no vomiting, no diarrhea, no constipation, no abdominal pain, no hematochezia  : no dysuria, no frequency, no urgency, no hematuria, no frothy urine  MSK: no arthralgias, no myalgias, no early morning stiffness, no Raynaud's  Neuro: no focal neurological deficits, no seizures  Sleep: no snoring, no daytime somnolence  Psych: no depression, no anxiety, no suicidal ideation      Physical Exam:  VITALS:   /76 (Site: Right Upper Arm, Position: Sitting, Cuff Size: Large Adult)   Pulse 87   Resp 18   Ht 5' 10\" (1.778 m)   Wt 184 lb (83.5 kg)   SpO2 94%   BMI 26.40 kg/m²     PHYSICAL EXAMINATION:  General: alert, awake, and oriented to time, place, person, and situation. Not in acute distress  Skin: no jaundice   Head: normocephalic/atraumatic  Eyes: anicteric sclera, well-injected conjunctiva. Pupils are equally round and reactive to light.  Extraocular movements are intact  Nose: no septal deviation evident  Sinuses: no sinus tenderness  Ears: external ears normal  Neck: supple, no cervical lymphadenopathy, thyroid symmetric and not enlarged, no bruits, no JVD  Heart: regular rate and rhythm, regular S1/S2, no S3/S4, no audible murmurs, no audible friction rub   Lungs: clear to auscultation bilaterally, no audible crackles, no audible wheezes, no audible rhonchi    Abdomen: normal bowel sounds, soft abdomen, non-tender, no palpable masses  Extremities: L > RLE pitting edema 1+ (site of L vein retrieval for CABG), warm, no cyanosis, no clubbing. Good capillary refill  MSK: pain on flexion and extension in R wrist and R 4th and 5th fingers   Peripheral vascular: 2+ pulses symmetric (radial)  Neuro: gait normal, CN II-XII intact, motor power 5/5 in all 4 extremities, sensation intact and symmetric    Labs   I have personally reviewed labs, and discussed pertinent findings with patient on this date 8/12/2022     Imaging   I have personally reviewed imaging, and discussed pertinent findings with patient on this date 8/12/2022     Other notes  I have personally reviewed other notes, and discussed pertinent findings with patient on this date 8/12/2022       Assessment/Plan:     1. Erythrocytosis  Recommend smoking cessation  If progressively worsening or associated with other CBC abnormalities, may consider Hematology referral  - CBC with Auto Differential; Future    2. Macrocytosis  - CBC with Auto Differential; Future  - Vitamin B12 & Folate; Future  - VITAMIN B1; Future  - TSH with Reflex; Future    3. Mixed hyperlipidemia  LDL 73,  Aug 2022  Increase Atorvastatin to 40mg QD  - Comprehensive Metabolic Panel; Future  - Lipid, Fasting; Future    4. Prediabetes  A1C 5.8% Aug 2022  Continue Ozempic 0.25mg weekly SQ  - Comprehensive Metabolic Panel; Future  - Hemoglobin A1C; Future    5. Essential hypertension  Stable  Continue Lisinopril 5mg QD, Coreg 3.125mg BID  - Comprehensive Metabolic Panel; Future    6. Acquired hypothyroidism  TSH normal Aug 2022  Continue Synthroid 112mcg QD  - TSH with Reflex; Future    7. Chronic combined systolic and diastolic congestive heart failure (HCC)  HFrEF/HFpEF (EF 57-98%, diastolic dysfunction, mild MR on 6/23/2020; EF up to 45-50% on TTE 2/4/2021)  Continue Lisinopril 5mg QD, Coreg 3.125mg BID, Lasix 20mg QD    8.  Panlobular emphysema (HCC)  Continue Albuterol inh PRN, Ashleigh Garcia Ellipta, Spiriva QD     9. Benign prostatic hyperplasia, unspecified whether lower urinary tract symptoms present  Continue Flomax, Proscar    10. Gastroesophageal reflux disease, unspecified whether esophagitis present  Continue Pepcid 10mg BID PRN     11. Chronic bilateral low back pain without sciatica  Continue Flexeril 10mg TID PRN, Cymbalta 60mg QD    12. Vasculogenic erectile dysfunction, unspecified vasculogenic erectile dysfunction type  - sildenafil (VIAGRA) 100 MG tablet; Take 1 tablet by mouth daily as needed for Erectile Dysfunction  Dispense: 10 tablet; Refill: 3    13. Right wrist pain  - methylPREDNISolone (MEDROL DOSEPACK) 4 MG tablet; Take by mouth. Dispense: 1 kit; Refill: 0      Care discussed with patient and questions answered. Patient verbalizes understanding and agrees with plan. Discussed with patient the importance of continuity of care. I encouraged patient to schedule next appointment within 3 months with me. Patient prefers to be reached by Phone call at [phone] for future medical correspondence. Encouraged to activate [employer]. I reviewed and reconciled the medications this visit. I reviewed and updated the past medical, surgical, social, and family history during this visit. After visit summary provided.        Glenn Fontenot MD  Internal Medicine  8/12/2022   11:10 AM

## 2022-08-12 NOTE — PATIENT INSTRUCTIONS
Fasting for a blood test: taking the right steps before testing helps ensure your results will be accurate. Why do I need to fast before my blood test?  If your healthcare provider has told you to fast before a blood test, it means you should not eat or drink anything, except water, for several hours before your test. When you eat and drink normally, those foods and beverages are absorbed into your bloodstream. That could affect the results of certain types of blood tests. What types of blood tests require fasting? The most common tests that require fasting are:  Glucose tests, which measure your blood sugar. Lipid tests, which measure cholesterol and triglycerides. You do not need to be fasting for HbA1C test.     How long do I have to fast before the test?  You usually need to fast for 8-12 hours before the test. Most tests that require fasting are scheduled for early in the morning. That way, most of your fasting time will be overnight. Can I drink anything besides water during a fast?  No. Juice, coffee, soda, and other beverages can get in your bloodstream and affect your results. In addition, you should not:  Chew gum   Smoke   Exercise  These activities can also affect your results. But you can drink water. It's actually encouraged that you drink 2 glasses of water before any blood test. It helps keep more fluid in your veins, which can make it easier to draw blood. If you are dehydrated, your blood draw experience may be unpleasant. Can I continue taking medicine during a fast?  Most of the time it's OK to take your usual medicines with water, unless otherwise specified by your healthcare provider. You may need to avoid certain medicines that you normally take with food.      What if I make a mistake and have something to eat or drink besides water during my fast?  Tell your healthcare provider before your test. Your test will most likely have to be re-scheduled for another time when you are able to complete your fast.    When can I eat and drink normally again? As soon as your test is over. You may want to bring a snack with you, so you can eat right away. Is there anything else I need to know about fasting before a blood test?  Be sure to talk to your healthcare provider if you have any questions or concerns about fasting. You should talk to your provider before taking any lab test. Most tests don't require fasting or other special preparations. For others, you may need to avoid certain foods, medicines, or activities.        [facility] Internal Medicine  [phone]

## 2022-09-02 ENCOUNTER — TELEPHONE (OUTPATIENT)
Dept: INTERNAL MEDICINE CLINIC | Age: 67
End: 2022-09-02

## 2022-09-15 NOTE — TELEPHONE ENCOUNTER
Bin Priest returned call with concerns. 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 She also noticed his test results and wonders why his NM Stress Test is abnormal. Please call Bin Priest and revise letter, if possible.
Per message sent IM from [facility], discussed with Dr Concha Looney and per Dr Concha Looney patient had syncopal episode due to dehydration and okay to send return to work letter stating patient okay to return to work as  from cardiac standpoint. Please also see signed disability paperwork scanned in media. See mentioned message attached:  \" Dr. Concha Looney said that you can write the letter. He said that the patient had a syncopal episode due to dehydration and he is cleared from a cardiac standpoint to go back to work. He said that the patient wants to work and he does not see any reason for him not to go back to work. \"  Letter generated and will fax to Scarlett Alvarado for physician signature.
Reviewed previous note from [id number] [employer]  and will discuss with  tomorrow.
This patient was seen at the [place] [id number] office for follow up with Dr Jose A Blount to discuss abnormal NM stress test. Per Dr Jose A Blount patient cleared to RTW on 10/4/2021 and per disability paperwork with no restrictions, but as tolerated by patient due to rib fracture, please see paperwork scanned in media. Returned phone call to Ruddy Moreno with patients employer [employer], and she states they are needing a more specific return to work letter. 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 Ruddy Moreno also states she is concerned and wanted to be sure patient is okay to return as a  due to 3 syncopal episodes since returning to work in January. Patient does have history of CABG last year as well as a subarachnoid hemorrhage from a syncopal episode where patient hit his head, but unsure if \"bleed was prior to fall or if fall caused bleed\". She did state patient does report wearing compression stockings as directed. Ruddy Moreno states patient was told on several occasions his blood pressure is low and this could be causing his syncopal episodes. She would like these concerns addressed with Dr Jose A Blount and if approving his return to work would like the letter emailed to [email or web address]. Will forward to [place] [id number] office, [place], [place] and [place] to review with Dr Jose A Blount.
response to care/treatments:

## 2022-10-30 DIAGNOSIS — R73.03 PREDIABETES: ICD-10-CM

## 2022-10-31 RX ORDER — SEMAGLUTIDE 1.34 MG/ML
INJECTION, SOLUTION SUBCUTANEOUS
Qty: 3 ADJUSTABLE DOSE PRE-FILLED PEN SYRINGE | Refills: 5 | Status: SHIPPED | OUTPATIENT
Start: 2022-10-31

## 2022-12-07 ENCOUNTER — TELEPHONE (OUTPATIENT)
Dept: INTERNAL MEDICINE CLINIC | Age: 67
End: 2022-12-07

## 2022-12-07 DIAGNOSIS — M25.531 RIGHT WRIST PAIN: ICD-10-CM

## 2022-12-07 RX ORDER — METHYLPREDNISOLONE 4 MG/1
TABLET ORAL
Qty: 1 KIT | Refills: 0 | Status: SHIPPED | OUTPATIENT
Start: 2022-12-07 | End: 2022-12-13

## 2022-12-07 NOTE — TELEPHONE ENCOUNTER
Pt called and asked if he can get a new prescription for the steroid that you have him for his hand. Pt states it did help but he is now out of it. Please Advise. Pt cancelled appt in 11/2022 and rescheduled for 2/2023.

## 2022-12-11 DIAGNOSIS — E78.2 MIXED HYPERLIPIDEMIA: ICD-10-CM

## 2022-12-12 RX ORDER — ATORVASTATIN CALCIUM 20 MG/1
20 TABLET, FILM COATED ORAL NIGHTLY
Qty: 90 TABLET | Refills: 1 | OUTPATIENT
Start: 2022-12-12

## 2023-01-11 ENCOUNTER — HOSPITAL ENCOUNTER (OUTPATIENT)
Age: 68
Discharge: HOME | End: 2023-01-11
Payer: MEDICARE

## 2023-01-11 ENCOUNTER — OFFICE VISIT (OUTPATIENT)
Dept: CARDIOLOGY CLINIC | Age: 68
End: 2023-01-11
Payer: MEDICARE

## 2023-01-11 VITALS
BODY MASS INDEX: 25.76 KG/M2 | HEIGHT: 71 IN | DIASTOLIC BLOOD PRESSURE: 64 MMHG | WEIGHT: 184 LBS | SYSTOLIC BLOOD PRESSURE: 118 MMHG | HEART RATE: 90 BPM

## 2023-01-11 DIAGNOSIS — I25.10 CORONARY ARTERY DISEASE INVOLVING NATIVE CORONARY ARTERY OF NATIVE HEART WITHOUT ANGINA PECTORIS: Primary | ICD-10-CM

## 2023-01-11 LAB
ALBUMIN SERPL-MCNC: 4.3 GM/DL (ref 3.4–5)
ALP BLD-CCNC: 123 IU/L (ref 40–129)
ALT SERPL-CCNC: 23 U/L (ref 10–40)
ANION GAP SERPL CALCULATED.3IONS-SCNC: 12 MMOL/L (ref 4–16)
AST SERPL-CCNC: 23 IU/L (ref 15–37)
BASOPHILS ABSOLUTE: 0.1 K/CU MM
BASOPHILS RELATIVE PERCENT: 0.5 % (ref 0–1)
BILIRUB SERPL-MCNC: 0.6 MG/DL (ref 0–1)
BUN BLDV-MCNC: 17 MG/DL (ref 6–23)
CALCIUM SERPL-MCNC: 9.7 MG/DL (ref 8.3–10.6)
CHLORIDE BLD-SCNC: 93 MMOL/L (ref 99–110)
CHOLESTEROL, FASTING: 105 MG/DL
CO2: 28 MMOL/L (ref 21–32)
CREAT SERPL-MCNC: 0.9 MG/DL (ref 0.9–1.3)
DIFFERENTIAL TYPE: ABNORMAL
EOSINOPHILS ABSOLUTE: 0.6 K/CU MM
EOSINOPHILS RELATIVE PERCENT: 5.6 % (ref 0–3)
ESTIMATED AVERAGE GLUCOSE: 134 MG/DL
FOLATE: >20 NG/ML (ref 3.1–17.5)
GFR SERPL CREATININE-BSD FRML MDRD: >60 ML/MIN/1.73M2
GLUCOSE BLD-MCNC: 122 MG/DL (ref 70–99)
HBA1C MFR BLD: 6.3 % (ref 4.2–6.3)
HCT VFR BLD CALC: 49.7 % (ref 42–52)
HDLC SERPL-MCNC: 45 MG/DL
HEMOGLOBIN: 17.2 GM/DL (ref 13.5–18)
IMMATURE NEUTROPHIL %: 0.4 % (ref 0–0.43)
LDL CHOLESTEROL CALCULATED: 47 MG/DL
LYMPHOCYTES ABSOLUTE: 2.1 K/CU MM
LYMPHOCYTES RELATIVE PERCENT: 19.7 % (ref 24–44)
MCH RBC QN AUTO: 34.8 PG (ref 27–31)
MCHC RBC AUTO-ENTMCNC: 34.6 % (ref 32–36)
MCV RBC AUTO: 100.6 FL (ref 78–100)
MONOCYTES ABSOLUTE: 1 K/CU MM
MONOCYTES RELATIVE PERCENT: 9.7 % (ref 0–4)
PDW BLD-RTO: 13.1 % (ref 11.7–14.9)
PLATELET # BLD: 193 K/CU MM (ref 140–440)
PMV BLD AUTO: 8.5 FL (ref 7.5–11.1)
POTASSIUM SERPL-SCNC: 4.4 MMOL/L (ref 3.5–5.1)
RBC # BLD: 4.94 M/CU MM (ref 4.6–6.2)
SEGMENTED NEUTROPHILS ABSOLUTE COUNT: 6.8 K/CU MM
SEGMENTED NEUTROPHILS RELATIVE PERCENT: 64.1 % (ref 36–66)
SODIUM BLD-SCNC: 133 MMOL/L (ref 135–145)
TOTAL IMMATURE NEUTOROPHIL: 0.04 K/CU MM
TOTAL PROTEIN: 7.4 GM/DL (ref 6.4–8.2)
TRIGLYCERIDE, FASTING: 66 MG/DL
TSH HIGH SENSITIVITY: 3.1 UIU/ML (ref 0.27–4.2)
WBC # BLD: 10.6 K/CU MM (ref 4–10.5)

## 2023-01-11 PROCEDURE — 36415 COLL VENOUS BLD VENIPUNCTURE: CPT

## 2023-01-11 PROCEDURE — 99214 OFFICE O/P EST MOD 30 MIN: CPT | Performed by: INTERNAL MEDICINE

## 2023-01-11 PROCEDURE — 3017F COLORECTAL CA SCREEN DOC REV: CPT | Performed by: INTERNAL MEDICINE

## 2023-01-11 PROCEDURE — 4004F PT TOBACCO SCREEN RCVD TLK: CPT | Performed by: INTERNAL MEDICINE

## 2023-01-11 PROCEDURE — 82607 VITAMIN B-12: CPT

## 2023-01-11 PROCEDURE — 82746 ASSAY OF FOLIC ACID SERUM: CPT

## 2023-01-11 PROCEDURE — 83036 HEMOGLOBIN GLYCOSYLATED A1C: CPT

## 2023-01-11 PROCEDURE — 84443 ASSAY THYROID STIM HORMONE: CPT

## 2023-01-11 PROCEDURE — 84425 ASSAY OF VITAMIN B-1: CPT

## 2023-01-11 PROCEDURE — 3074F SYST BP LT 130 MM HG: CPT | Performed by: INTERNAL MEDICINE

## 2023-01-11 PROCEDURE — 1123F ACP DISCUSS/DSCN MKR DOCD: CPT | Performed by: INTERNAL MEDICINE

## 2023-01-11 PROCEDURE — 80061 LIPID PANEL: CPT

## 2023-01-11 PROCEDURE — 3078F DIAST BP <80 MM HG: CPT | Performed by: INTERNAL MEDICINE

## 2023-01-11 PROCEDURE — G8484 FLU IMMUNIZE NO ADMIN: HCPCS | Performed by: INTERNAL MEDICINE

## 2023-01-11 PROCEDURE — G8417 CALC BMI ABV UP PARAM F/U: HCPCS | Performed by: INTERNAL MEDICINE

## 2023-01-11 PROCEDURE — 85025 COMPLETE CBC W/AUTO DIFF WBC: CPT

## 2023-01-11 PROCEDURE — 80053 COMPREHEN METABOLIC PANEL: CPT

## 2023-01-11 PROCEDURE — G8427 DOCREV CUR MEDS BY ELIG CLIN: HCPCS | Performed by: INTERNAL MEDICINE

## 2023-01-11 RX ORDER — ASPIRIN 81 MG/1
81 TABLET ORAL DAILY
Qty: 90 TABLET | Refills: 1 | Status: SHIPPED | OUTPATIENT
Start: 2023-01-11

## 2023-01-11 NOTE — PROGRESS NOTES
Tyron Manuel MD        OFFICE  FOLLOWUP NOTE    Chief complaints: patient is here for management of CAD, S/P cabg , HTN, DYSLPIDEMIA, subarachnoid hemorrhage, syncoe    Subjective: patient feels better, no chest pain, no shortness of breath, no dizziness, no palpitations    HPI Mario Awad is a 75 y. o.year old who  has a past medical history of CAD (coronary artery disease), Chest pain, COPD (chronic obstructive pulmonary disease) ([facility] [address] H/O cardiac catheterization, H/O cardiovascular stress test, H/O echocardiogram, H/O echocardiogram, H/O exercise stress test, Hx of cardiovascular stress test, Hx of Doppler ultrasound, Hyperlipidemia, Hypertension, IBS (irritable bowel syndrome), Pancreatitis, Rheumatoid arthritis ([address] S/P CABG x 3, SOB (shortness of breath), and Thyroid disease. and presents for management of CAD, S/P cabg , HTN, DYSLPIDEMIA, subarachnoid hemorrhage, syncoe, which are well controlled      Current Outpatient Medications   Medication Sig Dispense Refill    OZEMPIC, 0.25 OR 0.5 MG/DOSE, 2 MG/1.5ML SOPN INJECT 0.25MG ONCE WEEKLY UNDER THE SKIN 3 Adjustable Dose Pre-filled Pen Syringe 5    albuterol sulfate HFA (PROVENTIL;VENTOLIN;PROAIR) 108 (90 Base) MCG/ACT inhaler Inhale 2 puffs into the lungs every 4 hours (while awake) 1 each 5    carvedilol (COREG) 3.125 MG tablet Take 1 tablet by mouth in the morning and 1 tablet in the evening. Take with meals. 180 tablet 1    cyclobenzaprine (FLEXERIL) 10 MG tablet Take 1 tablet by mouth 3 times daily as needed for Muscle spasms 90 tablet 5    DULoxetine (CYMBALTA) 60 MG extended release capsule Take 1 capsule by mouth in the morning. 90 capsule 1    famotidine (PEPCID) 20 MG tablet TAKE 1/2 TABLET BY MOUTH 2 TIMES DAILY AS NEEDED (REFLUX) 90 tablet 1    finasteride (PROSCAR) 5 MG tablet Take 1 tablet by mouth in the morning.  90 tablet 1    fluticasone-vilanterol (BREO ELLIPTA) 100-25 MCG/INH AEPB inhaler Inhale 1 puff into the lungs in the morning. 30 each 5    levothyroxine (SYNTHROID) 112 MCG tablet TAKE 1 TABLET BY MOUTH EVERY DAY 90 tablet 1    lisinopril (PRINIVIL;ZESTRIL) 5 MG tablet Take 1 tablet by mouth in the morning. 90 tablet 1    sildenafil (VIAGRA) 100 MG tablet Take 1 tablet by mouth daily as needed for Erectile Dysfunction 10 tablet 3    tamsulosin (FLOMAX) 0.4 MG capsule Take 1 capsule by mouth in the morning. 90 capsule 1    tiotropium (SPIRIVA HANDIHALER) 18 MCG inhalation capsule Inhale 1 capsule into the lungs in the morning. 90 capsule 1    atorvastatin (LIPITOR) 40 MG tablet Take 1 tablet by mouth every evening 90 tablet 1    Blood Pressure KIT Check BP once daily 1 kit 0    Multiple Vitamins-Minerals (THERAPEUTIC MULTIVITAMIN-MINERALS) tablet Take 1 tablet by mouth daily (with breakfast)  0    furosemide (LASIX) 20 MG tablet Take 1 tablet by mouth daily as needed (leg swelling) 90 tablet 1     No current facility-administered medications for this visit. Allergies: Patient has no known allergies. Past Medical History:   Diagnosis Date    CAD (coronary artery disease)     Dr. Aubree Chavira    Chest pain     COPD (chronic obstructive pulmonary disease) ([facility] [address]     No pulmonologist - follows with PCP    H/O cardiac catheterization 09/24/2020     Severe calcified multivessel CAD with LV dysfuntion, PCWP is 12, CABG consult. H/O cardiovascular stress test 09/21/2021    abnormal stress test due to mildly depressed LVEF, increased EDV, Normal tracer uptake in all myocardial segment during  rest and stress. Decreased uptake inferiorly due to diaphragmatic artifact. H/O echocardiogram 06/23/2020     Mild concentric LVH with moderate systolic impairment. EF is 40-45 % . H/O echocardiogram 02/02/2021    ef 45-50%,  Mild LVH.     H/O exercise stress test 12/16/2020    Submaximal study due to failure to achieve target heart rate response and    Hx of cardiovascular stress test 09/20/2021    LVEF, increased EDB, Normal tracer uptake in all myocardial segment during rest and stress. Decreased uptake inferiorly due to diaphragmatic artifact. Hx of Doppler ultrasound 2021    significant reflux noted of the right CFV. Significant reflux noted in the Left SSV Distal.    Hyperlipidemia     Hypertension     Follows with PCP    IBS (irritable bowel syndrome)     Pancreatitis     Rheumatoid arthritis ([address]     S/P CABG x 3 10/19/2020    SOB (shortness of breath)     Thyroid disease      Past Surgical History:   Procedure Laterality Date    CARDIAC CATHETERIZATION  2020     Severe calcified multivessel CAD with LV dysfuntion, PCWP is 12, CABG consult.     COLONOSCOPY  2017    Polyps - [place], [place])    CORONARY ARTERY BYPASS GRAFT N/A 10/19/2020    CABG CORONARY ARTERY BYPASS x3 WITH LIMA, INTRAOP BETH, ENDOHARVEST OF THE LEFT SAPHENOUS VEIN performed by Cale Galdamez MD at [address] [place] [address], COLON, DIAGNOSTIC  2017    Normal exam per pt (done in [place], [place])     Family History   Problem Relation Age of Onset    Alzheimer's Disease Mother     Heart Disease Father     Heart Attack Father     High Blood Pressure Father     High Cholesterol Father      Social History     Tobacco Use    Smoking status: Former     Packs/day: 2.00     Years: 52.00     Pack years: 104.00     Types: Cigarettes     Start date:      Quit date: 10/1/2020     Years since quittin.2    Smokeless tobacco: Current     Types: Chew   Substance Use Topics    Alcohol use: Not Currently     Comment: caffeine 3 cups of coffee and 1 pop a day      [unfilled]  Review of Systems:   Constitutional: No Fever or Weight Loss   Eyes: No Decreased Vision  ENT: No Headaches, Hearing Loss or Vertigo  Cardiovascular: No chest pain, dyspnea on exertion, palpitations or loss of consciousness  Respiratory: No cough or wheezing    Gastrointestinal: No abdominal pain, appetite loss, blood in stools, constipation, diarrhea or heartburn  Genitourinary: No dysuria, trouble voiding, or hematuria  Musculoskeletal:  No gait disturbance, weakness or joint complaints  Integumentary: No rash or pruritis  Neurological: No TIA or stroke symptoms  Psychiatric: No anxiety or depression  Endocrine: No malaise, fatigue or temperature intolerance  Hematologic/Lymphatic: No bleeding problems, blood clots or swollen lymph nodes  Allergic/Immunologic: No nasal congestion or hives  All systems negative except as marked. Objective:  /64   Pulse 90   Ht 5' 10.8\" (1.798 m)   Wt 184 lb (83.5 kg)   BMI 25.81 kg/m²   Wt Readings from Last 3 Encounters:   01/11/23 184 lb (83.5 kg)   08/12/22 184 lb (83.5 kg)   05/26/22 184 lb (83.5 kg)     Body mass index is 25.81 kg/m². GENERAL - Alert, oriented, pleasant, in no apparent distress,normal grooming  HEENT - pupils are intact, cornea intact, conjunctive normal color, ears are normal in exam,  Neck - Supple. No jugular venous distention noted. No carotid bruits, no apical -carotid delay  Respiratory:  Normal breath sounds, No respiratory distress, No wheezing, No chest tenderness. ,no use of accessory muscles, diaphragm movement is normal  Cardiovascular: (PMI) apex non displaced,no lifts no thrills, no s3,no s4, Normal heart rate, Normal rhythm, No murmurs, No rubs, No gallops. Carotid arteries pulse and amplitude are normal no bruit, no abdominal bruit noted ( normal abdominal aorta ausculation),   Extremities - No cyanosis, clubbing, or significant edema, no varicose veins    Abdomen - No masses, tenderness, or organomegaly, no hepato-splenomegally, no bruits  Musculoskeletal - No significant edema, no kyphosis or scoliosis, no deformity in any extremity noted, muscle strength and tone are normal  Skin: no ulcer,no scar,no stasis dermatitis   Neurologic - alert oriented times 3,Cranial nerves II through XII are grossly intact. There were no gross focal neurologic abnormalities.    Psychiatric: normal mood and affect    No results found for: CKTOTAL, CKMB, CKMBINDEX, TROPONINI  BNP:  No results found for: BNP  PT/INR:  No results found for: PTINR  Lab Results   Component Value Date    LABA1C 5.8 08/06/2022    LABA1C 5.8 05/06/2022     Lab Results   Component Value Date    CHOL 155 06/23/2020    TRIG 173 (H) 06/23/2020    HDL 44 08/06/2022    LDLCALC        08/06/2022    LDLDIRECT 73 08/06/2022     Lab Results   Component Value Date    ALT 22 08/06/2022    AST 17 08/06/2022     TSH:  No results found for: TSH    Impression:  Erika Esposito is a 75 y. o.year old who  has a past medical history of CAD (coronary artery disease), Chest pain, COPD (chronic obstructive pulmonary disease) ([facility] [address] H/O cardiac catheterization, H/O cardiovascular stress test, H/O echocardiogram, H/O echocardiogram, H/O exercise stress test, Hx of cardiovascular stress test, Hx of Doppler ultrasound, Hyperlipidemia, Hypertension, IBS (irritable bowel syndrome), Pancreatitis, Rheumatoid arthritis ([facility] [address] S/P CABG x 3, SOB (shortness of breath), and Thyroid disease. and presents with     Plan:  Syncope: resolved it was last yr, and was diagnosed with dehydration: had stress test here in office which showed LVEF 49%, diaphragmatic artifact, he haf left rib fracture with fall and has reporducible pain. 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  H/O Subarachnoid hemorrage: back on aspirin and has seen Neurosx   NASAL bruise: recommend to see dermatologist  Obesity: recommend to cut back on calories,t 1200 calori restriction  CAD:s/o CABG X3 continue aspirin , continue statins,cntimnue  ACEinhibitors  betablockers,had  STRESS TEST AND he is not interested in CARDIAC REHAB  Dyslipidemia: continue statins,   Chf: no evidence of heart failure, left leg swelling is after venous stripping for CABG, venous doppler in [place] offices, reviewed, has left SSS reflux and b/l common femoral vein reflux disease, recommend compression socks and compression tights  HTN: continue lisinopril and bb and lasix  Health maintenance: exerise and All labs, medications and tests reviewed, continue all other medications of all above medical condition listed as is.     @[employer]@

## 2023-01-14 LAB — VITAMIN B1, PLASMA: 179 NMOL/L (ref 70–180)

## 2023-02-03 ENCOUNTER — TELEPHONE (OUTPATIENT)
Dept: CARDIOLOGY CLINIC | Age: 68
End: 2023-02-03

## 2023-02-05 DIAGNOSIS — E78.2 MIXED HYPERLIPIDEMIA: ICD-10-CM

## 2023-02-06 RX ORDER — ATORVASTATIN CALCIUM 40 MG/1
TABLET, FILM COATED ORAL
Qty: 90 TABLET | Refills: 1 | Status: SHIPPED | OUTPATIENT
Start: 2023-02-06

## 2023-02-17 ENCOUNTER — OFFICE VISIT (OUTPATIENT)
Dept: INTERNAL MEDICINE CLINIC | Age: 68
End: 2023-02-17

## 2023-02-17 ENCOUNTER — OFFICE VISIT (OUTPATIENT)
Dept: INTERNAL MEDICINE CLINIC | Age: 68
End: 2023-02-17
Payer: MEDICARE

## 2023-02-17 VITALS
SYSTOLIC BLOOD PRESSURE: 128 MMHG | WEIGHT: 182 LBS | DIASTOLIC BLOOD PRESSURE: 70 MMHG | HEIGHT: 70 IN | TEMPERATURE: 97.7 F | HEART RATE: 76 BPM | OXYGEN SATURATION: 94 % | BODY MASS INDEX: 26.05 KG/M2

## 2023-02-17 VITALS
BODY MASS INDEX: 26.05 KG/M2 | DIASTOLIC BLOOD PRESSURE: 70 MMHG | WEIGHT: 182 LBS | RESPIRATION RATE: 18 BRPM | HEIGHT: 70 IN | SYSTOLIC BLOOD PRESSURE: 128 MMHG | HEART RATE: 76 BPM

## 2023-02-17 DIAGNOSIS — I73.00 PRIMARY RAYNAUD'S PHENOMENON: ICD-10-CM

## 2023-02-17 DIAGNOSIS — N52.9 VASCULOGENIC ERECTILE DYSFUNCTION, UNSPECIFIED VASCULOGENIC ERECTILE DYSFUNCTION TYPE: ICD-10-CM

## 2023-02-17 DIAGNOSIS — Z12.5 PROSTATE CANCER SCREENING: ICD-10-CM

## 2023-02-17 DIAGNOSIS — M54.50 CHRONIC BILATERAL LOW BACK PAIN WITHOUT SCIATICA: ICD-10-CM

## 2023-02-17 DIAGNOSIS — Z87.891 PERSONAL HISTORY OF TOBACCO USE: ICD-10-CM

## 2023-02-17 DIAGNOSIS — R73.03 PREDIABETES: Primary | ICD-10-CM

## 2023-02-17 DIAGNOSIS — G89.29 CHRONIC BILATERAL LOW BACK PAIN WITHOUT SCIATICA: ICD-10-CM

## 2023-02-17 DIAGNOSIS — M79.644 CHRONIC PAIN OF RIGHT THUMB: ICD-10-CM

## 2023-02-17 DIAGNOSIS — K21.9 GASTROESOPHAGEAL REFLUX DISEASE, UNSPECIFIED WHETHER ESOPHAGITIS PRESENT: ICD-10-CM

## 2023-02-17 DIAGNOSIS — Z00.00 MEDICARE ANNUAL WELLNESS VISIT, SUBSEQUENT: Primary | ICD-10-CM

## 2023-02-17 DIAGNOSIS — G89.29 CHRONIC PAIN OF RIGHT THUMB: ICD-10-CM

## 2023-02-17 DIAGNOSIS — E03.9 ACQUIRED HYPOTHYROIDISM: ICD-10-CM

## 2023-02-17 DIAGNOSIS — N40.0 BENIGN PROSTATIC HYPERPLASIA, UNSPECIFIED WHETHER LOWER URINARY TRACT SYMPTOMS PRESENT: ICD-10-CM

## 2023-02-17 DIAGNOSIS — E78.2 MIXED HYPERLIPIDEMIA: ICD-10-CM

## 2023-02-17 DIAGNOSIS — I50.42 CHRONIC COMBINED SYSTOLIC AND DIASTOLIC CONGESTIVE HEART FAILURE (HCC): ICD-10-CM

## 2023-02-17 DIAGNOSIS — J43.1 PANLOBULAR EMPHYSEMA (HCC): ICD-10-CM

## 2023-02-17 DIAGNOSIS — I10 ESSENTIAL HYPERTENSION: ICD-10-CM

## 2023-02-17 PROCEDURE — 3017F COLORECTAL CA SCREEN DOC REV: CPT | Performed by: INTERNAL MEDICINE

## 2023-02-17 PROCEDURE — G0439 PPPS, SUBSEQ VISIT: HCPCS | Performed by: INTERNAL MEDICINE

## 2023-02-17 PROCEDURE — G8484 FLU IMMUNIZE NO ADMIN: HCPCS | Performed by: INTERNAL MEDICINE

## 2023-02-17 PROCEDURE — 1123F ACP DISCUSS/DSCN MKR DOCD: CPT | Performed by: INTERNAL MEDICINE

## 2023-02-17 PROCEDURE — 3074F SYST BP LT 130 MM HG: CPT | Performed by: INTERNAL MEDICINE

## 2023-02-17 PROCEDURE — 3078F DIAST BP <80 MM HG: CPT | Performed by: INTERNAL MEDICINE

## 2023-02-17 RX ORDER — ALBUTEROL SULFATE 90 UG/1
2 AEROSOL, METERED RESPIRATORY (INHALATION)
Qty: 1 EACH | Refills: 5 | Status: SHIPPED | OUTPATIENT
Start: 2023-02-17

## 2023-02-17 RX ORDER — LISINOPRIL 5 MG/1
5 TABLET ORAL DAILY
Qty: 90 TABLET | Refills: 1 | Status: SHIPPED | OUTPATIENT
Start: 2023-02-17

## 2023-02-17 RX ORDER — CARVEDILOL 3.12 MG/1
3.12 TABLET ORAL 2 TIMES DAILY WITH MEALS
Qty: 180 TABLET | Refills: 1 | Status: SHIPPED | OUTPATIENT
Start: 2023-02-17

## 2023-02-17 RX ORDER — METHYLPREDNISOLONE 4 MG/1
TABLET ORAL
Qty: 1 KIT | Refills: 0 | Status: CANCELLED | OUTPATIENT
Start: 2023-02-17 | End: 2023-02-23

## 2023-02-17 RX ORDER — CYCLOBENZAPRINE HCL 10 MG
10 TABLET ORAL 3 TIMES DAILY PRN
Qty: 90 TABLET | Refills: 5 | Status: SHIPPED | OUTPATIENT
Start: 2023-02-17

## 2023-02-17 RX ORDER — ATORVASTATIN CALCIUM 40 MG/1
TABLET, FILM COATED ORAL
Qty: 90 TABLET | Refills: 1 | Status: SHIPPED | OUTPATIENT
Start: 2023-02-17

## 2023-02-17 RX ORDER — TAMSULOSIN HYDROCHLORIDE 0.4 MG/1
0.4 CAPSULE ORAL DAILY
Qty: 90 CAPSULE | Refills: 1 | Status: SHIPPED | OUTPATIENT
Start: 2023-02-17

## 2023-02-17 RX ORDER — DULOXETIN HYDROCHLORIDE 60 MG/1
60 CAPSULE, DELAYED RELEASE ORAL DAILY
Qty: 90 CAPSULE | Refills: 1 | Status: SHIPPED | OUTPATIENT
Start: 2023-02-17

## 2023-02-17 RX ORDER — FINASTERIDE 5 MG/1
5 TABLET, FILM COATED ORAL DAILY
Qty: 90 TABLET | Refills: 1 | Status: SHIPPED | OUTPATIENT
Start: 2023-02-17

## 2023-02-17 RX ORDER — FUROSEMIDE 20 MG/1
20 TABLET ORAL DAILY PRN
Qty: 90 TABLET | Refills: 1 | Status: SHIPPED | OUTPATIENT
Start: 2023-02-17

## 2023-02-17 RX ORDER — TIOTROPIUM BROMIDE 18 UG/1
18 CAPSULE ORAL; RESPIRATORY (INHALATION) DAILY
Qty: 90 CAPSULE | Refills: 1 | Status: SHIPPED | OUTPATIENT
Start: 2023-02-17

## 2023-02-17 RX ORDER — FAMOTIDINE 20 MG/1
TABLET, FILM COATED ORAL
Qty: 90 TABLET | Refills: 1 | Status: SHIPPED | OUTPATIENT
Start: 2023-02-17

## 2023-02-17 RX ORDER — SILDENAFIL 100 MG/1
100 TABLET, FILM COATED ORAL DAILY PRN
Qty: 10 TABLET | Refills: 3 | Status: SHIPPED | OUTPATIENT
Start: 2023-02-17

## 2023-02-17 RX ORDER — SEMAGLUTIDE 1.34 MG/ML
INJECTION, SOLUTION SUBCUTANEOUS
Qty: 4 ADJUSTABLE DOSE PRE-FILLED PEN SYRINGE | Refills: 1 | Status: SHIPPED | OUTPATIENT
Start: 2023-02-17

## 2023-02-17 RX ORDER — LEVOTHYROXINE SODIUM 112 UG/1
TABLET ORAL
Qty: 90 TABLET | Refills: 1 | Status: SHIPPED | OUTPATIENT
Start: 2023-02-17

## 2023-02-17 SDOH — ECONOMIC STABILITY: INCOME INSECURITY: HOW HARD IS IT FOR YOU TO PAY FOR THE VERY BASICS LIKE FOOD, HOUSING, MEDICAL CARE, AND HEATING?: NOT HARD AT ALL

## 2023-02-17 SDOH — ECONOMIC STABILITY: FOOD INSECURITY: WITHIN THE PAST 12 MONTHS, YOU WORRIED THAT YOUR FOOD WOULD RUN OUT BEFORE YOU GOT MONEY TO BUY MORE.: NEVER TRUE

## 2023-02-17 SDOH — ECONOMIC STABILITY: HOUSING INSECURITY
IN THE LAST 12 MONTHS, WAS THERE A TIME WHEN YOU DID NOT HAVE A STEADY PLACE TO SLEEP OR SLEPT IN A SHELTER (INCLUDING NOW)?: NO

## 2023-02-17 SDOH — ECONOMIC STABILITY: FOOD INSECURITY: WITHIN THE PAST 12 MONTHS, THE FOOD YOU BOUGHT JUST DIDN'T LAST AND YOU DIDN'T HAVE MONEY TO GET MORE.: NEVER TRUE

## 2023-02-17 ASSESSMENT — PATIENT HEALTH QUESTIONNAIRE - PHQ9
1. LITTLE INTEREST OR PLEASURE IN DOING THINGS: 0
SUM OF ALL RESPONSES TO PHQ QUESTIONS 1-9: 0
2. FEELING DOWN, DEPRESSED OR HOPELESS: 0
SUM OF ALL RESPONSES TO PHQ9 QUESTIONS 1 & 2: 0
1. LITTLE INTEREST OR PLEASURE IN DOING THINGS: 0
SUM OF ALL RESPONSES TO PHQ QUESTIONS 1-9: 0
SUM OF ALL RESPONSES TO PHQ9 QUESTIONS 1 & 2: 0
SUM OF ALL RESPONSES TO PHQ QUESTIONS 1-9: 0
SUM OF ALL RESPONSES TO PHQ QUESTIONS 1-9: 0
2. FEELING DOWN, DEPRESSED OR HOPELESS: 0
SUM OF ALL RESPONSES TO PHQ QUESTIONS 1-9: 0

## 2023-02-17 ASSESSMENT — LIFESTYLE VARIABLES
HOW OFTEN DO YOU HAVE A DRINK CONTAINING ALCOHOL: NEVER
HOW MANY STANDARD DRINKS CONTAINING ALCOHOL DO YOU HAVE ON A TYPICAL DAY: PATIENT DOES NOT DRINK

## 2023-02-17 NOTE — PATIENT INSTRUCTIONS
Personalized Preventive Plan for Marguerite Whitten - 2/17/2023  Medicare offers a range of preventive health benefits. Some of the tests and screenings are paid in full while other may be subject to a deductible, co-insurance, and/or copay. Some of these benefits include a comprehensive review of your medical history including lifestyle, illnesses that may run in your family, and various assessments and screenings as appropriate. After reviewing your medical record and screening and assessments performed today your provider may have ordered immunizations, labs, imaging, and/or referrals for you. A list of these orders (if applicable) as well as your Preventive Care list are included within your After Visit Summary for your review. Other Preventive Recommendations:    A preventive eye exam performed by an eye specialist is recommended every 1-2 years to screen for glaucoma; cataracts, macular degeneration, and other eye disorders. A preventive dental visit is recommended every 6 months. Try to get at least 150 minutes of exercise per week or 10,000 steps per day on a pedometer . Order or download the FREE \"Exercise & Physical Activity: Your Everyday Guide\" from [employer]. Call 0-858.148.4912 or search [employer] online. You need 6680-5907 mg of calcium and 5023-5503 IU of vitamin D per day. It is possible to meet your calcium requirement with diet alone, but a vitamin D supplement is usually necessary to meet this goal.  When exposed to the sun, use a sunscreen that protects against both UVA and UVB radiation with an SPF of 30 or greater. Reapply every 2 to 3 hours or after sweating, drying off with a towel, or swimming. Always wear a seat belt when traveling in a car. Always wear a helmet when riding a bicycle or motorcycle. Heart-Healthy Diet   Sodium, Fat, and Cholesterol Controlled Diet       What Is a Heart Healthy Diet?    A heart-healthy diet is one that limits sodium , certain types of fat , and cholesterol . This type of diet is recommended for:   People with any form of cardiovascular disease (eg, coronary heart disease , peripheral vascular disease , previous heart attack , previous stroke )   People with risk factors for cardiovascular disease, such as high blood pressure , high cholesterol , or diabetes   Anyone who wants to lower their risk of developing cardiovascular disease   Sodium    Sodium is a mineral found in many foods. In general, most people consume much more sodium than they need. Diets high in sodium can increase blood pressure and lead to edema (water retention). On a heart-healthy diet, you should consume no more than 2,300 mg (milligrams) of sodium per dayabout the amount in one teaspoon of table salt. The foods highest in sodium include table salt (about 50% sodium), processed foods, convenience foods, and preserved foods. Cholesterol    Cholesterol is a fat-like, waxy substance in your blood. Our bodies make some cholesterol. It is also found in animal products, with the highest amounts in fatty meat, egg yolks, whole milk, cheese, shellfish, and organ meats. On a heart-healthy diet, you should limit your cholesterol intake to less than 200 mg per day. It is normal and important to have some cholesterol in your bloodstream. But too much cholesterol can cause plaque to build up within your arteries, which can eventually lead to a heart attack or stroke. The two types of cholesterol that are most commonly referred to are:   Low-density lipoprotein (LDL) cholesterol  Also known as bad cholesterol, this is the cholesterol that tends to build up along your arteries. Bad cholesterol levels are increased by eating fats that are saturated or hydrogenated. Optimal level of this cholesterol is less than 100. Over 130 starts to get risky for heart disease.    High-density lipoprotein (HDL) cholesterol  Also known as good cholesterol, this type of cholesterol actually carries cholesterol away from your arteries and may, therefore, help lower your risk of having a heart attack. You want this level to be high (ideally greater than 60). It is a risk to have a level less than 40. You can raise this good cholesterol by eating olive oil, canola oil, avocados, or nuts. Exercise raises this level, too.   Fat    Fat is calorie dense and packs a lot of calories into a small amount of food. Even though fats should be limited due to their high calorie content, not all fats are bad. In fact, some fats are quite healthful. Fat can be broken down into four main types.   The good-for-you fats are:   Monounsaturated fat  found in oils such as olive and canola, avocados, and nuts and natural nut butters; can decrease cholesterol levels, while keeping levels of HDL cholesterol high   Polyunsaturated fat  found in oils such as safflower, sunflower, soybean, corn, and sesame; can decrease total cholesterol and LDL cholesterol   Omega-3 fatty acids  particularly those found in fatty fish (such as salmon, trout, tuna, mackerel, herring, and sardines); can decrease risk of arrhythmias, decrease triglyceride levels, and slightly lower blood pressure   The fats that you want to limit are:   Saturated fat  found in animal products, many fast foods, and a few vegetables; increases total blood cholesterol, including LDL levels   Animal fats that are saturated include: butter, lard, whole-milk dairy products, meat fat, and poultry skin   Vegetable fats that are saturated include: hydrogenated shortening, palm oil, coconut oil, cocoa butter   Hydrogenated or trans fat  found in margarine and vegetable shortening, most shelf stable snack foods, and fried foods; increases LDL and decreases HDL     It is generally recommended that you limit your total fat for the day to less than 30% of your total calories. If you follow an 1800-calorie heart healthy diet, for example, this would mean 60  grams of fat or less per day. Saturated fat and trans fat in your diet raises your blood cholesterol the most, much more than dietary cholesterol does. For this reason, on a heart-healthy diet, less than 7% of your calories should come from saturated fat and ideally 0% from trans fat. On an 1800-calorie diet, this translates into less than 14 grams of saturated fat per day, leaving 46 grams of fat to come from mono- and polyunsaturated fats.    Food Choices on a Heart Healthy Diet   Food Category   Foods Recommended   Foods to Avoid   Grains   Breads and rolls without salted tops Most dry and cooked cereals Unsalted crackers and breadsticks Low-sodium or homemade breadcrumbs or stuffing All rice and pastas   Breads, rolls, and crackers with salted tops High-fat baked goods (eg, muffins, donuts, pastries) Quick breads, self-rising flour, and biscuit mixes Regular bread crumbs Instant hot cereals Commercially prepared rice, pasta, or stuffing mixes   Vegetables   Most fresh, frozen, and low-sodium canned vegetables Low-sodium and salt-free vegetable juices Canned vegetables if unsalted or rinsed   Regular canned vegetables and juices, including sauerkraut and pickled vegetables Frozen vegetables with sauces Commercially prepared potato and vegetable mixes   Fruits   Most fresh, frozen, and canned fruits All fruit juices   Fruits processed with salt or sodium   Milk   Nonfat or low-fat (1%) milk Nonfat or low-fat yogurt Cottage cheese, low-fat ricotta, cheeses labeled as low-fat and low-sodium   Whole milk Reduced-fat (2%) milk Malted and chocolate milk Full fat yogurt Most cheeses (unless low-fat and low salt) Buttermilk (no more than 1 cup per week)   Meats and Beans   Lean cuts of fresh or frozen beef, veal, lamb, or pork (look for the word loin) Fresh or frozen poultry without the skin Fresh or frozen fish and some shellfish Egg whites and egg substitutes (Limit whole eggs to three per week) Tofu Nuts or seeds (unsalted, dry-roasted), low-sodium peanut butter Dried peas, beans, and lentils   Any smoked, cured, salted, or canned meat, fish, or poultry (including roche, chipped beef, cold cuts, hot dogs, sausages, sardines, and anchovies) Poultry skins Breaded and/or fried fish or meats Canned peas, beans, and lentils Salted nuts   Fats and Oils   Olive oil and canola oil Low-sodium, low-fat salad dressings and mayonnaise   Butter, margarine, coconut and palm oils, roche fat   Snacks, Sweets, and Condiments   Low-sodium or unsalted versions of broths, soups, soy sauce, and condiments Pepper, herbs, and spices; vinegar, lemon, or lime juice Low-fat frozen desserts (yogurt, sherbet, fruit bars) Sugar, cocoa powder, honey, syrup, jam, and preserves Low-fat, trans-fat free cookies, cakes, and pies Jonathan and animal crackers, fig bars, thom snaps   High-fat desserts Broth, soups, gravies, and sauces, made from instant mixes or other high-sodium ingredients Salted snack foods Canned olives Meat tenderizers, seasoning salt, and most flavored vinegars   Beverages   Low-sodium carbonated beverages Tea and coffee in moderation Soy milk   Commercially softened water   Suggestions   Make whole grains, fruits, and vegetables the base of your diet. Choose heart-healthy fats such as canola, olive, and flaxseed oil, and foods high in heart-healthy fats, such as nuts, seeds, soybeans, tofu, and fish. Eat fish at least twice per week; the fish highest in omega-3 fatty acids and lowest in mercury include salmon, herring, mackerel, sardines, and canned chunk light tuna. If you eat fish less than twice per week or have high triglycerides, talk to your doctor about taking fish oil supplements. Read food labels. For products low in fat and cholesterol, look for fat free, low-fat, cholesterol free, saturated fat free, and trans fat freeAlso scan the Nutrition Facts Label, which lists saturated fat, trans fat, and cholesterol amounts. For products low in sodium, look for sodium free, very low sodium, low sodium, no added salt, and unsalted   Skip the salt when cooking or at the table; if food needs more flavor, get creative and try out different herbs and spices. Garlic and onion also add substantial flavor to foods. Trim any visible fat off meat and poultry before cooking, and drain the fat off after tinsley. Use cooking methods that require little or no added fat, such as grilling, boiling, baking, poaching, broiling, roasting, steaming, stir-frying, and sauting. Avoid fast food and convenience food. They tend to be high in saturated and trans fat and have a lot of added salt. Talk to a registered dietitian for individualized diet advice. Last Reviewed: March 2011 Jewel Alexandre MS, MPH, RD   Updated: 3/29/2011     Heart-Healthy Diet   Sodium, Fat, and Cholesterol Controlled Diet       What Is a Heart Healthy Diet? A heart-healthy diet is one that limits sodium , certain types of fat , and cholesterol . This type of diet is recommended for:   People with any form of cardiovascular disease (eg, coronary heart disease , peripheral vascular disease , previous heart attack , previous stroke )   People with risk factors for cardiovascular disease, such as high blood pressure , high cholesterol , or diabetes   Anyone who wants to lower their risk of developing cardiovascular disease   Sodium    Sodium is a mineral found in many foods. In general, most people consume much more sodium than they need. Diets high in sodium can increase blood pressure and lead to edema (water retention). On a heart-healthy diet, you should consume no more than 2,300 mg (milligrams) of sodium per dayabout the amount in one teaspoon of table salt. The foods highest in sodium include table salt (about 50% sodium), processed foods, convenience foods, and preserved foods. Cholesterol    Cholesterol is a fat-like, waxy substance in your blood.  Our bodies make some cholesterol. It is also found in animal products, with the highest amounts in fatty meat, egg yolks, whole milk, cheese, shellfish, and organ meats. On a heart-healthy diet, you should limit your cholesterol intake to less than 200 mg per day. It is normal and important to have some cholesterol in your bloodstream. But too much cholesterol can cause plaque to build up within your arteries, which can eventually lead to a heart attack or stroke. The two types of cholesterol that are most commonly referred to are:   Low-density lipoprotein (LDL) cholesterol  Also known as bad cholesterol, this is the cholesterol that tends to build up along your arteries. Bad cholesterol levels are increased by eating fats that are saturated or hydrogenated. Optimal level of this cholesterol is less than 100. Over 130 starts to get risky for heart disease. High-density lipoprotein (HDL) cholesterol  Also known as good cholesterol, this type of cholesterol actually carries cholesterol away from your arteries and may, therefore, help lower your risk of having a heart attack. You want this level to be high (ideally greater than 60). It is a risk to have a level less than 40. You can raise this good cholesterol by eating olive oil, canola oil, avocados, or nuts. Exercise raises this level, too. Fat    Fat is calorie dense and packs a lot of calories into a small amount of food. Even though fats should be limited due to their high calorie content, not all fats are bad. In fact, some fats are quite healthful. Fat can be broken down into four main types.    The good-for-you fats are:   Monounsaturated fat  found in oils such as olive and canola, avocados, and nuts and natural nut butters; can decrease cholesterol levels, while keeping levels of HDL cholesterol high   Polyunsaturated fat  found in oils such as safflower, sunflower, soybean, corn, and sesame; can decrease total cholesterol and LDL cholesterol   Omega-3 fatty acids  particularly those found in fatty fish (such as salmon, trout, tuna, mackerel, herring, and sardines); can decrease risk of arrhythmias, decrease triglyceride levels, and slightly lower blood pressure   The fats that you want to limit are:   Saturated fat  found in animal products, many fast foods, and a few vegetables; increases total blood cholesterol, including LDL levels   Animal fats that are saturated include: butter, lard, whole-milk dairy products, meat fat, and poultry skin   Vegetable fats that are saturated include: hydrogenated shortening, palm oil, coconut oil, cocoa butter   Hydrogenated or trans fat  found in margarine and vegetable shortening, most shelf stable snack foods, and fried foods; increases LDL and decreases HDL     It is generally recommended that you limit your total fat for the day to less than 30% of your total calories. If you follow an 1800-calorie heart healthy diet, for example, this would mean 60 grams of fat or less per day. Saturated fat and trans fat in your diet raises your blood cholesterol the most, much more than dietary cholesterol does. For this reason, on a heart-healthy diet, less than 7% of your calories should come from saturated fat and ideally 0% from trans fat. On an 1800-calorie diet, this translates into less than 14 grams of saturated fat per day, leaving 46 grams of fat to come from mono- and polyunsaturated fats.    Food Choices on a Heart Healthy Diet   Food Category   Foods Recommended   Foods to Avoid   Grains   Breads and rolls without salted tops Most dry and cooked cereals Unsalted crackers and breadsticks Low-sodium or homemade breadcrumbs or stuffing All rice and pastas   Breads, rolls, and crackers with salted tops High-fat baked goods (eg, muffins, donuts, pastries) Quick breads, self-rising flour, and biscuit mixes Regular bread crumbs Instant hot cereals Commercially prepared rice, pasta, or stuffing mixes   Vegetables   Most fresh, frozen, and low-sodium canned vegetables Low-sodium and salt-free vegetable juices Canned vegetables if unsalted or rinsed   Regular canned vegetables and juices, including sauerkraut and pickled vegetables Frozen vegetables with sauces Commercially prepared potato and vegetable mixes   Fruits   Most fresh, frozen, and canned fruits All fruit juices   Fruits processed with salt or sodium   Milk   Nonfat or low-fat (1%) milk Nonfat or low-fat yogurt Cottage cheese, low-fat ricotta, cheeses labeled as low-fat and low-sodium   Whole milk Reduced-fat (2%) milk Malted and chocolate milk Full fat yogurt Most cheeses (unless low-fat and low salt) Buttermilk (no more than 1 cup per week)   Meats and Beans   Lean cuts of fresh or frozen beef, veal, lamb, or pork (look for the word loin) Fresh or frozen poultry without the skin Fresh or frozen fish and some shellfish Egg whites and egg substitutes (Limit whole eggs to three per week) Tofu Nuts or seeds (unsalted, dry-roasted), low-sodium peanut butter Dried peas, beans, and lentils   Any smoked, cured, salted, or canned meat, fish, or poultry (including roche, chipped beef, cold cuts, hot dogs, sausages, sardines, and anchovies) Poultry skins Breaded and/or fried fish or meats Canned peas, beans, and lentils Salted nuts   Fats and Oils   Olive oil and canola oil Low-sodium, low-fat salad dressings and mayonnaise   Butter, margarine, coconut and palm oils, roche fat   Snacks, Sweets, and Condiments   Low-sodium or unsalted versions of broths, soups, soy sauce, and condiments Pepper, herbs, and spices; vinegar, lemon, or lime juice Low-fat frozen desserts (yogurt, sherbet, fruit bars) Sugar, cocoa powder, honey, syrup, jam, and preserves Low-fat, trans-fat free cookies, cakes, and pies Jonathan and animal crackers, fig bars, thom snaps   High-fat desserts Broth, soups, gravies, and sauces, made from instant mixes or other high-sodium ingredients Salted snack foods Canned olives Meat tenderizers, seasoning salt, and most flavored vinegars   Beverages   Low-sodium carbonated beverages Tea and coffee in moderation Soy milk   Commercially softened water   Suggestions   Make whole grains, fruits, and vegetables the base of your diet. Choose heart-healthy fats such as canola, olive, and flaxseed oil, and foods high in heart-healthy fats, such as nuts, seeds, soybeans, tofu, and fish. Eat fish at least twice per week; the fish highest in omega-3 fatty acids and lowest in mercury include salmon, herring, mackerel, sardines, and canned chunk light tuna. If you eat fish less than twice per week or have high triglycerides, talk to your doctor about taking fish oil supplements. Read food labels. For products low in fat and cholesterol, look for fat free, low-fat, cholesterol free, saturated fat free, and trans fat freeAlso scan the Nutrition Facts Label, which lists saturated fat, trans fat, and cholesterol amounts. For products low in sodium, look for sodium free, very low sodium, low sodium, no added salt, and unsalted   Skip the salt when cooking or at the table; if food needs more flavor, get creative and try out different herbs and spices. Garlic and onion also add substantial flavor to foods. Trim any visible fat off meat and poultry before cooking, and drain the fat off after tinsley. Use cooking methods that require little or no added fat, such as grilling, boiling, baking, poaching, broiling, roasting, steaming, stir-frying, and sauting. Avoid fast food and convenience food. They tend to be high in saturated and trans fat and have a lot of added salt. Talk to a registered dietitian for individualized diet advice. Last Reviewed: March 2011 Tremaine Sanchez MS, MPH, RD   Updated: 3/29/2011     High-Fiber Diet     What Is Fiber? Dietary fiber is a form of carbohydrate found in plants that cannot be digested by humans.  All plants contain fiber, including fruits, vegetables, grains, and legumes. Fiber is often classified into two categories: soluble and insoluble. Soluble fiber draws water into the bowel and can help slow digestion. Examples of foods that are high in soluble fiber include oatmeal, oat bran, barley, legumes (eg, beans and peas), apples, and strawberries. Insoluble fiber speeds digestion and can add bulk to the stool. Examples of foods that are high in insoluble fiber include whole-wheat products, wheat bran, cauliflower, green beans, and potatoes. Why Follow a High-Fiber Diet? A high-fiber diet is often recommended to prevent and treat constipation , hemorrhoids , diverticulitis , and irritable bowel syndrome . Eating a high-fiber diet can also help improve your cholesterol levels, lower your risk of coronary heart disease , reduce your risk of type 2 diabetes , and lower your weight. For people with type 1 or 2 diabetes, a high-fiber diet can also help stabilize blood sugar levels. How Much Fiber Should I Eat? A high-fiber diet should contain  20-35 grams  of fiber a day. This is actually the amount recommended for the general adult population; however, most Americans eat only 15 grams of fiber per day. Digestion of Fiber   Eating a higher fiber diet than usual can take some getting used to by your body's digestive system. To avoid the side effects of sudden increases in dietary fiber (eg, gas, cramping, bloating, and diarrhea), increase fiber gradually and be sure to drink plenty of fluids every day. Tips for Increasing Fiber Intake   Whenever possible, choose whole grains over refined grains (eg, brown rice instead of white rice, whole-wheat bread instead of white bread). Include a variety of grains in your diet, such as wheat, rye, barley, oats, quinoa, and bulgur. Eat more vegetarian-based meals. Here are some ideas: black bean burgers, eggplant lasagna, and veggie tofu stir-hardy.     Choose high-fiber snacks, such as fruits, popcorn, whole-grain crackers, and nuts. Make whole-grain cereal or whole-grain toast part of your daily breakfast regime. When eating out, whether ordering a sandwich or dinner, ask for extra vegetables. When baking, replace part of the white flour with whole-wheat flour. Whole-wheat flour is particularly easy to incorporate into a recipe. High-Fiber Diet Eating Guide   Food Category   Foods Recommended   Notes   Grains   Whole-grain breads, muffins, bagels, or tina bread Rye bread Whole-wheat crackers or crisp breads Whole-grain or bran cereals Oatmeal, oat bran, or grits Wheat germ Whole-wheat pasta and brown rice   Read the ingredients list on food labels. Look for products that list \"whole\" as the first ingredient (eg, whole-wheat, whole oats). Choose cereals with at least 2 grams of fiber per serving. Vegetables   All vegetables, especially asparagus, bean sprouts, broccoli, [place] sprouts, cabbage, carrots, cauliflower, celery, corn, greens, green beans, green pepper, onions, peas, potatoes (with skin), snow peas, spinach, squash, sweet potatoes, tomatoes, zucchini   For maximum fiber intake, eat the peels of fruits and vegetablesjust be sure to wash them well first.   Fruits   All fruits, especially apples, berries, grapefruits, mangoes, nectarines, oranges, peaches, pears, dried fruits (figs, dates, prunes, raisins)   Choose raw fruits and vegetables over juice, cooked, or cannedraw fruit has more fiber. Dried fruit is also a good source of fiber. Milk   With the exception of yogurt containing inulin (a type of fiber), dairy foods provide little fiber. Add more fiber by topping your yogurt or cottage cheese with fresh fruit, whole grain or bran cereals, nuts, or seeds.    Meats and Beans   All beans and peas, especially Garbanzo beans, kidney beans, lentils, lima beans, split peas, and prince beans All nuts and seeds, especially almonds, peanuts, [place] nuts, cashews, peanut butter, walnuts, sesame and sunflower seeds All meat, poultry, fish, and eggs   Increase fiber in meat dishes by adding prince beans, kidney beans, black-eyed peas, bran, or oatmeal. If you are following a low-fat diet, use nuts and seeds only in moderation. Fats and Oils   All in moderation   Fats and oils do not provide fiber   Snacks, Sweets, and Condiments   Fruit Nuts Popcorn, whole-wheat pretzels, or trail mix made with dried fruits, nuts, and seeds Cakes, breads, and cookies made with oatmeal or whole-wheat flour   Most snack foods do not provide much fiber. Choose snacks with at least 2 grams of fiber per serving. Last Reviewed: March 2011 Rodríguez Long MS, MPH, RD   Updated: 3/29/2011     Keep Your Memory [facility] [facility]       Many factors can affect your ability to remembera hectic lifestyle, aging, stress, chronic disease, and certain medicines. But, there are steps you can take to sharpen your mind and help preserve your memory. Challenge Your Brain   Regularly challenging your mind may help keeps it in top shape. Good mental exercises include:   Crossword puzzlesUse a dictionary if you need it; you will learn more that way. Brainteasers Try some! Crafts, such as wood working and sewing   Hobbies, such as gardening and building model airplanes   SocializingVisit old friends or join groups to meet new ones. Reading   Learning a new language   Taking a class, whether it be art history or silverio chi   TravelingExperience the food, history, and culture of your destination   Learning to use a computer   Going to museums, the theater, or thought-provoking movies   Changing things in your daily life, such as reversing your pattern in the grocery store or brushing your teeth using your nondominant hand   Use Memory Aids   There is no need to remember every detail on your own.  These memory aids can help:   Calendars and day planners   Electronic organizers to store all sorts of helpful informationThese devices can \"beep\" to remind you of appointments. A book of days to record birthdays, anniversaries, and other occasions that occur on the same date every year   Detailed \"to-do\" lists and strategically placed sticky notes   Quick \"study\" sessionsBefore a gathering, review who will be there so their names will be fresh in your mind. Establish routinesFor example, keep your keys, wallet, and umbrella in the same place all the time or take medicine with your 8:00 AM glass of juice   Live a Healthy Life   Many actions that will keep your body strong will do the same for your mind. For example:   Talk to Your Doctor About Herbs and Supplements    Malnutrition and vitamin deficiencies can impair your mental function. For example, vitamin B12 deficiency can cause a range of symptoms, including confusion. But, what if your nutritional needs are being met? Can herbs and supplements still offer a benefit? Researchers have investigated a range of natural remedies, such as ginkgo , ginseng , and the supplement phosphatidylserine (PS). So far, though, the evidence is inconsistent as to whether these products can improve memory or thinking. If you are interested in taking herbs and supplements, talk to your doctor first because they may interact with other medicines that you are taking. Exercise Regularly    Among the many benefits of regular exercise are increased blood flow to the brain and decreased risk of certain diseases that can interfere with memory function. One study found that even moderate exercise has a beneficial effect. Examples of \"moderate\" exercise include:   Playing 18 holes of golf once a week, without a cart   Playing tennis twice a week   Walking one mile per day   Manage Stress    It can be tough to remember what is important when your mind is cluttered. Make time for relaxation. Choose activities that calm you down, and make it routine.    Manage Chronic Conditions    Side effects of high blood pressure , diabetes, and heart disease can interfere with mental function. Many of the lifestyle steps discussed here can help manage these conditions. Strive to eat a healthy diet, exercise regularly, get stress under control, and follow your doctor's advice for your condition. Minimize Medications    Talk to your doctor about the medicines that you take. Some may be unnecessary. Also, healthy lifestyle habits may lower the need for certain drugs. Last Reviewed: April 2010 Dianna Stewart MD   Updated: 4/13/2010     Keeping Home a [facility]       As we get older, changes in balance, gait, strength, vision, hearing, and cognition make even the most youthful senior more prone to accidents. Falls are one of the leading health risks for older people. This increased risk of falling is related to:   Aging process (eg, decreased muscle strength, slowed reflexes)   Higher incidence of chronic health problems (eg, arthritis, diabetes) that may limit mobility, agility or sensory awareness   Side effects of medicine (eg, dizziness, blurred vision)especially medicines like prescription pain medicines and drugs used to treat mental health conditions   Depending on the brittleness of your bones, the consequences of a fall can be serious and long lasting. Home Life   Research by the Association of Aging [facility]) shows that some home accidents among older adults can be prevented by making simple lifestyle changes and basic modifications and repairs to the home environment. Here are some lifestyle changes that experts recommend:   Have your hearing and vision checked regularly. Be sure to wear prescription glasses that are right for you. Speak to your doctor or pharmacist about the possible side effects of your medicines. A number of medicines can cause dizziness. If you have problems with sleep, talk to your doctor. Limit your intake of alcohol. If necessary, use a cane or walker to help maintain your balance.    Wear supportive, rubber-soled shoes, even at home. If you live in a region that gets wintry weather, you may want to put special cleats on your shoes to prevent you from slipping on the snow and ice. Exercise regularly to help maintain muscle tone, agility, and balance. Always hold the banister when going up or down stairs. Also, use  bars when getting in or out of the bath or shower, or using the toilet. To avoid dizziness, get up slowly from a lying down position. Sit up first, dangling your legs for a minute or two before rising to a standing position. Overall Home Safety Check   According to the Consumer Product Safety Commision's \"Older Consumer Home Safety Checklist,\" it is important to check for potential hazards in each room. And remember, proper lighting is an essential factor in home safety. If you cannot see clearly, you are more likely to fall. Important questions to ask yourself include:   Are lamp, electric, extension, and telephone cords placed out of the flow of traffic and maintained in good condition? Have frayed cords been replaced? Are all small rugs and runners slip resistant? If not, you can secure them to the floor with a special double-sided carpet tape. Are smoke detectors properly locatedone on every floor of your home and one outside of every sleeping area? Are they in good working order? Are batteries replaced at least once a year? Do you have a well-maintained carbon monoxide detector outside every sleeping are in your home? Does your furniture layout leave plenty of space to maneuver between and around chairs, tables, beds, and sofas? Are hallways, stairs and passages between rooms well lit? Can you reach a lamp without getting out of bed? Are floor surfaces well maintained? Shag rugs, high-pile carpeting, tile floors, and polished wood floors can be particularly slippery. Stairs should always have handrails and be carpeted or fitted with a non-skid tread. Is your telephone easily reachable.  Is the cord safely tucked away? Room by Room   According to the Association of Aging, bathrooms and tim are the two most potentially hazardous rooms in your home. In the Kitchen    Be sure your stove is in proper working order and always make sure burners and the oven are off before you go out or go to sleep. Keep pots on the back burners, turn handles away from the front of the stove, and keep stove clean and free of grease build-up. Kitchen ventilation systems and range exhausts should be working properly. Keep flammable objects such as towels and pot holders away from the cooking area except when in use. Make sure kitchen curtains are tied back. Move cords and appliances away from the sink and hot surfaces. If extension cords are needed, install wiring guides so they do not hang over the sink, range, or working areas. Look for coffee pots, kettles and toaster ovens with automatic shut-offs. Keep a mop handy in the kitchen so you can wipe up spills instantly. You should also have a small fire extinguisher. Arrange your kitchen with frequently used items on lower shelves to avoid the need to stand on a stepstool to reach them. Make sure countertops are well-lit to avoid injuries while cutting and preparing food. In the Bathroom    Use a non-slip mat or decals in the tub and shower, since wet, soapy tile or porcelain surfaces are extremely slippery. Make sure bathroom rugs are non-skid or tape them firmly to the floor. Bathtubs should have at least one, preferably two, grab bars, firmly attached to structural supports in the wall. (Do not use built-in soap holders or glass shower doors as grab bars.)    Tub seats fitted with non-slip material on the legs allow you to wash sitting down. For people with limited mobility, bathtub transfer benches allow you to slide safely into the tub. Raised toilet seats and toilet safety rails are helpful for those with knee or hip problems.     In the Bedroom    Make sure you use a nightlight and that the area around your bed is clear of potential obstacles. Be careful with electric blankets and never go to sleep with a heating pad, which can cause serious burns even if on a low setting. Use fire-resistant mattress covers and pillows, and NEVER smoke in bed. Keep a phone next to the bed that is programmed to dial 911 at the push of a button. If you have a chronic condition, you may want to sign on with an automatic call-in service. Typically the system includes a small pendant that connects directly to an emergency medical voice-response system. You should also make arrangements to stay in contact with someonefriend, neighbor, family memberon a regular schedule. Fire Prevention   According to the [employer]. (Smoke Alarms for Every) [address] [place] [address], senior citizens are one of the two highest risk groups for death and serious injuries due to residential fires. When cooking, wear short-sleeved items, never a bulky long-sleeved robe. The Psychiatric's Safety Checklist for Older Consumers emphasizes the importance of checking basements, garages, workshops and storage areas for fire hazards, such as volatile liquids, piles of old rags or clothing and overloaded circuits. Never smoke in bed or when lying down on a couch or recliner chair. Small portable electric or kerosene heaters are responsible for many home fires and should be used cautiously if at all. If you do use one, be sure to keep them away from flammable materials. In case of fire, make sure you have a pre-established emergency exit plan. Have a professional check your fireplace and other fuel-burning appliances yearly. Helping Hands   Baby boomers entering the sarmiento years will continue to see the development of new products to help older adults live safely and independently in spite of age-related changes.   Making Life More Livable  , by [employer], lists over 1,000 products for \"living well in the mature years,\" such as bathing and mobility aids, household security devices, ergonomically designed knives and peelers, and faucet valves and knobs for temperature control. Medical supply stores and organizations are good sources of information about products that improve your quality of life and insure your safety.      Last Reviewed: November 2009 Chan Rodriguez MD   Updated: 3/7/2011

## 2023-02-17 NOTE — PROGRESS NOTES
Medicare Annual Wellness Visit    Deyvi Mata is here for Medicare AWV    Assessment & Plan   Medicare annual wellness visit, subsequent      Recommendations for Preventive Services Due: see orders and patient instructions/AVS.  Recommended screening schedule for the next 5-10 years is provided to the patient in written form: see Patient Instructions/AVS.     No follow-ups on file. Subjective       Patient's complete Health Risk Assessment and screening values have been reviewed and are found in Flowsheets. The following problems were reviewed today and where indicated follow up appointments were made and/or referrals ordered. Positive Risk Factor Screenings with Interventions:                       Advanced Directives:  Do you have a Living Will?: (!) No    Intervention:  has NO advanced directive - not interested in additional information at this time. Tobacco Use:  Tobacco Use: High Risk    Smoking Tobacco Use: Former    Smokeless Tobacco Use: Current    Passive Exposure: Not on file     E-cigarette/Vaping       Questions Responses    E-cigarette/Vaping Use Never User    Start Date     Passive Exposure     Quit Date     Counseling Given     Comments           Interventions:  Patient declined any further intervention or treatment-states he chews tobacco to keep him from smoking cigarettes. Objective   Vitals:    02/17/23 0842   BP: 128/70   Pulse: 76   Temp: 97.7 °F (36.5 °C)   SpO2: 94%   Weight: 182 lb (82.6 kg)   Height: 5' 10\" (1.778 m)      Body mass index is 26.11 kg/m². No Known Allergies  Prior to Visit Medications    Medication Sig Taking?  Authorizing Provider   atorvastatin (LIPITOR) 40 MG tablet TAKE 1 TABLET BY MOUTH EVERY DAY IN THE EVENING Yes Tena Thapa MD   aspirin EC 81 MG EC tablet Take 1 tablet by mouth daily Yes Armando Zapien MD   OZEMPIC, 0.25 OR 0.5 MG/DOSE, 2 MG/1.5ML SOPN INJECT 0.25MG ONCE WEEKLY UNDER THE SKIN Yes Candace Walton MD [place]   albuterol sulfate HFA (PROVENTIL;VENTOLIN;PROAIR) 108 (90 Base) MCG/ACT inhaler Inhale 2 puffs into the lungs every 4 hours (while awake) Yes Pauline Haro MD   carvedilol (COREG) 3.125 MG tablet Take 1 tablet by mouth in the morning and 1 tablet in the evening. Take with meals. Yes Pauline Haro MD   cyclobenzaprine (FLEXERIL) 10 MG tablet Take 1 tablet by mouth 3 times daily as needed for Muscle spasms Yes Pauline Haro MD   DULoxetine (CYMBALTA) 60 MG extended release capsule Take 1 capsule by mouth in the morning. Yes Pauline Haro MD   famotidine (PEPCID) 20 MG tablet TAKE 1/2 TABLET BY MOUTH 2 TIMES DAILY AS NEEDED (REFLUX) Yes Pauline Haro MD   finasteride (PROSCAR) 5 MG tablet Take 1 tablet by mouth in the morning. Yes Pauline Haro MD   fluticasone-vilanterol (BREO ELLIPTA) 100-25 MCG/INH AEPB inhaler Inhale 1 puff into the lungs in the morning. Yes Pauline Haro MD   furosemide (LASIX) 20 MG tablet Take 1 tablet by mouth daily as needed (leg swelling) Yes Pauline Haro MD   levothyroxine (SYNTHROID) 112 MCG tablet TAKE 1 TABLET BY MOUTH EVERY DAY Yes Pauline Haro MD   lisinopril (PRINIVIL;ZESTRIL) 5 MG tablet Take 1 tablet by mouth in the morning. Yes Pauline Haro MD   sildenafil (VIAGRA) 100 MG tablet Take 1 tablet by mouth daily as needed for Erectile Dysfunction Yes Pauline Haro MD   tamsulosin (FLOMAX) 0.4 MG capsule Take 1 capsule by mouth in the morning. Yes Pauline Haro MD   tiotropium (SPIRIVA HANDIHALER) 18 MCG inhalation capsule Inhale 1 capsule into the lungs in the morning.  Yes Pauline Haro MD   Blood Pressure KIT Check BP once daily Yes Pauline Haro MD   Multiple Vitamins-Minerals (THERAPEUTIC MULTIVITAMIN-MINERALS) tablet Take 1 tablet by mouth daily (with breakfast) Yes MD Ryan HallTeam (Including outside providers/suppliers regularly involved in providing care):   Patient Care Team:  Shay Calloway Segundo Fournier MD as PCP - General (Internal Medicine)  Glenn Fontenot MD as PCP - Empaneled Provider     Reviewed and updated this visit:  Tobacco  Allergies  Meds  Med Hx  Surg Hx  Soc Hx  Fam Hx        I, Mika Zavala LPN, [birth year], performed the documented evaluation under the direct supervision of the attending physician. Mika Zavala LPN       I reviewed the findings and agree with the plan as documented above by MARNI Zavala.     Electronically signed by Glenn Fontenot MD, MPH    Glenn Fontenot MD, MPH   Internal Medicine  2/17/2023  9:26 AM

## 2023-02-17 NOTE — PROGRESS NOTES
2/17/23    Mike Alcaraz  1955    Chief Complaint   Patient presents with    3 Month Follow-Up       History of Present Illness:  Mike Alcaraz is a 75 y.o. pleasant gentleman presenting today with a chief complaint of Raynaud's, CHF, HTN, prediabetes. He has a past medical history significant for:  HTN, on Lisinopril 5mg QD, Coreg 3.125mg BID  HL (LDL 47, TG 66 on 1/11/2023), on Atorvastatin 40mg  Prediabetes (HbA1C 6.3% on 1/11/2023), on Ozempic 0.25mg weekly  CAD s/p CABG (10/2020), on ASA   HFrEF/HFpEF (EF 62-09%, diastolic dysfunction, mild MR on 6/23/2020; EF up to 45-50% on TTE 2/4/2021), on Lisinopril 5mg QD, Coreg 3.125mg BID, Lasix 20mg QD  Hypothyroidism (TSH 3.1 normal on 1/11/2023), on Synthroid 112mcg QD  PAD, on ASA   ? Raynaud's   GERD, on Pepcid 10mg BID   Allergic rhinitis, off Zyrtec  BPH, on Flomax, Finasteride   ED, on Sildenafil 100mg QD PRN   COPD, on Albuterol inh PRN, Breo Ellipta, Spiriva QD   Chronic back pain, on Cymbalta 60mg QHS, Flexeril 10mg BID PRN  IBS-mixed, on Bentyl QID   H/o C diff (10/2020)  Juvenile RA   Rheumatic fever   Former smoker (10/2020)      # Patient was admitted 3/15-3/19/2021 after syncopal episode when he got up from a sitting position, and hit his head. Had LOC. CTH showed SAH. He was transferred to [facility] trauma service. NSGY consulted and recommended no surgical intervention. No intracranial aneurysm noted. He was cleared to be on ASA. Had FU with NSGY on 4/5 after CTH done. Also had TTE per Cardiology stable. Was off driving for 1 month and recommended compression stockings for his orthostatic hypotension. He is s/p Holter 30-days. He watches his salt intake. # Patient was admitted 9/23-9/24/2020 for hypotension and bradycardia following LHC. He had N/V and was hypovolemic. He was thought to be in cardiogenic shock s/p dopamine gtt. He also received IVF resuscitation. He was discharged home ambulating, in a stable condition.  [facility] showed multi-vessel CAD. He is s/p CABG in Oct 2020. Following with Dr. Sonia Mesa outpatient. He is s/p cardiac rehab  He also had C diff s/p PO Vanc. # Patient was in ER on 12/19/2021 for near syncope and chin laceration s/p sutured. QTc was 508 ms. Patient refused to stay in ER longer for further cardiac eval.   He has been having issues with hypotension and fluctuating BP. Patient was in ED 9/16/2021 for bradycardia and hypotension. Sustained fall and fractured L 6th rib. EKG wo ischemia. Mainor unremarkable. CBC, BMP stable. Reports he was out in the heat when he felt dizzy prior to falling. Refused to be admitted. Patient is now taking Lisinopril 5mg, Coreg 3.125mg BID, Lasix 20mg. Follows with Dr. More Landaverde from Cardiology. BP today 128/70, HR 76. No CP, SOB, palpitations, dizziness, or light-headedness. Not interested in cardiac rehab. His LE swelling is 2/2 significant reflux bilaterally (evident on US). # Chantix helped him quit smoking 4 days before CABG. # Patient was in the ED May 2020 for acute onset SOB. New diagnosis of CHF. EKG wo acute ischemia. Neg Mainor. BNP elevated. CT showed no PE but did show pulmonary edema (high d-dimer). COVID test negative. He was hypoxic, requiring 4L O2 however patient declined admission. He is not having worsening SOB, but at his baseline. He was given in the ED Lasix IV 40mg once. First time I met patient in June I prescribed Lasix 20mg QD. He reports his SOB improved with the Lasix. TTE done 6/23/2020 shows e/o mixed systolic and diastolic heart failure. He has never had LHC done. Patient denies alcohol abuse. He developed pre-renal JOSE which resolved after Lasix was held for 1 week (Cr up to 1.4 in June then down to 1.0 with GFR > 60 in July). He had not had ischemic work up done in the past.   # Takes Pepcid for GERD. Helping. He was on Zantac in the past but I discontinued it due to recall. No heartburn or reflux or nausea. # Takes Synthroid.  No symptoms of hyper or hypothyroidism. # Takes Zyrtec for allergic rhinitis. This is helping. Tolerating well. He is also using Flonase PRN. No coughing or sneezing. # Takes Finasteride and Flomax for BPH. Symptoms stable on these meds. Tolerating meds. No LUTS. # Takes Cymbalta and Flexeril for chronic back pain. Both helping. No side effects such as drowsiness. # Uses Albuterol and Symbicort for COPD, as well as recently added Spiriva. No wheezing, coughing, hemoptysis  # Reports he has h/o juvenile rheumatoid arthritis? # High MCV on recent labs. # As per patient he should have had his gall bladder removed however due to pandemic restrictions he had to hold off. # He takes ASA for PAD. No bleeding or bruising on ASA. No h/o CAD or ischemic stroke. # ? H/o dyslipidemia. Not on statin therapy. He does have RA however. # Patient's mother was diabetic. He has prediabetes. Well controlled off meds. # Patient is having ED over the past few months. # Cannot afford time or finances to go to [facility] at the moment due to work. Trying OTC weight loss pills but I recommended against this. Started Ozempic. Starting to lose weight. Tolerating well. # Patient had colonoscopy in 2014: 4 polyps, 1 of which was 20mm. Was recommended repeat after 1 year. Did not follow through. Patient continues to decline getting another colonoscopy done. # Labs May 2022 with elevated WBC (left shift and monocytosis) and Hb. Aug 2022 with elevated Hb, macrocytosis. Normal WBC and plts. Denies alcohol use (drinks 1-2 beers occasionally). # Chronic R thumb pain radiating to R wrist. He is R hand dominant. S/p Medrol dose pack with temporary relief.       Drives a semi-truck        Health maintenance:   Health Maintenance Due   Topic Date Due    Pneumococcal 65+ years Vaccine (1 - PCV) Never done    Hepatitis C screen  Never done    Low dose CT lung screening  Never done    Shingles vaccine (2 of 3) 04/02/2018    COVID-19 Vaccine (4 - Booster for Pfizer series) 03/14/2022    Flu vaccine (1) 08/01/2022         Review of Systems:  Constitutional: no fevers, no chills   Ears: no hearing loss, no tinnitus, no vertigo  Eyes: no blurry vision, no diplopia, no dryness, no itchiness  Mouth: no oral ulcers, no dry mouth, no sore throat  Nose: no nasal congestion, no epistaxis  Cardiac: no chest pain, no palpitations, leg swelling, no orthopnea, no PND, no syncope  Pulmonary: no dyspnea, no cough, no wheezing, no hemoptysis  GI: no nausea, no vomiting, no diarrhea, no constipation, no abdominal pain, no hematochezia  : no dysuria, no frequency, no urgency, no hematuria, no frothy urine  MSK: Raynaud's   Neuro: no focal neurological deficits, no seizures  Sleep: no snoring, no daytime somnolence  Psych: no depression, no anxiety, no suicidal ideation      Physical Exam:  VITALS:   /70 (Site: Left Upper Arm, Position: Sitting, Cuff Size: Large Adult)   Pulse 76   Resp 18   Ht 5' 10\" (1.778 m)   Wt 182 lb (82.6 kg)   BMI 26.11 kg/m²     PHYSICAL EXAMINATION:  General: alert, awake, and oriented to time, place, person, and situation. Not in acute distress   Skin:  no suspicious rashes, no jaundice  Head: normocephalic/atraumatic  Eyes: anicteric sclera, well-injected conjunctiva. Pupils are equally round and reactive to light. Extraocular movements are intact   Nose: no septal deviation evident  Sinuses: no sinus tenderness  Ears: external ears normal  Neck: supple, no cervical lymphadenopathy, thyroid symmetric and not enlarged, no bruits, no JVD  Heart: regular rate and rhythm, regular S1/S2, no S3/S4, no audible murmurs, no audible friction rub   Lungs: clear to auscultation bilaterally, no audible crackles, no audible wheezes, no audible rhonchi    Abdomen: normal bowel sounds, soft abdomen, non-tender, no palpable masses  Extremities: L > RLE pitting edema 1+ (site of L vein retrieval for CABG), warm, no cyanosis, no clubbing.  Good capillary refill  MSK: tender in R CMC area   Peripheral vascular: 2+ pulses symmetric (radial)  Neuro: gait normal, CN II-XII intact, motor power 5/5 in all 4 extremities, sensation intact and symmetric    Labs   I have personally reviewed labs, and discussed pertinent findings with patient on this date 2/17/2023     Imaging   I have personally reviewed imaging, and discussed pertinent findings with patient on this date 2/17/2023     Other notes  I have personally reviewed other notes, and discussed pertinent findings with patient on this date 2/17/2023       Assessment/Plan:     1. Prediabetes  A1C up to 6.3% Jan 2023  Increase Ozempic to 0.5mg weekly   - Hemoglobin A1C; Future    2. Mixed hyperlipidemia  LDL 47, TG 66 Jan 2023  Continue Atorvastatin 40mg QD   - Lipid, Fasting; Future    3. Essential hypertension  Stable  Continue Lisinopril 5mg QD, Coreg 3.125mg BID  - CBC with Auto Differential; Future  - Comprehensive Metabolic Panel; Future    4. Acquired hypothyroidism  TSH normal Jan 2023  Continue Synthroid 112mcg QD   - TSH with Reflex; Future    5. Chronic combined systolic and diastolic congestive heart failure (HCC)  HFrEF/HFpEF (EF 60-49%, diastolic dysfunction, mild MR on 6/23/2020; EF up to 45-50% on TTE 2/4/2021)  Continue ACEi, BB, loop diuretic  Compensated  Maintain euvolemia     6. Panlobular emphysema (HCC)  Stable  Continue Albuterol inh PRN, Breo Ellipta, Spiriva QD     7. Chronic bilateral low back pain without sciatica  - cyclobenzaprine (FLEXERIL) 10 MG tablet; Take 1 tablet by mouth 3 times daily as needed for Muscle spasms  Dispense: 90 tablet; Refill: 5  - DULoxetine (CYMBALTA) 60 MG extended release capsule; Take 1 capsule by mouth daily  Dispense: 90 capsule; Refill: 1    8. Gastroesophageal reflux disease, unspecified whether esophagitis present  - famotidine (PEPCID) 20 MG tablet; TAKE 1/2 TABLET BY MOUTH 2 TIMES DAILY AS NEEDED (REFLUX)  Dispense: 90 tablet; Refill: 1    9. Benign prostatic hyperplasia, unspecified whether lower urinary tract symptoms present  - finasteride (PROSCAR) 5 MG tablet; Take 1 tablet by mouth daily  Dispense: 90 tablet; Refill: 1  - tamsulosin (FLOMAX) 0.4 MG capsule; Take 1 capsule by mouth daily  Dispense: 90 capsule; Refill: 1    10. Vasculogenic erectile dysfunction, unspecified vasculogenic erectile dysfunction type  - sildenafil (VIAGRA) 100 MG tablet; Take 1 tablet by mouth daily as needed for Erectile Dysfunction  Dispense: 10 tablet; Refill: 3    11. Chronic pain of right thumb  S/p Medrol dose pack with temporary relief. ?[facility] OA. May need referral to Ortho   - XR HAND RIGHT (2 VIEWS); Future    12. Primary Raynaud's phenomenon  Discussed avoiding triggers. Recommend against nitro paste d/t CHF and being on PDE-5i  Also, recommend against CCB d/t being on ACEi and BB for CHF/HTN and BP being well controlled already     13. Prostate cancer screening  Patient declined PSA    14. Personal history of tobacco use  Discussed with the patient the current USPSTF guidelines released March 9, 2021 for screening for lung cancer. For adults aged 48 to [de-identified] years who have a 20 pack-year smoking history and currently smoke or have quit within the past 15 years the grade B recommendation is to:  Screen for lung cancer with low-dose computed tomography (LDCT) every year. Stop screening once a person has not smoked for 15 years or has a health problem that limits life expectancy or the ability to have lung surgery. The patient  reports that he quit smoking about 2 years ago. His smoking use included cigarettes. He started smoking about 55 years ago. He has a 104.00 pack-year smoking history. His smokeless tobacco use includes chew. . Discussed with patient the risks and benefits of screening, including over-diagnosis, false positive rate, and total radiation exposure. The patient currently exhibits no signs or symptoms suggestive of lung cancer.   Discussed with patient the importance of compliance with yearly annual lung cancer screenings and willingness to undergo diagnosis and treatment if screening scan is positive. In addition, the patient was counseled regarding the importance of remaining smoke free and/or total smoking cessation. Also reviewed the following if the patient has Medicare that as of February 10, 2022, Medicare only covers LDCT screening in patients aged 51-72 with at least a 20 pack-year smoking history who currently smoke or have quit in the last 15 years  - [place] VISIT TO DISCUSS LUNG CA SCREEN W LDCT  - CT Lung Screen (Annual/Baseline); Future      Care discussed with patient and questions answered. Patient verbalizes understanding and agrees with plan. Discussed with patient the importance of continuity of care. I encouraged patient to schedule next appointment within 3 months with me. Patient prefers to be reached by Phone call at [phone] for future medical correspondence. Encouraged to activate [employer]. I reviewed and reconciled the medications this visit. I reviewed and updated the past medical, surgical, social, and family history during this visit. After visit summary provided.        Alberto Caban MD, MPH   Internal Medicine  2/17/2023   9:26 AM

## 2023-04-28 DIAGNOSIS — M54.50 CHRONIC BILATERAL LOW BACK PAIN WITHOUT SCIATICA: ICD-10-CM

## 2023-04-28 DIAGNOSIS — I10 ESSENTIAL HYPERTENSION: ICD-10-CM

## 2023-04-28 DIAGNOSIS — I50.42 CHRONIC COMBINED SYSTOLIC AND DIASTOLIC CONGESTIVE HEART FAILURE (HCC): ICD-10-CM

## 2023-04-28 DIAGNOSIS — N40.0 BENIGN PROSTATIC HYPERPLASIA, UNSPECIFIED WHETHER LOWER URINARY TRACT SYMPTOMS PRESENT: ICD-10-CM

## 2023-04-28 DIAGNOSIS — J43.1 PANLOBULAR EMPHYSEMA (HCC): ICD-10-CM

## 2023-04-28 DIAGNOSIS — G89.29 CHRONIC BILATERAL LOW BACK PAIN WITHOUT SCIATICA: ICD-10-CM

## 2023-04-28 DIAGNOSIS — E03.9 ACQUIRED HYPOTHYROIDISM: ICD-10-CM

## 2023-04-28 DIAGNOSIS — K21.9 GASTROESOPHAGEAL REFLUX DISEASE, UNSPECIFIED WHETHER ESOPHAGITIS PRESENT: ICD-10-CM

## 2023-04-28 DIAGNOSIS — N52.9 VASCULOGENIC ERECTILE DYSFUNCTION, UNSPECIFIED VASCULOGENIC ERECTILE DYSFUNCTION TYPE: ICD-10-CM

## 2023-04-28 DIAGNOSIS — R73.03 PREDIABETES: ICD-10-CM

## 2023-04-28 DIAGNOSIS — E78.2 MIXED HYPERLIPIDEMIA: ICD-10-CM

## 2023-04-28 RX ORDER — SILDENAFIL 100 MG/1
100 TABLET, FILM COATED ORAL DAILY PRN
Qty: 10 TABLET | Refills: 3 | Status: CANCELLED | OUTPATIENT
Start: 2023-04-28

## 2023-04-28 RX ORDER — TIOTROPIUM BROMIDE 18 UG/1
18 CAPSULE ORAL; RESPIRATORY (INHALATION) DAILY
Qty: 90 CAPSULE | Refills: 1 | Status: SHIPPED | OUTPATIENT
Start: 2023-04-28

## 2023-04-28 RX ORDER — SILDENAFIL 100 MG/1
100 TABLET, FILM COATED ORAL DAILY PRN
Qty: 10 TABLET | Refills: 5 | Status: SHIPPED | OUTPATIENT
Start: 2023-04-28

## 2023-04-28 RX ORDER — CARVEDILOL 3.12 MG/1
3.12 TABLET ORAL 2 TIMES DAILY WITH MEALS
Qty: 180 TABLET | Refills: 1 | Status: SHIPPED | OUTPATIENT
Start: 2023-04-28

## 2023-04-28 RX ORDER — FUROSEMIDE 20 MG/1
20 TABLET ORAL DAILY PRN
Qty: 90 TABLET | Refills: 1 | Status: SHIPPED | OUTPATIENT
Start: 2023-04-28

## 2023-04-28 RX ORDER — DULOXETIN HYDROCHLORIDE 60 MG/1
60 CAPSULE, DELAYED RELEASE ORAL DAILY
Qty: 90 CAPSULE | Refills: 1 | Status: SHIPPED | OUTPATIENT
Start: 2023-04-28

## 2023-04-28 RX ORDER — CYCLOBENZAPRINE HCL 10 MG
10 TABLET ORAL 3 TIMES DAILY PRN
Qty: 90 TABLET | Refills: 5 | Status: SHIPPED | OUTPATIENT
Start: 2023-04-28

## 2023-04-28 RX ORDER — FAMOTIDINE 20 MG/1
TABLET, FILM COATED ORAL
Qty: 90 TABLET | Refills: 1 | Status: SHIPPED | OUTPATIENT
Start: 2023-04-28

## 2023-04-28 RX ORDER — TAMSULOSIN HYDROCHLORIDE 0.4 MG/1
0.4 CAPSULE ORAL DAILY
Qty: 90 CAPSULE | Refills: 1 | Status: SHIPPED | OUTPATIENT
Start: 2023-04-28

## 2023-04-28 RX ORDER — ATORVASTATIN CALCIUM 40 MG/1
TABLET, FILM COATED ORAL
Qty: 90 TABLET | Refills: 1 | Status: SHIPPED | OUTPATIENT
Start: 2023-04-28

## 2023-04-28 RX ORDER — ALBUTEROL SULFATE 90 UG/1
2 AEROSOL, METERED RESPIRATORY (INHALATION)
Qty: 1 EACH | Refills: 5 | Status: SHIPPED | OUTPATIENT
Start: 2023-04-28

## 2023-04-28 RX ORDER — LEVOTHYROXINE SODIUM 112 UG/1
TABLET ORAL
Qty: 90 TABLET | Refills: 1 | Status: SHIPPED | OUTPATIENT
Start: 2023-04-28

## 2023-04-28 RX ORDER — LISINOPRIL 5 MG/1
5 TABLET ORAL DAILY
Qty: 90 TABLET | Refills: 1 | Status: SHIPPED | OUTPATIENT
Start: 2023-04-28

## 2023-04-28 RX ORDER — SEMAGLUTIDE 1.34 MG/ML
INJECTION, SOLUTION SUBCUTANEOUS
Qty: 4 ADJUSTABLE DOSE PRE-FILLED PEN SYRINGE | Refills: 1 | Status: SHIPPED | OUTPATIENT
Start: 2023-04-28

## 2023-04-28 RX ORDER — FINASTERIDE 5 MG/1
5 TABLET, FILM COATED ORAL DAILY
Qty: 90 TABLET | Refills: 1 | Status: SHIPPED | OUTPATIENT
Start: 2023-04-28

## 2023-04-28 NOTE — TELEPHONE ENCOUNTER
Patient said that he saw Isamar Sierra last night and told her he was having problems getting his medications from [facility] and she told him that we would resend them.  Patient stated one medication goes to [facility] but cannot remember which one  I looked back in chart Viagra was sent to [address] [place] [address]

## 2023-05-17 DIAGNOSIS — I50.42 CHRONIC COMBINED SYSTOLIC AND DIASTOLIC CONGESTIVE HEART FAILURE (HCC): ICD-10-CM

## 2023-05-17 DIAGNOSIS — R73.03 PREDIABETES: Primary | ICD-10-CM

## 2023-05-17 DIAGNOSIS — E78.2 MIXED HYPERLIPIDEMIA: ICD-10-CM

## 2023-05-17 RX ORDER — SEMAGLUTIDE 0.68 MG/ML
0.5 INJECTION, SOLUTION SUBCUTANEOUS WEEKLY
Qty: 3 ML | Refills: 1 | Status: SHIPPED | OUTPATIENT
Start: 2023-05-17

## 2023-05-28 DIAGNOSIS — I50.42 CHRONIC COMBINED SYSTOLIC AND DIASTOLIC CONGESTIVE HEART FAILURE (HCC): ICD-10-CM

## 2023-05-28 DIAGNOSIS — E78.2 MIXED HYPERLIPIDEMIA: ICD-10-CM

## 2023-05-28 DIAGNOSIS — R73.03 PREDIABETES: ICD-10-CM

## 2023-05-30 RX ORDER — SEMAGLUTIDE 0.68 MG/ML
0.5 INJECTION, SOLUTION SUBCUTANEOUS WEEKLY
Qty: 3 ML | Refills: 1 | Status: SHIPPED | OUTPATIENT
Start: 2023-05-30

## 2023-05-30 RX ORDER — SEMAGLUTIDE 1.34 MG/ML
INJECTION, SOLUTION SUBCUTANEOUS
Refills: 5 | OUTPATIENT
Start: 2023-05-30

## 2023-07-17 ENCOUNTER — HOSPITAL ENCOUNTER (OUTPATIENT)
Age: 68
Setting detail: OBSERVATION
Discharge: HOME | End: 2023-07-18
Attending: EMERGENCY MEDICINE | Admitting: STUDENT IN AN ORGANIZED HEALTH CARE EDUCATION/TRAINING PROGRAM
Payer: MEDICARE

## 2023-07-17 ENCOUNTER — APPOINTMENT (OUTPATIENT)
Dept: GENERAL RADIOLOGY | Age: 68
End: 2023-07-17
Payer: MEDICARE

## 2023-07-17 ENCOUNTER — APPOINTMENT (OUTPATIENT)
Dept: CT IMAGING | Age: 68
End: 2023-07-17
Payer: MEDICARE

## 2023-07-17 DIAGNOSIS — R55 SYNCOPE AND COLLAPSE: Primary | ICD-10-CM

## 2023-07-17 DIAGNOSIS — I50.9 CONGESTIVE HEART FAILURE, UNSPECIFIED HF CHRONICITY, UNSPECIFIED HEART FAILURE TYPE (HCC): ICD-10-CM

## 2023-07-17 LAB
ALBUMIN SERPL-MCNC: 3.8 GM/DL (ref 3.4–5)
ALCOHOL SCREEN SERUM: <0.01 %WT/VOL
ALP BLD-CCNC: 67 IU/L (ref 40–129)
ALT SERPL-CCNC: 18 U/L (ref 10–40)
ANION GAP SERPL CALCULATED.3IONS-SCNC: 10 MMOL/L (ref 4–16)
AST SERPL-CCNC: 16 IU/L (ref 15–37)
BASOPHILS ABSOLUTE: 0 K/CU MM
BASOPHILS RELATIVE PERCENT: 0.4 % (ref 0–1)
BILIRUB SERPL-MCNC: 0.4 MG/DL (ref 0–1)
BUN SERPL-MCNC: 16 MG/DL (ref 6–23)
CALCIUM SERPL-MCNC: 9.1 MG/DL (ref 8.3–10.6)
CHLORIDE BLD-SCNC: 105 MMOL/L (ref 99–110)
CO2: 25 MMOL/L (ref 21–32)
CREAT SERPL-MCNC: 1.1 MG/DL (ref 0.9–1.3)
DIFFERENTIAL TYPE: ABNORMAL
EOSINOPHILS ABSOLUTE: 0.3 K/CU MM
EOSINOPHILS RELATIVE PERCENT: 2.7 % (ref 0–3)
GFR SERPL CREATININE-BSD FRML MDRD: >60 ML/MIN/1.73M2
GLUCOSE BLD-MCNC: 102 MG/DL (ref 70–99)
GLUCOSE SERPL-MCNC: 125 MG/DL (ref 70–99)
HCT VFR BLD CALC: 47.7 % (ref 42–52)
HEMOGLOBIN: 15.9 GM/DL (ref 13.5–18)
IMMATURE NEUTROPHIL %: 0.2 % (ref 0–0.43)
LIPASE: 25 IU/L (ref 13–60)
LYMPHOCYTES ABSOLUTE: 1.3 K/CU MM
LYMPHOCYTES RELATIVE PERCENT: 14.4 % (ref 24–44)
MCH RBC QN AUTO: 34.8 PG (ref 27–31)
MCHC RBC AUTO-ENTMCNC: 33.3 % (ref 32–36)
MCV RBC AUTO: 104.4 FL (ref 78–100)
MONOCYTES ABSOLUTE: 0.8 K/CU MM
MONOCYTES RELATIVE PERCENT: 8.5 % (ref 0–4)
PDW BLD-RTO: 13.2 % (ref 11.7–14.9)
PLATELET # BLD: 166 K/CU MM (ref 140–440)
PMV BLD AUTO: 9.5 FL (ref 7.5–11.1)
POTASSIUM SERPL-SCNC: 3.7 MMOL/L (ref 3.5–5.1)
PRO-BNP: 1865 PG/ML
RBC # BLD: 4.57 M/CU MM (ref 4.6–6.2)
SEGMENTED NEUTROPHILS ABSOLUTE COUNT: 6.8 K/CU MM
SEGMENTED NEUTROPHILS RELATIVE PERCENT: 73.8 % (ref 36–66)
SODIUM BLD-SCNC: 140 MMOL/L (ref 135–145)
TOTAL IMMATURE NEUTOROPHIL: 0.02 K/CU MM
TOTAL PROTEIN: 6.4 GM/DL (ref 6.4–8.2)
TROPONIN T: <0.01 NG/ML
WBC # BLD: 9.3 K/CU MM (ref 4–10.5)

## 2023-07-17 PROCEDURE — 84484 ASSAY OF TROPONIN QUANT: CPT

## 2023-07-17 PROCEDURE — 2580000003 HC RX 258: Performed by: EMERGENCY MEDICINE

## 2023-07-17 PROCEDURE — 85025 COMPLETE CBC W/AUTO DIFF WBC: CPT

## 2023-07-17 PROCEDURE — 70450 CT HEAD/BRAIN W/O DYE: CPT

## 2023-07-17 PROCEDURE — 71045 X-RAY EXAM CHEST 1 VIEW: CPT

## 2023-07-17 PROCEDURE — 99285 EMERGENCY DEPT VISIT HI MDM: CPT

## 2023-07-17 PROCEDURE — 83690 ASSAY OF LIPASE: CPT

## 2023-07-17 PROCEDURE — 80053 COMPREHEN METABOLIC PANEL: CPT

## 2023-07-17 PROCEDURE — 96361 HYDRATE IV INFUSION ADD-ON: CPT

## 2023-07-17 PROCEDURE — 96374 THER/PROPH/DIAG INJ IV PUSH: CPT

## 2023-07-17 PROCEDURE — 82962 GLUCOSE BLOOD TEST: CPT

## 2023-07-17 PROCEDURE — 6360000002 HC RX W HCPCS: Performed by: EMERGENCY MEDICINE

## 2023-07-17 PROCEDURE — G0480 DRUG TEST DEF 1-7 CLASSES: HCPCS

## 2023-07-17 PROCEDURE — 93005 ELECTROCARDIOGRAM TRACING: CPT | Performed by: EMERGENCY MEDICINE

## 2023-07-17 PROCEDURE — 83880 ASSAY OF NATRIURETIC PEPTIDE: CPT

## 2023-07-17 RX ORDER — FUROSEMIDE 10 MG/ML
40 INJECTION INTRAMUSCULAR; INTRAVENOUS ONCE
Status: COMPLETED | OUTPATIENT
Start: 2023-07-17 | End: 2023-07-17

## 2023-07-17 RX ORDER — FAMOTIDINE 20 MG/1
20 TABLET, FILM COATED ORAL DAILY
Status: DISCONTINUED | OUTPATIENT
Start: 2023-07-18 | End: 2023-07-18 | Stop reason: HOSPADM

## 2023-07-17 RX ORDER — ATORVASTATIN CALCIUM 40 MG/1
40 TABLET, FILM COATED ORAL NIGHTLY
Status: DISCONTINUED | OUTPATIENT
Start: 2023-07-18 | End: 2023-07-18 | Stop reason: HOSPADM

## 2023-07-17 RX ORDER — DULOXETIN HYDROCHLORIDE 60 MG/1
60 CAPSULE, DELAYED RELEASE ORAL DAILY
Status: DISCONTINUED | OUTPATIENT
Start: 2023-07-18 | End: 2023-07-18 | Stop reason: HOSPADM

## 2023-07-17 RX ORDER — CYCLOBENZAPRINE HCL 10 MG
10 TABLET ORAL 3 TIMES DAILY PRN
Status: DISCONTINUED | OUTPATIENT
Start: 2023-07-17 | End: 2023-07-18 | Stop reason: HOSPADM

## 2023-07-17 RX ORDER — ALBUTEROL SULFATE 90 UG/1
2 AEROSOL, METERED RESPIRATORY (INHALATION)
Status: DISCONTINUED | OUTPATIENT
Start: 2023-07-18 | End: 2023-07-18 | Stop reason: HOSPADM

## 2023-07-17 RX ORDER — LISINOPRIL 5 MG/1
5 TABLET ORAL DAILY
Status: DISCONTINUED | OUTPATIENT
Start: 2023-07-18 | End: 2023-07-18 | Stop reason: HOSPADM

## 2023-07-17 RX ORDER — ASPIRIN 81 MG/1
81 TABLET ORAL DAILY
Status: DISCONTINUED | OUTPATIENT
Start: 2023-07-18 | End: 2023-07-18 | Stop reason: HOSPADM

## 2023-07-17 RX ORDER — ONDANSETRON 4 MG/1
4 TABLET, ORALLY DISINTEGRATING ORAL EVERY 8 HOURS PRN
Status: DISCONTINUED | OUTPATIENT
Start: 2023-07-17 | End: 2023-07-18 | Stop reason: HOSPADM

## 2023-07-17 RX ORDER — SODIUM CHLORIDE 0.9 % (FLUSH) 0.9 %
5-40 SYRINGE (ML) INJECTION 2 TIMES DAILY
Status: DISCONTINUED | OUTPATIENT
Start: 2023-07-18 | End: 2023-07-18 | Stop reason: HOSPADM

## 2023-07-17 RX ORDER — SODIUM CHLORIDE 9 MG/ML
INJECTION, SOLUTION INTRAVENOUS PRN
Status: DISCONTINUED | OUTPATIENT
Start: 2023-07-17 | End: 2023-07-18 | Stop reason: HOSPADM

## 2023-07-17 RX ORDER — SODIUM CHLORIDE 0.9 % (FLUSH) 0.9 %
5-40 SYRINGE (ML) INJECTION PRN
Status: DISCONTINUED | OUTPATIENT
Start: 2023-07-17 | End: 2023-07-18 | Stop reason: HOSPADM

## 2023-07-17 RX ORDER — 0.9 % SODIUM CHLORIDE 0.9 %
1000 INTRAVENOUS SOLUTION INTRAVENOUS ONCE
Status: COMPLETED | OUTPATIENT
Start: 2023-07-17 | End: 2023-07-17

## 2023-07-17 RX ORDER — LEVOTHYROXINE SODIUM 112 UG/1
112 TABLET ORAL DAILY
Status: DISCONTINUED | OUTPATIENT
Start: 2023-07-18 | End: 2023-07-18 | Stop reason: HOSPADM

## 2023-07-17 RX ORDER — ACETAMINOPHEN 325 MG/1
650 TABLET ORAL EVERY 6 HOURS PRN
Status: DISCONTINUED | OUTPATIENT
Start: 2023-07-17 | End: 2023-07-18 | Stop reason: HOSPADM

## 2023-07-17 RX ORDER — TAMSULOSIN HYDROCHLORIDE 0.4 MG/1
0.4 CAPSULE ORAL DAILY
Status: DISCONTINUED | OUTPATIENT
Start: 2023-07-18 | End: 2023-07-18

## 2023-07-17 RX ORDER — ONDANSETRON 2 MG/ML
4 INJECTION INTRAMUSCULAR; INTRAVENOUS EVERY 6 HOURS PRN
Status: DISCONTINUED | OUTPATIENT
Start: 2023-07-17 | End: 2023-07-18 | Stop reason: HOSPADM

## 2023-07-17 RX ORDER — ENOXAPARIN SODIUM 100 MG/ML
40 INJECTION SUBCUTANEOUS DAILY
Status: DISCONTINUED | OUTPATIENT
Start: 2023-07-18 | End: 2023-07-18 | Stop reason: HOSPADM

## 2023-07-17 RX ORDER — FINASTERIDE 5 MG/1
5 TABLET, FILM COATED ORAL DAILY
Status: DISCONTINUED | OUTPATIENT
Start: 2023-07-18 | End: 2023-07-18 | Stop reason: HOSPADM

## 2023-07-17 RX ORDER — ACETAMINOPHEN 650 MG/1
650 SUPPOSITORY RECTAL EVERY 6 HOURS PRN
Status: DISCONTINUED | OUTPATIENT
Start: 2023-07-17 | End: 2023-07-18 | Stop reason: HOSPADM

## 2023-07-17 RX ORDER — CARVEDILOL 3.12 MG/1
3.12 TABLET ORAL 2 TIMES DAILY WITH MEALS
Status: DISCONTINUED | OUTPATIENT
Start: 2023-07-18 | End: 2023-07-18 | Stop reason: HOSPADM

## 2023-07-17 RX ORDER — POLYETHYLENE GLYCOL 3350 17 G/17G
17 POWDER, FOR SOLUTION ORAL DAILY PRN
Status: DISCONTINUED | OUTPATIENT
Start: 2023-07-17 | End: 2023-07-18 | Stop reason: HOSPADM

## 2023-07-17 RX ADMIN — FUROSEMIDE 40 MG: 10 INJECTION, SOLUTION INTRAMUSCULAR; INTRAVENOUS at 22:16

## 2023-07-17 RX ADMIN — SODIUM CHLORIDE 1000 ML: 9 INJECTION, SOLUTION INTRAVENOUS at 20:25

## 2023-07-17 ASSESSMENT — ENCOUNTER SYMPTOMS
GASTROINTESTINAL NEGATIVE: 1
RESPIRATORY NEGATIVE: 1
EYES NEGATIVE: 1

## 2023-07-18 VITALS
BODY MASS INDEX: 25.91 KG/M2 | TEMPERATURE: 97.3 F | WEIGHT: 181 LBS | RESPIRATION RATE: 16 BRPM | SYSTOLIC BLOOD PRESSURE: 111 MMHG | HEIGHT: 70 IN | OXYGEN SATURATION: 90 % | HEART RATE: 71 BPM | DIASTOLIC BLOOD PRESSURE: 66 MMHG

## 2023-07-18 PROBLEM — I95.1 ORTHOSTATIC HYPOTENSION: Status: ACTIVE | Noted: 2023-07-18

## 2023-07-18 LAB
ALBUMIN SERPL-MCNC: 3.8 GM/DL (ref 3.4–5)
ALP BLD-CCNC: 73 IU/L (ref 40–129)
ALT SERPL-CCNC: 18 U/L (ref 10–40)
ANION GAP SERPL CALCULATED.3IONS-SCNC: 8 MMOL/L (ref 4–16)
AST SERPL-CCNC: 15 IU/L (ref 15–37)
BASOPHILS ABSOLUTE: 0.1 K/CU MM
BASOPHILS RELATIVE PERCENT: 0.5 % (ref 0–1)
BILIRUB SERPL-MCNC: 0.5 MG/DL (ref 0–1)
BUN SERPL-MCNC: 18 MG/DL (ref 6–23)
CALCIUM SERPL-MCNC: 9.2 MG/DL (ref 8.3–10.6)
CHLORIDE BLD-SCNC: 103 MMOL/L (ref 99–110)
CO2: 27 MMOL/L (ref 21–32)
CREAT SERPL-MCNC: 0.9 MG/DL (ref 0.9–1.3)
DIFFERENTIAL TYPE: ABNORMAL
EKG ATRIAL RATE: 72 BPM
EKG DIAGNOSIS: NORMAL
EKG P-R INTERVAL: 192 MS
EKG Q-T INTERVAL: 458 MS
EKG QRS DURATION: 104 MS
EKG QTC CALCULATION (BAZETT): 501 MS
EKG R AXIS: 29 DEGREES
EKG T AXIS: 51 DEGREES
EKG VENTRICULAR RATE: 72 BPM
EOSINOPHILS ABSOLUTE: 0.3 K/CU MM
EOSINOPHILS RELATIVE PERCENT: 2.5 % (ref 0–3)
GFR SERPL CREATININE-BSD FRML MDRD: >60 ML/MIN/1.73M2
GLUCOSE SERPL-MCNC: 80 MG/DL (ref 70–99)
HCT VFR BLD CALC: 47.3 % (ref 42–52)
HEMOGLOBIN: 16.4 GM/DL (ref 13.5–18)
IMMATURE NEUTROPHIL %: 0.3 % (ref 0–0.43)
LV EF: 53 %
LVEF MODALITY: NORMAL
LYMPHOCYTES ABSOLUTE: 2.2 K/CU MM
LYMPHOCYTES RELATIVE PERCENT: 17.4 % (ref 24–44)
MCH RBC QN AUTO: 35.6 PG (ref 27–31)
MCHC RBC AUTO-ENTMCNC: 34.7 % (ref 32–36)
MCV RBC AUTO: 102.6 FL (ref 78–100)
MONOCYTES ABSOLUTE: 1.1 K/CU MM
MONOCYTES RELATIVE PERCENT: 8.3 % (ref 0–4)
PDW BLD-RTO: 13.2 % (ref 11.7–14.9)
PLATELET # BLD: 167 K/CU MM (ref 140–440)
PMV BLD AUTO: 9.6 FL (ref 7.5–11.1)
POTASSIUM SERPL-SCNC: 3.8 MMOL/L (ref 3.5–5.1)
RBC # BLD: 4.61 M/CU MM (ref 4.6–6.2)
SEGMENTED NEUTROPHILS ABSOLUTE COUNT: 9 K/CU MM
SEGMENTED NEUTROPHILS RELATIVE PERCENT: 71 % (ref 36–66)
SODIUM BLD-SCNC: 138 MMOL/L (ref 135–145)
TOTAL IMMATURE NEUTOROPHIL: 0.04 K/CU MM
TOTAL PROTEIN: 6.2 GM/DL (ref 6.4–8.2)
TROPONIN T: <0.01 NG/ML
WBC # BLD: 12.7 K/CU MM (ref 4–10.5)

## 2023-07-18 PROCEDURE — 2580000003 HC RX 258: Performed by: FAMILY MEDICINE

## 2023-07-18 PROCEDURE — 96372 THER/PROPH/DIAG INJ SC/IM: CPT

## 2023-07-18 PROCEDURE — 2700000000 HC OXYGEN THERAPY PER DAY

## 2023-07-18 PROCEDURE — 80053 COMPREHEN METABOLIC PANEL: CPT

## 2023-07-18 PROCEDURE — 6370000000 HC RX 637 (ALT 250 FOR IP): Performed by: FAMILY MEDICINE

## 2023-07-18 PROCEDURE — G0378 HOSPITAL OBSERVATION PER HR: HCPCS

## 2023-07-18 PROCEDURE — 85025 COMPLETE CBC W/AUTO DIFF WBC: CPT

## 2023-07-18 PROCEDURE — 93306 TTE W/DOPPLER COMPLETE: CPT

## 2023-07-18 PROCEDURE — 6360000002 HC RX W HCPCS: Performed by: FAMILY MEDICINE

## 2023-07-18 PROCEDURE — 94640 AIRWAY INHALATION TREATMENT: CPT

## 2023-07-18 PROCEDURE — 94761 N-INVAS EAR/PLS OXIMETRY MLT: CPT

## 2023-07-18 PROCEDURE — 99222 1ST HOSP IP/OBS MODERATE 55: CPT | Performed by: INTERNAL MEDICINE

## 2023-07-18 PROCEDURE — 84484 ASSAY OF TROPONIN QUANT: CPT

## 2023-07-18 PROCEDURE — 94664 DEMO&/EVAL PT USE INHALER: CPT

## 2023-07-18 PROCEDURE — 93010 ELECTROCARDIOGRAM REPORT: CPT | Performed by: INTERNAL MEDICINE

## 2023-07-18 PROCEDURE — 36415 COLL VENOUS BLD VENIPUNCTURE: CPT

## 2023-07-18 RX ORDER — DEXTROSE MONOHYDRATE 100 MG/ML
INJECTION, SOLUTION INTRAVENOUS CONTINUOUS PRN
Status: DISCONTINUED | OUTPATIENT
Start: 2023-07-18 | End: 2023-07-18 | Stop reason: HOSPADM

## 2023-07-18 RX ORDER — TAMSULOSIN HYDROCHLORIDE 0.4 MG/1
0.4 CAPSULE ORAL EVERY EVENING
Status: DISCONTINUED | OUTPATIENT
Start: 2023-07-18 | End: 2023-07-18

## 2023-07-18 RX ADMIN — FINASTERIDE 5 MG: 5 TABLET, FILM COATED ORAL at 08:25

## 2023-07-18 RX ADMIN — ENOXAPARIN SODIUM 40 MG: 100 INJECTION SUBCUTANEOUS at 08:26

## 2023-07-18 RX ADMIN — ALBUTEROL SULFATE 2 PUFF: 90 AEROSOL, METERED RESPIRATORY (INHALATION) at 07:25

## 2023-07-18 RX ADMIN — ALBUTEROL SULFATE 2 PUFF: 90 AEROSOL, METERED RESPIRATORY (INHALATION) at 13:40

## 2023-07-18 RX ADMIN — SODIUM CHLORIDE, PRESERVATIVE FREE 10 ML: 5 INJECTION INTRAVENOUS at 08:26

## 2023-07-18 RX ADMIN — SODIUM CHLORIDE, PRESERVATIVE FREE 10 ML: 5 INJECTION INTRAVENOUS at 00:42

## 2023-07-18 RX ADMIN — CARVEDILOL 3.12 MG: 3.12 TABLET, FILM COATED ORAL at 08:25

## 2023-07-18 RX ADMIN — DULOXETINE 60 MG: 60 CAPSULE, DELAYED RELEASE ORAL at 08:25

## 2023-07-18 RX ADMIN — LISINOPRIL 5 MG: 5 TABLET ORAL at 08:25

## 2023-07-18 RX ADMIN — LEVOTHYROXINE SODIUM 112 MCG: 112 TABLET ORAL at 05:46

## 2023-07-18 RX ADMIN — ASPIRIN 81 MG: 81 TABLET, COATED ORAL at 08:25

## 2023-07-18 RX ADMIN — ATORVASTATIN CALCIUM 40 MG: 40 TABLET, FILM COATED ORAL at 00:42

## 2023-07-18 RX ADMIN — FAMOTIDINE 20 MG: 20 TABLET, FILM COATED ORAL at 08:25

## 2023-07-18 RX ADMIN — MOMETASONE FUROATE AND FORMOTEROL FUMARATE DIHYDRATE 1 PUFF: 200; 5 AEROSOL RESPIRATORY (INHALATION) at 07:25

## 2023-07-18 RX ADMIN — TIOTROPIUM BROMIDE INHALATION SPRAY 2 PUFF: 3.12 SPRAY, METERED RESPIRATORY (INHALATION) at 07:25

## 2023-07-18 ASSESSMENT — PAIN SCALES - GENERAL
PAINLEVEL_OUTOF10: 0
PAINLEVEL_OUTOF10: 0

## 2023-07-18 ASSESSMENT — LIFESTYLE VARIABLES: HOW OFTEN DO YOU HAVE A DRINK CONTAINING ALCOHOL: NEVER

## 2023-07-18 NOTE — CONSULTS
07/18/23  0620   TROPONINT <0.010 <0.010     Lab Results   Component Value Date    PROBNP 1,865 (H) 07/17/2023    PROBNP 264.8 08/04/2021    PROBNP 2,651 (H) 05/28/2020     No results found for: BNP  Lab Results   Component Value Date    INR 1.07 03/16/2021    PROTIME 12.2 03/16/2021     Lab Results   Component Value Date    LABA1C 6.3 01/11/2023     Lab Results   Component Value Date     01/11/2023      OTHER TEST RESULTS REVIEWED    EKG: Normal sinus rhythm competing with ectopic atrial rhythm at a rate of 72 bpm with PACs and evidence of old inferior wall infarction. Compared to previous EKG from February 2022 there is no significant change. Chest Xray:  IMPRESSION:  1. Increasing reticular opacities in the mid to lower lung zones suggestive  of mild interstitial edema versus atypical infection. 2. COPD. ECHO:  Left ventricular systolic function is mildly depressed with an ejection   fraction of 45-50%. Hypokinesis of the inferior basal wall segments. Mild concentric left ventricular hypertrophy. Assessment and Recommendations: Active Hospital Problems    Diagnosis Date Noted    Orthostatic hypotension [I95.1] 07/18/2023    Syncope, unspecified syncope type [R55] 07/17/2023    S/P CABG x 3 [Z95.1] 10/19/2020    Coronary artery disease involving native coronary artery [I25.10] 09/24/2020    Mixed hyperlipidemia [E78.2] 07/15/2020     Consider discontinuing Flomax and avoid excessive diuresis. Patient is asked to hydrate himself well. He is counseled for smoking cessation. Needs follow-up as an outpatient to evaluate his LV function again and to follow-up on orthostatic hypotension. Patient is also on Ozempic which can cause orthostasis and the benefits needs to be weighed versus the side effects for this medication to continue the dose was recently increased on last office visit with PCP.   Discussed with [facility] admitting provider and he needs to follow-up with Dr. Guanako Rincon as an

## 2023-07-18 NOTE — ED PROVIDER NOTES
pulmonary edema this is supported by the patient's blood work which shows an elevated BNP. Patient's EKG does not show any evidence of acute ST or T wave changes. The patient was placed on oxygen and he has been given Lasix. The patient has had diuresis. This appears to be a related congestive heart failure as well as a syncopal episode. It is unclear as to whether the patient may have had a dysrhythmia that caused him to pass out. In any event the patient is going to need to be observed. The CT scan does not show any evidence of acute intracranial hemorrhage or mass effect. I have contacted hospitalist for admission    History from : Patient    Limitations to history : None    Patient was given the following medications:  Medications   0.9 % sodium chloride bolus (1,000 mLs IntraVENous New Bag 7/17/23 2025)   furosemide (LASIX) injection 40 mg (40 mg IntraVENous Given 7/17/23 2216)       Independent Imaging Interpretation by Radiology  CT HEAD WO CONTRAST   Final Result   1. No acute intracranial hemorrhage or mass effect. 2.  Benign 9 mm colloid cyst in the anterior superior 3rd ventricle near the   left foramen of Monro is similar to the prior CT head from March 16, 2021. no   hydrocephalus. XR CHEST PORTABLE   Preliminary Result   1. Increasing reticular opacities in the mid to lower lung zones suggestive   of mild interstitial edema versus atypical infection. 2. COPD.              EKG (if obtained): (All EKG's are interpreted by myself in the absence of a cardiologist)Sinus rhythm with premature atrial complexes   Inferior infarct (cited on or before 23-SEP-2020)   Prolonged QT   Abnormal ECG   When compared with ECG of 04-AUG-2021 08:22,   premature ventricular complexes are no longer present   premature atrial complexes are now present   Nonspecific T wave abnormality no longer evident in Inferior leads     Discussion with Other Profesionals : None    Social Determinants :

## 2023-07-18 NOTE — PROGRESS NOTES
Dr. Elieser Garcia here to evaluate pt.  Orthostatic VS obtained and recorded per Dr. Umana Mall request.

## 2023-07-18 NOTE — PROGRESS NOTES
4 Eyes Skin Assessment     NAME:  Chris Meyers  YOB: 1955  MEDICAL RECORD NUMBER:  [id number]    The patient is being assess for  Admission    I agree that 2 RN's have performed a thorough Head to Toe Skin Assessment on the patient. ALL assessment sites listed below have been assessed. Areas assessed by both nurses:    Head, Face, Ears, Shoulders, Back, Chest, Arms, Elbows, Hands, Sacrum. Buttock, Coccyx, Ischium, Legs. Feet and Heels, and Under Medical Devices         Does the Patient have a Wound?  No noted wound(s)       Arnold Prevention initiated:  No   Wound Care Orders initiated:  No    Pressure Injury (Stage 3,4, Unstageable, DTI, NWPT, and Complex wounds) if present place referral/consult order under [de-identified] No    New and Established Ostomies if present place consult order under : NA      Nurse 1 eSignature: Electronically signed by Pa Mata LPN on [birth year] at 17:50 AM EDT    **SHARE this note so that the co-signing nurse is able to place an eSignature**    Nurse 2 eSignature: Electronically signed by Greg Gracia RN on 7/18/23 at 5:09 AM EDT

## 2023-07-18 NOTE — CARE COORDINATION
07/18/23 0710   Service Assessment   Patient Orientation Alert and Oriented   Cognition Alert   History Provided By Patient   Primary Caregiver Self   Support Systems Family Members;Friends/Neighbors   Patient's Healthcare Decision Maker is: Patient Declined (Legal Next of Kin Remains as Decision Maker)   PCP Verified by CM Yes   Prior Functional Level Independent in ADLs/IADLs   Current Functional Level Independent in ADLs/IADLs   Can patient return to prior living arrangement Yes   Ability to make needs known: Good   Family able to assist with home care needs: Yes   Would you like for me to discuss the discharge plan with any other family members/significant others, and if so, who? No   Financial Resources Medicare   Community Resources None   Social/Functional History   Lives With Alone   Type of Home Apartment   Receives Help From Other (comment)  (Independent)   ADL Assistance Independent   Homemaking Assistance Independent   Homemaking Responsibilities Yes   Ambulation Assistance Independent   Transfer Assistance Independent   Active  Yes   Discharge Planning   Type of Residence Apartment   Living Arrangements Alone   Current Services Prior To Admission None   Potential Assistance Needed N/A   DME Ordered? No   Potential Assistance Purchasing Medications No   Type of Home Care Services None   Patient expects to be discharged to: Apartment   History of falls? 1   Services At/After Discharge   Transition of Care Consult (CM Consult) N/A   Services At/After Discharge None   [employer] Information Provided? No   Mode of Transport at Discharge Self   Confirm Follow Up Transport Family   Condition of Participation: Discharge Planning   The Plan for Transition of Care is related to the following treatment goals: Patient plans to return home   The Patient and/or Patient Representative was provided with a Choice of Provider?  Patient   Freedom of Choice list was provided with basic dialogue that supports

## 2023-07-18 NOTE — PROGRESS NOTES
Discharge instructions reviewed with pt who states understanding. Pt refused w/c, ambulated to front door with this RN for dc.

## 2023-07-18 NOTE — DISCHARGE SUMMARY
V2.0  Discharge Summary    Name:  Neville Sanders /Age/Sex: 1955 (8 y.o. male)   Admit Date: 2023  Discharge Date: 23    MRN & CSN:  [id number] & [id number] Encounter Date and Time 23 4:01 PM EDT    Attending:  Wenceslao Butt MD Discharging Provider: GETACHEW Olivo NP       Hospital Course:     Brief HPI:  Neville Sanders is a 75 y.o. male who presents to ED after a syncopal episode. Pt admits that he was at a bar and got up to go outside after becoming lightheaded. He admits that he went outside and felt dizzy and almost passed out. He denies hitting his head. EMS arrived and pt was gray, diaphoretic and tachycardic. Pt states he was short of breath. He denies drinking excessive amount of alcohol, previous ETOH history, was not smoking, or used illicit drugs. He admits that he had a headache and continued to have shortness of breath which is new for him. He admits he had a CABG in the past and has not followed up with a cardiologist in \"some time. \"  He is followed by his PCP. He currently denies CP, radiating CP, palpitations, h/o AFIB, HA, dizziness, vertigo, N/V/D, fever, chills, or cough. He denies ABD pain, or s/s of bleeding. He admits he takes a baby aspirin daily and uses his inhalers for COPD        Patient seen and examined today, he is doing well he states he feels back to his baseline he is a is requesting to be discharged home. He has been seen by cardiology he has been cleared for discharge. He will be discharged home today in stable condition. Brief Problem Based Course:   Acute on chronic heart failure, patient received 1 dose of 40 mg IV Lasix he did diurese 3 L. As per cardiology patient does not need to continue diuresis he has been cleared for discharge home cardiology does recommend discontinuing Flomax as patient is on finasteride as well. He does think Flomax is causing the orthostatic hypotension.   He is also on Ozempic which can also cause

## 2023-07-18 NOTE — PLAN OF CARE
Problem: Discharge Planning  Goal: Discharge to home or other facility with appropriate resources  Outcome: Progressing     Problem: Safety - Adult  Goal: Free from fall injury  Outcome: Progressing     Problem: Respiratory - Adult  Goal: Achieves optimal ventilation and oxygenation  Outcome: Progressing     Problem: Cardiovascular - Adult  Goal: Maintains optimal cardiac output and hemodynamic stability  Outcome: Progressing  Goal: Absence of cardiac dysrhythmias or at baseline  Outcome: Progressing     Problem: Infection - Adult  Goal: Absence of infection at discharge  Outcome: Progressing     Problem: Metabolic/Fluid and Electrolytes - Adult  Goal: Electrolytes maintained within normal limits  Outcome: Progressing     Problem: Hematologic - Adult  Goal: Maintains hematologic stability  Outcome: Progressing

## 2023-07-18 NOTE — H&P
V2.0  History and Physical      Name:  Reny Vargas /Age/Sex: 1955  (75 y.o. male)   MRN & CSN:  [id number] & [id number] Encounter Date/Time: 2023 11:26 PM EDT   Location:   PCP: Kae Duke MD       Hospital Day: 1    Assessment and Plan:   Reny Vargas is a 75 y.o. male with a past medical history of COPD, Chronic Pain, CHF/S/P CABG x 3,PreDiabetes, BPH, Hypothyroidism who presents with Syncope, unspecified syncope type    Syncope  - presented to ED via EMS after pt became lightheaded while at bar. He denies     Hitting his head but admits that he doesn't remember what happened after     Going outside \"for some air. \" He endorses mild HA with SOB at the time of the    Initial episode. He admits that prior to \"passing out\" he mildly dizzy with SOB. Per report, EMS arrived & pt was pale, gray in color, tachycardic and was     placed on non-rebreather mask. - EKG in ED: Sinus rhythm with premature atrial complexes   Inferior infarct (cited on or before 23-SEP-2020); Prolonged QT   Abnormal ECG; When compared with ECG of 04-AUG-2021 08:22,   premature ventricular complexes are no longer present; premature atrial   complexes are now present Nonspecific T wave abnormality no longer   evident in Inferior leads    - CXR 1. Increasing reticular opacities in the mid to lower lung zones suggestive   of mild interstitial edema versus atypical infection. 2. COPD.   - Echo 2021:  Left ventricular systolic function is mildly depressed with an  ejection fraction of 45-50%. Hypokinesis of the inferior basal wall segments. Mild concentric left ventricular hypertrophy.   - initial troponin NEG; pt denies CP, SOB, current dizziness, vertigo, HA  - Pt denies ETOH abuse  - Chart review noted admit 3/15-3/19/2021 after syncopal episode when he got    Up from a sitting position and hit his head; CTH showed SAH, transferred to     [facility]; Neuro c/s and no intervention/no intracranial

## 2023-07-18 NOTE — PLAN OF CARE
Problem: Discharge Planning  Goal: Discharge to home or other facility with appropriate resources  7/18/2023 1113 by Amy Khan RN  Outcome: Adequate for Discharge  7/18/2023 0507 by No Cisse RN  Outcome: Progressing     Problem: Safety - Adult  Goal: Free from fall injury  7/18/2023 1113 by Amy Khan RN  Outcome: Progressing  7/18/2023 0507 by No Cisse RN  Outcome: Progressing     Problem: Respiratory - Adult  Goal: Achieves optimal ventilation and oxygenation  7/18/2023 1113 by Amy Khan RN  Outcome: Progressing  7/18/2023 0507 by No Cisse RN  Outcome: Progressing     Problem: Cardiovascular - Adult  Goal: Maintains optimal cardiac output and hemodynamic stability  7/18/2023 1113 by Amy Khan RN  Outcome: Progressing  7/18/2023 0507 by No Cisse RN  Outcome: Progressing  Goal: Absence of cardiac dysrhythmias or at baseline  7/18/2023 1113 by Amy Khan RN  Outcome: Progressing  7/18/2023 0507 by No Cisse RN  Outcome: Progressing     Problem: Infection - Adult  Goal: Absence of infection at discharge  7/18/2023 0507 by No Cisse RN  Outcome: Progressing     Problem: Metabolic/Fluid and Electrolytes - Adult  Goal: Electrolytes maintained within normal limits  7/18/2023 0507 by No Cisse RN  Outcome: Progressing     Problem: Hematologic - Adult  Goal: Maintains hematologic stability  7/18/2023 0507 by No Cisse RN  Outcome: Progressing

## 2023-07-24 ENCOUNTER — OFFICE VISIT (OUTPATIENT)
Dept: INTERNAL MEDICINE CLINIC | Age: 68
End: 2023-07-24
Payer: MEDICARE

## 2023-07-24 VITALS
RESPIRATION RATE: 18 BRPM | SYSTOLIC BLOOD PRESSURE: 120 MMHG | DIASTOLIC BLOOD PRESSURE: 78 MMHG | WEIGHT: 182 LBS | BODY MASS INDEX: 26.05 KG/M2 | OXYGEN SATURATION: 98 % | HEIGHT: 70 IN | HEART RATE: 82 BPM

## 2023-07-24 DIAGNOSIS — K21.9 GASTROESOPHAGEAL REFLUX DISEASE, UNSPECIFIED WHETHER ESOPHAGITIS PRESENT: ICD-10-CM

## 2023-07-24 DIAGNOSIS — I10 ESSENTIAL HYPERTENSION: ICD-10-CM

## 2023-07-24 DIAGNOSIS — Z11.59 NEED FOR HEPATITIS C SCREENING TEST: ICD-10-CM

## 2023-07-24 DIAGNOSIS — G56.21 ULNAR NEUROPATHY OF RIGHT UPPER EXTREMITY: ICD-10-CM

## 2023-07-24 DIAGNOSIS — M54.50 CHRONIC BILATERAL LOW BACK PAIN WITHOUT SCIATICA: ICD-10-CM

## 2023-07-24 DIAGNOSIS — R73.03 PREDIABETES: ICD-10-CM

## 2023-07-24 DIAGNOSIS — E78.2 MIXED HYPERLIPIDEMIA: ICD-10-CM

## 2023-07-24 DIAGNOSIS — Z23 NEED FOR PNEUMOCOCCAL VACCINATION: ICD-10-CM

## 2023-07-24 DIAGNOSIS — N40.0 BENIGN PROSTATIC HYPERPLASIA, UNSPECIFIED WHETHER LOWER URINARY TRACT SYMPTOMS PRESENT: ICD-10-CM

## 2023-07-24 DIAGNOSIS — N52.9 VASCULOGENIC ERECTILE DYSFUNCTION, UNSPECIFIED VASCULOGENIC ERECTILE DYSFUNCTION TYPE: ICD-10-CM

## 2023-07-24 DIAGNOSIS — I50.42 CHRONIC COMBINED SYSTOLIC AND DIASTOLIC CONGESTIVE HEART FAILURE (HCC): ICD-10-CM

## 2023-07-24 DIAGNOSIS — Z23 NEED FOR VACCINATION FOR ZOSTER: ICD-10-CM

## 2023-07-24 DIAGNOSIS — M65.4 DE QUERVAIN'S DISEASE (TENOSYNOVITIS): Primary | ICD-10-CM

## 2023-07-24 DIAGNOSIS — J43.1 PANLOBULAR EMPHYSEMA (HCC): ICD-10-CM

## 2023-07-24 DIAGNOSIS — E03.9 ACQUIRED HYPOTHYROIDISM: ICD-10-CM

## 2023-07-24 DIAGNOSIS — I73.00 RAYNAUD'S PHENOMENON WITHOUT GANGRENE: ICD-10-CM

## 2023-07-24 DIAGNOSIS — Z13.6 ENCOUNTER FOR ABDOMINAL AORTIC ANEURYSM (AAA) SCREENING: ICD-10-CM

## 2023-07-24 DIAGNOSIS — G89.29 CHRONIC BILATERAL LOW BACK PAIN WITHOUT SCIATICA: ICD-10-CM

## 2023-07-24 PROCEDURE — 3074F SYST BP LT 130 MM HG: CPT | Performed by: GENERAL PRACTICE

## 2023-07-24 PROCEDURE — 99214 OFFICE O/P EST MOD 30 MIN: CPT | Performed by: GENERAL PRACTICE

## 2023-07-24 PROCEDURE — 3078F DIAST BP <80 MM HG: CPT | Performed by: GENERAL PRACTICE

## 2023-07-24 PROCEDURE — 1124F ACP DISCUSS-NO DSCNMKR DOCD: CPT | Performed by: GENERAL PRACTICE

## 2023-07-24 PROCEDURE — 20550 NJX 1 TENDON SHEATH/LIGAMENT: CPT | Performed by: GENERAL PRACTICE

## 2023-07-24 RX ORDER — TIOTROPIUM BROMIDE 18 UG/1
18 CAPSULE ORAL; RESPIRATORY (INHALATION) DAILY
Qty: 90 CAPSULE | Refills: 1 | Status: SHIPPED | OUTPATIENT
Start: 2023-07-24

## 2023-07-24 RX ORDER — LEVOTHYROXINE SODIUM 112 UG/1
TABLET ORAL
Qty: 90 TABLET | Refills: 1 | Status: SHIPPED | OUTPATIENT
Start: 2023-07-24

## 2023-07-24 RX ORDER — METHYLPREDNISOLONE ACETATE 40 MG/ML
40 INJECTION, SUSPENSION INTRA-ARTICULAR; INTRALESIONAL; INTRAMUSCULAR; SOFT TISSUE ONCE
Status: COMPLETED | OUTPATIENT
Start: 2023-07-24 | End: 2023-07-25

## 2023-07-24 RX ORDER — CARVEDILOL 3.12 MG/1
3.12 TABLET ORAL 2 TIMES DAILY WITH MEALS
Qty: 180 TABLET | Refills: 1 | Status: SHIPPED | OUTPATIENT
Start: 2023-07-24

## 2023-07-24 RX ORDER — ALBUTEROL SULFATE 90 UG/1
2 AEROSOL, METERED RESPIRATORY (INHALATION)
Qty: 1 EACH | Refills: 5 | Status: SHIPPED | OUTPATIENT
Start: 2023-07-24

## 2023-07-24 RX ORDER — SEMAGLUTIDE 0.68 MG/ML
0.25 INJECTION, SOLUTION SUBCUTANEOUS WEEKLY
Qty: 3 ML | Refills: 1 | Status: SHIPPED | OUTPATIENT
Start: 2023-07-24

## 2023-07-24 RX ORDER — SEMAGLUTIDE 0.68 MG/ML
0.5 INJECTION, SOLUTION SUBCUTANEOUS WEEKLY
Qty: 3 ML | Refills: 1 | Status: SHIPPED | OUTPATIENT
Start: 2023-07-24 | End: 2023-07-24

## 2023-07-24 RX ORDER — CYCLOBENZAPRINE HCL 10 MG
10 TABLET ORAL 3 TIMES DAILY PRN
Qty: 90 TABLET | Refills: 5 | Status: SHIPPED | OUTPATIENT
Start: 2023-07-24

## 2023-07-24 RX ORDER — ATORVASTATIN CALCIUM 40 MG/1
TABLET, FILM COATED ORAL
Qty: 90 TABLET | Refills: 1 | Status: SHIPPED | OUTPATIENT
Start: 2023-07-24

## 2023-07-24 RX ORDER — LIDOCAINE HYDROCHLORIDE 10 MG/ML
2 INJECTION, SOLUTION EPIDURAL; INFILTRATION; INTRACAUDAL; PERINEURAL ONCE
Status: COMPLETED | OUTPATIENT
Start: 2023-07-24 | End: 2023-07-25

## 2023-07-24 RX ORDER — DULOXETIN HYDROCHLORIDE 60 MG/1
60 CAPSULE, DELAYED RELEASE ORAL DAILY
Qty: 90 CAPSULE | Refills: 1 | Status: SHIPPED | OUTPATIENT
Start: 2023-07-24

## 2023-07-24 RX ORDER — FUROSEMIDE 20 MG/1
20 TABLET ORAL DAILY PRN
Qty: 90 TABLET | Refills: 1 | Status: SHIPPED | OUTPATIENT
Start: 2023-07-24

## 2023-07-24 RX ORDER — LISINOPRIL 5 MG/1
5 TABLET ORAL DAILY
Qty: 90 TABLET | Refills: 1 | Status: SHIPPED | OUTPATIENT
Start: 2023-07-24

## 2023-07-24 RX ORDER — FINASTERIDE 5 MG/1
5 TABLET, FILM COATED ORAL DAILY
Qty: 90 TABLET | Refills: 1 | Status: SHIPPED | OUTPATIENT
Start: 2023-07-24

## 2023-07-24 RX ORDER — SILDENAFIL 100 MG/1
100 TABLET, FILM COATED ORAL DAILY PRN
Qty: 10 TABLET | Refills: 5 | Status: SHIPPED | OUTPATIENT
Start: 2023-07-24

## 2023-07-24 RX ORDER — FAMOTIDINE 20 MG/1
TABLET, FILM COATED ORAL
Qty: 90 TABLET | Refills: 1 | Status: SHIPPED | OUTPATIENT
Start: 2023-07-24

## 2023-07-24 NOTE — PROGRESS NOTES
Patient was recently in the hospital.  He states he had dizziness and was taken by squad. He needs a refill of breo inhaler. Patient aware of screenings.   He would like shingles vaccine at [facility].

## 2023-07-24 NOTE — PROGRESS NOTES
Opal Poole (:  1955) is a 75 y.o. male,Established patient, here for evaluation of the following chief complaint(s):  Established New Doctor          ASSESSMENT/PLAN:  1. Vasculogenic erectile dysfunction, unspecified vasculogenic erectile dysfunction type  Continue sildenafil as needed, caution with hypotensioin. - sildenafil (VIAGRA) 100 MG tablet; Take 1 tablet by mouth daily as needed for Erectile Dysfunction  Dispense: 10 tablet; Refill: 5    2. Mixed hyperlipidemia  Continue HI statin and GLP1i  - atorvastatin (LIPITOR) 40 MG tablet; TAKE 1 TABLET BY MOUTH EVERY DAY IN THE EVENING  Dispense: 90 tablet; Refill: 1  - Semaglutide,0.25 or 0.5MG/DOS, (OZEMPIC, 0.25 OR 0.5 MG/DOSE,) 2 MG/3ML SOPN; Inject 0.25 mg into the skin once a week  Dispense: 3 mL; Refill: 1    3. Essential hypertension  Continue GDMT  - carvedilol (COREG) 3.125 MG tablet; Take 1 tablet by mouth 2 times daily (with meals)  Dispense: 180 tablet; Refill: 1  - lisinopril (PRINIVIL;ZESTRIL) 5 MG tablet; Take 1 tablet by mouth daily  Dispense: 90 tablet; Refill: 1    4. Chronic combined systolic and diastolic congestive heart failure (HCC)  Continue GDMT, hold flomax. - carvedilol (COREG) 3.125 MG tablet; Take 1 tablet by mouth 2 times daily (with meals)  Dispense: 180 tablet; Refill: 1  - furosemide (LASIX) 20 MG tablet; Take 1 tablet by mouth daily as needed (leg swelling)  Dispense: 90 tablet; Refill: 1  - lisinopril (PRINIVIL;ZESTRIL) 5 MG tablet; Take 1 tablet by mouth daily  Dispense: 90 tablet; Refill: 1  - TSH with Reflex; Future  - Semaglutide,0.25 or 0.5MG/DOS, (OZEMPIC, 0.25 OR 0.5 MG/DOSE,) 2 MG/3ML SOPN; Inject 0.25 mg into the skin once a week  Dispense: 3 mL; Refill: 1    5. Chronic bilateral low back pain without sciatica  Refill medication  - cyclobenzaprine (FLEXERIL) 10 MG tablet; Take 1 tablet by mouth 3 times daily as needed for Muscle spasms  Dispense: 90 tablet;  Refill: 5  - DULoxetine (CYMBALTA) 60 MG extended

## 2023-07-25 RX ADMIN — METHYLPREDNISOLONE ACETATE 40 MG: 40 INJECTION, SUSPENSION INTRA-ARTICULAR; INTRALESIONAL; INTRAMUSCULAR; SOFT TISSUE at 08:30

## 2023-07-25 RX ADMIN — LIDOCAINE HYDROCHLORIDE 2 ML: 10 INJECTION, SOLUTION EPIDURAL; INFILTRATION; INTRACAUDAL; PERINEURAL at 08:26

## 2023-07-25 ASSESSMENT — ENCOUNTER SYMPTOMS
BLOOD IN STOOL: 0
STRIDOR: 0
BACK PAIN: 0
CHEST TIGHTNESS: 0
COUGH: 0
VOMITING: 0
SHORTNESS OF BREATH: 0
ABDOMINAL PAIN: 0
NAUSEA: 0

## 2023-07-25 NOTE — PROGRESS NOTES
Patient consents to tendon sheath injection. Patient site identified, and name//site confirmed with time out immediately prior to procedure. 0.25cc of methylprednisolone and 0.75cc of lidocaine 1% without epinephrine drawn in 1cc syringe. Skin site marked and prepared with aseptic isopropyl alcohol solution. Allowed to dry 30seconds. Syringe attached with 25ga 1in needle, and advanced at marked site. Proper positioning confirmed, flash without evidence of blood. Peppered 1cc of mixture into EHL. Gentle massage of injection site bandaid dressing applied.      Pre-procedure pain score:6  Post-procedure pain: 0

## 2023-08-31 ENCOUNTER — TELEPHONE (OUTPATIENT)
Dept: INTERNAL MEDICINE CLINIC | Age: 68
End: 2023-08-31

## 2023-08-31 NOTE — TELEPHONE ENCOUNTER
Attempted to contact patient and reschedule the 9/11/2023 appointment due to the provider being out of the office. Unable to leave voicemail - greeting stated patient is not currently taking calls.

## 2023-11-20 ENCOUNTER — OFFICE VISIT (OUTPATIENT)
Dept: INTERNAL MEDICINE CLINIC | Age: 68
End: 2023-11-20
Payer: MEDICARE

## 2023-11-20 ENCOUNTER — TELEPHONE (OUTPATIENT)
Dept: INTERNAL MEDICINE CLINIC | Age: 68
End: 2023-11-20

## 2023-11-20 ENCOUNTER — HOSPITAL ENCOUNTER (OUTPATIENT)
Age: 68
Discharge: HOME | End: 2023-11-20
Payer: MEDICARE

## 2023-11-20 VITALS
BODY MASS INDEX: 28.06 KG/M2 | DIASTOLIC BLOOD PRESSURE: 82 MMHG | OXYGEN SATURATION: 99 % | SYSTOLIC BLOOD PRESSURE: 130 MMHG | HEIGHT: 70 IN | RESPIRATION RATE: 18 BRPM | WEIGHT: 196 LBS | HEART RATE: 67 BPM

## 2023-11-20 DIAGNOSIS — N40.0 BENIGN PROSTATIC HYPERPLASIA, UNSPECIFIED WHETHER LOWER URINARY TRACT SYMPTOMS PRESENT: ICD-10-CM

## 2023-11-20 DIAGNOSIS — I10 ESSENTIAL HYPERTENSION: ICD-10-CM

## 2023-11-20 DIAGNOSIS — J43.1 PANLOBULAR EMPHYSEMA (HCC): ICD-10-CM

## 2023-11-20 DIAGNOSIS — R60.0 LOWER EXTREMITY EDEMA: ICD-10-CM

## 2023-11-20 DIAGNOSIS — Z72.0 TOBACCO USE: ICD-10-CM

## 2023-11-20 DIAGNOSIS — K21.9 GASTROESOPHAGEAL REFLUX DISEASE, UNSPECIFIED WHETHER ESOPHAGITIS PRESENT: ICD-10-CM

## 2023-11-20 DIAGNOSIS — G89.29 CHRONIC BILATERAL LOW BACK PAIN WITHOUT SCIATICA: ICD-10-CM

## 2023-11-20 DIAGNOSIS — N52.9 VASCULOGENIC ERECTILE DYSFUNCTION, UNSPECIFIED VASCULOGENIC ERECTILE DYSFUNCTION TYPE: ICD-10-CM

## 2023-11-20 DIAGNOSIS — R73.03 PREDIABETES: ICD-10-CM

## 2023-11-20 DIAGNOSIS — E03.9 ACQUIRED HYPOTHYROIDISM: ICD-10-CM

## 2023-11-20 DIAGNOSIS — I50.42 CHRONIC COMBINED SYSTOLIC AND DIASTOLIC CONGESTIVE HEART FAILURE (HCC): ICD-10-CM

## 2023-11-20 DIAGNOSIS — I73.00 RAYNAUD'S PHENOMENON WITHOUT GANGRENE: ICD-10-CM

## 2023-11-20 DIAGNOSIS — D75.89 MACROCYTOSIS: ICD-10-CM

## 2023-11-20 DIAGNOSIS — I10 ESSENTIAL HYPERTENSION: Primary | ICD-10-CM

## 2023-11-20 DIAGNOSIS — I25.10 CORONARY ARTERY DISEASE INVOLVING NATIVE CORONARY ARTERY OF NATIVE HEART WITHOUT ANGINA PECTORIS: ICD-10-CM

## 2023-11-20 DIAGNOSIS — M54.50 CHRONIC BILATERAL LOW BACK PAIN WITHOUT SCIATICA: ICD-10-CM

## 2023-11-20 DIAGNOSIS — E78.2 MIXED HYPERLIPIDEMIA: ICD-10-CM

## 2023-11-20 LAB
25(OH)D3 SERPL-MCNC: 12.6 NG/ML
ALBUMIN SERPL-MCNC: 4.2 GM/DL (ref 3.4–5)
ALP BLD-CCNC: 88 IU/L (ref 40–129)
ALT SERPL-CCNC: 19 U/L (ref 10–40)
ANION GAP SERPL CALCULATED.3IONS-SCNC: 15 MMOL/L (ref 4–16)
AST SERPL-CCNC: 28 IU/L (ref 15–37)
BILIRUB SERPL-MCNC: 0.5 MG/DL (ref 0–1)
BUN SERPL-MCNC: 15 MG/DL (ref 6–23)
CALCIUM SERPL-MCNC: 9.5 MG/DL (ref 8.3–10.6)
CHLORIDE BLD-SCNC: 97 MMOL/L (ref 99–110)
CHOLEST SERPL-MCNC: 229 MG/DL
CO2: 27 MMOL/L (ref 21–32)
CREAT SERPL-MCNC: 1.1 MG/DL (ref 0.9–1.3)
ESTIMATED AVERAGE GLUCOSE: 123 MG/DL
FOLATE SERPL-MCNC: 13.3 NG/ML (ref 3.1–17.5)
GFR SERPL CREATININE-BSD FRML MDRD: >60 ML/MIN/1.73M2
GLUCOSE SERPL-MCNC: 92 MG/DL (ref 70–99)
HBA1C MFR BLD: 5.9 % (ref 4.2–6.3)
HCT VFR BLD CALC: 52.7 % (ref 42–52)
HDLC SERPL-MCNC: 43 MG/DL
HEMOGLOBIN: 17.8 GM/DL (ref 13.5–18)
LDLC SERPL CALC-MCNC: 149 MG/DL
MCH RBC QN AUTO: 35 PG (ref 27–31)
MCHC RBC AUTO-ENTMCNC: 33.8 % (ref 32–36)
MCV RBC AUTO: 103.7 FL (ref 78–100)
PDW BLD-RTO: 12.9 % (ref 11.7–14.9)
PLATELET # BLD: 199 K/CU MM (ref 140–440)
PMV BLD AUTO: 9.3 FL (ref 7.5–11.1)
POTASSIUM SERPL-SCNC: 4 MMOL/L (ref 3.5–5.1)
PRO-BNP: 242.5 PG/ML
RBC # BLD: 5.08 M/CU MM (ref 4.6–6.2)
SODIUM BLD-SCNC: 139 MMOL/L (ref 135–145)
T4 FREE SERPL-MCNC: 0.29 NG/DL (ref 0.9–1.8)
TOTAL PROTEIN: 7.5 GM/DL (ref 6.4–8.2)
TRIGL SERPL-MCNC: 187 MG/DL
TSH SERPL DL<=0.005 MIU/L-ACNC: 21.59 UIU/ML (ref 0.27–4.2)
VITAMIN B-12: 458.3 PG/ML (ref 211–911)
WBC # BLD: 9.1 K/CU MM (ref 4–10.5)

## 2023-11-20 PROCEDURE — G8484 FLU IMMUNIZE NO ADMIN: HCPCS | Performed by: GENERAL PRACTICE

## 2023-11-20 PROCEDURE — 83036 HEMOGLOBIN GLYCOSYLATED A1C: CPT

## 2023-11-20 PROCEDURE — 82306 VITAMIN D 25 HYDROXY: CPT

## 2023-11-20 PROCEDURE — 84443 ASSAY THYROID STIM HORMONE: CPT

## 2023-11-20 PROCEDURE — 3075F SYST BP GE 130 - 139MM HG: CPT | Performed by: GENERAL PRACTICE

## 2023-11-20 PROCEDURE — 84439 ASSAY OF FREE THYROXINE: CPT

## 2023-11-20 PROCEDURE — 3017F COLORECTAL CA SCREEN DOC REV: CPT | Performed by: GENERAL PRACTICE

## 2023-11-20 PROCEDURE — 80053 COMPREHEN METABOLIC PANEL: CPT

## 2023-11-20 PROCEDURE — 82746 ASSAY OF FOLIC ACID SERUM: CPT

## 2023-11-20 PROCEDURE — G8427 DOCREV CUR MEDS BY ELIG CLIN: HCPCS | Performed by: GENERAL PRACTICE

## 2023-11-20 PROCEDURE — 3079F DIAST BP 80-89 MM HG: CPT | Performed by: GENERAL PRACTICE

## 2023-11-20 PROCEDURE — 82607 VITAMIN B-12: CPT

## 2023-11-20 PROCEDURE — 36415 COLL VENOUS BLD VENIPUNCTURE: CPT

## 2023-11-20 PROCEDURE — 3023F SPIROM DOC REV: CPT | Performed by: GENERAL PRACTICE

## 2023-11-20 PROCEDURE — 85027 COMPLETE CBC AUTOMATED: CPT

## 2023-11-20 PROCEDURE — 80061 LIPID PANEL: CPT

## 2023-11-20 PROCEDURE — 99214 OFFICE O/P EST MOD 30 MIN: CPT | Performed by: GENERAL PRACTICE

## 2023-11-20 PROCEDURE — 1124F ACP DISCUSS-NO DSCNMKR DOCD: CPT | Performed by: GENERAL PRACTICE

## 2023-11-20 PROCEDURE — G8417 CALC BMI ABV UP PARAM F/U: HCPCS | Performed by: GENERAL PRACTICE

## 2023-11-20 PROCEDURE — 83880 ASSAY OF NATRIURETIC PEPTIDE: CPT

## 2023-11-20 PROCEDURE — 4004F PT TOBACCO SCREEN RCVD TLK: CPT | Performed by: GENERAL PRACTICE

## 2023-11-20 RX ORDER — SILDENAFIL 100 MG/1
100 TABLET, FILM COATED ORAL DAILY PRN
Qty: 10 TABLET | Refills: 5 | Status: SHIPPED | OUTPATIENT
Start: 2023-11-20

## 2023-11-20 RX ORDER — CYCLOBENZAPRINE HCL 10 MG
10 TABLET ORAL 3 TIMES DAILY PRN
Qty: 90 TABLET | Refills: 5 | Status: SHIPPED | OUTPATIENT
Start: 2023-11-20

## 2023-11-20 RX ORDER — TAMSULOSIN HYDROCHLORIDE 0.4 MG/1
CAPSULE ORAL
COMMUNITY
Start: 2023-08-19

## 2023-11-20 RX ORDER — LEVOTHYROXINE SODIUM 112 UG/1
TABLET ORAL
Qty: 90 TABLET | Refills: 1 | Status: SHIPPED | OUTPATIENT
Start: 2023-11-20

## 2023-11-20 RX ORDER — FINASTERIDE 5 MG/1
5 TABLET, FILM COATED ORAL DAILY
Qty: 90 TABLET | Refills: 1 | Status: SHIPPED | OUTPATIENT
Start: 2023-11-20

## 2023-11-20 RX ORDER — LISINOPRIL 5 MG/1
5 TABLET ORAL DAILY
Qty: 90 TABLET | Refills: 1 | Status: SHIPPED | OUTPATIENT
Start: 2023-11-20

## 2023-11-20 RX ORDER — ALBUTEROL SULFATE 90 UG/1
2 AEROSOL, METERED RESPIRATORY (INHALATION)
Qty: 1 EACH | Refills: 5 | Status: SHIPPED | OUTPATIENT
Start: 2023-11-20

## 2023-11-20 RX ORDER — CARVEDILOL 3.12 MG/1
3.12 TABLET ORAL 2 TIMES DAILY WITH MEALS
Qty: 180 TABLET | Refills: 1 | Status: SHIPPED | OUTPATIENT
Start: 2023-11-20

## 2023-11-20 RX ORDER — FUROSEMIDE 40 MG/1
40 TABLET ORAL DAILY PRN
Qty: 90 TABLET | Refills: 3 | Status: SHIPPED | OUTPATIENT
Start: 2023-11-20

## 2023-11-20 RX ORDER — SEMAGLUTIDE 0.68 MG/ML
0.25 INJECTION, SOLUTION SUBCUTANEOUS WEEKLY
Qty: 3 ML | Refills: 1 | Status: SHIPPED | OUTPATIENT
Start: 2023-11-20

## 2023-11-20 RX ORDER — DULOXETIN HYDROCHLORIDE 60 MG/1
60 CAPSULE, DELAYED RELEASE ORAL DAILY
Qty: 90 CAPSULE | Refills: 1 | Status: SHIPPED | OUTPATIENT
Start: 2023-11-20

## 2023-11-20 RX ORDER — FLUTICASONE FUROATE AND VILANTEROL 100; 25 UG/1; UG/1
1 POWDER RESPIRATORY (INHALATION) DAILY
Qty: 1 EACH | Refills: 3 | Status: SHIPPED | OUTPATIENT
Start: 2023-11-20

## 2023-11-20 RX ORDER — TAMSULOSIN HYDROCHLORIDE 0.4 MG/1
CAPSULE ORAL
Qty: 30 CAPSULE | Status: CANCELLED | OUTPATIENT
Start: 2023-11-20

## 2023-11-20 RX ORDER — SILDENAFIL 100 MG/1
100 TABLET, FILM COATED ORAL DAILY PRN
Qty: 10 TABLET | Refills: 5 | Status: SHIPPED | OUTPATIENT
Start: 2023-11-20 | End: 2023-11-20

## 2023-11-20 RX ORDER — ATORVASTATIN CALCIUM 40 MG/1
TABLET, FILM COATED ORAL
Qty: 90 TABLET | Refills: 1 | Status: SHIPPED | OUTPATIENT
Start: 2023-11-20

## 2023-11-20 RX ORDER — FAMOTIDINE 20 MG/1
TABLET, FILM COATED ORAL
Qty: 90 TABLET | Refills: 1 | Status: SHIPPED | OUTPATIENT
Start: 2023-11-20

## 2023-11-20 RX ORDER — TIOTROPIUM BROMIDE 18 UG/1
18 CAPSULE ORAL; RESPIRATORY (INHALATION) DAILY
Qty: 90 CAPSULE | Refills: 1 | Status: SHIPPED | OUTPATIENT
Start: 2023-11-20

## 2023-11-20 ASSESSMENT — ENCOUNTER SYMPTOMS
ABDOMINAL PAIN: 0
VOMITING: 0
STRIDOR: 0
NAUSEA: 0
BACK PAIN: 0
CHEST TIGHTNESS: 0
COUGH: 0
SHORTNESS OF BREATH: 0
BLOOD IN STOOL: 0

## 2023-11-20 NOTE — PROGRESS NOTES
Cleon Call (:  1955) is a 70 y.o. male,Established patient, here for evaluation of the following chief complaint(s):  Follow-up          ASSESSMENT/PLAN:  1. Essential hypertension  Labs ordered, refill coreg and acei  - TSH; Future  - FREE T4; Future  - carvedilol (COREG) 3.125 MG tablet; Take 1 tablet by mouth 2 times daily (with meals)  Dispense: 180 tablet; Refill: 1  - lisinopril (PRINIVIL;ZESTRIL) 5 MG tablet; Take 1 tablet by mouth daily  Dispense: 90 tablet; Refill: 1    2. Chronic combined systolic and diastolic congestive heart failure (HCC)  Continue GDMT, refill medication. Hypervolemic, increase lasix to 40mg daily, can take a 2nd tablet as needed for LE swelling.  - carvedilol (COREG) 3.125 MG tablet; Take 1 tablet by mouth 2 times daily (with meals)  Dispense: 180 tablet; Refill: 1  - furosemide (LASIX) 40 MG tablet; Take 1 tablet by mouth daily as needed (leg swelling)  Dispense: 90 tablet; Refill: 3  - lisinopril (PRINIVIL;ZESTRIL) 5 MG tablet; Take 1 tablet by mouth daily  Dispense: 90 tablet; Refill: 1  - Semaglutide,0.25 or 0.5MG/DOS, (OZEMPIC, 0.25 OR 0.5 MG/DOSE,) 2 MG/3ML SOPN; Inject 0.25 mg into the skin once a week  Dispense: 3 mL; Refill: 1    3. Coronary artery disease involving native coronary artery of native heart without angina pectoris  Cont statin and diet control    4. Panlobular emphysema (HCC)  Refill inhalers, symptoms controlled. - tiotropium (SPIRIVA HANDIHALER) 18 MCG inhalation capsule; Inhale 1 capsule into the lungs daily  Dispense: 90 capsule; Refill: 1  - albuterol sulfate HFA (PROVENTIL;VENTOLIN;PROAIR) 108 (90 Base) MCG/ACT inhaler; Inhale 2 puffs into the lungs every 4 hours (while awake)  Dispense: 1 each; Refill: 5    5. Gastroesophageal reflux disease, unspecified whether esophagitis present  Refill pepcid, check labs. Cont diet control  - Vitamin D 25 Hydroxy;  Future  - famotidine (PEPCID) 20 MG tablet; TAKE 1/2 TABLET BY MOUTH 2 TIMES DAILY AS

## 2023-11-20 NOTE — PROGRESS NOTES
Patient is having issues with his hands and feet swelling. He would like to get back on lasix. Patient did not have labs done. He had to leave town unexpectedly.

## 2023-11-20 NOTE — TELEPHONE ENCOUNTER
Pa approved for [facility] insurance will only cover brandname - spoke with pharmacy and they stated already has been taken care of

## 2023-12-09 RX ORDER — TAMSULOSIN HYDROCHLORIDE 0.4 MG/1
0.4 CAPSULE ORAL DAILY
Qty: 90 CAPSULE | Refills: 3 | Status: SHIPPED | OUTPATIENT
Start: 2023-12-09 | End: 2024-12-03

## 2023-12-09 NOTE — TELEPHONE ENCOUNTER
Medication:   Requested Prescriptions     Pending Prescriptions Disp Refills    tamsulosin (FLOMAX) 0.4 MG capsule 30 capsule 3     Sig: Take 1 capsule by mouth daily        Last Filled:  08/19/2023    Patient Phone Number: [phone] (home)     Last appt: 11/20/2023   Next appt: 2/23/2024    Last OARRS:        No data to display

## 2023-12-19 ENCOUNTER — TELEPHONE (OUTPATIENT)
Dept: INTERNAL MEDICINE CLINIC | Age: 68
End: 2023-12-19

## 2023-12-19 NOTE — TELEPHONE ENCOUNTER
Insurance will only pay for brand Cher Bo - contacted Pharmacy to dispense brand only  They have dispensed and given to patient

## 2023-12-20 PROBLEM — I60.9 SAH (SUBARACHNOID HEMORRHAGE) (HCC): Status: ACTIVE | Noted: 2021-03-17

## 2023-12-26 PROBLEM — Z87.898 HISTORY OF SYNCOPE: Status: ACTIVE | Noted: 2021-09-20

## 2023-12-26 NOTE — PROGRESS NOTES
02/02/2021    ef 45-50%,  Mild LVH. H/O exercise stress test 12/16/2020    Submaximal study due to failure to achieve target heart rate response and    Hx of cardiovascular stress test 09/20/2021    LVEF, increased EDB, Normal tracer uptake in all myocardial segment during rest and stress. Decreased uptake inferiorly due to diaphragmatic artifact. Hx of Doppler ultrasound 04/13/2021    significant reflux noted of the right CFV. Significant reflux noted in the Left SSV Distal.    Hyperlipidemia     Hypertension     Follows with PCP    IBS (irritable bowel syndrome)     Orthostatic hypotension 7/18/2023    Pancreatitis 2013    Rheumatoid arthritis (HCC)     S/P CABG x 3 10/19/2020    SOB (shortness of breath)     Thyroid disease        Current Outpatient Medications   Medication Sig Dispense Refill    atorvastatin (LIPITOR) 80 MG tablet TAKE 1 TABLET BY MOUTH EVERY DAY IN THE EVENING 90 tablet 3    vitamin D (ERGOCALCIFEROL) 1.25 MG (12220 UT) CAPS capsule Take 1 capsule by mouth once a week 12 capsule 1    levothyroxine (SYNTHROID) 112 MCG tablet TAKE 1 TABLET BY MOUTH EVERY DAY 90 tablet 1    fluticasone-salmeterol (ADVAIR) 250-50 MCG/ACT AEPB diskus inhaler Inhale 1 puff into the lungs in the morning and 1 puff in the evening.  180 each 1    tamsulosin (FLOMAX) 0.4 MG capsule Take 1 capsule by mouth daily 90 capsule 3    carvedilol (COREG) 3.125 MG tablet Take 1 tablet by mouth 2 times daily (with meals) 180 tablet 1    cyclobenzaprine (FLEXERIL) 10 MG tablet Take 1 tablet by mouth 3 times daily as needed for Muscle spasms 90 tablet 5    DULoxetine (CYMBALTA) 60 MG extended release capsule Take 1 capsule by mouth daily 90 capsule 1    famotidine (PEPCID) 20 MG tablet TAKE 1/2 TABLET BY MOUTH 2 TIMES DAILY AS NEEDED (REFLUX) 90 tablet 1    finasteride (PROSCAR) 5 MG tablet Take 1 tablet by mouth daily 90 tablet 1    furosemide (LASIX) 40 MG tablet Take 1 tablet by mouth daily as needed (leg swelling) 90 tablet

## 2023-12-27 ENCOUNTER — OFFICE VISIT (OUTPATIENT)
Dept: CARDIOLOGY CLINIC | Age: 68
End: 2023-12-27
Payer: MEDICARE

## 2023-12-27 VITALS
HEART RATE: 76 BPM | OXYGEN SATURATION: 96 % | WEIGHT: 193 LBS | HEIGHT: 70 IN | SYSTOLIC BLOOD PRESSURE: 112 MMHG | DIASTOLIC BLOOD PRESSURE: 64 MMHG | BODY MASS INDEX: 27.63 KG/M2

## 2023-12-27 DIAGNOSIS — R55 SYNCOPE, UNSPECIFIED SYNCOPE TYPE: ICD-10-CM

## 2023-12-27 DIAGNOSIS — I25.10 CORONARY ARTERY DISEASE INVOLVING NATIVE CORONARY ARTERY OF NATIVE HEART WITHOUT ANGINA PECTORIS: ICD-10-CM

## 2023-12-27 DIAGNOSIS — E78.2 MIXED HYPERLIPIDEMIA: Primary | ICD-10-CM

## 2023-12-27 DIAGNOSIS — I10 ESSENTIAL HYPERTENSION: ICD-10-CM

## 2023-12-27 PROCEDURE — G8484 FLU IMMUNIZE NO ADMIN: HCPCS | Performed by: NURSE PRACTITIONER

## 2023-12-27 PROCEDURE — 3074F SYST BP LT 130 MM HG: CPT | Performed by: NURSE PRACTITIONER

## 2023-12-27 PROCEDURE — 4004F PT TOBACCO SCREEN RCVD TLK: CPT | Performed by: NURSE PRACTITIONER

## 2023-12-27 PROCEDURE — 3017F COLORECTAL CA SCREEN DOC REV: CPT | Performed by: NURSE PRACTITIONER

## 2023-12-27 PROCEDURE — G8427 DOCREV CUR MEDS BY ELIG CLIN: HCPCS | Performed by: NURSE PRACTITIONER

## 2023-12-27 PROCEDURE — 1124F ACP DISCUSS-NO DSCNMKR DOCD: CPT | Performed by: NURSE PRACTITIONER

## 2023-12-27 PROCEDURE — G8417 CALC BMI ABV UP PARAM F/U: HCPCS | Performed by: NURSE PRACTITIONER

## 2023-12-27 PROCEDURE — 3078F DIAST BP <80 MM HG: CPT | Performed by: NURSE PRACTITIONER

## 2023-12-27 PROCEDURE — 99214 OFFICE O/P EST MOD 30 MIN: CPT | Performed by: NURSE PRACTITIONER

## 2023-12-27 RX ORDER — ATORVASTATIN CALCIUM 80 MG/1
TABLET, FILM COATED ORAL
Qty: 90 TABLET | Refills: 3 | Status: SHIPPED | OUTPATIENT
Start: 2023-12-27

## 2024-02-23 ENCOUNTER — TELEPHONE (OUTPATIENT)
Dept: INTERNAL MEDICINE CLINIC | Age: 69
End: 2024-02-23

## 2024-02-27 ENCOUNTER — HOSPITAL ENCOUNTER (OUTPATIENT)
Age: 69
Discharge: HOME | End: 2024-02-27
Payer: MEDICARE

## 2024-02-27 DIAGNOSIS — R73.03 PREDIABETES: ICD-10-CM

## 2024-02-27 DIAGNOSIS — I10 ESSENTIAL HYPERTENSION: ICD-10-CM

## 2024-02-27 DIAGNOSIS — E03.9 ACQUIRED HYPOTHYROIDISM: ICD-10-CM

## 2024-02-27 DIAGNOSIS — E55.9 VITAMIN D DEFICIENCY: ICD-10-CM

## 2024-02-27 LAB
ALBUMIN SERPL-MCNC: 4.1 GM/DL (ref 3.4–5)
ALP BLD-CCNC: 93 IU/L (ref 40–129)
ALT SERPL-CCNC: 18 U/L (ref 10–40)
ANION GAP SERPL CALCULATED.3IONS-SCNC: 12 MMOL/L (ref 7–16)
AST SERPL-CCNC: 15 IU/L (ref 15–37)
BILIRUB SERPL-MCNC: 0.6 MG/DL (ref 0–1)
BUN SERPL-MCNC: 15 MG/DL (ref 6–23)
CALCIUM SERPL-MCNC: 9.1 MG/DL (ref 8.3–10.6)
CHLORIDE BLD-SCNC: 95 MMOL/L (ref 99–110)
CO2: 27 MMOL/L (ref 21–32)
CREAT SERPL-MCNC: 1 MG/DL (ref 0.9–1.3)
ESTIMATED AVERAGE GLUCOSE: 131 MG/DL
GFR SERPL CREATININE-BSD FRML MDRD: >60 ML/MIN/1.73M2
GLUCOSE SERPL-MCNC: 99 MG/DL (ref 70–99)
HBA1C MFR BLD: 6.2 % (ref 4.2–6.3)
POTASSIUM SERPL-SCNC: 3.9 MMOL/L (ref 3.5–5.1)
SODIUM BLD-SCNC: 134 MMOL/L (ref 135–145)
T4 FREE SERPL-MCNC: 0.72 NG/DL (ref 0.9–1.8)
TOTAL PROTEIN: 7 GM/DL (ref 6.4–8.2)
TSH SERPL DL<=0.005 MIU/L-ACNC: 10.66 UIU/ML (ref 0.27–4.2)

## 2024-02-27 PROCEDURE — 80053 COMPREHEN METABOLIC PANEL: CPT

## 2024-02-27 PROCEDURE — 82306 VITAMIN D 25 HYDROXY: CPT

## 2024-02-27 PROCEDURE — 84443 ASSAY THYROID STIM HORMONE: CPT

## 2024-02-27 PROCEDURE — 84439 ASSAY OF FREE THYROXINE: CPT

## 2024-02-27 PROCEDURE — 83036 HEMOGLOBIN GLYCOSYLATED A1C: CPT

## 2024-02-27 PROCEDURE — 36415 COLL VENOUS BLD VENIPUNCTURE: CPT

## 2024-02-28 DIAGNOSIS — E03.9 ACQUIRED HYPOTHYROIDISM: ICD-10-CM

## 2024-02-28 LAB — 25(OH)D3 SERPL-MCNC: 35.45 NG/ML

## 2024-02-28 RX ORDER — LEVOTHYROXINE SODIUM 137 UG/1
TABLET ORAL
Qty: 90 TABLET | Refills: 1 | Status: SHIPPED | OUTPATIENT
Start: 2024-02-28

## 2024-03-06 ENCOUNTER — TELEPHONE (OUTPATIENT)
Age: 69
End: 2024-03-06

## 2024-03-06 NOTE — TELEPHONE ENCOUNTER
----- Message from Wili Cardona MD sent at 2/28/2024 10:49 AM EST -----  Persistent lab findings of hypothyroidism, I recommend increasing synthroid again to 137mcg. Assess thyroid function in 3-6mo.

## 2024-03-10 ENCOUNTER — APPOINTMENT (OUTPATIENT)
Dept: GENERAL RADIOLOGY | Age: 69
End: 2024-03-10
Payer: MEDICARE

## 2024-03-10 ENCOUNTER — HOSPITAL ENCOUNTER (EMERGENCY)
Age: 69
Discharge: HOME | End: 2024-03-10
Attending: EMERGENCY MEDICINE
Payer: MEDICARE

## 2024-03-10 VITALS
DIASTOLIC BLOOD PRESSURE: 59 MMHG | OXYGEN SATURATION: 92 % | SYSTOLIC BLOOD PRESSURE: 121 MMHG | WEIGHT: 195 LBS | TEMPERATURE: 97.5 F | HEIGHT: 70 IN | RESPIRATION RATE: 14 BRPM | BODY MASS INDEX: 27.92 KG/M2 | HEART RATE: 63 BPM

## 2024-03-10 DIAGNOSIS — R07.89 ATYPICAL CHEST PAIN: Primary | ICD-10-CM

## 2024-03-10 LAB
ANION GAP SERPL CALCULATED.3IONS-SCNC: 11 MMOL/L (ref 7–16)
BASOPHILS ABSOLUTE: 0.1 K/CU MM
BASOPHILS RELATIVE PERCENT: 0.7 % (ref 0–1)
BUN SERPL-MCNC: 16 MG/DL (ref 6–23)
CALCIUM SERPL-MCNC: 8.9 MG/DL (ref 8.3–10.6)
CHLORIDE BLD-SCNC: 98 MMOL/L (ref 99–110)
CO2: 30 MMOL/L (ref 21–32)
CREAT SERPL-MCNC: 0.9 MG/DL (ref 0.9–1.3)
DIFFERENTIAL TYPE: ABNORMAL
EOSINOPHILS ABSOLUTE: 0.5 K/CU MM
EOSINOPHILS RELATIVE PERCENT: 5.8 % (ref 0–3)
GFR SERPL CREATININE-BSD FRML MDRD: >60 ML/MIN/1.73M2
GLUCOSE SERPL-MCNC: 76 MG/DL (ref 70–99)
HCT VFR BLD CALC: 48.9 % (ref 42–52)
HEMOGLOBIN: 16.2 GM/DL (ref 13.5–18)
IMMATURE NEUTROPHIL %: 0.1 % (ref 0–0.43)
LYMPHOCYTES ABSOLUTE: 2.1 K/CU MM
LYMPHOCYTES RELATIVE PERCENT: 24.4 % (ref 24–44)
MCH RBC QN AUTO: 33.2 PG (ref 27–31)
MCHC RBC AUTO-ENTMCNC: 33.1 % (ref 32–36)
MCV RBC AUTO: 100.2 FL (ref 78–100)
MONOCYTES ABSOLUTE: 0.8 K/CU MM
MONOCYTES RELATIVE PERCENT: 9.6 % (ref 0–4)
PDW BLD-RTO: 13.3 % (ref 11.7–14.9)
PLATELET # BLD: 202 K/CU MM (ref 140–440)
PMV BLD AUTO: 8.7 FL (ref 7.5–11.1)
POTASSIUM SERPL-SCNC: 3.8 MMOL/L (ref 3.5–5.1)
PRO-BNP: 543.7 PG/ML
RBC # BLD: 4.88 M/CU MM (ref 4.6–6.2)
SEGMENTED NEUTROPHILS ABSOLUTE COUNT: 5.1 K/CU MM
SEGMENTED NEUTROPHILS RELATIVE PERCENT: 59.4 % (ref 36–66)
SODIUM BLD-SCNC: 139 MMOL/L (ref 135–145)
TOTAL IMMATURE NEUTOROPHIL: 0.01 K/CU MM
TROPONIN, HIGH SENSITIVITY: 15 NG/L (ref 0–22)
TROPONIN, HIGH SENSITIVITY: 15 NG/L (ref 0–22)
TSH SERPL DL<=0.005 MIU/L-ACNC: 2.7 UIU/ML (ref 0.27–4.2)
WBC # BLD: 8.5 K/CU MM (ref 4–10.5)

## 2024-03-10 PROCEDURE — 6370000000 HC RX 637 (ALT 250 FOR IP): Performed by: EMERGENCY MEDICINE

## 2024-03-10 PROCEDURE — 84484 ASSAY OF TROPONIN QUANT: CPT

## 2024-03-10 PROCEDURE — 85025 COMPLETE CBC W/AUTO DIFF WBC: CPT

## 2024-03-10 PROCEDURE — 80048 BASIC METABOLIC PNL TOTAL CA: CPT

## 2024-03-10 PROCEDURE — 93005 ELECTROCARDIOGRAM TRACING: CPT | Performed by: EMERGENCY MEDICINE

## 2024-03-10 PROCEDURE — 99285 EMERGENCY DEPT VISIT HI MDM: CPT

## 2024-03-10 PROCEDURE — 71045 X-RAY EXAM CHEST 1 VIEW: CPT

## 2024-03-10 PROCEDURE — 83880 ASSAY OF NATRIURETIC PEPTIDE: CPT

## 2024-03-10 PROCEDURE — 84443 ASSAY THYROID STIM HORMONE: CPT

## 2024-03-10 RX ORDER — ASPIRIN 81 MG/1
324 TABLET, CHEWABLE ORAL ONCE
Status: COMPLETED | OUTPATIENT
Start: 2024-03-10 | End: 2024-03-10

## 2024-03-10 RX ORDER — NITROGLYCERIN 0.4 MG/1
0.4 TABLET SUBLINGUAL EVERY 5 MIN PRN
Status: DISCONTINUED | OUTPATIENT
Start: 2024-03-10 | End: 2024-03-10 | Stop reason: HOSPADM

## 2024-03-10 RX ORDER — FUROSEMIDE 20 MG/1
20 TABLET ORAL ONCE
Status: COMPLETED | OUTPATIENT
Start: 2024-03-10 | End: 2024-03-10

## 2024-03-10 RX ADMIN — ASPIRIN 81 MG CHEWABLE TABLET 324 MG: 81 TABLET CHEWABLE at 19:24

## 2024-03-10 RX ADMIN — NITROGLYCERIN 0.4 MG: 0.4 TABLET, ORALLY DISINTEGRATING SUBLINGUAL at 19:25

## 2024-03-10 RX ADMIN — FUROSEMIDE 20 MG: 20 TABLET ORAL at 21:23

## 2024-03-10 ASSESSMENT — PAIN DESCRIPTION - ORIENTATION: ORIENTATION: MID;LEFT;RIGHT

## 2024-03-10 ASSESSMENT — PAIN SCALES - GENERAL: PAINLEVEL_OUTOF10: 8

## 2024-03-10 ASSESSMENT — HEART SCORE: ECG: 1

## 2024-03-10 ASSESSMENT — PAIN DESCRIPTION - LOCATION
LOCATION: CHEST
LOCATION: CHEST;ARM

## 2024-03-10 ASSESSMENT — LIFESTYLE VARIABLES
HOW MANY STANDARD DRINKS CONTAINING ALCOHOL DO YOU HAVE ON A TYPICAL DAY: PATIENT DOES NOT DRINK
HOW OFTEN DO YOU HAVE A DRINK CONTAINING ALCOHOL: NEVER

## 2024-03-10 ASSESSMENT — PAIN DESCRIPTION - DESCRIPTORS: DESCRIPTORS: SHARP

## 2024-03-11 LAB
EKG ATRIAL RATE: 63 BPM
EKG DIAGNOSIS: NORMAL
EKG P AXIS: 41 DEGREES
EKG P-R INTERVAL: 198 MS
EKG Q-T INTERVAL: 430 MS
EKG QRS DURATION: 100 MS
EKG QTC CALCULATION (BAZETT): 440 MS
EKG R AXIS: 27 DEGREES
EKG T AXIS: 44 DEGREES
EKG VENTRICULAR RATE: 63 BPM

## 2024-03-11 PROCEDURE — 93010 ELECTROCARDIOGRAM REPORT: CPT | Performed by: INTERNAL MEDICINE

## 2024-03-11 NOTE — ED PROVIDER NOTES
Shortness of Breath  Patient did have some increasing shortness of breath over the past few days.  Patient has a known history of some congestive heart failure.  The patient has recent echo from back in July which showed an ejection fraction of 55% however the patient does take Lasix he states that he feels as if he has had some increased leg swelling and he has been having some increasing shortness of breath.  Patient's shortness of breath is primarily with exertion.  The patient did not know whether this was represented by his COPD or whether the fact that he had some buildup of fluid.  The patient was also having some chest discomfort which she described as a nonspecific pain which seem to radiate up into his shoulders bilaterally as well as going down into the upper portions of his abdomen.  The patient states that he also felt lightheaded and had some nausea approximately 1 hour prior to presentation.    Review of Systems   Constitutional: Negative.    HENT: Negative.     Eyes: Negative.    Respiratory:  Positive for shortness of breath.    Cardiovascular: Negative.    Gastrointestinal: Negative.    Genitourinary: Negative.    Musculoskeletal: Negative.    Skin: Negative.    Neurological: Negative.    All other systems reviewed and are negative.      Family History   Problem Relation Age of Onset    Alzheimer's Disease Mother     Heart Disease Father     Heart Attack Father     High Blood Pressure Father     High Cholesterol Father      Social History     Socioeconomic History    Marital status:      Spouse name: Not on file    Number of children: Not on file    Years of education: Not on file    Highest education level: Not on file   Occupational History    Not on file   Tobacco Use    Smoking status: Former     Current packs/day: 0.00     Average packs/day: 2.0 packs/day for 52.7 years (105.5 ttl pk-yrs)     Types: Cigarettes     Start date: 1968     Quit date: 10/1/2020     Years since quitting: 3.4

## 2024-03-11 NOTE — DISCHARGE INSTR - COC
Manager/ signature: {Esignature:155728263}    PHYSICIAN SECTION    Prognosis: {Prognosis:6258672147}    Condition at Discharge: { Patient Condition:501910439}    Rehab Potential (if transferring to Rehab): {Prognosis:9745819479}    Recommended Labs or Other Treatments After Discharge: ***    Physician Certification: I certify the above information and transfer of Keyur Adrian  is necessary for the continuing treatment of the diagnosis listed and that he requires {Admit to Appropriate Level of Care:37487} for {GREATER/LESS:036081229} 30 days.     Update Admission H&P: {CHP DME Changes in HandP:026641632}    PHYSICIAN SIGNATURE:  {Esignature:011121636}

## 2024-04-18 ENCOUNTER — CARE COORDINATION (OUTPATIENT)
Dept: CARE COORDINATION | Age: 69
End: 2024-04-18

## 2024-05-10 ENCOUNTER — CARE COORDINATION (OUTPATIENT)
Dept: CARE COORDINATION | Age: 69
End: 2024-05-10

## 2024-06-04 ENCOUNTER — CARE COORDINATION (OUTPATIENT)
Dept: CARE COORDINATION | Age: 69
End: 2024-06-04

## 2024-07-19 ENCOUNTER — TELEPHONE (OUTPATIENT)
Age: 69
End: 2024-07-19

## 2024-07-25 ENCOUNTER — TELEPHONE (OUTPATIENT)
Age: 69
End: 2024-07-25

## 2024-08-23 ENCOUNTER — HOSPITAL ENCOUNTER (EMERGENCY)
Age: 69
Discharge: HOME | End: 2024-08-23
Attending: EMERGENCY MEDICINE
Payer: MEDICARE

## 2024-08-23 ENCOUNTER — APPOINTMENT (OUTPATIENT)
Dept: GENERAL RADIOLOGY | Age: 69
End: 2024-08-23
Payer: MEDICARE

## 2024-08-23 ENCOUNTER — APPOINTMENT (OUTPATIENT)
Dept: CT IMAGING | Age: 69
End: 2024-08-23
Payer: MEDICARE

## 2024-08-23 VITALS
BODY MASS INDEX: 27.2 KG/M2 | RESPIRATION RATE: 18 BRPM | TEMPERATURE: 97.7 F | DIASTOLIC BLOOD PRESSURE: 88 MMHG | SYSTOLIC BLOOD PRESSURE: 163 MMHG | OXYGEN SATURATION: 96 % | HEIGHT: 70 IN | WEIGHT: 190 LBS | HEART RATE: 69 BPM

## 2024-08-23 DIAGNOSIS — E03.9 HYPOTHYROIDISM, UNSPECIFIED TYPE: ICD-10-CM

## 2024-08-23 DIAGNOSIS — R25.1 SHAKINESS: ICD-10-CM

## 2024-08-23 DIAGNOSIS — R53.1 GENERAL WEAKNESS: Primary | ICD-10-CM

## 2024-08-23 LAB
BASOPHILS ABSOLUTE: 0.1 K/CU MM
BASOPHILS RELATIVE PERCENT: 1 % (ref 0–1)
D-DIMER QUANTITATIVE: 0.77 UG/ML (FEU)
DIFFERENTIAL TYPE: ABNORMAL
EKG ATRIAL RATE: 68 BPM
EKG DIAGNOSIS: NORMAL
EKG P AXIS: -18 DEGREES
EKG P-R INTERVAL: 196 MS
EKG Q-T INTERVAL: 464 MS
EKG QRS DURATION: 98 MS
EKG QTC CALCULATION (BAZETT): 493 MS
EKG R AXIS: 14 DEGREES
EKG T AXIS: 59 DEGREES
EKG VENTRICULAR RATE: 68 BPM
EOSINOPHILS ABSOLUTE: 0.2 K/CU MM
EOSINOPHILS RELATIVE PERCENT: 2.9 % (ref 0–3)
HCT VFR BLD CALC: 52.5 % (ref 42–52)
HEMOGLOBIN: 18.2 GM/DL (ref 13.5–18)
IMMATURE NEUTROPHIL %: 0.3 % (ref 0–0.43)
LYMPHOCYTES ABSOLUTE: 1.8 K/CU MM
LYMPHOCYTES RELATIVE PERCENT: 28.2 % (ref 24–44)
MAGNESIUM: 2 MG/DL (ref 1.8–2.4)
MCH RBC QN AUTO: 35.3 PG (ref 27–31)
MCHC RBC AUTO-ENTMCNC: 34.7 % (ref 32–36)
MCV RBC AUTO: 101.9 FL (ref 78–100)
MONOCYTES ABSOLUTE: 0.5 K/CU MM
MONOCYTES RELATIVE PERCENT: 7.6 % (ref 0–4)
NEUTROPHILS ABSOLUTE: 3.8 K/CU MM
NEUTROPHILS RELATIVE PERCENT: 60 % (ref 36–66)
PDW BLD-RTO: 14 % (ref 11.7–14.9)
PLATELET # BLD: 173 K/CU MM (ref 140–440)
PMV BLD AUTO: 9.5 FL (ref 7.5–11.1)
PRO-BNP: 606.2 PG/ML
RBC # BLD: 5.15 M/CU MM (ref 4.6–6.2)
TOTAL IMMATURE NEUTOROPHIL: 0.02 K/CU MM
TROPONIN, HIGH SENSITIVITY: 26 NG/L (ref 0–22)
TROPONIN, HIGH SENSITIVITY: 29 NG/L (ref 0–22)
TSH SERPL DL<=0.005 MIU/L-ACNC: 22.84 UIU/ML (ref 0.27–4.2)
WBC # BLD: 6.3 K/CU MM (ref 4–10.5)

## 2024-08-23 PROCEDURE — 99285 EMERGENCY DEPT VISIT HI MDM: CPT

## 2024-08-23 PROCEDURE — 84484 ASSAY OF TROPONIN QUANT: CPT

## 2024-08-23 PROCEDURE — 71045 X-RAY EXAM CHEST 1 VIEW: CPT

## 2024-08-23 PROCEDURE — 83880 ASSAY OF NATRIURETIC PEPTIDE: CPT

## 2024-08-23 PROCEDURE — 93010 ELECTROCARDIOGRAM REPORT: CPT | Performed by: INTERNAL MEDICINE

## 2024-08-23 PROCEDURE — 83735 ASSAY OF MAGNESIUM: CPT

## 2024-08-23 PROCEDURE — 84443 ASSAY THYROID STIM HORMONE: CPT

## 2024-08-23 PROCEDURE — 85379 FIBRIN DEGRADATION QUANT: CPT

## 2024-08-23 PROCEDURE — 93005 ELECTROCARDIOGRAM TRACING: CPT | Performed by: EMERGENCY MEDICINE

## 2024-08-23 PROCEDURE — 85025 COMPLETE CBC W/AUTO DIFF WBC: CPT

## 2024-08-23 ASSESSMENT — PAIN SCALES - GENERAL: PAINLEVEL_OUTOF10: 0

## 2024-08-23 ASSESSMENT — PAIN - FUNCTIONAL ASSESSMENT: PAIN_FUNCTIONAL_ASSESSMENT: 0-10

## 2024-08-23 NOTE — DISCHARGE INSTRUCTIONS
Follow-up with Dr. Galindo soon as possible regarding your thyroid functions.  Return to the ER for any worsening signs and symptoms.

## 2024-08-23 NOTE — ED PROVIDER NOTES
Emergency Department Encounter  Location: [facility] EMERGENCY DEPARTMENT    Patient: Keyur Adrian  MRN: [id number]  : 1955  Date of evaluation: 2024  ED Provider: Joao Starks DO, FACEP    Chief Complaint:    Fatigue (X2 hours feeling shaky/red and not well when getting ready for work. Denies pain upon arrival to ER )    [place]:  Keyur Adrian is a 68 y.o. male that presents to the emergency department with complaints of feeling shaky and feeling red and hot when he awoke this morning around 4:30 AM to go to work.  The patient states that he took his blood pressure and it recorded \"302/212\".  The patient thinks that his blood pressure medicine is not strong enough.  He did have cardiac bypass 3 years ago.  He states he felt slightly short of breath but denied any chest pain.  He took his morning medication and he is feeling better at this time.  He called into work and states that he would like to be off through the weekend to rest and recuperate.  He states he has been feeling weak.  He states he has felt weak since he had his bypass.  He denies fever or chills.  He denies vomiting or diarrhea.  He denies urinary symptoms or abdominal pain.  He has had no chest pain today.      ROS - see HPI, below listed is current ROS at time of my eval:  At least 10 systems reviewed and otherwise acutely negative except as in the [place].  General:  No fevers, no chills, positive for generalized weakness  Eyes:  No recent vison changes, no discharge  ENT:  No sore throat, no nasal congestion, no hearing changes  Cardiovascular:  No chest pain, no palpitations  Respiratory: Positive for slight shortness of breath, no cough, no wheezing  Gastrointestinal:  No pain, positive for slight nausea, no vomiting, no diarrhea  Musculoskeletal:  No muscle pain, no joint pain  Skin:  No rash, no pruritis, no easy bruising  Neurologic:  No speech problems, no headache, no extremity numbness, no extremity tingling, no

## 2024-08-23 NOTE — ED NOTES
Discharge instructions reviewed with patient. Reviewed medications with patient. No additional questions asked.  Voiced understanding. Encouraged patient to follow up as discussed by the ED physician. Reviewed how to access [email or web address] at discharged with patient. Encourage to sign up either on their smartphone or on the computer to be able to review the information form today's and future visits. Voiced understanding. No additional questions asked. Patient ambulatory without difficulty. Physician aware.

## 2024-08-26 ENCOUNTER — OFFICE VISIT (OUTPATIENT)
Age: 69
End: 2024-08-26

## 2024-08-26 ENCOUNTER — OFFICE VISIT (OUTPATIENT)
Age: 69
End: 2024-08-26
Payer: MEDICARE

## 2024-08-26 VITALS
RESPIRATION RATE: 20 BRPM | BODY MASS INDEX: 28.5 KG/M2 | HEART RATE: 62 BPM | DIASTOLIC BLOOD PRESSURE: 78 MMHG | WEIGHT: 199.08 LBS | HEIGHT: 70 IN | OXYGEN SATURATION: 95 % | SYSTOLIC BLOOD PRESSURE: 132 MMHG

## 2024-08-26 VITALS
DIASTOLIC BLOOD PRESSURE: 78 MMHG | WEIGHT: 199 LBS | HEART RATE: 62 BPM | BODY MASS INDEX: 28.49 KG/M2 | OXYGEN SATURATION: 95 % | HEIGHT: 70 IN | SYSTOLIC BLOOD PRESSURE: 132 MMHG | RESPIRATION RATE: 20 BRPM

## 2024-08-26 DIAGNOSIS — I73.9 PERIPHERAL ARTERIAL DISEASE (HCC): ICD-10-CM

## 2024-08-26 DIAGNOSIS — Z00.00 MEDICARE ANNUAL WELLNESS VISIT, SUBSEQUENT: Primary | ICD-10-CM

## 2024-08-26 DIAGNOSIS — E03.9 ACQUIRED HYPOTHYROIDISM: ICD-10-CM

## 2024-08-26 DIAGNOSIS — Z72.0 TOBACCO USE: Primary | ICD-10-CM

## 2024-08-26 DIAGNOSIS — N52.9 ERECTILE DYSFUNCTION, UNSPECIFIED ERECTILE DYSFUNCTION TYPE: ICD-10-CM

## 2024-08-26 DIAGNOSIS — I50.42 CHRONIC COMBINED SYSTOLIC AND DIASTOLIC CONGESTIVE HEART FAILURE (HCC): ICD-10-CM

## 2024-08-26 DIAGNOSIS — J43.1 PANLOBULAR EMPHYSEMA (HCC): ICD-10-CM

## 2024-08-26 DIAGNOSIS — M08.00 JUVENILE RHEUMATOID ARTHRITIS (HCC): ICD-10-CM

## 2024-08-26 PROCEDURE — 3023F SPIROM DOC REV: CPT | Performed by: GENERAL PRACTICE

## 2024-08-26 PROCEDURE — 3017F COLORECTAL CA SCREEN DOC REV: CPT | Performed by: GENERAL PRACTICE

## 2024-08-26 PROCEDURE — 99214 OFFICE O/P EST MOD 30 MIN: CPT | Performed by: GENERAL PRACTICE

## 2024-08-26 PROCEDURE — G0439 PPPS, SUBSEQ VISIT: HCPCS | Performed by: GENERAL PRACTICE

## 2024-08-26 PROCEDURE — 1124F ACP DISCUSS-NO DSCNMKR DOCD: CPT | Performed by: GENERAL PRACTICE

## 2024-08-26 PROCEDURE — 3075F SYST BP GE 130 - 139MM HG: CPT | Performed by: GENERAL PRACTICE

## 2024-08-26 PROCEDURE — 3078F DIAST BP <80 MM HG: CPT | Performed by: GENERAL PRACTICE

## 2024-08-26 PROCEDURE — G8427 DOCREV CUR MEDS BY ELIG CLIN: HCPCS | Performed by: GENERAL PRACTICE

## 2024-08-26 PROCEDURE — 4004F PT TOBACCO SCREEN RCVD TLK: CPT | Performed by: GENERAL PRACTICE

## 2024-08-26 PROCEDURE — G8417 CALC BMI ABV UP PARAM F/U: HCPCS | Performed by: GENERAL PRACTICE

## 2024-08-26 RX ORDER — TADALAFIL 20 MG/1
20 TABLET ORAL DAILY PRN
Qty: 30 TABLET | Refills: 1 | Status: SHIPPED | OUTPATIENT
Start: 2024-08-26

## 2024-08-26 RX ORDER — LEVOTHYROXINE SODIUM 200 UG/1
TABLET ORAL
Qty: 90 TABLET | Refills: 3 | Status: SHIPPED | OUTPATIENT
Start: 2024-08-26

## 2024-08-26 RX ORDER — FUROSEMIDE 40 MG
80 TABLET ORAL DAILY PRN
Qty: 180 TABLET | Refills: 3 | Status: SHIPPED | OUTPATIENT
Start: 2024-08-26

## 2024-08-26 SDOH — ECONOMIC STABILITY: FOOD INSECURITY: WITHIN THE PAST 12 MONTHS, YOU WORRIED THAT YOUR FOOD WOULD RUN OUT BEFORE YOU GOT MONEY TO BUY MORE.: NEVER TRUE

## 2024-08-26 SDOH — ECONOMIC STABILITY: FOOD INSECURITY: WITHIN THE PAST 12 MONTHS, THE FOOD YOU BOUGHT JUST DIDN'T LAST AND YOU DIDN'T HAVE MONEY TO GET MORE.: NEVER TRUE

## 2024-08-26 SDOH — ECONOMIC STABILITY: INCOME INSECURITY: HOW HARD IS IT FOR YOU TO PAY FOR THE VERY BASICS LIKE FOOD, HOUSING, MEDICAL CARE, AND HEATING?: NOT VERY HARD

## 2024-08-26 ASSESSMENT — PATIENT HEALTH QUESTIONNAIRE - PHQ9
SUM OF ALL RESPONSES TO PHQ QUESTIONS 1-9: 0
SUM OF ALL RESPONSES TO PHQ9 QUESTIONS 1 & 2: 0
SUM OF ALL RESPONSES TO PHQ9 QUESTIONS 1 & 2: 0
SUM OF ALL RESPONSES TO PHQ QUESTIONS 1-9: 0
SUM OF ALL RESPONSES TO PHQ QUESTIONS 1-9: 0
1. LITTLE INTEREST OR PLEASURE IN DOING THINGS: NOT AT ALL
SUM OF ALL RESPONSES TO PHQ QUESTIONS 1-9: 0
2. FEELING DOWN, DEPRESSED OR HOPELESS: NOT AT ALL
2. FEELING DOWN, DEPRESSED OR HOPELESS: NOT AT ALL
SUM OF ALL RESPONSES TO PHQ QUESTIONS 1-9: 0
1. LITTLE INTEREST OR PLEASURE IN DOING THINGS: NOT AT ALL

## 2024-08-26 ASSESSMENT — ENCOUNTER SYMPTOMS
NAUSEA: 0
VOMITING: 0
STRIDOR: 0
CHEST TIGHTNESS: 0
COUGH: 0
BLOOD IN STOOL: 0
BACK PAIN: 0
ABDOMINAL PAIN: 0
SHORTNESS OF BREATH: 0

## 2024-08-26 ASSESSMENT — LIFESTYLE VARIABLES
HOW OFTEN DO YOU HAVE A DRINK CONTAINING ALCOHOL: 2-3 TIMES A WEEK
HOW MANY STANDARD DRINKS CONTAINING ALCOHOL DO YOU HAVE ON A TYPICAL DAY: 1 OR 2

## 2024-08-26 NOTE — PROGRESS NOTES
albuterol sulfate HFA (PROVENTIL;VENTOLIN;PROAIR) 108 (90 Base) MCG/ACT inhaler Inhale 2 puffs into the lungs every 4 hours (while awake)  Wili Cardona MD   sildenafil (VIAGRA) 100 MG tablet Take 1 tablet by mouth daily as needed for Erectile Dysfunction  Wili Cardona MD   aspirin EC 81 MG EC tablet Take 1 tablet by mouth daily  Jennifer Jones MD   Multiple Vitamins-Minerals (THERAPEUTIC MULTIVITAMIN-MINERALS) tablet Take 1 tablet by mouth daily (with breakfast)  CHERRY Fulton MD       [facility] (Including outside providers/suppliers regularly involved in providing care):   Patient Care Team:  Wili Cardona MD as PCP - General (Internal Medicine)  Wili Cardona MD as PCP - Empaneled Provider      Reviewed and updated this visit:  Allergies  Meds  Med Hx  Surg Hx  Soc Hx  Fam Hx      I, Allison Stephenson LPN, 8/26/2024, performed the documented evaluation under the direct supervision of the attending physician.     This encounter was performed under Wili cortés MD’s, direct supervision, 8/26/2024.

## 2024-08-26 NOTE — PATIENT INSTRUCTIONS
Learning About Dental Care for Older Adults  Dental care for older adults: Overview  Dental care for older people is much the same as for younger adults. But older adults do have concerns that younger adults do not. Older adults may have problems with gum disease and decay on the roots of their teeth. They may need missing teeth replaced or broken fillings fixed. Or they may have dentures that need to be cared for. Some older adults may have trouble holding a toothbrush.  You can help remind the person you are caring for to brush and floss their teeth or to clean their dentures. In some cases, you may need to do the brushing and other dental care tasks. People who have trouble using their hands or who have dementia may need this extra help.  How can you help with dental care?  Normal dental care  To keep the teeth and gums healthy:  Brush the teeth with fluoride toothpaste twice a day--in the morning and at night--and floss at least once a day. Plaque can quickly build up on the teeth of older adults.  Watch for the signs of gum disease. These signs include gums that bleed after brushing or after eating hard foods, such as apples.  See a dentist regularly. Many experts recommend checkups every 6 months.  Keep the dentist up to date on any new medications the person is taking.  Encourage a balanced diet that includes whole grains, vegetables, and fruits, and that is low in saturated fat and sodium.  Encourage the person you're caring for not to use tobacco products. They can affect dental and general health.  Many older adults have a fixed income and feel that they can't afford dental care. But most [facility] and [facility] have programs in which dentists help older adults by lowering fees. Contact your area's public health offices or  for information about dental care in your area.  Using a toothbrush  Older adults with arthritis sometimes have trouble brushing their teeth because they can't easily hold  heart disease by raising cholesterol levels.     Limit the amount of solid fat--butter, margarine, and shortening--you eat. Use olive, peanut, or canola oil when you cook. Bake, broil, and steam foods instead of frying them.     Eat a variety of fruit and vegetables every day. Dark green, deep orange, red, or yellow fruits and vegetables are especially good for you. Examples include spinach, carrots, peaches, and berries.     Foods high in fiber can reduce your cholesterol and provide important vitamins and minerals. High-fiber foods include whole-grain cereals and breads, oatmeal, beans, brown rice, citrus fruits, and apples.     Eat lean proteins. Heart-healthy proteins include seafood, lean meats and poultry, eggs, beans, peas, nuts, seeds, and soy products.     Limit drinks and foods with added sugar. These include candy, desserts, and soda pop.   Heart-healthy lifestyle    If your doctor recommends it, get more exercise. For many people, walking is a good choice. Or you may want to swim, bike, or do other activities. Bit by bit, increase the time you're active every day. Try for at least 30 minutes on most days of the week.     Try to quit or cut back on using tobacco and other nicotine products. This includes smoking and vaping. If you need help quitting, talk to your doctor about stop-smoking programs and medicines. These can increase your chances of quitting for good. Quitting is one of the most important things you can do to protect your heart. It is never too late to quit. Try to avoid secondhand smoke too.     Stay at a weight that's healthy for you. Talk to your doctor if you need help losing weight.     Try to get 7 to 9 hours of sleep each night.     Limit alcohol to 2 drinks a day for men and 1 drink a day for women. Too much alcohol can cause health problems.     Manage other health problems such as diabetes, high blood pressure, and high cholesterol. If you think you may have a problem with alcohol

## 2024-08-26 NOTE — PROGRESS NOTES
Patient was in the ER on 8/23/24  Bp check    Patient states he has recently gained a lot of weight.  He has noticed increased swelling but states it has been that way since surgery.  He has been taking lasix.     He has been urinating quite a bit.

## 2024-08-26 NOTE — PROGRESS NOTES
increased salt intake. Continues to drive commercially. Takes lasix daily, was on alpha blocker for BPH (taken off by Dr. Felipe).     Drives a semi-truck    Review of Systems   Constitutional:  Negative for activity change, appetite change, chills, diaphoresis, fatigue, fever and unexpected weight change.   HENT:  Negative for hearing loss and nosebleeds.    Respiratory:  Negative for cough, chest tightness, shortness of breath and stridor.    Cardiovascular:  Positive for leg swelling. Negative for chest pain and palpitations.   Gastrointestinal:  Negative for abdominal pain, blood in stool, nausea and vomiting.   Endocrine: Negative for cold intolerance and heat intolerance.   Genitourinary:  Negative for difficulty urinating, dysuria, flank pain and hematuria.   Musculoskeletal:  Negative for arthralgias, back pain and myalgias.   Skin:  Negative for rash.   Neurological:  Negative for dizziness, weakness, light-headedness and headaches.   Psychiatric/Behavioral:  Negative for confusion and sleep disturbance. The patient is not nervous/anxious.           Objective   /78 (Site: Left Upper Arm, Position: Sitting, Cuff Size: Large Adult)   Pulse 62   Resp 20   Ht 1.778 m (5' 10\")   Wt 90.3 kg (199 lb)   SpO2 95%   BMI 28.55 kg/m²    Physical Exam  Constitutional:       General: He is not in acute distress.     Appearance: Normal appearance. He is not ill-appearing or diaphoretic.   HENT:      Head: Normocephalic and atraumatic.      Nose: Nose normal.      Mouth/Throat:      Mouth: Mucous membranes are moist.   Eyes:      Conjunctiva/sclera: Conjunctivae normal.   Cardiovascular:      Rate and Rhythm: Normal rate and regular rhythm.      Pulses: Normal pulses.   Pulmonary:      Effort: Pulmonary effort is normal. No respiratory distress.      Breath sounds: Normal breath sounds. No stridor. No wheezing, rhonchi or rales.   Abdominal:      General: Bowel sounds are normal. There is no distension.       Palpations: Abdomen is soft.      Tenderness: There is no abdominal tenderness. There is no guarding.   Musculoskeletal:         General: Normal range of motion.      Cervical back: Normal range of motion.      Right lower leg: Edema present.      Left lower leg: Edema present.   Skin:     General: Skin is warm and dry.   Neurological:      General: No focal deficit present.      Mental Status: He is alert and oriented to person, place, and time.   Psychiatric:         Mood and Affect: Mood normal.                  An electronic signature was used to authenticate this note.    --Wili Cardona MD

## 2024-09-06 DIAGNOSIS — E78.2 MIXED HYPERLIPIDEMIA: ICD-10-CM

## 2024-09-06 DIAGNOSIS — I50.42 CHRONIC COMBINED SYSTOLIC AND DIASTOLIC CONGESTIVE HEART FAILURE (HCC): ICD-10-CM

## 2024-09-06 DIAGNOSIS — R73.03 PREDIABETES: ICD-10-CM

## 2024-09-06 RX ORDER — SEMAGLUTIDE 0.68 MG/ML
INJECTION, SOLUTION SUBCUTANEOUS
Qty: 27 ML | Refills: 1 | Status: SHIPPED | OUTPATIENT
Start: 2024-09-06

## 2024-09-21 ENCOUNTER — HOSPITAL ENCOUNTER (OUTPATIENT)
Age: 69
Discharge: HOME | End: 2024-09-21
Payer: MEDICARE

## 2024-09-21 DIAGNOSIS — I50.42 CHRONIC COMBINED SYSTOLIC AND DIASTOLIC CONGESTIVE HEART FAILURE (HCC): ICD-10-CM

## 2024-09-21 LAB
ANION GAP SERPL CALCULATED.3IONS-SCNC: 12 MMOL/L (ref 4–16)
BUN SERPL-MCNC: 16 MG/DL (ref 6–23)
CALCIUM SERPL-MCNC: 9.1 MG/DL (ref 8.3–10.6)
CHLORIDE SERPL-SCNC: 99 MMOL/L (ref 99–110)
CO2 SERPL-SCNC: 25 MMOL/L (ref 21–32)
CREAT SERPL-MCNC: 1 MG/DL (ref 0.9–1.3)
GFR, ESTIMATED: 81 ML/MIN/1.73M2
GLUCOSE SERPL-MCNC: 228 MG/DL (ref 70–99)
POTASSIUM SERPL-SCNC: 4 MMOL/L (ref 3.5–5.1)
SODIUM SERPL-SCNC: 136 MMOL/L (ref 135–145)

## 2024-09-21 PROCEDURE — 36415 COLL VENOUS BLD VENIPUNCTURE: CPT

## 2024-09-21 PROCEDURE — 80048 BASIC METABOLIC PNL TOTAL CA: CPT

## 2024-11-09 DIAGNOSIS — M54.50 CHRONIC BILATERAL LOW BACK PAIN WITHOUT SCIATICA: ICD-10-CM

## 2024-11-09 DIAGNOSIS — G89.29 CHRONIC BILATERAL LOW BACK PAIN WITHOUT SCIATICA: ICD-10-CM

## 2024-11-11 RX ORDER — DULOXETIN HYDROCHLORIDE 60 MG/1
CAPSULE, DELAYED RELEASE ORAL DAILY
Qty: 90 CAPSULE | Refills: 1 | Status: SHIPPED | OUTPATIENT
Start: 2024-11-11

## 2024-12-13 ENCOUNTER — OFFICE VISIT (OUTPATIENT)
Dept: CARDIOLOGY CLINIC | Age: 69
End: 2024-12-13
Payer: MEDICARE

## 2024-12-13 VITALS
SYSTOLIC BLOOD PRESSURE: 124 MMHG | HEART RATE: 70 BPM | DIASTOLIC BLOOD PRESSURE: 82 MMHG | BODY MASS INDEX: 28.55 KG/M2 | WEIGHT: 199.4 LBS | HEIGHT: 70 IN

## 2024-12-13 DIAGNOSIS — R94.31 ABNORMAL ELECTROCARDIOGRAPHY: ICD-10-CM

## 2024-12-13 DIAGNOSIS — I10 ESSENTIAL HYPERTENSION: ICD-10-CM

## 2024-12-13 DIAGNOSIS — I25.10 CORONARY ARTERY DISEASE INVOLVING NATIVE CORONARY ARTERY OF NATIVE HEART WITHOUT ANGINA PECTORIS: Primary | ICD-10-CM

## 2024-12-13 DIAGNOSIS — E78.2 MIXED HYPERLIPIDEMIA: ICD-10-CM

## 2024-12-13 PROCEDURE — 99214 OFFICE O/P EST MOD 30 MIN: CPT | Performed by: NURSE PRACTITIONER

## 2024-12-13 PROCEDURE — 1159F MED LIST DOCD IN RCRD: CPT | Performed by: NURSE PRACTITIONER

## 2024-12-13 PROCEDURE — G8484 FLU IMMUNIZE NO ADMIN: HCPCS | Performed by: NURSE PRACTITIONER

## 2024-12-13 PROCEDURE — G8427 DOCREV CUR MEDS BY ELIG CLIN: HCPCS | Performed by: NURSE PRACTITIONER

## 2024-12-13 PROCEDURE — 3017F COLORECTAL CA SCREEN DOC REV: CPT | Performed by: NURSE PRACTITIONER

## 2024-12-13 PROCEDURE — 3074F SYST BP LT 130 MM HG: CPT | Performed by: NURSE PRACTITIONER

## 2024-12-13 PROCEDURE — 4004F PT TOBACCO SCREEN RCVD TLK: CPT | Performed by: NURSE PRACTITIONER

## 2024-12-13 PROCEDURE — 1124F ACP DISCUSS-NO DSCNMKR DOCD: CPT | Performed by: NURSE PRACTITIONER

## 2024-12-13 PROCEDURE — 3079F DIAST BP 80-89 MM HG: CPT | Performed by: NURSE PRACTITIONER

## 2024-12-13 PROCEDURE — G8417 CALC BMI ABV UP PARAM F/U: HCPCS | Performed by: NURSE PRACTITIONER

## 2024-12-13 ASSESSMENT — ENCOUNTER SYMPTOMS: SHORTNESS OF BREATH: 0

## 2024-12-13 NOTE — PROGRESS NOTES
[place] [address]  Phone: [phone]    Fax [phone]    Jerson Gong MD, [facility]  Ambrose Tobin MD, [facility]   Crista Neal MD, [facility] MD Jennifer Ryan MD, [facility]  Albin Meeks MD, [facility]    Coy Joshi MD, [facility]  Andrey Cortés MD, [facility]  Yumi Espinoza, APRN  Steffanie Spencer, APRN  Raquel Davies, APRN  Rico Boyd, APRN        Cardiology Progress Note      12/13/2024    RE: Keyur Adrian  (1955)                             Primary cardiologist: Dr. Alejandro Jones       Subjective:  CC:   1. Coronary artery disease involving native coronary artery of native heart without angina pectoris    2. Essential hypertension    3. Mixed hyperlipidemia    4. Abnormal electrocardiography          HPI: Keyur Adrian, who is a  69 y.o. year old male with a past medical history as listed below.  Patient presents to the office for follow up on CAD, HTN, and hyperlipidemia. Patient had severe LVH and pericarditis prior to CABG 10/19/20. He had bypass x 3, LIMA-LAD, SVG to PDA and Ramus per Dr. Fuller. Patient is  an active male who walks regularly. Patient is  compliant with medications.  Patient denies any chest pain, shortness of breath, dizziness, syncope, or palpitations.    Past Medical History:   Diagnosis Date    CAD (coronary artery disease)     Dr. SORAIDA Jones    Chest pain     COPD (chronic obstructive pulmonary disease) ([facility])     No pulmonologist - follows with PCP    H/O cardiac catheterization 09/24/2020     Severe calcified multivessel CAD with LV dysfuntion, PCWP is 12, CABG consult.    H/O cardiovascular stress test 09/21/2021    abnormal stress test due to mildly depressed LVEF, increased EDV, Normal tracer uptake in all myocardial segment during  rest and stress. Decreased uptake inferiorly due to diaphragmatic artifact.    H/O echocardiogram 06/23/2020     Mild concentric LVH with moderate systolic impairment. EF is 40-45

## 2024-12-14 ENCOUNTER — HOSPITAL ENCOUNTER (OUTPATIENT)
Age: 69
Discharge: HOME | End: 2024-12-14
Payer: MEDICARE

## 2024-12-14 LAB
CHOLEST SERPL-MCNC: 153 MG/DL (ref 125–199)
HDLC SERPL-MCNC: 42 MG/DL
LDLC SERPL CALC-MCNC: 96 MG/DL
TRIGL SERPL-MCNC: 72 MG/DL

## 2024-12-14 PROCEDURE — 36415 COLL VENOUS BLD VENIPUNCTURE: CPT

## 2024-12-14 PROCEDURE — 80061 LIPID PANEL: CPT

## 2024-12-17 ENCOUNTER — TELEPHONE (OUTPATIENT)
Dept: CARDIOLOGY CLINIC | Age: 69
End: 2024-12-17

## 2024-12-17 NOTE — TELEPHONE ENCOUNTER
----- Message from DAPHNIE WICK MA sent at 12/17/2024  2:48 PM EST -----    ----- Message -----  From: Rico Boyd APRN - CNP  Sent: 12/16/2024  12:20 PM EST  To: Katherin Lopez MA    Cholesterol much improved continue with current dose.

## 2025-01-12 ENCOUNTER — APPOINTMENT (OUTPATIENT)
Dept: GENERAL RADIOLOGY | Age: 70
End: 2025-01-12
Attending: EMERGENCY MEDICINE
Payer: MEDICARE

## 2025-01-12 ENCOUNTER — HOSPITAL ENCOUNTER (EMERGENCY)
Age: 70
Discharge: ANOTHER HOSPITAL | End: 2025-01-13
Attending: EMERGENCY MEDICINE
Payer: MEDICARE

## 2025-01-12 ENCOUNTER — APPOINTMENT (OUTPATIENT)
Dept: CT IMAGING | Age: 70
End: 2025-01-12
Payer: MEDICARE

## 2025-01-12 DIAGNOSIS — Z86.79 HISTORY OF CAD (CORONARY ARTERY DISEASE): ICD-10-CM

## 2025-01-12 DIAGNOSIS — J81.0 ACUTE PULMONARY EDEMA: ICD-10-CM

## 2025-01-12 DIAGNOSIS — R79.89 ELEVATED TSH: ICD-10-CM

## 2025-01-12 DIAGNOSIS — J96.11 CHRONIC RESPIRATORY FAILURE WITH HYPOXIA: ICD-10-CM

## 2025-01-12 DIAGNOSIS — R07.9 CHEST PAIN, UNSPECIFIED TYPE: Primary | ICD-10-CM

## 2025-01-12 LAB
ALBUMIN SERPL-MCNC: 4 G/DL (ref 3.4–5)
ALBUMIN/GLOB SERPL: 1.2 {RATIO} (ref 1.1–2.2)
ALP SERPL-CCNC: 100 U/L (ref 40–129)
ALT SERPL-CCNC: 19 U/L (ref 10–40)
ANION GAP SERPL CALCULATED.3IONS-SCNC: 14 MMOL/L (ref 4–16)
AST SERPL-CCNC: 28 U/L (ref 15–37)
BACTERIA URNS QL MICRO: ABNORMAL
BASOPHILS # BLD: 0.05 K/UL
BASOPHILS NFR BLD: 1 % (ref 0–1)
BILIRUB SERPL-MCNC: 0.4 MG/DL (ref 0–1)
BILIRUB UR QL STRIP: NEGATIVE
BNP SERPL-MCNC: 1460 PG/ML (ref 0–125)
BUN SERPL-MCNC: 17 MG/DL (ref 6–23)
CALCIUM SERPL-MCNC: 9 MG/DL (ref 8.3–10.6)
CHLORIDE SERPL-SCNC: 98 MMOL/L (ref 99–110)
CLARITY UR: CLEAR
CO2 SERPL-SCNC: 25 MMOL/L (ref 21–32)
COLOR UR: YELLOW
CREAT SERPL-MCNC: 1 MG/DL (ref 0.9–1.3)
D DIMER PPP FEU-MCNC: 1.03 UG/ML FEU (ref 0–0.46)
EOSINOPHIL # BLD: 0.1 K/UL
EOSINOPHILS RELATIVE PERCENT: 1 % (ref 0–3)
EPI CELLS #/AREA URNS HPF: ABNORMAL /HPF
ERYTHROCYTE [DISTWIDTH] IN BLOOD BY AUTOMATED COUNT: 13.3 % (ref 11.7–14.9)
GFR, ESTIMATED: 81 ML/MIN/1.73M2
GLUCOSE SERPL-MCNC: 153 MG/DL (ref 70–99)
GLUCOSE UR STRIP-MCNC: 250 MG/DL
HCT VFR BLD AUTO: 51.8 % (ref 42–52)
HGB BLD-MCNC: 17.6 G/DL (ref 13.5–18)
HGB UR QL STRIP.AUTO: ABNORMAL
IMM GRANULOCYTES # BLD AUTO: 0.02 K/UL
IMM GRANULOCYTES NFR BLD: 0 %
INFLUENZA A BY PCR: NOT DETECTED
INFLUENZA B BY PCR: NOT DETECTED
KETONES UR STRIP-MCNC: NEGATIVE MG/DL
LEUKOCYTE ESTERASE UR QL STRIP: NEGATIVE
LYMPHOCYTES NFR BLD: 1.58 K/UL
LYMPHOCYTES RELATIVE PERCENT: 21 % (ref 24–44)
MAGNESIUM SERPL-MCNC: 2.1 MG/DL (ref 1.8–2.4)
MCH RBC QN AUTO: 35.1 PG (ref 27–31)
MCHC RBC AUTO-ENTMCNC: 34 G/DL (ref 32–36)
MCV RBC AUTO: 103.2 FL (ref 78–100)
MONOCYTES NFR BLD: 0.53 K/UL
MONOCYTES NFR BLD: 7 % (ref 0–4)
NEUTROPHILS NFR BLD: 70 % (ref 36–66)
NEUTS SEG NFR BLD: 5.3 K/UL
NITRITE UR QL STRIP: NEGATIVE
PH UR STRIP: 6.5 [PH] (ref 5–8)
PLATELET # BLD AUTO: 159 K/UL (ref 140–440)
PMV BLD AUTO: 9.6 FL (ref 7.5–11.1)
POTASSIUM SERPL-SCNC: 3.5 MMOL/L (ref 3.5–5.1)
PROT SERPL-MCNC: 7.3 G/DL (ref 6.4–8.2)
PROT UR STRIP-MCNC: 100 MG/DL
RBC # BLD AUTO: 5.02 M/UL (ref 4.6–6.2)
RBC #/AREA URNS HPF: ABNORMAL /HPF
SARS-COV-2 RDRP RESP QL NAA+PROBE: NOT DETECTED
SODIUM SERPL-SCNC: 137 MMOL/L (ref 135–145)
SP GR UR STRIP: 1.02 (ref 1–1.03)
SPECIMEN DESCRIPTION: NORMAL
TROPONIN I SERPL HS-MCNC: 17 NG/L (ref 0–21)
TROPONIN I SERPL HS-MCNC: 19 NG/L (ref 0–21)
TSH SERPL DL<=0.05 MIU/L-ACNC: 20.97 UIU/ML (ref 0.27–4.2)
UROBILINOGEN UR STRIP-ACNC: 0.2 EU/DL (ref 0–1)
WBC #/AREA URNS HPF: ABNORMAL /HPF
WBC OTHER # BLD: 7.6 K/UL (ref 4–10.5)

## 2025-01-12 PROCEDURE — 93005 ELECTROCARDIOGRAM TRACING: CPT | Performed by: STUDENT IN AN ORGANIZED HEALTH CARE EDUCATION/TRAINING PROGRAM

## 2025-01-12 PROCEDURE — 99285 EMERGENCY DEPT VISIT HI MDM: CPT

## 2025-01-12 PROCEDURE — 85379 FIBRIN DEGRADATION QUANT: CPT

## 2025-01-12 PROCEDURE — 96374 THER/PROPH/DIAG INJ IV PUSH: CPT

## 2025-01-12 PROCEDURE — 6360000002 HC RX W HCPCS: Performed by: STUDENT IN AN ORGANIZED HEALTH CARE EDUCATION/TRAINING PROGRAM

## 2025-01-12 PROCEDURE — 96375 TX/PRO/DX INJ NEW DRUG ADDON: CPT

## 2025-01-12 PROCEDURE — 87635 SARS-COV-2 COVID-19 AMP PRB: CPT

## 2025-01-12 PROCEDURE — 84439 ASSAY OF FREE THYROXINE: CPT

## 2025-01-12 PROCEDURE — 87502 INFLUENZA DNA AMP PROBE: CPT

## 2025-01-12 PROCEDURE — 6360000004 HC RX CONTRAST MEDICATION: Performed by: STUDENT IN AN ORGANIZED HEALTH CARE EDUCATION/TRAINING PROGRAM

## 2025-01-12 PROCEDURE — 83880 ASSAY OF NATRIURETIC PEPTIDE: CPT

## 2025-01-12 PROCEDURE — 6370000000 HC RX 637 (ALT 250 FOR IP): Performed by: EMERGENCY MEDICINE

## 2025-01-12 PROCEDURE — 71275 CT ANGIOGRAPHY CHEST: CPT

## 2025-01-12 PROCEDURE — 83735 ASSAY OF MAGNESIUM: CPT

## 2025-01-12 PROCEDURE — 93010 ELECTROCARDIOGRAM REPORT: CPT | Performed by: INTERNAL MEDICINE

## 2025-01-12 PROCEDURE — 84443 ASSAY THYROID STIM HORMONE: CPT

## 2025-01-12 PROCEDURE — 80053 COMPREHEN METABOLIC PANEL: CPT

## 2025-01-12 PROCEDURE — 71045 X-RAY EXAM CHEST 1 VIEW: CPT

## 2025-01-12 PROCEDURE — 93005 ELECTROCARDIOGRAM TRACING: CPT | Performed by: EMERGENCY MEDICINE

## 2025-01-12 PROCEDURE — 84484 ASSAY OF TROPONIN QUANT: CPT

## 2025-01-12 PROCEDURE — 81001 URINALYSIS AUTO W/SCOPE: CPT

## 2025-01-12 PROCEDURE — 85025 COMPLETE CBC W/AUTO DIFF WBC: CPT

## 2025-01-12 RX ORDER — ACETAMINOPHEN 500 MG
1000 TABLET ORAL ONCE
Status: COMPLETED | OUTPATIENT
Start: 2025-01-12 | End: 2025-01-12

## 2025-01-12 RX ORDER — NITROGLYCERIN 0.4 MG/1
0.4 TABLET SUBLINGUAL EVERY 5 MIN PRN
Status: COMPLETED | OUTPATIENT
Start: 2025-01-12 | End: 2025-01-12

## 2025-01-12 RX ORDER — FUROSEMIDE 10 MG/ML
40 INJECTION INTRAMUSCULAR; INTRAVENOUS ONCE
Status: COMPLETED | OUTPATIENT
Start: 2025-01-12 | End: 2025-01-12

## 2025-01-12 RX ORDER — IOPAMIDOL 755 MG/ML
100 INJECTION, SOLUTION INTRAVASCULAR
Status: COMPLETED | OUTPATIENT
Start: 2025-01-12 | End: 2025-01-12

## 2025-01-12 RX ORDER — MORPHINE SULFATE 4 MG/ML
4 INJECTION, SOLUTION INTRAMUSCULAR; INTRAVENOUS ONCE
Status: COMPLETED | OUTPATIENT
Start: 2025-01-12 | End: 2025-01-12

## 2025-01-12 RX ORDER — CARVEDILOL 3.12 MG/1
3.12 TABLET ORAL 2 TIMES DAILY WITH MEALS
Status: DISCONTINUED | OUTPATIENT
Start: 2025-01-12 | End: 2025-01-13 | Stop reason: HOSPADM

## 2025-01-12 RX ADMIN — ACETAMINOPHEN 1000 MG: 500 TABLET ORAL at 19:26

## 2025-01-12 RX ADMIN — HYDROMORPHONE HYDROCHLORIDE 0.5 MG: 1 INJECTION, SOLUTION INTRAMUSCULAR; INTRAVENOUS; SUBCUTANEOUS at 22:11

## 2025-01-12 RX ADMIN — NITROGLYCERIN 0.4 MG: 0.4 TABLET SUBLINGUAL at 19:44

## 2025-01-12 RX ADMIN — FUROSEMIDE 40 MG: 10 INJECTION, SOLUTION INTRAMUSCULAR; INTRAVENOUS at 23:16

## 2025-01-12 RX ADMIN — CARVEDILOL 3.12 MG: 3.12 TABLET, FILM COATED ORAL at 19:37

## 2025-01-12 RX ADMIN — NITROGLYCERIN 0.4 MG: 0.4 TABLET SUBLINGUAL at 19:57

## 2025-01-12 RX ADMIN — IOPAMIDOL 100 ML: 755 INJECTION, SOLUTION INTRAVENOUS at 22:20

## 2025-01-12 RX ADMIN — MORPHINE SULFATE 4 MG: 4 INJECTION, SOLUTION INTRAMUSCULAR; INTRAVENOUS at 20:43

## 2025-01-12 RX ADMIN — NITROGLYCERIN 0.4 MG: 0.4 TABLET SUBLINGUAL at 19:25

## 2025-01-12 ASSESSMENT — PAIN DESCRIPTION - ORIENTATION
ORIENTATION: LEFT

## 2025-01-12 ASSESSMENT — PAIN SCALES - GENERAL
PAINLEVEL_OUTOF10: 10
PAINLEVEL_OUTOF10: 9
PAINLEVEL_OUTOF10: 9
PAINLEVEL_OUTOF10: 10

## 2025-01-12 ASSESSMENT — PAIN DESCRIPTION - DESCRIPTORS
DESCRIPTORS: SHARP

## 2025-01-12 ASSESSMENT — PAIN DESCRIPTION - LOCATION
LOCATION: CHEST

## 2025-01-12 ASSESSMENT — PAIN DESCRIPTION - PAIN TYPE: TYPE: ACUTE PAIN

## 2025-01-12 ASSESSMENT — PAIN DESCRIPTION - FREQUENCY: FREQUENCY: CONTINUOUS

## 2025-01-13 ENCOUNTER — HOSPITAL ENCOUNTER (INPATIENT)
Age: 70
LOS: 5 days | Discharge: HOME | DRG: 287 | End: 2025-01-18
Attending: INTERNAL MEDICINE | Admitting: STUDENT IN AN ORGANIZED HEALTH CARE EDUCATION/TRAINING PROGRAM
Payer: MEDICARE

## 2025-01-13 VITALS
DIASTOLIC BLOOD PRESSURE: 66 MMHG | TEMPERATURE: 97.4 F | HEIGHT: 70 IN | OXYGEN SATURATION: 92 % | HEART RATE: 60 BPM | BODY MASS INDEX: 27.92 KG/M2 | SYSTOLIC BLOOD PRESSURE: 112 MMHG | RESPIRATION RATE: 18 BRPM | WEIGHT: 195 LBS

## 2025-01-13 DIAGNOSIS — I20.9 ANGINA PECTORIS (HCC): Primary | ICD-10-CM

## 2025-01-13 DIAGNOSIS — R07.2 PRECORDIAL PAIN: ICD-10-CM

## 2025-01-13 DIAGNOSIS — R07.9 CHEST PAIN, UNSPECIFIED TYPE: ICD-10-CM

## 2025-01-13 DIAGNOSIS — R07.9 CHEST PAIN: ICD-10-CM

## 2025-01-13 LAB
ANION GAP SERPL CALCULATED.3IONS-SCNC: 12 MMOL/L (ref 9–17)
ANION GAP SERPL CALCULATED.3IONS-SCNC: 13 MMOL/L (ref 4–16)
BASOPHILS # BLD: 0.07 K/UL
BASOPHILS NFR BLD: 1 % (ref 0–1)
BUN SERPL-MCNC: 19 MG/DL (ref 6–23)
BUN SERPL-MCNC: 20 MG/DL (ref 7–20)
CALCIUM SERPL-MCNC: 8.7 MG/DL (ref 8.3–10.6)
CALCIUM SERPL-MCNC: 9.6 MG/DL (ref 8.3–10.6)
CHLORIDE SERPL-SCNC: 102 MMOL/L (ref 99–110)
CHLORIDE SERPL-SCNC: 102 MMOL/L (ref 99–110)
CO2 SERPL-SCNC: 26 MMOL/L (ref 21–32)
CO2 SERPL-SCNC: 27 MMOL/L (ref 21–32)
CREAT SERPL-MCNC: 1.2 MG/DL (ref 0.9–1.3)
CREAT SERPL-MCNC: 1.3 MG/DL (ref 0.8–1.3)
EKG ATRIAL RATE: 59 BPM
EKG ATRIAL RATE: 62 BPM
EKG ATRIAL RATE: 78 BPM
EKG DIAGNOSIS: NORMAL
EKG P AXIS: -1 DEGREES
EKG P AXIS: -33 DEGREES
EKG P-R INTERVAL: 170 MS
EKG P-R INTERVAL: 172 MS
EKG P-R INTERVAL: 194 MS
EKG Q-T INTERVAL: 440 MS
EKG Q-T INTERVAL: 494 MS
EKG Q-T INTERVAL: 502 MS
EKG QRS DURATION: 100 MS
EKG QRS DURATION: 102 MS
EKG QRS DURATION: 94 MS
EKG QTC CALCULATION (BAZETT): 489 MS
EKG QTC CALCULATION (BAZETT): 501 MS
EKG QTC CALCULATION (BAZETT): 509 MS
EKG R AXIS: 10 DEGREES
EKG R AXIS: 15 DEGREES
EKG R AXIS: 87 DEGREES
EKG T AXIS: -7 DEGREES
EKG T AXIS: 78 DEGREES
EKG T AXIS: 99 DEGREES
EKG VENTRICULAR RATE: 59 BPM
EKG VENTRICULAR RATE: 62 BPM
EKG VENTRICULAR RATE: 78 BPM
EOSINOPHIL # BLD: 0.26 K/UL
EOSINOPHILS RELATIVE PERCENT: 3 % (ref 0–3)
ERYTHROCYTE [DISTWIDTH] IN BLOOD BY AUTOMATED COUNT: 13.2 % (ref 11.7–14.9)
ERYTHROCYTE [DISTWIDTH] IN BLOOD BY AUTOMATED COUNT: 13.4 % (ref 11.7–14.9)
GFR, ESTIMATED: 55 ML/MIN/1.73M2
GFR, ESTIMATED: 65 ML/MIN/1.73M2
GLUCOSE BLD-MCNC: 106 MG/DL (ref 74–99)
GLUCOSE SERPL-MCNC: 80 MG/DL (ref 70–99)
GLUCOSE SERPL-MCNC: 97 MG/DL (ref 74–99)
HCT VFR BLD AUTO: 48.9 % (ref 42–52)
HCT VFR BLD AUTO: 52.5 % (ref 42–52)
HGB BLD-MCNC: 16.6 G/DL (ref 13.5–18)
HGB BLD-MCNC: 17.8 G/DL (ref 13.5–18)
IMM GRANULOCYTES # BLD AUTO: 0.06 K/UL
IMM GRANULOCYTES NFR BLD: 1 %
LACTATE BLDV-SCNC: 1.3 MMOL/L (ref 0.4–2)
LYMPHOCYTES NFR BLD: 1.76 K/UL
LYMPHOCYTES RELATIVE PERCENT: 21 % (ref 24–44)
MAGNESIUM SERPL-MCNC: 2.1 MG/DL (ref 1.8–2.4)
MCH RBC QN AUTO: 34.8 PG (ref 27–31)
MCH RBC QN AUTO: 35 PG (ref 27–31)
MCHC RBC AUTO-ENTMCNC: 33.9 G/DL (ref 32–36)
MCHC RBC AUTO-ENTMCNC: 33.9 G/DL (ref 32–36)
MCV RBC AUTO: 102.5 FL (ref 78–100)
MCV RBC AUTO: 103.1 FL (ref 78–100)
MONOCYTES NFR BLD: 0.53 K/UL
MONOCYTES NFR BLD: 6 % (ref 0–4)
NEUTROPHILS NFR BLD: 68 % (ref 36–66)
NEUTS SEG NFR BLD: 5.72 K/UL
PLATELET # BLD AUTO: 150 K/UL (ref 140–440)
PLATELET # BLD AUTO: 168 K/UL (ref 140–440)
PMV BLD AUTO: 10.1 FL (ref 7.5–11.1)
PMV BLD AUTO: 9.4 FL (ref 7.5–11.1)
POTASSIUM SERPL-SCNC: 4.5 MMOL/L (ref 3.5–5.1)
POTASSIUM SERPL-SCNC: 4.6 MMOL/L (ref 3.5–5.1)
PROCALCITONIN SERPL-MCNC: 0.05 NG/ML
RBC # BLD AUTO: 4.77 M/UL (ref 4.6–6.2)
RBC # BLD AUTO: 5.09 M/UL (ref 4.6–6.2)
SODIUM SERPL-SCNC: 139 MMOL/L (ref 136–145)
SODIUM SERPL-SCNC: 142 MMOL/L (ref 135–145)
T4 FREE SERPL-MCNC: 0.2 NG/DL (ref 0.9–1.8)
TROPONIN I SERPL HS-MCNC: 25 NG/L (ref 0–22)
WBC OTHER # BLD: 8.4 K/UL (ref 4–10.5)
WBC OTHER # BLD: 9.2 K/UL (ref 4–10.5)

## 2025-01-13 PROCEDURE — 6370000000 HC RX 637 (ALT 250 FOR IP): Performed by: EMERGENCY MEDICINE

## 2025-01-13 PROCEDURE — 84145 PROCALCITONIN (PCT): CPT

## 2025-01-13 PROCEDURE — G0378 HOSPITAL OBSERVATION PER HR: HCPCS

## 2025-01-13 PROCEDURE — 94640 AIRWAY INHALATION TREATMENT: CPT

## 2025-01-13 PROCEDURE — 80048 BASIC METABOLIC PNL TOTAL CA: CPT

## 2025-01-13 PROCEDURE — 83735 ASSAY OF MAGNESIUM: CPT

## 2025-01-13 PROCEDURE — 96376 TX/PRO/DX INJ SAME DRUG ADON: CPT

## 2025-01-13 PROCEDURE — 6370000000 HC RX 637 (ALT 250 FOR IP): Performed by: PHYSICIAN ASSISTANT

## 2025-01-13 PROCEDURE — 99285 EMERGENCY DEPT VISIT HI MDM: CPT

## 2025-01-13 PROCEDURE — 2500000003 HC RX 250 WO HCPCS: Performed by: INTERNAL MEDICINE

## 2025-01-13 PROCEDURE — 6360000002 HC RX W HCPCS

## 2025-01-13 PROCEDURE — 85025 COMPLETE CBC W/AUTO DIFF WBC: CPT

## 2025-01-13 PROCEDURE — 93005 ELECTROCARDIOGRAM TRACING: CPT | Performed by: INTERNAL MEDICINE

## 2025-01-13 PROCEDURE — 93010 ELECTROCARDIOGRAM REPORT: CPT | Performed by: INTERNAL MEDICINE

## 2025-01-13 PROCEDURE — 87040 BLOOD CULTURE FOR BACTERIA: CPT

## 2025-01-13 PROCEDURE — 6360000002 HC RX W HCPCS: Performed by: FAMILY MEDICINE

## 2025-01-13 PROCEDURE — 2580000003 HC RX 258: Performed by: INTERNAL MEDICINE

## 2025-01-13 PROCEDURE — 2700000000 HC OXYGEN THERAPY PER DAY

## 2025-01-13 PROCEDURE — 2500000003 HC RX 250 WO HCPCS: Performed by: PHYSICIAN ASSISTANT

## 2025-01-13 PROCEDURE — 6370000000 HC RX 637 (ALT 250 FOR IP): Performed by: FAMILY MEDICINE

## 2025-01-13 PROCEDURE — 99223 1ST HOSP IP/OBS HIGH 75: CPT | Performed by: INTERNAL MEDICINE

## 2025-01-13 PROCEDURE — 84484 ASSAY OF TROPONIN QUANT: CPT

## 2025-01-13 PROCEDURE — 85027 COMPLETE CBC AUTOMATED: CPT

## 2025-01-13 PROCEDURE — 6360000002 HC RX W HCPCS: Performed by: INTERNAL MEDICINE

## 2025-01-13 PROCEDURE — 82962 GLUCOSE BLOOD TEST: CPT

## 2025-01-13 PROCEDURE — 83605 ASSAY OF LACTIC ACID: CPT

## 2025-01-13 RX ORDER — SODIUM CHLORIDE, SODIUM LACTATE, POTASSIUM CHLORIDE, AND CALCIUM CHLORIDE .6; .31; .03; .02 G/100ML; G/100ML; G/100ML; G/100ML
1000 INJECTION, SOLUTION INTRAVENOUS ONCE
Status: COMPLETED | OUTPATIENT
Start: 2025-01-13 | End: 2025-01-13

## 2025-01-13 RX ORDER — ATORVASTATIN CALCIUM 40 MG/1
80 TABLET, FILM COATED ORAL NIGHTLY
Status: DISCONTINUED | OUTPATIENT
Start: 2025-01-13 | End: 2025-01-18 | Stop reason: HOSPADM

## 2025-01-13 RX ORDER — SODIUM CHLORIDE 0.9 % (FLUSH) 0.9 %
10 SYRINGE (ML) INJECTION PRN
Status: DISCONTINUED | OUTPATIENT
Start: 2025-01-13 | End: 2025-01-18 | Stop reason: HOSPADM

## 2025-01-13 RX ORDER — MAGNESIUM SULFATE IN WATER 40 MG/ML
2000 INJECTION, SOLUTION INTRAVENOUS PRN
Status: DISCONTINUED | OUTPATIENT
Start: 2025-01-13 | End: 2025-01-13 | Stop reason: HOSPADM

## 2025-01-13 RX ORDER — POTASSIUM CHLORIDE 7.45 MG/ML
10 INJECTION INTRAVENOUS PRN
Status: DISCONTINUED | OUTPATIENT
Start: 2025-01-13 | End: 2025-01-13 | Stop reason: HOSPADM

## 2025-01-13 RX ORDER — LEVOTHYROXINE SODIUM 100 UG/1
200 TABLET ORAL DAILY
Status: DISCONTINUED | OUTPATIENT
Start: 2025-01-14 | End: 2025-01-18 | Stop reason: HOSPADM

## 2025-01-13 RX ORDER — ENOXAPARIN SODIUM 100 MG/ML
40 INJECTION SUBCUTANEOUS DAILY
Status: DISCONTINUED | OUTPATIENT
Start: 2025-01-14 | End: 2025-01-15

## 2025-01-13 RX ORDER — ONDANSETRON 2 MG/ML
INJECTION INTRAMUSCULAR; INTRAVENOUS
Status: COMPLETED
Start: 2025-01-13 | End: 2025-01-13

## 2025-01-13 RX ORDER — ALBUTEROL SULFATE 90 UG/1
2 INHALANT RESPIRATORY (INHALATION)
Status: DISCONTINUED | OUTPATIENT
Start: 2025-01-13 | End: 2025-01-18 | Stop reason: HOSPADM

## 2025-01-13 RX ORDER — BUDESONIDE AND FORMOTEROL FUMARATE DIHYDRATE 80; 4.5 UG/1; UG/1
2 AEROSOL RESPIRATORY (INHALATION)
Status: DISCONTINUED | OUTPATIENT
Start: 2025-01-13 | End: 2025-01-18 | Stop reason: HOSPADM

## 2025-01-13 RX ORDER — MORPHINE SULFATE 2 MG/ML
2 INJECTION, SOLUTION INTRAMUSCULAR; INTRAVENOUS
Status: DISCONTINUED | OUTPATIENT
Start: 2025-01-13 | End: 2025-01-13 | Stop reason: HOSPADM

## 2025-01-13 RX ORDER — FAMOTIDINE 20 MG/1
20 TABLET, FILM COATED ORAL DAILY
Status: DISCONTINUED | OUTPATIENT
Start: 2025-01-13 | End: 2025-01-13 | Stop reason: HOSPADM

## 2025-01-13 RX ORDER — PROMETHAZINE HYDROCHLORIDE 25 MG/1
12.5 TABLET ORAL EVERY 6 HOURS PRN
Status: DISCONTINUED | OUTPATIENT
Start: 2025-01-13 | End: 2025-01-18 | Stop reason: HOSPADM

## 2025-01-13 RX ORDER — ACETAMINOPHEN 325 MG/1
650 TABLET ORAL EVERY 6 HOURS PRN
Status: DISCONTINUED | OUTPATIENT
Start: 2025-01-13 | End: 2025-01-18 | Stop reason: HOSPADM

## 2025-01-13 RX ORDER — FINASTERIDE 5 MG/1
5 TABLET, FILM COATED ORAL DAILY
Status: DISCONTINUED | OUTPATIENT
Start: 2025-01-14 | End: 2025-01-18 | Stop reason: HOSPADM

## 2025-01-13 RX ORDER — ONDANSETRON 2 MG/ML
4 INJECTION INTRAMUSCULAR; INTRAVENOUS EVERY 6 HOURS PRN
Status: DISCONTINUED | OUTPATIENT
Start: 2025-01-13 | End: 2025-01-18 | Stop reason: HOSPADM

## 2025-01-13 RX ORDER — FINASTERIDE 5 MG/1
5 TABLET, FILM COATED ORAL DAILY
Status: DISCONTINUED | OUTPATIENT
Start: 2025-01-13 | End: 2025-01-13 | Stop reason: HOSPADM

## 2025-01-13 RX ORDER — SODIUM CHLORIDE, SODIUM LACTATE, POTASSIUM CHLORIDE, AND CALCIUM CHLORIDE .6; .31; .03; .02 G/100ML; G/100ML; G/100ML; G/100ML
500 INJECTION, SOLUTION INTRAVENOUS ONCE
Status: DISCONTINUED | OUTPATIENT
Start: 2025-01-13 | End: 2025-01-13

## 2025-01-13 RX ORDER — MORPHINE SULFATE 4 MG/ML
4 INJECTION, SOLUTION INTRAMUSCULAR; INTRAVENOUS
Status: DISCONTINUED | OUTPATIENT
Start: 2025-01-13 | End: 2025-01-13 | Stop reason: HOSPADM

## 2025-01-13 RX ORDER — ATORVASTATIN CALCIUM 80 MG/1
80 TABLET, FILM COATED ORAL NIGHTLY
Status: DISCONTINUED | OUTPATIENT
Start: 2025-01-13 | End: 2025-01-13 | Stop reason: HOSPADM

## 2025-01-13 RX ORDER — FUROSEMIDE 10 MG/ML
40 INJECTION INTRAMUSCULAR; INTRAVENOUS 2 TIMES DAILY
Status: DISCONTINUED | OUTPATIENT
Start: 2025-01-13 | End: 2025-01-13 | Stop reason: HOSPADM

## 2025-01-13 RX ORDER — SODIUM CHLORIDE 0.9 % (FLUSH) 0.9 %
10 SYRINGE (ML) INJECTION EVERY 12 HOURS SCHEDULED
Status: DISCONTINUED | OUTPATIENT
Start: 2025-01-13 | End: 2025-01-18 | Stop reason: HOSPADM

## 2025-01-13 RX ORDER — ISOSORBIDE MONONITRATE 30 MG/1
30 TABLET, EXTENDED RELEASE ORAL DAILY
Status: DISCONTINUED | OUTPATIENT
Start: 2025-01-13 | End: 2025-01-13 | Stop reason: HOSPADM

## 2025-01-13 RX ORDER — BUDESONIDE AND FORMOTEROL FUMARATE DIHYDRATE 80; 4.5 UG/1; UG/1
2 AEROSOL RESPIRATORY (INHALATION)
Status: DISCONTINUED | OUTPATIENT
Start: 2025-01-13 | End: 2025-01-13 | Stop reason: HOSPADM

## 2025-01-13 RX ORDER — POTASSIUM CHLORIDE 1500 MG/1
40 TABLET, EXTENDED RELEASE ORAL PRN
Status: DISCONTINUED | OUTPATIENT
Start: 2025-01-13 | End: 2025-01-13 | Stop reason: HOSPADM

## 2025-01-13 RX ORDER — ACETAMINOPHEN 650 MG/1
650 SUPPOSITORY RECTAL EVERY 6 HOURS PRN
Status: DISCONTINUED | OUTPATIENT
Start: 2025-01-13 | End: 2025-01-18 | Stop reason: HOSPADM

## 2025-01-13 RX ORDER — LEVOTHYROXINE SODIUM 100 UG/1
200 TABLET ORAL DAILY
Status: DISCONTINUED | OUTPATIENT
Start: 2025-01-13 | End: 2025-01-13 | Stop reason: HOSPADM

## 2025-01-13 RX ORDER — DULOXETIN HYDROCHLORIDE 30 MG/1
60 CAPSULE, DELAYED RELEASE ORAL DAILY
Status: DISCONTINUED | OUTPATIENT
Start: 2025-01-14 | End: 2025-01-18 | Stop reason: HOSPADM

## 2025-01-13 RX ORDER — NOREPINEPHRINE BITARTRATE 0.06 MG/ML
INJECTION, SOLUTION INTRAVENOUS
Status: DISPENSED
Start: 2025-01-13 | End: 2025-01-14

## 2025-01-13 RX ORDER — IPRATROPIUM BROMIDE AND ALBUTEROL SULFATE 2.5; .5 MG/3ML; MG/3ML
1 SOLUTION RESPIRATORY (INHALATION) EVERY 4 HOURS PRN
Status: DISCONTINUED | OUTPATIENT
Start: 2025-01-13 | End: 2025-01-13 | Stop reason: HOSPADM

## 2025-01-13 RX ORDER — POTASSIUM CHLORIDE 1500 MG/1
40 TABLET, EXTENDED RELEASE ORAL PRN
Status: DISCONTINUED | OUTPATIENT
Start: 2025-01-13 | End: 2025-01-18 | Stop reason: HOSPADM

## 2025-01-13 RX ORDER — NICOTINE 21 MG/24HR
1 PATCH, TRANSDERMAL 24 HOURS TRANSDERMAL DAILY
Status: DISCONTINUED | OUTPATIENT
Start: 2025-01-13 | End: 2025-01-13 | Stop reason: HOSPADM

## 2025-01-13 RX ORDER — POTASSIUM CHLORIDE 7.45 MG/ML
10 INJECTION INTRAVENOUS PRN
Status: DISCONTINUED | OUTPATIENT
Start: 2025-01-13 | End: 2025-01-18 | Stop reason: HOSPADM

## 2025-01-13 RX ORDER — LISINOPRIL 10 MG/1
5 TABLET ORAL DAILY
Status: DISCONTINUED | OUTPATIENT
Start: 2025-01-13 | End: 2025-01-13 | Stop reason: HOSPADM

## 2025-01-13 RX ORDER — ASPIRIN 81 MG/1
81 TABLET ORAL DAILY
Status: DISCONTINUED | OUTPATIENT
Start: 2025-01-13 | End: 2025-01-13 | Stop reason: HOSPADM

## 2025-01-13 RX ORDER — M-VIT,TX,IRON,MINS/CALC/FOLIC 27MG-0.4MG
1 TABLET ORAL
Status: DISCONTINUED | OUTPATIENT
Start: 2025-01-14 | End: 2025-01-18 | Stop reason: HOSPADM

## 2025-01-13 RX ORDER — ENOXAPARIN SODIUM 100 MG/ML
40 INJECTION SUBCUTANEOUS DAILY
Status: DISCONTINUED | OUTPATIENT
Start: 2025-01-13 | End: 2025-01-13 | Stop reason: HOSPADM

## 2025-01-13 RX ORDER — SODIUM CHLORIDE 9 MG/ML
INJECTION, SOLUTION INTRAVENOUS PRN
Status: DISCONTINUED | OUTPATIENT
Start: 2025-01-13 | End: 2025-01-18 | Stop reason: HOSPADM

## 2025-01-13 RX ORDER — NOREPINEPHRINE BITARTRATE 0.06 MG/ML
1-100 INJECTION, SOLUTION INTRAVENOUS CONTINUOUS
Status: DISCONTINUED | OUTPATIENT
Start: 2025-01-13 | End: 2025-01-13

## 2025-01-13 RX ORDER — ASPIRIN 81 MG/1
81 TABLET, CHEWABLE ORAL DAILY
Status: DISCONTINUED | OUTPATIENT
Start: 2025-01-14 | End: 2025-01-18 | Stop reason: HOSPADM

## 2025-01-13 RX ORDER — FAMOTIDINE 20 MG/1
20 TABLET, FILM COATED ORAL 2 TIMES DAILY
Status: DISCONTINUED | OUTPATIENT
Start: 2025-01-13 | End: 2025-01-18 | Stop reason: HOSPADM

## 2025-01-13 RX ORDER — TAMSULOSIN HYDROCHLORIDE 0.4 MG/1
0.4 CAPSULE ORAL DAILY
Status: DISCONTINUED | OUTPATIENT
Start: 2025-01-13 | End: 2025-01-13 | Stop reason: HOSPADM

## 2025-01-13 RX ORDER — DULOXETIN HYDROCHLORIDE 60 MG/1
60 CAPSULE, DELAYED RELEASE ORAL DAILY
Status: DISCONTINUED | OUTPATIENT
Start: 2025-01-13 | End: 2025-01-13 | Stop reason: HOSPADM

## 2025-01-13 RX ORDER — CYCLOBENZAPRINE HCL 10 MG
10 TABLET ORAL 3 TIMES DAILY PRN
Status: DISCONTINUED | OUTPATIENT
Start: 2025-01-13 | End: 2025-01-18 | Stop reason: HOSPADM

## 2025-01-13 RX ORDER — NITROGLYCERIN 0.4 MG/1
0.4 TABLET SUBLINGUAL EVERY 5 MIN PRN
Status: DISCONTINUED | OUTPATIENT
Start: 2025-01-13 | End: 2025-01-13

## 2025-01-13 RX ORDER — MAGNESIUM SULFATE IN WATER 40 MG/ML
2000 INJECTION, SOLUTION INTRAVENOUS PRN
Status: DISCONTINUED | OUTPATIENT
Start: 2025-01-13 | End: 2025-01-18 | Stop reason: HOSPADM

## 2025-01-13 RX ORDER — CYCLOBENZAPRINE HCL 10 MG
10 TABLET ORAL 3 TIMES DAILY PRN
Status: DISCONTINUED | OUTPATIENT
Start: 2025-01-13 | End: 2025-01-13 | Stop reason: HOSPADM

## 2025-01-13 RX ORDER — 0.9 % SODIUM CHLORIDE 0.9 %
1000 INTRAVENOUS SOLUTION INTRAVENOUS ONCE
Status: COMPLETED | OUTPATIENT
Start: 2025-01-13 | End: 2025-01-13

## 2025-01-13 RX ADMIN — TIOTROPIUM BROMIDE INHALATION SPRAY 2 PUFF: 3.12 SPRAY, METERED RESPIRATORY (INHALATION) at 12:55

## 2025-01-13 RX ADMIN — BUDESONIDE AND FORMOTEROL FUMARATE DIHYDRATE 2 PUFF: 80; 4.5 AEROSOL RESPIRATORY (INHALATION) at 12:56

## 2025-01-13 RX ADMIN — ENOXAPARIN SODIUM 40 MG: 100 INJECTION SUBCUTANEOUS at 10:18

## 2025-01-13 RX ADMIN — BUDESONIDE AND FORMOTEROL FUMARATE DIHYDRATE 2 PUFF: 80; 4.5 AEROSOL RESPIRATORY (INHALATION) at 20:34

## 2025-01-13 RX ADMIN — SODIUM CHLORIDE, POTASSIUM CHLORIDE, SODIUM LACTATE AND CALCIUM CHLORIDE 1000 ML: 600; 310; 30; 20 INJECTION, SOLUTION INTRAVENOUS at 14:51

## 2025-01-13 RX ADMIN — LISINOPRIL 5 MG: 10 TABLET ORAL at 10:18

## 2025-01-13 RX ADMIN — ONDANSETRON 4 MG: 2 INJECTION INTRAMUSCULAR; INTRAVENOUS at 12:59

## 2025-01-13 RX ADMIN — BUDESONIDE AND FORMOTEROL FUMARATE DIHYDRATE 2 PUFF: 80; 4.5 AEROSOL RESPIRATORY (INHALATION) at 07:59

## 2025-01-13 RX ADMIN — CARVEDILOL 3.12 MG: 3.12 TABLET, FILM COATED ORAL at 10:19

## 2025-01-13 RX ADMIN — SODIUM CHLORIDE 1000 ML: 9 INJECTION, SOLUTION INTRAVENOUS at 13:00

## 2025-01-13 RX ADMIN — WATER 1000 MG: 1 INJECTION INTRAMUSCULAR; INTRAVENOUS; SUBCUTANEOUS at 14:35

## 2025-01-13 RX ADMIN — DULOXETINE 60 MG: 60 CAPSULE, DELAYED RELEASE ORAL at 10:19

## 2025-01-13 RX ADMIN — AZITHROMYCIN MONOHYDRATE 500 MG: 500 INJECTION, POWDER, LYOPHILIZED, FOR SOLUTION INTRAVENOUS at 14:43

## 2025-01-13 RX ADMIN — TAMSULOSIN HYDROCHLORIDE 0.4 MG: 0.4 CAPSULE ORAL at 10:19

## 2025-01-13 RX ADMIN — ALBUTEROL SULFATE 2 PUFF: 90 AEROSOL, METERED RESPIRATORY (INHALATION) at 12:57

## 2025-01-13 RX ADMIN — ATORVASTATIN CALCIUM 80 MG: 40 TABLET, FILM COATED ORAL at 20:50

## 2025-01-13 RX ADMIN — FAMOTIDINE 20 MG: 20 TABLET, FILM COATED ORAL at 10:19

## 2025-01-13 RX ADMIN — FUROSEMIDE 40 MG: 10 INJECTION, SOLUTION INTRAMUSCULAR; INTRAVENOUS at 10:18

## 2025-01-13 RX ADMIN — ISOSORBIDE MONONITRATE 30 MG: 30 TABLET, EXTENDED RELEASE ORAL at 10:19

## 2025-01-13 RX ADMIN — FINASTERIDE 5 MG: 5 TABLET, FILM COATED ORAL at 10:20

## 2025-01-13 RX ADMIN — ALBUTEROL SULFATE 2 PUFF: 90 AEROSOL, METERED RESPIRATORY (INHALATION) at 20:34

## 2025-01-13 RX ADMIN — LEVOTHYROXINE SODIUM 200 MCG: 0.1 TABLET ORAL at 10:19

## 2025-01-13 RX ADMIN — SODIUM CHLORIDE, PRESERVATIVE FREE 10 ML: 5 INJECTION INTRAVENOUS at 20:50

## 2025-01-13 RX ADMIN — CYCLOBENZAPRINE HYDROCHLORIDE 10 MG: 10 TABLET, FILM COATED ORAL at 06:04

## 2025-01-13 ASSESSMENT — PAIN - FUNCTIONAL ASSESSMENT
PAIN_FUNCTIONAL_ASSESSMENT: 0-10
PAIN_FUNCTIONAL_ASSESSMENT: 0-10

## 2025-01-13 ASSESSMENT — ENCOUNTER SYMPTOMS
RHINORRHEA: 0
COLOR CHANGE: 0
SHORTNESS OF BREATH: 1

## 2025-01-13 ASSESSMENT — PAIN DESCRIPTION - DESCRIPTORS: DESCRIPTORS: SHARP

## 2025-01-13 ASSESSMENT — PAIN DESCRIPTION - LOCATION
LOCATION: CHEST

## 2025-01-13 ASSESSMENT — PAIN SCALES - GENERAL
PAINLEVEL_OUTOF10: 10
PAINLEVEL_OUTOF10: 10

## 2025-01-13 ASSESSMENT — HEART SCORE: ECG: NON-SPECIFC REPOLARIZATION DISTURBANCE/LBTB/PM

## 2025-01-13 NOTE — ED NOTES
1314 perfect serve message sent to Dr Gong on in pt consult from hospitalist    1317 Dr Gong acknowledged perfect serve message. Added to treatment team.

## 2025-01-13 NOTE — PROGRESS NOTES
I personally saw the patient and have reviewed all lab and imaging. I discussed the patient with the EMILIA and was available for questions and consultation as needed.     History:   Had left sided chest pain. Stabbing pain. Feels like heart attack 4 year ago when he had to have open heart surgery. Started Thursday. Constant. Let side of chest. Nothing helps. Nothing worsens. Hard to breath. Both legs are swollen. No improvement at home. Cardiologist is Dr. Jones. No fever, but felt warm before. Has cough. Sometimes brings up clear fluid.     Exam:   No acute distress  S1s2 slightly bradycardic  Tenderness to palpation on left side of chest  Rales b/l  Abd soft nt nd  Edema in LE    MDM:   ***      Karlie Bahkta MD  1/13/25

## 2025-01-13 NOTE — H&P
V2.0  History and Physical      Name:  Keyur Adrian /Age/Sex: 1955  (69 y.o. male)   MRN & CSN:  [id number] & [id number] Encounter Date/Time: 2025 1:53 PM EST   Location:  ED19/ED-19 PCP: Wili High MD       Hospital Day: 1    Assessment and Plan:   Keyur Adrian is a 69 y.o. male with a pmh of  who presents with Chest pain    Hospital Problems             Last Modified POA    * (Principal) Chest pain 2025 Yes       Hypotension  Patient had symptomatic hypotension on arrival to ED from [place] [facility]  Suspected due to hypovolemia due to poor intake  Systolic blood pressure dropped to 60s  Blood pressure improved with initiation of fluid  Lactate 1.3  Low concern for sepsis causing hypotension.  UA unremarkable, patient does not have any signs and symptoms of pneumonia either.  Will add procalcitonin, blood cultures  Will cover with 1 dose of IV ceftriaxone and IV azithromycin until further results      Chest pain  Patient had initially presented [place] ED for substernal chest pain evaluation  Troponin 17, 19  Cardiology consulted  Plan for echocardiogram, stress test    History of CAD s/p CABG x 3   Continue Lipitor    Hypertension  On lisinopril, hold for now due to hypotension    History of hypothyroidism, on Synthroid    History of COPD  Panlobular emphysema in the CTA  Wears 2 L oxygen as needed  Will add breathing treatment          Disposition:   Current Living situation:   Expected Disposition:   Estimated D/C:     Diet Diet NPO   DVT Prophylaxis [] Lovenox, []  Heparin, [] SCDs, [] Ambulation,  [] Eliquis, [] Xarelto, [] Coumadin   Code Status Full Code   Surrogate Decision Maker/ POA      History from:     patient    History of Present Illness:     Chief Complaint: Chest pain  Keyur Adrian is a 69 y.o. male with pmh of CAD s/p CABG, hypertension, hyperlipidemia, hypothyroidism who presents with chest pain.  Patient started to have chest pain on 2024.  The pain eventually got worse

## 2025-01-13 NOTE — ED NOTES
Pt pressure continued to decrease since arrival from [place] ED. Pt reports continues to have chest pain and has not let up.  on arrival and now in the 50s. Hospitalist aware of this with EKG repeated, blood work drawn and Second line placed. Pt clammy and appearing dusky in color. POC glucose 106.    Dr Sauceda at bedside as well as provider herbie.

## 2025-01-13 NOTE — ED NOTES
Belongings placed into bag, boots, jeans and belt (pt states wallet is in jeans), and shirt. Pt coat left out to wear for transport.

## 2025-01-13 NOTE — ED NOTES
Pt falling asleep quickly while this nurse in room but continues to report severe chest pain when awake. Pt currently resting at this time. Does not appear in distress.

## 2025-01-13 NOTE — ED NOTES
Medication History  [facility]    Patient Name: Keyur Adrian 1955     Medication history has been completed by: Fadia Davies CPhT    Source(s) of information: insurance claims     Primary Care Physician: Wili High MD     Pharmacy: [facility] [place]    Allergies as of 01/12/2025    (No Known Allergies)        Prior to Admission medications    Medication Sig Start Date End Date Taking? Authorizing Provider   DULoxetine (CYMBALTA) 60 MG extended release capsule TAKE 1 CAPSULE BY MOUTH EVERY DAY 11/11/24   Wili High MD   Semaglutide,0.25 or 0.5MG/DOS, (OZEMPIC, 0.25 OR 0.5 MG/DOSE,) 2 MG/3ML SOPN INJECT 0.25MG INTO THE SKIN ONE TIME PER WEEK 9/6/24   Yosef Banuelos PA   levothyroxine (SYNTHROID) 200 MCG tablet TAKE 1 TABLET BY MOUTH EVERY DAY 8/26/24   Wili High MD   tadalafil (CIALIS) 20 MG tablet Take 1 tablet by mouth daily as needed for Erectile Dysfunction 8/26/24   Wili High MD   furosemide (LASIX) 40 MG tablet Take 2 tablets by mouth daily as needed (leg swelling) 8/26/24   Wili High MD   atorvastatin (LIPITOR) 80 MG tablet TAKE 1 TABLET BY MOUTH EVERY DAY IN THE EVENING 12/27/23   Rico Boyd, APRN - CNP   vitamin D (ERGOCALCIFEROL) 1.25 MG (24574 UT) CAPS capsule Take 1 capsule by mouth once a week 12/21/23   Wili High MD   fluticasone-salmeterol (ADVAIR) 250-50 MCG/ACT AEPB diskus inhaler Inhale 1 puff into the lungs in the morning and 1 puff in the evening. 12/21/23   Wili High MD   tamsulosin (FLOMAX) 0.4 MG capsule Take 1 capsule by mouth daily 12/9/23 12/13/24  Wili High MD   carvedilol (COREG) 3.125 MG tablet Take 1 tablet by mouth 2 times daily (with meals) 11/20/23   Wili High MD   cyclobenzaprine (FLEXERIL) 10 MG tablet Take 1 tablet by mouth 3 times daily as needed for Muscle spasms 11/20/23   Wili High MD   famotidine (PEPCID) 20 MG tablet TAKE 1/2 TABLET BY MOUTH 2 TIMES DAILY AS

## 2025-01-13 NOTE — PROGRESS NOTES
4 Eyes Skin Assessment     NAME:  Keyur Adrian  YOB: 1955  MEDICAL RECORD NUMBER:  [id number]    The patient is being assessed for  Admission    I agree that at least one RN has performed a thorough Head to Toe Skin Assessment on the patient. ALL assessment sites listed below have been assessed.      Areas assessed by both nurses:    Head, Face, Ears, Shoulders, Back, Chest, Arms, Elbows, Hands, Sacrum. Buttock, Coccyx, Ischium, and Legs. Feet and Heels        Does the Patient have a Wound? No noted wound(s)       Arnold Prevention initiated by RN: No  Wound Care Orders initiated by RN: Yes    Pressure Injury (Stage 3,4, Unstageable, DTI, NWPT, and Complex wounds) if present, place Wound referral order by RN under : No    New Ostomies, if present place, Ostomy referral order under : No     Nurse 1 eSignature: Electronically signed by Neo Jaquez RN on 1/13/25 at 6:09 PM EST    **SHARE this note so that the co-signing nurse can place an eSignature**    Nurse 2 eSignature: Electronically signed by Steffanie Matthew RN on 1/13/25 at 6:09 PM EST

## 2025-01-13 NOTE — ED NOTES
Hosptialist updated on pt current vitals pt 90-92% 4L and pressure continues to run low. CP remains per pt.

## 2025-01-13 NOTE — PROGRESS NOTES
Patient was initially admitted to obs, however as I saw the patient, he was hypotensive and bradycardic, unstable for observation.  The ICU physician was contacted by Dr. Zigeler and Dr. Sauceda admitted patient to inpatient rather than observation, both providers present during exam.  Initial orders for hospitalization replaced by myself, care to be taken over by Dr. Greenberg.    Charlotte Ventura PA-C  Hospitalist  1/13/2025, 1:06 PM

## 2025-01-13 NOTE — PLAN OF CARE
Patient will have stress test tomorrow morning 1/14/2025 per Dr. Gong. Patient is to be NPO at midnight.     Brittany LOPESMT

## 2025-01-13 NOTE — PLAN OF CARE
Problem: Chronic Conditions and Co-morbidities  Goal: Patient's chronic conditions and co-morbidity symptoms are monitored and maintained or improved  Outcome: Progressing     Problem: Safety - Adult  Goal: Free from fall injury  Outcome: Progressing     Problem: Discharge Planning  Goal: Discharge to home or other facility with appropriate resources  Outcome: Progressing     Problem: Pain  Goal: Verbalizes/displays adequate comfort level or baseline comfort level  Outcome: Progressing     Problem: Anxiety  Goal: Will report anxiety at manageable levels  Description: INTERVENTIONS:  1. Administer medication as ordered  2. Teach and rehearse alternative coping skills  3. Provide emotional support with 1:1 interaction with staff  Outcome: Progressing     Problem: Decision Making  Goal: Pt/Family able to effectively weigh alternatives and participate in decision making related to treatment and care  Description: INTERVENTIONS:  1. Determine when there are differences between patient's view, family's view, and healthcare provider's view of condition  2. Facilitate patient and family articulation of goals for care  3. Help patient and family identify pros/cons of alternative solutions  4. Provide information as requested by patient/family  5. Respect patient/family right to receive or not to receive information  6. Serve as a liaison between patient and family and health care team  7. Initiate Consults from Ethics, Palliative Care or initiate Family Care Conference as is appropriate  Outcome: Progressing     Problem: Confusion  Goal: Confusion, delirium, dementia, or psychosis is improved or at baseline  Description: INTERVENTIONS:  1. Assess for possible contributors to thought disturbance, including medications, impaired vision or hearing, underlying metabolic abnormalities, dehydration, psychiatric diagnoses, and notify attending LIP  2. [place] high risk fall precautions, as indicated  3. Provide frequent short

## 2025-01-13 NOTE — PLAN OF CARE
Progressing  Goal: Hemodynamic stability and optimal renal function maintained  Outcome: Progressing     Problem: Hematologic - Adult  Goal: Maintains hematologic stability  Outcome: Progressing

## 2025-01-13 NOTE — ED NOTES
ED TO INPATIENT SBAR HANDOFF    Patient Name: Keyur Adrian   :  1955  69 y.o.   Preferred Name  Pratik   Family/Caregiver Present no   Restraints no   C-SSRS:    Sitter no   Sepsis Risk Score        Situation  Chief Complaint   Patient presents with    Chest Pain     Brief Description of Patient's Condition: pt admitted in [facility] and awaiting for ready bed in [place]. Transferred to ED. Pt reports has been having chest pain. Pt with decline since arrival, karina, hypotensive, continues to complain of cp while awake. Baseline lethargic but arouses to answer questions.   Mental Status: coherent, logical, and thought processes intact  Arrived from: home    Imaging:   No orders to display     Abnormal labs:   Abnormal Labs Reviewed   TROPONIN - Abnormal; Notable for the following components:       Result Value    Troponin, High Sensitivity 25 (*)     All other components within normal limits   CBC WITH AUTO DIFFERENTIAL - Abnormal; Notable for the following components:    Hematocrit 52.5 (*)     .1 (*)     MCH 35.0 (*)     Neutrophils % 68 (*)     Lymphocytes % 21 (*)     Monocytes % 6 (*)     Immature Granulocytes % 1 (*)     All other components within normal limits   BASIC METABOLIC PANEL W/ REFLEX TO MG FOR LOW K - Abnormal; Notable for the following components:    Est, Glom Filt Rate 55 (*)     All other components within normal limits   POCT GLUCOSE - Abnormal; Notable for the following components:    POC Glucose 106 (*)     All other components within normal limits        Background  History:   Past Medical History:   Diagnosis Date    CAD (coronary artery disease)     Dr. SORAIDA Jones    Chest pain     COPD (chronic obstructive pulmonary disease) (HCC)     No pulmonologist - follows with PCP    H/O cardiac catheterization 2020     Severe calcified multivessel CAD with LV dysfuntion, PCWP is 12, CABG consult.    H/O cardiovascular stress test 2021    abnormal stress test due to mildly  depressed LVEF, increased EDV, Normal tracer uptake in all myocardial segment during  rest and stress. Decreased uptake inferiorly due to diaphragmatic artifact.    H/O echocardiogram 06/23/2020     Mild concentric LVH with moderate systolic impairment. EF is 40-45 % .    H/O echocardiogram 02/02/2021    ef 45-50%,  Mild LVH.    H/O exercise stress test 12/16/2020    Submaximal study due to failure to achieve target heart rate response and    Hx of cardiovascular stress test 09/20/2021    LVEF, increased EDB, Normal tracer uptake in all myocardial segment during rest and stress. Decreased uptake inferiorly due to diaphragmatic artifact.    Hx of Doppler ultrasound 04/13/2021    significant reflux noted of the right CFV. Significant reflux noted in the Left SSV Distal.    Hyperlipidemia     Hypertension     Follows with PCP    IBS (irritable bowel syndrome)     Orthostatic hypotension 7/18/2023    Pancreatitis 2013    Rheumatoid arthritis (HCC)     S/P CABG x 3 10/19/2020    SOB (shortness of breath)     Thyroid disease        Assessment    Vitals:        Vitals:    01/13/25 1530 01/13/25 1545 01/13/25 1600 01/13/25 1615   BP: 92/61 93/61 (!) 93/55 96/67   Pulse: 60 60 56 53   Resp: 14   (!) 9   Temp:       SpO2: 100% 93% 95%        PO Status: Regular    O2 Flow Rate: O2 Device: Nasal cannula 4L    Cardiac Rhythm: karina      Last documented pain medication administered: n/a    NIH Score: NIH       Active LDA's:   Peripheral IV 01/12/25 Left Antecubital (Active)       Peripheral IV 01/13/25 Right Antecubital (Active)       Pertinent or High Risk Medications/Drips: no   If Yes, please provide details:       Blood Product Administration: no  If Yes, please provide details:     Recommendation    Incomplete orders   Additional Comments:    If any further questions, please call Sending RN at [phone]    Electronically signed by: Electronically signed by Jordyn Alatorre RN on 1/13/2025 at 4:31 PM

## 2025-01-13 NOTE — CONSULTS
ordered    BPH  -Continue Finasteride and Tamsulosin    GERD  -Continue famotidine daily    Diet Diet NPO Exceptions are: Sips of Water with Meds   DVT Prophylaxis [] Lovenox, []  Heparin, [] SCDs, [] Ambulation [] Eliquis   GI Prophylaxis [] PPI,  [] H2 Blocker,  [] Carafate,  [] Diet/Tube Feeds   Code Status Prior   Disposition Patient requires continued admission due to necessity of evaluation by cardiology for chest pain in the setting of CAD.     History of Present Illness: 4 elements, Location/Symptom, Timing/Onset, Context/Setting, Quality, Duration, Modifying Factors, Severity     Chief Complaint:   Keyur Adrian is a 69 y.o.  male  who presents with chest pains. He reports that the chest pain began in the evening on 1/11/24 while sitting and resting. He states the pain got worse during the daytime of 1/12 while resting and that he began experiencing diaphoresis as well. He denies any nausea, or vomiting. He states the pain was described as heavy and centered in his chest without radiation to extremities or jaw.        Review of Systems: (10 or more )   Review of Systems   Constitutional:  Positive for activity change and fatigue.   HENT:  Negative for congestion and rhinorrhea.    Respiratory:  Positive for shortness of breath.    Cardiovascular:  Positive for chest pain. Negative for palpitations.   Musculoskeletal:  Positive for myalgias. Negative for neck pain and neck stiffness.   Skin:  Negative for color change and pallor.   Neurological:  Positive for weakness.         Ten point ROS reviewed negative, unless as noted above    Family History   Problem Relation Age of Onset    Alzheimer's Disease Mother     Heart Disease Father     Heart Attack Father     High Blood Pressure Father     High Cholesterol Father        Social History     Tobacco Use    Smoking status: Some Days     Current packs/day: 0.00     Average packs/day: 2.0 packs/day for 52.7 years (105.5 ttl pk-yrs)     Types: Cigarettes     Start 
Neuro

## 2025-01-13 NOTE — CONSULTS
not use drugs.  Family history:  family history includes Alzheimer's Disease in his mother; Heart Attack in his father; Heart Disease in his father; High Blood Pressure in his father; High Cholesterol in his father.    No Known Allergies    albuterol sulfate HFA (PROVENTIL;VENTOLIN;PROAIR) 108 (90 Base) MCG/ACT inhaler 2 puff, Q4H WA RT  [START ON 1/14/2025] aspirin EC tablet 81 mg, Daily  atorvastatin (LIPITOR) tablet 80 mg, Nightly  cyclobenzaprine (FLEXERIL) tablet 10 mg, TID PRN  [START ON 1/14/2025] DULoxetine (CYMBALTA) extended release capsule 60 mg, Daily  famotidine (PEPCID) tablet 20 mg, BID  [START ON 1/14/2025] finasteride (PROSCAR) tablet 5 mg, Daily  budesonide-formoterol (SYMBICORT) 80-4.5 MCG/ACT inhaler 2 puff, BID RT  levothyroxine (SYNTHROID) tablet 200 mcg, Daily  [START ON 1/14/2025] therapeutic multivitamin-minerals 1 tablet, Daily with breakfast  tiotropium (SPIRIVA RESPIMAT) 2.5 MCG/ACT inhaler 2 puff, Daily RT  sodium chloride flush 0.9 % injection 10 mL, 2 times per day  sodium chloride flush 0.9 % injection 10 mL, PRN  0.9 % sodium chloride infusion, PRN  potassium chloride (KLOR-CON M) extended release tablet 40 mEq, PRN   Or  potassium bicarb-citric acid (EFFER-K) effervescent tablet 40 mEq, PRN   Or  potassium chloride 10 mEq/100 mL IVPB (Peripheral Line), PRN  magnesium sulfate 2000 mg in 50 mL IVPB premix, PRN  promethazine (PHENERGAN) tablet 12.5 mg, Q6H PRN   Or  ondansetron (ZOFRAN) injection 4 mg, Q6H PRN  acetaminophen (TYLENOL) tablet 650 mg, Q6H PRN   Or  acetaminophen (TYLENOL) suppository 650 mg, Q6H PRN  magnesium hydroxide (MILK OF MAGNESIA) 400 MG/5ML suspension 30 mL, Daily PRN  nitroGLYCERIN (NITROSTAT) SL tablet 0.4 mg, Q5 Min PRN  [START ON 1/14/2025] enoxaparin (LOVENOX) injection 40 mg, Daily  norepinephrine-sodium chloride (LEVOPHED) 16-0.9 MG/250ML-% infusion,   sodium chloride 0.9 % bolus 1,000 mL, Once  cefTRIAXone (ROCEPHIN) 1,000 mg in sterile water 10 mL IV  13.5 - 18.0 g/dL    Hematocrit 51.8 42.0 - 52.0 %    .2 (H) 78.0 - 100.0 fL    MCH 35.1 (H) 27.0 - 31.0 pg    MCHC 34.0 32.0 - 36.0 g/dL    RDW 13.3 11.7 - 14.9 %    Platelets 159 140 - 440 k/uL    MPV 9.6 7.5 - 11.1 fL    Neutrophils % 70 (H) 36 - 66 %    Lymphocytes % 21 (L) 24 - 44 %    Monocytes % 7 (H) 0 - 4 %    Eosinophils % 1 0 - 3 %    Basophils % 1 0 - 1 %    Immature Granulocytes % 0 0 %    Neutrophils Absolute 5.30 k/uL    Lymphocytes Absolute 1.58 k/uL    Monocytes Absolute 0.53 k/uL    Eosinophils Absolute 0.10 k/uL    Basophils Absolute 0.05 k/uL    Immature Granulocytes Absolute 0.02 k/uL   Comprehensive Metabolic Panel w/ Reflex to MG    Collection Time: 01/12/25  5:20 PM   Result Value Ref Range    Sodium 137 135 - 145 mmol/L    Potassium 3.5 3.5 - 5.1 mmol/L    Chloride 98 (L) 99 - 110 mmol/L    CO2 25 21 - 32 mmol/L    Anion Gap 14 4 - 16 mmol/L    Glucose 153 (H) 70 - 99 mg/dL    BUN 17 6 - 23 mg/dL    Creatinine 1.0 0.9 - 1.3 mg/dL    Est, Glom Filt Rate 81 >60 mL/min/1.73m2    Calcium 9.0 8.3 - 10.6 mg/dL    Total Protein 7.3 6.4 - 8.2 g/dL    Albumin 4.0 3.4 - 5.0 g/dL    Albumin/Globulin Ratio 1.2 1.1 - 2.2    Total Bilirubin 0.4 0.0 - 1.0 mg/dL    Alkaline Phosphatase 100 40 - 129 U/L    ALT 19 10 - 40 U/L    AST 28 15 - 37 U/L   Troponin    Collection Time: 01/12/25  5:20 PM   Result Value Ref Range    Troponin, High Sensitivity 17 0 - 21 ng/L   Magnesium    Collection Time: 01/12/25  5:20 PM   Result Value Ref Range    Magnesium 2.1 1.8 - 2.4 mg/dL   D-Dimer Test    Collection Time: 01/12/25  5:20 PM   Result Value Ref Range    D-Dimer, Quant 1.03 (H) 0.00 - 0.46 ug/mL FEU   Troponin    Collection Time: 01/12/25  6:35 PM   Result Value Ref Range    Troponin, High Sensitivity 19 0 - 21 ng/L   Brain Natriuretic Peptide    Collection Time: 01/12/25  6:35 PM   Result Value Ref Range    NT Pro-BNP 1,460 (H) 0 - 125 pg/mL   TSH reflex to FT4    Collection Time: 01/12/25  6:35 PM   Result

## 2025-01-13 NOTE — ED PROVIDER NOTES
Emergency Department Encounter  Location: [facility] EMERGENCY DEPARTMENT    Patient: Keyur Adrian  MRN: [id number]  : 1955  Date of evaluation: 2025  ED Provider: Sergei Garcia DO      Keyur Adrian was checked out to me by Dr. Thorpe. Please see his/her initial documentation for details of the patient's initial ED presentation, physical exam and completed studies.    In brief, Keyur Adrian is a 69 y.o. male that presented to the emergency department with concerns of left-sided chest pain.    I have reviewed and interpreted all of the currently available lab results and diagnostics from this visit:  Results for orders placed or performed during the hospital encounter of 25   COVID-19, Rapid    Specimen: Nasopharyngeal Swab   Result Value Ref Range    Specimen Description .NASOPHARYNGEAL SWAB     SARS-CoV-2, Rapid Not Detected Not Detected   Influenza A+B, PCR   Result Value Ref Range    Influenza A by PCR Not Detected Not Detected    Influenza B by PCR Not Detected Not Detected   CBC with Auto Differential   Result Value Ref Range    WBC 7.6 4.0 - 10.5 k/uL    RBC 5.02 4.60 - 6.20 m/uL    Hemoglobin 17.6 13.5 - 18.0 g/dL    Hematocrit 51.8 42.0 - 52.0 %    .2 (H) 78.0 - 100.0 fL    MCH 35.1 (H) 27.0 - 31.0 pg    MCHC 34.0 32.0 - 36.0 g/dL    RDW 13.3 11.7 - 14.9 %    Platelets 159 140 - 440 k/uL    MPV 9.6 7.5 - 11.1 fL    Neutrophils % 70 (H) 36 - 66 %    Lymphocytes % 21 (L) 24 - 44 %    Monocytes % 7 (H) 0 - 4 %    Eosinophils % 1 0 - 3 %    Basophils % 1 0 - 1 %    Immature Granulocytes % 0 0 %    Neutrophils Absolute 5.30 k/uL    Lymphocytes Absolute 1.58 k/uL    Monocytes Absolute 0.53 k/uL    Eosinophils Absolute 0.10 k/uL    Basophils Absolute 0.05 k/uL    Immature Granulocytes Absolute 0.02 k/uL   Comprehensive Metabolic Panel w/ Reflex to MG   Result Value Ref Range    Sodium 137 135 - 145 mmol/L    Potassium 3.5 3.5 - 5.1 mmol/L    Chloride 98 (L) 99 - 110 mmol/L    CO2 25 21 - 
 Unable to Pay for Housing in the Last Year: Not on file     Number of Times Moved in the Last Year: Not on file     Homeless in the Last Year: No     No current facility-administered medications for this encounter.     Current Outpatient Medications   Medication Sig Dispense Refill    DULoxetine (CYMBALTA) 60 MG extended release capsule TAKE 1 CAPSULE BY MOUTH EVERY DAY 90 capsule 1    Semaglutide,0.25 or 0.5MG/DOS, (OZEMPIC, 0.25 OR 0.5 MG/DOSE,) 2 MG/3ML SOPN INJECT 0.25MG INTO THE SKIN ONE TIME PER WEEK 27 mL 1    levothyroxine (SYNTHROID) 200 MCG tablet TAKE 1 TABLET BY MOUTH EVERY DAY 90 tablet 3    tadalafil (CIALIS) 20 MG tablet Take 1 tablet by mouth daily as needed for Erectile Dysfunction 30 tablet 1    furosemide (LASIX) 40 MG tablet Take 2 tablets by mouth daily as needed (leg swelling) 180 tablet 3    atorvastatin (LIPITOR) 80 MG tablet TAKE 1 TABLET BY MOUTH EVERY DAY IN THE EVENING 90 tablet 3    vitamin D (ERGOCALCIFEROL) 1.25 MG (63067 UT) CAPS capsule Take 1 capsule by mouth once a week 12 capsule 1    fluticasone-salmeterol (ADVAIR) 250-50 MCG/ACT AEPB diskus inhaler Inhale 1 puff into the lungs in the morning and 1 puff in the evening. 180 each 1    tamsulosin (FLOMAX) 0.4 MG capsule Take 1 capsule by mouth daily 90 capsule 3    carvedilol (COREG) 3.125 MG tablet Take 1 tablet by mouth 2 times daily (with meals) 180 tablet 1    cyclobenzaprine (FLEXERIL) 10 MG tablet Take 1 tablet by mouth 3 times daily as needed for Muscle spasms 90 tablet 5    famotidine (PEPCID) 20 MG tablet TAKE 1/2 TABLET BY MOUTH 2 TIMES DAILY AS NEEDED (REFLUX) 90 tablet 1    finasteride (PROSCAR) 5 MG tablet Take 1 tablet by mouth daily 90 tablet 1    lisinopril (PRINIVIL;ZESTRIL) 5 MG tablet Take 1 tablet by mouth daily 90 tablet 1    nitroglycerin (RECTIV) 0.4 % rectal ointment Place 1 inch rectally every morning 15 g 0    tiotropium (SPIRIVA HANDIHALER) 18 MCG inhalation capsule Inhale 1 capsule into the lungs daily

## 2025-01-14 ENCOUNTER — APPOINTMENT (OUTPATIENT)
Dept: NUCLEAR MEDICINE | Age: 70
DRG: 287 | End: 2025-01-14
Attending: INTERNAL MEDICINE
Payer: MEDICARE

## 2025-01-14 ENCOUNTER — APPOINTMENT (OUTPATIENT)
Dept: NON INVASIVE DIAGNOSTICS | Age: 70
DRG: 287 | End: 2025-01-14
Attending: INTERNAL MEDICINE
Payer: MEDICARE

## 2025-01-14 PROBLEM — R07.2 PRECORDIAL CHEST PAIN: Status: ACTIVE | Noted: 2025-01-14

## 2025-01-14 LAB
ANION GAP SERPL CALCULATED.3IONS-SCNC: 11 MMOL/L (ref 9–17)
BASOPHILS # BLD: 0.04 K/UL
BASOPHILS NFR BLD: 0 % (ref 0–1)
BUN SERPL-MCNC: 28 MG/DL (ref 7–20)
CALCIUM SERPL-MCNC: 8.6 MG/DL (ref 8.3–10.6)
CHLORIDE SERPL-SCNC: 104 MMOL/L (ref 99–110)
CHOLEST SERPL-MCNC: 151 MG/DL (ref 125–199)
CO2 SERPL-SCNC: 22 MMOL/L (ref 21–32)
CREAT SERPL-MCNC: 1.2 MG/DL (ref 0.8–1.3)
ECHO AO ROOT DIAM: 3.7 CM
ECHO AO ROOT INDEX: 1.78 CM/M2
ECHO AV AREA PEAK VELOCITY: 2.7 CM2
ECHO AV AREA VTI: 2.9 CM2
ECHO AV AREA/BSA PEAK VELOCITY: 1.3 CM2/M2
ECHO AV AREA/BSA VTI: 1.4 CM2/M2
ECHO AV MEAN GRADIENT: 3 MMHG
ECHO AV MEAN VELOCITY: 0.8 M/S
ECHO AV PEAK GRADIENT: 3 MMHG
ECHO AV PEAK VELOCITY: 0.9 M/S
ECHO AV VELOCITY RATIO: 0.67
ECHO AV VTI: 17.8 CM
ECHO BSA: 2.11 M2
ECHO BSA: 2.11 M2
ECHO IVC PROX: 1.4 CM
ECHO LA AREA 4C: 21.7 CM2
ECHO LA DIAMETER INDEX: 1.88 CM/M2
ECHO LA DIAMETER: 3.9 CM
ECHO LA MAJOR AXIS: 6.4 CM
ECHO LA TO AORTIC ROOT RATIO: 1.05
ECHO LA VOL MOD A4C: 61 ML (ref 18–58)
ECHO LA VOLUME INDEX MOD A4C: 29 ML/M2 (ref 16–34)
ECHO LV E' LATERAL VELOCITY: 8.2 CM/S
ECHO LV E' SEPTAL VELOCITY: 5.6 CM/S
ECHO LV EDV A4C: 105 ML
ECHO LV EDV INDEX A4C: 50 ML/M2
ECHO LV EF PHYSICIAN: 50 %
ECHO LV EJECTION FRACTION A4C: 52 %
ECHO LV ESV A4C: 50 ML
ECHO LV ESV INDEX A4C: 24 ML/M2
ECHO LV FRACTIONAL SHORTENING: 24 % (ref 28–44)
ECHO LV INTERNAL DIMENSION DIASTOLE INDEX: 2.4 CM/M2
ECHO LV INTERNAL DIMENSION DIASTOLIC: 5 CM (ref 4.2–5.9)
ECHO LV INTERNAL DIMENSION SYSTOLIC INDEX: 1.83 CM/M2
ECHO LV INTERNAL DIMENSION SYSTOLIC: 3.8 CM
ECHO LV IVSD: 1.1 CM (ref 0.6–1)
ECHO LV MASS 2D: 233.7 G (ref 88–224)
ECHO LV MASS INDEX 2D: 112.4 G/M2 (ref 49–115)
ECHO LV POSTERIOR WALL DIASTOLIC: 1.3 CM (ref 0.6–1)
ECHO LV RELATIVE WALL THICKNESS RATIO: 0.52
ECHO LVOT AREA: 4.5 CM2
ECHO LVOT AV VTI INDEX: 0.64
ECHO LVOT DIAM: 2.4 CM
ECHO LVOT MEAN GRADIENT: 1 MMHG
ECHO LVOT PEAK GRADIENT: 1 MMHG
ECHO LVOT PEAK VELOCITY: 0.6 M/S
ECHO LVOT STROKE VOLUME INDEX: 24.8 ML/M2
ECHO LVOT SV: 51.5 ML
ECHO LVOT VTI: 11.4 CM
ECHO MV A VELOCITY: 0.52 M/S
ECHO MV E DECELERATION TIME (DT): 232 MS
ECHO MV E VELOCITY: 0.85 M/S
ECHO MV E/A RATIO: 1.63
ECHO MV E/E' LATERAL: 10.37
ECHO MV E/E' RATIO (AVERAGED): 12.77
ECHO MV E/E' SEPTAL: 15.18
ECHO RV MID DIMENSION: 3.3 CM
EKG ATRIAL RATE: 63 BPM
EKG DIAGNOSIS: NORMAL
EKG P AXIS: -59 DEGREES
EKG P-R INTERVAL: 186 MS
EKG Q-T INTERVAL: 492 MS
EKG QRS DURATION: 100 MS
EKG QTC CALCULATION (BAZETT): 503 MS
EKG R AXIS: 22 DEGREES
EKG T AXIS: 114 DEGREES
EKG VENTRICULAR RATE: 63 BPM
EOSINOPHIL # BLD: 0.23 K/UL
EOSINOPHILS RELATIVE PERCENT: 2 % (ref 0–3)
ERYTHROCYTE [DISTWIDTH] IN BLOOD BY AUTOMATED COUNT: 13.6 % (ref 11.7–14.9)
GFR, ESTIMATED: 60 ML/MIN/1.73M2
GLUCOSE SERPL-MCNC: 87 MG/DL (ref 74–99)
HCT VFR BLD AUTO: 49.9 % (ref 42–52)
HDLC SERPL-MCNC: 33 MG/DL
HGB BLD-MCNC: 16.1 G/DL (ref 13.5–18)
IMM GRANULOCYTES # BLD AUTO: 0.05 K/UL
IMM GRANULOCYTES NFR BLD: 1 %
LDLC SERPL CALC-MCNC: 94 MG/DL
LYMPHOCYTES NFR BLD: 1.76 K/UL
LYMPHOCYTES RELATIVE PERCENT: 19 % (ref 24–44)
MCH RBC QN AUTO: 35.5 PG (ref 27–31)
MCHC RBC AUTO-ENTMCNC: 32.3 G/DL (ref 32–36)
MCV RBC AUTO: 109.9 FL (ref 78–100)
MONOCYTES NFR BLD: 0.7 K/UL
MONOCYTES NFR BLD: 7 % (ref 0–4)
NEUTROPHILS NFR BLD: 71 % (ref 36–66)
NEUTS SEG NFR BLD: 6.75 K/UL
NUC STRESS EJECTION FRACTION: 43 %
PLATELET, FLUORESCENCE: 101 K/UL (ref 140–440)
PMV BLD AUTO: 10.7 FL (ref 7.5–11.1)
POTASSIUM SERPL-SCNC: 4.4 MMOL/L (ref 3.5–5.1)
RBC # BLD AUTO: 4.54 M/UL (ref 4.6–6.2)
SODIUM SERPL-SCNC: 136 MMOL/L (ref 136–145)
STRESS BASELINE HR: 68 BPM
STRESS ESTIMATED WORKLOAD: 1 METS
STRESS PEAK DIAS BP: 68 MMHG
STRESS PEAK SYS BP: 121 MMHG
STRESS PERCENT HR ACHIEVED: 57 %
STRESS POST PEAK HR: 85 BPM
STRESS RATE PRESSURE PRODUCT: NORMAL BPM*MMHG
STRESS TARGET HR: 150 BPM
TID: 1.12
TRIGL SERPL-MCNC: 121 MG/DL
TROPONIN I SERPL HS-MCNC: 22 NG/L (ref 0–22)
WBC OTHER # BLD: 9.5 K/UL (ref 4–10.5)

## 2025-01-14 PROCEDURE — 85025 COMPLETE CBC W/AUTO DIFF WBC: CPT

## 2025-01-14 PROCEDURE — 93010 ELECTROCARDIOGRAM REPORT: CPT | Performed by: INTERNAL MEDICINE

## 2025-01-14 PROCEDURE — 6360000002 HC RX W HCPCS: Performed by: PHYSICIAN ASSISTANT

## 2025-01-14 PROCEDURE — 6370000000 HC RX 637 (ALT 250 FOR IP): Performed by: PHYSICIAN ASSISTANT

## 2025-01-14 PROCEDURE — 36415 COLL VENOUS BLD VENIPUNCTURE: CPT

## 2025-01-14 PROCEDURE — 6360000002 HC RX W HCPCS: Performed by: STUDENT IN AN ORGANIZED HEALTH CARE EDUCATION/TRAINING PROGRAM

## 2025-01-14 PROCEDURE — 2060000000 HC ICU INTERMEDIATE R&B

## 2025-01-14 PROCEDURE — 2700000000 HC OXYGEN THERAPY PER DAY

## 2025-01-14 PROCEDURE — 80048 BASIC METABOLIC PNL TOTAL CA: CPT

## 2025-01-14 PROCEDURE — 78452 HT MUSCLE IMAGE SPECT MULT: CPT | Performed by: INTERNAL MEDICINE

## 2025-01-14 PROCEDURE — 93016 CV STRESS TEST SUPVJ ONLY: CPT | Performed by: INTERNAL MEDICINE

## 2025-01-14 PROCEDURE — A9500 TC99M SESTAMIBI: HCPCS | Performed by: INTERNAL MEDICINE

## 2025-01-14 PROCEDURE — B2151ZZ FLUOROSCOPY OF LEFT HEART USING LOW OSMOLAR CONTRAST: ICD-10-PCS | Performed by: INTERNAL MEDICINE

## 2025-01-14 PROCEDURE — 4A023N7 MEASUREMENT OF CARDIAC SAMPLING AND PRESSURE, LEFT HEART, PERCUTANEOUS APPROACH: ICD-10-PCS | Performed by: INTERNAL MEDICINE

## 2025-01-14 PROCEDURE — 94640 AIRWAY INHALATION TREATMENT: CPT

## 2025-01-14 PROCEDURE — 93018 CV STRESS TEST I&R ONLY: CPT | Performed by: INTERNAL MEDICINE

## 2025-01-14 PROCEDURE — 93306 TTE W/DOPPLER COMPLETE: CPT | Performed by: INTERNAL MEDICINE

## 2025-01-14 PROCEDURE — 84484 ASSAY OF TROPONIN QUANT: CPT

## 2025-01-14 PROCEDURE — 96372 THER/PROPH/DIAG INJ SC/IM: CPT

## 2025-01-14 PROCEDURE — 93017 CV STRESS TEST TRACING ONLY: CPT

## 2025-01-14 PROCEDURE — 93306 TTE W/DOPPLER COMPLETE: CPT

## 2025-01-14 PROCEDURE — APPNB15 APP NON BILLABLE TIME 0-15 MINS

## 2025-01-14 PROCEDURE — 6370000000 HC RX 637 (ALT 250 FOR IP)

## 2025-01-14 PROCEDURE — 3430000000 HC RX DIAGNOSTIC RADIOPHARMACEUTICAL: Performed by: INTERNAL MEDICINE

## 2025-01-14 PROCEDURE — 99232 SBSQ HOSP IP/OBS MODERATE 35: CPT | Performed by: INTERNAL MEDICINE

## 2025-01-14 PROCEDURE — 78452 HT MUSCLE IMAGE SPECT MULT: CPT

## 2025-01-14 PROCEDURE — 94761 N-INVAS EAR/PLS OXIMETRY MLT: CPT

## 2025-01-14 PROCEDURE — 2500000003 HC RX 250 WO HCPCS: Performed by: PHYSICIAN ASSISTANT

## 2025-01-14 PROCEDURE — 6360000002 HC RX W HCPCS: Performed by: INTERNAL MEDICINE

## 2025-01-14 PROCEDURE — 80061 LIPID PANEL: CPT

## 2025-01-14 PROCEDURE — B2111ZZ FLUOROSCOPY OF MULTIPLE CORONARY ARTERIES USING LOW OSMOLAR CONTRAST: ICD-10-PCS | Performed by: INTERNAL MEDICINE

## 2025-01-14 RX ORDER — TETRAKIS(2-METHOXYISOBUTYLISOCYANIDE)COPPER(I) TETRAFLUOROBORATE 1 MG/ML
33 INJECTION, POWDER, LYOPHILIZED, FOR SOLUTION INTRAVENOUS
Status: COMPLETED | OUTPATIENT
Start: 2025-01-14 | End: 2025-01-14

## 2025-01-14 RX ORDER — MORPHINE SULFATE 2 MG/ML
2 INJECTION, SOLUTION INTRAMUSCULAR; INTRAVENOUS
Status: DISCONTINUED | OUTPATIENT
Start: 2025-01-14 | End: 2025-01-16

## 2025-01-14 RX ORDER — ISOSORBIDE MONONITRATE 30 MG/1
30 TABLET, EXTENDED RELEASE ORAL DAILY
Status: DISCONTINUED | OUTPATIENT
Start: 2025-01-14 | End: 2025-01-15 | Stop reason: SDUPTHER

## 2025-01-14 RX ORDER — CARVEDILOL 6.25 MG/1
3.12 TABLET ORAL 2 TIMES DAILY WITH MEALS
Status: DISCONTINUED | OUTPATIENT
Start: 2025-01-14 | End: 2025-01-16

## 2025-01-14 RX ORDER — REGADENOSON 0.08 MG/ML
0.4 INJECTION, SOLUTION INTRAVENOUS
Status: COMPLETED | OUTPATIENT
Start: 2025-01-14 | End: 2025-01-14

## 2025-01-14 RX ORDER — NITROGLYCERIN 0.4 MG/1
0.4 TABLET SUBLINGUAL EVERY 5 MIN PRN
Status: DISCONTINUED | OUTPATIENT
Start: 2025-01-14 | End: 2025-01-18 | Stop reason: HOSPADM

## 2025-01-14 RX ORDER — TETRAKIS(2-METHOXYISOBUTYLISOCYANIDE)COPPER(I) TETRAFLUOROBORATE 1 MG/ML
11 INJECTION, POWDER, LYOPHILIZED, FOR SOLUTION INTRAVENOUS
Status: COMPLETED | OUTPATIENT
Start: 2025-01-14 | End: 2025-01-14

## 2025-01-14 RX ORDER — REGADENOSON 0.08 MG/ML
0.4 INJECTION, SOLUTION INTRAVENOUS
Status: CANCELLED | OUTPATIENT
Start: 2025-01-14

## 2025-01-14 RX ADMIN — DULOXETINE HYDROCHLORIDE 60 MG: 30 CAPSULE, DELAYED RELEASE ORAL at 09:33

## 2025-01-14 RX ADMIN — FAMOTIDINE 20 MG: 20 TABLET ORAL at 09:33

## 2025-01-14 RX ADMIN — KIT FOR THE PREPARATION OF TECHNETIUM TC99M SESTAMIBI 33 MILLICURIE: 1 INJECTION, POWDER, LYOPHILIZED, FOR SOLUTION PARENTERAL at 11:48

## 2025-01-14 RX ADMIN — ATORVASTATIN CALCIUM 80 MG: 40 TABLET, FILM COATED ORAL at 22:29

## 2025-01-14 RX ADMIN — MORPHINE SULFATE 2 MG: 2 INJECTION, SOLUTION INTRAMUSCULAR; INTRAVENOUS at 15:50

## 2025-01-14 RX ADMIN — ALBUTEROL SULFATE 2 PUFF: 90 AEROSOL, METERED RESPIRATORY (INHALATION) at 21:31

## 2025-01-14 RX ADMIN — ENOXAPARIN SODIUM 40 MG: 100 INJECTION SUBCUTANEOUS at 09:33

## 2025-01-14 RX ADMIN — FAMOTIDINE 20 MG: 20 TABLET ORAL at 22:29

## 2025-01-14 RX ADMIN — ASPIRIN 81 MG CHEWABLE TABLET 81 MG: 81 TABLET CHEWABLE at 09:33

## 2025-01-14 RX ADMIN — ALBUTEROL SULFATE 2 PUFF: 90 AEROSOL, METERED RESPIRATORY (INHALATION) at 07:52

## 2025-01-14 RX ADMIN — CYCLOBENZAPRINE 10 MG: 10 TABLET, FILM COATED ORAL at 17:48

## 2025-01-14 RX ADMIN — ALBUTEROL SULFATE 2 PUFF: 90 AEROSOL, METERED RESPIRATORY (INHALATION) at 12:33

## 2025-01-14 RX ADMIN — SODIUM CHLORIDE, PRESERVATIVE FREE 10 ML: 5 INJECTION INTRAVENOUS at 09:35

## 2025-01-14 RX ADMIN — ALBUTEROL SULFATE 2 PUFF: 90 AEROSOL, METERED RESPIRATORY (INHALATION) at 16:03

## 2025-01-14 RX ADMIN — ISOSORBIDE MONONITRATE 30 MG: 30 TABLET, EXTENDED RELEASE ORAL at 15:50

## 2025-01-14 RX ADMIN — BUDESONIDE AND FORMOTEROL FUMARATE DIHYDRATE 2 PUFF: 80; 4.5 AEROSOL RESPIRATORY (INHALATION) at 21:32

## 2025-01-14 RX ADMIN — Medication 1 TABLET: at 09:33

## 2025-01-14 RX ADMIN — REGADENOSON 0.4 MG: 0.08 INJECTION, SOLUTION INTRAVENOUS at 10:10

## 2025-01-14 RX ADMIN — MORPHINE SULFATE 2 MG: 2 INJECTION, SOLUTION INTRAMUSCULAR; INTRAVENOUS at 22:27

## 2025-01-14 RX ADMIN — FINASTERIDE 5 MG: 5 TABLET, FILM COATED ORAL at 09:33

## 2025-01-14 RX ADMIN — MORPHINE SULFATE 2 MG: 2 INJECTION, SOLUTION INTRAMUSCULAR; INTRAVENOUS at 19:26

## 2025-01-14 RX ADMIN — SODIUM CHLORIDE, PRESERVATIVE FREE 10 ML: 5 INJECTION INTRAVENOUS at 22:29

## 2025-01-14 RX ADMIN — LEVOTHYROXINE SODIUM 200 MCG: 0.1 TABLET ORAL at 13:39

## 2025-01-14 RX ADMIN — CARVEDILOL 3.12 MG: 6.25 TABLET, FILM COATED ORAL at 17:45

## 2025-01-14 RX ADMIN — KIT FOR THE PREPARATION OF TECHNETIUM TC99M SESTAMIBI 11 MILLICURIE: 1 INJECTION, POWDER, LYOPHILIZED, FOR SOLUTION PARENTERAL at 11:49

## 2025-01-14 RX ADMIN — BUDESONIDE AND FORMOTEROL FUMARATE DIHYDRATE 2 PUFF: 80; 4.5 AEROSOL RESPIRATORY (INHALATION) at 07:53

## 2025-01-14 RX ADMIN — TIOTROPIUM BROMIDE INHALATION SPRAY 2 PUFF: 3.12 SPRAY, METERED RESPIRATORY (INHALATION) at 07:54

## 2025-01-14 ASSESSMENT — PAIN DESCRIPTION - FREQUENCY: FREQUENCY: INTERMITTENT

## 2025-01-14 ASSESSMENT — ENCOUNTER SYMPTOMS
ABDOMINAL PAIN: 0
COUGH: 0
SORE THROAT: 0
BACK PAIN: 0
VOICE CHANGE: 0
SHORTNESS OF BREATH: 0
SINUS PAIN: 0
CONSTIPATION: 0
CHEST TIGHTNESS: 0
DIARRHEA: 0
VOMITING: 0
COLOR CHANGE: 0
NAUSEA: 0
SINUS PRESSURE: 0
TROUBLE SWALLOWING: 0
WHEEZING: 0

## 2025-01-14 ASSESSMENT — PAIN DESCRIPTION - LOCATION
LOCATION: CHEST

## 2025-01-14 ASSESSMENT — PAIN SCALES - GENERAL
PAINLEVEL_OUTOF10: 10
PAINLEVEL_OUTOF10: 10
PAINLEVEL_OUTOF10: 9
PAINLEVEL_OUTOF10: 10
PAINLEVEL_OUTOF10: 10
PAINLEVEL_OUTOF10: 9
PAINLEVEL_OUTOF10: 10
PAINLEVEL_OUTOF10: 7

## 2025-01-14 ASSESSMENT — PAIN DESCRIPTION - ORIENTATION: ORIENTATION: ANTERIOR

## 2025-01-14 ASSESSMENT — PAIN - FUNCTIONAL ASSESSMENT
PAIN_FUNCTIONAL_ASSESSMENT: ACTIVITIES ARE NOT PREVENTED

## 2025-01-14 ASSESSMENT — PAIN DESCRIPTION - DESCRIPTORS
DESCRIPTORS: ACHING

## 2025-01-14 ASSESSMENT — PAIN DESCRIPTION - ONSET: ONSET: ON-GOING

## 2025-01-14 ASSESSMENT — PAIN DESCRIPTION - PAIN TYPE: TYPE: ACUTE PAIN;CHRONIC PAIN

## 2025-01-14 NOTE — PLAN OF CARE
Problem: Chronic Conditions and Co-morbidities  Goal: Patient's chronic conditions and co-morbidity symptoms are monitored and maintained or improved  Outcome: Progressing     Problem: Safety - Adult  Goal: Free from fall injury  Outcome: Progressing     Problem: Discharge Planning  Goal: Discharge to home or other facility with appropriate resources  Outcome: Progressing     Problem: Pain  Goal: Verbalizes/displays adequate comfort level or baseline comfort level  Outcome: Progressing     Problem: Anxiety  Goal: Will report anxiety at manageable levels  Description: INTERVENTIONS:  1. Administer medication as ordered  2. Teach and rehearse alternative coping skills  3. Provide emotional support with 1:1 interaction with staff  Outcome: Progressing     Problem: Decision Making  Goal: Pt/Family able to effectively weigh alternatives and participate in decision making related to treatment and care  Description: INTERVENTIONS:  1. Determine when there are differences between patient's view, family's view, and healthcare provider's view of condition  2. Facilitate patient and family articulation of goals for care  3. Help patient and family identify pros/cons of alternative solutions  4. Provide information as requested by patient/family  5. Respect patient/family right to receive or not to receive information  6. Serve as a liaison between patient and family and health care team  7. Initiate Consults from Ethics, Palliative Care or initiate Family Care Conference as is appropriate  Outcome: Progressing     Problem: Confusion  Goal: Confusion, delirium, dementia, or psychosis is improved or at baseline  Description: INTERVENTIONS:  1. Assess for possible contributors to thought disturbance, including medications, impaired vision or hearing, underlying metabolic abnormalities, dehydration, psychiatric diagnoses, and notify attending LIP  2. [place] high risk fall precautions, as indicated  3. Provide frequent short

## 2025-01-14 NOTE — PROGRESS NOTES
Result Value Ref Range    Body Surface Area 2.11 m2    Stress Systolic  mmHg    Stress Diastolic BP 68 mmHg    Baseline HR 68 BPM    Stress Peak HR 85 BPM    Stress Estimated Workload 1.0 METS    Stress Rate Pressure Product 10,285 BPM*mmHg    Stress Target  bpm    Stress Percent HR Achieved 57 %    TID 1.12     Nuc Stress EF 43 %        Imaging/Diagnostics Last 24 Hours   Nuclear stress test with myocardial perfusion    Result Date: 1/14/2025    Perfusion Conclusion: TID ratio is 1.12.   ECG: Resting ECG demonstrates normal sinus rhythm.   Stress Test: A pharmacological stress test was performed using regadenoson (Lexiscan). The patient reported no symptoms during the stress test.   Nonspecific ST and T wave changes are seen   Cardiolite study demonstrates an area reduced tracer uptake of the lateral wall with slight improvement of the resting images the rest of the myocardium shows normal tracer uptake ejection fraction by gated study is 43% with abnormal global left ventricular systolic function   Cardiolite study demonstrates mild degree of lateral ischemia the perfusion of the rest of the myocardium is normal the global function is mildly reduced RATED CHEST PAIN AT 10- THROUGHOUT TESTING PROCESS     Echo (TTE) complete (PRN contrast/bubble/strain/3D)    Result Date: 1/14/2025    Left Ventricle: Low normal left ventricular systolic function with a visually estimated EF of 50 - 55%. Left ventricle size is normal. Mildly increased wall thickness. Normal wall motion. Indeterminate diastolic function.   Aortic Valve: Thickened cusps.   Pericardium: There is evidence of epicardial fat. No pericardial effusion.     CTA PULMONARY W CONTRAST    Result Date: 1/12/2025  CTA PULMONARY W CONTRAST 1/12/2025 23:19 TECHNIQUE: Multidetector CT was performed from the thoracic inlet to the upper abdomen after nonionic intravenous contrast. Maximum Intensity Projections (MIPs) (3D volumetric technique) were created  and the images were reviewed. CT radiation dose optimization techniques (automated exposure control, use of iterative reconstruction techniques, or adjustment of the mA and/or kV according  to patient size) were used to limit patient radiation dose. History: Chest pain IOPAMIDOL 76 % IV SOLN COMPARISON: None FINDINGS: THYROID: Normal THORACIC AORTA: Normal caliber Atherosclerotic calcification. PULMONARY ARTERY: Normal caliber. No pulmonary artery filling defects to suggest  pulmonary emboli. HEART: Normal size.  Atherosclerotic coronary calcification. LYMPH NODES: No lymphadenopathy by CT size criteria. TRACHEA AND AIRWAYS: Trachea is midline and patent. There is no bronchiectasis. LUNGS: Extensive emphysematous changes with diffuse groundglass throughout both lungs. Groundglass throughout both lungs. Bibasilar atelectasis or scarring. There is no pleural effusion. BONES AND SOFT TISSUES: No acute or suspicious osseous lesions. UPPER ABDOMEN: No significant abnormality. IMPRESSION: 1.  No pulmonary emboli. 2.  Extensive emphysematous changes with diffuse groundglass throughout both lungs. Groundglass throughout both lungs, may be chronic or could be due to pulmonary edema or infection.. Bibasilar atelectasis or scarring.  Dictated and Electronically Signed By: Yvan Baumann MD 1/12/2025 22:49        XR CHEST PORTABLE    Result Date: 1/12/2025  AP chest: INDICATION: chest pain DEMOGRAPHICS: 69 years old Male COMPARISON: None Findings: The cardiac silhouette is within normal limits in size. Mediastinal contours are  unremarkable. No definite focal consolidation, pleural effusion, or pneumothorax is demonstrated. Diffuse coarse interstitium likely chronic. No pleural effusion. IMPRESSION: 1. No evidence of active cardiac or pulmonary process. Findings favored to represent chronic interstitial change. Appearance relatively similar to prior  Dictated and Electronically Signed By: Keyur Gómez 1/12/2025 18:10

## 2025-01-14 NOTE — CARE COORDINATION
Sw attempted to see the pt. Pt not in room. SW will check back this after noon about d/c planning.     Pt admitted for hypotension and chest pain. Pt independent from home in [place]. Pt does drive. Pt has home 02. Pt has Jeffrey Roland and PCP.

## 2025-01-14 NOTE — CARE COORDINATION
SW introduced self to the pt and explained CM role for d/c. Pt admitted for hypotension and chest pain Pt reports that he was living with a friend in [place], friend was evicted from the apartment last week, Pt stayed in friend's car for 2 days prior to hospital admission. pt is now homeless. Pt reports that he does drive but his car recently broke down, he borrows his friends when needed. Pt is on home o2 and has a oxygen machine but no portable. SW discussed possible homeless shelter as an option. Pt has insurance and a PCP, MD Anila. D/c plan unknown.  CM will continue to follow for further d/c needs.    01/14/25 1218   Service Assessment   Patient Orientation Alert and Oriented   Cognition Alert   History Provided By Patient;Medical Record   Primary Caregiver Self   Accompanied By/Relationship Self   Support Systems None   Patient's Healthcare Decision Maker is: Legal Next of Kin   PCP Verified by CM Yes  (MD Anila)   Last Visit to PCP   (unknown)   Prior Functional Level Independent in ADLs/IADLs   Current Functional Level Independent in ADLs/IADLs   Can patient return to prior living arrangement No  (Pt reports he is now homeless)   Ability to make needs known: Good   Family able to assist with home care needs: No  (Reports no supports)   Financial Resources Medicare   Community Resources Lodging;Transportation  (Pt reprots car is broken doen and he is recently homeless)   CM/SW Referral Homeless requesting intervention

## 2025-01-14 NOTE — CARE COORDINATION
NEELAM spoke with Elvira CONNELLY) @ [facility] [phone] in [place]. Pt will need to call Elvira on day of d/c to determine if a bed is available.     NEELAM provided the pt with name and # of shelter and discussed he will need to call prior to d/c.

## 2025-01-14 NOTE — PROGRESS NOTES
[facility] [place] [address]  Phone: [phone]    Fax [phone]                  Jerson Gong MD, [facility]       Ambrose Tobin MD, [facility]  Crista Neal MD, [facility]    MD Jennifer Ryan MD Tariq Rizvi, MD Bilal Alam, MD Dr. Waseem Sajjad MD Melissa Kellis, APRBUSTER Spencer, APRBUSTER Davies, APRBUSTER Boyd, APRN  Elijah Pathak PA-C    CARDIOLOGY  NOTE      Name:  Keyur Adrian /Age/Sex: 1955  (69 y.o. male)   MRN & CSN:  [id number] & [id number] Admission Date/Time: 2025 12:03 PM   Location:  -A PCP: Wili High MD       Hospital Day: 2    - Cardiology consult is for: Chest pain      ASSESSMENT/ PLAN:  Chest pain with known history of coronary artery disease s/p CABG x 3  -Troponin non-ACS  -Complaining of chest pain today  -Echocardiogram showing preserved EF without significant wall motion abnormalities  -Stress test today revealing mild degree of lateral ischemia.  -Plan for left heart catheterization tomorrow.  N.p.o. after midnight  -Aspirin, Lipitor 80 mg nightly  -Restart Coreg 3.125 mg twice daily  -Initiate on Imdur 30 mg daily.  As needed nitro.  On Cialis at home  Hypotension with Hx hypertension  -Blood pressure is since stabilized.  -Lisinopril held  COPD chronic rate 2 L supplemental oxygen  Hypothyroidism: On Synthroid          Subjective:  Keyur is a 69 y.o.year old     Informed of abnormal stress test.  Educated patient on need for left heart catheterization.  Procedure was explained in detail as well as risks and benefits.  Patient voiced understanding and was agreeable to undergoing procedure.  Consent was obtained.     Plan for left heart catheterization tomorrow.    Offered heart catheterization today however patient stated that he would prefer to wait till tomorrow.    Was complaining of chest pain today.    Discussed care  with primary team and nursing staff    Objective: Temperature:  Current - Temp: 97.5 °F (36.4 °C); Max - Temp  Av.9 °F (36.6 °C)  Min: 97.5 °F (36.4 °C)  Max: 98.2 °F (36.8 °C)    Respiratory Rate : Resp  Avg: 15.6  Min: 10  Max: 22    Pulse Range: Pulse  Av.2  Min: 55  Max: 72    Blood Presuure Range:  Systolic (24hrs), Av , Min:101 , Max:140   ; Diastolic (24hrs), Av, Min:52, Max:78      Pulse ox Range: SpO2  Av.8 %  Min: 91 %  Max: 98 %    24hr I & O:    Intake/Output Summary (Last 24 hours) at 2025 1703  Last data filed at 2025 1605  Gross per 24 hour   Intake 240 ml   Output 650 ml   Net -410 ml         /77   Pulse 65   Temp 97.5 °F (36.4 °C) (Oral)   Resp 18   Ht 1.778 m (5' 10\")   Wt 89.8 kg (198 lb)   SpO2 93%   BMI 28.41 kg/m²         TELEMETRY: Sinus      has a past medical history of CAD (coronary artery disease), Chest pain, COPD (chronic obstructive pulmonary disease) ([facility]), H/O cardiac catheterization, H/O cardiovascular stress test, H/O echocardiogram, H/O echocardiogram, H/O exercise stress test, Hx of cardiovascular stress test, Hx of Doppler ultrasound, Hyperlipidemia, Hypertension, IBS (irritable bowel syndrome), Orthostatic hypotension, Pancreatitis, Rheumatoid arthritis ([facility]), S/P CABG x 3, SOB (shortness of breath), and Thyroid disease.   has a past surgical history that includes Endoscopy, colon, diagnostic (2017); Colonoscopy (2017); Cardiac catheterization (2020); and Coronary artery bypass graft (N/A, 10/19/2020).    Physical Exam     Medications:    isosorbide mononitrate  30 mg Oral Daily    albuterol sulfate HFA  2 puff Inhalation Q4H WA RT    aspirin  81 mg Oral Daily    atorvastatin  80 mg Oral Nightly    DULoxetine  60 mg Oral Daily    famotidine  20 mg Oral BID    finasteride  5 mg Oral Daily    budesonide-formoterol  2 puff Inhalation BID RT    levothyroxine  200 mcg Oral Daily    therapeutic multivitamin-minerals  1 tablet Oral

## 2025-01-15 LAB
ANION GAP SERPL CALCULATED.3IONS-SCNC: 7 MMOL/L (ref 9–17)
BASOPHILS # BLD: 0.05 K/UL
BASOPHILS NFR BLD: 1 % (ref 0–1)
BUN SERPL-MCNC: 25 MG/DL (ref 7–20)
CALCIUM SERPL-MCNC: 8.8 MG/DL (ref 8.3–10.6)
CHLORIDE SERPL-SCNC: 105 MMOL/L (ref 99–110)
CO2 SERPL-SCNC: 25 MMOL/L (ref 21–32)
CREAT SERPL-MCNC: 1 MG/DL (ref 0.8–1.3)
ECHO BSA: 2.11 M2
EOSINOPHIL # BLD: 0.29 K/UL
EOSINOPHILS RELATIVE PERCENT: 4 % (ref 0–3)
ERYTHROCYTE [DISTWIDTH] IN BLOOD BY AUTOMATED COUNT: 13.3 % (ref 11.7–14.9)
GFR, ESTIMATED: 72 ML/MIN/1.73M2
GLUCOSE SERPL-MCNC: 118 MG/DL (ref 74–99)
HCT VFR BLD AUTO: 45.8 % (ref 42–52)
HGB BLD-MCNC: 15.3 G/DL (ref 13.5–18)
IMM GRANULOCYTES # BLD AUTO: 0.01 K/UL
IMM GRANULOCYTES NFR BLD: 0 %
LYMPHOCYTES NFR BLD: 1.59 K/UL
LYMPHOCYTES RELATIVE PERCENT: 21 % (ref 24–44)
MCH RBC QN AUTO: 35.3 PG (ref 27–31)
MCHC RBC AUTO-ENTMCNC: 33.4 G/DL (ref 32–36)
MCV RBC AUTO: 105.8 FL (ref 78–100)
MONOCYTES NFR BLD: 0.66 K/UL
MONOCYTES NFR BLD: 9 % (ref 0–4)
NEUTROPHILS NFR BLD: 65 % (ref 36–66)
NEUTS SEG NFR BLD: 4.93 K/UL
PLATELET, FLUORESCENCE: 146 K/UL (ref 140–440)
PMV BLD AUTO: 9.6 FL (ref 7.5–11.1)
POTASSIUM SERPL-SCNC: 4.2 MMOL/L (ref 3.5–5.1)
RBC # BLD AUTO: 4.33 M/UL (ref 4.6–6.2)
SODIUM SERPL-SCNC: 137 MMOL/L (ref 136–145)
WBC OTHER # BLD: 7.5 K/UL (ref 4–10.5)

## 2025-01-15 PROCEDURE — 2500000003 HC RX 250 WO HCPCS: Performed by: INTERNAL MEDICINE

## 2025-01-15 PROCEDURE — 6370000000 HC RX 637 (ALT 250 FOR IP): Performed by: INTERNAL MEDICINE

## 2025-01-15 PROCEDURE — 99152 MOD SED SAME PHYS/QHP 5/>YRS: CPT | Performed by: INTERNAL MEDICINE

## 2025-01-15 PROCEDURE — 6370000000 HC RX 637 (ALT 250 FOR IP): Performed by: PHYSICIAN ASSISTANT

## 2025-01-15 PROCEDURE — 6360000002 HC RX W HCPCS: Performed by: INTERNAL MEDICINE

## 2025-01-15 PROCEDURE — 99153 MOD SED SAME PHYS/QHP EA: CPT | Performed by: INTERNAL MEDICINE

## 2025-01-15 PROCEDURE — 99232 SBSQ HOSP IP/OBS MODERATE 35: CPT | Performed by: INTERNAL MEDICINE

## 2025-01-15 PROCEDURE — 93459 L HRT ART/GRFT ANGIO: CPT | Performed by: INTERNAL MEDICINE

## 2025-01-15 PROCEDURE — C1894 INTRO/SHEATH, NON-LASER: HCPCS | Performed by: INTERNAL MEDICINE

## 2025-01-15 PROCEDURE — 94640 AIRWAY INHALATION TREATMENT: CPT

## 2025-01-15 PROCEDURE — 2709999900 HC NON-CHARGEABLE SUPPLY: Performed by: INTERNAL MEDICINE

## 2025-01-15 PROCEDURE — 1200000000 HC SEMI PRIVATE

## 2025-01-15 PROCEDURE — APPNB15 APP NON BILLABLE TIME 0-15 MINS

## 2025-01-15 PROCEDURE — 6360000004 HC RX CONTRAST MEDICATION: Performed by: INTERNAL MEDICINE

## 2025-01-15 PROCEDURE — 6360000002 HC RX W HCPCS: Performed by: STUDENT IN AN ORGANIZED HEALTH CARE EDUCATION/TRAINING PROGRAM

## 2025-01-15 PROCEDURE — 2700000000 HC OXYGEN THERAPY PER DAY

## 2025-01-15 PROCEDURE — 94761 N-INVAS EAR/PLS OXIMETRY MLT: CPT

## 2025-01-15 PROCEDURE — 2500000003 HC RX 250 WO HCPCS: Performed by: PHYSICIAN ASSISTANT

## 2025-01-15 PROCEDURE — 85025 COMPLETE CBC W/AUTO DIFF WBC: CPT

## 2025-01-15 PROCEDURE — C1769 GUIDE WIRE: HCPCS | Performed by: INTERNAL MEDICINE

## 2025-01-15 PROCEDURE — 6370000000 HC RX 637 (ALT 250 FOR IP)

## 2025-01-15 PROCEDURE — 80048 BASIC METABOLIC PNL TOTAL CA: CPT

## 2025-01-15 PROCEDURE — 36415 COLL VENOUS BLD VENIPUNCTURE: CPT

## 2025-01-15 RX ORDER — HYDRALAZINE HYDROCHLORIDE 20 MG/ML
10 INJECTION INTRAMUSCULAR; INTRAVENOUS EVERY 10 MIN PRN
Status: DISCONTINUED | OUTPATIENT
Start: 2025-01-15 | End: 2025-01-18 | Stop reason: HOSPADM

## 2025-01-15 RX ORDER — SODIUM CHLORIDE 0.9 % (FLUSH) 0.9 %
5-40 SYRINGE (ML) INJECTION EVERY 12 HOURS SCHEDULED
Status: DISCONTINUED | OUTPATIENT
Start: 2025-01-15 | End: 2025-01-18 | Stop reason: HOSPADM

## 2025-01-15 RX ORDER — SODIUM CHLORIDE 0.9 % (FLUSH) 0.9 %
5-40 SYRINGE (ML) INJECTION PRN
Status: DISCONTINUED | OUTPATIENT
Start: 2025-01-15 | End: 2025-01-18 | Stop reason: HOSPADM

## 2025-01-15 RX ORDER — SODIUM CHLORIDE 9 MG/ML
INJECTION, SOLUTION INTRAVENOUS PRN
Status: DISCONTINUED | OUTPATIENT
Start: 2025-01-15 | End: 2025-01-18 | Stop reason: HOSPADM

## 2025-01-15 RX ORDER — FENTANYL CITRATE 50 UG/ML
INJECTION, SOLUTION INTRAMUSCULAR; INTRAVENOUS PRN
Status: DISCONTINUED | OUTPATIENT
Start: 2025-01-15 | End: 2025-01-15 | Stop reason: HOSPADM

## 2025-01-15 RX ORDER — RANOLAZINE 500 MG/1
500 TABLET, EXTENDED RELEASE ORAL 2 TIMES DAILY
Status: DISCONTINUED | OUTPATIENT
Start: 2025-01-15 | End: 2025-01-18 | Stop reason: HOSPADM

## 2025-01-15 RX ORDER — ISOSORBIDE MONONITRATE 60 MG/1
60 TABLET, EXTENDED RELEASE ORAL DAILY
Status: DISCONTINUED | OUTPATIENT
Start: 2025-01-16 | End: 2025-01-18 | Stop reason: HOSPADM

## 2025-01-15 RX ORDER — IOPAMIDOL 755 MG/ML
INJECTION, SOLUTION INTRAVASCULAR PRN
Status: DISCONTINUED | OUTPATIENT
Start: 2025-01-15 | End: 2025-01-15 | Stop reason: HOSPADM

## 2025-01-15 RX ORDER — MIDAZOLAM HYDROCHLORIDE 1 MG/ML
INJECTION, SOLUTION INTRAMUSCULAR; INTRAVENOUS PRN
Status: DISCONTINUED | OUTPATIENT
Start: 2025-01-15 | End: 2025-01-15 | Stop reason: HOSPADM

## 2025-01-15 RX ORDER — ACETAMINOPHEN 325 MG/1
650 TABLET ORAL EVERY 4 HOURS PRN
Status: DISCONTINUED | OUTPATIENT
Start: 2025-01-15 | End: 2025-01-15 | Stop reason: SDUPTHER

## 2025-01-15 RX ADMIN — MORPHINE SULFATE 2 MG: 2 INJECTION, SOLUTION INTRAMUSCULAR; INTRAVENOUS at 21:29

## 2025-01-15 RX ADMIN — DULOXETINE HYDROCHLORIDE 60 MG: 30 CAPSULE, DELAYED RELEASE ORAL at 08:34

## 2025-01-15 RX ADMIN — FAMOTIDINE 20 MG: 20 TABLET ORAL at 21:30

## 2025-01-15 RX ADMIN — CARVEDILOL 3.12 MG: 6.25 TABLET, FILM COATED ORAL at 17:01

## 2025-01-15 RX ADMIN — FINASTERIDE 5 MG: 5 TABLET, FILM COATED ORAL at 08:34

## 2025-01-15 RX ADMIN — ASPIRIN 81 MG CHEWABLE TABLET 81 MG: 81 TABLET CHEWABLE at 08:34

## 2025-01-15 RX ADMIN — MORPHINE SULFATE 2 MG: 2 INJECTION, SOLUTION INTRAMUSCULAR; INTRAVENOUS at 08:42

## 2025-01-15 RX ADMIN — BUDESONIDE AND FORMOTEROL FUMARATE DIHYDRATE 2 PUFF: 80; 4.5 AEROSOL RESPIRATORY (INHALATION) at 08:31

## 2025-01-15 RX ADMIN — MORPHINE SULFATE 2 MG: 2 INJECTION, SOLUTION INTRAMUSCULAR; INTRAVENOUS at 17:02

## 2025-01-15 RX ADMIN — RANOLAZINE 500 MG: 500 TABLET, EXTENDED RELEASE ORAL at 14:14

## 2025-01-15 RX ADMIN — ISOSORBIDE MONONITRATE 30 MG: 30 TABLET, EXTENDED RELEASE ORAL at 08:34

## 2025-01-15 RX ADMIN — TIOTROPIUM BROMIDE INHALATION SPRAY 2 PUFF: 3.12 SPRAY, METERED RESPIRATORY (INHALATION) at 08:36

## 2025-01-15 RX ADMIN — ALBUTEROL SULFATE 2 PUFF: 90 AEROSOL, METERED RESPIRATORY (INHALATION) at 08:31

## 2025-01-15 RX ADMIN — FAMOTIDINE 20 MG: 20 TABLET ORAL at 08:35

## 2025-01-15 RX ADMIN — SODIUM CHLORIDE, PRESERVATIVE FREE 10 ML: 5 INJECTION INTRAVENOUS at 21:30

## 2025-01-15 RX ADMIN — RANOLAZINE 500 MG: 500 TABLET, EXTENDED RELEASE ORAL at 21:28

## 2025-01-15 RX ADMIN — LEVOTHYROXINE SODIUM 200 MCG: 0.1 TABLET ORAL at 05:00

## 2025-01-15 RX ADMIN — CARVEDILOL 3.12 MG: 6.25 TABLET, FILM COATED ORAL at 08:35

## 2025-01-15 RX ADMIN — ATORVASTATIN CALCIUM 80 MG: 40 TABLET, FILM COATED ORAL at 21:28

## 2025-01-15 RX ADMIN — BUDESONIDE AND FORMOTEROL FUMARATE DIHYDRATE 2 PUFF: 80; 4.5 AEROSOL RESPIRATORY (INHALATION) at 23:05

## 2025-01-15 RX ADMIN — MORPHINE SULFATE 2 MG: 2 INJECTION, SOLUTION INTRAMUSCULAR; INTRAVENOUS at 05:03

## 2025-01-15 RX ADMIN — Medication 1 TABLET: at 08:34

## 2025-01-15 RX ADMIN — SODIUM CHLORIDE, PRESERVATIVE FREE 10 ML: 5 INJECTION INTRAVENOUS at 08:35

## 2025-01-15 RX ADMIN — SODIUM CHLORIDE, PRESERVATIVE FREE 10 ML: 5 INJECTION INTRAVENOUS at 21:29

## 2025-01-15 ASSESSMENT — ENCOUNTER SYMPTOMS
ABDOMINAL PAIN: 0
WHEEZING: 0
COUGH: 0
SINUS PAIN: 0
CHEST TIGHTNESS: 0
TROUBLE SWALLOWING: 0
BACK PAIN: 0
CONSTIPATION: 0
DIARRHEA: 0
SORE THROAT: 0
VOMITING: 0
VOICE CHANGE: 0
SINUS PRESSURE: 0
SHORTNESS OF BREATH: 0
COLOR CHANGE: 0
NAUSEA: 0

## 2025-01-15 ASSESSMENT — PAIN - FUNCTIONAL ASSESSMENT
PAIN_FUNCTIONAL_ASSESSMENT: ACTIVITIES ARE NOT PREVENTED

## 2025-01-15 ASSESSMENT — PAIN SCALES - GENERAL
PAINLEVEL_OUTOF10: 0
PAINLEVEL_OUTOF10: 7
PAINLEVEL_OUTOF10: 10
PAINLEVEL_OUTOF10: 9
PAINLEVEL_OUTOF10: 3
PAINLEVEL_OUTOF10: 5
PAINLEVEL_OUTOF10: 10
PAINLEVEL_OUTOF10: 7

## 2025-01-15 ASSESSMENT — PAIN DESCRIPTION - FREQUENCY
FREQUENCY: CONTINUOUS
FREQUENCY: INTERMITTENT

## 2025-01-15 ASSESSMENT — PAIN DESCRIPTION - LOCATION
LOCATION: HEAD;CHEST
LOCATION: CHEST
LOCATION: CHEST;LEG;HEAD

## 2025-01-15 ASSESSMENT — PAIN DESCRIPTION - ORIENTATION
ORIENTATION: MID
ORIENTATION: RIGHT;MID;LEFT
ORIENTATION: MID
ORIENTATION: RIGHT;LEFT;MID
ORIENTATION: ANTERIOR

## 2025-01-15 ASSESSMENT — PAIN DESCRIPTION - ONSET
ONSET: ON-GOING
ONSET: ON-GOING

## 2025-01-15 ASSESSMENT — PAIN DESCRIPTION - DESCRIPTORS
DESCRIPTORS: ACHING

## 2025-01-15 ASSESSMENT — PAIN DESCRIPTION - PAIN TYPE
TYPE: ACUTE PAIN;CHRONIC PAIN
TYPE: ACUTE PAIN;CHRONIC PAIN
TYPE: ACUTE PAIN
TYPE: ACUTE PAIN;CHRONIC PAIN

## 2025-01-15 NOTE — PLAN OF CARE
Problem: Chronic Conditions and Co-morbidities  Goal: Patient's chronic conditions and co-morbidity symptoms are monitored and maintained or improved  1/15/2025 0824 by Sarah Rodgers RN  Outcome: Progressing  1/15/2025 0003 by Jess Ureña RN  Outcome: Progressing     Problem: Safety - Adult  Goal: Free from fall injury  1/15/2025 0824 by Sarah Rodgers RN  Outcome: Progressing  1/15/2025 0003 by Jess Ureña RN  Outcome: Progressing     Problem: Discharge Planning  Goal: Discharge to home or other facility with appropriate resources  1/15/2025 0824 by Sarah Rodgers RN  Outcome: Progressing  1/15/2025 0003 by Jess Ureña RN  Outcome: Progressing     Problem: Pain  Goal: Verbalizes/displays adequate comfort level or baseline comfort level  1/15/2025 0824 by Sarah Rodgers RN  Outcome: Progressing  1/15/2025 0003 by Jess Ureña RN  Outcome: Progressing     Problem: Anxiety  Goal: Will report anxiety at manageable levels  Description: INTERVENTIONS:  1. Administer medication as ordered  2. Teach and rehearse alternative coping skills  3. Provide emotional support with 1:1 interaction with staff  1/15/2025 0824 by Sarah Rodgers RN  Outcome: Progressing  1/15/2025 0003 by Jess Ureña RN  Outcome: Progressing     Problem: Decision Making  Goal: Pt/Family able to effectively weigh alternatives and participate in decision making related to treatment and care  Description: INTERVENTIONS:  1. Determine when there are differences between patient's view, family's view, and healthcare provider's view of condition  2. Facilitate patient and family articulation of goals for care  3. Help patient and family identify pros/cons of alternative solutions  4. Provide information as requested by patient/family  5. Respect patient/family right to receive or not to receive information  6. Serve as a liaison between patient and family and health care team  7. Initiate Consults from Ethics, Palliative

## 2025-01-15 NOTE — PROGRESS NOTES
[place] [address]  Phone: [phone]    Fax [phone]                  Jerson Gong MD, [facility]       Ambrose Tobin MD, [facility]  Crista Neal MD, [facility]    MD Jennifer Ryan MD Tariq Rizvi, MD Bilal Alam, MD Dr. Waseem Sajjad MD Melissa Kellis, APRBUSTER Spencer, APRBUSTER Davies, APRBUSTER Boyd, APRN  Elijah Pathak PA-C    CARDIOLOGY  NOTE      Name:  Keyur Adrian /Age/Sex: 1955  (69 y.o. male)   MRN & CSN:  [id number] & [id number] Admission Date/Time: 2025 12:03 PM   Location:  -A PCP: Wili High MD       Hospital Day: 3    - Cardiology consult is for: Chest pain      ASSESSMENT/ PLAN:  Chest pain with known history of coronary artery disease s/p CABG x 3  -Troponin non-ACS  -Complaining of chest pain today, however recommend investigating noncardiac cause of chest pain  -Echocardiogram showing preserved EF without significant wall motion abnormalities  -Stress test revealing mild degree of lateral ischemia.  -[facility] today. Medical management. Patent  LIMA-LAD And SVG-ramus.  Unable to find SVG to PDA.   RCA  -Aspirin, Lipitor 80 mg nightly  -Coreg 3.125 mg twice daily  -Continue Imdur 30 mg daily.  As needed nitro.  On Cialis at home, recommend to stop  Hypotension with Hx hypertension  -Blood pressure is since stabilized.  -Lisinopril held  COPD chronic rate 2 L supplemental oxygen  Hypothyroidism: On Synthroid          Subjective:  Keyur is a 69 y.o.year old     Discussed care with nursing staff.    Patient continues to complain of central/left-sided chest pressure.  No alleviating factors at this time    Explained to him results of his left heart catheterization    Objective: Temperature:  Current - Temp: 98 °F (36.7 °C); Max - Temp  Av.8 °F (36.6 °C)  Min: 97.5 °F (36.4 °C)  Max: 98.1 °F (36.7 °C)    Respiratory Rate :  diffusely diseased it is filled by SVG to ramus graft distal  4  Circumflex artery is diffusely diseased it is a 70 to 80% stenosis in the midsegment OM1 is 99% stenosis and hazy distally circumflex is diffusely diseased and fills the PDA and RCA by giving collaterals  (No change compared to previous cath before CABG)  5. Rght coronary artery is 100% occluded in the ostium and proximal segment distally PDA is filled by collaterals from the left side     GRAFTS:  LIMA to LAD is widely patent  SVG to ramus or high OM is widely patent  SVG to  PDA is not seen (possible occluded) aortogram completed by placement of a pigtail catheter and ascending aorta but no graft seen     RECOMMENDATIONS AND DISPOSITION:   Continue medical management  Follow up with cardiology     Had CABG x 3 with LIMA to the LAD, SVG to PDA and SVG to ramus     Hyperlipidemia on Lipitor 80 mg p.o. daily which will be continued     Hypertension patient is on lisinopril which will be continued     History of hypothyroidism was on Synthroid                FERNANDO WEBB MD FAC

## 2025-01-15 NOTE — PROGRESS NOTES
ventricular systolic function   Cardiolite study demonstrates mild degree of lateral ischemia the perfusion of the rest of the myocardium is normal the global function is mildly reduced RATED CHEST PAIN AT 10- THROUGHOUT TESTING PROCESS     Echo (TTE) complete (PRN contrast/bubble/strain/3D)    Result Date: 1/14/2025    Left Ventricle: Low normal left ventricular systolic function with a visually estimated EF of 50 - 55%. Left ventricle size is normal. Mildly increased wall thickness. Normal wall motion. Indeterminate diastolic function.   Aortic Valve: Thickened cusps.   Pericardium: There is evidence of epicardial fat. No pericardial effusion.     CTA PULMONARY W CONTRAST    Result Date: 1/12/2025  CTA PULMONARY W CONTRAST 1/12/2025 23:19 TECHNIQUE: Multidetector CT was performed from the thoracic inlet to the upper abdomen after nonionic intravenous contrast. Maximum Intensity Projections (MIPs) (3D volumetric technique) were created and the images were reviewed. CT radiation dose optimization techniques (automated exposure control, use of iterative reconstruction techniques, or adjustment of the mA and/or kV according  to patient size) were used to limit patient radiation dose. History: Chest pain IOPAMIDOL 76 % IV SOLN COMPARISON: None FINDINGS: THYROID: Normal THORACIC AORTA: Normal caliber Atherosclerotic calcification. PULMONARY ARTERY: Normal caliber. No pulmonary artery filling defects to suggest  pulmonary emboli. HEART: Normal size.  Atherosclerotic coronary calcification. LYMPH NODES: No lymphadenopathy by CT size criteria. TRACHEA AND AIRWAYS: Trachea is midline and patent. There is no bronchiectasis. LUNGS: Extensive emphysematous changes with diffuse groundglass throughout both lungs. Groundglass throughout both lungs. Bibasilar atelectasis or scarring. There is no pleural effusion. BONES AND SOFT TISSUES: No acute or suspicious osseous lesions. UPPER ABDOMEN: No significant abnormality. IMPRESSION:  1.  No pulmonary emboli. 2.  Extensive emphysematous changes with diffuse groundglass throughout both lungs. Groundglass throughout both lungs, may be chronic or could be due to pulmonary edema or infection.. Bibasilar atelectasis or scarring.  Dictated and Electronically Signed By: Yvan Baumann MD 1/12/2025 22:49        XR CHEST PORTABLE    Result Date: 1/12/2025  AP chest: INDICATION: chest pain DEMOGRAPHICS: 69 years old Male COMPARISON: None Findings: The cardiac silhouette is within normal limits in size. Mediastinal contours are  unremarkable. No definite focal consolidation, pleural effusion, or pneumothorax is demonstrated. Diffuse coarse interstitium likely chronic. No pleural effusion. IMPRESSION: 1. No evidence of active cardiac or pulmonary process. Findings favored to represent chronic interstitial change. Appearance relatively similar to prior  Dictated and Electronically Signed By: Keyur Gómez 1/12/2025 18:10          Comment: Please note this report has been produced using speech recognition software and may contain errors related to that system including errors in grammar, punctuation, and spelling, as well as words and phrases that may be inappropriate. If there are any questions or concerns please feel free to contact the dictating provider for clarification.     Electronically signed by Nabil Russo MD on 1/15/2025 at 11:11 AM

## 2025-01-15 NOTE — CARE COORDINATION
CM reviewed chart and discussed in IDR. Pt is having a procedure today.Not medically ready for discharge.

## 2025-01-15 NOTE — PROGRESS NOTES
Plan cath due to ACTUVE CHETS PAIN H/O cabg  Chest pain with known history of coronary artery disease s/p CABG x 3  -Troponin non-ACS  -Complaining of chest pain today  -Echocardiogram showing preserved EF without significant wall motion abnormalities  -Stress test today revealing mild degree of lateral ischemia.  -Plan for left heart catheterization   -Aspirin, Lipitor 80 mg nightly  -  Alternates and risk of the procedure were dicussed in detail  Patient is in agreement to proceed  Mallampati is 2  ASA is  3

## 2025-01-15 NOTE — PLAN OF CARE
Problem: Chronic Conditions and Co-morbidities  Goal: Patient's chronic conditions and co-morbidity symptoms are monitored and maintained or improved  1/15/2025 0003 by Jess Ureña RN  Outcome: Progressing  1/14/2025 1133 by Sarah Rodgers RN  Outcome: Progressing     Problem: Safety - Adult  Goal: Free from fall injury  1/15/2025 0003 by Jess Ureña RN  Outcome: Progressing  1/14/2025 1133 by Sarah Rodgers RN  Outcome: Progressing     Problem: Discharge Planning  Goal: Discharge to home or other facility with appropriate resources  1/15/2025 0003 by Jess Ureña RN  Outcome: Progressing  1/14/2025 1133 by Sarah Rodgers RN  Outcome: Progressing     Problem: Pain  Goal: Verbalizes/displays adequate comfort level or baseline comfort level  1/15/2025 0003 by Jess Ureña RN  Outcome: Progressing  1/14/2025 1133 by Sarah Rodgers RN  Outcome: Progressing     Problem: Anxiety  Goal: Will report anxiety at manageable levels  Description: INTERVENTIONS:  1. Administer medication as ordered  2. Teach and rehearse alternative coping skills  3. Provide emotional support with 1:1 interaction with staff  1/15/2025 0003 by Jess Ureña RN  Outcome: Progressing  1/14/2025 1133 by Sarah Rodgers RN  Outcome: Progressing     Problem: Decision Making  Goal: Pt/Family able to effectively weigh alternatives and participate in decision making related to treatment and care  Description: INTERVENTIONS:  1. Determine when there are differences between patient's view, family's view, and healthcare provider's view of condition  2. Facilitate patient and family articulation of goals for care  3. Help patient and family identify pros/cons of alternative solutions  4. Provide information as requested by patient/family  5. Respect patient/family right to receive or not to receive information  6. Serve as a liaison between patient and family and health care team  7. Initiate Consults from Ethics, Palliative  consult with , Psychosocial CNS, Spiritual Care as appropriate  1/15/2025 0003 by Jess Ureña RN  Outcome: Progressing  1/14/2025 1133 by Sarah Rodgers RN  Outcome: Progressing     Problem: Neurosensory - Adult  Goal: Achieves stable or improved neurological status  1/15/2025 0003 by Jess Ureña RN  Outcome: Progressing  1/14/2025 1133 by Sarah Rodgers RN  Outcome: Progressing  Goal: Absence of seizures  1/15/2025 0003 by Jess Ureña RN  Outcome: Progressing  1/14/2025 1133 by Sarah Rodgers RN  Outcome: Progressing  Goal: Remains free of injury related to seizures activity  1/15/2025 0003 by Jess Ureña RN  Outcome: Progressing  1/14/2025 1133 by Sarah Rodgers RN  Outcome: Progressing  Goal: Achieves maximal functionality and self care  1/15/2025 0003 by Jess Ureña RN  Outcome: Progressing  1/14/2025 1133 by Sarah Rodgers RN  Outcome: Progressing     Problem: Respiratory - Adult  Goal: Achieves optimal ventilation and oxygenation  1/15/2025 0003 by Jess Ureña RN  Outcome: Progressing  1/14/2025 1133 by Sarah Rodgers RN  Outcome: Progressing     Problem: Cardiovascular - Adult  Goal: Maintains optimal cardiac output and hemodynamic stability  1/15/2025 0003 by Jess Ureña RN  Outcome: Progressing  1/14/2025 1133 by Sarah Rodgers RN  Outcome: Progressing  Goal: Absence of cardiac dysrhythmias or at baseline  1/15/2025 0003 by Jess Ureña RN  Outcome: Progressing  1/14/2025 1133 by Sarah Rodgers RN  Outcome: Progressing     Problem: Skin/Tissue Integrity - Adult  Goal: Skin integrity remains intact  1/15/2025 0003 by Jess Ureña RN  Outcome: Progressing  1/14/2025 1133 by Sarah Rodgers RN  Outcome: Progressing  Goal: Incisions, wounds, or drain sites healing without S/S of infection  1/15/2025 0003 by Jess Ureña RN  Outcome: Progressing  1/14/2025 1133 by Sarah Rodgers RN  Outcome: Progressing  Goal: Oral mucous membranes

## 2025-01-16 LAB
BASOPHILS # BLD: 0.04 K/UL
BASOPHILS NFR BLD: 0 % (ref 0–1)
EOSINOPHIL # BLD: 0.26 K/UL
EOSINOPHILS RELATIVE PERCENT: 3 % (ref 0–3)
ERYTHROCYTE [DISTWIDTH] IN BLOOD BY AUTOMATED COUNT: 13.1 % (ref 11.7–14.9)
HCT VFR BLD AUTO: 49.1 % (ref 42–52)
HGB BLD-MCNC: 16.3 G/DL (ref 13.5–18)
IMM GRANULOCYTES # BLD AUTO: 0.03 K/UL
IMM GRANULOCYTES NFR BLD: 0 %
LYMPHOCYTES NFR BLD: 1.15 K/UL
LYMPHOCYTES RELATIVE PERCENT: 12 % (ref 24–44)
MCH RBC QN AUTO: 34.8 PG (ref 27–31)
MCHC RBC AUTO-ENTMCNC: 33.2 G/DL (ref 32–36)
MCV RBC AUTO: 104.7 FL (ref 78–100)
MONOCYTES NFR BLD: 0.83 K/UL
MONOCYTES NFR BLD: 9 % (ref 0–4)
NEUTROPHILS NFR BLD: 75 % (ref 36–66)
NEUTS SEG NFR BLD: 7.04 K/UL
PLATELET, FLUORESCENCE: 139 K/UL (ref 140–440)
PMV BLD AUTO: 9.8 FL (ref 7.5–11.1)
RBC # BLD AUTO: 4.69 M/UL (ref 4.6–6.2)
WBC OTHER # BLD: 9.4 K/UL (ref 4–10.5)

## 2025-01-16 PROCEDURE — 6370000000 HC RX 637 (ALT 250 FOR IP): Performed by: INTERNAL MEDICINE

## 2025-01-16 PROCEDURE — 1200000000 HC SEMI PRIVATE

## 2025-01-16 PROCEDURE — 6360000002 HC RX W HCPCS: Performed by: STUDENT IN AN ORGANIZED HEALTH CARE EDUCATION/TRAINING PROGRAM

## 2025-01-16 PROCEDURE — 2700000000 HC OXYGEN THERAPY PER DAY

## 2025-01-16 PROCEDURE — 99232 SBSQ HOSP IP/OBS MODERATE 35: CPT | Performed by: INTERNAL MEDICINE

## 2025-01-16 PROCEDURE — 6360000002 HC RX W HCPCS: Performed by: INTERNAL MEDICINE

## 2025-01-16 PROCEDURE — 94640 AIRWAY INHALATION TREATMENT: CPT

## 2025-01-16 PROCEDURE — 2500000003 HC RX 250 WO HCPCS: Performed by: INTERNAL MEDICINE

## 2025-01-16 PROCEDURE — 6370000000 HC RX 637 (ALT 250 FOR IP)

## 2025-01-16 PROCEDURE — APPNB15 APP NON BILLABLE TIME 0-15 MINS

## 2025-01-16 PROCEDURE — 36415 COLL VENOUS BLD VENIPUNCTURE: CPT

## 2025-01-16 PROCEDURE — 85025 COMPLETE CBC W/AUTO DIFF WBC: CPT

## 2025-01-16 PROCEDURE — 94761 N-INVAS EAR/PLS OXIMETRY MLT: CPT

## 2025-01-16 RX ORDER — KETOROLAC TROMETHAMINE 30 MG/ML
30 INJECTION, SOLUTION INTRAMUSCULAR; INTRAVENOUS ONCE
Status: COMPLETED | OUTPATIENT
Start: 2025-01-16 | End: 2025-01-16

## 2025-01-16 RX ORDER — RANOLAZINE 500 MG/1
500 TABLET, EXTENDED RELEASE ORAL 2 TIMES DAILY
Qty: 60 TABLET | Refills: 3 | Status: SHIPPED | OUTPATIENT
Start: 2025-01-16

## 2025-01-16 RX ORDER — CARVEDILOL 6.25 MG/1
6.25 TABLET ORAL 2 TIMES DAILY WITH MEALS
Status: DISCONTINUED | OUTPATIENT
Start: 2025-01-16 | End: 2025-01-18 | Stop reason: HOSPADM

## 2025-01-16 RX ORDER — LIDOCAINE 4 G/G
1 PATCH TOPICAL DAILY
Qty: 5 EACH | Refills: 0 | Status: SHIPPED | OUTPATIENT
Start: 2025-01-17 | End: 2025-01-22

## 2025-01-16 RX ORDER — LISINOPRIL 5 MG/1
5 TABLET ORAL DAILY
Status: DISCONTINUED | OUTPATIENT
Start: 2025-01-16 | End: 2025-01-18 | Stop reason: HOSPADM

## 2025-01-16 RX ORDER — ISOSORBIDE MONONITRATE 60 MG/1
60 TABLET, EXTENDED RELEASE ORAL DAILY
Qty: 30 TABLET | Refills: 3 | Status: SHIPPED | OUTPATIENT
Start: 2025-01-17

## 2025-01-16 RX ORDER — LIDOCAINE 4 G/G
1 PATCH TOPICAL DAILY
Status: DISCONTINUED | OUTPATIENT
Start: 2025-01-16 | End: 2025-01-18 | Stop reason: HOSPADM

## 2025-01-16 RX ORDER — CARVEDILOL 6.25 MG/1
6.25 TABLET ORAL 2 TIMES DAILY WITH MEALS
Qty: 60 TABLET | Refills: 3 | Status: SHIPPED | OUTPATIENT
Start: 2025-01-16

## 2025-01-16 RX ADMIN — MORPHINE SULFATE 2 MG: 2 INJECTION, SOLUTION INTRAMUSCULAR; INTRAVENOUS at 06:15

## 2025-01-16 RX ADMIN — KETOROLAC TROMETHAMINE 30 MG: 30 INJECTION, SOLUTION INTRAMUSCULAR; INTRAVENOUS at 20:39

## 2025-01-16 RX ADMIN — CYCLOBENZAPRINE 10 MG: 10 TABLET, FILM COATED ORAL at 13:03

## 2025-01-16 RX ADMIN — RANOLAZINE 500 MG: 500 TABLET, EXTENDED RELEASE ORAL at 09:14

## 2025-01-16 RX ADMIN — BUDESONIDE AND FORMOTEROL FUMARATE DIHYDRATE 2 PUFF: 80; 4.5 AEROSOL RESPIRATORY (INHALATION) at 21:13

## 2025-01-16 RX ADMIN — ATORVASTATIN CALCIUM 80 MG: 40 TABLET, FILM COATED ORAL at 20:39

## 2025-01-16 RX ADMIN — ACETAMINOPHEN 650 MG: 325 TABLET ORAL at 00:12

## 2025-01-16 RX ADMIN — RANOLAZINE 500 MG: 500 TABLET, EXTENDED RELEASE ORAL at 20:39

## 2025-01-16 RX ADMIN — HYDRALAZINE HYDROCHLORIDE 10 MG: 20 INJECTION INTRAMUSCULAR; INTRAVENOUS at 00:13

## 2025-01-16 RX ADMIN — ASPIRIN 81 MG CHEWABLE TABLET 81 MG: 81 TABLET CHEWABLE at 09:15

## 2025-01-16 RX ADMIN — KETOROLAC TROMETHAMINE 30 MG: 30 INJECTION, SOLUTION INTRAMUSCULAR; INTRAVENOUS at 13:04

## 2025-01-16 RX ADMIN — FAMOTIDINE 20 MG: 20 TABLET ORAL at 20:39

## 2025-01-16 RX ADMIN — FAMOTIDINE 20 MG: 20 TABLET ORAL at 09:14

## 2025-01-16 RX ADMIN — ALBUTEROL SULFATE 2 PUFF: 90 AEROSOL, METERED RESPIRATORY (INHALATION) at 11:38

## 2025-01-16 RX ADMIN — CARVEDILOL 6.25 MG: 6.25 TABLET, FILM COATED ORAL at 18:27

## 2025-01-16 RX ADMIN — Medication 1 TABLET: at 09:14

## 2025-01-16 RX ADMIN — ACETAMINOPHEN 650 MG: 325 TABLET ORAL at 06:15

## 2025-01-16 RX ADMIN — SODIUM CHLORIDE, PRESERVATIVE FREE 10 ML: 5 INJECTION INTRAVENOUS at 20:39

## 2025-01-16 RX ADMIN — CYCLOBENZAPRINE 10 MG: 10 TABLET, FILM COATED ORAL at 00:13

## 2025-01-16 RX ADMIN — FINASTERIDE 5 MG: 5 TABLET, FILM COATED ORAL at 09:14

## 2025-01-16 RX ADMIN — ALBUTEROL SULFATE 2 PUFF: 90 AEROSOL, METERED RESPIRATORY (INHALATION) at 16:49

## 2025-01-16 RX ADMIN — ALBUTEROL SULFATE 2 PUFF: 90 AEROSOL, METERED RESPIRATORY (INHALATION) at 21:13

## 2025-01-16 RX ADMIN — ALBUTEROL SULFATE 2 PUFF: 90 AEROSOL, METERED RESPIRATORY (INHALATION) at 08:33

## 2025-01-16 RX ADMIN — TIOTROPIUM BROMIDE INHALATION SPRAY 2 PUFF: 3.12 SPRAY, METERED RESPIRATORY (INHALATION) at 08:34

## 2025-01-16 RX ADMIN — DULOXETINE HYDROCHLORIDE 60 MG: 30 CAPSULE, DELAYED RELEASE ORAL at 09:15

## 2025-01-16 RX ADMIN — BUDESONIDE AND FORMOTEROL FUMARATE DIHYDRATE 2 PUFF: 80; 4.5 AEROSOL RESPIRATORY (INHALATION) at 08:32

## 2025-01-16 RX ADMIN — MORPHINE SULFATE 2 MG: 2 INJECTION, SOLUTION INTRAMUSCULAR; INTRAVENOUS at 01:09

## 2025-01-16 RX ADMIN — LISINOPRIL 5 MG: 5 TABLET ORAL at 09:14

## 2025-01-16 RX ADMIN — SODIUM CHLORIDE, PRESERVATIVE FREE 10 ML: 5 INJECTION INTRAVENOUS at 09:15

## 2025-01-16 RX ADMIN — LEVOTHYROXINE SODIUM 200 MCG: 0.1 TABLET ORAL at 06:11

## 2025-01-16 RX ADMIN — ISOSORBIDE MONONITRATE 60 MG: 60 TABLET, EXTENDED RELEASE ORAL at 09:14

## 2025-01-16 ASSESSMENT — PAIN DESCRIPTION - ORIENTATION
ORIENTATION: LEFT

## 2025-01-16 ASSESSMENT — PAIN SCALES - GENERAL
PAINLEVEL_OUTOF10: 10

## 2025-01-16 ASSESSMENT — PAIN DESCRIPTION - LOCATION
LOCATION: CHEST
LOCATION: HEAD;CHEST

## 2025-01-16 ASSESSMENT — PAIN DESCRIPTION - DESCRIPTORS
DESCRIPTORS: ACHING;SHARP
DESCRIPTORS: DISCOMFORT
DESCRIPTORS: SHARP
DESCRIPTORS: ACHING;SHARP
DESCRIPTORS: DISCOMFORT
DESCRIPTORS: ACHING;SHARP

## 2025-01-16 ASSESSMENT — PAIN DESCRIPTION - PAIN TYPE
TYPE: CHRONIC PAIN
TYPE: CHRONIC PAIN

## 2025-01-16 NOTE — PLAN OF CARE
Problem: Chronic Conditions and Co-morbidities  Goal: Patient's chronic conditions and co-morbidity symptoms are monitored and maintained or improved  1/15/2025 2219 by Wendy Acosta RN  Outcome: Progressing  1/15/2025 0824 by Sarah Rodgers RN  Outcome: Progressing     Problem: Safety - Adult  Goal: Free from fall injury  1/15/2025 2219 by Wendy Acosta RN  Outcome: Progressing  1/15/2025 0824 by Sarah Rodgers RN  Outcome: Progressing     Problem: Discharge Planning  Goal: Discharge to home or other facility with appropriate resources  1/15/2025 2219 by Wendy Acosta RN  Outcome: Progressing  1/15/2025 0824 by Sarah Rodgers RN  Outcome: Progressing     Problem: Pain  Goal: Verbalizes/displays adequate comfort level or baseline comfort level  1/15/2025 0824 by Sarah Rodgers RN  Outcome: Progressing

## 2025-01-16 NOTE — CARE COORDINATION
Received referral from [facility].  Approved a 1x voucher for 1 medication(s).  Faxed voucher to Outpatient Pharmacy.      Per CM, Patient is appealing discharge.  Will hold voucher pending appeal.

## 2025-01-16 NOTE — CARE COORDINATION
Files SUCCESSFULLY uploaded for Case Control ID [id number]  Your Submission Tracking ID is [id number]     There are 6 files(s) have been uploaded:  CHANNING Demo.pdf  JG IMM letter.pdf  JG Clinical.pdf  JG Meds.pdf  JG Labs.pdf  JG Micro.pdf

## 2025-01-16 NOTE — DISCHARGE INSTRUCTIONS
Heart Cath Limitations  -No lifting over 15 pounds for 7 days.  -Avoid driving for 48 hours  -No water submersion (bath, hot tub, swimming), but okay to shower after discharge.    Some bruising is common and should not be alarming.   Advised to return to emergency department ASAP if you notice bright red blood coming from catheter access site or if there is a firm mass/lump that is growing. Potential life threatening condition. Hold pressure on site immediately  Informed to contact the office if they notice new fever, excessive warmth, redness, swelling, or pus draining from site.      Please make appointment with primary care physician for your chest wall pain.  Please continue to use your home muscle relaxer as well as Tylenol for pain.

## 2025-01-16 NOTE — PROGRESS NOTES
°C)    Respiratory Rate : Resp  Av.7  Min: 11  Max: 18    Pulse Range: Pulse  Av.3  Min: 58  Max: 83    Blood Presuure Range:  Systolic (24hrs), Av , Min:120 , Max:170   ; Diastolic (24hrs), Av, Min:70, Max:94      Pulse ox Range: SpO2  Av.5 %  Min: 89 %  Max: 95 %    24hr I & O:    Intake/Output Summary (Last 24 hours) at 2025 1325  Last data filed at 2025 1315  Gross per 24 hour   Intake 940 ml   Output 835 ml   Net 105 ml         /70   Pulse 68   Temp 97.6 °F (36.4 °C) (Oral)   Resp 14   Ht 1.778 m (5' 10\")   Wt 89.5 kg (197 lb 4.8 oz)   SpO2 95%   BMI 28.31 kg/m²         TELEMETRY: Sinus      has a past medical history of CAD (coronary artery disease), Chest pain, COPD (chronic obstructive pulmonary disease) ([facility]), H/O cardiac catheterization, H/O cardiovascular stress test, H/O echocardiogram, H/O echocardiogram, H/O exercise stress test, Hx of cardiovascular stress test, Hx of Doppler ultrasound, Hyperlipidemia, Hypertension, IBS (irritable bowel syndrome), Orthostatic hypotension, Pancreatitis, Rheumatoid arthritis ([facility]), S/P CABG x 3, SOB (shortness of breath), and Thyroid disease.   has a past surgical history that includes Endoscopy, colon, diagnostic (); Colonoscopy (); Cardiac catheterization (2020); Coronary artery bypass graft (N/A, 10/19/2020); cardiovascular stress test (2025); and Cardiac procedure (N/A, 1/15/2025).    Physical Exam  Constitutional:       Appearance: He is not ill-appearing.   HENT:      Mouth/Throat:      Comments: Pupils equal and round  Cardiovascular:      Rate and Rhythm: Normal rate and regular rhythm.   Pulmonary:      Effort: Pulmonary effort is normal.      Breath sounds: Normal breath sounds.   Abdominal:      Palpations: Abdomen is soft.   Musculoskeletal:      Right lower leg: No edema.      Left lower leg: No edema.   Skin:     General: Skin is warm.      Capillary Refill: Capillary refill takes less  dominance system.     1. Left main:  Left main is patent  2.  Ostial left main has a 99 to 100% occluded, LIMA fills distal LAD and then backfills the diagonal branch  3. Ramus intermedius is a large vessel it is diffusely diseased it is filled by SVG to ramus graft distal  4  Circumflex artery is diffusely diseased it is a 70 to 80% stenosis in the midsegment OM1 is 99% stenosis and hazy distally circumflex is diffusely diseased and fills the PDA and RCA by giving collaterals  (No change compared to previous cath before CABG)  5. Rght coronary artery is 100% occluded in the ostium and proximal segment distally PDA is filled by collaterals from the left side     GRAFTS:  LIMA to LAD is widely patent  SVG to ramus or high OM is widely patent  SVG to  PDA is not seen (possible occluded) aortogram completed by placement of a pigtail catheter and ascending aorta but no graft seen     RECOMMENDATIONS AND DISPOSITION:   Continue medical management  Follow up with cardiology     Had CABG x 3 with LIMA to the LAD, SVG to PDA and SVG to ramus     Hyperlipidemia on Lipitor 80 mg p.o. daily which will be continued     Hypertension patient is on lisinopril which will be continued     History of hypothyroidism was on Synthroid             FERNANDO WEBB MD [facility]

## 2025-01-16 NOTE — CARE COORDINATION
Pt on discharge.  CM updated by Pt CANDIS Orozco that Pt wishes to appeal.    CM assisted Pt in calling [facility] at Pt request.  Appeal given to [facility], case number [id number].      PS to Dr. Russo to update.

## 2025-01-17 PROCEDURE — 6370000000 HC RX 637 (ALT 250 FOR IP)

## 2025-01-17 PROCEDURE — 94640 AIRWAY INHALATION TREATMENT: CPT

## 2025-01-17 PROCEDURE — 6370000000 HC RX 637 (ALT 250 FOR IP): Performed by: INTERNAL MEDICINE

## 2025-01-17 PROCEDURE — 2500000003 HC RX 250 WO HCPCS: Performed by: INTERNAL MEDICINE

## 2025-01-17 PROCEDURE — 1200000000 HC SEMI PRIVATE

## 2025-01-17 PROCEDURE — 94761 N-INVAS EAR/PLS OXIMETRY MLT: CPT

## 2025-01-17 RX ADMIN — ALBUTEROL SULFATE 2 PUFF: 90 AEROSOL, METERED RESPIRATORY (INHALATION) at 12:12

## 2025-01-17 RX ADMIN — CYCLOBENZAPRINE 10 MG: 10 TABLET, FILM COATED ORAL at 11:59

## 2025-01-17 RX ADMIN — CARVEDILOL 6.25 MG: 6.25 TABLET, FILM COATED ORAL at 08:20

## 2025-01-17 RX ADMIN — ACETAMINOPHEN 650 MG: 325 TABLET ORAL at 11:59

## 2025-01-17 RX ADMIN — CARVEDILOL 6.25 MG: 6.25 TABLET, FILM COATED ORAL at 18:13

## 2025-01-17 RX ADMIN — SODIUM CHLORIDE, PRESERVATIVE FREE 10 ML: 5 INJECTION INTRAVENOUS at 08:21

## 2025-01-17 RX ADMIN — RANOLAZINE 500 MG: 500 TABLET, EXTENDED RELEASE ORAL at 21:01

## 2025-01-17 RX ADMIN — FAMOTIDINE 20 MG: 20 TABLET ORAL at 08:20

## 2025-01-17 RX ADMIN — LEVOTHYROXINE SODIUM 200 MCG: 0.1 TABLET ORAL at 08:20

## 2025-01-17 RX ADMIN — ALBUTEROL SULFATE 2 PUFF: 90 AEROSOL, METERED RESPIRATORY (INHALATION) at 15:12

## 2025-01-17 RX ADMIN — ASPIRIN 81 MG CHEWABLE TABLET 81 MG: 81 TABLET CHEWABLE at 08:20

## 2025-01-17 RX ADMIN — ALBUTEROL SULFATE 2 PUFF: 90 AEROSOL, METERED RESPIRATORY (INHALATION) at 09:55

## 2025-01-17 RX ADMIN — DULOXETINE HYDROCHLORIDE 60 MG: 30 CAPSULE, DELAYED RELEASE ORAL at 08:20

## 2025-01-17 RX ADMIN — CYCLOBENZAPRINE 10 MG: 10 TABLET, FILM COATED ORAL at 21:01

## 2025-01-17 RX ADMIN — ATORVASTATIN CALCIUM 80 MG: 40 TABLET, FILM COATED ORAL at 21:01

## 2025-01-17 RX ADMIN — RANOLAZINE 500 MG: 500 TABLET, EXTENDED RELEASE ORAL at 08:21

## 2025-01-17 RX ADMIN — ISOSORBIDE MONONITRATE 60 MG: 60 TABLET, EXTENDED RELEASE ORAL at 08:20

## 2025-01-17 RX ADMIN — FAMOTIDINE 20 MG: 20 TABLET ORAL at 21:01

## 2025-01-17 RX ADMIN — FINASTERIDE 5 MG: 5 TABLET, FILM COATED ORAL at 08:20

## 2025-01-17 RX ADMIN — BUDESONIDE AND FORMOTEROL FUMARATE DIHYDRATE 2 PUFF: 80; 4.5 AEROSOL RESPIRATORY (INHALATION) at 09:56

## 2025-01-17 RX ADMIN — LISINOPRIL 5 MG: 5 TABLET ORAL at 08:20

## 2025-01-17 RX ADMIN — Medication 1 TABLET: at 08:20

## 2025-01-17 RX ADMIN — TIOTROPIUM BROMIDE INHALATION SPRAY 2 PUFF: 3.12 SPRAY, METERED RESPIRATORY (INHALATION) at 12:11

## 2025-01-17 RX ADMIN — ACETAMINOPHEN 650 MG: 325 TABLET ORAL at 04:19

## 2025-01-17 RX ADMIN — CYCLOBENZAPRINE 10 MG: 10 TABLET, FILM COATED ORAL at 04:20

## 2025-01-17 ASSESSMENT — PAIN DESCRIPTION - DESCRIPTORS
DESCRIPTORS: SHARP;ACHING
DESCRIPTORS: ACHING;SHARP

## 2025-01-17 ASSESSMENT — ENCOUNTER SYMPTOMS
COLOR CHANGE: 0
WHEEZING: 0
SINUS PAIN: 0
SORE THROAT: 0
SHORTNESS OF BREATH: 0
CONSTIPATION: 0
TROUBLE SWALLOWING: 0
COUGH: 0
NAUSEA: 0
ABDOMINAL PAIN: 0
VOICE CHANGE: 0
SINUS PRESSURE: 0
VOMITING: 0
CHEST TIGHTNESS: 0
DIARRHEA: 0
BACK PAIN: 0

## 2025-01-17 ASSESSMENT — PAIN SCALES - GENERAL
PAINLEVEL_OUTOF10: 8
PAINLEVEL_OUTOF10: 10

## 2025-01-17 ASSESSMENT — PAIN DESCRIPTION - ORIENTATION
ORIENTATION: LEFT
ORIENTATION: LEFT

## 2025-01-17 ASSESSMENT — PAIN DESCRIPTION - LOCATION
LOCATION: CHEST
LOCATION: CHEST

## 2025-01-17 NOTE — CARE COORDINATION
Chart reviewed. The patient appealed discharge and the discharge was upheld. He will need to leave by tomorrow. Patient was given the name of Elvira [phone] in [place] at the the [facility] to call at discharge. CM is available if needs arise.

## 2025-01-17 NOTE — CARE COORDINATION
Room # 3029-Received a call from Pam at 1-0520- Pt refusing to be discharged --he was cleared for discharge  after a  Medicare discharge appeal was denied and the discharge was upheld earlier this AM . Pt just now being discharged, a call to the [facility] ( [phone]) confirmed there are no available beds., pt requested three  as he was originally brought from [place].  The [facility] advised to call each morning for bed vacancies.     [facility] is not possible at this hour on Friday (and he is from [facility]). Call made to the [facility] to  at [phone]-no answer , message.     The medications for [facility] per Voucher were  already delivered to pt  prior to pharmacy closing.     --Pt will be allowed to stay after Medicare ruling until noon January 18, 2025.This CM spoke at bedside to pt and made it clear to make some decisions on plans for release tomorrow, or he will be removed . Suggestions are to call the [facility] in AM , or he can go to the [facility] at [address] -transportation per rides plus. Pt also mentioned he has two possible contacts he may try.     --Mr Martínez understood the plan and was in agreement, he had no further questions . Pam CHIRINOS was also notified of this information . Food bag was also given to pt.

## 2025-01-17 NOTE — PROGRESS NOTES
01/17/25 1210   Encounter Summary   Encounter Overview/Reason Loneliness/Social Isolation   Encounter Code  Assessment by  services   Service Provided For Patient   Referral/Consult From Patient;Nurse   Support System Friends/neighbors   Last Encounter  01/17/25  (Pt doesn't have much support, he lives alone, he expressed pain in is chest and head, anxious about his condition and a plan to get well, he finds meaning in God, he found prayer to be comforting and gave him peace.)   Complexity of Encounter Low   Begin Time 1159   End Time  1212   Total Time Calculated 13 min   Spiritual/Emotional needs   Type Spiritual Support;Emotional Distress   Assessment/Intervention/Outcome   Assessment Anxious;Concerns with suffering;Coping;Loneliness;Stress overload   Intervention Active listening;Discussed illness injury and it’s impact;Discussed meaning/purpose;Discussed relationship with God;Prayer (assurance of)/[place];Sustaining Presence/Ministry of presence   Outcome Encouraged;Expressed feelings, needs, and concerns;Expressed Gratitude;Less anxious, Less agitated;Venting emotion   Plan and Referrals   Plan/Referrals Continue Support (comment)

## 2025-01-17 NOTE — PROGRESS NOTES
V2.0  [facility] Hospitalist Progress Note      Name:  Keyur Adrian /Age/Sex: 1955  (69 y.o. male)   MRN & CSN:  [id number] & [id number] Encounter Date/Time: 2025 1:48 PM EST    Location:  [address] [place] [address] PCP: Wili High MD       Hospital Day: 5    Assessment and Plan:   Keyur Adrian is a 69 y.o. male who presents with Chest pain      Plan:      Chest pain- [facility]  Patient had initially presented [place] ED for substernal chest pain evaluation. Troponin 17, 19. Chest pain reproducible.  Cardiology consulted  Stress test positive: Cath 1/15. Chest pain non-cardiac  NSAIDS, lidocain patch    Hypotension  Patient had symptomatic hypotension on arrival to ED from [place] ED. Suspected due to hypovolemia due to poor intake  BP stabilized      History of CAD s/p CABG x 3   Continue Lipitor and aspirin     Hypertension  On lisinopril, hold for now due to hypotension     History of hypothyroidism, on Synthroid     History of COPD  Panlobular emphysema in the CTA  Wears 2 L oxygen as needed  Will add breathing treatment    Diet ADULT DIET; Regular   DVT Prophylaxis [x] Lovenox, []  Heparin, [] SCDs, [] Ambulation,    [] Eliquis, [] Xarelto  [] Coumadin [] other   Code Status Full Code   Disposition From: Home  Expected Disposition: Home  Estimated Date of Discharge: medically ready for DC  Patient does not require continued admission   Surrogate Decision Maker/ POA      Personally reviewed Lab Studies and Imaging     Discussed management of the case with cardiology who recommended cardiac cath    EKG interpreted personally and results no acute ST changes      Subjective:     Chief Complaint: Chest Pain       Patient chest pain reproducible. Patient cleared for discharge to home. Patient educated on MSK chest pain can take 1-2 weeks to resolve.  It is not dangerous though can be uncomfortable. Does not require continued hospital stay.    Review of Systems:    Review of Systems   Constitutional:  Negative for

## 2025-01-17 NOTE — CARE COORDINATION
Per Andrea portal: 1/17 Outcome:  ME Agrees with termination of Hospital services  Beneficiary Liability Starts on 01/18/2025  [id number]

## 2025-01-17 NOTE — DISCHARGE SUMMARY
V2.0  [facility] Hospitalist Progress Note      Name:  Keyur Adrian /Age/Sex: 1955  (69 y.o. male)   MRN & CSN:  [id number] & [id number] Encounter Date/Time: 2025 1:48 PM EST    Location:  [address] [place] [address] PCP: Wili High MD       Hospital Day: 5    Assessment and Plan:   Keyur Adrian is a 69 y.o. male who presents with Chest pain      Plan:      Chest pain- [facility]  Patient had initially presented [place] ED for substernal chest pain evaluation. Troponin 17, 19. Chest pain reproducible.  Cardiology consulted  Stress test positive: Cath 1/15. Chest pain non-cardiac  NSAIDS, lidocain patch    Hypotension  Patient had symptomatic hypotension on arrival to ED from [place] ED. Suspected due to hypovolemia due to poor intake  BP stabilized      History of CAD s/p CABG x 3   Continue Lipitor and aspirin     Hypertension  On lisinopril, hold for now due to hypotension     History of hypothyroidism, on Synthroid     History of COPD  Panlobular emphysema in the CTA  Wears 2 L oxygen as needed  Will add breathing treatment    Diet ADULT DIET; Regular   DVT Prophylaxis [x] Lovenox, []  Heparin, [] SCDs, [] Ambulation,    [] Eliquis, [] Xarelto  [] Coumadin [] other   Code Status Full Code   Disposition From: Home  Expected Disposition: Home  Estimated Date of Discharge: medically ready for DC  Patient does not require continued admission   Surrogate Decision Maker/ POA      Personally reviewed Lab Studies and Imaging     Discussed management of the case with cardiology who recommended cardiac cath    EKG interpreted personally and results no acute ST changes      Subjective:     Chief Complaint: Chest Pain     No acute events overnight. Remains appropriate for discharge. Is appealing. Appears comfortable and in no acute distress. Educated on MSK pain taking some time to go away.    Review of Systems:    Review of Systems   Constitutional:  Negative for activity change, appetite change, chills, fatigue and fever.  at 2:37 PM

## 2025-01-18 VITALS
RESPIRATION RATE: 17 BRPM | OXYGEN SATURATION: 92 % | TEMPERATURE: 97.4 F | WEIGHT: 197.6 LBS | HEART RATE: 65 BPM | DIASTOLIC BLOOD PRESSURE: 73 MMHG | SYSTOLIC BLOOD PRESSURE: 129 MMHG | BODY MASS INDEX: 28.29 KG/M2 | HEIGHT: 70 IN

## 2025-01-18 PROCEDURE — 6370000000 HC RX 637 (ALT 250 FOR IP): Performed by: INTERNAL MEDICINE

## 2025-01-18 PROCEDURE — 94761 N-INVAS EAR/PLS OXIMETRY MLT: CPT

## 2025-01-18 PROCEDURE — 94669 MECHANICAL CHEST WALL OSCILL: CPT

## 2025-01-18 PROCEDURE — 6370000000 HC RX 637 (ALT 250 FOR IP)

## 2025-01-18 PROCEDURE — 94640 AIRWAY INHALATION TREATMENT: CPT

## 2025-01-18 PROCEDURE — 2700000000 HC OXYGEN THERAPY PER DAY

## 2025-01-18 RX ADMIN — FINASTERIDE 5 MG: 5 TABLET, FILM COATED ORAL at 07:54

## 2025-01-18 RX ADMIN — Medication 1 TABLET: at 07:53

## 2025-01-18 RX ADMIN — TIOTROPIUM BROMIDE INHALATION SPRAY 2 PUFF: 3.12 SPRAY, METERED RESPIRATORY (INHALATION) at 06:50

## 2025-01-18 RX ADMIN — RANOLAZINE 500 MG: 500 TABLET, EXTENDED RELEASE ORAL at 07:53

## 2025-01-18 RX ADMIN — ALBUTEROL SULFATE 2 PUFF: 90 AEROSOL, METERED RESPIRATORY (INHALATION) at 06:50

## 2025-01-18 RX ADMIN — CARVEDILOL 6.25 MG: 6.25 TABLET, FILM COATED ORAL at 07:53

## 2025-01-18 RX ADMIN — ASPIRIN 81 MG CHEWABLE TABLET 81 MG: 81 TABLET CHEWABLE at 07:52

## 2025-01-18 RX ADMIN — DULOXETINE HYDROCHLORIDE 60 MG: 30 CAPSULE, DELAYED RELEASE ORAL at 07:53

## 2025-01-18 RX ADMIN — FAMOTIDINE 20 MG: 20 TABLET ORAL at 07:53

## 2025-01-18 RX ADMIN — ISOSORBIDE MONONITRATE 60 MG: 60 TABLET, EXTENDED RELEASE ORAL at 07:53

## 2025-01-18 RX ADMIN — LEVOTHYROXINE SODIUM 200 MCG: 0.1 TABLET ORAL at 07:53

## 2025-01-18 RX ADMIN — LISINOPRIL 5 MG: 5 TABLET ORAL at 07:53

## 2025-01-18 RX ADMIN — BUDESONIDE AND FORMOTEROL FUMARATE DIHYDRATE 2 PUFF: 80; 4.5 AEROSOL RESPIRATORY (INHALATION) at 06:50

## 2025-01-18 RX ADMIN — ALBUTEROL SULFATE 2 PUFF: 90 AEROSOL, METERED RESPIRATORY (INHALATION) at 10:39

## 2025-01-18 ASSESSMENT — PAIN SCALES - GENERAL: PAINLEVEL_OUTOF10: 5

## 2025-01-18 ASSESSMENT — PAIN - FUNCTIONAL ASSESSMENT: PAIN_FUNCTIONAL_ASSESSMENT: ACTIVITIES ARE NOT PREVENTED

## 2025-01-18 ASSESSMENT — PAIN DESCRIPTION - FREQUENCY: FREQUENCY: CONTINUOUS

## 2025-01-18 ASSESSMENT — PAIN DESCRIPTION - DESCRIPTORS: DESCRIPTORS: ACHING;SORE

## 2025-01-18 ASSESSMENT — PAIN DESCRIPTION - ORIENTATION: ORIENTATION: LEFT

## 2025-01-18 ASSESSMENT — PAIN DESCRIPTION - LOCATION: LOCATION: CHEST

## 2025-01-18 ASSESSMENT — PAIN DESCRIPTION - PAIN TYPE: TYPE: CHRONIC PAIN;ACUTE PAIN

## 2025-01-18 ASSESSMENT — PAIN DESCRIPTION - ONSET: ONSET: ON-GOING

## 2025-01-18 NOTE — PROGRESS NOTES
V2.0  [facility] [place] Progress Note                            Name:  Keyur Adrian /Age/Sex: 1955  (69 y.o. male)   MRN & CSN:  [id number] & [id number] Encounter Date/Time: 2025 1:48 PM EST    Location:  [address] [place] [address] PCP: Wili High MD        Hospital Day: 5     Assessment and Plan:   Keyur Adrian is a 69 y.o. male who presents with Chest pain        Plan:     Chest pain- MSK  Patient had initially presented Center ED for substernal chest pain evaluation. Troponin 17, 19. Chest pain reproducible.  Cardiology consulted  Stress test positive: Cath 1/15. Chest pain non-cardiac  NSAIDS, lidocain patch     Hypotension  Patient had symptomatic hypotension on arrival to ED from Center ED. Suspected due to hypovolemia due to poor intake  BP stabilized       History of CAD s/p CABG x 3   Continue Lipitor and aspirin     Hypertension  On lisinopril, hold for now due to hypotension     History of hypothyroidism, on Synthroid     History of COPD  Panlobular emphysema in the CTA  Wears 2 L oxygen as needed  Will add breathing treatment     Diet ADULT DIET; Regular   DVT Prophylaxis [x] Lovenox, []  Heparin, [] SCDs, [] Ambulation,    [] Eliquis, [] Xarelto  [] Coumadin [] other   Code Status Full Code   Disposition From: Home  Expected Disposition: Home  Estimated Date of Discharge: medically ready for DC  Patient does not require continued admission   Surrogate Decision Maker/ POA        Personally reviewed Lab Studies and Imaging      Discussed management of the case with cardiology who recommended cardiac cath     EKG interpreted personally and results no acute ST changes        Subjective:      Chief Complaint: Chest Pain     No acute events overnight. Remains appropriate for discharge. Is appealing. Appears comfortable and in no acute distress. Educated on MSK pain taking some time to go away.     Review of Systems:    Review of Systems   Constitutional:  Negative for activity change, appetite

## 2025-01-19 LAB
MICROORGANISM SPEC CULT: NORMAL
MICROORGANISM SPEC CULT: NORMAL
SERVICE CMNT-IMP: NORMAL
SERVICE CMNT-IMP: NORMAL
SPECIMEN DESCRIPTION: NORMAL
SPECIMEN DESCRIPTION: NORMAL

## 2025-01-20 ENCOUNTER — CARE COORDINATION (OUTPATIENT)
Dept: CASE MANAGEMENT | Age: 70
End: 2025-01-20

## 2025-01-20 DIAGNOSIS — R07.9 CHEST PAIN, UNSPECIFIED TYPE: Primary | ICD-10-CM

## 2025-01-20 PROCEDURE — 1111F DSCHRG MED/CURRENT MED MERGE: CPT | Performed by: GENERAL PRACTICE

## 2025-01-20 NOTE — CARE COORDINATION
Care Transitions Note    Initial Call - Call within 2 business days of discharge: Yes    Patient Current Location:   [facility]    Care Transition Nurse contacted patient by telephone to perform post hospital discharge assessment, verified name and  as identifiers. Provided introduction to self, and explanation of Care Transition Nurse role.     Patient: Keyur Adrian    Patient : 1955   MRN: [id number]    Reason for Admission:  Chest Pain s/p Cardiac Cath 1/15/25, Hypotension  Discharge Date: 25  RURS: Readmission Risk Score: 10.6  Facility: [facility] 25-25    Last Discharge Facility       Date Complaint Diagnosis Description Type Department Provider    25 Chest Pain Angina pectoris (HCC) ... ED to Hosp-Admission (Discharged) (ADMITTED) [facility] 3E Nabil Russo MD; [facility]...   Was this an external facility discharge? No    Additional needs identified to be addressed with provider   Patient: Keyur Adrian    Patient : 1955   MRN: [id number]    Reason for Admission:  Chest Pain s/p Cardiac Cath 1/15/25, Hypotension  Facility: [facility] 25-25    Patient reports continued \"sharp chest pain\" (states \"non-stop\"), chronic sob on exertion, vonda le edema, h/a, feels \"off balance\". Reports right groin (cardiac cath site) lt red, \"sore\"--denies hard lumps, bogginess, drainage, bruising.   Patient currently homeless, staying at [facility] and does not have transportation for appt.   Please contact patient asap at [phone] for further advisement.     Thank You,  Terri PITTS         Method of communication with provider: chart routing [facility] clinical pool-- high priority.    Patients top risk factors for readmission: financial, functional physical ability, medical condition-ongoing cp, support system, transportation, utilization of services, and homelessness    Interventions to address risk factors:   Review of patient management of

## 2025-01-21 ENCOUNTER — CARE COORDINATION (OUTPATIENT)
Dept: CASE MANAGEMENT | Age: 70
End: 2025-01-21

## 2025-01-21 NOTE — CARE COORDINATION
Care Transitions Note    Follow Up Call     Patient: Keyur Adrian                          Patient : 1955   MRN: [id number]                             Reason for Admission:  Chest Pain s/p Cardiac Cath 1/15/25, Hypotension  Discharge Date: 25       RURS: Readmission Risk Score: 10.6  Facility: [facility] 25-25    Patient Current Location:  [place]    Care Transition Nurse contacted patient by telephone. Verified name and  as identifiers.    Additional needs identified to be addressed with provider   Patient: Keyur Adrian                           Patient : 1955   MRN: [id number]                                    Reason for Admission:  Chest Pain s/p Cardiac Cath 1/15/25, Hypotension  Facility: [facility] 25-25     Patient reports continued \"sharp chest pain\" (states \"non-stop\"), chronic sob on exertion, vonda le edema, h/a, feels \"off balance\". Reports right groin (cardiac cath site) lt red, \"sore\"--denies hard lumps, bogginess, drainage, bruising.   Patient currently homeless, staying at [facility] and does not have transportation for appt.   Please contact patient asap at [phone] for further advisement.      Thank You,  Terri PITTS         Method of communication with provider: No f/u noted from [employer] re: high priority message sent 25. T/C Lana- [facility]. Discussed above. Rescheduled appt to 25 1050am Dr Jones. 911 if in distress.    Care Summary Note: Patient reports still at [facility]. Reports continued sharp chest pain (states \"steady\"), chronic sob on exertion, vonda le edema, h/a, feels \"off balance\". Denies palpitations, dizziness, ac resp distress. Noted to be speaking in complete, unlabored sentences. Reports right groin (cardiac cath site) still light red, \"sore\"--denies hard lumps, bogginess, drainage, bruising. Encouraged using ice pack on groin site. Reports he will ask center staff for bag of ice. Advised of

## 2025-01-23 ENCOUNTER — CARE COORDINATION (OUTPATIENT)
Dept: CARE COORDINATION | Age: 70
End: 2025-01-23

## 2025-01-23 NOTE — CARE COORDINATION
Patient Current Location: [place]    Initial Contact Social Work Note - Ambulatory  1/23/2025      Date of referral: 1/21/25  Referral received from: GISSELLE Travis  Reason for referral: Pt is homeless[facility], transportation    Previous SW referral: No  If yes, brief summary of outcome: n/a    Two Identifiers Verified: Yes    Insurance Provider: Medicare A and B, [employer] Gold Plus     Support System:   friends    Anderson Status:  unk    Community Providers:  n/a    ADL Assistance Needed: N/A    Housing/Living Concerns or Home Modification Needs: Pt was evicted after getting behind on rent while hospitalized and is now homeless. [facility]      Transportation Concern: Merged call to [facility] and scheduled transportation to health house appointment scheduled for 1/30    Medication Cost Concern: none reported     Medication Adherence Concern: n/a    Financial Concern(s): income is 1440 / month    Income (only if applicable): see above    Ability to Read/Write: Yes    Advance Care Plan:  N/A    Other: Pt is at [facility] currently, originally from [place], got ahold of a friend in [place] where his belongings are at. Pt was evicted and vehicle is broke down  Cardiology appt on 1/30 at 10:50am    Met some people who are trying to help get him some information    Will send pic via mobile phone for    Social security - 1440 / month    Pt doesn't have furniture and utilities were paid, only had to pay rent and phone bill  Got sick and was in the hospital and wouldn't work with Pt.     [facility]  [address] .     Merged call to [facility] to see if they have availability for transport to upcoming appt on 1/30 @ 10:50am. Transportation was scheduled and pt will be picked up at 10:30am    Provided Pt with phone number for [facility]  and provided him with list of income based housing for [place] and [place].        Identified

## 2025-01-24 ENCOUNTER — CARE COORDINATION (OUTPATIENT)
Dept: CASE MANAGEMENT | Age: 70
End: 2025-01-24

## 2025-01-24 NOTE — CARE COORDINATION
Care Transitions Note    Follow Up Call     Patient: Keyur Adrian                          Patient : 1955   MRN: [id number]                             Reason for Admission:  Chest Pain s/p Cardiac Cath 1/15/25, Hypotension  Discharge Date: 25       RURS: Readmission Risk Score: 10.6  Facility: [facility] 25-25    Attempt to reach patient for transitions of care follow up call unsuccessful.     Outreach Attempts:   HIPAA compliant voicemail left for patient.     Follow Up Appointment:   Future Appointments         Provider Specialty Dept Phone    2025 10:50 AM Jennifer Jones MD Cardiology [phone]    2025 1:40 PM Rico Boyd, APRN - CNP Cardiology [phone]            Plan for follow-up on next business day.  based on severity of symptoms and risk factors. Plan for next call: ongoing symptoms?, rx needs??     Terri Travis RN

## 2025-01-27 ENCOUNTER — CARE COORDINATION (OUTPATIENT)
Dept: CASE MANAGEMENT | Age: 70
End: 2025-01-27

## 2025-01-27 NOTE — CARE COORDINATION
Care Transitions Note    Follow Up Call     Patient: Keyur Adrian                          Patient : 1955   MRN: [id number]                             Reason for Admission:  Chest Pain s/p Cardiac Cath 1/15/25, Hypotension  Discharge Date: 25       RURS: Readmission Risk Score: 10.6  Facility: [facility] 25-25    2nd attempt to reach patient for transitions of care follow up call unsuccessful.    Outreach Attempts:   HIPAA compliant voicemail left for patient.   Oligasist message sent.     Follow Up Appointment:   Future Appointments         Provider Specialty Dept Phone    2025 10:50 AM Jennifer Jones MD Cardiology [phone]    2025 1:40 PM iRco Boyd, APRN - CNP Cardiology [phone]            Plan for follow-up on next business day.  based on severity of symptoms and risk factors. Plan for next call: ongoing symptoms?, rx needs??, appt reminder--transportation    Terri Travis RN

## 2025-01-28 ENCOUNTER — CARE COORDINATION (OUTPATIENT)
Dept: CASE MANAGEMENT | Age: 70
End: 2025-01-28

## 2025-01-28 ENCOUNTER — CARE COORDINATION (OUTPATIENT)
Dept: CARE COORDINATION | Age: 70
End: 2025-01-28

## 2025-01-28 NOTE — CARE COORDINATION
Care Transitions Note    Follow Up Call     Patient: Keyur Adrian                          Patient : 1955   MRN: [id number]                             Reason for Admission:  Chest Pain s/p Cardiac Cath 1/15/25, Hypotension  Discharge Date: 25       RURS: Readmission Risk Score: 10.6  Facility: [facility] 25-25    3rd attempt to reach patient for transitions of care follow up call unsuccessful. Will make 4th/final attempt at later date.    Outreach Attempts:   HIPAA compliant voicemail left for patient.     Follow Up Appointment:   Future Appointments         Provider Specialty Dept Phone    2025 10:50 AM Jennifer Jones MD Cardiology [phone]    2025 1:40 PM Rico Boyd, APRN - CNP Cardiology [phone]            Plan for follow-up on next business day.  based on severity of symptoms and risk factors. Plan for next call:  ongoing symptoms?, rx needs??, appt reminder--transportation     Terri Travis RN

## 2025-01-28 NOTE — CARE COORDINATION
Patient Current Location: Home: [address] [place] [address] [address]  [place] [address]     Phone call to Pt to follow up regarding housing. Pt is requesting for this SW to call to cancel ride to 1/30 appointment as he is sick.     Pt reported he has left a message for someone at [facility] regarding housing and hasn't heard back.     Pt had a chance to call a couple of apartments and left messages, one of the apartments was full with a long wait list.     SW encouraged Pt to try [facility] again.     SW offered to merge call to apartment managers with him. Pt declined, stating he will try some more this afternoon. SW encouraged Pt to call this SW with any questions.    Phone call to Bhavya with [employer] to cancel Pt ride for 1/30.    NEELAM plan of care: NEELAM will make next outreach on 1/31 to follow up regarding housing.

## 2025-01-29 ENCOUNTER — CARE COORDINATION (OUTPATIENT)
Dept: CASE MANAGEMENT | Age: 70
End: 2025-01-29

## 2025-01-29 NOTE — CARE COORDINATION
Care Transitions Note    Follow Up Call     Patient: Keyur Adrian                          Patient : 1955   MRN: [id number]                             Reason for Admission:  Chest Pain s/p Cardiac Cath 1/15/25, Hypotension  Discharge Date: 25       RURS: Readmission Risk Score: 10.6  Facility: [facility] 25-25    4th unsuccessful attempt to reach for Care Transition follow up call. HIPAA compliant message left requesting call back. CT program closed per protocol, no further outreach scheduled.      Outreach Attempts:   Multiple attempts to contact patient at phone numbers on file.     Follow Up Appointment:   Future Appointments         Provider Specialty Dept Phone    3/14/2025 11:50 AM Jennifer Jones MD Cardiology [phone]    2025 1:40 PM Rico Boyd, APRN - CNP Cardiology [phone]              Terri Travis RN

## 2025-01-31 ENCOUNTER — APPOINTMENT (OUTPATIENT)
Dept: GENERAL RADIOLOGY | Age: 70
DRG: 177 | End: 2025-01-31
Payer: MEDICARE

## 2025-01-31 ENCOUNTER — APPOINTMENT (OUTPATIENT)
Dept: CT IMAGING | Age: 70
DRG: 177 | End: 2025-01-31
Payer: MEDICARE

## 2025-01-31 ENCOUNTER — CARE COORDINATION (OUTPATIENT)
Dept: CARE COORDINATION | Age: 70
End: 2025-01-31

## 2025-01-31 ENCOUNTER — HOSPITAL ENCOUNTER (INPATIENT)
Age: 70
LOS: 5 days | Discharge: HOME | DRG: 177 | End: 2025-02-05
Attending: STUDENT IN AN ORGANIZED HEALTH CARE EDUCATION/TRAINING PROGRAM | Admitting: HOSPITALIST
Payer: MEDICARE

## 2025-01-31 DIAGNOSIS — R07.9 CHEST PAIN, UNSPECIFIED TYPE: Primary | ICD-10-CM

## 2025-01-31 DIAGNOSIS — R09.02 HYPOXEMIA: ICD-10-CM

## 2025-01-31 DIAGNOSIS — U07.1 COVID-19 VIRUS INFECTION: ICD-10-CM

## 2025-01-31 DIAGNOSIS — R79.89 ELEVATED BRAIN NATRIURETIC PEPTIDE (BNP) LEVEL: ICD-10-CM

## 2025-01-31 PROBLEM — I95.9 HYPOTENSION: Status: ACTIVE | Noted: 2025-01-31

## 2025-01-31 LAB
ALBUMIN SERPL-MCNC: 3.8 G/DL (ref 3.4–5)
ALBUMIN/GLOB SERPL: 1.3 {RATIO} (ref 1.1–2.2)
ALP SERPL-CCNC: 85 U/L (ref 40–129)
ALT SERPL-CCNC: 18 U/L (ref 10–40)
ANION GAP SERPL CALCULATED.3IONS-SCNC: 15 MMOL/L (ref 4–16)
AST SERPL-CCNC: 18 U/L (ref 15–37)
B PARAP IS1001 DNA NPH QL NAA+NON-PROBE: NOT DETECTED
B PERT DNA SPEC QL NAA+PROBE: NOT DETECTED
BASOPHILS # BLD: 0.04 K/UL
BASOPHILS NFR BLD: 1 % (ref 0–1)
BILIRUB SERPL-MCNC: 0.7 MG/DL (ref 0–1)
BNP SERPL-MCNC: 3143 PG/ML (ref 0–125)
BUN SERPL-MCNC: 27 MG/DL (ref 6–23)
C PNEUM DNA NPH QL NAA+NON-PROBE: NOT DETECTED
CALCIUM SERPL-MCNC: 8.8 MG/DL (ref 8.3–10.6)
CHLORIDE SERPL-SCNC: 100 MMOL/L (ref 99–110)
CO2 SERPL-SCNC: 23 MMOL/L (ref 21–32)
CREAT SERPL-MCNC: 1.3 MG/DL (ref 0.9–1.3)
D DIMER PPP FEU-MCNC: 1.67 UG/ML FEU (ref 0–0.46)
EOSINOPHIL # BLD: 0.26 K/UL
EOSINOPHILS RELATIVE PERCENT: 3 % (ref 0–3)
ERYTHROCYTE [DISTWIDTH] IN BLOOD BY AUTOMATED COUNT: 12.8 % (ref 11.7–14.9)
FLUAV RNA NPH QL NAA+NON-PROBE: NOT DETECTED
FLUBV RNA NPH QL NAA+NON-PROBE: NOT DETECTED
GFR, ESTIMATED: 59 ML/MIN/1.73M2
GLUCOSE SERPL-MCNC: 120 MG/DL (ref 74–99)
HADV DNA NPH QL NAA+NON-PROBE: NOT DETECTED
HCOV 229E RNA NPH QL NAA+NON-PROBE: NOT DETECTED
HCOV HKU1 RNA NPH QL NAA+NON-PROBE: NOT DETECTED
HCOV NL63 RNA NPH QL NAA+NON-PROBE: NOT DETECTED
HCOV OC43 RNA NPH QL NAA+NON-PROBE: NOT DETECTED
HCT VFR BLD AUTO: 48 % (ref 42–52)
HGB BLD-MCNC: 16.5 G/DL (ref 13.5–18)
HMPV RNA NPH QL NAA+NON-PROBE: NOT DETECTED
HPIV1 RNA NPH QL NAA+NON-PROBE: NOT DETECTED
HPIV2 RNA NPH QL NAA+NON-PROBE: NOT DETECTED
HPIV3 RNA NPH QL NAA+NON-PROBE: NOT DETECTED
HPIV4 RNA NPH QL NAA+NON-PROBE: NOT DETECTED
IMM GRANULOCYTES # BLD AUTO: 0.03 K/UL
IMM GRANULOCYTES NFR BLD: 0 %
LYMPHOCYTES NFR BLD: 1.9 K/UL
LYMPHOCYTES RELATIVE PERCENT: 25 % (ref 24–44)
M PNEUMO DNA NPH QL NAA+NON-PROBE: NOT DETECTED
MCH RBC QN AUTO: 35 PG (ref 27–31)
MCHC RBC AUTO-ENTMCNC: 34.4 G/DL (ref 32–36)
MCV RBC AUTO: 101.7 FL (ref 78–100)
MONOCYTES NFR BLD: 0.65 K/UL
MONOCYTES NFR BLD: 8 % (ref 0–4)
NEUTROPHILS NFR BLD: 63 % (ref 36–66)
NEUTS SEG NFR BLD: 4.88 K/UL
PLATELET # BLD AUTO: 175 K/UL (ref 140–440)
PMV BLD AUTO: 9.5 FL (ref 7.5–11.1)
POTASSIUM SERPL-SCNC: 4.3 MMOL/L (ref 3.5–5.1)
PROCALCITONIN SERPL-MCNC: 0.04 NG/ML
PROT SERPL-MCNC: 6.7 G/DL (ref 6.4–8.2)
RBC # BLD AUTO: 4.72 M/UL (ref 4.6–6.2)
RSV RNA NPH QL NAA+NON-PROBE: NOT DETECTED
RV+EV RNA NPH QL NAA+NON-PROBE: NOT DETECTED
SARS-COV-2 RNA NPH QL NAA+NON-PROBE: DETECTED
SODIUM SERPL-SCNC: 138 MMOL/L (ref 135–145)
SPECIMEN DESCRIPTION: ABNORMAL
TROPONIN I SERPL HS-MCNC: 18 NG/L (ref 0–21)
TROPONIN I SERPL HS-MCNC: 22 NG/L (ref 0–21)
WBC OTHER # BLD: 7.8 K/UL (ref 4–10.5)

## 2025-01-31 PROCEDURE — 96375 TX/PRO/DX INJ NEW DRUG ADDON: CPT

## 2025-01-31 PROCEDURE — 87040 BLOOD CULTURE FOR BACTERIA: CPT

## 2025-01-31 PROCEDURE — 6370000000 HC RX 637 (ALT 250 FOR IP): Performed by: STUDENT IN AN ORGANIZED HEALTH CARE EDUCATION/TRAINING PROGRAM

## 2025-01-31 PROCEDURE — 71045 X-RAY EXAM CHEST 1 VIEW: CPT

## 2025-01-31 PROCEDURE — 6360000004 HC RX CONTRAST MEDICATION: Performed by: STUDENT IN AN ORGANIZED HEALTH CARE EDUCATION/TRAINING PROGRAM

## 2025-01-31 PROCEDURE — 0202U NFCT DS 22 TRGT SARS-COV-2: CPT

## 2025-01-31 PROCEDURE — 84484 ASSAY OF TROPONIN QUANT: CPT

## 2025-01-31 PROCEDURE — 94664 DEMO&/EVAL PT USE INHALER: CPT

## 2025-01-31 PROCEDURE — 96365 THER/PROPH/DIAG IV INF INIT: CPT

## 2025-01-31 PROCEDURE — 6360000002 HC RX W HCPCS: Performed by: STUDENT IN AN ORGANIZED HEALTH CARE EDUCATION/TRAINING PROGRAM

## 2025-01-31 PROCEDURE — 2580000003 HC RX 258: Performed by: STUDENT IN AN ORGANIZED HEALTH CARE EDUCATION/TRAINING PROGRAM

## 2025-01-31 PROCEDURE — 99285 EMERGENCY DEPT VISIT HI MDM: CPT

## 2025-01-31 PROCEDURE — 85379 FIBRIN DEGRADATION QUANT: CPT

## 2025-01-31 PROCEDURE — 84145 PROCALCITONIN (PCT): CPT

## 2025-01-31 PROCEDURE — 85025 COMPLETE CBC W/AUTO DIFF WBC: CPT

## 2025-01-31 PROCEDURE — 6360000002 HC RX W HCPCS: Performed by: EMERGENCY MEDICINE

## 2025-01-31 PROCEDURE — 1200000000 HC SEMI PRIVATE

## 2025-01-31 PROCEDURE — 80053 COMPREHEN METABOLIC PANEL: CPT

## 2025-01-31 PROCEDURE — 2700000000 HC OXYGEN THERAPY PER DAY

## 2025-01-31 PROCEDURE — 83880 ASSAY OF NATRIURETIC PEPTIDE: CPT

## 2025-01-31 PROCEDURE — 71260 CT THORAX DX C+: CPT

## 2025-01-31 PROCEDURE — 96366 THER/PROPH/DIAG IV INF ADDON: CPT

## 2025-01-31 PROCEDURE — 94640 AIRWAY INHALATION TREATMENT: CPT

## 2025-01-31 RX ORDER — ACETAMINOPHEN 325 MG/1
650 TABLET ORAL EVERY 6 HOURS PRN
Status: DISCONTINUED | OUTPATIENT
Start: 2025-01-31 | End: 2025-02-01 | Stop reason: SDUPTHER

## 2025-01-31 RX ORDER — LEVOTHYROXINE SODIUM 100 UG/1
200 TABLET ORAL DAILY
Status: DISCONTINUED | OUTPATIENT
Start: 2025-02-01 | End: 2025-02-05 | Stop reason: HOSPADM

## 2025-01-31 RX ORDER — ENOXAPARIN SODIUM 100 MG/ML
40 INJECTION SUBCUTANEOUS DAILY
Status: DISCONTINUED | OUTPATIENT
Start: 2025-02-01 | End: 2025-02-05 | Stop reason: HOSPADM

## 2025-01-31 RX ORDER — ACETAMINOPHEN 500 MG
1000 TABLET ORAL ONCE
Status: COMPLETED | OUTPATIENT
Start: 2025-01-31 | End: 2025-01-31

## 2025-01-31 RX ORDER — ACETAMINOPHEN 325 MG/1
650 TABLET ORAL EVERY 6 HOURS PRN
Status: DISCONTINUED | OUTPATIENT
Start: 2025-01-31 | End: 2025-02-05 | Stop reason: HOSPADM

## 2025-01-31 RX ORDER — SODIUM CHLORIDE 9 MG/ML
INJECTION, SOLUTION INTRAVENOUS PRN
Status: DISCONTINUED | OUTPATIENT
Start: 2025-01-31 | End: 2025-02-05 | Stop reason: HOSPADM

## 2025-01-31 RX ORDER — KETOROLAC TROMETHAMINE 15 MG/ML
15 INJECTION, SOLUTION INTRAMUSCULAR; INTRAVENOUS ONCE
Status: COMPLETED | OUTPATIENT
Start: 2025-01-31 | End: 2025-01-31

## 2025-01-31 RX ORDER — TAMSULOSIN HYDROCHLORIDE 0.4 MG/1
0.4 CAPSULE ORAL DAILY
Status: DISCONTINUED | OUTPATIENT
Start: 2025-02-01 | End: 2025-02-01

## 2025-01-31 RX ORDER — PROCHLORPERAZINE EDISYLATE 5 MG/ML
5 INJECTION INTRAMUSCULAR; INTRAVENOUS EVERY 6 HOURS PRN
Status: DISCONTINUED | OUTPATIENT
Start: 2025-01-31 | End: 2025-02-05 | Stop reason: HOSPADM

## 2025-01-31 RX ORDER — ALBUTEROL SULFATE 90 UG/1
2 INHALANT RESPIRATORY (INHALATION) EVERY 4 HOURS PRN
Status: DISCONTINUED | OUTPATIENT
Start: 2025-01-31 | End: 2025-02-05 | Stop reason: HOSPADM

## 2025-01-31 RX ORDER — FAMOTIDINE 20 MG/1
20 TABLET, FILM COATED ORAL DAILY
Status: DISCONTINUED | OUTPATIENT
Start: 2025-02-01 | End: 2025-02-05 | Stop reason: HOSPADM

## 2025-01-31 RX ORDER — VITAMIN B COMPLEX
2000 TABLET ORAL DAILY
Status: DISCONTINUED | OUTPATIENT
Start: 2025-02-01 | End: 2025-02-05 | Stop reason: HOSPADM

## 2025-01-31 RX ORDER — ACETAMINOPHEN 650 MG/1
650 SUPPOSITORY RECTAL EVERY 6 HOURS PRN
Status: DISCONTINUED | OUTPATIENT
Start: 2025-01-31 | End: 2025-02-01 | Stop reason: SDUPTHER

## 2025-01-31 RX ORDER — M-VIT,TX,IRON,MINS/CALC/FOLIC 27MG-0.4MG
1 TABLET ORAL
Status: DISCONTINUED | OUTPATIENT
Start: 2025-02-01 | End: 2025-02-05 | Stop reason: HOSPADM

## 2025-01-31 RX ORDER — SODIUM CHLORIDE 0.9 % (FLUSH) 0.9 %
5-40 SYRINGE (ML) INJECTION PRN
Status: DISCONTINUED | OUTPATIENT
Start: 2025-01-31 | End: 2025-02-05 | Stop reason: HOSPADM

## 2025-01-31 RX ORDER — FENTANYL CITRATE 50 UG/ML
50 INJECTION, SOLUTION INTRAMUSCULAR; INTRAVENOUS ONCE
Status: COMPLETED | OUTPATIENT
Start: 2025-01-31 | End: 2025-01-31

## 2025-01-31 RX ORDER — FUROSEMIDE 40 MG/1
40 TABLET ORAL 2 TIMES DAILY
Status: DISCONTINUED | OUTPATIENT
Start: 2025-02-01 | End: 2025-02-05 | Stop reason: HOSPADM

## 2025-01-31 RX ORDER — MAGNESIUM SULFATE IN WATER 40 MG/ML
2000 INJECTION, SOLUTION INTRAVENOUS PRN
Status: DISCONTINUED | OUTPATIENT
Start: 2025-01-31 | End: 2025-02-02

## 2025-01-31 RX ORDER — RANOLAZINE 500 MG/1
500 TABLET, EXTENDED RELEASE ORAL 2 TIMES DAILY
Status: DISCONTINUED | OUTPATIENT
Start: 2025-02-01 | End: 2025-02-05 | Stop reason: HOSPADM

## 2025-01-31 RX ORDER — 0.9 % SODIUM CHLORIDE 0.9 %
1000 INTRAVENOUS SOLUTION INTRAVENOUS ONCE
Status: COMPLETED | OUTPATIENT
Start: 2025-01-31 | End: 2025-01-31

## 2025-01-31 RX ORDER — DULOXETIN HYDROCHLORIDE 60 MG/1
60 CAPSULE, DELAYED RELEASE ORAL DAILY
Status: DISCONTINUED | OUTPATIENT
Start: 2025-02-01 | End: 2025-02-05 | Stop reason: HOSPADM

## 2025-01-31 RX ORDER — LISINOPRIL 5 MG/1
5 TABLET ORAL DAILY
Status: DISCONTINUED | OUTPATIENT
Start: 2025-02-01 | End: 2025-02-05 | Stop reason: HOSPADM

## 2025-01-31 RX ORDER — IOPAMIDOL 755 MG/ML
100 INJECTION, SOLUTION INTRAVASCULAR
Status: COMPLETED | OUTPATIENT
Start: 2025-01-31 | End: 2025-01-31

## 2025-01-31 RX ORDER — POTASSIUM CHLORIDE 1500 MG/1
40 TABLET, EXTENDED RELEASE ORAL PRN
Status: DISCONTINUED | OUTPATIENT
Start: 2025-01-31 | End: 2025-02-02

## 2025-01-31 RX ORDER — POLYETHYLENE GLYCOL 3350 17 G/17G
17 POWDER, FOR SOLUTION ORAL DAILY PRN
Status: DISCONTINUED | OUTPATIENT
Start: 2025-01-31 | End: 2025-02-05 | Stop reason: HOSPADM

## 2025-01-31 RX ORDER — ASPIRIN 81 MG/1
324 TABLET, CHEWABLE ORAL ONCE
Status: COMPLETED | OUTPATIENT
Start: 2025-01-31 | End: 2025-01-31

## 2025-01-31 RX ORDER — FINASTERIDE 5 MG/1
5 TABLET, FILM COATED ORAL DAILY
Status: DISCONTINUED | OUTPATIENT
Start: 2025-02-01 | End: 2025-02-05 | Stop reason: HOSPADM

## 2025-01-31 RX ORDER — ISOSORBIDE MONONITRATE 60 MG/1
60 TABLET, EXTENDED RELEASE ORAL DAILY
Status: DISCONTINUED | OUTPATIENT
Start: 2025-02-01 | End: 2025-02-05 | Stop reason: HOSPADM

## 2025-01-31 RX ORDER — GUAIFENESIN/DEXTROMETHORPHAN 100-10MG/5
5 SYRUP ORAL EVERY 4 HOURS PRN
Status: DISCONTINUED | OUTPATIENT
Start: 2025-01-31 | End: 2025-02-05 | Stop reason: HOSPADM

## 2025-01-31 RX ORDER — SODIUM CHLORIDE 0.9 % (FLUSH) 0.9 %
5-40 SYRINGE (ML) INJECTION EVERY 12 HOURS SCHEDULED
Status: DISCONTINUED | OUTPATIENT
Start: 2025-02-01 | End: 2025-02-05 | Stop reason: HOSPADM

## 2025-01-31 RX ORDER — ONDANSETRON 2 MG/ML
4 INJECTION INTRAMUSCULAR; INTRAVENOUS ONCE
Status: COMPLETED | OUTPATIENT
Start: 2025-01-31 | End: 2025-01-31

## 2025-01-31 RX ORDER — ATORVASTATIN CALCIUM 80 MG/1
80 TABLET, FILM COATED ORAL NIGHTLY
Status: DISCONTINUED | OUTPATIENT
Start: 2025-02-01 | End: 2025-02-05 | Stop reason: HOSPADM

## 2025-01-31 RX ORDER — POTASSIUM CHLORIDE 7.45 MG/ML
10 INJECTION INTRAVENOUS PRN
Status: DISCONTINUED | OUTPATIENT
Start: 2025-01-31 | End: 2025-02-02

## 2025-01-31 RX ORDER — ACETAMINOPHEN 650 MG/1
650 SUPPOSITORY RECTAL EVERY 6 HOURS PRN
Status: DISCONTINUED | OUTPATIENT
Start: 2025-01-31 | End: 2025-02-05 | Stop reason: HOSPADM

## 2025-01-31 RX ORDER — ASPIRIN 81 MG/1
81 TABLET ORAL DAILY
Status: DISCONTINUED | OUTPATIENT
Start: 2025-02-01 | End: 2025-02-05 | Stop reason: HOSPADM

## 2025-01-31 RX ORDER — IPRATROPIUM BROMIDE AND ALBUTEROL SULFATE 2.5; .5 MG/3ML; MG/3ML
3 SOLUTION RESPIRATORY (INHALATION) ONCE
Status: COMPLETED | OUTPATIENT
Start: 2025-01-31 | End: 2025-01-31

## 2025-01-31 RX ORDER — VANCOMYCIN 1.25 G/250ML
20 INJECTION, SOLUTION INTRAVENOUS ONCE
Status: COMPLETED | OUTPATIENT
Start: 2025-01-31 | End: 2025-01-31

## 2025-01-31 RX ORDER — CARVEDILOL 3.12 MG/1
3.12 TABLET ORAL 2 TIMES DAILY WITH MEALS
Status: DISCONTINUED | OUTPATIENT
Start: 2025-02-01 | End: 2025-02-05 | Stop reason: HOSPADM

## 2025-01-31 RX ORDER — BUDESONIDE AND FORMOTEROL FUMARATE DIHYDRATE 160; 4.5 UG/1; UG/1
2 AEROSOL RESPIRATORY (INHALATION)
Status: DISCONTINUED | OUTPATIENT
Start: 2025-02-01 | End: 2025-02-05 | Stop reason: HOSPADM

## 2025-01-31 RX ADMIN — VANCOMYCIN 1750 MG: 1.25 INJECTION, SOLUTION INTRAVENOUS at 17:29

## 2025-01-31 RX ADMIN — KETOROLAC TROMETHAMINE 15 MG: 15 INJECTION, SOLUTION INTRAMUSCULAR; INTRAVENOUS at 18:14

## 2025-01-31 RX ADMIN — FENTANYL CITRATE 50 MCG: 50 INJECTION INTRAMUSCULAR; INTRAVENOUS at 20:45

## 2025-01-31 RX ADMIN — IPRATROPIUM BROMIDE AND ALBUTEROL SULFATE 3 DOSE: .5; 3 SOLUTION RESPIRATORY (INHALATION) at 17:40

## 2025-01-31 RX ADMIN — ACETAMINOPHEN 1000 MG: 500 TABLET ORAL at 18:14

## 2025-01-31 RX ADMIN — IOPAMIDOL 100 ML: 755 INJECTION, SOLUTION INTRAVENOUS at 19:05

## 2025-01-31 RX ADMIN — ONDANSETRON 4 MG: 2 INJECTION, SOLUTION INTRAMUSCULAR; INTRAVENOUS at 20:45

## 2025-01-31 RX ADMIN — SODIUM CHLORIDE 1000 ML: 9 INJECTION, SOLUTION INTRAVENOUS at 17:09

## 2025-01-31 RX ADMIN — CEFEPIME 2000 MG: 2 INJECTION, POWDER, FOR SOLUTION INTRAVENOUS at 17:29

## 2025-01-31 RX ADMIN — ASPIRIN 324 MG: 81 TABLET, CHEWABLE ORAL at 17:10

## 2025-01-31 ASSESSMENT — PAIN SCALES - GENERAL
PAINLEVEL_OUTOF10: 10
PAINLEVEL_OUTOF10: 9
PAINLEVEL_OUTOF10: 10
PAINLEVEL_OUTOF10: 9

## 2025-01-31 ASSESSMENT — PAIN DESCRIPTION - PAIN TYPE: TYPE: ACUTE PAIN

## 2025-01-31 ASSESSMENT — PAIN - FUNCTIONAL ASSESSMENT
PAIN_FUNCTIONAL_ASSESSMENT: ACTIVITIES ARE NOT PREVENTED
PAIN_FUNCTIONAL_ASSESSMENT: 0-10

## 2025-01-31 ASSESSMENT — PAIN DESCRIPTION - LOCATION
LOCATION: CHEST

## 2025-01-31 ASSESSMENT — PAIN DESCRIPTION - DESCRIPTORS
DESCRIPTORS: SHARP
DESCRIPTORS: SHARP

## 2025-01-31 ASSESSMENT — PAIN DESCRIPTION - ONSET: ONSET: ON-GOING

## 2025-01-31 ASSESSMENT — PAIN DESCRIPTION - FREQUENCY: FREQUENCY: CONTINUOUS

## 2025-01-31 ASSESSMENT — PAIN DESCRIPTION - ORIENTATION: ORIENTATION: MID;LEFT

## 2025-01-31 NOTE — CARE COORDINATION
Attempted phone call to Pt to follow up regarding housing. No answer, vm message left requesting return call, contact number provided.     NEELAM plan of care: SW will make next outreach attempt on 2/6 to follow up regarding housing.

## 2025-01-31 NOTE — ED PROVIDER NOTES
Monocytes Absolute 0.65 k/uL    Eosinophils Absolute 0.26 k/uL    Basophils Absolute 0.04 k/uL    Immature Granulocytes Absolute 0.03 k/uL   Comprehensive Metabolic Panel w/ Reflex to MG   Result Value Ref Range    Sodium 138 135 - 145 mmol/L    Potassium 4.3 3.5 - 5.1 mmol/L    Chloride 100 99 - 110 mmol/L    CO2 23 21 - 32 mmol/L    Anion Gap 15 4 - 16 mmol/L    Glucose 120 (H) 74 - 99 mg/dL    BUN 27 (H) 6 - 23 mg/dL    Creatinine 1.3 0.9 - 1.3 mg/dL    Est, Glom Filt Rate 59 (L) >60 mL/min/1.73m2    Calcium 8.8 8.3 - 10.6 mg/dL    Total Protein 6.7 6.4 - 8.2 g/dL    Albumin 3.8 3.4 - 5.0 g/dL    Albumin/Globulin Ratio 1.3 1.1 - 2.2    Total Bilirubin 0.7 0.0 - 1.0 mg/dL    Alkaline Phosphatase 85 40 - 129 U/L    ALT 18 10 - 40 U/L    AST 18 15 - 37 U/L   Troponin   Result Value Ref Range    Troponin, High Sensitivity 22 (H) 0 - 21 ng/L   D-Dimer Test   Result Value Ref Range    D-Dimer, Quant 1.67 (H) 0.00 - 0.46 ug/mL FEU   Troponin   Result Value Ref Range    Troponin, High Sensitivity 18 0 - 21 ng/L   Basic Metabolic Panel w/ Reflex to MG   Result Value Ref Range    Sodium 137 135 - 145 mmol/L    Potassium 4.1 3.5 - 5.1 mmol/L    Chloride 104 99 - 110 mmol/L    CO2 19 (L) 21 - 32 mmol/L    Anion Gap 14 4 - 16 mmol/L    Glucose 114 (H) 74 - 99 mg/dL    BUN 31 (H) 6 - 23 mg/dL    Creatinine 1.2 0.9 - 1.3 mg/dL    Est, Glom Filt Rate 65 >60 mL/min/1.73m2    Calcium 8.1 (L) 8.3 - 10.6 mg/dL   CBC with Auto Differential   Result Value Ref Range    WBC 7.6 4.0 - 10.5 k/uL    RBC 4.25 (L) 4.60 - 6.20 m/uL    Hemoglobin 14.6 13.5 - 18.0 g/dL    Hematocrit 43.8 42.0 - 52.0 %    .1 (H) 78.0 - 100.0 fL    MCH 34.4 (H) 27.0 - 31.0 pg    MCHC 33.3 32.0 - 36.0 g/dL    RDW 12.9 11.7 - 14.9 %    Platelets 160 140 - 440 k/uL    MPV 9.7 7.5 - 11.1 fL    Neutrophils % 79 (H) 36 - 66 %    Lymphocytes % 14 (L) 24 - 44 %    Monocytes % 5 (H) 0 - 4 %    Eosinophils % 1 0 - 3 %    Basophils % 0 0 - 1 %    Immature  Granulocytes % 0 0 %    Neutrophils Absolute 5.99 k/uL    Lymphocytes Absolute 1.05 k/uL    Monocytes Absolute 0.38 k/uL    Eosinophils Absolute 0.09 k/uL    Basophils Absolute 0.03 k/uL    Immature Granulocytes Absolute 0.02 k/uL   C-Reactive Protein   Result Value Ref Range    CRP 11.9 (H) 0.0 - 5.0 mg/L   Vitamin D 25 Hydroxy   Result Value Ref Range    Vit D, 25-Hydroxy 17.4 (L) 30 - 150 ng/mL   Magnesium   Result Value Ref Range    Magnesium 2.0 1.8 - 2.4 mg/dL   Troponin   Result Value Ref Range    Troponin, High Sensitivity 14 0 - 21 ng/L   Troponin   Result Value Ref Range    Troponin, High Sensitivity 18 0 - 21 ng/L   Basic Metabolic Panel w/ Reflex to MG   Result Value Ref Range    Sodium 141 135 - 145 mmol/L    Potassium 3.9 3.5 - 5.1 mmol/L    Chloride 105 99 - 110 mmol/L    CO2 23 21 - 32 mmol/L    Anion Gap 13 4 - 16 mmol/L    Glucose 131 (H) 74 - 99 mg/dL    BUN 30 (H) 6 - 23 mg/dL    Creatinine 1.1 0.9 - 1.3 mg/dL    Est, Glom Filt Rate 72 >60 mL/min/1.73m2    Calcium 8.7 8.3 - 10.6 mg/dL   CBC with Auto Differential   Result Value Ref Range    WBC 12.3 (H) 4.0 - 10.5 k/uL    RBC 4.10 (L) 4.60 - 6.20 m/uL    Hemoglobin 14.2 13.5 - 18.0 g/dL    Hematocrit 40.9 (L) 42.0 - 52.0 %    MCV 99.8 78.0 - 100.0 fL    MCH 34.6 (H) 27.0 - 31.0 pg    MCHC 34.7 32.0 - 36.0 g/dL    RDW 12.8 11.7 - 14.9 %    Platelets 174 140 - 440 k/uL    MPV 9.7 7.5 - 11.1 fL    Neutrophils % 78 (H) 36 - 66 %    Lymphocytes % 13 (L) 24 - 44 %    Monocytes % 8 (H) 0 - 4 %    Eosinophils % 1 0 - 3 %    Basophils % 0 0 - 1 %    Immature Granulocytes % 0 0 %    Neutrophils Absolute 9.58 k/uL    Lymphocytes Absolute 1.55 k/uL    Monocytes Absolute 1.01 k/uL    Eosinophils Absolute 0.09 k/uL    Basophils Absolute 0.02 k/uL    Immature Granulocytes Absolute 0.04 k/uL   C-Reactive Protein   Result Value Ref Range    CRP 6.8 (H) 0.0 - 5.0 mg/L   Procalcitonin   Result Value Ref Range    Procalcitonin 0.04 ng/mL   Basic Metabolic Panel w/

## 2025-02-01 LAB
25(OH)D3 SERPL-MCNC: 17.4 NG/ML (ref 30–150)
ANION GAP SERPL CALCULATED.3IONS-SCNC: 14 MMOL/L (ref 4–16)
BASOPHILS # BLD: 0.03 K/UL
BASOPHILS NFR BLD: 0 % (ref 0–1)
BUN SERPL-MCNC: 31 MG/DL (ref 6–23)
CALCIUM SERPL-MCNC: 8.1 MG/DL (ref 8.3–10.6)
CHLORIDE SERPL-SCNC: 104 MMOL/L (ref 99–110)
CO2 SERPL-SCNC: 19 MMOL/L (ref 21–32)
CREAT SERPL-MCNC: 1.2 MG/DL (ref 0.9–1.3)
CRP SERPL HS-MCNC: 11.9 MG/L (ref 0–5)
EOSINOPHIL # BLD: 0.09 K/UL
EOSINOPHILS RELATIVE PERCENT: 1 % (ref 0–3)
ERYTHROCYTE [DISTWIDTH] IN BLOOD BY AUTOMATED COUNT: 12.9 % (ref 11.7–14.9)
GFR, ESTIMATED: 65 ML/MIN/1.73M2
GLUCOSE SERPL-MCNC: 114 MG/DL (ref 74–99)
HCT VFR BLD AUTO: 43.8 % (ref 42–52)
HGB BLD-MCNC: 14.6 G/DL (ref 13.5–18)
IMM GRANULOCYTES # BLD AUTO: 0.02 K/UL
IMM GRANULOCYTES NFR BLD: 0 %
L PNEUMO1 AG UR QL IA.RAPID: NEGATIVE
LYMPHOCYTES NFR BLD: 1.05 K/UL
LYMPHOCYTES RELATIVE PERCENT: 14 % (ref 24–44)
MAGNESIUM SERPL-MCNC: 2 MG/DL (ref 1.8–2.4)
MCH RBC QN AUTO: 34.4 PG (ref 27–31)
MCHC RBC AUTO-ENTMCNC: 33.3 G/DL (ref 32–36)
MCV RBC AUTO: 103.1 FL (ref 78–100)
MONOCYTES NFR BLD: 0.38 K/UL
MONOCYTES NFR BLD: 5 % (ref 0–4)
NEUTROPHILS NFR BLD: 79 % (ref 36–66)
NEUTS SEG NFR BLD: 5.99 K/UL
PLATELET # BLD AUTO: 160 K/UL (ref 140–440)
PMV BLD AUTO: 9.7 FL (ref 7.5–11.1)
POTASSIUM SERPL-SCNC: 4.1 MMOL/L (ref 3.5–5.1)
RBC # BLD AUTO: 4.25 M/UL (ref 4.6–6.2)
S PNEUM AG SPEC QL: NEGATIVE
SODIUM SERPL-SCNC: 137 MMOL/L (ref 135–145)
SPECIMEN SOURCE: NORMAL
TROPONIN I SERPL HS-MCNC: 14 NG/L (ref 0–21)
WBC OTHER # BLD: 7.6 K/UL (ref 4–10.5)

## 2025-02-01 PROCEDURE — 86140 C-REACTIVE PROTEIN: CPT

## 2025-02-01 PROCEDURE — 94640 AIRWAY INHALATION TREATMENT: CPT

## 2025-02-01 PROCEDURE — 80048 BASIC METABOLIC PNL TOTAL CA: CPT

## 2025-02-01 PROCEDURE — 2500000003 HC RX 250 WO HCPCS: Performed by: FAMILY MEDICINE

## 2025-02-01 PROCEDURE — 85025 COMPLETE CBC W/AUTO DIFF WBC: CPT

## 2025-02-01 PROCEDURE — 84484 ASSAY OF TROPONIN QUANT: CPT

## 2025-02-01 PROCEDURE — 6360000002 HC RX W HCPCS: Performed by: FAMILY MEDICINE

## 2025-02-01 PROCEDURE — 1200000000 HC SEMI PRIVATE

## 2025-02-01 PROCEDURE — 2700000000 HC OXYGEN THERAPY PER DAY

## 2025-02-01 PROCEDURE — 87449 NOS EACH ORGANISM AG IA: CPT

## 2025-02-01 PROCEDURE — 36415 COLL VENOUS BLD VENIPUNCTURE: CPT

## 2025-02-01 PROCEDURE — 6370000000 HC RX 637 (ALT 250 FOR IP): Performed by: FAMILY MEDICINE

## 2025-02-01 PROCEDURE — 82306 VITAMIN D 25 HYDROXY: CPT

## 2025-02-01 PROCEDURE — 94761 N-INVAS EAR/PLS OXIMETRY MLT: CPT

## 2025-02-01 PROCEDURE — 87899 AGENT NOS ASSAY W/OPTIC: CPT

## 2025-02-01 PROCEDURE — 83735 ASSAY OF MAGNESIUM: CPT

## 2025-02-01 RX ADMIN — BUDESONIDE AND FORMOTEROL FUMARATE DIHYDRATE 2 PUFF: 160; 4.5 AEROSOL RESPIRATORY (INHALATION) at 20:08

## 2025-02-01 RX ADMIN — ATORVASTATIN CALCIUM 80 MG: 80 TABLET, FILM COATED ORAL at 22:25

## 2025-02-01 RX ADMIN — ISOSORBIDE MONONITRATE 60 MG: 60 TABLET, EXTENDED RELEASE ORAL at 10:22

## 2025-02-01 RX ADMIN — SODIUM CHLORIDE, PRESERVATIVE FREE 10 ML: 5 INJECTION INTRAVENOUS at 10:39

## 2025-02-01 RX ADMIN — ACETAMINOPHEN 650 MG: 325 TABLET ORAL at 01:10

## 2025-02-01 RX ADMIN — ASPIRIN 81 MG: 81 TABLET, COATED ORAL at 10:22

## 2025-02-01 RX ADMIN — Medication 3 MG: at 01:10

## 2025-02-01 RX ADMIN — Medication 3 MG: at 22:25

## 2025-02-01 RX ADMIN — RANOLAZINE 500 MG: 500 TABLET, FILM COATED, EXTENDED RELEASE ORAL at 10:22

## 2025-02-01 RX ADMIN — ACETAMINOPHEN 650 MG: 325 TABLET ORAL at 22:25

## 2025-02-01 RX ADMIN — FAMOTIDINE 20 MG: 20 TABLET, FILM COATED ORAL at 10:22

## 2025-02-01 RX ADMIN — Medication 1 TABLET: at 10:22

## 2025-02-01 RX ADMIN — DEXAMETHASONE 6 MG: 2 TABLET ORAL at 01:10

## 2025-02-01 RX ADMIN — SODIUM CHLORIDE, PRESERVATIVE FREE 10 ML: 5 INJECTION INTRAVENOUS at 22:28

## 2025-02-01 RX ADMIN — ENOXAPARIN SODIUM 40 MG: 100 INJECTION SUBCUTANEOUS at 10:22

## 2025-02-01 RX ADMIN — FUROSEMIDE 40 MG: 40 TABLET ORAL at 17:44

## 2025-02-01 RX ADMIN — CARVEDILOL 3.12 MG: 3.12 TABLET, FILM COATED ORAL at 17:44

## 2025-02-01 RX ADMIN — FUROSEMIDE 40 MG: 40 TABLET ORAL at 10:22

## 2025-02-01 RX ADMIN — FINASTERIDE 5 MG: 5 TABLET, FILM COATED ORAL at 10:22

## 2025-02-01 RX ADMIN — RANOLAZINE 500 MG: 500 TABLET, FILM COATED, EXTENDED RELEASE ORAL at 22:25

## 2025-02-01 RX ADMIN — CARVEDILOL 3.12 MG: 3.12 TABLET, FILM COATED ORAL at 10:39

## 2025-02-01 RX ADMIN — DULOXETINE 60 MG: 60 CAPSULE, DELAYED RELEASE ORAL at 10:22

## 2025-02-01 RX ADMIN — SODIUM CHLORIDE, PRESERVATIVE FREE 10 ML: 5 INJECTION INTRAVENOUS at 22:30

## 2025-02-01 RX ADMIN — CHOLECALCIFEROL TAB 25 MCG (1000 UNIT) 2000 UNITS: 25 TAB at 10:22

## 2025-02-01 RX ADMIN — BUDESONIDE AND FORMOTEROL FUMARATE DIHYDRATE 2 PUFF: 160; 4.5 AEROSOL RESPIRATORY (INHALATION) at 07:47

## 2025-02-01 ASSESSMENT — PAIN DESCRIPTION - ONSET: ONSET: ON-GOING

## 2025-02-01 ASSESSMENT — PAIN DESCRIPTION - PAIN TYPE
TYPE: ACUTE PAIN
TYPE: ACUTE PAIN

## 2025-02-01 ASSESSMENT — PAIN DESCRIPTION - FREQUENCY: FREQUENCY: CONTINUOUS

## 2025-02-01 ASSESSMENT — PAIN SCALES - GENERAL
PAINLEVEL_OUTOF10: 9
PAINLEVEL_OUTOF10: 6
PAINLEVEL_OUTOF10: 0

## 2025-02-01 ASSESSMENT — PAIN DESCRIPTION - LOCATION
LOCATION: CHEST;HEAD
LOCATION: CHEST

## 2025-02-01 ASSESSMENT — PAIN DESCRIPTION - DESCRIPTORS
DESCRIPTORS: ACHING
DESCRIPTORS: SHARP

## 2025-02-01 ASSESSMENT — PAIN DESCRIPTION - ORIENTATION
ORIENTATION: MID
ORIENTATION: MID

## 2025-02-01 NOTE — H&P
V2.0  History and Physical      Name:  Keyur Adrian /Age/Sex: 1955  (69 y.o. male)   MRN & CSN:  [id number] & [id number] Encounter Date/Time: 2025 9:54 PM EST   Location:   PCP: Wili High MD       Hospital Day: 1    Assessment and Plan:   Keyur Adrian is a 69 y.o. male with a pmh of COPD, systolic heart failure, and CAD who presents with Hypotension, Covid 19, and viral pneumonia.    Hospital Problems             Last Modified POA    * (Principal) Hypotension 2025 Yes       Plan:  Hypotension  -Intermittent problem x several weeks. Also hospitalized for this at [facility] earlier this month.   -Carvedilol was increased upon discharge on 25. Will resume lower 3.125mg dosage when appropriate.   -Hold home lisinopril. Will resume when appropriate  -Up with assistance only  -Cardiac monitoring    Covid 19  -Symptoms began 3 days prior to arrival at hospital  -Saturations remain above 90% on RA  -Will place in isolation  -Dexamethasone daily x 10 days  -Vitamin D x 7 days  -Supportive care  -CRP in am    Viral Pneumonia  -CT chest shows bilateral ground glass opacities consistent with Covid 19 pneumonia  -WBC's normal at 7.8  -Pt afebrile  -Procal 0.04  -Will check urinary antigens and hold off on ATB's at this time due to likely viral cause of PNA at this time.   -Trend CBC daily    CAD  -S/P CABG x3  -Continue atorvastatin, ASA daily    Systolic Heart failure with reduced EF  -EF 50-55% on 25  -pBNP 3,143  -Appears euvolemic on exam  -Continue furosemide, coreg (lower dosage) in am. Will resume lisinopril when appropriate  -DMITRI diet    Chest pain  -Musculoskeletal. Reproducible  -Continue Isosorbide mononitrate and Ranexa    Hypothyroidism  -Continue levothyroxine  -TSH 22.840 on 24 and levothyroxine was increased to 200mcg at that time    HTN  -Lisinopril on hold at this time in the setting of hypotension    GERD  -continue famotidine daily    BPH  -Continue Tamsulosin and  the spoken word can be misinterpreted by the technology leading to omissions or inappropriate words, phrases or sentences.  Dictated and Electronically Signed By: Josefina Farr DO 1/31/2025 19:54        XR CHEST PORTABLE    Result Date: 1/31/2025  EXAMINATION: XR CHEST PORTABLE DATE OF EXAM:  1/31/2025 18:28 DEMOGRAPHICS: 69 years old Male INDICATION: Chest pain, shortness of breath COMPARISON: 1/12/2025 TECHNIQUE: Single AP portable chest radiograph was obtained. FINDINGS: A prior CABG is seen. The cardiomediastinal silhouette is enlarged and  is unchanged. Diffuse prominent reticular interstitial lung markings are seen once again and are unchanged. No focal consolidation or discrete pleural effusion is seen. The osseous and soft tissue structures are without acute abnormality. IMPRESSION: 1.  No evidence for acute disease 2.  Diffuse prominent reticular interstitial disease seen which may be chronic. This dictation was created with voice recognition software.  While attempts have  been made to review the dictation as it is transcribed, on occasion the spoken word can be misinterpreted by the technology leading to omissions or inappropriate words, phrases or sentences.  Dictated and Electronically Signed By: Jaron Lew MD 1/31/2025 17:54            Electronically signed by Andrea Naegele, APRN - CNP on 1/31/2025 at 9:54 PM

## 2025-02-01 NOTE — PROGRESS NOTES
Called [facility] pharmacy and verified home medications with pharmacist. Updated home medication list. Provider notified.

## 2025-02-01 NOTE — PROGRESS NOTES
V2.0    [facility]      Name:  Keyur Adrian /Age/Sex: 1955  (69 y.o. male)   MRN & CSN:  [id number] & [id number] Encounter Date/Time: 2025 3:59 PM EST   Location:   PCP: Wili High MD     Attending:Karlie Bhakta MD       Hospital Day: 2    Assessment and Recommendations   Keyur Adrian is a 69 y.o. male who presents with Hypotension      Plan:   Hypotension, resolved.  Apparently patient was hospitalized previously at [facility] in January, discharged 2025 Coreg was increased to 6.25 twice daily, Coreg has now been reduced to his original dose of 3.125 daily.  Lisinopril is on hold.  Continue telemetry  COVID-19, symptoms began 3 days prior to arrival at the hospital, continue dexamethasone and supportive care  Oxygen, patient requested oxygen for comfort.  Viral pneumonia, CT chest shows bilateral groundglass opacities consistent with COVID-19 pneumonia, patient is afebrile, procalcitonin 0.04 urinary antigens are negative.  Continue to monitor CBC.  Chronic diastolic heart failure with an EF of 50 to 55% on echocardiogram completed 2025.  BNP was 3143, appears euvolemic on exam.  Continue Lasix, reduced dose Coreg.  Resume lisinopril when appropriate  History of CAD status post CABG x 3 continue aspirin and atorvastatin  Reproducible musculoskeletal chest pain, continue isosorbide mononitrate and Ranexa  Hypothyroidism, continue levothyroxine  GERD continue Pepcid  BPH continue finasteride  Hypertension resume lisinopril        Diet ADULT DIET; Regular; No Added Salt (3-4 gm)   DVT Prophylaxis [] Lovenox, []  Heparin, [] SCDs, [] Ambulation,  [] Eliquis, [] Xarelto  [] Coumadin   Code Status Full Code   Disposition From: Home  Expected Disposition: Home  Estimated Date of Discharge: 24 hours  Patient requires continued admission due to requiring oxygen   Surrogate Decision Maker/ POA No one listed     Personally reviewed Lab Studies and Imaging     Discussed management

## 2025-02-01 NOTE — PROGRESS NOTES
This RN has reviewed and agrees with TIM Barba LPN's data collection and has collaborated with this LPN regarding the patient's care plan.

## 2025-02-01 NOTE — PROGRESS NOTES
4 Eyes Skin Assessment     NAME:  Keyur Adrian  YOB: 1955  MEDICAL RECORD NUMBER:  [id number]    The patient is being assessed for  Admission    I agree that at least one RN has performed a thorough Head to Toe Skin Assessment on the patient. ALL assessment sites listed below have been assessed.      Areas assessed by both nurses:    Head, Face, Ears, Shoulders, Back, Chest, Arms, Elbows, Hands, Sacrum. Buttock, Coccyx, Ischium, Legs. Feet and Heels, and Under Medical Devices         Does the Patient have a Wound? No noted wound(s)       Arnold Prevention initiated by RN: No  Wound Care Orders initiated by RN: No    Pressure Injury (Stage 3,4, Unstageable, DTI, NWPT, and Complex wounds) if present, place Wound referral order by RN under : No    New Ostomies, if present place, Ostomy referral order under : No     Nurse 1 eSignature: Electronically signed by Nicole Rodriguez RN on 2/1/25 at 2:16 AM EST    **SHARE this note so that the co-signing nurse can place an eSignature**    Nurse 2 eSignature: Electronically signed by Elsi Holly RN on 2/1/25 at 2:34 AM EST

## 2025-02-01 NOTE — ED PROVIDER NOTES
Patient handed off to me by colleague Dr. Mancilla pending CT PE scan.  Patient presented emergency department complaint of chest pain for 4 days midsternal shortness of breath cough.  Patient increased troponin history of heart disease CABG on Lasix getting IV fluids because initial hypotensive and is COVID-positive.  CT PE scan negative for any pulmonary embolus some groundglass opacities in the lower lung bases.  Patient updated on these findings repeat troponin is negative.  Patient states he still having some chest pain ordered fentanyl 50 mcg IV Zofran 4 mg IV.  Have ordered repeat EKG.  Hospitalist has been consulted for admission    BP (!) 113/57   Pulse 57   Temp 97.6 °F (36.4 °C) (Oral)   Resp 24   Ht 1.778 m (5' 10\")   Wt 89.4 kg (197 lb)   SpO2 92%   BMI 28.27 kg/m²     Medications   fentaNYL (SUBLIMAZE) injection 50 mcg (has no administration in time range)   ondansetron (ZOFRAN) injection 4 mg (has no administration in time range)   sodium chloride 0.9 % bolus 1,000 mL (0 mLs IntraVENous Stopped 1/31/25 1815)   aspirin chewable tablet 324 mg (324 mg Oral Given 1/31/25 1710)   ipratropium 0.5 mg-albuterol 2.5 mg (DUONEB) nebulizer solution 3 Dose (3 Doses Inhalation Given 1/31/25 1740)   ceFEPIme (MAXIPIME) 2,000 mg in sodium chloride 0.9 % 100 mL IVPB (mini-bag) (0 mg IntraVENous Stopped 1/31/25 1815)   vancomycin (VANCOCIN) 1750 mg in 350 mL IVPB (1,750 mg IntraVENous New Bag 1/31/25 1729)   acetaminophen (TYLENOL) tablet 1,000 mg (1,000 mg Oral Given 1/31/25 1814)   ketorolac (TORADOL) injection 15 mg (15 mg IntraVENous Given 1/31/25 1814)   iopamidol (ISOVUE-370) 76 % injection 100 mL (100 mLs IntraVENous Given 1/31/25 1905)         Labs Reviewed   RESPIRATORY PANEL, MOLECULAR, WITH COVID-19 - Abnormal; Notable for the following components:       Result Value    SARS-CoV-2, PCR DETECTED (*)     All other components within normal limits   BRAIN NATRIURETIC PEPTIDE - Abnormal; Notable for the

## 2025-02-02 LAB
ANION GAP SERPL CALCULATED.3IONS-SCNC: 13 MMOL/L (ref 4–16)
BASOPHILS # BLD: 0.02 K/UL
BASOPHILS NFR BLD: 0 % (ref 0–1)
BUN SERPL-MCNC: 30 MG/DL (ref 6–23)
CALCIUM SERPL-MCNC: 8.7 MG/DL (ref 8.3–10.6)
CHLORIDE SERPL-SCNC: 105 MMOL/L (ref 99–110)
CO2 SERPL-SCNC: 23 MMOL/L (ref 21–32)
CREAT SERPL-MCNC: 1.1 MG/DL (ref 0.9–1.3)
CRP SERPL HS-MCNC: 6.8 MG/L (ref 0–5)
EOSINOPHIL # BLD: 0.09 K/UL
EOSINOPHILS RELATIVE PERCENT: 1 % (ref 0–3)
ERYTHROCYTE [DISTWIDTH] IN BLOOD BY AUTOMATED COUNT: 12.8 % (ref 11.7–14.9)
GFR, ESTIMATED: 72 ML/MIN/1.73M2
GLUCOSE SERPL-MCNC: 131 MG/DL (ref 74–99)
HCT VFR BLD AUTO: 40.9 % (ref 42–52)
HGB BLD-MCNC: 14.2 G/DL (ref 13.5–18)
IMM GRANULOCYTES # BLD AUTO: 0.04 K/UL
IMM GRANULOCYTES NFR BLD: 0 %
LYMPHOCYTES NFR BLD: 1.55 K/UL
LYMPHOCYTES RELATIVE PERCENT: 13 % (ref 24–44)
MCH RBC QN AUTO: 34.6 PG (ref 27–31)
MCHC RBC AUTO-ENTMCNC: 34.7 G/DL (ref 32–36)
MCV RBC AUTO: 99.8 FL (ref 78–100)
MONOCYTES NFR BLD: 1.01 K/UL
MONOCYTES NFR BLD: 8 % (ref 0–4)
NEUTROPHILS NFR BLD: 78 % (ref 36–66)
NEUTS SEG NFR BLD: 9.58 K/UL
PLATELET # BLD AUTO: 174 K/UL (ref 140–440)
PMV BLD AUTO: 9.7 FL (ref 7.5–11.1)
POTASSIUM SERPL-SCNC: 3.9 MMOL/L (ref 3.5–5.1)
PROCALCITONIN SERPL-MCNC: 0.04 NG/ML
RBC # BLD AUTO: 4.1 M/UL (ref 4.6–6.2)
SODIUM SERPL-SCNC: 141 MMOL/L (ref 135–145)
TROPONIN I SERPL HS-MCNC: 18 NG/L (ref 0–21)
WBC OTHER # BLD: 12.3 K/UL (ref 4–10.5)

## 2025-02-02 PROCEDURE — 87205 SMEAR GRAM STAIN: CPT

## 2025-02-02 PROCEDURE — 84145 PROCALCITONIN (PCT): CPT

## 2025-02-02 PROCEDURE — 6360000002 HC RX W HCPCS: Performed by: FAMILY MEDICINE

## 2025-02-02 PROCEDURE — 1200000000 HC SEMI PRIVATE

## 2025-02-02 PROCEDURE — 85025 COMPLETE CBC W/AUTO DIFF WBC: CPT

## 2025-02-02 PROCEDURE — 86140 C-REACTIVE PROTEIN: CPT

## 2025-02-02 PROCEDURE — 2700000000 HC OXYGEN THERAPY PER DAY

## 2025-02-02 PROCEDURE — 2500000003 HC RX 250 WO HCPCS: Performed by: FAMILY MEDICINE

## 2025-02-02 PROCEDURE — 94640 AIRWAY INHALATION TREATMENT: CPT

## 2025-02-02 PROCEDURE — 6370000000 HC RX 637 (ALT 250 FOR IP): Performed by: FAMILY MEDICINE

## 2025-02-02 PROCEDURE — 80048 BASIC METABOLIC PNL TOTAL CA: CPT

## 2025-02-02 PROCEDURE — 87070 CULTURE OTHR SPECIMN AEROBIC: CPT

## 2025-02-02 PROCEDURE — 36415 COLL VENOUS BLD VENIPUNCTURE: CPT

## 2025-02-02 RX ADMIN — ACETAMINOPHEN 650 MG: 325 TABLET ORAL at 21:07

## 2025-02-02 RX ADMIN — ACETAMINOPHEN 650 MG: 325 TABLET ORAL at 11:07

## 2025-02-02 RX ADMIN — RANOLAZINE 500 MG: 500 TABLET, FILM COATED, EXTENDED RELEASE ORAL at 21:07

## 2025-02-02 RX ADMIN — RANOLAZINE 500 MG: 500 TABLET, FILM COATED, EXTENDED RELEASE ORAL at 11:08

## 2025-02-02 RX ADMIN — CARVEDILOL 3.12 MG: 3.12 TABLET, FILM COATED ORAL at 11:11

## 2025-02-02 RX ADMIN — FUROSEMIDE 40 MG: 40 TABLET ORAL at 17:24

## 2025-02-02 RX ADMIN — Medication 1 TABLET: at 11:08

## 2025-02-02 RX ADMIN — CARVEDILOL 3.12 MG: 3.12 TABLET, FILM COATED ORAL at 17:24

## 2025-02-02 RX ADMIN — ENOXAPARIN SODIUM 40 MG: 100 INJECTION SUBCUTANEOUS at 11:08

## 2025-02-02 RX ADMIN — FUROSEMIDE 40 MG: 40 TABLET ORAL at 11:08

## 2025-02-02 RX ADMIN — ATORVASTATIN CALCIUM 80 MG: 80 TABLET, FILM COATED ORAL at 21:07

## 2025-02-02 RX ADMIN — BUDESONIDE AND FORMOTEROL FUMARATE DIHYDRATE 2 PUFF: 160; 4.5 AEROSOL RESPIRATORY (INHALATION) at 19:15

## 2025-02-02 RX ADMIN — CHOLECALCIFEROL TAB 25 MCG (1000 UNIT) 2000 UNITS: 25 TAB at 11:08

## 2025-02-02 RX ADMIN — DULOXETINE 60 MG: 60 CAPSULE, DELAYED RELEASE ORAL at 11:08

## 2025-02-02 RX ADMIN — LEVOTHYROXINE SODIUM 200 MCG: 0.1 TABLET ORAL at 05:31

## 2025-02-02 RX ADMIN — ASPIRIN 81 MG: 81 TABLET, COATED ORAL at 11:08

## 2025-02-02 RX ADMIN — Medication 3 MG: at 21:07

## 2025-02-02 RX ADMIN — BUDESONIDE AND FORMOTEROL FUMARATE DIHYDRATE 2 PUFF: 160; 4.5 AEROSOL RESPIRATORY (INHALATION) at 08:14

## 2025-02-02 RX ADMIN — SODIUM CHLORIDE, PRESERVATIVE FREE 10 ML: 5 INJECTION INTRAVENOUS at 21:09

## 2025-02-02 RX ADMIN — DEXAMETHASONE 6 MG: 2 TABLET ORAL at 11:07

## 2025-02-02 RX ADMIN — GUAIFENESIN SYRUP AND DEXTROMETHORPHAN 5 ML: 100; 10 SYRUP ORAL at 21:07

## 2025-02-02 RX ADMIN — FINASTERIDE 5 MG: 5 TABLET, FILM COATED ORAL at 11:08

## 2025-02-02 RX ADMIN — FAMOTIDINE 20 MG: 20 TABLET, FILM COATED ORAL at 11:08

## 2025-02-02 RX ADMIN — ISOSORBIDE MONONITRATE 60 MG: 60 TABLET, EXTENDED RELEASE ORAL at 11:08

## 2025-02-02 ASSESSMENT — PAIN - FUNCTIONAL ASSESSMENT: PAIN_FUNCTIONAL_ASSESSMENT: ACTIVITIES ARE NOT PREVENTED

## 2025-02-02 ASSESSMENT — PAIN DESCRIPTION - ONSET: ONSET: ON-GOING

## 2025-02-02 ASSESSMENT — PAIN SCALES - WONG BAKER
WONGBAKER_NUMERICALRESPONSE: 0;2

## 2025-02-02 ASSESSMENT — PAIN DESCRIPTION - LOCATION
LOCATION: CHEST
LOCATION: HEAD;CHEST
LOCATION: CHEST;HEAD

## 2025-02-02 ASSESSMENT — PAIN DESCRIPTION - PAIN TYPE: TYPE: ACUTE PAIN

## 2025-02-02 ASSESSMENT — PAIN SCALES - GENERAL
PAINLEVEL_OUTOF10: 8
PAINLEVEL_OUTOF10: 5
PAINLEVEL_OUTOF10: 6
PAINLEVEL_OUTOF10: 6
PAINLEVEL_OUTOF10: 8
PAINLEVEL_OUTOF10: 3
PAINLEVEL_OUTOF10: 8

## 2025-02-02 ASSESSMENT — PAIN DESCRIPTION - DESCRIPTORS
DESCRIPTORS: ACHING;DISCOMFORT;NAGGING
DESCRIPTORS: SORE;DISCOMFORT;OTHER (COMMENT)
DESCRIPTORS: ACHING

## 2025-02-02 ASSESSMENT — PAIN DESCRIPTION - ORIENTATION
ORIENTATION: MID
ORIENTATION: MID

## 2025-02-02 ASSESSMENT — PAIN DESCRIPTION - FREQUENCY: FREQUENCY: INTERMITTENT

## 2025-02-02 NOTE — PLAN OF CARE
Problem: Chronic Conditions and Co-morbidities  Goal: Patient's chronic conditions and co-morbidity symptoms are monitored and maintained or improved  2/1/2025 2330 by Shirin Gallardo LPN  Outcome: Progressing  2/1/2025 1817 by Geovanni Travis RN  Outcome: Progressing     Problem: Pain  Goal: Verbalizes/displays adequate comfort level or baseline comfort level  2/1/2025 2330 by Shirin Gallardo LPN  Outcome: Progressing  2/1/2025 1817 by Geovanni Travis RN  Outcome: Progressing     Problem: Safety - Adult  Goal: Free from fall injury  2/1/2025 2330 by Shirin Gallardo LPN  Outcome: Progressing  2/1/2025 1817 by Geovanni Travis RN  Outcome: Progressing

## 2025-02-02 NOTE — PROGRESS NOTES
V2.0    [facility] [place]      Name:  Keyur Adrian /Age/Sex: 1955  (69 y.o. male)   MRN & CSN:  [id number] & [id number] Encounter Date/Time: 2025 3:59 PM EST   Location:   PCP: Wili High MD     Attending:Karlie Bhakta MD       Hospital Day: 3    Assessment and Recommendations   Keyur Adrian is a 69 y.o. male who presents with Hypotension      Plan:   Hypotension, resolved/improved.  Apparently patient was hospitalized previously at [facility] in January, discharged 2025 Coreg was increased to 6.25 twice daily, Coreg has now been reduced to his original dose of 3.125 daily.  Lisinopril is on hold.  Continue telemetry  Dizziness, will check MRI tomorrow a.m. rule out CVA.  COVID-19, symptoms began 3 days prior to arrival at the hospital, continue dexamethasone and supportive care  Oxygen, patient requested oxygen for comfort.  He is currently on 3 L nasal cannula.  Will need to get room air sat.  Viral pneumonia, CT chest shows bilateral groundglass opacities consistent with COVID-19 pneumonia, patient is afebrile, procalcitonin 0.04 urinary antigens are negative.  Continue to monitor CBC.  Chronic diastolic heart failure with an EF of 50 to 55% on echocardiogram completed 2025.  BNP was 3143, appears euvolemic on exam.  Continue Lasix, reduced dose Coreg.  Resume lisinopril when appropriate  History of CAD status post CABG x 3 continue aspirin and atorvastatin, Ranexa, Imdur   Reproducible musculoskeletal chest pain, continue isosorbide mononitrate and Ranexa  Hypothyroidism, continue levothyroxine  GERD continue Pepcid  BPH continue finasteride  Hypertension resume lisinopril        Diet ADULT DIET; Regular; No Added Salt (3-4 gm)   DVT Prophylaxis [x] Lovenox, []  Heparin, [] SCDs, [] Ambulation,  [] Eliquis, [] Xarelto  [] Coumadin   Code Status Full Code   Disposition From: Home  Expected Disposition: Home  Estimated Date of Discharge: 24 hours  Patient requires continued  contrasted appearance and are normal size and contour.   Atherosclerotic and coronary artery calcifications are present. Heart: The heart size is normal.  There is no evidence for pericardial effusion.  Mediastinum: No pathologically enlarged mediastinal lymph nodes are seen. No focal mass lesions are identified. No hiatal hernia. Lungs: Redemonstration of emphysematous changes, bibasilar atelectasis versus scarring and scattered groundglass opacifications, predominantly in the dependent portions of the lungs Pleura: There is no pleural effusion or focal pleural thickening. Tracheobronchial tree: The tracheobronchial tree is clear. No focal abnormalities are seen. Upper abdomen: The visualized upper abdomen is grossly unremarkable. Bones: No significant bony abnormalities are noted. Stable median sternotomy changes Chest wall: The chest wall and axilla are within normal limits. IMPRESSION: 1.  No evidence of pulmonary embolism. 2. Redemonstration of prominent emphysematous changes. Areas of opacification are slightly more prominent than on comparison no primarily dependent. Again these may reflect atelectasis, scarring, chronic changes, infectious process or edema. This dictation was created with voice recognition software.  While attempts have  been made to review the dictation as it is transcribed, on occasion the spoken word can be misinterpreted by the technology leading to omissions or inappropriate words, phrases or sentences.  Dictated and Electronically Signed By: Josefina Farr DO 1/31/2025 19:54        XR CHEST PORTABLE    Result Date: 1/31/2025  EXAMINATION: XR CHEST PORTABLE DATE OF EXAM:  1/31/2025 18:28 DEMOGRAPHICS: 69 years old Male INDICATION: Chest pain, shortness of breath COMPARISON: 1/12/2025 TECHNIQUE: Single AP portable chest radiograph was obtained. FINDINGS: A prior CABG is seen. The cardiomediastinal silhouette is enlarged and  is unchanged. Diffuse prominent reticular interstitial lung

## 2025-02-03 ENCOUNTER — APPOINTMENT (OUTPATIENT)
Dept: MRI IMAGING | Age: 70
DRG: 177 | End: 2025-02-03
Payer: MEDICARE

## 2025-02-03 LAB
ANION GAP SERPL CALCULATED.3IONS-SCNC: 13 MMOL/L (ref 4–16)
BASOPHILS # BLD: 0.01 K/UL
BASOPHILS NFR BLD: 0 % (ref 0–1)
BUN SERPL-MCNC: 25 MG/DL (ref 6–23)
CALCIUM SERPL-MCNC: 8.9 MG/DL (ref 8.3–10.6)
CHLORIDE SERPL-SCNC: 100 MMOL/L (ref 99–110)
CO2 SERPL-SCNC: 24 MMOL/L (ref 21–32)
CREAT SERPL-MCNC: 0.9 MG/DL (ref 0.9–1.3)
CRP SERPL HS-MCNC: 3.2 MG/L (ref 0–5)
EOSINOPHIL # BLD: 0.02 K/UL
EOSINOPHILS RELATIVE PERCENT: 0 % (ref 0–3)
ERYTHROCYTE [DISTWIDTH] IN BLOOD BY AUTOMATED COUNT: 12.4 % (ref 11.7–14.9)
GFR, ESTIMATED: >90 ML/MIN/1.73M2
GLUCOSE SERPL-MCNC: 120 MG/DL (ref 74–99)
HCT VFR BLD AUTO: 43.1 % (ref 42–52)
HGB BLD-MCNC: 14.9 G/DL (ref 13.5–18)
IMM GRANULOCYTES # BLD AUTO: 0.03 K/UL
IMM GRANULOCYTES NFR BLD: 0 %
LYMPHOCYTES NFR BLD: 0.98 K/UL
LYMPHOCYTES RELATIVE PERCENT: 9 % (ref 24–44)
MCH RBC QN AUTO: 34.3 PG (ref 27–31)
MCHC RBC AUTO-ENTMCNC: 34.6 G/DL (ref 32–36)
MCV RBC AUTO: 99.3 FL (ref 78–100)
MONOCYTES NFR BLD: 0.87 K/UL
MONOCYTES NFR BLD: 8 % (ref 0–4)
NEUTROPHILS NFR BLD: 82 % (ref 36–66)
NEUTS SEG NFR BLD: 8.83 K/UL
PLATELET # BLD AUTO: 179 K/UL (ref 140–440)
PMV BLD AUTO: 9.5 FL (ref 7.5–11.1)
POTASSIUM SERPL-SCNC: 4.1 MMOL/L (ref 3.5–5.1)
RBC # BLD AUTO: 4.34 M/UL (ref 4.6–6.2)
SODIUM SERPL-SCNC: 137 MMOL/L (ref 135–145)
TROPONIN I SERPL HS-MCNC: 15 NG/L (ref 0–21)
WBC OTHER # BLD: 10.7 K/UL (ref 4–10.5)

## 2025-02-03 PROCEDURE — 80048 BASIC METABOLIC PNL TOTAL CA: CPT

## 2025-02-03 PROCEDURE — 97166 OT EVAL MOD COMPLEX 45 MIN: CPT

## 2025-02-03 PROCEDURE — 1200000000 HC SEMI PRIVATE

## 2025-02-03 PROCEDURE — 70551 MRI BRAIN STEM W/O DYE: CPT

## 2025-02-03 PROCEDURE — 6360000002 HC RX W HCPCS: Performed by: FAMILY MEDICINE

## 2025-02-03 PROCEDURE — 86140 C-REACTIVE PROTEIN: CPT

## 2025-02-03 PROCEDURE — 84484 ASSAY OF TROPONIN QUANT: CPT

## 2025-02-03 PROCEDURE — 6360000002 HC RX W HCPCS: Performed by: NURSE PRACTITIONER

## 2025-02-03 PROCEDURE — 2500000003 HC RX 250 WO HCPCS: Performed by: FAMILY MEDICINE

## 2025-02-03 PROCEDURE — 97535 SELF CARE MNGMENT TRAINING: CPT

## 2025-02-03 PROCEDURE — 85025 COMPLETE CBC W/AUTO DIFF WBC: CPT

## 2025-02-03 PROCEDURE — 6370000000 HC RX 637 (ALT 250 FOR IP): Performed by: FAMILY MEDICINE

## 2025-02-03 PROCEDURE — 97162 PT EVAL MOD COMPLEX 30 MIN: CPT

## 2025-02-03 PROCEDURE — 94761 N-INVAS EAR/PLS OXIMETRY MLT: CPT

## 2025-02-03 PROCEDURE — 94640 AIRWAY INHALATION TREATMENT: CPT

## 2025-02-03 PROCEDURE — 36415 COLL VENOUS BLD VENIPUNCTURE: CPT

## 2025-02-03 RX ORDER — GUAIFENESIN/DEXTROMETHORPHAN 100-10MG/5
5 SYRUP ORAL EVERY 4 HOURS PRN
Qty: 120 ML | Refills: 0 | OUTPATIENT
Start: 2025-02-03 | End: 2025-02-13

## 2025-02-03 RX ORDER — ALBUTEROL SULFATE 90 UG/1
2 INHALANT RESPIRATORY (INHALATION) EVERY 4 HOURS PRN
Qty: 18 G | Refills: 3 | OUTPATIENT
Start: 2025-02-03

## 2025-02-03 RX ORDER — DEXAMETHASONE 6 MG/1
6 TABLET ORAL DAILY
Qty: 7 TABLET | Refills: 0 | OUTPATIENT
Start: 2025-02-04 | End: 2025-02-11

## 2025-02-03 RX ORDER — LORAZEPAM 2 MG/ML
1 INJECTION INTRAMUSCULAR ONCE
Status: COMPLETED | OUTPATIENT
Start: 2025-02-03 | End: 2025-02-03

## 2025-02-03 RX ORDER — CHOLECALCIFEROL (VITAMIN D3) 50 MCG
2000 TABLET ORAL DAILY
Qty: 4 TABLET | Refills: 0 | OUTPATIENT
Start: 2025-02-04 | End: 2025-02-08

## 2025-02-03 RX ADMIN — GUAIFENESIN SYRUP AND DEXTROMETHORPHAN 5 ML: 100; 10 SYRUP ORAL at 05:58

## 2025-02-03 RX ADMIN — SODIUM CHLORIDE, PRESERVATIVE FREE 10 ML: 5 INJECTION INTRAVENOUS at 09:41

## 2025-02-03 RX ADMIN — GUAIFENESIN SYRUP AND DEXTROMETHORPHAN 5 ML: 100; 10 SYRUP ORAL at 21:20

## 2025-02-03 RX ADMIN — ASPIRIN 81 MG: 81 TABLET, COATED ORAL at 09:37

## 2025-02-03 RX ADMIN — CARVEDILOL 3.12 MG: 3.12 TABLET, FILM COATED ORAL at 18:33

## 2025-02-03 RX ADMIN — RANOLAZINE 500 MG: 500 TABLET, FILM COATED, EXTENDED RELEASE ORAL at 21:17

## 2025-02-03 RX ADMIN — LEVOTHYROXINE SODIUM 200 MCG: 0.1 TABLET ORAL at 05:55

## 2025-02-03 RX ADMIN — ENOXAPARIN SODIUM 40 MG: 100 INJECTION SUBCUTANEOUS at 09:34

## 2025-02-03 RX ADMIN — RANOLAZINE 500 MG: 500 TABLET, FILM COATED, EXTENDED RELEASE ORAL at 09:35

## 2025-02-03 RX ADMIN — FUROSEMIDE 40 MG: 40 TABLET ORAL at 09:39

## 2025-02-03 RX ADMIN — BUDESONIDE AND FORMOTEROL FUMARATE DIHYDRATE 2 PUFF: 160; 4.5 AEROSOL RESPIRATORY (INHALATION) at 20:03

## 2025-02-03 RX ADMIN — ATORVASTATIN CALCIUM 80 MG: 80 TABLET, FILM COATED ORAL at 21:17

## 2025-02-03 RX ADMIN — CHOLECALCIFEROL TAB 25 MCG (1000 UNIT) 2000 UNITS: 25 TAB at 09:39

## 2025-02-03 RX ADMIN — FAMOTIDINE 20 MG: 20 TABLET, FILM COATED ORAL at 09:37

## 2025-02-03 RX ADMIN — ISOSORBIDE MONONITRATE 60 MG: 60 TABLET, EXTENDED RELEASE ORAL at 09:37

## 2025-02-03 RX ADMIN — SODIUM CHLORIDE, PRESERVATIVE FREE 10 ML: 5 INJECTION INTRAVENOUS at 21:19

## 2025-02-03 RX ADMIN — DEXAMETHASONE 6 MG: 2 TABLET ORAL at 09:35

## 2025-02-03 RX ADMIN — ACETAMINOPHEN 650 MG: 325 TABLET ORAL at 21:17

## 2025-02-03 RX ADMIN — LORAZEPAM 1 MG: 2 INJECTION INTRAMUSCULAR; INTRAVENOUS at 15:44

## 2025-02-03 RX ADMIN — FUROSEMIDE 40 MG: 40 TABLET ORAL at 18:32

## 2025-02-03 RX ADMIN — CARVEDILOL 3.12 MG: 3.12 TABLET, FILM COATED ORAL at 09:40

## 2025-02-03 RX ADMIN — DULOXETINE 60 MG: 60 CAPSULE, DELAYED RELEASE ORAL at 09:39

## 2025-02-03 RX ADMIN — FINASTERIDE 5 MG: 5 TABLET, FILM COATED ORAL at 09:40

## 2025-02-03 RX ADMIN — Medication 3 MG: at 21:17

## 2025-02-03 RX ADMIN — BUDESONIDE AND FORMOTEROL FUMARATE DIHYDRATE 2 PUFF: 160; 4.5 AEROSOL RESPIRATORY (INHALATION) at 07:52

## 2025-02-03 ASSESSMENT — PAIN DESCRIPTION - DESCRIPTORS
DESCRIPTORS: ACHING
DESCRIPTORS: BURNING

## 2025-02-03 ASSESSMENT — PAIN SCALES - WONG BAKER
WONGBAKER_NUMERICALRESPONSE: NO HURT

## 2025-02-03 ASSESSMENT — PAIN SCALES - GENERAL
PAINLEVEL_OUTOF10: 0
PAINLEVEL_OUTOF10: 8
PAINLEVEL_OUTOF10: 3
PAINLEVEL_OUTOF10: 0

## 2025-02-03 ASSESSMENT — PAIN DESCRIPTION - LOCATION
LOCATION: CHEST;HEAD;LEG
LOCATION: HEAD

## 2025-02-03 ASSESSMENT — PAIN DESCRIPTION - ONSET: ONSET: PROGRESSIVE

## 2025-02-03 ASSESSMENT — PAIN DESCRIPTION - PAIN TYPE: TYPE: ACUTE PAIN

## 2025-02-03 ASSESSMENT — PAIN DESCRIPTION - ORIENTATION: ORIENTATION: ANTERIOR;POSTERIOR

## 2025-02-03 ASSESSMENT — PAIN DESCRIPTION - FREQUENCY: FREQUENCY: INTERMITTENT

## 2025-02-03 NOTE — PROGRESS NOTES
Physical Therapy  Facility/Department: [facility]  Physical Therapy Initial Assessment    Name: Keyur Adrian  : 1955  MRN: [id number]  Date of Service: 2/3/2025    Discharge Recommendations:  Continue to assess pending progress, Subacute/Skilled Nursing Facility(swing bed) or, Home with Home health PT          Patient Diagnosis(es): The primary encounter diagnosis was Chest pain, unspecified type. Diagnoses of Hypoxemia, COVID-19 virus infection, and Elevated brain natriuretic peptide (BNP) level were also pertinent to this visit.  Past Medical History:  has a past medical history of CAD (coronary artery disease), Chest pain, COPD (chronic obstructive pulmonary disease) ([facility]), H/O cardiac catheterization, H/O cardiovascular stress test, H/O echocardiogram, H/O echocardiogram, H/O exercise stress test, Hx of cardiovascular stress test, Hx of Doppler ultrasound, Hyperlipidemia, Hypertension, IBS (irritable bowel syndrome), Orthostatic hypotension, Pancreatitis, Rheumatoid arthritis (HCC), S/P CABG x 3, SOB (shortness of breath), and Thyroid disease.  Past Surgical History:  has a past surgical history that includes Endoscopy, colon, diagnostic (2017); Colonoscopy (2017); Cardiac catheterization (2020); Coronary artery bypass graft (N/A, 10/19/2020); cardiovascular stress test (2025); and Cardiac procedure (N/A, 1/15/2025).    Assessment Patient is a 69 y.o. male who presents to [facility] with Covid 19. Patient would benefit from continued skilled physical therapy services to address decreased strength, ROM, and endurance, impaired balance, and decline in functional mobilit   Body Structures, Functions, Activity Limitations Requiring Skilled Therapeutic Intervention: Decreased functional mobility   Treatment Diagnosis: debility  Therapy Prognosis: Good  Decision Making: Medium Complexity  History: PF- Covid 19  Exam: strength/transfers  Clinical Presentation: evolving  Barriers to Learning:

## 2025-02-03 NOTE — PROGRESS NOTES
with or without device  Active : Yes  Mode of Transportation: Car  Occupation: Full time employment  Type of Occupation: [employer] in [place] as a   Leisure & Hobbies: sports watching tv    Examination of body systems (includes body structures/functions, activity/participation limitations)  Observation:  Pt awake in bed, upon arrival and agreeable to therapy  Vision: Impaired: Wears glasses all the time  Hearing: WFL  Cardiopulmonary:   O2 needs 2-3L at home as need and today placed on 2 L with c/o SOB and nurse instructed  Cognition: WNL     Musculoskeletal  ROM BUE: WNL  Strength BUE: WNL  Neuro:   grossly intact       Mobility  Bed mobility: Stand By Assistance   Transfers: Contact Guard Assistance   Toilet transfer:Contact Guard Assistance  Tub transfer:CGA based on observation of in room mobility and strength,   Sitting balance:  good edge of bed  Standing balance:  fair+  light dynamic with occasional  walker support ,      ADLs  Self Feeding: Modified Independent  Grooming: Modified Independent in sitting  Bathing: Moderate Assistance with fatigue and sob  Toileting: Minimal Assistance  to SBA - and  mod I with urinal  UB Dressing: Supervision  LB Dressing: Moderate Assistance SOB in reaching to feet and declined to try further  Safety Awareness: Modified Independent  Home Management: Moderate Assistance to max  Community ADLs: Moderate Assistance to max due to fatigue, generalized weakness        AM-PAC 6 click short form for inpatient daily activity:   How much help from another person does the patient currently need... Unable  Dep A Lot  Max A A Lot   Mod A A Little  Min A A Little   CGA  SBA None   Mod I  Indep  Sup   1.  Putting on and taking off regular lower body clothing? [] 1    [] 2   [x] 2   [] 3   [] 3   [] 4      2. Bathing (including washing, rinsing, drying)? [] 1   [] 2   [x] 2 [] 3 [] 3 [] 4   3. Toileting, which includes using toilet, bedpan, or urinal? [] 1    [] 2   [] 2    [x] 3   [] 3   [] 4     4. Putting on and taking off regular upper body clothing? [] 1   [] 2   [] 2   [] 3   [] 3    [x] 4      5. Taking care of personal grooming such as brushing teeth? [] 1   [] 2    [] 2 [] 3    [] 3   [x] 4      6. Eating meals?   [] 1   [] 2   [] 2   [] 3   [] 3   [x] 4      Raw Score:  19     [24=0% impaired(CH), 23=1-19%(CI), 20-22=20-39%(CJ), 15-19=40-59%(CK), 10-14=60-79%(CL), 7-9=80-99%(CM), 6=100%(CN)]         Goals  Patient Goals:to return home and to work, pt receptive to therapy program   Time frame: 1 week or until discharge  Patient will complete functional transfers with mod I using AE PRN and with good safety awareness.  Patient will complete all toilet tasks with mod I  Patient will complete LB ADLs with mod II using AE PRN  Pt will tolerate 10+ min of therapeutic activity/ex  to improve functional mobility, strength, AROM,simulated homemaking tasks to facilitate ADL independence in order to return home safely    Education  Given to: Patient  Education provided: Role of therapy, Plan of care, and Energy conservation   Education method: Verbal      Therapy Time   Individual Co-Eval   Time In  10:51   Time Out  11:11   Total Time  20   1 eval 1 adl    Signed: FAMILIA Giang/L 49439/3/2025, 1:01 PM

## 2025-02-03 NOTE — CARE COORDINATION
02/03/25 0750   Service Assessment   Patient Orientation Alert and Oriented   Cognition Alert   History Provided By Patient   Primary Caregiver Self   Support Systems Friends/Neighbors   Patient's Healthcare Decision Maker is: Patient Declined (Legal Next of Kin Remains as Decision Maker)   PCP Verified by CM Yes   Prior Functional Level Independent in ADLs/IADLs   Current Functional Level Independent in ADLs/IADLs   Can patient return to prior living arrangement Yes   Ability to make needs known: Good   Family able to assist with home care needs: No   Would you like for me to discuss the discharge plan with any other family members/significant others, and if so, who? No   Financial Resources Medicare   Community Resources None   Social/Functional History   Lives With Alone   Type of Home Apartment   Home Equipment Oxygen   Active  Yes   Discharge Planning   Type of Residence Apartment   Living Arrangements Alone   Current Services Prior To Admission Oxygen Therapy   Potential Assistance Needed N/A   Patient expects to be discharged to: Apartment   Services At/After Discharge   Services At/After Discharge None   Mode of Transport at Discharge Other (see comment)  (TBD)   Confirm Follow Up Transport Self   Condition of Participation: Discharge Planning   The Plan for Transition of Care is related to the following treatment goals: Plans to return home   The Patient and/or Patient Representative was provided with a Choice of Provider? Patient   The Patient and/Or Patient Representative agree with the Discharge Plan? Yes   Freedom of Choice list was provided with basic dialogue that supports the patient's individualized plan of care/goals, treatment preferences, and shares the quality data associated with the providers?  Yes     CM spoke with the patient on the room telephone to initiate discharge planning, patient tested positive for COVID-19 1/31/25.  Patient lives alone in an apartment, has medical coverage

## 2025-02-03 NOTE — PROGRESS NOTES
V2.0    [facility] [place] Progress Note      Name:  Keyur Adrian /Age/Sex: 1955  (69 y.o. male)   MRN & CSN:  [id number] & [id number] Encounter Date/Time: 2/3/2025 3:59 PM EST   Location:   PCP: Wili High MD     Attending:Rob Loya MD       Hospital Day: 4    Assessment and Recommendations   Keyur Adrian is a 69 y.o. male who presents with Hypotension      Plan:   Hypotension, resolved/improved.  Apparently patient was hospitalized previously at [facility] in January, discharged 2025 Coreg was increased to 6.25 twice daily, Coreg has now been reduced to his original dose of 3.125 daily.  Lisinopril is on hold.  Continue telemetry  Dizziness, MRI to be completed today awaiting results.  Patient was seen by physical  COVID-19, symptoms began 3 days prior to arrival at the hospital, continue dexamethasone and supportive care  Oxygen, patient requested oxygen for comfort.  He is currently on 3 L nasal cannula.  Will need to get room air sat.  Viral pneumonia, CT chest shows bilateral groundglass opacities consistent with COVID-19 pneumonia, patient is afebrile, procalcitonin 0.04 urinary antigens are negative.  Continue to monitor CBC.  Chronic diastolic heart failure with an EF of 50 to 55% on echocardiogram completed 2025.  BNP was 3143, appears euvolemic on exam.  Continue Lasix, reduced dose Coreg.  Resume lisinopril when appropriate  History of CAD status post CABG x 3 continue aspirin and atorvastatin, Ranexa, Imdur   Reproducible musculoskeletal chest pain, continue isosorbide mononitrate and Ranexa  Hypothyroidism, continue levothyroxine  GERD continue Pepcid  BPH continue finasteride  Hypertension resume lisinopril        Diet ADULT DIET; Regular; No Added Salt (3-4 gm)   DVT Prophylaxis [x] Lovenox, []  Heparin, [] SCDs, [] Ambulation,  [] Eliquis, [] Xarelto  [] Coumadin   Code Status Full Code   Disposition From: Home  Expected Disposition: Home  Estimated Date of Discharge:   markings are seen once again and are unchanged. No focal consolidation or discrete pleural effusion is seen. The osseous and soft tissue structures are without acute abnormality. IMPRESSION: 1.  No evidence for acute disease 2.  Diffuse prominent reticular interstitial disease seen which may be chronic. This dictation was created with voice recognition software.  While attempts have  been made to review the dictation as it is transcribed, on occasion the spoken word can be misinterpreted by the technology leading to omissions or inappropriate words, phrases or sentences.  Dictated and Electronically Signed By: Jaron Lew MD 1/31/2025 17:54          CBC:   Recent Labs     02/01/25  0603 02/02/25  0353 02/03/25  0430   WBC 7.6 12.3* 10.7*   HGB 14.6 14.2 14.9    174 179     BMP:    Recent Labs     02/01/25  0603 02/02/25  0353 02/03/25  0430    141 137   K 4.1 3.9 4.1    105 100   CO2 19* 23 24   BUN 31* 30* 25*   CREATININE 1.2 1.1 0.9   GLUCOSE 114* 131* 120*     Hepatic:   Recent Labs     01/31/25  1700   AST 18   ALT 18   BILITOT 0.7   ALKPHOS 85     Lipids:   Lab Results   Component Value Date/Time    CHOL 151 01/14/2025 05:09 AM    HDL 33 01/14/2025 05:09 AM    TRIG 121 01/14/2025 05:09 AM     Hemoglobin A1C:   Lab Results   Component Value Date/Time    LABA1C 6.2 02/27/2024 01:17 PM     TSH:   Lab Results   Component Value Date/Time    TSH 20.97 01/12/2025 06:35 PM     Troponin:   Lab Results   Component Value Date/Time    TROPONINT <0.010 07/18/2023 06:20 AM    TROPONINT <0.010 07/17/2023 08:15 PM    TROPONINT <0.010 08/04/2021 11:30 AM       BNP:   Recent Labs     01/31/25  1700   PROBNP 3,143*     UA:  Lab Results   Component Value Date/Time    NITRU NEGATIVE 01/12/2025 07:40 PM    COLORU Yellow 01/12/2025 07:40 PM    PHUR 6.5 01/12/2025 07:40 PM    WBCUA 0 TO 5 01/12/2025 07:40 PM    RBCUA 3 to 5 01/12/2025 07:40 PM    BACTERIA FEW 01/12/2025 07:40 PM    LEUKOCYTESUR NEGATIVE

## 2025-02-04 LAB — CRP SERPL HS-MCNC: <3 MG/L (ref 0–5)

## 2025-02-04 PROCEDURE — 6360000002 HC RX W HCPCS: Performed by: FAMILY MEDICINE

## 2025-02-04 PROCEDURE — 6370000000 HC RX 637 (ALT 250 FOR IP): Performed by: FAMILY MEDICINE

## 2025-02-04 PROCEDURE — 94640 AIRWAY INHALATION TREATMENT: CPT

## 2025-02-04 PROCEDURE — 2700000000 HC OXYGEN THERAPY PER DAY

## 2025-02-04 PROCEDURE — 86140 C-REACTIVE PROTEIN: CPT

## 2025-02-04 PROCEDURE — 1200000000 HC SEMI PRIVATE

## 2025-02-04 PROCEDURE — 2500000003 HC RX 250 WO HCPCS: Performed by: FAMILY MEDICINE

## 2025-02-04 PROCEDURE — 36415 COLL VENOUS BLD VENIPUNCTURE: CPT

## 2025-02-04 PROCEDURE — 94761 N-INVAS EAR/PLS OXIMETRY MLT: CPT

## 2025-02-04 RX ORDER — LEVOFLOXACIN 500 MG/1
500 TABLET, FILM COATED ORAL DAILY
Qty: 5 TABLET | Refills: 0 | Status: SHIPPED | OUTPATIENT
Start: 2025-02-04 | End: 2025-02-09

## 2025-02-04 RX ORDER — GUAIFENESIN/DEXTROMETHORPHAN 100-10MG/5
5 SYRUP ORAL EVERY 4 HOURS PRN
Qty: 120 ML | Refills: 0 | Status: SHIPPED | OUTPATIENT
Start: 2025-02-04 | End: 2025-02-14

## 2025-02-04 RX ORDER — CARVEDILOL 6.25 MG/1
3.12 TABLET ORAL 2 TIMES DAILY WITH MEALS
Qty: 60 TABLET | Refills: 3 | Status: SHIPPED | OUTPATIENT
Start: 2025-02-04

## 2025-02-04 RX ORDER — DEXAMETHASONE 6 MG/1
6 TABLET ORAL DAILY
Qty: 6 TABLET | Refills: 0 | Status: SHIPPED | OUTPATIENT
Start: 2025-02-05 | End: 2025-02-11

## 2025-02-04 RX ORDER — CHOLECALCIFEROL (VITAMIN D3) 50 MCG
2000 TABLET ORAL DAILY
Qty: 30 TABLET | Refills: 0 | Status: SHIPPED | OUTPATIENT
Start: 2025-02-05 | End: 2025-03-07

## 2025-02-04 RX ADMIN — CHOLECALCIFEROL TAB 25 MCG (1000 UNIT) 2000 UNITS: 25 TAB at 08:57

## 2025-02-04 RX ADMIN — FAMOTIDINE 20 MG: 20 TABLET, FILM COATED ORAL at 08:57

## 2025-02-04 RX ADMIN — ASPIRIN 81 MG: 81 TABLET, COATED ORAL at 08:57

## 2025-02-04 RX ADMIN — Medication 3 MG: at 19:59

## 2025-02-04 RX ADMIN — LEVOTHYROXINE SODIUM 200 MCG: 0.1 TABLET ORAL at 04:29

## 2025-02-04 RX ADMIN — FUROSEMIDE 40 MG: 40 TABLET ORAL at 17:33

## 2025-02-04 RX ADMIN — CARVEDILOL 3.12 MG: 3.12 TABLET, FILM COATED ORAL at 17:32

## 2025-02-04 RX ADMIN — FINASTERIDE 5 MG: 5 TABLET, FILM COATED ORAL at 08:57

## 2025-02-04 RX ADMIN — FUROSEMIDE 40 MG: 40 TABLET ORAL at 08:57

## 2025-02-04 RX ADMIN — DEXAMETHASONE 6 MG: 2 TABLET ORAL at 08:57

## 2025-02-04 RX ADMIN — ATORVASTATIN CALCIUM 80 MG: 80 TABLET, FILM COATED ORAL at 19:58

## 2025-02-04 RX ADMIN — ACETAMINOPHEN 650 MG: 325 TABLET ORAL at 04:29

## 2025-02-04 RX ADMIN — ENOXAPARIN SODIUM 40 MG: 100 INJECTION SUBCUTANEOUS at 08:57

## 2025-02-04 RX ADMIN — DULOXETINE 60 MG: 60 CAPSULE, DELAYED RELEASE ORAL at 08:57

## 2025-02-04 RX ADMIN — RANOLAZINE 500 MG: 500 TABLET, FILM COATED, EXTENDED RELEASE ORAL at 08:57

## 2025-02-04 RX ADMIN — SODIUM CHLORIDE, PRESERVATIVE FREE 10 ML: 5 INJECTION INTRAVENOUS at 19:59

## 2025-02-04 RX ADMIN — SODIUM CHLORIDE, PRESERVATIVE FREE 10 ML: 5 INJECTION INTRAVENOUS at 09:09

## 2025-02-04 RX ADMIN — BUDESONIDE AND FORMOTEROL FUMARATE DIHYDRATE 2 PUFF: 160; 4.5 AEROSOL RESPIRATORY (INHALATION) at 20:12

## 2025-02-04 RX ADMIN — ISOSORBIDE MONONITRATE 60 MG: 60 TABLET, EXTENDED RELEASE ORAL at 08:57

## 2025-02-04 RX ADMIN — CARVEDILOL 3.12 MG: 3.12 TABLET, FILM COATED ORAL at 08:57

## 2025-02-04 RX ADMIN — RANOLAZINE 500 MG: 500 TABLET, FILM COATED, EXTENDED RELEASE ORAL at 19:58

## 2025-02-04 RX ADMIN — Medication 1 TABLET: at 08:57

## 2025-02-04 RX ADMIN — BUDESONIDE AND FORMOTEROL FUMARATE DIHYDRATE 2 PUFF: 160; 4.5 AEROSOL RESPIRATORY (INHALATION) at 07:49

## 2025-02-04 ASSESSMENT — PAIN SCALES - GENERAL
PAINLEVEL_OUTOF10: 0
PAINLEVEL_OUTOF10: 0
PAINLEVEL_OUTOF10: 3
PAINLEVEL_OUTOF10: 0
PAINLEVEL_OUTOF10: 0

## 2025-02-04 ASSESSMENT — PAIN DESCRIPTION - PAIN TYPE: TYPE: ACUTE PAIN

## 2025-02-04 ASSESSMENT — PAIN DESCRIPTION - LOCATION: LOCATION: STERNUM

## 2025-02-04 ASSESSMENT — PAIN DESCRIPTION - DESCRIPTORS: DESCRIPTORS: DISCOMFORT

## 2025-02-04 ASSESSMENT — PAIN DESCRIPTION - FREQUENCY: FREQUENCY: INTERMITTENT

## 2025-02-04 ASSESSMENT — PAIN DESCRIPTION - ORIENTATION: ORIENTATION: MID;ANTERIOR

## 2025-02-04 ASSESSMENT — PAIN DESCRIPTION - ONSET: ONSET: PROGRESSIVE

## 2025-02-04 ASSESSMENT — PAIN - FUNCTIONAL ASSESSMENT: PAIN_FUNCTIONAL_ASSESSMENT: ACTIVITIES ARE NOT PREVENTED

## 2025-02-04 NOTE — CARE COORDINATION
CM met with the patient during IDR.  Patient is interested in SNF placement and also declined [facility] services.  Patient confirmed that he was living with friends prior to admission and plans to return to their home following discharge.  Patient stated that he believes his friends will be able to provide transportation following discharge but it would need to be after they are off work.  CM available if needs arise.

## 2025-02-04 NOTE — PROGRESS NOTES
Pt's primary nurse informed me that pt's discharge was placed and he would be calling for his ride at 430 when they got off work. After following up, pt informed his nurse that the people he is staying with will now not be home until midnight and he has no way to get into their residence. Pt also informed the nurse that he does not have his oxygen concentrator at his current residence and it is at his ex wife's house in [place] and he has no way of obtaining it. Pt declined his need for oxygen on discharge. Primary nurse at this point assessed his oxygen on room air. [facility] was called to see if pt would be able to stay there overnight. They stated that as long as he agreed to be a part of their program and got there before 7pm tonight  he would be able to stay- pt was made aware and agreed to go. [facility],  office, and [facility] were all called to see if they would be able to provide transport for this patient before 7 pm and none had availability. [facility] did agree to pickup patient tomorrow morning at 10am to transport him to [facility] - so this was confirmed and set up.

## 2025-02-04 NOTE — DISCHARGE SUMMARY
V2.0  Discharge Summary    Name:  Keyur Adrian /Age/Sex: 1955 (69 y.o. male)   Admit Date: 2025  Discharge Date: 25    MRN & CSN:  [id number] & [id number] Encounter Date and Time 25 11:58 AM EST    Attending:  Karlie Bhakta MD Discharging Provider: GETACHEW Wallace [facility]       Hospital Course:     Brief HPI: Keyur Adrian is a 69 y.o. male who presented with hypotension. Apparently patient was hospitalized previously at [facility] in January for cardiology evaluation.  Underwent left heart cath with no significant vessel disease 1/15, Coreg and imdure were increased to 6.25 twice daily on discharge and possibly contributing to patient's presentation.  He will discharge to friend's house in stable condition where he reports having his personal items, medications, and oxygen concentrator.    Brief Problem Based Course:     The patient expressed appropriate understanding of, and agreement with the discharge recommendations, medications, and plan.     Consults this admission:  None    Discharge Diagnosis:   Hypotension    Symptomatic hypotension, resolved. Hx of hypertension    Lisinopril is on hold.  Coreg reduced suspect 2/2 recent medication changes.  Currently HDS and low suspicion for sepsis causing presentation.  Dispo: Home in stable condition with outpatient follow-up.  Trend BPs and follow-up with  Dizziness, suspect 2/2 above.  MRI nonacute and improved.  NIHSS 0  Acute respiratory failure with hypoxia 2/2 viral pneumonia  COVID-19,   symptoms began 3 days prior to arrival at the hospital, initial positive date   HDS afebrile, procalcitonin 0.04 urinary antigens are negative.   CT chest shows bilateral groundglass opacities consistent with COVID-19 pneumonia  continue dexamethasone course and  PRN symptom control.  Has supplemental oxygen at home and currently on 2 L/min nasal cannula. ,   Continue PRN albuterol inhaler  Sputum culture shows rare gram-positive/gram-negative rods.  Will

## 2025-02-05 VITALS
RESPIRATION RATE: 16 BRPM | WEIGHT: 196.21 LBS | HEART RATE: 58 BPM | OXYGEN SATURATION: 92 % | SYSTOLIC BLOOD PRESSURE: 168 MMHG | DIASTOLIC BLOOD PRESSURE: 126 MMHG | BODY MASS INDEX: 28.09 KG/M2 | TEMPERATURE: 98.2 F | HEIGHT: 70 IN

## 2025-02-05 LAB
MICROORGANISM SPEC CULT: ABNORMAL
MICROORGANISM SPEC CULT: NORMAL
MICROORGANISM SPEC CULT: NORMAL
MICROORGANISM/AGENT SPEC: ABNORMAL
SERVICE CMNT-IMP: NORMAL
SERVICE CMNT-IMP: NORMAL
SPECIMEN DESCRIPTION: ABNORMAL
SPECIMEN DESCRIPTION: NORMAL
SPECIMEN DESCRIPTION: NORMAL

## 2025-02-05 PROCEDURE — 6360000002 HC RX W HCPCS: Performed by: FAMILY MEDICINE

## 2025-02-05 PROCEDURE — 2500000003 HC RX 250 WO HCPCS: Performed by: FAMILY MEDICINE

## 2025-02-05 PROCEDURE — 6370000000 HC RX 637 (ALT 250 FOR IP): Performed by: FAMILY MEDICINE

## 2025-02-05 PROCEDURE — 94761 N-INVAS EAR/PLS OXIMETRY MLT: CPT

## 2025-02-05 PROCEDURE — 94640 AIRWAY INHALATION TREATMENT: CPT

## 2025-02-05 RX ADMIN — BUDESONIDE AND FORMOTEROL FUMARATE DIHYDRATE 2 PUFF: 160; 4.5 AEROSOL RESPIRATORY (INHALATION) at 07:56

## 2025-02-05 RX ADMIN — FUROSEMIDE 40 MG: 40 TABLET ORAL at 09:53

## 2025-02-05 RX ADMIN — DULOXETINE 60 MG: 60 CAPSULE, DELAYED RELEASE ORAL at 09:52

## 2025-02-05 RX ADMIN — Medication 1 TABLET: at 09:53

## 2025-02-05 RX ADMIN — FINASTERIDE 5 MG: 5 TABLET, FILM COATED ORAL at 09:53

## 2025-02-05 RX ADMIN — RANOLAZINE 500 MG: 500 TABLET, FILM COATED, EXTENDED RELEASE ORAL at 09:53

## 2025-02-05 RX ADMIN — ENOXAPARIN SODIUM 40 MG: 100 INJECTION SUBCUTANEOUS at 09:52

## 2025-02-05 RX ADMIN — ASPIRIN 81 MG: 81 TABLET, COATED ORAL at 09:53

## 2025-02-05 RX ADMIN — LEVOTHYROXINE SODIUM 200 MCG: 0.1 TABLET ORAL at 05:26

## 2025-02-05 RX ADMIN — ISOSORBIDE MONONITRATE 60 MG: 60 TABLET, EXTENDED RELEASE ORAL at 09:53

## 2025-02-05 RX ADMIN — CARVEDILOL 3.12 MG: 3.12 TABLET, FILM COATED ORAL at 09:53

## 2025-02-05 RX ADMIN — FAMOTIDINE 20 MG: 20 TABLET, FILM COATED ORAL at 09:53

## 2025-02-05 RX ADMIN — ALBUTEROL SULFATE 2 PUFF: 90 AEROSOL, METERED RESPIRATORY (INHALATION) at 07:58

## 2025-02-05 RX ADMIN — SODIUM CHLORIDE, PRESERVATIVE FREE 10 ML: 5 INJECTION INTRAVENOUS at 09:52

## 2025-02-05 RX ADMIN — DEXAMETHASONE 6 MG: 2 TABLET ORAL at 09:52

## 2025-02-05 RX ADMIN — CHOLECALCIFEROL TAB 25 MCG (1000 UNIT) 2000 UNITS: 25 TAB at 09:53

## 2025-02-05 ASSESSMENT — PAIN SCALES - GENERAL
PAINLEVEL_OUTOF10: 0
PAINLEVEL_OUTOF10: 0

## 2025-02-05 NOTE — PROGRESS NOTES
Discharge education completed with pt, pt demonstrated appropriate teach back, IV's removed, pt has prescription called in to pharmacy, medication's and side effect education completed, pt has discharge paperwork, pt walked out to lobby with this nurse, volunteer has his cab voucher and will accompany him until he gets in the cab,  pt denies any needs or questions at this time and states he has all of his belongings.

## 2025-02-05 NOTE — PLAN OF CARE
Problem: Chronic Conditions and Co-morbidities  Goal: Patient's chronic conditions and co-morbidity symptoms are monitored and maintained or improved  2/5/2025 0033 by Lois Eckert RN  Outcome: Progressing  2/4/2025 1055 by Lois Rain RN  Outcome: Progressing     Problem: Pain  Goal: Verbalizes/displays adequate comfort level or baseline comfort level  2/5/2025 0033 by Lois Eckert RN  Outcome: Progressing  2/4/2025 1055 by Lois Rani RN  Outcome: Progressing     Problem: Safety - Adult  Goal: Free from fall injury  2/5/2025 0033 by Lois Eckert RN  Outcome: Progressing  2/4/2025 1055 by Lois Rain RN  Outcome: Progressing

## 2025-02-05 NOTE — PROGRESS NOTES
I did NOT see the patient. The patient was discussed with the EMILIA. I was available for questions and consultation as needed.       Plan for dc

## 2025-02-05 NOTE — PROGRESS NOTES
This RN has reviewed and agrees with Briseida Middleton LPN's data collection and has collaborated with this LPN regarding the patient's care plan.

## 2025-02-05 NOTE — CARE COORDINATION
CM met with the patient during IDR, patient was discharged yesterday (2/4/25) and did not initiate an appeal of his discharge.  Patient seems to be somewhat evasive regarding his living situation.  Patient indicated to this CM, RN, and NP yesterday during IDR that he would be able to return to his friend's home and that they would be able to provide transportation after they were off work.  Patient then notified the staff last evening that his friends would not be home until after midnight and he had no way of getting into the home.  The [place] nursing supervisor contacted the [facility] but transportation was not able to be arranged.  Patient now states that he plans to go the [facility] and that returning to his friend's house is not possible.  CM spoke with Dena at the [facility] who advised that he will need to first have a background check completed before he arrives to the [facility].  CM will arrange transportation.         10:18 AM  CM spoke with [employer] (taxi service) and they will arrive at the main entrance at 11 AM to transport the patient the [place] [employer] for the patient to complete a background check and from there to the [facility].  CM available if needs arise.

## 2025-02-06 ENCOUNTER — CARE COORDINATION (OUTPATIENT)
Dept: CARE COORDINATION | Age: 70
End: 2025-02-06

## 2025-02-06 ENCOUNTER — CARE COORDINATION (OUTPATIENT)
Dept: CASE MANAGEMENT | Age: 70
End: 2025-02-06

## 2025-02-06 ENCOUNTER — TELEPHONE (OUTPATIENT)
Age: 70
End: 2025-02-06

## 2025-02-06 NOTE — TELEPHONE ENCOUNTER
----- Message from Dr. Wili High MD sent at 2/5/2025  7:47 AM EST -----  Please schedule post-hospital F/u

## 2025-02-06 NOTE — TELEPHONE ENCOUNTER
Attempted to contact patient and schedule a hospital follow up appointment.  No answer, voicemail left for patient to contact office.

## 2025-02-06 NOTE — CARE COORDINATION
Attempted phone call to Pt to follow up regarding housing. No answer, vm message left requesting return call, contact number provided.     Conducted chart review and identified Pt went to [facility] in [place] upon d/c from hospital on 2/5.     NEELAM plan of care: SW will make next outreach attempt on 2/11 to follow up regarding housing.

## 2025-02-06 NOTE — CARE COORDINATION
Care Transitions Note    Initial Call - Call within 2 business days of discharge: Yes    1st attempt to reach for Care Transition discharge call unsuccessful.    Outreach Attempts:   HIPAA compliant voicemail left for patient.   [employer] message sent.     Patient: Keyur Adrian    Patient : 1955   MRN: [id number]    Reason for Admission: Covid19, Pna, Hypotension, Dizziness   Discharge Date: 25  RURS: Readmission Risk Score: 18.2  Facility: [facility] 25-25 (readmit)    Last Discharge Facility       Date Complaint Diagnosis Description Type Department Provider    25 Chest Pain; Nausea Chest pain, unspecified type ... ED to Hosp-Admission (Discharged) (ADMITTED) Karlie Laurent MD; Qing Mancilla...     Follow Up Appointment: PCP office attempting to reach patient for TCM appt  Future Appointments         Provider Specialty Dept Phone    3/14/2025 11:50 AM Jennifer Jones MD Cardiology [phone]    2025 1:40 PM Rico Boyd, APRN - CNP Cardiology [phone]            Plan for follow-up on next business day. 2nd attempt.      Terri Travis RN

## 2025-02-07 ENCOUNTER — CARE COORDINATION (OUTPATIENT)
Dept: CASE MANAGEMENT | Age: 70
End: 2025-02-07

## 2025-02-07 NOTE — CARE COORDINATION
Care Transitions Note    Initial Call - Call within 2 business days of discharge: Yes    2nd unsuccessful attempt to reach for Care Transition discharge call. HIPAA compliant message left requesting call back. CT program closed per protocol, no further outreach scheduled.      Outreach Attempts:   Multiple attempts to contact patient at phone numbers on file.     Patient: Keyur Adrian    Patient : 1955   MRN: [id number]    Reason for Admission: Covid19, Pna, Hypotension, Dizziness   Discharge Date: 25  RURS: Readmission Risk Score: 18.2  Facility: [facility] 25-25 (readmit)     Last Discharge Facility       Date Complaint Diagnosis Description Type Department Provider    25 Chest Pain; Nausea Chest pain, unspecified type ... ED to Hosp-Admission (Discharged) (ADMITTED) Karlie Landa MD; Qing Mancilla...     Follow Up Appointment:   Future Appointments         Provider Specialty Dept Phone    3/14/2025 11:50 AM Jennifer Jones MD Cardiology [phone]    2025 1:40 PM Rico Boyd, APRN - CNP Cardiology [phone]          Terri Travis RN

## 2025-02-11 ENCOUNTER — CARE COORDINATION (OUTPATIENT)
Dept: CARE COORDINATION | Age: 70
End: 2025-02-11

## 2025-02-11 NOTE — CARE COORDINATION
Attempted phone call to Pt to follow up regarding housing. No answer, vm message left requesting return call, contact number provided.     NEELAM plan of care: NEELAM will make next outreach attempt on 2/19.

## 2025-02-14 DIAGNOSIS — I50.42 CHRONIC COMBINED SYSTOLIC AND DIASTOLIC CONGESTIVE HEART FAILURE (HCC): ICD-10-CM

## 2025-02-14 DIAGNOSIS — I10 ESSENTIAL HYPERTENSION: ICD-10-CM

## 2025-02-14 DIAGNOSIS — N40.0 BENIGN PROSTATIC HYPERPLASIA, UNSPECIFIED WHETHER LOWER URINARY TRACT SYMPTOMS PRESENT: ICD-10-CM

## 2025-02-14 DIAGNOSIS — K21.9 GASTROESOPHAGEAL REFLUX DISEASE, UNSPECIFIED WHETHER ESOPHAGITIS PRESENT: ICD-10-CM

## 2025-02-14 DIAGNOSIS — E03.9 ACQUIRED HYPOTHYROIDISM: ICD-10-CM

## 2025-02-15 DIAGNOSIS — E78.2 MIXED HYPERLIPIDEMIA: ICD-10-CM

## 2025-02-17 RX ORDER — CARVEDILOL 3.12 MG/1
3.12 TABLET ORAL 2 TIMES DAILY WITH MEALS
Qty: 180 TABLET | Refills: 1 | OUTPATIENT
Start: 2025-02-17

## 2025-02-17 RX ORDER — FINASTERIDE 5 MG/1
5 TABLET, FILM COATED ORAL DAILY
Qty: 90 TABLET | Refills: 1 | OUTPATIENT
Start: 2025-02-17

## 2025-02-17 RX ORDER — TAMSULOSIN HYDROCHLORIDE 0.4 MG/1
CAPSULE ORAL DAILY
Qty: 90 CAPSULE | Refills: 3 | OUTPATIENT
Start: 2025-02-17

## 2025-02-17 RX ORDER — ATORVASTATIN CALCIUM 80 MG/1
TABLET, FILM COATED ORAL
Qty: 90 TABLET | Refills: 3 | Status: SHIPPED | OUTPATIENT
Start: 2025-02-17

## 2025-02-17 RX ORDER — LEVOTHYROXINE SODIUM 137 UG/1
TABLET ORAL
Qty: 90 TABLET | Refills: 1 | OUTPATIENT
Start: 2025-02-17

## 2025-02-17 RX ORDER — FAMOTIDINE 20 MG/1
TABLET, FILM COATED ORAL
Qty: 90 TABLET | Refills: 1 | OUTPATIENT
Start: 2025-02-17

## 2025-02-17 RX ORDER — LISINOPRIL 5 MG/1
5 TABLET ORAL DAILY
Qty: 90 TABLET | Refills: 1 | OUTPATIENT
Start: 2025-02-17

## 2025-02-19 ENCOUNTER — CARE COORDINATION (OUTPATIENT)
Dept: CARE COORDINATION | Age: 70
End: 2025-02-19

## 2025-02-19 NOTE — CARE COORDINATION
Attempted phone call to Pt to follow up regarding housing. No answer, vm message left requesting return call, contact number provided.     [employer] message sent.    SW plan of care: SW will resolve from SW services due to inability to reach Pt

## 2025-03-14 ENCOUNTER — TELEPHONE (OUTPATIENT)
Age: 70
End: 2025-03-14

## 2025-03-14 ENCOUNTER — OFFICE VISIT (OUTPATIENT)
Dept: CARDIOLOGY CLINIC | Age: 70
End: 2025-03-14
Payer: MEDICARE

## 2025-03-14 VITALS
SYSTOLIC BLOOD PRESSURE: 120 MMHG | DIASTOLIC BLOOD PRESSURE: 64 MMHG | BODY MASS INDEX: 27.7 KG/M2 | OXYGEN SATURATION: 94 % | HEIGHT: 69 IN | HEART RATE: 71 BPM | WEIGHT: 187 LBS

## 2025-03-14 DIAGNOSIS — I10 ESSENTIAL HYPERTENSION: ICD-10-CM

## 2025-03-14 DIAGNOSIS — E78.2 MIXED HYPERLIPIDEMIA: ICD-10-CM

## 2025-03-14 DIAGNOSIS — I50.42 CHRONIC COMBINED SYSTOLIC AND DIASTOLIC CONGESTIVE HEART FAILURE (HCC): ICD-10-CM

## 2025-03-14 DIAGNOSIS — G89.29 CHRONIC BILATERAL LOW BACK PAIN WITHOUT SCIATICA: ICD-10-CM

## 2025-03-14 DIAGNOSIS — I25.10 CORONARY ARTERY DISEASE INVOLVING NATIVE CORONARY ARTERY OF NATIVE HEART WITHOUT ANGINA PECTORIS: ICD-10-CM

## 2025-03-14 DIAGNOSIS — N40.0 BENIGN PROSTATIC HYPERPLASIA, UNSPECIFIED WHETHER LOWER URINARY TRACT SYMPTOMS PRESENT: ICD-10-CM

## 2025-03-14 DIAGNOSIS — R94.31 ABNORMAL ELECTROCARDIOGRAPHY: ICD-10-CM

## 2025-03-14 DIAGNOSIS — E03.9 ACQUIRED HYPOTHYROIDISM: ICD-10-CM

## 2025-03-14 DIAGNOSIS — R55 SYNCOPE, UNSPECIFIED SYNCOPE TYPE: ICD-10-CM

## 2025-03-14 DIAGNOSIS — M54.50 CHRONIC BILATERAL LOW BACK PAIN WITHOUT SCIATICA: ICD-10-CM

## 2025-03-14 DIAGNOSIS — K21.9 GASTROESOPHAGEAL REFLUX DISEASE, UNSPECIFIED WHETHER ESOPHAGITIS PRESENT: ICD-10-CM

## 2025-03-14 DIAGNOSIS — R73.03 PREDIABETES: ICD-10-CM

## 2025-03-14 DIAGNOSIS — J43.1 PANLOBULAR EMPHYSEMA (HCC): ICD-10-CM

## 2025-03-14 DIAGNOSIS — E78.2 MIXED HYPERLIPIDEMIA: Primary | ICD-10-CM

## 2025-03-14 PROCEDURE — 3074F SYST BP LT 130 MM HG: CPT | Performed by: INTERNAL MEDICINE

## 2025-03-14 PROCEDURE — 1124F ACP DISCUSS-NO DSCNMKR DOCD: CPT | Performed by: INTERNAL MEDICINE

## 2025-03-14 PROCEDURE — 3078F DIAST BP <80 MM HG: CPT | Performed by: INTERNAL MEDICINE

## 2025-03-14 PROCEDURE — 1159F MED LIST DOCD IN RCRD: CPT | Performed by: INTERNAL MEDICINE

## 2025-03-14 PROCEDURE — G8427 DOCREV CUR MEDS BY ELIG CLIN: HCPCS | Performed by: INTERNAL MEDICINE

## 2025-03-14 PROCEDURE — G8417 CALC BMI ABV UP PARAM F/U: HCPCS | Performed by: INTERNAL MEDICINE

## 2025-03-14 PROCEDURE — 4004F PT TOBACCO SCREEN RCVD TLK: CPT | Performed by: INTERNAL MEDICINE

## 2025-03-14 PROCEDURE — 3017F COLORECTAL CA SCREEN DOC REV: CPT | Performed by: INTERNAL MEDICINE

## 2025-03-14 PROCEDURE — 99214 OFFICE O/P EST MOD 30 MIN: CPT | Performed by: INTERNAL MEDICINE

## 2025-03-14 RX ORDER — TAMSULOSIN HYDROCHLORIDE 0.4 MG/1
0.4 CAPSULE ORAL DAILY
Qty: 30 CAPSULE | Refills: 0 | Status: SHIPPED | OUTPATIENT
Start: 2025-03-14 | End: 2026-03-09

## 2025-03-14 RX ORDER — CHOLECALCIFEROL (VITAMIN D3) 50 MCG
2000 TABLET ORAL DAILY
Qty: 30 TABLET | Refills: 0 | Status: SHIPPED | OUTPATIENT
Start: 2025-03-14 | End: 2025-04-13

## 2025-03-14 RX ORDER — ISOSORBIDE MONONITRATE 60 MG/1
60 TABLET, EXTENDED RELEASE ORAL DAILY
Qty: 30 TABLET | Refills: 0 | Status: SHIPPED | OUTPATIENT
Start: 2025-03-14

## 2025-03-14 RX ORDER — DEXAMETHASONE 6 MG/1
6 TABLET ORAL
Status: CANCELLED | OUTPATIENT
Start: 2025-03-14

## 2025-03-14 RX ORDER — ISOSORBIDE MONONITRATE 60 MG/1
60 TABLET, EXTENDED RELEASE ORAL DAILY
Qty: 30 TABLET | Refills: 3 | Status: SHIPPED | OUTPATIENT
Start: 2025-03-14

## 2025-03-14 RX ORDER — DULOXETIN HYDROCHLORIDE 60 MG/1
60 CAPSULE, DELAYED RELEASE ORAL DAILY
Qty: 30 CAPSULE | Refills: 0 | Status: SHIPPED | OUTPATIENT
Start: 2025-03-14

## 2025-03-14 RX ORDER — CARVEDILOL 12.5 MG/1
12.5 TABLET ORAL 2 TIMES DAILY WITH MEALS
Qty: 120 TABLET | Refills: 3 | Status: SHIPPED | OUTPATIENT
Start: 2025-03-14

## 2025-03-14 RX ORDER — SEMAGLUTIDE 0.68 MG/ML
0.25 INJECTION, SOLUTION SUBCUTANEOUS WEEKLY
Qty: 27 ML | Refills: 1 | Status: SHIPPED | OUTPATIENT
Start: 2025-03-14

## 2025-03-14 RX ORDER — LEVOTHYROXINE SODIUM 200 UG/1
TABLET ORAL
Qty: 30 TABLET | Refills: 0 | Status: SHIPPED | OUTPATIENT
Start: 2025-03-14

## 2025-03-14 RX ORDER — FUROSEMIDE 40 MG/1
80 TABLET ORAL DAILY PRN
Qty: 30 TABLET | Refills: 0 | Status: SHIPPED | OUTPATIENT
Start: 2025-03-14

## 2025-03-14 RX ORDER — DEXAMETHASONE 6 MG/1
6 TABLET ORAL
COMMUNITY

## 2025-03-14 RX ORDER — RANOLAZINE 1000 MG/1
1000 TABLET, EXTENDED RELEASE ORAL 2 TIMES DAILY
Qty: 60 TABLET | Refills: 3 | Status: SHIPPED | OUTPATIENT
Start: 2025-03-14

## 2025-03-14 RX ORDER — FAMOTIDINE 20 MG/1
TABLET, FILM COATED ORAL
Qty: 60 TABLET | Refills: 0 | Status: SHIPPED | OUTPATIENT
Start: 2025-03-14

## 2025-03-14 RX ORDER — FLUTICASONE PROPIONATE AND SALMETEROL 250; 50 UG/1; UG/1
1 POWDER RESPIRATORY (INHALATION) EVERY 12 HOURS
Qty: 180 EACH | Refills: 0 | Status: SHIPPED | OUTPATIENT
Start: 2025-03-14

## 2025-03-14 RX ORDER — RANOLAZINE 500 MG/1
500 TABLET, EXTENDED RELEASE ORAL 2 TIMES DAILY
Qty: 60 TABLET | Refills: 0 | Status: CANCELLED | OUTPATIENT
Start: 2025-03-14

## 2025-03-14 RX ORDER — FINASTERIDE 5 MG/1
5 TABLET, FILM COATED ORAL DAILY
Qty: 30 TABLET | Refills: 0 | Status: SHIPPED | OUTPATIENT
Start: 2025-03-14

## 2025-03-14 NOTE — PROGRESS NOTES
hematuria  Musculoskeletal:  No gait disturbance, weakness or joint complaints  Integumentary: No rash or pruritis  Neurological: No TIA or stroke symptoms  Psychiatric: No anxiety or depression  Endocrine: No malaise, fatigue or temperature intolerance  Hematologic/Lymphatic: No bleeding problems, blood clots or swollen lymph nodes  Allergic/Immunologic: No nasal congestion or hives  All systems negative except as marked.   Objective:  /64 (BP Site: Right Upper Arm, Patient Position: Sitting, BP Cuff Size: Medium Adult)   Pulse 71   Ht 1.753 m (5' 9\")   Wt 84.8 kg (187 lb)   SpO2 94%   BMI 27.62 kg/m²   Wt Readings from Last 3 Encounters:   03/14/25 84.8 kg (187 lb)   02/03/25 89 kg (196 lb 3.4 oz)   02/03/25 89 kg (196 lb 3.4 oz)     Body mass index is 27.62 kg/m².  GENERAL - Alert, oriented, pleasant, in no apparent distress,normal grooming  HEENT - pupils are intact, cornea intact, conjunctive normal color, ears are normal in exam,  Neck - Supple.  No jugular venous distention noted. No carotid bruits, no apical -carotid delay  Respiratory:  Normal breath sounds, No respiratory distress, No wheezing, No chest tenderness.,no use of accessory muscles, diaphragm movement is normal  Cardiovascular: (PMI) apex non displaced,no lifts no thrills, no s3,no s4, Normal heart rate, Normal rhythm, No murmurs, No rubs, No gallops. Carotid arteries pulse and amplitude are normal no bruit, no abdominal bruit noted ( normal abdominal aorta ausculation),   Extremities - No cyanosis, clubbing, or significant edema, no varicose veins    Abdomen - No masses, tenderness, or organomegaly, no hepato-splenomegally, no bruits  Musculoskeletal - No significant edema, no kyphosis or scoliosis, no deformity in any extremity noted, muscle strength and tone are normal  Skin: no ulcer,no scar,no stasis dermatitis   Neurologic - alert oriented times 3,Cranial nerves II through XII are grossly intact.  There were no gross focal

## 2025-03-14 NOTE — TELEPHONE ENCOUNTER
Pt coming in 03/19    [facility]     Needs all meds PCP has him on (incl Ozempic)    Please advise

## 2025-03-19 ENCOUNTER — OFFICE VISIT (OUTPATIENT)
Age: 70
End: 2025-03-19

## 2025-03-19 VITALS
SYSTOLIC BLOOD PRESSURE: 114 MMHG | HEART RATE: 86 BPM | HEIGHT: 69 IN | WEIGHT: 185 LBS | OXYGEN SATURATION: 93 % | RESPIRATION RATE: 20 BRPM | DIASTOLIC BLOOD PRESSURE: 78 MMHG | BODY MASS INDEX: 27.4 KG/M2

## 2025-03-19 DIAGNOSIS — J43.1 PANLOBULAR EMPHYSEMA (HCC): ICD-10-CM

## 2025-03-19 DIAGNOSIS — I50.42 CHRONIC COMBINED SYSTOLIC AND DIASTOLIC CONGESTIVE HEART FAILURE (HCC): ICD-10-CM

## 2025-03-19 DIAGNOSIS — I97.130 CHF FOLLOWING CARDIAC SURGERY, POSTOP: ICD-10-CM

## 2025-03-19 DIAGNOSIS — I10 ESSENTIAL HYPERTENSION: ICD-10-CM

## 2025-03-19 DIAGNOSIS — R73.03 PREDIABETES: ICD-10-CM

## 2025-03-19 DIAGNOSIS — M54.50 CHRONIC BILATERAL LOW BACK PAIN WITHOUT SCIATICA: ICD-10-CM

## 2025-03-19 DIAGNOSIS — G89.29 CHRONIC BILATERAL LOW BACK PAIN WITHOUT SCIATICA: ICD-10-CM

## 2025-03-19 DIAGNOSIS — N40.0 BENIGN PROSTATIC HYPERPLASIA, UNSPECIFIED WHETHER LOWER URINARY TRACT SYMPTOMS PRESENT: ICD-10-CM

## 2025-03-19 DIAGNOSIS — E55.9 VITAMIN D DEFICIENCY: ICD-10-CM

## 2025-03-19 DIAGNOSIS — M08.00 JUVENILE RHEUMATOID ARTHRITIS (HCC): ICD-10-CM

## 2025-03-19 DIAGNOSIS — E78.2 MIXED HYPERLIPIDEMIA: ICD-10-CM

## 2025-03-19 DIAGNOSIS — K21.9 GASTROESOPHAGEAL REFLUX DISEASE, UNSPECIFIED WHETHER ESOPHAGITIS PRESENT: ICD-10-CM

## 2025-03-19 DIAGNOSIS — N52.9 ERECTILE DYSFUNCTION, UNSPECIFIED ERECTILE DYSFUNCTION TYPE: ICD-10-CM

## 2025-03-19 DIAGNOSIS — I25.10 CORONARY ARTERY DISEASE INVOLVING NATIVE CORONARY ARTERY OF NATIVE HEART WITHOUT ANGINA PECTORIS: ICD-10-CM

## 2025-03-19 DIAGNOSIS — Z87.891 PERSONAL HISTORY OF TOBACCO USE, PRESENTING HAZARDS TO HEALTH: ICD-10-CM

## 2025-03-19 DIAGNOSIS — E03.9 ACQUIRED HYPOTHYROIDISM: Primary | ICD-10-CM

## 2025-03-19 PROBLEM — I60.9 SAH (SUBARACHNOID HEMORRHAGE) (HCC): Status: RESOLVED | Noted: 2021-03-17 | Resolved: 2025-03-19

## 2025-03-19 RX ORDER — DULOXETIN HYDROCHLORIDE 60 MG/1
60 CAPSULE, DELAYED RELEASE ORAL DAILY
Qty: 90 CAPSULE | Refills: 3 | Status: SHIPPED | OUTPATIENT
Start: 2025-03-19

## 2025-03-19 RX ORDER — TADALAFIL 20 MG/1
20 TABLET ORAL PRN
COMMUNITY
End: 2025-03-19 | Stop reason: SDUPTHER

## 2025-03-19 RX ORDER — SEMAGLUTIDE 0.68 MG/ML
0.25 INJECTION, SOLUTION SUBCUTANEOUS WEEKLY
Qty: 9 ML | Refills: 3 | Status: SHIPPED | OUTPATIENT
Start: 2025-03-19

## 2025-03-19 RX ORDER — FUROSEMIDE 40 MG/1
80 TABLET ORAL DAILY PRN
Qty: 180 TABLET | Refills: 3 | Status: SHIPPED | OUTPATIENT
Start: 2025-03-19

## 2025-03-19 RX ORDER — TADALAFIL 20 MG/1
20 TABLET ORAL PRN
Qty: 90 TABLET | Refills: 3 | Status: SHIPPED | OUTPATIENT
Start: 2025-03-19 | End: 2025-03-19

## 2025-03-19 RX ORDER — RANOLAZINE 500 MG/1
500 TABLET, EXTENDED RELEASE ORAL 2 TIMES DAILY
Qty: 180 TABLET | Refills: 3 | Status: SHIPPED | OUTPATIENT
Start: 2025-03-19

## 2025-03-19 RX ORDER — ALBUTEROL SULFATE 90 UG/1
INHALANT RESPIRATORY (INHALATION)
Qty: 8.5 EACH | Refills: 5 | Status: SHIPPED | OUTPATIENT
Start: 2025-03-19

## 2025-03-19 RX ORDER — CHOLECALCIFEROL (VITAMIN D3) 50 MCG
2000 TABLET ORAL DAILY
Qty: 90 TABLET | Refills: 3 | Status: SHIPPED | OUTPATIENT
Start: 2025-03-19 | End: 2026-03-14

## 2025-03-19 RX ORDER — LISINOPRIL 5 MG/1
5 TABLET ORAL DAILY
Qty: 90 TABLET | Refills: 3 | Status: SHIPPED | OUTPATIENT
Start: 2025-03-19

## 2025-03-19 RX ORDER — FLUTICASONE PROPIONATE AND SALMETEROL 250; 50 UG/1; UG/1
1 POWDER RESPIRATORY (INHALATION) EVERY 12 HOURS
Qty: 180 EACH | Refills: 3 | Status: SHIPPED | OUTPATIENT
Start: 2025-03-19

## 2025-03-19 RX ORDER — LEVOTHYROXINE SODIUM 200 UG/1
TABLET ORAL
Qty: 90 TABLET | Refills: 3 | Status: SHIPPED | OUTPATIENT
Start: 2025-03-19

## 2025-03-19 RX ORDER — CARVEDILOL 3.12 MG/1
3.12 TABLET ORAL 2 TIMES DAILY WITH MEALS
COMMUNITY
End: 2025-03-19 | Stop reason: ALTCHOICE

## 2025-03-19 RX ORDER — FAMOTIDINE 20 MG/1
TABLET, FILM COATED ORAL
Qty: 180 TABLET | Refills: 3 | Status: SHIPPED | OUTPATIENT
Start: 2025-03-19

## 2025-03-19 RX ORDER — ERGOCALCIFEROL 1.25 MG/1
50000 CAPSULE, LIQUID FILLED ORAL WEEKLY
COMMUNITY

## 2025-03-19 RX ORDER — FINASTERIDE 5 MG/1
5 TABLET, FILM COATED ORAL DAILY
Qty: 90 TABLET | Refills: 3 | Status: SHIPPED | OUTPATIENT
Start: 2025-03-19

## 2025-03-19 RX ORDER — CYCLOBENZAPRINE HCL 10 MG
10 TABLET ORAL 3 TIMES DAILY PRN
COMMUNITY

## 2025-03-19 RX ORDER — TAMSULOSIN HYDROCHLORIDE 0.4 MG/1
0.4 CAPSULE ORAL DAILY
Qty: 90 CAPSULE | Refills: 3 | Status: SHIPPED | OUTPATIENT
Start: 2025-03-19 | End: 2026-03-14

## 2025-03-19 RX ORDER — CARVEDILOL 3.12 MG/1
3.12 TABLET ORAL 2 TIMES DAILY WITH MEALS
Qty: 180 TABLET | Refills: 3 | Status: SHIPPED | OUTPATIENT
Start: 2025-03-19

## 2025-03-19 RX ORDER — LISINOPRIL 5 MG/1
5 TABLET ORAL DAILY
COMMUNITY
End: 2025-03-19 | Stop reason: ALTCHOICE

## 2025-03-19 RX ORDER — ALBUTEROL SULFATE 90 UG/1
2 INHALANT RESPIRATORY (INHALATION) EVERY 6 HOURS PRN
COMMUNITY
End: 2025-03-19

## 2025-03-19 ASSESSMENT — PATIENT HEALTH QUESTIONNAIRE - PHQ9
SUM OF ALL RESPONSES TO PHQ QUESTIONS 1-9: 0
1. LITTLE INTEREST OR PLEASURE IN DOING THINGS: NOT AT ALL
2. FEELING DOWN, DEPRESSED OR HOPELESS: NOT AT ALL
SUM OF ALL RESPONSES TO PHQ QUESTIONS 1-9: 0

## 2025-03-19 NOTE — PROGRESS NOTES
advised about behavior change, including information about personal health harms, usage of appropriate cessation measures and benefits of cessation.  Time spent (minutes): 4

## 2025-03-25 ENCOUNTER — TELEPHONE (OUTPATIENT)
Age: 70
End: 2025-03-25

## 2025-03-25 DIAGNOSIS — R42 ORTHOSTATIC DIZZINESS: Primary | ICD-10-CM

## 2025-03-25 NOTE — TELEPHONE ENCOUNTER
Received call from Candace  at [facility] Pt was there with her on Speaker phone. She is concerned about patients medications and states she is concerned that the patient is possibly not taking the medications at the correct times or splitting them up as he should. She states that the patient has been deteriorating since his last appointment such as dizziness and instability.  She is very concerned that the patient is going to begin to have falls. Pt has been obtaining blood pressures, BP's from 114/54, 155/72, 141/81, 149/77. States pts becomes dizzy and has to sit down and feels as though he is going to fall if he does not sit down. Antonieta has also reached out to [facility] on Friday but is still awaiting a call back. Please advise ASAP. This nurse did advise that if the dizziness become worse or any other symptoms arise the patient would possibly need to report to the ED for evaluation.

## 2025-03-25 NOTE — TELEPHONE ENCOUNTER
Informed Candace that HHC and PT ordered and she said since he is at the shelter they do not have the capability . States that if his healthcare warranted maybe assisted living would be an option but patient not sure.  Michelle Rock will be making a visit with him and will advise us on what options might be available. Please keep Candace informed.

## 2025-03-26 ENCOUNTER — TELEPHONE (OUTPATIENT)
Dept: OTHER | Age: 70
End: 2025-03-26

## 2025-03-26 ENCOUNTER — PARAMEDICINE (OUTPATIENT)
Dept: OTHER | Age: 70
End: 2025-03-26

## 2025-03-26 DIAGNOSIS — R73.03 PREDIABETES: Primary | ICD-10-CM

## 2025-03-26 DIAGNOSIS — E55.9 VITAMIN D DEFICIENCY: ICD-10-CM

## 2025-03-26 RX ORDER — AVOBENZONE, HOMOSALATE, OCTISALATE, OCTOCRYLENE 30; 40; 45; 26 MG/ML; MG/ML; MG/ML; MG/ML
1 CREAM TOPICAL DAILY
Qty: 100 EACH | Refills: 5 | Status: SHIPPED | OUTPATIENT
Start: 2025-03-26

## 2025-03-26 RX ORDER — BLOOD-GLUCOSE METER
1 KIT MISCELLANEOUS DAILY
Qty: 1 KIT | Refills: 0 | Status: SHIPPED | OUTPATIENT
Start: 2025-03-26

## 2025-03-26 RX ORDER — ERGOCALCIFEROL 1.25 MG/1
50000 CAPSULE, LIQUID FILLED ORAL WEEKLY
Qty: 12 CAPSULE | Refills: 3 | Status: SHIPPED | OUTPATIENT
Start: 2025-03-26

## 2025-03-26 RX ORDER — GLUCOSAMINE HCL/CHONDROITIN SU 500-400 MG
CAPSULE ORAL
Qty: 100 STRIP | Refills: 5 | Status: SHIPPED | OUTPATIENT
Start: 2025-03-26

## 2025-03-26 NOTE — TELEPHONE ENCOUNTER
Returned call to [facility] yesterday. Patient has questions regarding his medications. Patient feels dizzy most of the time. CP will go down to [facility] this morning at 8:00 to review medications.

## 2025-03-26 NOTE — PROGRESS NOTES
Had a visit with the patient today at [facility]. Patient is currently homeless. Reviewed all medications with the patient. There were 5 medications that the patient has that had a discrepancy.     Flexeril - patient is taking BID and not PRN. Pt states he has muscles spasms when he walks.     Lasix - patient is taking BID and not PRN. States he has consistent bilateral swelling in legs and feet.     Ozempic - patient states he does not take. This is on his med list.     Carvedilol - need to confirm dosage is 3.125 or 6.25 or 12.5.    Ranolazine - taking 500 once a day. Should be BID. Also clarify 500 or 1000 tablets.     Vit D - how many units should he be taking.     Most of these discrepancies are from AVS from Dr. Joens 3/14/25 and Dr. Anila DIANAS on 3/19/25.    CP advised patient to continue taking what he normally takes until meds can be confirmed.     Patient c/o dizziness when he stands up. CP did orthostatics twice, one manually and the other with his blood pressure cuff. Both were positive. Did not take heart rate.     Manual:   160/80  Laying down  160/82  Sitting  100/72  Standing    Automatic:  146/86  Laying down   132/79  Sitting  94/51  Standing      Patient had NOT taken any of his morning meds before orthostatics. Advised patient that CP would reach out to PCP for clarification of medications.     Will reach out to Candace at [facility] today to advise [facility] cannot see the patient at the shelter.

## 2025-03-27 ENCOUNTER — PARAMEDICINE (OUTPATIENT)
Dept: OTHER | Age: 70
End: 2025-03-27

## 2025-03-27 NOTE — PROGRESS NOTES
Spoke patients PCP. The following adjustment were made to pt's medications.     Lasix - hold lasix for one day then continue with one every other day.   2.   Carvedilol - 3.125 BID  3.   Ranolazine - 500 BID  4.   Vit D 50,000 units once a week  5.   Take Ozempic as indicated. Do not start         until patient has glucose monitor.     Visited with patient again at the [facility]. Went over all changes with patient and staff. Advised patient that I would come see him once a week to monitor his blood pressure and edema until he can find a home and HHC can be ordered.     Will continue to follow

## 2025-03-31 ENCOUNTER — TELEPHONE (OUTPATIENT)
Dept: OTHER | Age: 70
End: 2025-03-31

## 2025-03-31 NOTE — TELEPHONE ENCOUNTER
Received a call from Benita Zaldivar [phone] [facility]. They are able to see the patient at the [facility]. They will be monitoring his blood pressures. Will cancel my appointment with him. Will continue to follow for a while.

## 2025-04-02 ENCOUNTER — TELEPHONE (OUTPATIENT)
Dept: CARDIOLOGY CLINIC | Age: 70
End: 2025-04-02

## 2025-04-02 DIAGNOSIS — I97.130 CHF FOLLOWING CARDIAC SURGERY, POSTOP: ICD-10-CM

## 2025-04-02 NOTE — TELEPHONE ENCOUNTER
Dr. Jennifer Jones       [facility] calls:      MRN:    [id number]         Chest Pain:    Are you experiencing new onset chest pain: Yes    What type of pain is it? - Sharp Pain    How lon hours     PainScale:8

## 2025-04-02 NOTE — TELEPHONE ENCOUNTER
Spoke with patient he is having some chest pain. States it is not too bad. Does not want to go to [facility]. OV moved sooner due to symptoms. Current VS  172/82 HR 72 has a headache

## 2025-04-03 RX ORDER — CARVEDILOL 6.25 MG/1
6.25 TABLET ORAL 2 TIMES DAILY WITH MEALS
Qty: 60 TABLET | Refills: 3 | Status: SHIPPED | OUTPATIENT
Start: 2025-04-03

## 2025-04-03 NOTE — TELEPHONE ENCOUNTER
B/P is elevated which can be contributing to symptoms.  Increase carvedilol to 6.25 mg twice daily.

## 2025-04-08 ENCOUNTER — OFFICE VISIT (OUTPATIENT)
Dept: CARDIOLOGY CLINIC | Age: 70
End: 2025-04-08
Payer: MEDICARE

## 2025-04-08 VITALS
WEIGHT: 188 LBS | OXYGEN SATURATION: 96 % | SYSTOLIC BLOOD PRESSURE: 122 MMHG | BODY MASS INDEX: 27.85 KG/M2 | HEIGHT: 69 IN | HEART RATE: 71 BPM | DIASTOLIC BLOOD PRESSURE: 70 MMHG

## 2025-04-08 DIAGNOSIS — I10 ESSENTIAL HYPERTENSION: Primary | ICD-10-CM

## 2025-04-08 PROCEDURE — 3017F COLORECTAL CA SCREEN DOC REV: CPT | Performed by: INTERNAL MEDICINE

## 2025-04-08 PROCEDURE — 4004F PT TOBACCO SCREEN RCVD TLK: CPT | Performed by: INTERNAL MEDICINE

## 2025-04-08 PROCEDURE — 93000 ELECTROCARDIOGRAM COMPLETE: CPT | Performed by: INTERNAL MEDICINE

## 2025-04-08 PROCEDURE — 1124F ACP DISCUSS-NO DSCNMKR DOCD: CPT | Performed by: INTERNAL MEDICINE

## 2025-04-08 PROCEDURE — G8428 CUR MEDS NOT DOCUMENT: HCPCS | Performed by: INTERNAL MEDICINE

## 2025-04-08 PROCEDURE — G8417 CALC BMI ABV UP PARAM F/U: HCPCS | Performed by: INTERNAL MEDICINE

## 2025-04-08 PROCEDURE — 99214 OFFICE O/P EST MOD 30 MIN: CPT | Performed by: INTERNAL MEDICINE

## 2025-04-08 PROCEDURE — 3078F DIAST BP <80 MM HG: CPT | Performed by: INTERNAL MEDICINE

## 2025-04-08 PROCEDURE — 3074F SYST BP LT 130 MM HG: CPT | Performed by: INTERNAL MEDICINE

## 2025-04-08 NOTE — PROGRESS NOTES
Jennifer Jones MD        OFFICE  FOLLOWUP NOTE    Chief complaints: patient is here for management of CAD, S/P cabg , HTN, DYSLPIDEMIA, subarachnoid hemorrhage, syncoe     Subjective: patient feels better, + chest pain, no shortness of breath, no dizziness, no palpitations    HPI Keyur is a 69 y.o.year old who  has a past medical history of CAD (coronary artery disease), Chest pain, COPD (chronic obstructive pulmonary disease) ([facility]), H/O cardiac catheterization, H/O cardiovascular stress test, H/O echocardiogram, H/O echocardiogram, H/O exercise stress test, Hx of cardiovascular stress test, Hx of Doppler ultrasound, Hyperlipidemia, Hypertension, IBS (irritable bowel syndrome), Orthostatic hypotension, Pancreatitis, Rheumatoid arthritis ([facility]), S/P CABG x 3, SAH (subarachnoid hemorrhage) ([facility]), SOB (shortness of breath), and Thyroid disease. and presents for management of CAD, S/P cabg , HTN, DYSLPIDEMIA, subarachnoid hemorrhage, syncoe  which are well controlled      Current Outpatient Medications   Medication Sig Dispense Refill    carvedilol (COREG) 6.25 MG tablet Take 1 tablet by mouth 2 times daily (with meals) 60 tablet 3    glucose monitoring kit 1 kit by Does not apply route daily 1 kit 0    vitamin D (ERGOCALCIFEROL) 1.25 MG (29827 UT) CAPS capsule Take 1 capsule by mouth once a week 12 capsule 3    Lancets MISC 1 each by Does not apply route daily 100 each 5    blood glucose monitor strips Test 1 times a day & as needed for symptoms of irregular blood glucose. Dispense sufficient amount for indicated testing frequency plus additional to accommodate PRN testing needs. 100 strip 5    levothyroxine (SYNTHROID) 200 MCG tablet TAKE 1 TABLET BY MOUTH EVERY DAY 90 tablet 3    Semaglutide,0.25 or 0.5MG/DOS, (OZEMPIC, 0.25 OR 0.5 MG/DOSE,) 2 MG/3ML SOPN Inject 0.25 mg as directed once a week 9 mL 3    tamsulosin (FLOMAX) 0.4 MG capsule Take 1 capsule by mouth daily 90 capsule 3    furosemide 
ahmed   Do we have the labs in their chart No  If we do not have the labs, ask where they were drawn     If we do not have these labs, you are retrieve these labs for the provider!    Do you need any prescriptions refilled? -     Do you have a surgery or procedure scheduled in the near future - No    Do use tobacco products? - Yes half alexander   Do you drink alcohol? - No  Do you use any illicit drugs? - No  Caffeine? - Yes  How much caffeine? .2  cups coffee       Check medication list thoroughly!!! AND RECONCILE OUTSIDE MEDICATIONS  If dose has changed change the entire order not just the MG  BE SURE TO ASK PATIENT IF THEY NEED MEDICATION REFILLS  Verify Pharmacy and update if incorrect    Add to every patient's \"wrap up\" the following dot phrase AFTERVISITCARDIOHEARTHOUSE and ensure we explain this to our patients

## 2025-04-09 NOTE — TELEPHONE ENCOUNTER
Patient does not have dexAMETHasone 6 MG and it is reported that he should be taking it. Want to know if the Doctor can send in a prescription.

## 2025-04-10 RX ORDER — DEXAMETHASONE 6 MG/1
6 TABLET ORAL
OUTPATIENT
Start: 2025-04-10

## 2025-04-24 ENCOUNTER — OFFICE VISIT (OUTPATIENT)
Age: 70
End: 2025-04-24
Payer: MEDICARE

## 2025-04-24 VITALS
OXYGEN SATURATION: 90 % | HEIGHT: 69 IN | DIASTOLIC BLOOD PRESSURE: 30 MMHG | WEIGHT: 187 LBS | SYSTOLIC BLOOD PRESSURE: 70 MMHG | BODY MASS INDEX: 27.7 KG/M2 | HEART RATE: 90 BPM

## 2025-04-24 DIAGNOSIS — I95.9 HYPOTENSION, UNSPECIFIED HYPOTENSION TYPE: Primary | ICD-10-CM

## 2025-04-24 PROCEDURE — 4004F PT TOBACCO SCREEN RCVD TLK: CPT | Performed by: PHYSICIAN ASSISTANT

## 2025-04-24 PROCEDURE — G8417 CALC BMI ABV UP PARAM F/U: HCPCS | Performed by: PHYSICIAN ASSISTANT

## 2025-04-24 PROCEDURE — 1159F MED LIST DOCD IN RCRD: CPT | Performed by: PHYSICIAN ASSISTANT

## 2025-04-24 PROCEDURE — 93005 ELECTROCARDIOGRAM TRACING: CPT | Performed by: PHYSICIAN ASSISTANT

## 2025-04-24 PROCEDURE — 3017F COLORECTAL CA SCREEN DOC REV: CPT | Performed by: PHYSICIAN ASSISTANT

## 2025-04-24 PROCEDURE — 93010 ELECTROCARDIOGRAM REPORT: CPT | Performed by: PHYSICIAN ASSISTANT

## 2025-04-24 PROCEDURE — 99214 OFFICE O/P EST MOD 30 MIN: CPT | Performed by: PHYSICIAN ASSISTANT

## 2025-04-24 PROCEDURE — G8427 DOCREV CUR MEDS BY ELIG CLIN: HCPCS | Performed by: PHYSICIAN ASSISTANT

## 2025-04-24 PROCEDURE — 3074F SYST BP LT 130 MM HG: CPT | Performed by: PHYSICIAN ASSISTANT

## 2025-04-24 PROCEDURE — 1124F ACP DISCUSS-NO DSCNMKR DOCD: CPT | Performed by: PHYSICIAN ASSISTANT

## 2025-04-24 PROCEDURE — 3078F DIAST BP <80 MM HG: CPT | Performed by: PHYSICIAN ASSISTANT

## 2025-04-24 ASSESSMENT — ENCOUNTER SYMPTOMS
VOMITING: 0
PHOTOPHOBIA: 0
BLOOD IN STOOL: 0
EYE REDNESS: 0
COUGH: 0
BACK PAIN: 0
SORE THROAT: 0
EYE PAIN: 0
EYE DISCHARGE: 0
ABDOMINAL PAIN: 0
CHEST TIGHTNESS: 0
DIARRHEA: 0
NAUSEA: 0
CONSTIPATION: 0
COLOR CHANGE: 0
WHEEZING: 0
RHINORRHEA: 0
SHORTNESS OF BREATH: 0

## 2025-04-24 NOTE — PROGRESS NOTES
Keyur Adrian (:  1955) is a 69 y.o. male,Established patient, here for evaluation of the following chief complaint(s):    Dizziness and Hypertension (BP has been going up and down )    ASSESSMENT/PLAN:  Assessment & Plan  1.  Hypotension  - Consistently low BP: supine 90/50, sitting 80/40, standing 70/30. Pulse stable at 80-90.  On recheck a few minutes later in sitting position, patient's blood pressure around 70/30.  - EKG: Sinus rhythm; PACs noted previously.  - Hypotension likely multifactorial including patient's medication regimen of Coreg, Imdur, Ranexa, Lasix, Flomax, finasteride.   - Advised immediate ED visit for hypotension and lightheadedness, patient adamantly refuses.  Patient is alert and oriented by 4, advised patient that symptomatic hypotension with lightheadedness comes with increased risk of syncope, fall, life-threatening injuries etc (prior SAH from similar issue).  Patient understands that not seeking higher level medical care at the emergency department includes risk of death; he continues to refuse transport to Emergency Department.  - With patient refusing to go to emergency department, advised he can hold Lasix for couple of days to see if this helps with blood pressure regulation. Encouraged to complete blood work ordered by PCP last month. Advised to seek immediate medical attention if condition worsens.    1. Hypotension, unspecified hypotension type  -     EKG 12 lead; Future      No follow-ups on file.      SUBJECTIVE/OBJECTIVE:    History of Present Illness  The patient presents for evaluation of dizziness.    Experiencing episodes of dizziness for 1.5 to 2 weeks, increasing in frequency. Described as faintness with transient visual near \"blackouts.\" Near-syncope reported, no actual syncope. Symptoms are postural, occurring upon standing, resolving after standing still. Facial flushing noted. Adequate hydration maintained, no palpitations or chest pain. Home BP readings as

## 2025-04-25 ENCOUNTER — PARAMEDICINE (OUTPATIENT)
Dept: OTHER | Age: 70
End: 2025-04-25

## 2025-04-25 NOTE — PROGRESS NOTES
Had a home visit with the patient today at [facility]. Patient is currently homeless. Asked patient why he refused to go the the ED when [facility] recommended that he go due to his BP. Patient stated \"I've been there twice before and they just sent me home\". Patient returned to the shelter after his appointment and fell due to dizziness. EMS was called and patient refused transport.     Reviewed his BP readings from his appointment yesterday. Reviewed Yosef Carney recommendations. Educated patient that he could continue to be dizzy and fall with his pressure so low. Encouraged patient to come to the ED next time he falls or has dizziness. Patient voiced understanding.     Shelter advised patient that he could be removed from the shelter on May 5th. This is the end of his 90 days. Up to this point patient has not wanted to go assisted living or another city or county. Advised patient that he could be removed on May 5th. Encouraged him to move wherever he could at this point. He can always move again. Patient was agreeable to look into [facility].

## 2025-04-30 ENCOUNTER — TELEPHONE (OUTPATIENT)
Dept: OTHER | Age: 70
End: 2025-04-30

## 2025-04-30 NOTE — TELEPHONE ENCOUNTER
Referral was made to [employer].     10:23 Call made to [facility] to see why patient was denied for assisted living. No answer. Left message for Albino Shaffer for return call.     11:21 Galen returned CP call. She was not aware that patient was declined. Will start the process to see if he qualifies.     12[facility]

## 2025-05-08 ENCOUNTER — PARAMEDICINE (OUTPATIENT)
Dept: OTHER | Age: 70
End: 2025-05-08

## 2025-05-08 RX ORDER — ISOSORBIDE MONONITRATE 60 MG/1
60 TABLET, EXTENDED RELEASE ORAL DAILY
Qty: 90 TABLET | Refills: 1 | Status: SHIPPED | OUTPATIENT
Start: 2025-05-08

## 2025-05-08 NOTE — PROGRESS NOTES
Met with patient and  at the [facility]. Patient was denied by [employer]. He was also denied for Assisted Living at [facility]. Patient received word that there might be an opening at [facility]. They will keep CP updated. Patient states his blood pressure is still up and down. Patient denies any recent falls. Will continue to follow until patient frinds housing.

## 2025-05-22 RX ORDER — CYCLOBENZAPRINE HCL 10 MG
TABLET ORAL
Qty: 90 TABLET | Refills: 5 | OUTPATIENT
Start: 2025-05-22

## 2025-07-02 NOTE — PROGRESS NOTES
MRN: [id number]  Name: Keyur Adrian  : 1955    Insurance: Payor: MEDICARE /  /  /      Phone #: [phone]  Provider: GETACHEW Kimbrough CNP     Date of Visit: 2025    Reason for visit:  Recent Hospitalization Date:    Reason for Hospitalization:    Last EK  Type of Device:       Vitals BP HR O2% WT HT ORTHO BP LYING ORTHO BP SITTING ORTHO BP SITTING   Today's Findings           Patients work up- Check List     Testing Last Date Completed Date Expected  ([place] [facility]) Additional Notes    MA to document For provider to complete Either MA or Provider    Carotid Duplex  STAT 1 WK 6 MTH       THIS WK 2 WK 1 YEAR     Cardiac CTA  STAT 1 WK 6 MTH       THIS WK 2 WK 1 YEAR     Cardiac CT Calcium scoring  STAT 1 WK 6 MTH       THIS WK 2 WK 1 YEAR     CTA Chest, Abdomen & Pelvis  STAT 1 WK 6 MTH       THIS WK 2 WK 1 YEAR     CT Chest IV w/ Contrast  STAT 1 WK 6 MTH       THIS WK 2 WK 1 YEAR     CT Chest w/o Contrast  STAT 1 WK 6 MTH       THIS WK 2 WK 1 YEAR     CXR  STAT 1 WK 6 MTH       THIS WK 2 WK 1 YEAR     ECHO  Stress Complete Limited     MRI- Cardiac  STAT 1 WK 6 MTH       THIS WK 2 WK 1 YEAR     MUGA Scan  STAT 1 WK 6 MTH       THIS WK 2 WK 1 YEAR     Nuclear Stress  Lexiscan Cardiolite     PFT  STAT 1 WK 6 MTH       THIS WK 2 WK 1 YEAR     Treadmill Stress Test  STAT 1 WK 6 MTH       THIS WK 2 WK 1 YEAR     Vascular Duplex  Lower: Right Left Bilat       Upper: Right Left Bilat     Other Test Not Listed:    Monitors Last Date Completed Day's Request/Ordered     Holter  Short term 24 hours 48 hours      Long term 3 days 7 days 14 days   Event   (1-30 days)      Procedures Last Date Performed Procedure Details Date Expected   Additional Notes    ASD Closure        Carotid Angio        Cardioversion        Heart Cath  R L R&L      Peripheral Angio  R L      PFO Closure        PTCA/PCI        BETH        BETH/Cardioversion        Venogram        Tilt Table        Other Type of

## 2025-07-29 ENCOUNTER — PARAMEDICINE (OUTPATIENT)
Dept: OTHER | Age: 70
End: 2025-07-29

## 2025-07-29 NOTE — PROGRESS NOTES
No contact with the patient for close to 3 months. Patient was staying at the [facility] and was being assisted by the CM. Will remove the patient from the program. Should another referral be made another file will be opened.

## 2025-08-01 ENCOUNTER — OFFICE VISIT (OUTPATIENT)
Age: 70
End: 2025-08-01

## 2025-08-01 VITALS
HEART RATE: 58 BPM | SYSTOLIC BLOOD PRESSURE: 156 MMHG | DIASTOLIC BLOOD PRESSURE: 86 MMHG | WEIGHT: 189 LBS | BODY MASS INDEX: 27.91 KG/M2 | OXYGEN SATURATION: 99 %

## 2025-08-01 DIAGNOSIS — E03.9 ACQUIRED HYPOTHYROIDISM: ICD-10-CM

## 2025-08-01 DIAGNOSIS — N52.9 ERECTILE DYSFUNCTION, UNSPECIFIED ERECTILE DYSFUNCTION TYPE: ICD-10-CM

## 2025-08-01 DIAGNOSIS — K21.9 GASTROESOPHAGEAL REFLUX DISEASE, UNSPECIFIED WHETHER ESOPHAGITIS PRESENT: ICD-10-CM

## 2025-08-01 DIAGNOSIS — I50.42 CHRONIC COMBINED SYSTOLIC AND DIASTOLIC CONGESTIVE HEART FAILURE (HCC): Primary | ICD-10-CM

## 2025-08-01 DIAGNOSIS — N40.0 BENIGN PROSTATIC HYPERPLASIA, UNSPECIFIED WHETHER LOWER URINARY TRACT SYMPTOMS PRESENT: ICD-10-CM

## 2025-08-01 DIAGNOSIS — I10 ESSENTIAL HYPERTENSION: ICD-10-CM

## 2025-08-01 DIAGNOSIS — E78.2 MIXED HYPERLIPIDEMIA: ICD-10-CM

## 2025-08-01 ASSESSMENT — ENCOUNTER SYMPTOMS
ABDOMINAL PAIN: 0
DIARRHEA: 0
BLOOD IN STOOL: 0
COUGH: 1
SHORTNESS OF BREATH: 0
CHEST TIGHTNESS: 0
WHEEZING: 0
ABDOMINAL DISTENTION: 0
VOMITING: 0
CONSTIPATION: 0
NAUSEA: 0

## 2025-08-01 NOTE — PROGRESS NOTES
Keyur Adrian (:  1955) is a 69 y.o. male,Established patient, here for evaluation of the following chief complaint(s):    Medication Check (Pt has not taken any meds for 3 days. Pt only wants to take certain meds. Needs to talk about meds. )      SUBJECTIVE/OBJECTIVE:  Pt presents for medication reconciliation visit - was being followed community paramedic however she has not had contact with pt for 3 months and he was discharged from program. [facility] - pt has stopped taking medications 3 days ago - does not want to take all of them - just wants to pick and choose - states he has felt better for the past three days - denies any acute concerns - only wants to take inhalers - vitamin -d - atorvastatin - lisinopril- asa- lasix- levothyroxine and carvedilol - Imdur- ranexa         No Known Allergies     Current Outpatient Medications   Medication Sig Dispense Refill    isosorbide mononitrate (IMDUR) 60 MG extended release tablet Take 1 tablet by mouth daily 90 tablet 1    carvedilol (COREG) 6.25 MG tablet Take 1 tablet by mouth 2 times daily (with meals) 60 tablet 3    glucose monitoring kit 1 kit by Does not apply route daily 1 kit 0    vitamin D (ERGOCALCIFEROL) 1.25 MG (45991 UT) CAPS capsule Take 1 capsule by mouth once a week 12 capsule 3    Lancets MISC 1 each by Does not apply route daily 100 each 5    blood glucose monitor strips Test 1 times a day & as needed for symptoms of irregular blood glucose. Dispense sufficient amount for indicated testing frequency plus additional to accommodate PRN testing needs. 100 strip 5    levothyroxine (SYNTHROID) 200 MCG tablet TAKE 1 TABLET BY MOUTH EVERY DAY 90 tablet 3    furosemide (LASIX) 40 MG tablet Take 2 tablets by mouth daily as needed (leg swelling) 180 tablet 3    fluticasone-salmeterol (ADVAIR) 250-50 MCG/ACT AEPB diskus inhaler Inhale 1 puff into the lungs in the morning and 1 puff in the evening. 180 each 3    lisinopril

## (undated) DEVICE — CANNULA PERF L5.5IN DIA9FR AORT ROOT AG STD TIP W/ VENT LN

## (undated) DEVICE — DECANTER FLD 9IN ST BG FOR ASEP TRNSF OF FLD

## (undated) DEVICE — TUBING INSUFFLATOR HEAT HI FLO SET PNEUMOCLEAR

## (undated) DEVICE — BLADE SAW STRNM 10X35X0.6MM

## (undated) DEVICE — SUTURE ETHIB EXCL X BR GRN V-7 DA 2-0 30 PX977 PX977H

## (undated) DEVICE — CONNECTOR DRNGE W3/8-0.5XH3/16XL3/16IN 2:1 SIL CARD STR

## (undated) DEVICE — ELECTRODE ES L4IN PTFE INSUL BLDE W/ SFTY SL DISP EDGE

## (undated) DEVICE — MPS® DELIVERY SET W/ARREST AGENT AND ADDITIVE CASSETTES, HEAT EXCHANGER & 10 FT. DELIVERY TUBING: Brand: MPS

## (undated) DEVICE — DRAIN SURG SGL COLL PT TB FOR ATS BG OASIS

## (undated) DEVICE — SUTURE VCRL SZ 2 L27IN ABSRB UD L65MM TP-1 1/2 CIR J849G

## (undated) DEVICE — TOWEL,OR,DSP,ST,WHITE,DLX,XR,4/PK,20PK/C: Brand: MEDLINE

## (undated) DEVICE — CLIP SM RED INTERN HMOCLP TITAN LIGATING

## (undated) DEVICE — 3M™ STERI-DRAPE™ INSTRUMENT POUCH 1018: Brand: STERI-DRAPE™

## (undated) DEVICE — SUTURE VCRL 2-0 L36IN ABSRB UD CTX L48MM 1/2 CIR TAPERPOINT J979H

## (undated) DEVICE — TOWEL,OR,DSP,ST,BLUE,STD,6/PK,12PK/CS: Brand: MEDLINE

## (undated) DEVICE — WAX SURG 2.5GM HEMSTAT BNE BEESWAX PARAFFIN ISO PALMITATE

## (undated) DEVICE — SUTURE SURGLON SZ 1 L30IN NONABSORBABLE BLK NO NDL NYL PRE 8886191971

## (undated) DEVICE — CATHETER ANGIO 6FR L100CM DIA0.056IN FL4 CRV VASC ACCS EXPO

## (undated) DEVICE — SUTURE MCRYL SZ 3-0 L27IN ABSRB UD L24MM PS-1 3/8 CIR PRIM Y936H

## (undated) DEVICE — TAPE,CLOTH/SILK,CURAD,3"X10YD,LF,40/CS: Brand: CURAD

## (undated) DEVICE — BLADE CLIPPER GEN PURP NS

## (undated) DEVICE — SUTURE ETHBND EXCEL SZ 2-0 L36IN NONABSORBABLE GRN L26MM SH X523H

## (undated) DEVICE — CANNULA PERF 20FR L3/8IN ELONG TAPR DIFFUSE TIP WIREWOUND

## (undated) DEVICE — DRAPE,UTILITY,XL,4/PK,STERILE: Brand: MEDLINE

## (undated) DEVICE — TOTAL TRAY, 16FR 10ML SIL FOLEY, URN: Brand: MEDLINE

## (undated) DEVICE — GLOVE SURG SZ 7 CRM LTX FREE POLYISOPRENE POLYMER BEAD ANTI

## (undated) DEVICE — KIT INTRO 9FR L4IN POLYUR PERC SHTH RADPQ W INTEGR HEMSTAS

## (undated) DEVICE — Z INACTIVE USE 2540311 LEAD PACE L475MM CHN A OR V MYOCARDIAL STEROID ELUT SIL

## (undated) DEVICE — CONNECTOR PIPE 3/8X1/2IN REDUC STR

## (undated) DEVICE — CATHETER ANGIO 6FR L100CM DIA0.056IN FR4 CRV VASC ACCS EXPO

## (undated) DEVICE — OPEN HEART PACK: Brand: MEDLINE INDUSTRIES, INC.

## (undated) DEVICE — BLOOD TRANSFUSION FILTER: Brand: HAEMONETICS

## (undated) DEVICE — Z INACTIVE USE 2660664 SOLUTION IRRIG 3000ML 0.9% SOD CHL USP UROMATIC PLAS CONT

## (undated) DEVICE — BLANKET THER AD W24XL60IN FAB COVERING SUP SFT ULT THN LTWT

## (undated) DEVICE — ADHESIVE SKIN CLSR 0.7ML TOP DERMBND ADV

## (undated) DEVICE — Z INACTIVE USE 2641837 CLIP LIG M BLU TI HRT SHP WIRE HORZ 600 PER BX

## (undated) DEVICE — BAG AUTOTRNS 600ML BLD SGL CHMBR 3 PRT PLAS DEHP PVC

## (undated) DEVICE — SENSOR OXMTR SM AD DISP FOR INVOS SYS

## (undated) DEVICE — CANNULA PERF L15IN DIA29FR VEN 3 STG THN WALL DSGN W  VENT

## (undated) DEVICE — SUTURE PROL SZ 7-0 L18IN NONABSORBABLE BLU L9.3MM BV-1 3/8 8701H

## (undated) DEVICE — ZINACTIVE USE 2539609 APPLICATOR MEDICATED 10.5 CC SOLUTION HI LT ORNG CHLORAPREP

## (undated) DEVICE — Device

## (undated) DEVICE — PLEDGET VASC W3/16XL3/8IN THK1/16IN PTFE SFT

## (undated) DEVICE — ROTATING SURGICAL PUNCHES, 1 PER POUCH: Brand: A&E MEDICAL / ROTATING SURGICAL PUNCHES

## (undated) DEVICE — INTENDED FOR TISSUE SEPARATION, AND OTHER PROCEDURES THAT REQUIRE A SHARP SURGICAL BLADE TO PUNCTURE OR CUT.: Brand: BARD-PARKER ® STAINLESS STEEL BLADES

## (undated) DEVICE — SUTURE S STL SZ 5 L18IN NONABSORBABLE SIL V-40 L48MM 1/2 M650G

## (undated) DEVICE — SINGLE PORT MANIFOLD: Brand: NEPTUNE 2

## (undated) DEVICE — GLOVE SURG SZ 65 THK91MIL LTX FREE SYN POLYISOPRENE

## (undated) DEVICE — DRAIN,WOUND,ROUND,24FR,5/16",FULL-FLUTED: Brand: MEDLINE

## (undated) DEVICE — GLOVE ORANGE PI 7   MSG9070

## (undated) DEVICE — Device: Brand: VIRTUOSAPH PLUS WITH RADIAL INDICATION

## (undated) DEVICE — CATHETER DIAG 6FR L110CM ID0.056IN TRILON VENT PGTL 145

## (undated) DEVICE — SPONGE LAP W18XL18IN WHT COT 4 PLY FLD STRUNG RADPQ DISP ST

## (undated) DEVICE — MARKER SURG SKIN UTIL REGULAR/FINE 2 TIP W/ RUL AND 9 LBL

## (undated) DEVICE — SUTURE S STL SZ 5 L18IN NONABSORBABLE SIL L48MM CCS 1/4 CIR M657G

## (undated) DEVICE — ANGIOGRAPHY KIT CUST MANIFOLD

## (undated) DEVICE — COVER US PRB W15XL120CM W/ GEL RUBBERBAND TAPE STRP FLD GEN

## (undated) DEVICE — PINNACLE R/O II INTRODUCER SHEATH WITH RADIOPAQUE MARKER: Brand: PINNACLE

## (undated) DEVICE — SUTURE NRLN SZ 1 L18IN NONABSORBABLE BLK L36MM CT-1 1/2 CIR C520D

## (undated) DEVICE — BLADE OPHTH GRN ROUNDED TIP 1 SIDE SHRP GRINDLESS MINI-BLDE

## (undated) DEVICE — SUTURE PROL SZ 5-0 L18IN NONABSORBABLE BLU C-1 L13MM 3/8 8717H

## (undated) DEVICE — GUIDEWIRE VASC L150CM DIA0.035IN FLX TIP L7CM PTFE STR FIX

## (undated) DEVICE — ELECTRODE ES AD CRDLSS PT RET REM POLYHESIVE

## (undated) DEVICE — KIT CVC AD 7FR L20CM POLYUR BLU FLEXTIP ANTIMIC MULTILUMEN

## (undated) DEVICE — GLOVE SURG SZ 6 THK91MIL LTX FREE SYN POLYISOPRENE ANTI

## (undated) DEVICE — SWAN-GANZ CCOMBO V THERMODILUTION CATHETER: Brand: SWAN-GANZ CCOMBO V

## (undated) DEVICE — FOGARTY - HYDRAGRIP SURGICAL - CLAMP INSERTS: Brand: FOGARTY SOFTJAW

## (undated) DEVICE — APPLICATOR MEDICATED 26 CC SOLUTION HI LT ORNG CHLORAPREP

## (undated) DEVICE — BLADE ES L2.75IN ELASTOMERIC COAT DURABLE BEND UPTO 90DEG

## (undated) DEVICE — SUTURE PROL SZ 4-0 L36IN NONABSORBABLE BLU L26MM SH 1/2 CIR 8521H

## (undated) DEVICE — SUTURE NONABSORBABLE MONOFILAMENT 6-0 C-1 4X18 IN PROLENE M8718

## (undated) DEVICE — TUBING SUCT 9 11FR L475IN RIG SHFT MINI SUC TIP DLP

## (undated) DEVICE — 6 FOOT DISPOSABLE EXTENSION CABLE WITH SAFE CONNECT / SCREW-DOWN